# Patient Record
Sex: MALE | Race: BLACK OR AFRICAN AMERICAN | Employment: OTHER | URBAN - METROPOLITAN AREA
[De-identification: names, ages, dates, MRNs, and addresses within clinical notes are randomized per-mention and may not be internally consistent; named-entity substitution may affect disease eponyms.]

---

## 2020-03-31 ENCOUNTER — HOSPITAL ENCOUNTER (INPATIENT)
Age: 65
LOS: 8 days | Discharge: HOME | DRG: 177 | End: 2020-04-08
Attending: EMERGENCY MEDICINE | Admitting: INTERNAL MEDICINE
Payer: MEDICARE

## 2020-03-31 ENCOUNTER — APPOINTMENT (OUTPATIENT)
Dept: GENERAL RADIOLOGY | Age: 65
DRG: 177 | End: 2020-03-31
Payer: MEDICARE

## 2020-03-31 PROBLEM — N18.6 ESRD ON DIALYSIS (HCC): Status: ACTIVE | Noted: 2020-03-31

## 2020-03-31 PROBLEM — Z99.2 ESRD ON DIALYSIS (HCC): Status: ACTIVE | Noted: 2020-03-31

## 2020-03-31 PROBLEM — J44.1 COPD EXACERBATION (HCC): Status: ACTIVE | Noted: 2020-03-31

## 2020-03-31 PROBLEM — J18.9 HCAP (HEALTHCARE-ASSOCIATED PNEUMONIA): Status: ACTIVE | Noted: 2020-03-31

## 2020-03-31 LAB
ANION GAP SERPL CALCULATED.3IONS-SCNC: 16 MMOL/L (ref 3–16)
BASE EXCESS VENOUS: 12.6 MMOL/L
BASOPHILS ABSOLUTE: 0 K/UL (ref 0–0.2)
BASOPHILS RELATIVE PERCENT: 0.8 %
BUN BLDV-MCNC: 13 MG/DL (ref 7–20)
CALCIUM SERPL-MCNC: 8.9 MG/DL (ref 8.3–10.6)
CARBOXYHEMOGLOBIN: 2 %
CHLORIDE BLD-SCNC: 94 MMOL/L (ref 99–110)
CO2: 29 MMOL/L (ref 21–32)
CREAT SERPL-MCNC: 2.6 MG/DL (ref 0.8–1.3)
EOSINOPHILS ABSOLUTE: 0.1 K/UL (ref 0–0.6)
EOSINOPHILS RELATIVE PERCENT: 1.4 %
GFR AFRICAN AMERICAN: 30
GFR NON-AFRICAN AMERICAN: 25
GLUCOSE BLD-MCNC: 93 MG/DL (ref 70–99)
HCO3 VENOUS: 38 MMOL/L (ref 23–29)
HCT VFR BLD CALC: 30.5 % (ref 40.5–52.5)
HEMOGLOBIN: 10.2 G/DL (ref 13.5–17.5)
LACTIC ACID: 2.1 MMOL/L (ref 0.4–2)
LYMPHOCYTES ABSOLUTE: 0.5 K/UL (ref 1–5.1)
LYMPHOCYTES RELATIVE PERCENT: 10 %
MAGNESIUM: 2.2 MG/DL (ref 1.8–2.4)
MCH RBC QN AUTO: 31.8 PG (ref 26–34)
MCHC RBC AUTO-ENTMCNC: 33.3 G/DL (ref 31–36)
MCV RBC AUTO: 95.5 FL (ref 80–100)
METHEMOGLOBIN VENOUS: 0.7 %
MONOCYTES ABSOLUTE: 0.3 K/UL (ref 0–1.3)
MONOCYTES RELATIVE PERCENT: 6.1 %
NEUTROPHILS ABSOLUTE: 4.1 K/UL (ref 1.7–7.7)
NEUTROPHILS RELATIVE PERCENT: 81.7 %
O2 CONTENT, VEN: 9 ML/DL
O2 SAT, VEN: 69 %
O2 THERAPY: ABNORMAL
PCO2, VEN: 49.3 MMHG (ref 40–50)
PDW BLD-RTO: 16.2 % (ref 12.4–15.4)
PH VENOUS: 7.49 (ref 7.35–7.45)
PLATELET # BLD: 131 K/UL (ref 135–450)
PMV BLD AUTO: 8.7 FL (ref 5–10.5)
PO2, VEN: 36 MMHG
POTASSIUM REFLEX MAGNESIUM: 3.3 MMOL/L (ref 3.5–5.1)
PRO-BNP: ABNORMAL PG/ML (ref 0–124)
PROCALCITONIN: 0.81 NG/ML (ref 0–0.15)
RBC # BLD: 3.2 M/UL (ref 4.2–5.9)
SODIUM BLD-SCNC: 139 MMOL/L (ref 136–145)
TCO2 CALC VENOUS: 39 MMOL/L
TROPONIN: 0.1 NG/ML
WBC # BLD: 5.1 K/UL (ref 4–11)

## 2020-03-31 PROCEDURE — 96375 TX/PRO/DX INJ NEW DRUG ADDON: CPT

## 2020-03-31 PROCEDURE — 85025 COMPLETE CBC W/AUTO DIFF WBC: CPT

## 2020-03-31 PROCEDURE — 1200000000 HC SEMI PRIVATE

## 2020-03-31 PROCEDURE — 93005 ELECTROCARDIOGRAM TRACING: CPT | Performed by: INTERNAL MEDICINE

## 2020-03-31 PROCEDURE — 2580000003 HC RX 258: Performed by: NURSE PRACTITIONER

## 2020-03-31 PROCEDURE — 83735 ASSAY OF MAGNESIUM: CPT

## 2020-03-31 PROCEDURE — 6370000000 HC RX 637 (ALT 250 FOR IP): Performed by: EMERGENCY MEDICINE

## 2020-03-31 PROCEDURE — 87205 SMEAR GRAM STAIN: CPT

## 2020-03-31 PROCEDURE — 6360000002 HC RX W HCPCS: Performed by: NURSE PRACTITIONER

## 2020-03-31 PROCEDURE — 83880 ASSAY OF NATRIURETIC PEPTIDE: CPT

## 2020-03-31 PROCEDURE — 99285 EMERGENCY DEPT VISIT HI MDM: CPT

## 2020-03-31 PROCEDURE — 80048 BASIC METABOLIC PNL TOTAL CA: CPT

## 2020-03-31 PROCEDURE — 2580000003 HC RX 258: Performed by: EMERGENCY MEDICINE

## 2020-03-31 PROCEDURE — 94640 AIRWAY INHALATION TREATMENT: CPT

## 2020-03-31 PROCEDURE — 0100U HC RESPIRPTHGN MULT REV TRANS & AMP PRB TECH 21 TRGT: CPT

## 2020-03-31 PROCEDURE — 71045 X-RAY EXAM CHEST 1 VIEW: CPT

## 2020-03-31 PROCEDURE — 84145 PROCALCITONIN (PCT): CPT

## 2020-03-31 PROCEDURE — 94664 DEMO&/EVAL PT USE INHALER: CPT

## 2020-03-31 PROCEDURE — 96366 THER/PROPH/DIAG IV INF ADDON: CPT

## 2020-03-31 PROCEDURE — 83605 ASSAY OF LACTIC ACID: CPT

## 2020-03-31 PROCEDURE — 84484 ASSAY OF TROPONIN QUANT: CPT

## 2020-03-31 PROCEDURE — 6360000002 HC RX W HCPCS: Performed by: EMERGENCY MEDICINE

## 2020-03-31 PROCEDURE — 87070 CULTURE OTHR SPECIMN AEROBIC: CPT

## 2020-03-31 PROCEDURE — 96365 THER/PROPH/DIAG IV INF INIT: CPT

## 2020-03-31 PROCEDURE — 82803 BLOOD GASES ANY COMBINATION: CPT

## 2020-03-31 PROCEDURE — 93005 ELECTROCARDIOGRAM TRACING: CPT | Performed by: EMERGENCY MEDICINE

## 2020-03-31 PROCEDURE — U0002 COVID-19 LAB TEST NON-CDC: HCPCS

## 2020-03-31 PROCEDURE — 6370000000 HC RX 637 (ALT 250 FOR IP): Performed by: NURSE PRACTITIONER

## 2020-03-31 RX ORDER — SODIUM CHLORIDE 9 MG/ML
INJECTION, SOLUTION INTRAVENOUS CONTINUOUS
Status: DISCONTINUED | OUTPATIENT
Start: 2020-03-31 | End: 2020-04-01

## 2020-03-31 RX ORDER — OMEPRAZOLE 20 MG/1
20 CAPSULE, DELAYED RELEASE ORAL DAILY
Status: ON HOLD | COMMUNITY
End: 2020-04-08 | Stop reason: SDUPTHER

## 2020-03-31 RX ORDER — ACETAMINOPHEN 325 MG/1
650 TABLET ORAL EVERY 4 HOURS PRN
Status: DISCONTINUED | OUTPATIENT
Start: 2020-03-31 | End: 2020-04-08 | Stop reason: HOSPADM

## 2020-03-31 RX ORDER — TORSEMIDE 20 MG/1
40 TABLET ORAL DAILY
Status: DISCONTINUED | OUTPATIENT
Start: 2020-04-01 | End: 2020-04-08 | Stop reason: HOSPADM

## 2020-03-31 RX ORDER — BUDESONIDE AND FORMOTEROL FUMARATE DIHYDRATE 160; 4.5 UG/1; UG/1
2 AEROSOL RESPIRATORY (INHALATION) 2 TIMES DAILY
COMMUNITY
End: 2022-06-15 | Stop reason: ALTCHOICE

## 2020-03-31 RX ORDER — ACETAMINOPHEN 650 MG/1
650 SUPPOSITORY RECTAL EVERY 4 HOURS PRN
Status: DISCONTINUED | OUTPATIENT
Start: 2020-03-31 | End: 2020-04-08 | Stop reason: HOSPADM

## 2020-03-31 RX ORDER — BUDESONIDE AND FORMOTEROL FUMARATE DIHYDRATE 160; 4.5 UG/1; UG/1
2 AEROSOL RESPIRATORY (INHALATION) 2 TIMES DAILY
Status: DISCONTINUED | OUTPATIENT
Start: 2020-03-31 | End: 2020-04-08 | Stop reason: HOSPADM

## 2020-03-31 RX ORDER — CARVEDILOL 12.5 MG/1
12.5 TABLET ORAL 2 TIMES DAILY
COMMUNITY

## 2020-03-31 RX ORDER — METHYLPREDNISOLONE SODIUM SUCCINATE 125 MG/2ML
125 INJECTION, POWDER, LYOPHILIZED, FOR SOLUTION INTRAMUSCULAR; INTRAVENOUS ONCE
Status: COMPLETED | OUTPATIENT
Start: 2020-03-31 | End: 2020-03-31

## 2020-03-31 RX ORDER — ALLOPURINOL 100 MG/1
100 TABLET ORAL DAILY
Status: DISCONTINUED | OUTPATIENT
Start: 2020-04-01 | End: 2020-04-08 | Stop reason: HOSPADM

## 2020-03-31 RX ORDER — ATORVASTATIN CALCIUM 80 MG/1
80 TABLET, FILM COATED ORAL DAILY
Status: DISCONTINUED | OUTPATIENT
Start: 2020-04-01 | End: 2020-04-08 | Stop reason: HOSPADM

## 2020-03-31 RX ORDER — ALBUTEROL SULFATE 90 UG/1
2 AEROSOL, METERED RESPIRATORY (INHALATION) EVERY 6 HOURS PRN
COMMUNITY

## 2020-03-31 RX ORDER — ASPIRIN 81 MG/1
81 TABLET, CHEWABLE ORAL DAILY
Status: DISCONTINUED | OUTPATIENT
Start: 2020-04-01 | End: 2020-04-08 | Stop reason: HOSPADM

## 2020-03-31 RX ORDER — PROMETHAZINE HYDROCHLORIDE 25 MG/1
12.5 TABLET ORAL EVERY 6 HOURS PRN
Status: DISCONTINUED | OUTPATIENT
Start: 2020-03-31 | End: 2020-04-01

## 2020-03-31 RX ORDER — SODIUM CHLORIDE 0.9 % (FLUSH) 0.9 %
10 SYRINGE (ML) INJECTION PRN
Status: DISCONTINUED | OUTPATIENT
Start: 2020-03-31 | End: 2020-04-08 | Stop reason: HOSPADM

## 2020-03-31 RX ORDER — ACETAMINOPHEN 650 MG/1
650 SUPPOSITORY RECTAL EVERY 6 HOURS PRN
Status: DISCONTINUED | OUTPATIENT
Start: 2020-03-31 | End: 2020-03-31

## 2020-03-31 RX ORDER — GABAPENTIN 300 MG/1
300 CAPSULE ORAL NIGHTLY
Status: DISCONTINUED | OUTPATIENT
Start: 2020-03-31 | End: 2020-04-08 | Stop reason: HOSPADM

## 2020-03-31 RX ORDER — ATORVASTATIN CALCIUM 80 MG/1
80 TABLET, FILM COATED ORAL DAILY
Status: ON HOLD | COMMUNITY
End: 2020-04-08 | Stop reason: SDUPTHER

## 2020-03-31 RX ORDER — LEVOFLOXACIN 5 MG/ML
250 INJECTION, SOLUTION INTRAVENOUS EVERY 24 HOURS
Status: DISCONTINUED | OUTPATIENT
Start: 2020-03-31 | End: 2020-04-01

## 2020-03-31 RX ORDER — ACETAMINOPHEN 325 MG/1
650 TABLET ORAL EVERY 6 HOURS PRN
Status: DISCONTINUED | OUTPATIENT
Start: 2020-03-31 | End: 2020-03-31

## 2020-03-31 RX ORDER — TRAZODONE HYDROCHLORIDE 150 MG/1
150 TABLET ORAL NIGHTLY
Status: ON HOLD | COMMUNITY
End: 2020-04-08 | Stop reason: SDUPTHER

## 2020-03-31 RX ORDER — HYDRALAZINE HYDROCHLORIDE 25 MG/1
25 TABLET, FILM COATED ORAL 2 TIMES DAILY
Status: DISCONTINUED | OUTPATIENT
Start: 2020-03-31 | End: 2020-04-08

## 2020-03-31 RX ORDER — SODIUM CHLORIDE 0.9 % (FLUSH) 0.9 %
10 SYRINGE (ML) INJECTION EVERY 12 HOURS SCHEDULED
Status: DISCONTINUED | OUTPATIENT
Start: 2020-03-31 | End: 2020-04-08 | Stop reason: HOSPADM

## 2020-03-31 RX ORDER — ALLOPURINOL 100 MG/1
100 TABLET ORAL
COMMUNITY

## 2020-03-31 RX ORDER — PREDNISONE 20 MG/1
60 TABLET ORAL ONCE
Status: DISCONTINUED | OUTPATIENT
Start: 2020-03-31 | End: 2020-03-31

## 2020-03-31 RX ORDER — ALBUTEROL SULFATE 90 UG/1
2 AEROSOL, METERED RESPIRATORY (INHALATION) ONCE
Status: COMPLETED | OUTPATIENT
Start: 2020-03-31 | End: 2020-03-31

## 2020-03-31 RX ORDER — METHYLPREDNISOLONE SODIUM SUCCINATE 40 MG/ML
40 INJECTION, POWDER, LYOPHILIZED, FOR SOLUTION INTRAMUSCULAR; INTRAVENOUS DAILY
Status: DISCONTINUED | OUTPATIENT
Start: 2020-04-01 | End: 2020-04-01

## 2020-03-31 RX ORDER — ALBUTEROL SULFATE 90 UG/1
2 AEROSOL, METERED RESPIRATORY (INHALATION) EVERY 6 HOURS PRN
Status: DISCONTINUED | OUTPATIENT
Start: 2020-03-31 | End: 2020-04-01

## 2020-03-31 RX ORDER — ONDANSETRON 2 MG/ML
4 INJECTION INTRAMUSCULAR; INTRAVENOUS EVERY 6 HOURS PRN
Status: DISCONTINUED | OUTPATIENT
Start: 2020-03-31 | End: 2020-04-01

## 2020-03-31 RX ORDER — CARVEDILOL 25 MG/1
25 TABLET ORAL 2 TIMES DAILY WITH MEALS
Status: DISCONTINUED | OUTPATIENT
Start: 2020-04-01 | End: 2020-04-06

## 2020-03-31 RX ADMIN — SODIUM CHLORIDE, PRESERVATIVE FREE 10 ML: 5 INJECTION INTRAVENOUS at 21:47

## 2020-03-31 RX ADMIN — METHYLPREDNISOLONE SODIUM SUCCINATE 125 MG: 125 INJECTION, POWDER, FOR SOLUTION INTRAMUSCULAR; INTRAVENOUS at 21:49

## 2020-03-31 RX ADMIN — TRAZODONE HYDROCHLORIDE 150 MG: 100 TABLET ORAL at 21:49

## 2020-03-31 RX ADMIN — SODIUM CHLORIDE: 9 INJECTION, SOLUTION INTRAVENOUS at 21:47

## 2020-03-31 RX ADMIN — HYDRALAZINE HYDROCHLORIDE 25 MG: 25 TABLET, FILM COATED ORAL at 21:50

## 2020-03-31 RX ADMIN — GABAPENTIN 300 MG: 300 CAPSULE ORAL at 21:49

## 2020-03-31 RX ADMIN — LEVOFLOXACIN 250 MG: 5 INJECTION, SOLUTION INTRAVENOUS at 21:49

## 2020-03-31 RX ADMIN — BUDESONIDE AND FORMOTEROL FUMARATE DIHYDRATE 2 PUFF: 160; 4.5 AEROSOL RESPIRATORY (INHALATION) at 21:51

## 2020-03-31 RX ADMIN — CEFEPIME 1 G: 1 INJECTION, POWDER, FOR SOLUTION INTRAMUSCULAR; INTRAVENOUS at 17:57

## 2020-03-31 RX ADMIN — VANCOMYCIN HYDROCHLORIDE 1000 MG: 1 INJECTION, POWDER, LYOPHILIZED, FOR SOLUTION INTRAVENOUS at 17:59

## 2020-03-31 RX ADMIN — Medication 2 PUFF: at 16:55

## 2020-03-31 SDOH — HEALTH STABILITY: MENTAL HEALTH: HOW OFTEN DO YOU HAVE A DRINK CONTAINING ALCOHOL?: NEVER

## 2020-03-31 ASSESSMENT — ENCOUNTER SYMPTOMS
DIARRHEA: 0
ANAL BLEEDING: 0
WHEEZING: 0
COUGH: 1
BLOOD IN STOOL: 0
BACK PAIN: 0
CONSTIPATION: 0
ABDOMINAL DISTENTION: 0
EYE PAIN: 0
PHOTOPHOBIA: 0
CHOKING: 0
STRIDOR: 0
EYE ITCHING: 0
EYE DISCHARGE: 0
RECTAL PAIN: 0
ABDOMINAL PAIN: 0
APNEA: 0
VOMITING: 0
EYE REDNESS: 0
SHORTNESS OF BREATH: 1
COLOR CHANGE: 0
CHEST TIGHTNESS: 0
NAUSEA: 0

## 2020-03-31 ASSESSMENT — PAIN DESCRIPTION - PAIN TYPE: TYPE: ACUTE PAIN

## 2020-03-31 ASSESSMENT — PAIN DESCRIPTION - LOCATION: LOCATION: RIB CAGE

## 2020-03-31 ASSESSMENT — PAIN DESCRIPTION - ONSET: ONSET: PROGRESSIVE

## 2020-03-31 ASSESSMENT — PAIN SCALES - GENERAL
PAINLEVEL_OUTOF10: 6
PAINLEVEL_OUTOF10: 0

## 2020-03-31 ASSESSMENT — PAIN DESCRIPTION - FREQUENCY: FREQUENCY: CONTINUOUS

## 2020-03-31 ASSESSMENT — PAIN DESCRIPTION - DESCRIPTORS: DESCRIPTORS: ACHING

## 2020-03-31 NOTE — H&P
Provider, MD   aspirin 81 MG tablet Take 81 mg by mouth daily   Yes Historical Provider, MD   VITAMIN D PO Take by mouth Vitamin D2. 50,000 units once a week   Yes Historical Provider, MD   TORSEMIDE PO Take 40 mg by mouth daily   Yes Historical Provider, MD   tiotropium (SPIRIVA) 18 MCG inhalation capsule Inhale 18 mcg into the lungs daily   Yes Historical Provider, MD   budesonide-formoterol (SYMBICORT) 160-4.5 MCG/ACT AERO Inhale 2 puffs into the lungs 2 times daily   Yes Historical Provider, MD   albuterol sulfate HFA (VENTOLIN HFA) 108 (90 Base) MCG/ACT inhaler Inhale 2 puffs into the lungs every 6 hours as needed for Wheezing 90mcg/actuation   Yes Historical Provider, MD   allopurinol (ZYLOPRIM) 100 MG tablet Take 100 mg by mouth daily   Yes Historical Provider, MD   atorvastatin (LIPITOR) 80 MG tablet Take 80 mg by mouth daily   Yes Historical Provider, MD   OMEPRAZOLE PO Take by mouth 0.4mg cap daily   Yes Historical Provider, MD   traZODone (DESYREL) 150 MG tablet Take 150 mg by mouth nightly   Yes Historical Provider, MD       Allergies:  Patient has no known allergies. Social History:      The patient currently lives home    TOBACCO:   reports that he has quit smoking. He has never used smokeless tobacco.  ETOH:   reports current alcohol use. Family History:      Reviewed in detail positive as follows:    History reviewed. No pertinent family history. REVIEW OF SYSTEMS:   Pertinent positives as noted in the HPI. All other systems reviewed and negative. PHYSICAL EXAM PERFORMED:    BP (!) 168/98   Pulse 101   Temp 98.5 °F (36.9 °C) (Oral)   Resp 30   Ht 5' 8\" (1.727 m)   Wt 129 lb 13.6 oz (58.9 kg)   SpO2 100%   BMI 19.74 kg/m²     General appearance: Well-developed, well-nourished male in no apparent distress, appears stated age and cooperative. Positive cough but no fever. HEENT:  Normal cephalic, atraumatic without obvious deformity. Pupils equal, round, and reactive to light. Extra ocular muscles intact. Conjunctivae/corneas clear. Neck: Supple, with full range of motion. No jugular venous distention. Trachea midline. Respiratory:  Normal respiratory effort. Bibasilar Rales with occasional wheeze. No accessory muscle use  Cardiovascular:  Regular rate and rhythm without murmurs, rubs or gallops. No lower extremity edema   Abdomen: Soft, non-tender, non-distended, without rebound or guarding. Normal bowel sounds. Musculoskeletal:  No clubbing, cyanosis or edema bilaterally. Full range of motion without deformity. Skin: Skin color, texture, turgor normal.  No rashes or lesions. Neurologic:  Neurovascularly intact without any focal sensory/motor deficits. Cranial nerves: II-XII intact, grossly non-focal.  Psychiatric:  Alert and oriented, thought content appropriate, normal insight  Capillary Refill: Brisk,< 3 seconds   Peripheral Pulses: +2 palpable, equal bilaterally       Labs:     Recent Labs     03/31/20  1645   WBC 5.1   HGB 10.2*   HCT 30.5*   *     Recent Labs     03/31/20  1645      K 3.3*   CL 94*   CO2 29   BUN 13   CREATININE 2.6*   CALCIUM 8.9     No results for input(s): AST, ALT, BILIDIR, BILITOT, ALKPHOS in the last 72 hours. No results for input(s): INR in the last 72 hours. Recent Labs     03/31/20  1645   TROPONINI 0.10*       Urinalysis:    No results found for: Sidhu Giuliana, BACTERIA, RBCUA, BLOODU, SPECGRAV, GLUCOSEU    Radiology:       XR CHEST PORTABLE   Final Result   Mild interstitial/reticular opacities to the lungs bilaterally along with   mild hazy opacities. Differential considerations include mild pulmonary and   interstitial edema as well as nonspecific infectious/inflammatory process to   include atypical/viral pneumonia. Underlying chronic interstitial/fibrotic   change not excluded.              ASSESSMENT:    Active Hospital Problems    Diagnosis Date Noted    HCAP (healthcare-associated pneumonia) [J18.9] 03/31/2020    COPD

## 2020-03-31 NOTE — ED TRIAGE NOTES
Pt presents to ED via EMS from dialysis center with c/o of SOB and cough. Completed dialysis. Pt reports \"is always short of breath\" but it has worsened. +nonproductive cough. Pt reports has been tested for COVID-19 but tested negative. +COPD. ST on monitor. Pt reports wears PRN O2 at night but is requiring 2L NC to maintain an O2 sat >90%. EKG performed on arrival. Call light within reach. Bed in lowest position. Will continue to monitor.

## 2020-03-31 NOTE — ED NOTES
Handoff report to Kayley Dyer RN to assume care. Denies further questions.       Robert Forrest RN  03/31/20 1935

## 2020-04-01 LAB
ANION GAP SERPL CALCULATED.3IONS-SCNC: 17 MMOL/L (ref 3–16)
ANISOCYTOSIS: ABNORMAL
BASOPHILS ABSOLUTE: 0 K/UL (ref 0–0.2)
BASOPHILS RELATIVE PERCENT: 0.3 %
BUN BLDV-MCNC: 26 MG/DL (ref 7–20)
C-REACTIVE PROTEIN: 22.6 MG/L (ref 0–5.1)
CALCIUM SERPL-MCNC: 9.1 MG/DL (ref 8.3–10.6)
CHLORIDE BLD-SCNC: 94 MMOL/L (ref 99–110)
CO2: 28 MMOL/L (ref 21–32)
CREAT SERPL-MCNC: 4.3 MG/DL (ref 0.8–1.3)
EKG ATRIAL RATE: 106 BPM
EKG ATRIAL RATE: 169 BPM
EKG DIAGNOSIS: NORMAL
EKG DIAGNOSIS: NORMAL
EKG P AXIS: 22 DEGREES
EKG P AXIS: 66 DEGREES
EKG P-R INTERVAL: 142 MS
EKG P-R INTERVAL: 192 MS
EKG Q-T INTERVAL: 294 MS
EKG Q-T INTERVAL: 344 MS
EKG QRS DURATION: 88 MS
EKG QRS DURATION: 92 MS
EKG QTC CALCULATION (BAZETT): 456 MS
EKG QTC CALCULATION (BAZETT): 492 MS
EKG R AXIS: -60 DEGREES
EKG R AXIS: 102 DEGREES
EKG T AXIS: 106 DEGREES
EKG T AXIS: 73 DEGREES
EKG VENTRICULAR RATE: 106 BPM
EKG VENTRICULAR RATE: 169 BPM
EOSINOPHILS ABSOLUTE: 0 K/UL (ref 0–0.6)
EOSINOPHILS RELATIVE PERCENT: 0 %
FERRITIN: 1078 NG/ML (ref 30–400)
GFR AFRICAN AMERICAN: 17
GFR NON-AFRICAN AMERICAN: 14
GLUCOSE BLD-MCNC: 63 MG/DL (ref 70–99)
HCT VFR BLD CALC: 30.1 % (ref 40.5–52.5)
HEMOGLOBIN: 9.9 G/DL (ref 13.5–17.5)
INR BLD: 1.03 (ref 0.86–1.14)
L. PNEUMOPHILA SEROGP 1 UR AG: NORMAL
LACTATE DEHYDROGENASE: 259 U/L (ref 100–190)
LYMPHOCYTES ABSOLUTE: 0.3 K/UL (ref 1–5.1)
LYMPHOCYTES RELATIVE PERCENT: 6.2 %
MAGNESIUM: 2.2 MG/DL (ref 1.8–2.4)
MCH RBC QN AUTO: 31.6 PG (ref 26–34)
MCHC RBC AUTO-ENTMCNC: 32.8 G/DL (ref 31–36)
MCV RBC AUTO: 96.5 FL (ref 80–100)
MONOCYTES ABSOLUTE: 0.4 K/UL (ref 0–1.3)
MONOCYTES RELATIVE PERCENT: 9.2 %
MRSA SCREEN RT-PCR: NORMAL
NEUTROPHILS ABSOLUTE: 3.7 K/UL (ref 1.7–7.7)
NEUTROPHILS RELATIVE PERCENT: 84.3 %
OVALOCYTES: ABNORMAL
PDW BLD-RTO: 16.4 % (ref 12.4–15.4)
PLATELET # BLD: 99 K/UL (ref 135–450)
PLATELET SLIDE REVIEW: ABNORMAL
PMV BLD AUTO: 9.1 FL (ref 5–10.5)
POLYCHROMASIA: ABNORMAL
POTASSIUM SERPL-SCNC: 4.1 MMOL/L (ref 3.5–5.1)
PROTHROMBIN TIME: 12 SEC (ref 10–13.2)
RBC # BLD: 3.12 M/UL (ref 4.2–5.9)
REPORT: NORMAL
RESPIRATORY PANEL PCR: NORMAL
SLIDE REVIEW: ABNORMAL
SODIUM BLD-SCNC: 139 MMOL/L (ref 136–145)
STREP PNEUMONIAE ANTIGEN, URINE: NORMAL
VANCOMYCIN RANDOM: 17 UG/ML
WBC # BLD: 4.4 K/UL (ref 4–11)

## 2020-04-01 PROCEDURE — 87449 NOS EACH ORGANISM AG IA: CPT

## 2020-04-01 PROCEDURE — 6360000002 HC RX W HCPCS: Performed by: NURSE PRACTITIONER

## 2020-04-01 PROCEDURE — 94761 N-INVAS EAR/PLS OXIMETRY MLT: CPT

## 2020-04-01 PROCEDURE — 2580000003 HC RX 258: Performed by: NURSE PRACTITIONER

## 2020-04-01 PROCEDURE — 6360000002 HC RX W HCPCS: Performed by: FAMILY MEDICINE

## 2020-04-01 PROCEDURE — 2580000003 HC RX 258: Performed by: INTERNAL MEDICINE

## 2020-04-01 PROCEDURE — 85025 COMPLETE CBC W/AUTO DIFF WBC: CPT

## 2020-04-01 PROCEDURE — 82728 ASSAY OF FERRITIN: CPT

## 2020-04-01 PROCEDURE — 6370000000 HC RX 637 (ALT 250 FOR IP): Performed by: NURSE PRACTITIONER

## 2020-04-01 PROCEDURE — 94640 AIRWAY INHALATION TREATMENT: CPT

## 2020-04-01 PROCEDURE — 2700000000 HC OXYGEN THERAPY PER DAY

## 2020-04-01 PROCEDURE — 83615 LACTATE (LD) (LDH) ENZYME: CPT

## 2020-04-01 PROCEDURE — 1200000000 HC SEMI PRIVATE

## 2020-04-01 PROCEDURE — 93010 ELECTROCARDIOGRAM REPORT: CPT | Performed by: INTERNAL MEDICINE

## 2020-04-01 PROCEDURE — 80202 ASSAY OF VANCOMYCIN: CPT

## 2020-04-01 PROCEDURE — 2580000003 HC RX 258: Performed by: FAMILY MEDICINE

## 2020-04-01 PROCEDURE — 6370000000 HC RX 637 (ALT 250 FOR IP): Performed by: INTERNAL MEDICINE

## 2020-04-01 PROCEDURE — 87641 MR-STAPH DNA AMP PROBE: CPT

## 2020-04-01 PROCEDURE — 80048 BASIC METABOLIC PNL TOTAL CA: CPT

## 2020-04-01 PROCEDURE — 6360000002 HC RX W HCPCS: Performed by: INTERNAL MEDICINE

## 2020-04-01 PROCEDURE — 85610 PROTHROMBIN TIME: CPT

## 2020-04-01 PROCEDURE — 83735 ASSAY OF MAGNESIUM: CPT

## 2020-04-01 PROCEDURE — 6370000000 HC RX 637 (ALT 250 FOR IP): Performed by: FAMILY MEDICINE

## 2020-04-01 PROCEDURE — 86140 C-REACTIVE PROTEIN: CPT

## 2020-04-01 RX ORDER — BENZONATATE 100 MG/1
200 CAPSULE ORAL 3 TIMES DAILY PRN
Status: DISCONTINUED | OUTPATIENT
Start: 2020-04-01 | End: 2020-04-08 | Stop reason: HOSPADM

## 2020-04-01 RX ORDER — ALBUTEROL SULFATE 90 UG/1
2 AEROSOL, METERED RESPIRATORY (INHALATION) EVERY 4 HOURS PRN
Status: DISCONTINUED | OUTPATIENT
Start: 2020-04-01 | End: 2020-04-07

## 2020-04-01 RX ORDER — HEPARIN SODIUM 5000 [USP'U]/ML
5000 INJECTION, SOLUTION INTRAVENOUS; SUBCUTANEOUS EVERY 8 HOURS SCHEDULED
Status: DISCONTINUED | OUTPATIENT
Start: 2020-04-02 | End: 2020-04-04

## 2020-04-01 RX ORDER — ONDANSETRON 2 MG/ML
4 INJECTION INTRAMUSCULAR; INTRAVENOUS EVERY 6 HOURS PRN
Status: DISCONTINUED | OUTPATIENT
Start: 2020-04-01 | End: 2020-04-08 | Stop reason: HOSPADM

## 2020-04-01 RX ORDER — PROMETHAZINE HYDROCHLORIDE AND CODEINE PHOSPHATE 6.25; 1 MG/5ML; MG/5ML
5 SOLUTION ORAL NIGHTLY PRN
Status: DISCONTINUED | OUTPATIENT
Start: 2020-04-01 | End: 2020-04-03

## 2020-04-01 RX ORDER — POTASSIUM CHLORIDE 750 MG/1
10 TABLET, FILM COATED, EXTENDED RELEASE ORAL ONCE
Status: COMPLETED | OUTPATIENT
Start: 2020-04-01 | End: 2020-04-01

## 2020-04-01 RX ADMIN — TORSEMIDE 40 MG: 20 TABLET ORAL at 08:17

## 2020-04-01 RX ADMIN — ATORVASTATIN CALCIUM 80 MG: 80 TABLET, FILM COATED ORAL at 08:17

## 2020-04-01 RX ADMIN — CARVEDILOL 25 MG: 25 TABLET, FILM COATED ORAL at 17:52

## 2020-04-01 RX ADMIN — HYDRALAZINE HYDROCHLORIDE 25 MG: 25 TABLET, FILM COATED ORAL at 08:17

## 2020-04-01 RX ADMIN — VANCOMYCIN HYDROCHLORIDE 250 MG: 500 INJECTION, POWDER, LYOPHILIZED, FOR SOLUTION INTRAVENOUS at 15:35

## 2020-04-01 RX ADMIN — POTASSIUM CHLORIDE 10 MEQ: 750 TABLET, FILM COATED, EXTENDED RELEASE ORAL at 13:41

## 2020-04-01 RX ADMIN — ASPIRIN 81 MG 81 MG: 81 TABLET ORAL at 08:18

## 2020-04-01 RX ADMIN — AZITHROMYCIN DIHYDRATE 500 MG: 500 INJECTION, POWDER, LYOPHILIZED, FOR SOLUTION INTRAVENOUS at 20:48

## 2020-04-01 RX ADMIN — Medication 2 PUFF: at 08:13

## 2020-04-01 RX ADMIN — BUDESONIDE AND FORMOTEROL FUMARATE DIHYDRATE 2 PUFF: 160; 4.5 AEROSOL RESPIRATORY (INHALATION) at 08:16

## 2020-04-01 RX ADMIN — ENOXAPARIN SODIUM 30 MG: 30 INJECTION SUBCUTANEOUS at 08:17

## 2020-04-01 RX ADMIN — Medication 2 PUFF: at 14:01

## 2020-04-01 RX ADMIN — GABAPENTIN 300 MG: 300 CAPSULE ORAL at 20:49

## 2020-04-01 RX ADMIN — CARVEDILOL 25 MG: 25 TABLET, FILM COATED ORAL at 08:17

## 2020-04-01 RX ADMIN — BENZONATATE 200 MG: 100 CAPSULE ORAL at 17:52

## 2020-04-01 RX ADMIN — TRAZODONE HYDROCHLORIDE 150 MG: 100 TABLET ORAL at 20:48

## 2020-04-01 RX ADMIN — CEFEPIME HYDROCHLORIDE 1 G: 1 INJECTION, POWDER, FOR SOLUTION INTRAMUSCULAR; INTRAVENOUS at 05:07

## 2020-04-01 RX ADMIN — ALLOPURINOL 100 MG: 100 TABLET ORAL at 08:18

## 2020-04-01 RX ADMIN — HYDRALAZINE HYDROCHLORIDE 25 MG: 25 TABLET, FILM COATED ORAL at 20:49

## 2020-04-01 RX ADMIN — METHYLPREDNISOLONE SODIUM SUCCINATE 40 MG: 40 INJECTION, POWDER, FOR SOLUTION INTRAMUSCULAR; INTRAVENOUS at 08:17

## 2020-04-01 RX ADMIN — SODIUM CHLORIDE, PRESERVATIVE FREE 10 ML: 5 INJECTION INTRAVENOUS at 08:18

## 2020-04-01 RX ADMIN — CEFEPIME HYDROCHLORIDE 1 G: 1 INJECTION, POWDER, FOR SOLUTION INTRAMUSCULAR; INTRAVENOUS at 17:52

## 2020-04-01 RX ADMIN — SODIUM CHLORIDE, PRESERVATIVE FREE 10 ML: 5 INJECTION INTRAVENOUS at 20:50

## 2020-04-01 RX ADMIN — BUDESONIDE AND FORMOTEROL FUMARATE DIHYDRATE 2 PUFF: 160; 4.5 AEROSOL RESPIRATORY (INHALATION) at 20:49

## 2020-04-01 ASSESSMENT — PAIN SCALES - GENERAL: PAINLEVEL_OUTOF10: 0

## 2020-04-01 NOTE — PROGRESS NOTES
Pt had no complaints overnight. Pt did desat with exertion and when he took his O2 off he desats to 78%. Pt is still tachypneic.

## 2020-04-01 NOTE — PROGRESS NOTES
Clinical Pharmacy Note  Vancomycin Consult    Melvin Albrecht is a 60 y.o. male ordered Vancomycin for HCAP; consult received from   to manage therapy. Also receiving LQ. Patient Active Problem List   Diagnosis    HCAP (healthcare-associated pneumonia)    COPD exacerbation ([facility] [address]    ESRD on dialysis ([facility] [address]       Allergies:  Patient has no known allergies. Temp max:  Temp (24hrs), Av.2 °F (36.8 °C), Min:97.8 °F (36.6 °C), Max:98.5 °F (36.9 °C)      Recent Labs     20  1645 20  1350   WBC 5.1 4.4       Recent Labs     20  1645 20  1350   BUN 13 26*   CREATININE 2.6* 4.3*         Intake/Output Summary (Last 24 hours) at 2020 1445  Last data filed at 2020 1200  Gross per 24 hour   Intake 450 ml   Output --   Net 450 ml       Culture Results:      Ht Readings from Last 1 Encounters:   20 5' 8\" (1.727 m)        Wt Readings from Last 1 Encounters:   20 130 lb 11.7 oz (59.3 kg)         Estimated Creatinine Clearance: 15 mL/min (A) (based on SCr of 4.3 mg/dL (H)). Assessment/Plan:  Vanco trough on day #2 17mcg/ml after 1gm dose at 1800 last pm  Vancomycin 250 mg IVx1  ordered. Regimen projects a trough level of 15-20 mg/L. Level ordered for 20 0600. Patient on HD t-th-sat  . Thank you for the consult. Will continue to follow.

## 2020-04-01 NOTE — CONSULTS
not smoke or drink. FAMILY HISTORY:  Not significant for any kidney disease. REVIEW OF SYSTEMS:  Hard of hearing. No vision problem. Denies any  nausea, vomiting, or diarrhea. Denies any chest pain. Admits  improvement of shortness of breath and dyspnea on exertion. Denies any  fever, chills, or rigors. Feeling weak, tired, decreased level of  energy. PHYSICAL EXAMINATION:  GENERAL:  Sitting up in bed, alert, awake, and oriented, responding  properly. VITAL SIGNS:  Blood pressure 126/80, pulse 90, temperature 97.9. HEENT EXAMINATION:  No JVD. CHEST:  Few rhonchi and wheezing. CVS:  S1, S2 audible. Regular rate and rhythm. ABDOMEN:  Soft, nontender. Positive bowel sounds. EXTREMITIES:  Negative edema. LABORATORY DATA:  Sodium 133, potassium 3.3, chloride 94, bicarb 29, BUN  13, creatinine 2.6. Hemoglobin 10.2. IMPRESSION:  1. Shortness of breath most likely:  i.  Exacerbation of COPD. ii.  Rule out pneumonia. 2.  End-stage renal disease, on hemodialysis on Tuesday, Thursday, and  Saturday. 3.  Hypertension. 4.  Anemia of chronic disease. PLAN:  1. Medication reviewed and adjusted. 2.  Discontinue IV fluids. 3.  Repeat labs tomorrow. 4.  Continue with dialysis on Tuesday, Thursday, and Saturday. We will follow.         Drake Morris MD    D: 04/01/2020 13:10:41       T: 04/01/2020 17:00:11     [email or web address]  Job#: [id number]     Doc#: [id number]    CC:

## 2020-04-01 NOTE — PROGRESS NOTES
4 Eyes Skin Assessment     The patient is being assess for  Admission    I agree that 2 RN's have performed a thorough Head to Toe Skin Assessment on the patient. ALL assessment sites listed below have been assessed. Areas assessed by both nurses:   [x]   Head, Face, and Ears   [x]   Shoulders, Back, and Chest  [x]   Arms, Elbows, and Hands   [x]   Coccyx, Sacrum, and IschIum  [x]   Legs, Feet, and Heels        Does the Patient have Skin Breakdown?   No         Mauricio Prevention initiated:  No   Wound Care Orders initiated:  No      [facility] nurse consulted for Pressure Injury (Stage 3,4, Unstageable, DTI, NWPT, and Complex wounds), New and Established Ostomies:  No      Nurse 1 eSignature: Electronically signed by Malena Simms RN on 4/1/20 at 6:41 AM EDT    **SHARE this note so that the co-signing nurse is able to place an eSignature**    Nurse 2 eSignature: Electronically signed by Lexi Otto RN on 4/1/20 at 6:44 AM EDT

## 2020-04-01 NOTE — CARE COORDINATION
INITIAL CASE MANAGEMENT ASSESSMENT    Reviewed chart, met with patient to assess possible discharge needs. Explained Case Management role/services. Living Situation: confirmed address, lives w/ his daughter Carol Malcolm [phone], his son in law in a 2 story home w/ a flight to the [employer], 4 steps to enter from the porch and an upstairs bedroom. His son in law is still working and daughter is working from home    ADLs: he had been independent     DME: RW, cane, shr seat, grab bars in the shr, oxygen that he has been wearing just prn but does have portable tanks. Daughter did not know provider    PT/OT Recs: not currently ordered     Active Services: none     Transportation: family may be able to transport at [place] depending on their work schedules     Medications: confirmed Medicare as primary and Medicaid as ssecondary, rx are covered and daughter asked that they be filled at [facility]    PCP: confirmed Larry Ryder      HD/PD: [address] [facility] at [phone]  Fax [phone]    PLAN/COMMENTS: daughter plans for pt to return home  States that he was tested 3/21 at [facility] and was COVID neg      SW/CM provided contact information for patient or family to call with any questions. SW/CM will follow and assist as needed.   Electronically signed by Elena Aguilera RN on 4/1/2020 at 11:26 AM

## 2020-04-01 NOTE — PROGRESS NOTES
chloride 75 mL/hr at 03/31/20 2147       No intake or output data in the 24 hours ending 04/01/20 0921    Results:  CBC:   Recent Labs     03/31/20  1645   WBC 5.1   HGB 10.2*   HCT 30.5*   MCV 95.5   *     BMP:   Recent Labs     03/31/20  1645      K 3.3*   CL 94*   CO2 29   BUN 13   CREATININE 2.6*     Mag: No results for input(s): MAG in the last 72 hours. Phos: No results found for: PHOS  No components found for: GLU    LIVER PROFILE: No results for input(s): AST, ALT, LIPASE, BILIDIR, BILITOT, ALKPHOS in the last 72 hours. Invalid input(s): AMYLASE,  ALB  PT/INR: No results for input(s): PROTIME, INR in the last 72 hours. APTT: No results for input(s): APTT in the last 72 hours. UA:No results for input(s): NITRITE, COLORU, PHUR, LABCAST, WBCUA, RBCUA, MUCUS, TRICHOMONAS, YEAST, BACTERIA, CLARITYU, SPECGRAV, LEUKOCYTESUR, UROBILINOGEN, BILIRUBINUR, BLOODU, GLUCOSEU, AMORPHOUS in the last 72 hours. Invalid input(s): Dequan Phipps input(s): ABG  Lab Results   Component Value Date    CALCIUM 8.9 03/31/2020       Assessment:    Active Problems:    HCAP (healthcare-associated pneumonia)    COPD exacerbation ([address]    ESRD on dialysis [facility])  Resolved Problems:    * No resolved hospital problems. [facility] course: 60 y.o. male with PMHx of CVA, CHF, ESRD on dialysis, COPD, HLD and HTN  admitted with shortness of breath and cough. Patient reports having a cough for several weeks. Patient was tested for Covid 19, 2 weeks ago and was negative. He is afraid he may have been exposed since then. ED work-up significant for chest x-ray with mild interstitial reticular opacities bilaterally along with mild hazy opacities.     Plan:     Pneumonia, HCAP (hospitalized within 90 days) UC   - cont cefepime, azithro, and Vanc  - Blood cultures x 2 ordered  - Respiratory Panel is negative  - Legionella pneumophilia and Strep pneumoniae urine antigens are negative  - Rapid Influenza A&B negative  -

## 2020-04-02 LAB
ALBUMIN SERPL-MCNC: 3.6 G/DL (ref 3.4–5)
ALP BLD-CCNC: 146 U/L (ref 40–129)
ALT SERPL-CCNC: 37 U/L (ref 10–40)
ANION GAP SERPL CALCULATED.3IONS-SCNC: 16 MMOL/L (ref 3–16)
AST SERPL-CCNC: 76 U/L (ref 15–37)
BASOPHILS ABSOLUTE: 0 K/UL (ref 0–0.2)
BASOPHILS RELATIVE PERCENT: 0.5 %
BILIRUB SERPL-MCNC: 0.8 MG/DL (ref 0–1)
BILIRUBIN DIRECT: 0.3 MG/DL (ref 0–0.3)
BILIRUBIN, INDIRECT: 0.5 MG/DL (ref 0–1)
BUN BLDV-MCNC: 42 MG/DL (ref 7–20)
CALCIUM SERPL-MCNC: 8.9 MG/DL (ref 8.3–10.6)
CHLORIDE BLD-SCNC: 98 MMOL/L (ref 99–110)
CO2: 27 MMOL/L (ref 21–32)
CREAT SERPL-MCNC: 5.6 MG/DL (ref 0.8–1.3)
CULTURE, RESPIRATORY: NORMAL
EOSINOPHILS ABSOLUTE: 0 K/UL (ref 0–0.6)
EOSINOPHILS RELATIVE PERCENT: 0.8 %
GFR AFRICAN AMERICAN: 13
GFR NON-AFRICAN AMERICAN: 10
GLUCOSE BLD-MCNC: 115 MG/DL (ref 70–99)
GRAM STAIN RESULT: NORMAL
HCT VFR BLD CALC: 28.2 % (ref 40.5–52.5)
HEMOGLOBIN: 8.8 G/DL (ref 13.5–17.5)
LYMPHOCYTES ABSOLUTE: 0.5 K/UL (ref 1–5.1)
LYMPHOCYTES RELATIVE PERCENT: 12.4 %
MAGNESIUM: 2.2 MG/DL (ref 1.8–2.4)
MCH RBC QN AUTO: 31.4 PG (ref 26–34)
MCHC RBC AUTO-ENTMCNC: 31.1 G/DL (ref 31–36)
MCV RBC AUTO: 100.8 FL (ref 80–100)
MONOCYTES ABSOLUTE: 0.2 K/UL (ref 0–1.3)
MONOCYTES RELATIVE PERCENT: 6.2 %
NEUTROPHILS ABSOLUTE: 3.1 K/UL (ref 1.7–7.7)
NEUTROPHILS RELATIVE PERCENT: 80.1 %
PDW BLD-RTO: 16.8 % (ref 12.4–15.4)
PHOSPHORUS: 4 MG/DL (ref 2.5–4.9)
PLATELET # BLD: 82 K/UL (ref 135–450)
PMV BLD AUTO: 8.8 FL (ref 5–10.5)
POTASSIUM SERPL-SCNC: 4.7 MMOL/L (ref 3.5–5.1)
RBC # BLD: 2.8 M/UL (ref 4.2–5.9)
SODIUM BLD-SCNC: 141 MMOL/L (ref 136–145)
TOTAL PROTEIN: 5.8 G/DL (ref 6.4–8.2)
VANCOMYCIN RANDOM: 19.7 UG/ML
WBC # BLD: 3.9 K/UL (ref 4–11)

## 2020-04-02 PROCEDURE — 6360000002 HC RX W HCPCS: Performed by: FAMILY MEDICINE

## 2020-04-02 PROCEDURE — 6370000000 HC RX 637 (ALT 250 FOR IP): Performed by: FAMILY MEDICINE

## 2020-04-02 PROCEDURE — 1200000000 HC SEMI PRIVATE

## 2020-04-02 PROCEDURE — 6360000002 HC RX W HCPCS: Performed by: INTERNAL MEDICINE

## 2020-04-02 PROCEDURE — 5A1D70Z PERFORMANCE OF URINARY FILTRATION, INTERMITTENT, LESS THAN 6 HOURS PER DAY: ICD-10-PCS | Performed by: INTERNAL MEDICINE

## 2020-04-02 PROCEDURE — 94761 N-INVAS EAR/PLS OXIMETRY MLT: CPT

## 2020-04-02 PROCEDURE — 80069 RENAL FUNCTION PANEL: CPT

## 2020-04-02 PROCEDURE — 6370000000 HC RX 637 (ALT 250 FOR IP): Performed by: NURSE PRACTITIONER

## 2020-04-02 PROCEDURE — 80076 HEPATIC FUNCTION PANEL: CPT

## 2020-04-02 PROCEDURE — 2580000003 HC RX 258: Performed by: FAMILY MEDICINE

## 2020-04-02 PROCEDURE — 85025 COMPLETE CBC W/AUTO DIFF WBC: CPT

## 2020-04-02 PROCEDURE — 93005 ELECTROCARDIOGRAM TRACING: CPT | Performed by: FAMILY MEDICINE

## 2020-04-02 PROCEDURE — 2580000003 HC RX 258: Performed by: NURSE PRACTITIONER

## 2020-04-02 PROCEDURE — 90935 HEMODIALYSIS ONE EVALUATION: CPT

## 2020-04-02 PROCEDURE — 80202 ASSAY OF VANCOMYCIN: CPT

## 2020-04-02 PROCEDURE — 2580000003 HC RX 258: Performed by: INTERNAL MEDICINE

## 2020-04-02 PROCEDURE — 83735 ASSAY OF MAGNESIUM: CPT

## 2020-04-02 RX ORDER — HYDROXYCHLOROQUINE SULFATE 200 MG/1
400 TABLET, FILM COATED ORAL 2 TIMES DAILY
Status: COMPLETED | OUTPATIENT
Start: 2020-04-02 | End: 2020-04-02

## 2020-04-02 RX ORDER — LANOLIN ALCOHOL/MO/W.PET/CERES
3 CREAM (GRAM) TOPICAL NIGHTLY
Status: DISCONTINUED | OUTPATIENT
Start: 2020-04-02 | End: 2020-04-08 | Stop reason: HOSPADM

## 2020-04-02 RX ORDER — PANTOPRAZOLE SODIUM 40 MG/1
40 TABLET, DELAYED RELEASE ORAL
Status: DISCONTINUED | OUTPATIENT
Start: 2020-04-03 | End: 2020-04-08 | Stop reason: HOSPADM

## 2020-04-02 RX ADMIN — HYDROXYCHLOROQUINE SULFATE 400 MG: 200 TABLET ORAL at 21:51

## 2020-04-02 RX ADMIN — ALLOPURINOL 100 MG: 100 TABLET ORAL at 08:08

## 2020-04-02 RX ADMIN — BUDESONIDE AND FORMOTEROL FUMARATE DIHYDRATE 2 PUFF: 160; 4.5 AEROSOL RESPIRATORY (INHALATION) at 20:12

## 2020-04-02 RX ADMIN — HEPARIN SODIUM 5000 UNITS: 5000 INJECTION INTRAVENOUS; SUBCUTANEOUS at 06:18

## 2020-04-02 RX ADMIN — ATORVASTATIN CALCIUM 80 MG: 80 TABLET, FILM COATED ORAL at 08:07

## 2020-04-02 RX ADMIN — Medication 2 PUFF: at 08:07

## 2020-04-02 RX ADMIN — HEPARIN SODIUM 5000 UNITS: 5000 INJECTION INTRAVENOUS; SUBCUTANEOUS at 21:51

## 2020-04-02 RX ADMIN — BUDESONIDE AND FORMOTEROL FUMARATE DIHYDRATE 2 PUFF: 160; 4.5 AEROSOL RESPIRATORY (INHALATION) at 08:07

## 2020-04-02 RX ADMIN — SODIUM CHLORIDE, PRESERVATIVE FREE 10 ML: 5 INJECTION INTRAVENOUS at 08:08

## 2020-04-02 RX ADMIN — ASPIRIN 81 MG 81 MG: 81 TABLET ORAL at 08:07

## 2020-04-02 RX ADMIN — AZITHROMYCIN DIHYDRATE 500 MG: 500 INJECTION, POWDER, LYOPHILIZED, FOR SOLUTION INTRAVENOUS at 20:12

## 2020-04-02 RX ADMIN — TORSEMIDE 40 MG: 20 TABLET ORAL at 08:07

## 2020-04-02 RX ADMIN — CARVEDILOL 25 MG: 25 TABLET, FILM COATED ORAL at 18:24

## 2020-04-02 RX ADMIN — MELATONIN 3 MG: at 21:51

## 2020-04-02 RX ADMIN — GABAPENTIN 300 MG: 300 CAPSULE ORAL at 21:51

## 2020-04-02 RX ADMIN — HYDROXYCHLOROQUINE SULFATE 400 MG: 200 TABLET ORAL at 18:24

## 2020-04-02 RX ADMIN — HEPARIN SODIUM 5000 UNITS: 5000 INJECTION INTRAVENOUS; SUBCUTANEOUS at 13:32

## 2020-04-02 RX ADMIN — CEFEPIME HYDROCHLORIDE 1 G: 1 INJECTION, POWDER, FOR SOLUTION INTRAMUSCULAR; INTRAVENOUS at 18:24

## 2020-04-02 ASSESSMENT — PAIN SCALES - GENERAL
PAINLEVEL_OUTOF10: 0

## 2020-04-02 NOTE — PROGRESS NOTES
04/02/20  0630   WBC 5.1 4.4 3.9*   HGB 10.2* 9.9* 8.8*   HCT 30.5* 30.1* 28.2*   MCV 95.5 96.5 100.8*   * 99* 82*     BMP:   Recent Labs     03/31/20  1645 04/01/20  1350 04/02/20  0630    139 141   K 3.3* 4.1 4.7   CL 94* 94* 98*   CO2 29 28 27   PHOS  --   --  4.0   BUN 13 26* 42*   CREATININE 2.6* 4.3* 5.6*     Mag: No results for input(s): MAG in the last 72 hours. Phos:   Lab Results   Component Value Date    PHOS 4.0 04/02/2020     No components found for: GLU    LIVER PROFILE:   Recent Labs     04/02/20  0630   AST 76*   ALT 37   BILIDIR 0.3   BILITOT 0.8   ALKPHOS 146*     PT/INR:   Recent Labs     04/01/20  1813   PROTIME 12.0   INR 1.03     APTT: No results for input(s): APTT in the last 72 hours. UA:No results for input(s): NITRITE, COLORU, PHUR, LABCAST, WBCUA, RBCUA, MUCUS, TRICHOMONAS, YEAST, BACTERIA, CLARITYU, SPECGRAV, LEUKOCYTESUR, UROBILINOGEN, BILIRUBINUR, BLOODU, GLUCOSEU, AMORPHOUS in the last 72 hours. Invalid input(s): Ardeth Muse input(s): ABG  Lab Results   Component Value Date    CALCIUM 8.9 04/02/2020    PHOS 4.0 04/02/2020       Assessment:    Active Problems:    HCAP (healthcare-associated pneumonia)    COPD exacerbation ([facility] [address]    ESRD on dialysis [facility])  Resolved Problems:    * No resolved hospital problems. [facility]: 60 y.o. male with PMHx of CVA, CHF, ESRD on dialysis, COPD, HLD and HTN  admitted with shortness of breath and cough. Patient reports having a cough for several weeks. Patient was tested for Covid 19, 2 weeks ago and was negative. He is afraid he may have been exposed since then. ED work-up significant for chest x-ray with mild interstitial reticular opacities bilaterally along with mild hazy opacities.     Plan:     HCAP vs viral pna  - cxr with bl interstitial/reticular opacities   - cont cefepime, azithro x 5 days  - Blood cultures were not ordered on admission  - Respiratory Panel is negative  - Legionella pneumophilia and Strep

## 2020-04-02 NOTE — FLOWSHEET NOTE
04/02/20 1445 04/02/20 1812   Treatment   Time On 1445  --    Time Off  --  1812   Treatment Goal 2500  --    Observations & Evaluations   Level of Consciousness 0 0   Oriented X 3 3   Vital Signs   /85 (!) 168/100   Temp 98 °F (36.7 °C) 98.4 °F (36.9 °C)   Pulse 94 95   Resp 18 18   Weight 136 lb 14.5 oz (62.1 kg) 132 lb 0.9 oz (59.9 kg)   Weight Method Bed scale Bed scale   Percent Weight Change 10.3 -3.54   Dry Weight 126 lb 12.2 oz (57.5 kg)  --    Pain Assessment   Pain Assessment 0-10 0-10   Pain Level 0 0   Access   Needle Size Used 15  --    Technical Checks   RO Machine Log Sheet Completed Yes  --    Machine Alarm Self Test Completed  --    Machine Autotest Completed  --    Air Foam Detector Tested  --    Extracorporeal Circuit Tested for Integrity Yes  --    Treatment Initiation   Dialyze Hours 3.5  --    Treatment  Initiation Universal Precautions maintained;Lines secured to patient; Connections secured;Prime given;Venous Parameters set; Arterial Parameters set; Air foam detector engaged; Hemosafe Device; Saline line double clamped; Revaclear Dialyzer  --    During Hemodialysis Assessment   Blood Flow Rate (ml/min) 350 ml/min  --    Ultrafiltration Rate (ml/hr) 710 ml/hr  --    Arterial Pressure (mmHg) -90 mmHg  --    Venous Pressure (mmHg) 140  --    TMP 60  --      --    Access Visible Yes  --    Dialysis Bath   K+ (Potassium) 3  --    Ca+ (Calcium) 2.5  --    Na+ (Sodium) 140  --    HCO3 (Bicarb) 40  --    Post-Hemodialysis Assessment   Post-Treatment Procedures  --  Blood returned; Access bleeding time > 10 minutes   Machine Disinfection Process  --  Acid/Vinegar Clean;Bleach; Exterior Machine Disinfection   Rinseback Volume (ml)  --  400 ml   Total Liters Processed (l/min)  --  67.1 l/min   Dialyzer Clearance  --  Lightly streaked   Duration of Treatment (minutes)  --  210 minutes   Hemodialysis Intake (ml)  --  400 ml   Hemodialysis Output (ml)  --  2359 ml   NET Removed (ml)  --  1959 ml Tolerated Treatment  --  Good

## 2020-04-02 NOTE — PROGRESS NOTES
Pt is still complaining of double vision. Vitals are stable and pt is stable to sit on the stedy without assistance.

## 2020-04-03 LAB
EKG ATRIAL RATE: 83 BPM
EKG DIAGNOSIS: NORMAL
EKG P AXIS: 81 DEGREES
EKG P-R INTERVAL: 216 MS
EKG Q-T INTERVAL: 462 MS
EKG QRS DURATION: 90 MS
EKG QTC CALCULATION (BAZETT): 542 MS
EKG R AXIS: 38 DEGREES
EKG T AXIS: 53 DEGREES
EKG VENTRICULAR RATE: 83 BPM
HBV SURFACE AB TITR SER: 208.1 MIU/ML
HEPATITIS B SURFACE ANTIGEN INTERPRETATION: NORMAL

## 2020-04-03 PROCEDURE — 86706 HEP B SURFACE ANTIBODY: CPT

## 2020-04-03 PROCEDURE — 6370000000 HC RX 637 (ALT 250 FOR IP): Performed by: FAMILY MEDICINE

## 2020-04-03 PROCEDURE — 2580000003 HC RX 258: Performed by: FAMILY MEDICINE

## 2020-04-03 PROCEDURE — 6360000002 HC RX W HCPCS: Performed by: FAMILY MEDICINE

## 2020-04-03 PROCEDURE — 93010 ELECTROCARDIOGRAM REPORT: CPT | Performed by: INTERNAL MEDICINE

## 2020-04-03 PROCEDURE — 87340 HEPATITIS B SURFACE AG IA: CPT

## 2020-04-03 PROCEDURE — 2580000003 HC RX 258: Performed by: NURSE PRACTITIONER

## 2020-04-03 PROCEDURE — 2580000003 HC RX 258: Performed by: INTERNAL MEDICINE

## 2020-04-03 PROCEDURE — 86704 HEP B CORE ANTIBODY TOTAL: CPT

## 2020-04-03 PROCEDURE — 1200000000 HC SEMI PRIVATE

## 2020-04-03 PROCEDURE — 6370000000 HC RX 637 (ALT 250 FOR IP): Performed by: NURSE PRACTITIONER

## 2020-04-03 PROCEDURE — 94640 AIRWAY INHALATION TREATMENT: CPT

## 2020-04-03 PROCEDURE — 6360000002 HC RX W HCPCS: Performed by: INTERNAL MEDICINE

## 2020-04-03 RX ORDER — 0.9 % SODIUM CHLORIDE 0.9 %
500 INTRAVENOUS SOLUTION INTRAVENOUS ONCE
Status: COMPLETED | OUTPATIENT
Start: 2020-04-03 | End: 2020-04-03

## 2020-04-03 RX ORDER — GUAIFENESIN/DEXTROMETHORPHAN 100-10MG/5
5 SYRUP ORAL EVERY 4 HOURS PRN
Status: DISCONTINUED | OUTPATIENT
Start: 2020-04-03 | End: 2020-04-08 | Stop reason: HOSPADM

## 2020-04-03 RX ORDER — HYDROXYCHLOROQUINE SULFATE 200 MG/1
200 TABLET, FILM COATED ORAL 2 TIMES DAILY
Status: DISCONTINUED | OUTPATIENT
Start: 2020-04-03 | End: 2020-04-06

## 2020-04-03 RX ADMIN — HYDRALAZINE HYDROCHLORIDE 25 MG: 25 TABLET, FILM COATED ORAL at 08:20

## 2020-04-03 RX ADMIN — GABAPENTIN 300 MG: 300 CAPSULE ORAL at 21:36

## 2020-04-03 RX ADMIN — CARVEDILOL 25 MG: 25 TABLET, FILM COATED ORAL at 17:44

## 2020-04-03 RX ADMIN — AZITHROMYCIN DIHYDRATE 500 MG: 500 INJECTION, POWDER, LYOPHILIZED, FOR SOLUTION INTRAVENOUS at 20:11

## 2020-04-03 RX ADMIN — Medication 2 PUFF: at 08:21

## 2020-04-03 RX ADMIN — CEFEPIME HYDROCHLORIDE 1 G: 1 INJECTION, POWDER, FOR SOLUTION INTRAMUSCULAR; INTRAVENOUS at 17:44

## 2020-04-03 RX ADMIN — ALLOPURINOL 100 MG: 100 TABLET ORAL at 08:20

## 2020-04-03 RX ADMIN — HEPARIN SODIUM 5000 UNITS: 5000 INJECTION INTRAVENOUS; SUBCUTANEOUS at 06:36

## 2020-04-03 RX ADMIN — BUDESONIDE AND FORMOTEROL FUMARATE DIHYDRATE 2 PUFF: 160; 4.5 AEROSOL RESPIRATORY (INHALATION) at 20:11

## 2020-04-03 RX ADMIN — TORSEMIDE 40 MG: 20 TABLET ORAL at 08:20

## 2020-04-03 RX ADMIN — MELATONIN 3 MG: at 21:36

## 2020-04-03 RX ADMIN — BUDESONIDE AND FORMOTEROL FUMARATE DIHYDRATE 2 PUFF: 160; 4.5 AEROSOL RESPIRATORY (INHALATION) at 08:21

## 2020-04-03 RX ADMIN — ATORVASTATIN CALCIUM 80 MG: 80 TABLET, FILM COATED ORAL at 08:20

## 2020-04-03 RX ADMIN — HYDROXYCHLOROQUINE SULFATE 200 MG: 200 TABLET ORAL at 21:36

## 2020-04-03 RX ADMIN — CARVEDILOL 25 MG: 25 TABLET, FILM COATED ORAL at 08:20

## 2020-04-03 RX ADMIN — SODIUM CHLORIDE 500 ML: 9 INJECTION, SOLUTION INTRAVENOUS at 15:03

## 2020-04-03 RX ADMIN — SODIUM CHLORIDE, PRESERVATIVE FREE 10 ML: 5 INJECTION INTRAVENOUS at 08:21

## 2020-04-03 RX ADMIN — ASPIRIN 81 MG 81 MG: 81 TABLET ORAL at 08:20

## 2020-04-03 RX ADMIN — HEPARIN SODIUM 5000 UNITS: 5000 INJECTION INTRAVENOUS; SUBCUTANEOUS at 21:36

## 2020-04-03 RX ADMIN — PANTOPRAZOLE SODIUM 40 MG: 40 TABLET, DELAYED RELEASE ORAL at 06:36

## 2020-04-03 RX ADMIN — HEPARIN SODIUM 5000 UNITS: 5000 INJECTION INTRAVENOUS; SUBCUTANEOUS at 12:37

## 2020-04-03 ASSESSMENT — PAIN SCALES - GENERAL
PAINLEVEL_OUTOF10: 0
PAINLEVEL_OUTOF10: 0

## 2020-04-03 NOTE — PROGRESS NOTES
Patient is alert and oriented x4, up with SBA, call light within reach, bed/chair alarm on. AM meds complete, patient tolerated well. VSS and WDL. No s/s of distress, no further needs noted at this time.  Electronically signed by Layne iMllard RN on 4/3/2020 at 10:21 AM

## 2020-04-03 NOTE — PROGRESS NOTES
contact isolation  - testing pending  - on Plaquenil and Azithromycin    COPD - improving  - c/w inhalers   - steroids stopped  - antitussives    ESRD on HD  '- HD TTS  - mgmt per nephrology    HTN  - c/w home meds    DVT Prophylaxis: heparin sq  Diet: DIET GENERAL;  Code Status: DNR-CC      Dispo - inpatient    Mariah Banks MD

## 2020-04-03 NOTE — PROGRESS NOTES
Patient went to restroom by self. Had BM and was too weak to stand up by self, called out for help. Used Stedy to get patient back to bed. Vital signs stable, /72. Hydralazine held and NP notified.

## 2020-04-04 LAB
ANION GAP SERPL CALCULATED.3IONS-SCNC: 13 MMOL/L (ref 3–16)
BUN BLDV-MCNC: 36 MG/DL (ref 7–20)
CALCIUM SERPL-MCNC: 8.9 MG/DL (ref 8.3–10.6)
CHLORIDE BLD-SCNC: 96 MMOL/L (ref 99–110)
CO2: 28 MMOL/L (ref 21–32)
CREAT SERPL-MCNC: 5.9 MG/DL (ref 0.8–1.3)
GFR AFRICAN AMERICAN: 12
GFR NON-AFRICAN AMERICAN: 10
GLUCOSE BLD-MCNC: 95 MG/DL (ref 70–99)
HCT VFR BLD CALC: 24.9 % (ref 40.5–52.5)
HEMOGLOBIN: 8.2 G/DL (ref 13.5–17.5)
HEPATITIS B CORE TOTAL ANTIBODY: NEGATIVE
MCH RBC QN AUTO: 31.7 PG (ref 26–34)
MCHC RBC AUTO-ENTMCNC: 32.7 G/DL (ref 31–36)
MCV RBC AUTO: 96.9 FL (ref 80–100)
PDW BLD-RTO: 16.7 % (ref 12.4–15.4)
PLATELET # BLD: 81 K/UL (ref 135–450)
PMV BLD AUTO: 9.1 FL (ref 5–10.5)
POTASSIUM SERPL-SCNC: 5 MMOL/L (ref 3.5–5.1)
RBC # BLD: 2.58 M/UL (ref 4.2–5.9)
SODIUM BLD-SCNC: 137 MMOL/L (ref 136–145)
WBC # BLD: 4.2 K/UL (ref 4–11)

## 2020-04-04 PROCEDURE — 6360000002 HC RX W HCPCS: Performed by: INTERNAL MEDICINE

## 2020-04-04 PROCEDURE — 97530 THERAPEUTIC ACTIVITIES: CPT | Performed by: PHYSICAL THERAPIST

## 2020-04-04 PROCEDURE — 94640 AIRWAY INHALATION TREATMENT: CPT

## 2020-04-04 PROCEDURE — 97161 PT EVAL LOW COMPLEX 20 MIN: CPT | Performed by: PHYSICAL THERAPIST

## 2020-04-04 PROCEDURE — 6370000000 HC RX 637 (ALT 250 FOR IP): Performed by: FAMILY MEDICINE

## 2020-04-04 PROCEDURE — 2580000003 HC RX 258: Performed by: FAMILY MEDICINE

## 2020-04-04 PROCEDURE — 2580000003 HC RX 258: Performed by: INTERNAL MEDICINE

## 2020-04-04 PROCEDURE — 97116 GAIT TRAINING THERAPY: CPT | Performed by: PHYSICAL THERAPIST

## 2020-04-04 PROCEDURE — 80048 BASIC METABOLIC PNL TOTAL CA: CPT

## 2020-04-04 PROCEDURE — 85027 COMPLETE CBC AUTOMATED: CPT

## 2020-04-04 PROCEDURE — 97166 OT EVAL MOD COMPLEX 45 MIN: CPT

## 2020-04-04 PROCEDURE — 2580000003 HC RX 258: Performed by: NURSE PRACTITIONER

## 2020-04-04 PROCEDURE — 97530 THERAPEUTIC ACTIVITIES: CPT

## 2020-04-04 PROCEDURE — 6370000000 HC RX 637 (ALT 250 FOR IP): Performed by: NURSE PRACTITIONER

## 2020-04-04 PROCEDURE — 90935 HEMODIALYSIS ONE EVALUATION: CPT

## 2020-04-04 PROCEDURE — 6360000002 HC RX W HCPCS: Performed by: FAMILY MEDICINE

## 2020-04-04 PROCEDURE — 1200000000 HC SEMI PRIVATE

## 2020-04-04 RX ADMIN — HYDRALAZINE HYDROCHLORIDE 25 MG: 25 TABLET, FILM COATED ORAL at 09:56

## 2020-04-04 RX ADMIN — CARVEDILOL 25 MG: 25 TABLET, FILM COATED ORAL at 09:56

## 2020-04-04 RX ADMIN — SODIUM CHLORIDE, PRESERVATIVE FREE 10 ML: 5 INJECTION INTRAVENOUS at 20:03

## 2020-04-04 RX ADMIN — PANTOPRAZOLE SODIUM 40 MG: 40 TABLET, DELAYED RELEASE ORAL at 06:27

## 2020-04-04 RX ADMIN — GABAPENTIN 300 MG: 300 CAPSULE ORAL at 21:24

## 2020-04-04 RX ADMIN — HEPARIN SODIUM 5000 UNITS: 5000 INJECTION INTRAVENOUS; SUBCUTANEOUS at 06:27

## 2020-04-04 RX ADMIN — CEFEPIME HYDROCHLORIDE 1 G: 1 INJECTION, POWDER, FOR SOLUTION INTRAMUSCULAR; INTRAVENOUS at 18:17

## 2020-04-04 RX ADMIN — ALLOPURINOL 100 MG: 100 TABLET ORAL at 09:56

## 2020-04-04 RX ADMIN — MELATONIN 3 MG: at 21:24

## 2020-04-04 RX ADMIN — BUDESONIDE AND FORMOTEROL FUMARATE DIHYDRATE 2 PUFF: 160; 4.5 AEROSOL RESPIRATORY (INHALATION) at 07:55

## 2020-04-04 RX ADMIN — BUDESONIDE AND FORMOTEROL FUMARATE DIHYDRATE 2 PUFF: 160; 4.5 AEROSOL RESPIRATORY (INHALATION) at 20:04

## 2020-04-04 RX ADMIN — HYDROXYCHLOROQUINE SULFATE 200 MG: 200 TABLET ORAL at 09:56

## 2020-04-04 RX ADMIN — SODIUM CHLORIDE, PRESERVATIVE FREE 10 ML: 5 INJECTION INTRAVENOUS at 07:56

## 2020-04-04 RX ADMIN — AZITHROMYCIN DIHYDRATE 500 MG: 500 INJECTION, POWDER, LYOPHILIZED, FOR SOLUTION INTRAVENOUS at 20:03

## 2020-04-04 RX ADMIN — ATORVASTATIN CALCIUM 80 MG: 80 TABLET, FILM COATED ORAL at 09:56

## 2020-04-04 RX ADMIN — TORSEMIDE 40 MG: 20 TABLET ORAL at 09:56

## 2020-04-04 RX ADMIN — HYDROXYCHLOROQUINE SULFATE 200 MG: 200 TABLET ORAL at 21:24

## 2020-04-04 RX ADMIN — ASPIRIN 81 MG 81 MG: 81 TABLET ORAL at 09:55

## 2020-04-04 RX ADMIN — CARVEDILOL 25 MG: 25 TABLET, FILM COATED ORAL at 18:17

## 2020-04-04 ASSESSMENT — PAIN SCALES - GENERAL
PAINLEVEL_OUTOF10: 0

## 2020-04-04 NOTE — PROGRESS NOTES
Physical Therapy    Facility/Department: [address] PROGRESSIVE CARE  Initial Assessment    NAME: Nickolas Jones  : 1955  MRN: [id number]    Date of Service: 2020    Discharge Recommendations:  Home with assist PRN, S Level 1   PT Equipment Recommendations  Equipment Needed: No  Nickolas Jonse scored a 22/24 on the AM-PAC short mobility form. Current research shows that an AM-PAC score of 18 or greater is typically associated with a discharge to the patient's home setting. Based on the patients AM-PAC score and their current functional mobility deficits, it is recommended that the patient have 2-3 sessions per week of Physical Therapy at d/c to increase the patients independence. HOME HEALTH CARE: LEVEL 1 STANDARD     -Initial home health evaluation to occur within 24-48 hours, in patient home    -Home health agency to establish plan of care for patient over 60 day period    -Medication Reconciliation    -PCP Visit scheduled within seven days of discharge    -PT/OT to evaluate with goal of regaining prior level of functioning    -OT to evaluate if patient has [address] needs for personal care     If patient discharges prior to next session this note will serve as a discharge summary. Please see below for the latest assessment towards goals. Assessment   Body structures, Functions, Activity limitations: Decreased functional mobility   Assessment: pt is a 61 yo male who was adm to hosp with SOB; pt currently being ruled out for COVID; feel pt is close to his baseline except for endurance; recommend home with family assist and home PT  Prognosis: Good  Decision Making: Low Complexity  PT Education: PT Role;General Safety  Barriers to Learning: none  REQUIRES PT FOLLOW UP: Yes  Activity Tolerance  Activity Tolerance: Patient Tolerated treatment well;Patient limited by endurance       Patient Diagnosis(es): The primary encounter diagnosis was Dyspnea, unspecified type.  A diagnosis of Pneumonia due to organism was also pertinent to this visit. has a past medical history of Cerebral artery occlusion with cerebral infarction ([facility] [address] CHF (congestive heart failure) ([facility] [address] Chronic kidney disease, COPD (chronic obstructive pulmonary disease) ([facility] [address] Dialysis patient ([facility] [address] Gout, Hemodialysis patient ([facility] [address] Hx of blood clots, Hyperlipidemia, Hypertension, and Renal failure.   has a past surgical history that includes Dialysis fistula creation (Left) and Colonoscopy. Restrictions  Restrictions/Precautions  Restrictions/Precautions: Fall Risk  Position Activity Restriction  Other position/activity restrictions: HD T-H-Sat; wears AFO R foot when he is going outside  Vision/Hearing  Vision: Within Functional Limits  Hearing: Within functional limits     Subjective  General  Chart Reviewed: Yes  Patient assessed for rehabilitation services?: Yes  Additional Pertinent Hx: per MD note:  60 y.o. male with PMHx of CVA, CHF, ESRD on dialysis, COPD, HLD and HTN  admitted with shortness of breath and cough. Patient reports having a cough for several weeks. Patient was tested for Covid 19, 2 weeks ago and was negative. He is afraid he may have been exposed since then. ED work-up significant for chest x-ray with mild interstitial reticular opacities bilaterally along with mild hazy opacities. Response To Previous Treatment: Not applicable  Family / Caregiver Present: No  Follows Commands: Within Functional Limits  Subjective  Subjective: pt agreeable to therapy.  No c/o pain  Pain Screening  Patient Currently in Pain: Denies          Orientation  Orientation  Overall Orientation Status: Within Functional Limits  Social/Functional History  Social/Functional History  Lives With: Daughter(daughter and son in law)  Type of Home: House  Home Layout: Two level, Laundry in basement(1 flight to bed room)  Home Access: Stairs to enter with rails  Entrance Stairs - Number of Steps:  11+ 6 with rail  Bathroom Shower/Tub: Tub/Shower unit  Bathroom Equipment: Shower chair, Grab bars in shower(doesn't use shower chair)  Home Equipment: [email or web address]  ADL Assistance: [address]: (pt does his own laundry but daughter carries it up and down stairs)  Ambulation Assistance: Independent(intermittently uses cane)  Transfer Assistance: Independent  Active : No  Cognition   Cognition  Overall Cognitive Status: WFL    Objective          AROM RLE (degrees)  RLE AROM: WFL  AROM LLE (degrees)  LLE AROM : WFL  Strength RLE  Strength RLE: WFL  Comment: pt has some intermittent strength deficits from previous stroke  Strength LLE  Strength LLE: WFL     Sensation  Overall Sensation Status: Impaired(numbness/tingling in B hands and feet)  Bed mobility  Supine to Sit: Independent  Sit to Supine: Unable to assess(up in chair after session)  Scooting: Independent  Transfers  Sit to Stand: Stand by assistance  Stand to sit: Stand by assistance  Ambulation  Ambulation?: Yes  Ambulation 1  Surface: level tile  Device: No Device  Assistance: Stand by assistance  Quality of Gait: no LOB noted  Distance: 20'  Comments: pt becomes SOB with activity but O2 sats in 90s on RA     Balance  Comments: MI for sitting balance; SBA for standing balance        Plan   Plan  Times per week: 3-5  Current Treatment Recommendations: Functional Mobility Training  Safety Devices  Type of devices: Call light within reach, Left in chair, Nurse notified      AM-PAC Score  AM-PAC Inpatient Mobility Raw Score : 22 (04/04/20 1323)  AM-PAC Inpatient T-Scale Score : 53.28 (04/04/20 1323)  Mobility Inpatient CMS 0-100% Score: 20.91 (04/04/20 1323)  Mobility Inpatient CMS G-Code Modifier : Amado Taylor (04/04/20 1323)          Goals  Short term goals  Time Frame for Short term goals: by discharge  Short term goal 1: transfers MI  Short term goal 2: amb ' without an AD MI  Patient Goals   Patient goals : to get home       Therapy Time   Individual Concurrent Group

## 2020-04-04 NOTE — PROGRESS NOTES
This RN called the  and left a message per Dr. Domenica Jimenes request, in order to help facilitate a discharge plan for the pt to receive dialysis outpt as he is still in rule out droplet precautions for COVID-19

## 2020-04-04 NOTE — PROGRESS NOTES
Dialysis RN at bedside, pt has been in dialysis for a couple of hours and doesn't have any complaints at this time, this RN will ensure that pt has an order for dinner

## 2020-04-04 NOTE — PROGRESS NOTES
Laundry in basement(1 flight to bed room)  Home Access: Stairs to enter with rails  Entrance Stairs - Number of Steps:  11+ 6 with rail  Bathroom Shower/Tub: Tub/Shower unit  Bathroom Equipment: Shower chair, Grab bars in shower(doesn't use shower chair)  Home Equipment: Cane  ADL Assistance: [address]: (pt does his own laundry but daughter carries it up and down stairs)  Ambulation Assistance: Independent(intermittently uses cane)  Transfer Assistance: Independent  Active : No       Objective   Vision: Within Functional Limits  Hearing: Within functional limits    Orientation  Overall Orientation Status: Within Functional Limits  Orientation Level: Oriented X4     Balance  Sitting Balance: Supervision  Standing Balance: Stand by assistance  Functional Mobility  Functional - Mobility Device: No device  Activity: Other(short distances in room)  Assist Level: Stand by assistance  Functional Mobility Comments: no LOB; pt reports decreased overall endurance; no reports of light headedness or dizziness; pt reports minimal SOB although SpO2 >90% after ambulation  Wheelchair Bed Transfers  Wheelchair/Bed - Technique: Ambulating  Equipment Used: Bed;Other(bed to chair)  Level of Asssistance: Stand by assistance  ADL  Additional Comments: Anticipate pt needing up to SBA/supervision for ADLs at this time based on ROM, strength, balance, and endurance.    Tone RUE  RUE Tone: Normotonic  Tone LUE  LUE Tone: Normotonic  Coordination  Movements Are Fluid And Coordinated: Yes     Bed mobility  Supine to Sit: Independent  Sit to Supine: Unable to assess(up in chair after session)  Scooting: Independent  Transfers  Sit to stand: Stand by assistance  Stand to sit: Stand by assistance     Cognition  Overall Cognitive Status: WFL        Sensation  Overall Sensation Status: Impaired(numbness/tingling in B hands and feet)        LUE AROM (degrees)  LUE AROM : WFL  RUE AROM (degrees)  RUE AROM : WFL  LUE Strength  Gross LUE Strength: WFL  L Hand General: 4+/5  RUE Strength  Gross RUE Strength: WFL  R Hand General: 4+/5                   Plan   Plan  Times per week: 2-3x  Current Treatment Recommendations: Strengthening, Functional Mobility Training, Gait Training, Endurance Training, Balance Training, Safety Education & Training, Self-Care / ADL, Equipment Evaluation, Education, & procurement, Patient/Caregiver Education & Training    AM-PAC Score        AM-PAC Inpatient Daily Activity Raw Score: 21 (04/04/20 1321)  AM-PAC Inpatient ADL T-Scale Score : 44.27 (04/04/20 1321)  ADL Inpatient CMS 0-100% Score: 32.79 (04/04/20 1321)  ADL Inpatient CMS G-Code Modifier : Issac Valle (04/04/20 1321)    Goals  Short term goals  Time Frame for Short term goals: prior to D/C  Short term goal 1: complet functional mobility and transfers Ind  Short term goal 2: complete bathing and dressing Ind  Short term goal 3: complete toileting Ind  Short term goal 4: complete grooming in stance at sink Ind  Long term goals  Time Frame for Long term goals : STG=LTG  Patient Goals   Patient goals : return home       Therapy Time   Individual Concurrent Group Co-treatment   Time In 1230         Time Out 1315         Minutes 45         Timed Code Treatment Minutes: 30 Minutes(15 minute eval)       PATRICIA Vincent/ALMA

## 2020-04-05 ENCOUNTER — APPOINTMENT (OUTPATIENT)
Dept: CT IMAGING | Age: 65
DRG: 177 | End: 2020-04-05
Payer: MEDICARE

## 2020-04-05 ENCOUNTER — APPOINTMENT (OUTPATIENT)
Dept: GENERAL RADIOLOGY | Age: 65
DRG: 177 | End: 2020-04-05
Payer: MEDICARE

## 2020-04-05 LAB
HCT VFR BLD CALC: 24.7 % (ref 40.5–52.5)
HEMOGLOBIN: 8 G/DL (ref 13.5–17.5)
MCH RBC QN AUTO: 31.6 PG (ref 26–34)
MCHC RBC AUTO-ENTMCNC: 32.6 G/DL (ref 31–36)
MCV RBC AUTO: 97.2 FL (ref 80–100)
PDW BLD-RTO: 16.8 % (ref 12.4–15.4)
PLATELET # BLD: 88 K/UL (ref 135–450)
PMV BLD AUTO: 9.3 FL (ref 5–10.5)
RBC # BLD: 2.54 M/UL (ref 4.2–5.9)
TROPONIN: 0.08 NG/ML
WBC # BLD: 3.9 K/UL (ref 4–11)

## 2020-04-05 PROCEDURE — 6360000002 HC RX W HCPCS: Performed by: INTERNAL MEDICINE

## 2020-04-05 PROCEDURE — 36415 COLL VENOUS BLD VENIPUNCTURE: CPT

## 2020-04-05 PROCEDURE — 6370000000 HC RX 637 (ALT 250 FOR IP): Performed by: FAMILY MEDICINE

## 2020-04-05 PROCEDURE — 2580000003 HC RX 258: Performed by: INTERNAL MEDICINE

## 2020-04-05 PROCEDURE — 84484 ASSAY OF TROPONIN QUANT: CPT

## 2020-04-05 PROCEDURE — 70450 CT HEAD/BRAIN W/O DYE: CPT

## 2020-04-05 PROCEDURE — 6370000000 HC RX 637 (ALT 250 FOR IP): Performed by: NURSE PRACTITIONER

## 2020-04-05 PROCEDURE — 94640 AIRWAY INHALATION TREATMENT: CPT

## 2020-04-05 PROCEDURE — 85027 COMPLETE CBC AUTOMATED: CPT

## 2020-04-05 PROCEDURE — 71045 X-RAY EXAM CHEST 1 VIEW: CPT

## 2020-04-05 PROCEDURE — 93005 ELECTROCARDIOGRAM TRACING: CPT | Performed by: INTERNAL MEDICINE

## 2020-04-05 PROCEDURE — 2580000003 HC RX 258: Performed by: FAMILY MEDICINE

## 2020-04-05 PROCEDURE — 86022 PLATELET ANTIBODIES: CPT

## 2020-04-05 PROCEDURE — 6360000002 HC RX W HCPCS: Performed by: FAMILY MEDICINE

## 2020-04-05 PROCEDURE — 2580000003 HC RX 258: Performed by: NURSE PRACTITIONER

## 2020-04-05 PROCEDURE — 1200000000 HC SEMI PRIVATE

## 2020-04-05 RX ADMIN — GABAPENTIN 300 MG: 300 CAPSULE ORAL at 21:57

## 2020-04-05 RX ADMIN — ATORVASTATIN CALCIUM 80 MG: 80 TABLET, FILM COATED ORAL at 08:47

## 2020-04-05 RX ADMIN — Medication 2 PUFF: at 11:22

## 2020-04-05 RX ADMIN — CARVEDILOL 25 MG: 25 TABLET, FILM COATED ORAL at 18:05

## 2020-04-05 RX ADMIN — AZITHROMYCIN DIHYDRATE 500 MG: 500 INJECTION, POWDER, LYOPHILIZED, FOR SOLUTION INTRAVENOUS at 20:01

## 2020-04-05 RX ADMIN — BUDESONIDE AND FORMOTEROL FUMARATE DIHYDRATE 2 PUFF: 160; 4.5 AEROSOL RESPIRATORY (INHALATION) at 20:01

## 2020-04-05 RX ADMIN — BUDESONIDE AND FORMOTEROL FUMARATE DIHYDRATE 2 PUFF: 160; 4.5 AEROSOL RESPIRATORY (INHALATION) at 08:52

## 2020-04-05 RX ADMIN — PANTOPRAZOLE SODIUM 40 MG: 40 TABLET, DELAYED RELEASE ORAL at 05:38

## 2020-04-05 RX ADMIN — SODIUM CHLORIDE, PRESERVATIVE FREE 10 ML: 5 INJECTION INTRAVENOUS at 20:01

## 2020-04-05 RX ADMIN — ASPIRIN 81 MG 81 MG: 81 TABLET ORAL at 08:46

## 2020-04-05 RX ADMIN — SODIUM CHLORIDE, PRESERVATIVE FREE 10 ML: 5 INJECTION INTRAVENOUS at 08:47

## 2020-04-05 RX ADMIN — CARVEDILOL 25 MG: 25 TABLET, FILM COATED ORAL at 08:46

## 2020-04-05 RX ADMIN — TORSEMIDE 40 MG: 20 TABLET ORAL at 08:47

## 2020-04-05 RX ADMIN — ALLOPURINOL 100 MG: 100 TABLET ORAL at 08:47

## 2020-04-05 RX ADMIN — HYDROXYCHLOROQUINE SULFATE 200 MG: 200 TABLET ORAL at 08:46

## 2020-04-05 RX ADMIN — HYDROXYCHLOROQUINE SULFATE 200 MG: 200 TABLET ORAL at 21:57

## 2020-04-05 RX ADMIN — CEFEPIME HYDROCHLORIDE 1 G: 1 INJECTION, POWDER, FOR SOLUTION INTRAMUSCULAR; INTRAVENOUS at 18:05

## 2020-04-05 ASSESSMENT — PAIN SCALES - GENERAL
PAINLEVEL_OUTOF10: 0
PAINLEVEL_OUTOF10: 0
PAINLEVEL_OUTOF10: 7
PAINLEVEL_OUTOF10: 0

## 2020-04-05 NOTE — PROGRESS NOTES
D: pt has low BP this am, 4 L of fluid removed yesterday in HD, hydralazine held this am, pt denies chest pain at this time, but earlier this am, pt states that he had some chest pain, 3 out of 10, sharp pain in left chest A: this RN reported findings to Dr. Lluvia Asencio R: Dr. Lluvia Asencio ordered EKG

## 2020-04-05 NOTE — PROGRESS NOTES
°F (36.5 °C) (Oral)   Resp 18   Ht 5' 8\" (1.727 m)   Wt 134 lb 14.7 oz (61.2 kg)   SpO2 100%   BMI 20.51 kg/m²     General appearance: No apparent distress, appears stated age and cooperative. HEENT: Pupils equal, round, and reactive to light. Conjunctivae/corneas clear. Neck: Supple, with full range of motion. Trachea midline. Respiratory:  Normal respiratory effort. Clear to auscultation, bilaterally without Rales/Wheezes/Rhonchi. Coughing  Cardiovascular: Regular rate and rhythm with normal S1/S2 without murmurs, rubs or gallops. Abdomen: Soft, non-tender, non-distended with normal bowel sounds. Musculoskeletal: No clubbing, cyanosis or edema bilaterally. Full range of motion without deformity. Skin: Skin color, texture, turgor normal.  No rashes or lesions. Neurologic:  Neurovascularly intact without any focal sensory/motor deficits. Cranial nerves: II-XII intact, grossly non-focal.  Psychiatric: Alert and oriented, thought content appropriate, normal insight  Capillary Refill: Brisk,< 3 seconds   Peripheral Pulses: +2 palpable, equal bilaterally       Labs:   Recent Labs     04/04/20  0702 04/05/20  0718   WBC 4.2 3.9*   HGB 8.2* 8.0*   HCT 24.9* 24.7*   PLT 81* 88*     Recent Labs     04/04/20  0702      K 5.0   CL 96*   CO2 28   BUN 36*   CREATININE 5.9*   CALCIUM 8.9     No results for input(s): AST, ALT, BILIDIR, BILITOT, ALKPHOS in the last 72 hours. No results for input(s): INR in the last 72 hours. No results for input(s): Williemae Cannon in the last 72 hours. Urinalysis:    No results found for: Ian Clear, BACTERIA, RBCUA, BLOODU, [address], [address]    Radiology:  XR CHEST PORTABLE   Final Result   Mild diffuse interstitial prominence, improved since previous exam.         XR CHEST PORTABLE   Final Result   Mild interstitial/reticular opacities to the lungs bilaterally along with   mild hazy opacities.   Differential considerations include mild pulmonary and   interstitial edema as well as nonspecific infectious/inflammatory process to   include atypical/viral pneumonia. Underlying chronic interstitial/fibrotic   change not excluded. Assessment/Plan:    Active Hospital Problems    Diagnosis    HCAP (healthcare-associated pneumonia) [J18.9]    COPD exacerbation ([facility] [address] [J44.1]    ESRD on dialysis ([facility] [address] [N18.6, Z99.2]     HCAP versus viral pneumonia  Treating for gram negative PNA  - continuecefepime and azithromycin   - respiratory panel negative  - urine antigens negative  - flu negative  - MRSA negative  - repeat CXR in AM    Chest pain  3/10, has been coughing quite a bit. EKG with TWI on admission, then improved and now present again. Trop 0.10 on admission, not rechecked. Hard to interpret in setting of ESRD. Looking at associated chart had [address] with clean coronaries. Trop chronically elevated to 0.08--0.10.   - recheck trop  - defer cardiology consult unless chest pain recurs or trop significantly elevated     COVID-19 rule out  - c/w droplet plus contact isolation  - test pending  - on Plaquenil and Azithromycin     COPD - improving  - c/w inhalers   - steroids stopped  - antitussives     ESRD on HD  - HD TTS  - mgmt per nephrology     HTN  - c/w home meds     Thrombocytopenia  - platelet trending down  - d/c heparin  - HIT screening sent  - cbc in AM     DVT Prophylaxis: d/c heparin given low platelets, c/w SCDs  Diet: DIET GENERAL;  Code Status: DNR-CC    PT/OT Eval Status: ordered--C    Dispo - home likely AM-- will need to ensure he can return to HD with COVID pending, discussed with Sally Faye MD    This note was transcribed using [id number] [employer]. Please disregard any translational errors.

## 2020-04-06 LAB
ALBUMIN SERPL-MCNC: 3.4 G/DL (ref 3.4–5)
ANION GAP SERPL CALCULATED.3IONS-SCNC: 19 MMOL/L (ref 3–16)
ANION GAP SERPL CALCULATED.3IONS-SCNC: 22 MMOL/L (ref 3–16)
BASOPHILS ABSOLUTE: 0 K/UL (ref 0–0.2)
BASOPHILS RELATIVE PERCENT: 0.3 %
BUN BLDV-MCNC: 52 MG/DL (ref 7–20)
BUN BLDV-MCNC: 56 MG/DL (ref 7–20)
CALCIUM SERPL-MCNC: 8.4 MG/DL (ref 8.3–10.6)
CALCIUM SERPL-MCNC: 8.6 MG/DL (ref 8.3–10.6)
CHLORIDE BLD-SCNC: 92 MMOL/L (ref 99–110)
CHLORIDE BLD-SCNC: 93 MMOL/L (ref 99–110)
CO2: 21 MMOL/L (ref 21–32)
CO2: 24 MMOL/L (ref 21–32)
CREAT SERPL-MCNC: 8.7 MG/DL (ref 0.8–1.3)
CREAT SERPL-MCNC: 8.9 MG/DL (ref 0.8–1.3)
EKG ATRIAL RATE: 74 BPM
EKG DIAGNOSIS: NORMAL
EKG P AXIS: 72 DEGREES
EKG P-R INTERVAL: 222 MS
EKG Q-T INTERVAL: 428 MS
EKG QRS DURATION: 96 MS
EKG QTC CALCULATION (BAZETT): 475 MS
EKG R AXIS: 6 DEGREES
EKG T AXIS: 63 DEGREES
EKG VENTRICULAR RATE: 74 BPM
EOSINOPHILS ABSOLUTE: 0.1 K/UL (ref 0–0.6)
EOSINOPHILS RELATIVE PERCENT: 1.7 %
GFR AFRICAN AMERICAN: 7
GFR AFRICAN AMERICAN: 8
GFR NON-AFRICAN AMERICAN: 6
GFR NON-AFRICAN AMERICAN: 6
GLUCOSE BLD-MCNC: 103 MG/DL (ref 70–99)
GLUCOSE BLD-MCNC: 105 MG/DL (ref 70–99)
HCT VFR BLD CALC: 22.6 % (ref 40.5–52.5)
HEMOGLOBIN: 7.5 G/DL (ref 13.5–17.5)
HEPARIN INDUCED PLATELET ANTIBODY: NEGATIVE
LYMPHOCYTES ABSOLUTE: 0.5 K/UL (ref 1–5.1)
LYMPHOCYTES RELATIVE PERCENT: 11 %
MAGNESIUM: 2.2 MG/DL (ref 1.8–2.4)
MCH RBC QN AUTO: 31.6 PG (ref 26–34)
MCHC RBC AUTO-ENTMCNC: 33 G/DL (ref 31–36)
MCV RBC AUTO: 95.9 FL (ref 80–100)
MONOCYTES ABSOLUTE: 0.3 K/UL (ref 0–1.3)
MONOCYTES RELATIVE PERCENT: 7.7 %
NEUTROPHILS ABSOLUTE: 3.4 K/UL (ref 1.7–7.7)
NEUTROPHILS RELATIVE PERCENT: 79.3 %
PDW BLD-RTO: 16.5 % (ref 12.4–15.4)
PHOSPHORUS: 5.2 MG/DL (ref 2.5–4.9)
PLATELET # BLD: 85 K/UL (ref 135–450)
PMV BLD AUTO: 8.4 FL (ref 5–10.5)
POTASSIUM SERPL-SCNC: 4.7 MMOL/L (ref 3.5–5.1)
POTASSIUM SERPL-SCNC: 4.8 MMOL/L (ref 3.5–5.1)
PROCALCITONIN: 0.8 NG/ML (ref 0–0.15)
RBC # BLD: 2.36 M/UL (ref 4.2–5.9)
SARS-COV-2: NOT DETECTED
SODIUM BLD-SCNC: 135 MMOL/L (ref 136–145)
SODIUM BLD-SCNC: 136 MMOL/L (ref 136–145)
SOURCE: NORMAL
WBC # BLD: 4.3 K/UL (ref 4–11)

## 2020-04-06 PROCEDURE — 6370000000 HC RX 637 (ALT 250 FOR IP): Performed by: NURSE PRACTITIONER

## 2020-04-06 PROCEDURE — 6360000002 HC RX W HCPCS: Performed by: INTERNAL MEDICINE

## 2020-04-06 PROCEDURE — 83735 ASSAY OF MAGNESIUM: CPT

## 2020-04-06 PROCEDURE — 2580000003 HC RX 258: Performed by: INTERNAL MEDICINE

## 2020-04-06 PROCEDURE — 2580000003 HC RX 258: Performed by: NURSE PRACTITIONER

## 2020-04-06 PROCEDURE — 84145 PROCALCITONIN (PCT): CPT

## 2020-04-06 PROCEDURE — 6370000000 HC RX 637 (ALT 250 FOR IP): Performed by: FAMILY MEDICINE

## 2020-04-06 PROCEDURE — 1200000000 HC SEMI PRIVATE

## 2020-04-06 PROCEDURE — 94640 AIRWAY INHALATION TREATMENT: CPT

## 2020-04-06 PROCEDURE — 93010 ELECTROCARDIOGRAM REPORT: CPT | Performed by: INTERNAL MEDICINE

## 2020-04-06 PROCEDURE — 85025 COMPLETE CBC W/AUTO DIFF WBC: CPT

## 2020-04-06 PROCEDURE — 80069 RENAL FUNCTION PANEL: CPT

## 2020-04-06 RX ORDER — HEPARIN SODIUM 5000 [USP'U]/ML
5000 INJECTION, SOLUTION INTRAVENOUS; SUBCUTANEOUS EVERY 8 HOURS SCHEDULED
Status: DISCONTINUED | OUTPATIENT
Start: 2020-04-06 | End: 2020-04-08

## 2020-04-06 RX ORDER — CARVEDILOL 12.5 MG/1
12.5 TABLET ORAL 2 TIMES DAILY WITH MEALS
Status: DISCONTINUED | OUTPATIENT
Start: 2020-04-06 | End: 2020-04-08 | Stop reason: HOSPADM

## 2020-04-06 RX ADMIN — TORSEMIDE 40 MG: 20 TABLET ORAL at 08:43

## 2020-04-06 RX ADMIN — ATORVASTATIN CALCIUM 80 MG: 80 TABLET, FILM COATED ORAL at 08:43

## 2020-04-06 RX ADMIN — BUDESONIDE AND FORMOTEROL FUMARATE DIHYDRATE 2 PUFF: 160; 4.5 AEROSOL RESPIRATORY (INHALATION) at 19:39

## 2020-04-06 RX ADMIN — CARVEDILOL 25 MG: 25 TABLET, FILM COATED ORAL at 09:49

## 2020-04-06 RX ADMIN — HYDROXYCHLOROQUINE SULFATE 200 MG: 200 TABLET ORAL at 08:43

## 2020-04-06 RX ADMIN — HEPARIN SODIUM 5000 UNITS: 5000 INJECTION INTRAVENOUS; SUBCUTANEOUS at 20:24

## 2020-04-06 RX ADMIN — MELATONIN 3 MG: at 20:24

## 2020-04-06 RX ADMIN — Medication 2 PUFF: at 08:48

## 2020-04-06 RX ADMIN — PANTOPRAZOLE SODIUM 40 MG: 40 TABLET, DELAYED RELEASE ORAL at 06:26

## 2020-04-06 RX ADMIN — ASPIRIN 81 MG 81 MG: 81 TABLET ORAL at 08:43

## 2020-04-06 RX ADMIN — HEPARIN SODIUM 5000 UNITS: 5000 INJECTION INTRAVENOUS; SUBCUTANEOUS at 15:45

## 2020-04-06 RX ADMIN — BENZONATATE 200 MG: 100 CAPSULE ORAL at 12:20

## 2020-04-06 RX ADMIN — ALLOPURINOL 100 MG: 100 TABLET ORAL at 08:43

## 2020-04-06 RX ADMIN — SODIUM CHLORIDE, PRESERVATIVE FREE 10 ML: 5 INJECTION INTRAVENOUS at 20:24

## 2020-04-06 RX ADMIN — BUDESONIDE AND FORMOTEROL FUMARATE DIHYDRATE 2 PUFF: 160; 4.5 AEROSOL RESPIRATORY (INHALATION) at 08:48

## 2020-04-06 RX ADMIN — GUAIFENESIN AND DEXTROMETHORPHAN 5 ML: 100; 10 SYRUP ORAL at 12:20

## 2020-04-06 RX ADMIN — ACETAMINOPHEN 650 MG: 325 TABLET, FILM COATED ORAL at 10:47

## 2020-04-06 RX ADMIN — GABAPENTIN 300 MG: 300 CAPSULE ORAL at 20:24

## 2020-04-06 RX ADMIN — SODIUM CHLORIDE, PRESERVATIVE FREE 10 ML: 5 INJECTION INTRAVENOUS at 08:48

## 2020-04-06 RX ADMIN — CEFEPIME HYDROCHLORIDE 1 G: 1 INJECTION, POWDER, FOR SOLUTION INTRAMUSCULAR; INTRAVENOUS at 18:30

## 2020-04-06 ASSESSMENT — PAIN DESCRIPTION - FREQUENCY
FREQUENCY: CONTINUOUS
FREQUENCY: CONTINUOUS

## 2020-04-06 ASSESSMENT — PAIN DESCRIPTION - LOCATION
LOCATION: ABDOMEN
LOCATION: HEAD

## 2020-04-06 ASSESSMENT — PAIN SCALES - GENERAL
PAINLEVEL_OUTOF10: 7
PAINLEVEL_OUTOF10: 5

## 2020-04-06 ASSESSMENT — PAIN DESCRIPTION - PAIN TYPE
TYPE: ACUTE PAIN
TYPE: ACUTE PAIN

## 2020-04-06 ASSESSMENT — PAIN DESCRIPTION - DESCRIPTORS
DESCRIPTORS: ACHING;HEADACHE
DESCRIPTORS: TIGHTNESS

## 2020-04-06 ASSESSMENT — PAIN - FUNCTIONAL ASSESSMENT
PAIN_FUNCTIONAL_ASSESSMENT: ACTIVITIES ARE NOT PREVENTED
PAIN_FUNCTIONAL_ASSESSMENT: ACTIVITIES ARE NOT PREVENTED

## 2020-04-06 ASSESSMENT — PAIN DESCRIPTION - ORIENTATION
ORIENTATION: MID
ORIENTATION: MID

## 2020-04-06 ASSESSMENT — PAIN DESCRIPTION - ONSET
ONSET: ON-GOING
ONSET: ON-GOING

## 2020-04-06 ASSESSMENT — PAIN DESCRIPTION - PROGRESSION
CLINICAL_PROGRESSION: NOT CHANGED
CLINICAL_PROGRESSION: NOT CHANGED

## 2020-04-06 NOTE — PROGRESS NOTES
Educated patient on purpose of 4 eyes skin assessment and asked patient if allowed to perform, patient verbalized understanding and agreed to assessment. 4 Eyes Skin Assessment     The patient is being assess for  Transfer to New Unit    I agree that 2 RN's have performed a thorough Head to Toe Skin Assessment on the patient. ALL assessment sites listed below have been assessed.        Areas assessed by both nurses: abdulkadir and christophe  [x]   Head, Face, and Ears   [x]   Shoulders, Back, and Chest  [x]   Arms, Elbows, and Hands   [x]   Coccyx, Sacrum, and IschIum  [x]   Legs, Feet, and Heels        Does the Patient have Skin Breakdown?  bruising to abdomen         Mauricio Prevention initiated:  NA   Wound Care Orders initiated:  NA      WO nurse consulted for Pressure Injury (Stage 3,4, Unstageable, DTI, NWPT, and Complex wounds), New and Established Ostomies:  NA      Nurse 1 eSignature: Electronically signed by Karen Diaz RN on 4/6/20 at 6:11 PM EDT    **SHARE this note so that the co-signing nurse is able to place an eSignature**    Nurse 2 eSignature: Electronically signed by Tripp Antonio RN on 4/6/20 at 6:11 PM EDT

## 2020-04-06 NOTE — PROGRESS NOTES
Hospitalist Progress Note      PCP: Melvin Hashimoto, MD    Date of Admission: 3/31/2020    Chief Complaint:   Chief Complaint   Patient presents with    Shortness of Breath     dialysis pt. from dialysis. finished tx. [facility] Course: 60 y. o. male with PMHx of CVA, CHF, ESRD on dialysis, COPD, HLD and HTN  admitted with shortness of breath and cough.  Patient reports having a cough for several weeks. Patient was tested for Covid 19, 2 weeks ago and was negative. He is afraid he may have been exposed since then. ED work-up significant for chest x-ray with mild interstitial reticular opacities bilaterally along with mild hazy opacities. Subjective: Syncopal event last night when up in restroom. CTH negative. BP dropped to 70's/60's. Says he has been feeling dizzy and lightheaded when going to the bathroom for the last few days. Symptoms sound most consistent with orthostatic vs vasovagal etiology. Denies chest pain, nausea, vomiting, fevers, abdominal pain. Still coughing.       Medications:  Reviewed    Infusion Medications   Scheduled Medications    hydroxychloroquine  200 mg Oral BID    pantoprazole  40 mg Oral QAM AC    melatonin  3 mg Oral Nightly    cefepime  1 g Intravenous Q24H    allopurinol  100 mg Oral Daily    aspirin  81 mg Oral Daily    atorvastatin  80 mg Oral Daily    budesonide-formoterol  2 puff Inhalation BID    carvedilol  25 mg Oral BID WC    gabapentin  300 mg Oral Nightly    hydrALAZINE  25 mg Oral BID    tiotropium  2 puff Inhalation Daily    torsemide  40 mg Oral Daily    sodium chloride flush  10 mL Intravenous 2 times per day     PRN Meds: guaiFENesin-dextromethorphan, benzonatate, ondansetron, albuterol sulfate HFA, ipratropium, sodium chloride flush, acetaminophen **OR** acetaminophen      Intake/Output Summary (Last 24 hours) at 4/6/2020 0904  Last data filed at 4/5/2020 1859  Gross per 24 hour   Intake 720 ml   Output --   Net 720 ml       Physical

## 2020-04-06 NOTE — PROGRESS NOTES
Department of Internal Medicine  Nephrology Progress Note    HPI:  72-year-old -American male with a past medical history significant for hypertension, CVA, coronary artery disease, congestive heart failure, COPD, end-stage renal disease on hemodialysis on Tuesday, Thursday, and Saturday, [facility] with worsening shortness  of breath. Apparently, thepatient is having cough and shortness of breath off and on for the last three to four weeks and it worsened to a point the patient decided to come to the emergency room. He has been admitted for possible exacerbation of COPD and COVID rule out. He was tested a few weeks ago and was negative.  At the time of consultation, the patient is feeling and breathing much better. Denies any chest pain, shortness of breath, fever, chills or rigors. Denies any chest pain and admits breathing status at the baseline. The patient finished his dialysis treatment yesterday. SUBJECTIVE:  Gets dizzy at times; blurred vision; no SOB  ROS:  In bed. [address]:  medications reviewed.    heparin (porcine)  5,000 Units Subcutaneous 3 times per day    carvedilol  12.5 mg Oral BID WC    hydroxychloroquine  200 mg Oral BID    pantoprazole  40 mg Oral QAM AC    melatonin  3 mg Oral Nightly    cefepime  1 g Intravenous Q24H    allopurinol  100 mg Oral Daily    aspirin  81 mg Oral Daily    atorvastatin  80 mg Oral Daily    budesonide-formoterol  2 puff Inhalation BID    gabapentin  300 mg Oral Nightly    [Held by provider] hydrALAZINE  25 mg Oral BID    tiotropium  2 puff Inhalation Daily    torsemide  40 mg Oral Daily    sodium chloride flush  10 mL Intravenous 2 times per day         Physical Exam:    VITALS:  /74   Pulse 81   Temp 96.9 °F (36.1 °C) (Oral)   Resp 20   Ht 5' 8\" (1.727 m)   Wt 134 lb 14.7 oz (61.2 kg)   SpO2 96%   BMI 20.51 kg/m²   24HR INTAKE/OUTPUT:      Intake/Output Summary (Last 24 hours) at 4/6/2020 1420  Last

## 2020-04-06 NOTE — PROGRESS NOTES
Made MD aware the COVID results were negative and pt can be moved to a Non-COVID unit. Pt transferred to 3102. Bedside report given to [facility], all questions answered, no complications. Carotid dopplers were ordered for syncope. Order was canceled earlier due to COVID rule out and unable to obtain test. Made MD aware that if MD still wanted test done, it would have to be reordered. Awaiting response.      Electronically signed by Bo Norman RN on 4/6/2020 at 5:14 PM

## 2020-04-06 NOTE — PROGRESS NOTES
Pt arrived to unit at 1650 . Pt is alert and oriented X4. Pt oriented to unit and to room. Pt oriented to call light and to phone. White board updated. Pt denies any further needs at this time. Dyspnea with exertion noted, respirtory rate 24. Mews 3. Top of telemetry monitor changed out showed artifact 150s, with new top, monitor shows 80s SR 1st av block. Will continue to monitor and assess.    Electronically signed by Lilia Jama RN on 4/6/2020 at 6:11 PM

## 2020-04-07 LAB
ALBUMIN SERPL-MCNC: 3.5 G/DL (ref 3.4–5)
ANION GAP SERPL CALCULATED.3IONS-SCNC: 21 MMOL/L (ref 3–16)
BUN BLDV-MCNC: 70 MG/DL (ref 7–20)
CALCIUM SERPL-MCNC: 8.5 MG/DL (ref 8.3–10.6)
CHLORIDE BLD-SCNC: 89 MMOL/L (ref 99–110)
CO2: 22 MMOL/L (ref 21–32)
CREAT SERPL-MCNC: 10.2 MG/DL (ref 0.8–1.3)
EKG ATRIAL RATE: 83 BPM
EKG DIAGNOSIS: NORMAL
EKG P AXIS: 65 DEGREES
EKG P-R INTERVAL: 228 MS
EKG Q-T INTERVAL: 418 MS
EKG QRS DURATION: 90 MS
EKG QTC CALCULATION (BAZETT): 491 MS
EKG R AXIS: 12 DEGREES
EKG T AXIS: 72 DEGREES
EKG VENTRICULAR RATE: 83 BPM
GFR AFRICAN AMERICAN: 6
GFR NON-AFRICAN AMERICAN: 5
GLUCOSE BLD-MCNC: 93 MG/DL (ref 70–99)
GLUCOSE BLD-MCNC: 95 MG/DL (ref 70–99)
HCT VFR BLD CALC: 23.8 % (ref 40.5–52.5)
HEMOGLOBIN: 7.9 G/DL (ref 13.5–17.5)
MAGNESIUM: 2.2 MG/DL (ref 1.8–2.4)
MCH RBC QN AUTO: 31.7 PG (ref 26–34)
MCHC RBC AUTO-ENTMCNC: 33 G/DL (ref 31–36)
MCV RBC AUTO: 96.2 FL (ref 80–100)
PDW BLD-RTO: 16.3 % (ref 12.4–15.4)
PERFORMED ON: NORMAL
PHOSPHORUS: 5.9 MG/DL (ref 2.5–4.9)
PLATELET # BLD: 92 K/UL (ref 135–450)
PMV BLD AUTO: 9.4 FL (ref 5–10.5)
POTASSIUM SERPL-SCNC: 5.2 MMOL/L (ref 3.5–5.1)
RBC # BLD: 2.48 M/UL (ref 4.2–5.9)
SODIUM BLD-SCNC: 132 MMOL/L (ref 136–145)
TROPONIN: 0.11 NG/ML
WBC # BLD: 3.9 K/UL (ref 4–11)

## 2020-04-07 PROCEDURE — 6370000000 HC RX 637 (ALT 250 FOR IP): Performed by: NURSE PRACTITIONER

## 2020-04-07 PROCEDURE — 80069 RENAL FUNCTION PANEL: CPT

## 2020-04-07 PROCEDURE — 2580000003 HC RX 258: Performed by: INTERNAL MEDICINE

## 2020-04-07 PROCEDURE — 1200000000 HC SEMI PRIVATE

## 2020-04-07 PROCEDURE — 6370000000 HC RX 637 (ALT 250 FOR IP): Performed by: FAMILY MEDICINE

## 2020-04-07 PROCEDURE — 85027 COMPLETE CBC AUTOMATED: CPT

## 2020-04-07 PROCEDURE — 84484 ASSAY OF TROPONIN QUANT: CPT

## 2020-04-07 PROCEDURE — 6360000002 HC RX W HCPCS: Performed by: INTERNAL MEDICINE

## 2020-04-07 PROCEDURE — 90935 HEMODIALYSIS ONE EVALUATION: CPT

## 2020-04-07 PROCEDURE — 2580000003 HC RX 258: Performed by: NURSE PRACTITIONER

## 2020-04-07 PROCEDURE — 36415 COLL VENOUS BLD VENIPUNCTURE: CPT

## 2020-04-07 PROCEDURE — 94640 AIRWAY INHALATION TREATMENT: CPT

## 2020-04-07 PROCEDURE — 93010 ELECTROCARDIOGRAM REPORT: CPT | Performed by: INTERNAL MEDICINE

## 2020-04-07 PROCEDURE — 6370000000 HC RX 637 (ALT 250 FOR IP): Performed by: INTERNAL MEDICINE

## 2020-04-07 PROCEDURE — 93005 ELECTROCARDIOGRAM TRACING: CPT | Performed by: INTERNAL MEDICINE

## 2020-04-07 PROCEDURE — 93880 EXTRACRANIAL BILAT STUDY: CPT

## 2020-04-07 PROCEDURE — 83735 ASSAY OF MAGNESIUM: CPT

## 2020-04-07 RX ORDER — IPRATROPIUM BROMIDE AND ALBUTEROL SULFATE 2.5; .5 MG/3ML; MG/3ML
1 SOLUTION RESPIRATORY (INHALATION) EVERY 4 HOURS PRN
Status: DISCONTINUED | OUTPATIENT
Start: 2020-04-07 | End: 2020-04-08 | Stop reason: HOSPADM

## 2020-04-07 RX ORDER — MIDODRINE HYDROCHLORIDE 5 MG/1
5 TABLET ORAL PRN
Status: DISCONTINUED | OUTPATIENT
Start: 2020-04-07 | End: 2020-04-08 | Stop reason: HOSPADM

## 2020-04-07 RX ORDER — ALBUMIN (HUMAN) 12.5 G/50ML
12.5 SOLUTION INTRAVENOUS PRN
Status: DISCONTINUED | OUTPATIENT
Start: 2020-04-07 | End: 2020-04-08 | Stop reason: HOSPADM

## 2020-04-07 RX ADMIN — BUDESONIDE AND FORMOTEROL FUMARATE DIHYDRATE 2 PUFF: 160; 4.5 AEROSOL RESPIRATORY (INHALATION) at 20:11

## 2020-04-07 RX ADMIN — DARBEPOETIN ALFA 60 MCG: 60 SOLUTION INTRAVENOUS; SUBCUTANEOUS at 12:35

## 2020-04-07 RX ADMIN — TORSEMIDE 40 MG: 20 TABLET ORAL at 13:46

## 2020-04-07 RX ADMIN — SODIUM CHLORIDE, PRESERVATIVE FREE 10 ML: 5 INJECTION INTRAVENOUS at 20:42

## 2020-04-07 RX ADMIN — ASPIRIN 81 MG 81 MG: 81 TABLET ORAL at 13:46

## 2020-04-07 RX ADMIN — MELATONIN 3 MG: at 20:42

## 2020-04-07 RX ADMIN — GUAIFENESIN AND DEXTROMETHORPHAN 5 ML: 100; 10 SYRUP ORAL at 17:34

## 2020-04-07 RX ADMIN — HEPARIN SODIUM 5000 UNITS: 5000 INJECTION INTRAVENOUS; SUBCUTANEOUS at 06:42

## 2020-04-07 RX ADMIN — GUAIFENESIN AND DEXTROMETHORPHAN 5 ML: 100; 10 SYRUP ORAL at 02:45

## 2020-04-07 RX ADMIN — HEPARIN SODIUM 5000 UNITS: 5000 INJECTION INTRAVENOUS; SUBCUTANEOUS at 20:42

## 2020-04-07 RX ADMIN — ALLOPURINOL 100 MG: 100 TABLET ORAL at 13:46

## 2020-04-07 RX ADMIN — PANTOPRAZOLE SODIUM 40 MG: 40 TABLET, DELAYED RELEASE ORAL at 06:42

## 2020-04-07 RX ADMIN — CARVEDILOL 12.5 MG: 12.5 TABLET, FILM COATED ORAL at 17:29

## 2020-04-07 RX ADMIN — CEFEPIME HYDROCHLORIDE 1 G: 1 INJECTION, POWDER, FOR SOLUTION INTRAMUSCULAR; INTRAVENOUS at 17:28

## 2020-04-07 RX ADMIN — GUAIFENESIN AND DEXTROMETHORPHAN 5 ML: 100; 10 SYRUP ORAL at 13:47

## 2020-04-07 RX ADMIN — GABAPENTIN 300 MG: 300 CAPSULE ORAL at 20:43

## 2020-04-07 RX ADMIN — BENZONATATE 200 MG: 100 CAPSULE ORAL at 17:34

## 2020-04-07 RX ADMIN — ALBUTEROL SULFATE 2 PUFF: 90 AEROSOL, METERED RESPIRATORY (INHALATION) at 02:48

## 2020-04-07 RX ADMIN — ATORVASTATIN CALCIUM 80 MG: 80 TABLET, FILM COATED ORAL at 13:46

## 2020-04-07 RX ADMIN — HEPARIN SODIUM 5000 UNITS: 5000 INJECTION INTRAVENOUS; SUBCUTANEOUS at 13:47

## 2020-04-07 RX ADMIN — ACETAMINOPHEN 650 MG: 325 TABLET, FILM COATED ORAL at 17:34

## 2020-04-07 RX ADMIN — GUAIFENESIN AND DEXTROMETHORPHAN 5 ML: 100; 10 SYRUP ORAL at 21:38

## 2020-04-07 RX ADMIN — IPRATROPIUM BROMIDE 2 PUFF: 17 AEROSOL, METERED RESPIRATORY (INHALATION) at 02:48

## 2020-04-07 ASSESSMENT — PAIN DESCRIPTION - LOCATION: LOCATION: HEAD

## 2020-04-07 ASSESSMENT — PAIN - FUNCTIONAL ASSESSMENT: PAIN_FUNCTIONAL_ASSESSMENT: ACTIVITIES ARE NOT PREVENTED

## 2020-04-07 ASSESSMENT — PAIN SCALES - GENERAL
PAINLEVEL_OUTOF10: 0
PAINLEVEL_OUTOF10: 0
PAINLEVEL_OUTOF10: 3

## 2020-04-07 ASSESSMENT — PAIN DESCRIPTION - PROGRESSION: CLINICAL_PROGRESSION: GRADUALLY WORSENING

## 2020-04-07 ASSESSMENT — PAIN DESCRIPTION - PAIN TYPE: TYPE: ACUTE PAIN

## 2020-04-07 ASSESSMENT — PAIN DESCRIPTION - DESCRIPTORS: DESCRIPTORS: ACHING

## 2020-04-07 ASSESSMENT — PAIN DESCRIPTION - ONSET: ONSET: ON-GOING

## 2020-04-07 ASSESSMENT — PAIN DESCRIPTION - FREQUENCY: FREQUENCY: INTERMITTENT

## 2020-04-07 NOTE — PLAN OF CARE
Problem: Falls - Risk of:  Goal: Absence of physical injury  Description: Absence of physical injury  4/6/2020 0214 by Beth Mcdowell RN  Outcome: Ongoing       Problem: DAILY CARE  Goal: Daily care needs are met  4/6/2020 0214 by Beth Mcdowell RN  Outcome: Ongoing       Problem: PAIN  Goal: Patient's pain/discomfort is manageable  4/6/2020 0214 by Beth Mcdowell RN  Outcome: Ongoing     Problem: SKIN INTEGRITY  Goal: Skin integrity is maintained or improved  4/6/2020 0214 by Beth Mcdowell RN  Outcome: Ongoing       Problem: KNOWLEDGE DEFICIT  Goal: Patient/S.O. demonstrates understanding of disease process, treatment plan, medications, and discharge instructions. 4/6/2020 0214 by Beth Mcdowell RN  Outcome: Ongoing        Problem: Pain:  Description: Pain management should include both nonpharmacologic and pharmacologic interventions.   Goal: Pain level will decrease  Description: Pain level will decrease  Outcome: Ongoing  Goal: Control of acute pain  Description: Control of acute pain  Outcome: Ongoing
Problem: Falls - Risk of:  Goal: Will remain free from falls  Description: Will remain free from falls  4/1/2020 2338 by Ruth Ann Baxter RN  Outcome: Ongoing  4/1/2020 1037 by Maile Clements RN  Outcome: Ongoing  Goal: Absence of physical injury  Description: Absence of physical injury  4/1/2020 2338 by Ruth Ann Baxter RN  Outcome: Ongoing  4/1/2020 1037 by Maile Clements RN  Outcome: Ongoing
Problem: Falls - Risk of:  Goal: Will remain free from falls  Description: Will remain free from falls  4/2/2020 0916 by Hailey Cullen RN  Outcome: Ongoing  4/1/2020 2338 by Zoie Rodrigues RN  Outcome: Ongoing  Goal: Absence of physical injury  Description: Absence of physical injury  4/2/2020 0916 by Hailey Cullen RN  Outcome: Ongoing  4/1/2020 2338 by Zoie Rodrigues RN  Outcome: Ongoing     Problem: DAILY CARE  Goal: Daily care needs are met  4/2/2020 0916 by Hailey Cullen RN  Outcome: Ongoing  4/1/2020 2338 by Zoie Rodrigues RN  Outcome: Ongoing     Problem: PAIN  Goal: Patient's pain/discomfort is manageable  4/2/2020 0916 by Hailey Cullen RN  Outcome: Ongoing  4/1/2020 2338 by Zoie Rodrigues RN  Outcome: Ongoing     Problem: SKIN INTEGRITY  Goal: Skin integrity is maintained or improved  4/2/2020 0916 by Hailey Cullen RN  Outcome: Ongoing  4/1/2020 2338 by Zoie Rodrigues RN  Outcome: Ongoing     Problem: KNOWLEDGE DEFICIT  Goal: Patient/S.O. demonstrates understanding of disease process, treatment plan, medications, and discharge instructions.   4/2/2020 0916 by Hailey Cullen RN  Outcome: Ongoing  4/1/2020 2338 by Zoie Rodrigues RN  Outcome: Ongoing     Problem: DISCHARGE BARRIERS  Goal: Patient's continuum of care needs are met  4/2/2020 0916 by Hailey Cullen RN  Outcome: Ongoing  4/1/2020 2338 by Zoie Rodrigues RN  Outcome: Ongoing
Problem: Falls - Risk of:  Goal: Will remain free from falls  Description: Will remain free from falls  4/5/2020 0056 by Yosvany Najera RN  Outcome: Ongoing    Goal: Absence of physical injury  Description: Absence of physical injury  4/5/2020 0056 by Yosvany Najera RN  Outcome: Ongoing     Problem: DAILY CARE  Goal: Daily care needs are met  4/5/2020 0056 by Yosvany Najera RN  Outcome: Ongoing     Problem: PAIN  Goal: Patient's pain/discomfort is manageable  4/5/2020 0056 by Yosvany Najera RN  Outcome: Ongoing     Problem: SKIN INTEGRITY  Goal: Skin integrity is maintained or improved  4/5/2020 0056 by Yosvany Najera RN  Outcome: Ongoing       Problem: KNOWLEDGE DEFICIT  Goal: Patient/S.O. demonstrates understanding of disease process, treatment plan, medications, and discharge instructions.   4/5/2020 0056 by Yosvany Najera RN  Outcome: Ongoing
Problem: Falls - Risk of:  Goal: Will remain free from falls  Description: Will remain free from falls  4/6/2020 1031 by Ninoska Desai RN  Outcome: Ongoing     Fall risk assessment completed . Fall precautions in place, bed/ chair alarm on, side rails 2/4 up, call light in reach, educated pt on calling for assistance when needed, room clear of clutter. Pt verbalized understanding. Problem: DAILY CARE  Goal: Daily care needs are met  4/6/2020 1031 by Ninoska Desai RN  Outcome: Ongoing   Patient's ability assessed to perform self care and independent activity encouraged according to that ability. Assisted with ADL's as needed. Risk for skin breakdown assessed. Potential discharge needs assessed. Patient and family included in daily care decisions. Problem: SKIN INTEGRITY  Goal: Skin integrity is maintained or improved  4/6/2020 1031 by Ninoska Desai RN  Outcome: Ongoing   Skin assessment completed every shift. Pt assessed for incontinence, appropriate barrier cream applied prn. Pt encouraged to turn/rotate every 2 hours. Assistance provided if pt unable to do so themselves. Problem: Pain:  Goal: Pain level will decrease  Description: Pain level will decrease  4/6/2020 1031 by Ninoska Desai RN  Outcome: Ongoing   Pain/discomfort being managed with PRN analgesics per MD orders. Pt able to express presence and absence of pain and rate pain appropriately using numerical scale.
Problem: Falls - Risk of:  Goal: Will remain free from falls  Description: Will remain free from falls  4/7/2020 0721 by Shalini Raines RN  Outcome: Ongoing  4/6/2020 1859 by Michel Santana RN  Outcome: Ongoing  Note: Fall risk assessment completed. Fall precautions in place. Call light within reach. Pt educated on calling for assistance before getting up. Walkway free of clutter. Will continue to monitor. Goal: Absence of physical injury  Description: Absence of physical injury  4/7/2020 0721 by Shalini Raines RN  Outcome: Ongoing  4/6/2020 1859 by Michel Santana RN  Outcome: Ongoing  Note: Fall risk assessment completed. Fall precautions in place. Call light within reach. Pt educated on calling for assistance before getting up. Walkway free of clutter. Will continue to monitor. Problem: DAILY CARE  Goal: Daily care needs are met  4/7/2020 0721 by Shalini Raines RN  Outcome: Ongoing  4/6/2020 1859 by Michel Santana RN  Outcome: Ongoing     Problem: PAIN  Goal: Patient's pain/discomfort is manageable  4/7/2020 0721 by Shalini Raines RN  Outcome: Ongoing  4/6/2020 1859 by Michel Santana RN  Outcome: Ongoing  Note: Will monitor patients comfort and pain levels. Problem: SKIN INTEGRITY  Goal: Skin integrity is maintained or improved  4/7/2020 0721 by Shlaini Raines RN  Outcome: Ongoing  4/6/2020 1859 by Michel Santana RN  Outcome: Ongoing  Note: Will monitor skin and mucous members. Will turn patient every 2 hours, monitor for friction and sheering, and change dressings as needed. Will preform skin assessment every shift.  '     Problem: KNOWLEDGE DEFICIT  Goal: Patient/S.O. demonstrates understanding of disease process, treatment plan, medications, and discharge instructions.   4/7/2020 0721 by Shalini Raines RN  Outcome: Ongoing  4/6/2020 1859 by Michel Santana RN  Outcome: Ongoing     Problem: DISCHARGE BARRIERS  Goal: Patient's continuum of care needs are met  4/7/2020 0721 by Shalini Raines RN  Outcome:
Problem: Falls - Risk of:  Goal: Will remain free from falls  Description: Will remain free from falls  Outcome: Ongoing  Goal: Absence of physical injury  Description: Absence of physical injury  Outcome: Ongoing     Problem: DAILY CARE  Goal: Daily care needs are met  Outcome: Ongoing     Problem: PAIN  Goal: Patient's pain/discomfort is manageable  Outcome: Ongoing     Problem: SKIN INTEGRITY  Goal: Skin integrity is maintained or improved  Outcome: Ongoing     Problem: KNOWLEDGE DEFICIT  Goal: Patient/S.O. demonstrates understanding of disease process, treatment plan, medications, and discharge instructions.   Outcome: Ongoing
Ongoing  Note: Will monitor skin and mucous members. Will turn patient every 2 hours, monitor for friction and sheering, and change dressings as needed. Will preform skin assessment every shift. 4/7/2020 1900 by Robin Ruggiero RN  Outcome: Ongoing  4/7/2020 0721 by Robert Velazquez RN  Outcome: Ongoing     Problem: KNOWLEDGE DEFICIT  Goal: Patient/S.O. demonstrates understanding of disease process, treatment plan, medications, and discharge instructions. 4/7/2020 1936 by Robin Ruggiero RN  Outcome: Ongoing  4/7/2020 1900 by Robin Ruggiero RN  Outcome: Ongoing  4/7/2020 0721 by Robert Velazquez RN  Outcome: Ongoing     Problem: DISCHARGE BARRIERS  Goal: Patient's continuum of care needs are met  4/7/2020 1936 by Robin Ruggiero RN  Outcome: Ongoing  4/7/2020 1900 by Robin Ruggiero RN  Outcome: Ongoing  4/7/2020 0721 by Robert Velazquez RN  Outcome: Ongoing     Problem: Pain:  Goal: Pain level will decrease  Description: Pain level will decrease  4/7/2020 1936 by Robin Ruggiero RN  Outcome: Ongoing  Note: Pt assessed for pain. Pt in pain and assessed with 0-10 pain rating scale. Pt given prescribed analgesic for pain. (See eMar) Pt satisfied with pain relief thus far. Will reassess and continue to monitor.      4/7/2020 1900 by Robin Ruggiero RN  Outcome: Ongoing  4/7/2020 0721 by Robert Velazquez RN  Outcome: Ongoing  Goal: Control of acute pain  Description: Control of acute pain  4/7/2020 1936 by Robin Ruggiero RN  Outcome: Ongoing  4/7/2020 1900 by Robin Ruggiero RN  Outcome: Ongoing  4/7/2020 0721 by Robert Velazquez RN  Outcome: Ongoing  Goal: Control of chronic pain  Description: Control of chronic pain  4/7/2020 1936 by Robin Ruggiero RN  Outcome: Ongoing  4/7/2020 1900 by Robin Ruggiero RN  Outcome: Ongoing  4/7/2020 0721 by Robert Velazquez RN  Outcome: Ongoing

## 2020-04-07 NOTE — PROGRESS NOTES
Department of Internal Medicine  Nephrology Progress Note    HPI:  72-year-old -American male with a past medical history significant for hypertension, CVA, coronary artery disease, congestive heart failure, COPD, end-stage renal disease on hemodialysis on Tuesday, Thursday, and Saturday, [facility] admitted with worsening shortness  of breath. Apparently, thepatient is having cough and shortness of breath off and on for the last three to four weeks and it worsened to a point the patient decided to come to the emergency room. He has been admitted for possible exacerbation of COPD and COVID rule out. He was tested a few weeks ago and was negative.  At the time of consultation, the patient is feeling and breathing much better. Denies any chest pain, shortness of breath, fever, chills or rigors. Denies any chest pain and admits breathing status at the baseline. The patient finished his dialysis treatment yesterday. SUBJECTIVE:  Gets dizzy at times; blurred vision; no SOB  ROS:  In bed. [address] [employer]:  medications reviewed.    heparin (porcine)  5,000 Units Subcutaneous 3 times per day    carvedilol  12.5 mg Oral BID WC    pantoprazole  40 mg Oral QAM AC    melatonin  3 mg Oral Nightly    cefepime  1 g Intravenous Q24H    allopurinol  100 mg Oral Daily    aspirin  81 mg Oral Daily    atorvastatin  80 mg Oral Daily    budesonide-formoterol  2 puff Inhalation BID    gabapentin  300 mg Oral Nightly    [Held by provider] hydrALAZINE  25 mg Oral BID    tiotropium  2 puff Inhalation Daily    torsemide  40 mg Oral Daily    sodium chloride flush  10 mL Intravenous 2 times per day         Physical Exam:    VITALS:  /84   Pulse 84   Temp 98.8 °F (37.1 °C)   Resp 18   Ht 5' 8\" (1.727 m)   Wt 143 lb 11.8 oz (65.2 kg)   SpO2 98%   BMI 21.86 kg/m²   24HR INTAKE/OUTPUT:      Intake/Output Summary (Last 24 hours) at 4/7/2020 1120  Last data filed at 4/7/2020 0546  Gross per 24 hour

## 2020-04-07 NOTE — PROGRESS NOTES
Bed in lowest position, wheels locked, bed exit alarm in place, call light within reach, and non skid footwear on. Walkway free of clutter. Pt alert and oriented and able to make needs known. Pt educated to use call light when needing to get up, and pt utilizes call light to make needs known. Will continue to monitor.  Electronically signed by Shalini Raines RN on 4/7/2020 at 7:20 AM

## 2020-04-07 NOTE — PROGRESS NOTES
Patient is currently out of the room and unavailable for MDIs; will re-attempt as time allows later this a.m.

## 2020-04-08 VITALS
SYSTOLIC BLOOD PRESSURE: 127 MMHG | OXYGEN SATURATION: 87 % | TEMPERATURE: 98.2 F | RESPIRATION RATE: 18 BRPM | DIASTOLIC BLOOD PRESSURE: 69 MMHG | HEIGHT: 68 IN | BODY MASS INDEX: 20.48 KG/M2 | HEART RATE: 96 BPM | WEIGHT: 135.14 LBS

## 2020-04-08 LAB
ALBUMIN SERPL-MCNC: 3.3 G/DL (ref 3.4–5)
ANION GAP SERPL CALCULATED.3IONS-SCNC: 13 MMOL/L (ref 3–16)
BUN BLDV-MCNC: 35 MG/DL (ref 7–20)
CALCIUM SERPL-MCNC: 8.4 MG/DL (ref 8.3–10.6)
CHLORIDE BLD-SCNC: 92 MMOL/L (ref 99–110)
CO2: 30 MMOL/L (ref 21–32)
CREAT SERPL-MCNC: 6.1 MG/DL (ref 0.8–1.3)
GFR AFRICAN AMERICAN: 11
GFR NON-AFRICAN AMERICAN: 9
GLUCOSE BLD-MCNC: 144 MG/DL (ref 70–99)
HCT VFR BLD CALC: 22.1 % (ref 40.5–52.5)
HEMOGLOBIN: 7.2 G/DL (ref 13.5–17.5)
MAGNESIUM: 2 MG/DL (ref 1.8–2.4)
MCH RBC QN AUTO: 31.7 PG (ref 26–34)
MCHC RBC AUTO-ENTMCNC: 32.8 G/DL (ref 31–36)
MCV RBC AUTO: 96.6 FL (ref 80–100)
PDW BLD-RTO: 17 % (ref 12.4–15.4)
PHOSPHORUS: 3.6 MG/DL (ref 2.5–4.9)
PLATELET # BLD: 91 K/UL (ref 135–450)
PMV BLD AUTO: 8.7 FL (ref 5–10.5)
POTASSIUM SERPL-SCNC: 3.6 MMOL/L (ref 3.5–5.1)
PROCALCITONIN: 1.18 NG/ML (ref 0–0.15)
RBC # BLD: 2.28 M/UL (ref 4.2–5.9)
SODIUM BLD-SCNC: 135 MMOL/L (ref 136–145)
WBC # BLD: 3.4 K/UL (ref 4–11)

## 2020-04-08 PROCEDURE — 97530 THERAPEUTIC ACTIVITIES: CPT

## 2020-04-08 PROCEDURE — 97535 SELF CARE MNGMENT TRAINING: CPT

## 2020-04-08 PROCEDURE — 94680 O2 UPTK RST&XERS DIR SIMPLE: CPT

## 2020-04-08 PROCEDURE — 6370000000 HC RX 637 (ALT 250 FOR IP): Performed by: FAMILY MEDICINE

## 2020-04-08 PROCEDURE — 6370000000 HC RX 637 (ALT 250 FOR IP): Performed by: NURSE PRACTITIONER

## 2020-04-08 PROCEDURE — 94761 N-INVAS EAR/PLS OXIMETRY MLT: CPT

## 2020-04-08 PROCEDURE — 2580000003 HC RX 258: Performed by: NURSE PRACTITIONER

## 2020-04-08 PROCEDURE — 85027 COMPLETE CBC AUTOMATED: CPT

## 2020-04-08 PROCEDURE — 6360000002 HC RX W HCPCS: Performed by: INTERNAL MEDICINE

## 2020-04-08 PROCEDURE — 83735 ASSAY OF MAGNESIUM: CPT

## 2020-04-08 PROCEDURE — 84145 PROCALCITONIN (PCT): CPT

## 2020-04-08 PROCEDURE — 36415 COLL VENOUS BLD VENIPUNCTURE: CPT

## 2020-04-08 PROCEDURE — 6370000000 HC RX 637 (ALT 250 FOR IP): Performed by: INTERNAL MEDICINE

## 2020-04-08 PROCEDURE — 80069 RENAL FUNCTION PANEL: CPT

## 2020-04-08 PROCEDURE — 94640 AIRWAY INHALATION TREATMENT: CPT

## 2020-04-08 RX ORDER — ATORVASTATIN CALCIUM 80 MG/1
80 TABLET, FILM COATED ORAL DAILY
Qty: 30 TABLET | Refills: 0 | Status: SHIPPED | OUTPATIENT
Start: 2020-04-08 | End: 2022-06-15 | Stop reason: ALTCHOICE

## 2020-04-08 RX ORDER — BENZONATATE 200 MG/1
200 CAPSULE ORAL 3 TIMES DAILY PRN
Qty: 30 CAPSULE | Refills: 0 | Status: SHIPPED | OUTPATIENT
Start: 2020-04-08 | End: 2020-04-18

## 2020-04-08 RX ORDER — HYDRALAZINE HYDROCHLORIDE 25 MG/1
25 TABLET, FILM COATED ORAL 2 TIMES DAILY
Qty: 60 TABLET | Refills: 0 | Status: SHIPPED | OUTPATIENT
Start: 2020-04-08 | End: 2020-04-08 | Stop reason: HOSPADM

## 2020-04-08 RX ORDER — GABAPENTIN 300 MG/1
300 CAPSULE ORAL NIGHTLY
Qty: 30 CAPSULE | Refills: 0 | Status: SHIPPED | OUTPATIENT
Start: 2020-04-08 | End: 2022-03-01

## 2020-04-08 RX ORDER — OMEPRAZOLE 20 MG/1
20 CAPSULE, DELAYED RELEASE ORAL DAILY
Qty: 30 CAPSULE | Refills: 0 | Status: SHIPPED | OUTPATIENT
Start: 2020-04-08 | End: 2022-03-01

## 2020-04-08 RX ORDER — LOSARTAN POTASSIUM 25 MG/1
25 TABLET ORAL DAILY
Qty: 30 TABLET | Refills: 0 | Status: SHIPPED | OUTPATIENT
Start: 2020-04-08 | End: 2022-03-01

## 2020-04-08 RX ORDER — TRAZODONE HYDROCHLORIDE 150 MG/1
150 TABLET ORAL NIGHTLY
Qty: 30 TABLET | Refills: 0 | Status: SHIPPED | OUTPATIENT
Start: 2020-04-08 | End: 2022-03-01

## 2020-04-08 RX ORDER — TORSEMIDE 20 MG/1
40 TABLET ORAL DAILY
Qty: 60 TABLET | Refills: 0 | Status: SHIPPED | OUTPATIENT
Start: 2020-04-08 | End: 2022-03-01

## 2020-04-08 RX ADMIN — HEPARIN SODIUM 5000 UNITS: 5000 INJECTION INTRAVENOUS; SUBCUTANEOUS at 06:09

## 2020-04-08 RX ADMIN — ASPIRIN 81 MG 81 MG: 81 TABLET ORAL at 09:32

## 2020-04-08 RX ADMIN — BUDESONIDE AND FORMOTEROL FUMARATE DIHYDRATE 2 PUFF: 160; 4.5 AEROSOL RESPIRATORY (INHALATION) at 07:36

## 2020-04-08 RX ADMIN — PANTOPRAZOLE SODIUM 40 MG: 40 TABLET, DELAYED RELEASE ORAL at 06:09

## 2020-04-08 RX ADMIN — Medication 2 PUFF: at 07:36

## 2020-04-08 RX ADMIN — SODIUM CHLORIDE, PRESERVATIVE FREE 10 ML: 5 INJECTION INTRAVENOUS at 09:33

## 2020-04-08 RX ADMIN — CARVEDILOL 12.5 MG: 12.5 TABLET, FILM COATED ORAL at 09:32

## 2020-04-08 RX ADMIN — ALLOPURINOL 100 MG: 100 TABLET ORAL at 09:32

## 2020-04-08 RX ADMIN — ATORVASTATIN CALCIUM 80 MG: 80 TABLET, FILM COATED ORAL at 09:32

## 2020-04-08 RX ADMIN — TORSEMIDE 40 MG: 20 TABLET ORAL at 09:31

## 2020-04-08 ASSESSMENT — PAIN SCALES - GENERAL
PAINLEVEL_OUTOF10: 0
PAINLEVEL_OUTOF10: 0

## 2020-04-08 NOTE — PROGRESS NOTES
Physical Therapy    Kassie Covarrubias  4/8/2020  Patient is ambulating in room ad milena. OT indicates his mobility is safe. Nursing also indicates same. Will discharge PT.   Electronically signed by Saul Garcia PT on 4/8/2020 at 9:56 AM

## 2020-04-08 NOTE — CARE COORDINATION
4/8 dc order noted- no orders- family to return home- has home O2 -respiratory to deliver portable tank.   Electronically signed by Wong Duran on 4/8/2020 at 3:02 PM

## 2020-04-08 NOTE — DISCHARGE INSTR - DIET

## 2020-04-08 NOTE — PROGRESS NOTES
Data- discharge order received, pt verbalized agreement to discharge, disposition to previous residence, no needs for HHC/DME. Action- discharge instructions prepared and given to Mr Chandu Vivas, pt verbalized understanding. Medication information packet given r/t NEW and/or CHANGED prescriptions emphasizing name/purpose/side effects, pt verbalized understanding. Discharge instruction summary: Diet- renal cardiac, Activity- as tolerated , Primary Care Physician as follows: Leola Cheney -[phone] f/u appointment to be made by patient, immunizations reviewed, prescription medications filled by out patient pharmacy. CHF Education reviewed. Pt/ Family has had a total of 60 minutes CHF education this admission encounter. Response- Pt belongings gathered, IV removed. Disposition is home (no HHC/DME needs), transported by the [facility], taken to lobby via w/c , no complications.

## 2020-04-08 NOTE — PROGRESS NOTES
Department of Internal Medicine  Nephrology Progress Note    HPI:  62-year-old -American male with a past medical history significant for hypertension, CVA, coronary artery disease, congestive heart failure, COPD, end-stage renal disease on hemodialysis on Tuesday, Thursday, and Saturday, [facility] admitted with worsening shortness  of breath. Apparently, thepatient is having cough and shortness of breath off and on for the last three to four weeks and it worsened to a point the patient decided to come to the emergency room. He has been admitted for possible exacerbation of COPD and COVID rule out. He was tested a few weeks ago and was negative.  At the time of consultation, the patient is feeling and breathing much better. Denies any chest pain, shortness of breath, fever, chills or rigors. Denies any chest pain and admits breathing status at the baseline. The patient finished his dialysis treatment yesterday. SUBJECTIVE:  Feeling a lot better today; no SOB; eager to go home  ROS:  In bed. [address]:  medications reviewed.    darbepoetin zachary-polysorbate  60 mcg Intravenous Q7 Days    heparin (porcine)  5,000 Units Subcutaneous 3 times per day    carvedilol  12.5 mg Oral BID WC    pantoprazole  40 mg Oral QAM AC    melatonin  3 mg Oral Nightly    cefepime  1 g Intravenous Q24H    allopurinol  100 mg Oral Daily    aspirin  81 mg Oral Daily    atorvastatin  80 mg Oral Daily    budesonide-formoterol  2 puff Inhalation BID    gabapentin  300 mg Oral Nightly    tiotropium  2 puff Inhalation Daily    torsemide  40 mg Oral Daily    sodium chloride flush  10 mL Intravenous 2 times per day         Physical Exam:    VITALS:  BP (!) 146/83   Pulse 96   Temp 97.9 °F (36.6 °C) (Oral)   Resp 18   Ht 5' 8\" (1.727 m)   Wt 135 lb 2.3 oz (61.3 kg)   SpO2 94%   BMI 20.55 kg/m²   24HR INTAKE/OUTPUT:      Intake/Output Summary (Last 24 hours) at 4/8/2020 1040  Last data filed at

## 2020-04-08 NOTE — PROGRESS NOTES
Hospitalist Progress Note      PCP: Royer Hedrick MD    Date of Admission: 3/31/2020    Chief Complaint:   Chief Complaint   Patient presents with    Shortness of Breath     dialysis pt. from dialysis. finished tx. [facility] Course: 60 y. o. male with PMHx of CVA, CHF, ESRD on dialysis, COPD, HLD and HTN  admitted with shortness of breath and cough.  Patient reports having a cough for several weeks. Patient was tested for Covid 19, 2 weeks ago and was negative. He is afraid he may have been exposed since then. ED work-up significant for chest x-ray with mild interstitial reticular opacities bilaterally along with mild hazy opacities. Subjective:  Pt with dialysis today, tolerated well.   Denies, CP, SOB, presyncopal symptoms, other complaints    Medications:  Reviewed    Infusion Medications   Scheduled Medications    darbepoetin zachary-polysorbate  60 mcg Intravenous Q7 Days    heparin (porcine)  5,000 Units Subcutaneous 3 times per day    carvedilol  12.5 mg Oral BID WC    pantoprazole  40 mg Oral QAM AC    melatonin  3 mg Oral Nightly    cefepime  1 g Intravenous Q24H    allopurinol  100 mg Oral Daily    aspirin  81 mg Oral Daily    atorvastatin  80 mg Oral Daily    budesonide-formoterol  2 puff Inhalation BID    gabapentin  300 mg Oral Nightly    [Held by provider] hydrALAZINE  25 mg Oral BID    tiotropium  2 puff Inhalation Daily    torsemide  40 mg Oral Daily    sodium chloride flush  10 mL Intravenous 2 times per day     PRN Meds: ipratropium-albuterol, midodrine, albumin human, guaiFENesin-dextromethorphan, benzonatate, ondansetron, sodium chloride flush, acetaminophen **OR** acetaminophen      Intake/Output Summary (Last 24 hours) at 4/7/2020 2015  Last data filed at 4/7/2020 1220  Gross per 24 hour   Intake 720 ml   Output 4400 ml   Net -3680 ml       Physical Exam Performed:    BP (!) 142/79   Pulse 95   Temp 98.3 °F (36.8 °C)   Resp 24   Ht 5' 8\" (1.727 m)   Wt 134 improving  - c/w inhalers   - steroids stopped  - antitussives     ESRD on HD  - HD TTS  - mgmt per nephrology     HTN  - c/w home meds     Thrombocytopenia  - platelet trending down initially, now stable  - d/c heparin  - HIT screening sent-- negative  - cbc in AM     DVT Prophylaxis: heparin  Diet: DIET GENERAL;  Code Status: DNR-CCA    PT/OT Eval Status: ordered--C    Dispo - home prepared for 4/8    Katherne Closs, MD    This note was transcribed using [id number]. Please disregard any translational errors.

## 2020-04-08 NOTE — PROGRESS NOTES
MD  Subjective  Subjective: Patient supine in bed upon arrival to room. Patient without reports of pain. Agreeable to therapy  General Comment  Comments: okay for therapy per RN. Orientation  Orientation  Overall Orientation Status: Within Functional Limits  Objective    ADL  Grooming: Independent  Additional Comments: Patient reports completed ADL tasks this am prior to session without diff        Balance  Sitting Balance: Independent  Standing Balance: Independent  Standing Balance  Activity: Static standing without device   Functional Mobility  Functional - Mobility Device: No device  Activity: Other  Assist Level: Independent  Functional Mobility Comments: Functional mobility without device Independently without LOB noted. Bed mobility  Supine to Sit: Independent  Sit to Supine: Independent  Transfers  Sit to stand: Independent  Stand to sit:  Independent     Cognition  Overall Cognitive Status: [facility]           Plan   Plan  Times per week: D/C from acute OT  Current Treatment Recommendations: Strengthening, Functional Mobility Training, Gait Training, Endurance Training, Balance Training, Safety Education & Training, Self-Care / ADL, Equipment Evaluation, Education, & procurement, Patient/Caregiver Education & Training    AM-PAC Score        AM-PAC Inpatient Daily Activity Raw Score: 24 (04/08/20 0949)  AM-PAC Inpatient ADL T-Scale Score : 57.54 (04/08/20 0949)  ADL Inpatient CMS 0-100% Score: 0 (04/08/20 0949)  ADL Inpatient CMS G-Code Modifier : [address] (04/08/20 6281)    Goals  Short term goals  Time Frame for Short term goals: prior to D/C: All goals ongoing   Short term goal 1: complet functional mobility and transfers Ind  Short term goal 2: complete bathing and dressing Ind  Short term goal 3: complete toileting Ind  Short term goal 4: complete grooming in stance at sink Ind  Long term goals  Time Frame for Long term goals : STG=LTG  Patient Goals   Patient goals : return home       Therapy Time

## 2020-04-08 NOTE — DISCHARGE SUMMARY
CNP   4/8/2020      Thank you Chichi Molina MD for the opportunity to be involved in this patient's care. If you have any questions or concerns please feel free to contact me at [phone].

## 2020-04-08 NOTE — PROGRESS NOTES
[facility]    Respiratory Therapy   Home Oxygen Evaluation        Name: Steev Alexis  Medical Record Number: [id number]  Age: 60 y.o. Gender:  male   : 1955  Today's date: 2020  Room: X5K-1675/3102-01      Assessment        /69   Pulse 96   Temp 98.2 °F (36.8 °C) (Oral)   Resp 18   Ht 5' 8\" (1.727 m)   Wt 135 lb 2.3 oz (61.3 kg)   SpO2 (!) 87%   BMI 20.55 kg/m²     Patient Active Problem List   Diagnosis    HCAP (healthcare-associated pneumonia)    COPD exacerbation ([facility]    ESRD on dialysis ([facility]       Social History:  Social History     Tobacco Use    Smoking status: Former Smoker    Smokeless tobacco: Never Used   Substance Use Topics    Alcohol use: Yes     Frequency: Never     Comment: occasional    Drug use: Never       Patient Room Air saturation at rest 91  %  Patient Room Air saturation upon ambulation 83 %    Oxygen saturations of 88% or less on RA qualifies patient for Home Oxygen    Patient resting on 2  lmp  with an oxygen saturation of  93 %     Patient ambulated on 2 lpm with an oxygen saturation of 94%            Qualifying patient for home oxygen with ambulation and continuous flow  @ 2 lpm.      In your clinical assessment does the Patient Require Portable Oxygen Tanks?     Yes              [facility]  home care company contacted to follow care of patients home oxygen needs on 2020 at 1:04 PM    Patient/caregiver was educated on Home Oxygen process:  Yes      Level of patient/caregiver understanding able to:   [] Verbalize understanding   [] Demonstrate understanding       [] Teach back        [] Needs reinforcement        []  No available caregiver               []  Other:     Response to education:  Good      Time Spent with Home O2 Set Up:  5  minutes     Clare Guillermo RCP on 2020 at 1:04 PM

## 2020-04-09 ENCOUNTER — CARE COORDINATION (OUTPATIENT)
Dept: CASE MANAGEMENT | Age: 65
End: 2020-04-09

## 2020-04-09 NOTE — CARE COORDINATION
Sw received call from patient's [facility] [place] ([phone]; fax [phone]) requesting patient's dc summary and notes to support any increase in services he may need at home. Carlo sent dc summary and last PT/OT notes.      Electronically signed by Amy Guerrero on 4/9/2020 at 2:38 PM

## 2020-04-10 ENCOUNTER — CARE COORDINATION (OUTPATIENT)
Dept: CASE MANAGEMENT | Age: 65
End: 2020-04-10

## 2020-09-10 ENCOUNTER — APPOINTMENT (OUTPATIENT)
Dept: GENERAL RADIOLOGY | Age: 65
DRG: 190 | End: 2020-09-10
Payer: MEDICARE

## 2020-09-10 ENCOUNTER — HOSPITAL ENCOUNTER (INPATIENT)
Age: 65
LOS: 2 days | Discharge: HOME | DRG: 190 | End: 2020-09-12
Attending: INTERNAL MEDICINE | Admitting: INTERNAL MEDICINE
Payer: MEDICARE

## 2020-09-10 PROBLEM — J44.9 COPD (CHRONIC OBSTRUCTIVE PULMONARY DISEASE) (HCC): Status: ACTIVE | Noted: 2020-09-10

## 2020-09-10 LAB
ANION GAP SERPL CALCULATED.3IONS-SCNC: 14 MMOL/L (ref 3–16)
BASOPHILS ABSOLUTE: 0 K/UL (ref 0–0.2)
BASOPHILS RELATIVE PERCENT: 1 %
BUN BLDV-MCNC: 36 MG/DL (ref 7–20)
CALCIUM SERPL-MCNC: 9.5 MG/DL (ref 8.3–10.6)
CHLORIDE BLD-SCNC: 87 MMOL/L (ref 99–110)
CO2: 27 MMOL/L (ref 21–32)
CREAT SERPL-MCNC: 7 MG/DL (ref 0.8–1.3)
EOSINOPHILS ABSOLUTE: 0.1 K/UL (ref 0–0.6)
EOSINOPHILS RELATIVE PERCENT: 5.5 %
GFR AFRICAN AMERICAN: 10
GFR NON-AFRICAN AMERICAN: 8
GLUCOSE BLD-MCNC: 74 MG/DL (ref 70–99)
HCT VFR BLD CALC: 37.3 % (ref 40.5–52.5)
HEMOGLOBIN: 12 G/DL (ref 13.5–17.5)
LYMPHOCYTES ABSOLUTE: 0.5 K/UL (ref 1–5.1)
LYMPHOCYTES RELATIVE PERCENT: 18.7 %
MCH RBC QN AUTO: 30.3 PG (ref 26–34)
MCHC RBC AUTO-ENTMCNC: 32.2 G/DL (ref 31–36)
MCV RBC AUTO: 93.9 FL (ref 80–100)
MONOCYTES ABSOLUTE: 0.3 K/UL (ref 0–1.3)
MONOCYTES RELATIVE PERCENT: 11.4 %
NEUTROPHILS ABSOLUTE: 1.7 K/UL (ref 1.7–7.7)
NEUTROPHILS RELATIVE PERCENT: 63.4 %
PDW BLD-RTO: 15.7 % (ref 12.4–15.4)
PLATELET # BLD: 91 K/UL (ref 135–450)
PMV BLD AUTO: 8.2 FL (ref 5–10.5)
POTASSIUM REFLEX MAGNESIUM: 5.2 MMOL/L (ref 3.5–5.1)
PRO-BNP: ABNORMAL PG/ML (ref 0–124)
RBC # BLD: 3.98 M/UL (ref 4.2–5.9)
SODIUM BLD-SCNC: 128 MMOL/L (ref 136–145)
TROPONIN: 0.07 NG/ML
WBC # BLD: 2.7 K/UL (ref 4–11)

## 2020-09-10 PROCEDURE — 1200000000 HC SEMI PRIVATE

## 2020-09-10 PROCEDURE — 6370000000 HC RX 637 (ALT 250 FOR IP): Performed by: PHYSICIAN ASSISTANT

## 2020-09-10 PROCEDURE — 84484 ASSAY OF TROPONIN QUANT: CPT

## 2020-09-10 PROCEDURE — 6370000000 HC RX 637 (ALT 250 FOR IP): Performed by: INTERNAL MEDICINE

## 2020-09-10 PROCEDURE — 94640 AIRWAY INHALATION TREATMENT: CPT

## 2020-09-10 PROCEDURE — 83880 ASSAY OF NATRIURETIC PEPTIDE: CPT

## 2020-09-10 PROCEDURE — 2500000003 HC RX 250 WO HCPCS: Performed by: INTERNAL MEDICINE

## 2020-09-10 PROCEDURE — 93005 ELECTROCARDIOGRAM TRACING: CPT | Performed by: PHYSICIAN ASSISTANT

## 2020-09-10 PROCEDURE — 2700000000 HC OXYGEN THERAPY PER DAY

## 2020-09-10 PROCEDURE — 71046 X-RAY EXAM CHEST 2 VIEWS: CPT

## 2020-09-10 PROCEDURE — 80048 BASIC METABOLIC PNL TOTAL CA: CPT

## 2020-09-10 PROCEDURE — 2580000003 HC RX 258: Performed by: INTERNAL MEDICINE

## 2020-09-10 PROCEDURE — 6360000002 HC RX W HCPCS: Performed by: INTERNAL MEDICINE

## 2020-09-10 PROCEDURE — 94761 N-INVAS EAR/PLS OXIMETRY MLT: CPT

## 2020-09-10 PROCEDURE — 99285 EMERGENCY DEPT VISIT HI MDM: CPT

## 2020-09-10 PROCEDURE — 85025 COMPLETE CBC W/AUTO DIFF WBC: CPT

## 2020-09-10 RX ORDER — TORSEMIDE 20 MG/1
40 TABLET ORAL DAILY
Status: DISCONTINUED | OUTPATIENT
Start: 2020-09-11 | End: 2020-09-12 | Stop reason: HOSPADM

## 2020-09-10 RX ORDER — ONDANSETRON 2 MG/ML
4 INJECTION INTRAMUSCULAR; INTRAVENOUS EVERY 6 HOURS PRN
Status: DISCONTINUED | OUTPATIENT
Start: 2020-09-10 | End: 2020-09-12 | Stop reason: HOSPADM

## 2020-09-10 RX ORDER — TRAZODONE HYDROCHLORIDE 50 MG/1
50 TABLET ORAL NIGHTLY
Status: DISCONTINUED | OUTPATIENT
Start: 2020-09-10 | End: 2020-09-12 | Stop reason: HOSPADM

## 2020-09-10 RX ORDER — FUROSEMIDE 10 MG/ML
40 INJECTION INTRAMUSCULAR; INTRAVENOUS ONCE
Status: COMPLETED | OUTPATIENT
Start: 2020-09-10 | End: 2020-09-10

## 2020-09-10 RX ORDER — SODIUM CHLORIDE 0.9 % (FLUSH) 0.9 %
10 SYRINGE (ML) INJECTION EVERY 12 HOURS SCHEDULED
Status: DISCONTINUED | OUTPATIENT
Start: 2020-09-10 | End: 2020-09-12 | Stop reason: HOSPADM

## 2020-09-10 RX ORDER — ACETAMINOPHEN 325 MG/1
650 TABLET ORAL EVERY 6 HOURS PRN
Status: DISCONTINUED | OUTPATIENT
Start: 2020-09-10 | End: 2020-09-12 | Stop reason: HOSPADM

## 2020-09-10 RX ORDER — CALCIUM ACETATE 667 MG/1
1 TABLET ORAL
COMMUNITY
End: 2022-03-01

## 2020-09-10 RX ORDER — LOSARTAN POTASSIUM 25 MG/1
25 TABLET ORAL DAILY
Status: DISCONTINUED | OUTPATIENT
Start: 2020-09-10 | End: 2020-09-12 | Stop reason: HOSPADM

## 2020-09-10 RX ORDER — PREDNISONE 20 MG/1
40 TABLET ORAL DAILY
Status: DISCONTINUED | OUTPATIENT
Start: 2020-09-10 | End: 2020-09-12 | Stop reason: HOSPADM

## 2020-09-10 RX ORDER — GABAPENTIN 300 MG/1
300 CAPSULE ORAL NIGHTLY
Status: DISCONTINUED | OUTPATIENT
Start: 2020-09-10 | End: 2020-09-12 | Stop reason: HOSPADM

## 2020-09-10 RX ORDER — ATORVASTATIN CALCIUM 80 MG/1
80 TABLET, FILM COATED ORAL DAILY
Status: DISCONTINUED | OUTPATIENT
Start: 2020-09-10 | End: 2020-09-12 | Stop reason: HOSPADM

## 2020-09-10 RX ORDER — TORSEMIDE 20 MG/1
40 TABLET ORAL DAILY
Status: DISCONTINUED | OUTPATIENT
Start: 2020-09-10 | End: 2020-09-10

## 2020-09-10 RX ORDER — CALCIUM ACETATE 667 MG/1
667 CAPSULE ORAL
Status: DISCONTINUED | OUTPATIENT
Start: 2020-09-10 | End: 2020-09-12 | Stop reason: HOSPADM

## 2020-09-10 RX ORDER — PANTOPRAZOLE SODIUM 40 MG/1
40 TABLET, DELAYED RELEASE ORAL
Status: DISCONTINUED | OUTPATIENT
Start: 2020-09-11 | End: 2020-09-12 | Stop reason: HOSPADM

## 2020-09-10 RX ORDER — CARVEDILOL 25 MG/1
25 TABLET ORAL 2 TIMES DAILY WITH MEALS
Status: DISCONTINUED | OUTPATIENT
Start: 2020-09-10 | End: 2020-09-12 | Stop reason: HOSPADM

## 2020-09-10 RX ORDER — PROMETHAZINE HYDROCHLORIDE 25 MG/1
12.5 TABLET ORAL EVERY 6 HOURS PRN
Status: DISCONTINUED | OUTPATIENT
Start: 2020-09-10 | End: 2020-09-12 | Stop reason: HOSPADM

## 2020-09-10 RX ORDER — IPRATROPIUM BROMIDE AND ALBUTEROL SULFATE 2.5; .5 MG/3ML; MG/3ML
1 SOLUTION RESPIRATORY (INHALATION) ONCE
Status: COMPLETED | OUTPATIENT
Start: 2020-09-10 | End: 2020-09-10

## 2020-09-10 RX ORDER — SODIUM CHLORIDE 0.9 % (FLUSH) 0.9 %
10 SYRINGE (ML) INJECTION PRN
Status: DISCONTINUED | OUTPATIENT
Start: 2020-09-10 | End: 2020-09-12 | Stop reason: HOSPADM

## 2020-09-10 RX ORDER — ALLOPURINOL 100 MG/1
100 TABLET ORAL DAILY
Status: DISCONTINUED | OUTPATIENT
Start: 2020-09-10 | End: 2020-09-12 | Stop reason: HOSPADM

## 2020-09-10 RX ORDER — ACETAMINOPHEN 650 MG/1
650 SUPPOSITORY RECTAL EVERY 6 HOURS PRN
Status: DISCONTINUED | OUTPATIENT
Start: 2020-09-10 | End: 2020-09-12 | Stop reason: HOSPADM

## 2020-09-10 RX ORDER — AZITHROMYCIN 250 MG/1
250 TABLET, FILM COATED ORAL EVERY EVENING
Status: DISCONTINUED | OUTPATIENT
Start: 2020-09-10 | End: 2020-09-12 | Stop reason: HOSPADM

## 2020-09-10 RX ORDER — ASPIRIN 81 MG/1
81 TABLET, CHEWABLE ORAL DAILY
Status: DISCONTINUED | OUTPATIENT
Start: 2020-09-10 | End: 2020-09-12 | Stop reason: HOSPADM

## 2020-09-10 RX ORDER — IPRATROPIUM BROMIDE AND ALBUTEROL SULFATE 2.5; .5 MG/3ML; MG/3ML
1 SOLUTION RESPIRATORY (INHALATION) EVERY 4 HOURS PRN
Status: DISCONTINUED | OUTPATIENT
Start: 2020-09-10 | End: 2020-09-12 | Stop reason: HOSPADM

## 2020-09-10 RX ORDER — HEPARIN SODIUM 5000 [USP'U]/ML
5000 INJECTION, SOLUTION INTRAVENOUS; SUBCUTANEOUS EVERY 8 HOURS SCHEDULED
Status: DISCONTINUED | OUTPATIENT
Start: 2020-09-10 | End: 2020-09-12 | Stop reason: HOSPADM

## 2020-09-10 RX ORDER — BUDESONIDE AND FORMOTEROL FUMARATE DIHYDRATE 160; 4.5 UG/1; UG/1
2 AEROSOL RESPIRATORY (INHALATION) 2 TIMES DAILY
Status: DISCONTINUED | OUTPATIENT
Start: 2020-09-10 | End: 2020-09-12 | Stop reason: HOSPADM

## 2020-09-10 RX ADMIN — PREDNISONE 40 MG: 20 TABLET ORAL at 20:13

## 2020-09-10 RX ADMIN — IPRATROPIUM BROMIDE AND ALBUTEROL SULFATE 1 AMPULE: .5; 3 SOLUTION RESPIRATORY (INHALATION) at 13:34

## 2020-09-10 RX ADMIN — CARVEDILOL 25 MG: 25 TABLET, FILM COATED ORAL at 20:12

## 2020-09-10 RX ADMIN — ALLOPURINOL 100 MG: 100 TABLET ORAL at 20:13

## 2020-09-10 RX ADMIN — LOSARTAN POTASSIUM 25 MG: 25 TABLET, FILM COATED ORAL at 20:13

## 2020-09-10 RX ADMIN — ATORVASTATIN CALCIUM 80 MG: 80 TABLET, FILM COATED ORAL at 20:13

## 2020-09-10 RX ADMIN — HEPARIN SODIUM 5000 UNITS: 5000 INJECTION INTRAVENOUS; SUBCUTANEOUS at 20:13

## 2020-09-10 RX ADMIN — GABAPENTIN 300 MG: 300 CAPSULE ORAL at 20:13

## 2020-09-10 RX ADMIN — FUROSEMIDE 40 MG: 10 INJECTION, SOLUTION INTRAMUSCULAR; INTRAVENOUS at 20:13

## 2020-09-10 RX ADMIN — AZITHROMYCIN MONOHYDRATE 250 MG: 250 TABLET ORAL at 20:12

## 2020-09-10 RX ADMIN — BUDESONIDE AND FORMOTEROL FUMARATE DIHYDRATE 2 PUFF: 160; 4.5 AEROSOL RESPIRATORY (INHALATION) at 20:32

## 2020-09-10 RX ADMIN — SODIUM CHLORIDE, PRESERVATIVE FREE 10 ML: 5 INJECTION INTRAVENOUS at 20:19

## 2020-09-10 RX ADMIN — ASPIRIN 81 MG: 81 TABLET, CHEWABLE ORAL at 20:13

## 2020-09-10 RX ADMIN — CALCIUM ACETATE 667 MG: 667 CAPSULE ORAL at 20:13

## 2020-09-10 ASSESSMENT — ENCOUNTER SYMPTOMS
ABDOMINAL PAIN: 0
DIARRHEA: 0
NAUSEA: 0
VOMITING: 0
EYE PAIN: 0
SHORTNESS OF BREATH: 1
COUGH: 1
BACK PAIN: 0

## 2020-09-10 ASSESSMENT — PAIN SCALES - GENERAL
PAINLEVEL_OUTOF10: 0
PAINLEVEL_OUTOF10: 0

## 2020-09-10 NOTE — ED PROVIDER NOTES
[address] [facility]        Pt Name: Perico Spears  MRN: [id number]  [place] 1955  Date of evaluation: 9/10/2020  Provider: BERNARDO Hernandez  PCP: Jody Ahn MD    VU. I have evaluated this patient. My supervising physician was available for consultation. CHIEF COMPLAINT       Chief Complaint   Patient presents with    Shortness of Breath     started around 3am this morning pt went to dialysis but did not have it because he couldnt breathe        HISTORY OF PRESENT ILLNESS   (Location, Timing/Onset, Context/Setting, Quality, Duration, Modifying Factors, Severity, Associated Signs and Symptoms)  Note limiting factors. Perico Spears is a 60 y.o. male with past medical history of CVA, CHF, end-stage renal disease on dialysis ([facility]), who presents to the emergency department for evaluation of shortness of breath. Patient went to dialysis but could not have it performed due to his shortness of breath. Patient reports a 2-day history of cough and shortness of breath. It worsened this morning and woke him up from his sleep. Cough is productive of white phlegm. No wheezing. It hurts to take a deep breath. Patient states this feels similar to when he was admitted 3 months ago. He reports chills without fever. The patient denies abdominal pain, nausea, vomiting, diarrhea. No other acute concerns, associated symptoms or modifying factors. Nursing Notes were all reviewed and agreed with or any disagreements were addressed in the HPI. REVIEW OF SYSTEMS    (2-9 systems for level 4, 10 or more for level 5)     Review of Systems   Constitutional: Negative for chills, fatigue and fever. Eyes: Negative for pain. Respiratory: Positive for cough and shortness of breath. Cardiovascular: Negative for chest pain. Gastrointestinal: Negative for abdominal pain, diarrhea, nausea and vomiting.    Genitourinary: Negative for dysuria. Musculoskeletal: Negative for back pain, neck pain and neck stiffness. Skin: Negative for rash. Neurological: Negative for dizziness and headaches. Psychiatric/Behavioral: Negative for confusion. Positives and Pertinent negatives as per HPI. Except as noted above in the ROS, all other systems were reviewed and negative. PAST MEDICAL HISTORY     Past Medical History:   Diagnosis Date    [facility] occlusion with [facility] ([facility]     CHF (congestive heart failure) (HCC)     Chronic kidney disease     COPD (chronic obstructive pulmonary disease) ([facility]     Dialysis patient ([facility]     Gout     Hemodialysis patient ([facility]     Hx of blood clots     Hyperlipidemia     Hypertension     Renal failure          SURGICAL HISTORY     Past Surgical History:   Procedure Laterality Date    COLONOSCOPY      DIALYSIS FISTULA CREATION Left          CURRENTMEDICATIONS       Previous Medications    ALBUTEROL SULFATE HFA (VENTOLIN HFA) 108 (90 BASE) MCG/ACT INHALER    Inhale 2 puffs into the lungs every 6 hours as needed for Wheezing 90mcg/actuation    ALLOPURINOL (ZYLOPRIM) 100 MG TABLET    Take 100 mg by mouth daily    ASPIRIN 81 MG TABLET    Take 81 mg by mouth daily    ATORVASTATIN (LIPITOR) 80 MG TABLET    Take 1 tablet by mouth daily    BUDESONIDE-FORMOTEROL (SYMBICORT) 160-4.5 MCG/ACT AERO    Inhale 2 puffs into the lungs 2 times daily    CALCIUM ACETATE 667 MG TABS    Take 1 tablet by mouth 3 times daily (with meals)    CARVEDILOL (COREG) 25 MG TABLET    Take 25 mg by mouth 2 times daily Unsure of dose     GABAPENTIN (NEURONTIN) 300 MG CAPSULE    Take 1 capsule by mouth nightly for 30 days. LOSARTAN (COZAAR) 25 MG TABLET    Take 1 tablet by mouth daily Unsure of dose.     OMEPRAZOLE (PRILOSEC) 20 MG DELAYED RELEASE CAPSULE    Take 1 capsule by mouth daily    TIOTROPIUM (SPIRIVA) 18 MCG INHALATION CAPSULE    Inhale 1 capsule into the lungs daily    TORSEMIDE (DEMADEX) 20 MG TABLET    Take 2 tablets by mouth daily    TRAZODONE (DESYREL) 150 MG TABLET    Take 1 tablet by mouth nightly         ALLERGIES     Patient has no known allergies. FAMILYHISTORY     No family history on file. SOCIAL HISTORY       Social History     Tobacco Use    Smoking status: Former Smoker    Smokeless tobacco: Never Used   Substance Use Topics    Alcohol use: Yes     Frequency: Never     Comment: occasional    Drug use: Never       SCREENINGS             PHYSICAL EXAM    (up to 7 for level 4, 8 or more for level 5)     ED Triage Vitals   BP Temp Temp src Pulse Resp SpO2 Height Weight   -- -- -- -- -- -- -- --       Physical Exam  Vitals signs and nursing note reviewed. Constitutional:       General: He is not in acute distress. Appearance: He is well-developed. He is not diaphoretic. HENT:      Head: Normocephalic and atraumatic. Eyes:      General:         Right eye: No discharge. Left eye: No discharge. Neck:      Musculoskeletal: Normal range of motion and neck supple. Pulmonary:      Effort: Tachypnea (decreased breath sounds) present. No respiratory distress. Breath sounds: No stridor. Comments: Increased work of breathing  Musculoskeletal: Normal range of motion. Right lower leg: No edema. Left lower leg: No edema. Skin:     General: Skin is warm and dry. Coloration: Skin is not pale. Neurological:      Mental Status: He is alert and oriented to person, place, and time. Comments: No gross facial drooping. Moves all 4 extremities spontaneously.    Psychiatric:         Behavior: Behavior normal.         DIAGNOSTIC RESULTS   LABS:    Labs Reviewed   CBC WITH AUTO DIFFERENTIAL - Abnormal; Notable for the following components:       Result Value    WBC 2.7 (*)     RBC 3.98 (*)     Hemoglobin 12.0 (*)     Hematocrit 37.3 (*)     RDW 15.7 (*)     Platelets 91 (*)     Lymphocytes Absolute 0.5 (*)     All other components within early mild edema. I suspect the patient has a COPD exacerbation with his history of increasing productive cough of white sputum and shortness of breath that woke him up this morning. I ordered a DuoNeb. Patient needs hemodialysis, missed his session earlier today, will consult the hospitalist for admission. At reevaluation after duoneb -- pt states he does feel some improvement. Cough is more productive now. Results for orders placed or performed during the hospital encounter of 09/10/20   CBC Auto Differential   Result Value Ref Range    WBC 2.7 (L) 4.0 - 11.0 K/uL    RBC 3.98 (L) 4.20 - 5.90 M/uL    Hemoglobin 12.0 (L) 13.5 - 17.5 g/dL    Hematocrit 37.3 (L) 40.5 - 52.5 %    MCV 93.9 80.0 - 100.0 fL    MCH 30.3 26.0 - 34.0 pg    MCHC 32.2 31.0 - 36.0 g/dL    RDW 15.7 (H) 12.4 - 15.4 %    Platelets 91 (L) 913 - 450 K/uL    MPV 8.2 5.0 - 10.5 fL    Neutrophils % 63.4 %    Lymphocytes % 18.7 %    Monocytes % 11.4 %    Eosinophils % 5.5 %    Basophils % 1.0 %    Neutrophils Absolute 1.7 1.7 - 7.7 K/uL    Lymphocytes Absolute 0.5 (L) 1.0 - 5.1 K/uL    Monocytes Absolute 0.3 0.0 - 1.3 K/uL    Eosinophils Absolute 0.1 0.0 - 0.6 K/uL    Basophils Absolute 0.0 0.0 - 0.2 K/uL   Basic Metabolic Panel w/ Reflex to MG   Result Value Ref Range    Sodium 128 (L) 136 - 145 mmol/L    Potassium reflex Magnesium 5.2 (H) 3.5 - 5.1 mmol/L    Chloride 87 (L) 99 - 110 mmol/L    CO2 27 21 - 32 mmol/L    Anion Gap 14 3 - 16    Glucose 74 70 - 99 mg/dL    BUN 36 (H) 7 - 20 mg/dL    CREATININE 7.0 (HH) 0.8 - 1.3 mg/dL    GFR Non-African American 8 (A) >60    GFR  10 (A) >60    Calcium 9.5 8.3 - 10.6 mg/dL   Troponin   Result Value Ref Range    Troponin 0.07 (H) <0.01 ng/mL   Brain Natriuretic Peptide   Result Value Ref Range    Pro-BNP 54,044 (H) 0 - 124 pg/mL       I spoke with Dr. Amada Trammell. We thoroughly discussed the history, physical exam, laboratory and imaging studies, as well as, emergency department course. Based upon that discussion, we've decided to admit Anjel Fried for further observation and evaluation of Mike Rivera's dyspnea. As I have deemed necessary from their history, physical, and studies, I have considered and evaluated Anjel Fried for the following diagnoses:  ACUTE CORONARY SYNDROME, CHRONIC OBSTRUCTIVE PULMONARY DISEASE, CONGESTIVE HEART FAILURE, PERICARDIAL TAMPONADE, PNEUMONIA, PNEUMOTHORAX, PULMONARY EMBOLISM, SEPSIS, and THORACIC DISSECTION. FINAL IMPRESSION      1. COPD exacerbation ([facility]    2. ESRD on hemodialysis ([facility]    3. Dyspnea, unspecified type          DISPOSITION/PLAN   DISPOSITION        PATIENT REFERREDTO:  No follow-up provider specified.     DISCHARGE MEDICATIONS:  New Prescriptions    No medications on file       DISCONTINUED MEDICATIONS:  Discontinued Medications    No medications on file              (Please note that portions of this note were completed with a voice recognition program.  Efforts were made to edit the dictations but occasionally words are mis-transcribed.)    BERNARDO Canchola (electronically signed)           BERNARDO Canchola  09/10/20 3100

## 2020-09-10 NOTE — PROGRESS NOTES
4 Eyes Admission Assessment     I agree as the admission nurse that 2 RN's have performed a thorough Head to Toe Skin Assessment on the patient. ALL assessment sites listed below have been assessed on admission. Areas assessed by both nurses: Yes  [x]   Head, Face, and Ears   [x]   Shoulders, Back, and Chest  [x]   Arms, Elbows, and Hands   [x]   Coccyx, Sacrum, and Ischum  [x]   Legs, Feet, and Heels        Does the Patient have Skin Breakdown?   No         Mauricio Prevention initiated:  No   Wound Care Orders initiated:  NA      [facility] nurse consulted for Pressure Injury (Stage 3,4, Unstageable, DTI, NWPT, and Complex wounds):  No      Nurse 1 eSignature: Electronically signed by Goran Campbell RN on 9/10/20 at 5:16 PM EDT    **SHARE this note so that the co-signing nurse is able to place an eSignature**    Nurse 2 eSignature: Electronically signed by Rubina Segura RN on 9/10/20 at 5:17 PM EDT

## 2020-09-10 NOTE — PROGRESS NOTES
Pt arrived to room 3105 from ED. Vital signs taken and were WNL. Admission and assessment completed. Pt oriented to room, bed, phone, and call light. Fall prevention contract reviewed and signed. Fall precautions in place. Call light, bedside table and phone within easy reach. Pt instructed to use call light to call for assistance.

## 2020-09-10 NOTE — ED NOTES
Bed: B-06  Expected date: 9/10/20  Expected time: 11:25 AM  Means of arrival: [facility]  Comments:  64M SOB     Jing Braun RN  09/10/20 1120

## 2020-09-10 NOTE — ED PROVIDER NOTES
I was available for consultation on the patient if deemed necessary by advanced practice provider. I was not involved in the care of this patient nor had any participation in the medical decision making process. I was the attending on duty when the patient came to the ER was seen independently by the VU. The patient was discharged or admitted from the ER without my knowledge. I was available for consultation but was not requested to evaluate the patient, nor asked supervised the case. I did not see the patient and I am signing this chart administratively after the fact. EKG  The Ekg interpreted by me shows  normal sinus rhythm and And first-degree AV block with a rate of 87  Axis is   Normal  QTc is  Prolonged at 515 ms  Intervals and Durations are unremarkable. ST Segments: no acute change and normal  Delta waves, Brugada Syndrome, and Short LA are not present. Prior EKG to compare with was available.  No significant changes compared to prior EKG from April 7, 2020        Alma Delia Walton MD  09/10/20 [id number]

## 2020-09-10 NOTE — PROGRESS NOTES
Medication Reconciliation    List of medications patient is currently taking is complete. Source of information: 1. Conversation with patient at bedside                                      2. EPIC records                                       3. [employer] in [address]  Patient has no known allergies. Notes regarding home medications:   1. Patient did not receive any of his home medications prior to arrival to the emergency department.

## 2020-09-10 NOTE — PLAN OF CARE
Problem: Falls - Risk of:  Goal: Will remain free from falls  Description: Will remain free from falls  Outcome: Ongoing  Note: Fall risk assessment completed. Fall precautions in place. Call light within reach. Pt educated on calling for assistance before getting up. Walkway free of clutter. Will continue to monitor. Electronically signed by Lelo Elder RN on 9/10/2020 at 5:26 PM      Problem: OXYGENATION/RESPIRATORY FUNCTION  Goal: Patient will maintain patent airway  Outcome: Ongoing  Note: Patient remains free of significant airway secretions. Patient able to effectively cough and clear any secretions that need cleared. Will continue to monitor patient throughout shift. Electronically signed by Lelo Elder RN on 9/10/2020 at 5:27 PM      Problem: HEMODYNAMIC STATUS  Goal: Patient has stable vital signs and fluid balance  Outcome: Ongoing  Note: Vital signs stable, respiratory rate normal, heart rate is WNLs and regular on cardiac monitor, +2 pulses and less than 3 second cap refill noted in all extremities, body color appropriate for ethnicity and temperature warm, edema noted to none, patient repositions  self, and daily weights are ordered and no change from yesterday's weight. MD notified of weight gain. Will continue to monitor and reassess. Electronically signed by Lelo Elder RN on 9/10/2020 at 5:27 PM      Problem: FLUID AND ELECTROLYTE IMBALANCE  Goal: Fluid and electrolyte balance are achieved/maintained  Outcome: Ongoing  Note: Educated to drink fluids within fluid restriction guidelines and to adequately hydrate, medications administered as ordered, abnormal lab values assessed and reported to physician if critical or pertinent to patient status, skin turgor, mucus membranes, and respiratory status assess this shift and WNL. Patient and Family was included in plan of care. Will continue to monitor and reassess.  Electronically signed by Lelo Elder RN on 9/10/2020 at 5:27 PM Problem: ACTIVITY INTOLERANCE/IMPAIRED MOBILITY  Goal: Mobility/activity is maintained at optimum level for patient  Outcome: Ongoing

## 2020-09-10 NOTE — H&P
Hospital Medicine History & Physical      PCP: Fay Tomas MD    Date of Admission: 9/10/2020    Chief Complaint: Dyspnea      History Of Present Illness:  Patient is a 62-year-old male with past medical history of ESRD on HD TTS, CVA, hypertension who presents to the hospital for shortness of breath. According to the patient he woke up this morning with shortness of breath at around 4:30 AM and since then his shortness of breath was not relieving with inhalers. He also has dry cough, denies orthopnea, bilateral lower extremity swelling, he mentions he missed his dialysis for today. Denies associated chest pain nausea vomiting diarrhea constipation dysuria. Past Medical History:          Diagnosis Date    [facility]     [facility]     Chronic kidney disease     [facility] obstructive [facility] disease) [facility]     D[facility]     Gout     Hemodialysis [facility] ([facility]     Hx of blood clots     Hyperlipidemia     Hypertension     Renal failure        Past Surgical History:          Procedure Laterality Date    COLONOSCOPY      DIALYSIS FISTULA CREATION Left        Medications Prior to Admission:      Prior to Admission medications    Medication Sig Start Date End Date Taking? Authorizing Provider   calcium acetate 667 MG TABS Take 1 tablet by mouth 3 times daily (with meals)   Yes Historical Provider, MD   tiotropium (SPIRIVA) 18 MCG inhalation capsule Inhale 1 capsule into the lungs daily 4/8/20 9/10/20 Yes OFELIA Mills - CNP   gabapentin (NEURONTIN) 300 MG capsule Take 1 capsule by mouth nightly for 30 days.  4/8/20 9/10/20 Yes Dora Joyce APRN - CNP   traZODone (DESYREL) 150 MG tablet Take 1 tablet by mouth nightly  Patient taking differently: Take 150 mg by mouth nightly as needed  4/8/20 9/10/20 Yes Dora Joyce APRN - CNP   atorvastatin (LIPITOR) 80 MG tablet Take 1 tablet by mouth daily 4/8/20 9/10/20 Yes Joie Patterson, APRN - CNP   losartan (COZAAR) 25 MG tablet Take 1 tablet by mouth daily Unsure of dose. 4/8/20 9/10/20 Yes Adelaide Sever, APRN - CNP   torsemide (DEMADEX) 20 MG tablet Take 2 tablets by mouth daily 4/8/20 9/10/20 Yes Annette Sever, APRN - CNP   omeprazole (PRILOSEC) 20 MG delayed release capsule Take 1 capsule by mouth daily 4/8/20 9/10/20 Yes Annette Sever, APRN - CNP   carvedilol (COREG) 25 MG tablet Take 25 mg by mouth 2 times daily Unsure of dose    Yes Historical Provider, MD   aspirin 81 MG tablet Take 81 mg by mouth daily   Yes Historical Provider, MD   budesonide-formoterol (SYMBICORT) 160-4.5 MCG/ACT AERO Inhale 2 puffs into the lungs 2 times daily   Yes Historical Provider, MD   albuterol sulfate HFA (VENTOLIN HFA) 108 (90 Base) MCG/ACT inhaler Inhale 2 puffs into the lungs every 6 hours as needed for Wheezing 90mcg/actuation   Yes Historical Provider, MD   allopurinol (ZYLOPRIM) 100 MG tablet Take 100 mg by mouth daily   Yes Historical Provider, MD       Allergies:  Patient has no known allergies. Social History:      TOBACCO:   reports that he has quit smoking. He has never used smokeless tobacco.  ETOH:   reports current alcohol use. Family History:       Reviewed in detail and non contributory      No family history on file. REVIEW OF SYSTEMS:   Pertinent positives as noted in the HPI. All other systems reviewed and negative. PHYSICAL EXAM PERFORMED:    BP (!) 153/92   Pulse 95   Temp 98.1 °F (36.7 °C) (Oral)   Resp 16   Ht 5' 8\" (1.727 m)   Wt 143 lb 8 oz (65.1 kg)   SpO2 90%   BMI 21.82 kg/m²     General appearance:  No apparent distress, cooperative. HEENT:  Normal cephalic, atraumatic without obvious deformity. Conjunctivae/corneas clear. Neck: Supple, with full range of motion.  No cervical lymphadenopathy  Respiratory: Decreased breathing sounds bilaterally  Cardiovascular:  Regular rate and rhythm with normal S1/S2 without murmurs, rubs or gallops. Abdomen: Soft, non-tender, non-distended, normal bowel sounds. Musculoskeletal:  No edema noted bilaterally. No tenderness on palpation   Skin: no rash visible  Neurologic:  Neurologically intact without any focal sensory/motor deficits. grossly non-focal.  Psychiatric:  Alert and oriented, normal mood  Peripheral Pulses: +2 palpable, equal bilaterally       Labs:     Recent Labs     09/10/20  1227   WBC 2.7*   HGB 12.0*   HCT 37.3*   PLT 91*     Recent Labs     09/10/20  1227   *   K 5.2*   CL 87*   CO2 27   BUN 36*   CREATININE 7.0*   CALCIUM 9.5     No results for input(s): AST, ALT, BILIDIR, BILITOT, ALKPHOS in the last 72 hours. No results for input(s): INR in the last 72 hours. Recent Labs     09/10/20  1227   TROPONINI 0.07*       Urinalysis:    No results found for: Marvie Leeks, BACTERIA, RBCUA, BLOODU, SPECGRAV, GLUCOSEU    Radiology:       XR CHEST (2 VW)   Final Result   Trace pleural effusions/pleural thickening as well as mild subpleural opacity   which can reflect early/mild edema in the appropriate clinical setting. Active Hospital Problems    Diagnosis Date Noted    [facility] ([facility] [J44.9] 09/10/2020         Patient is a 56-year-old male with past medical history of ESRD on HD TTS, CVA, hypertension who presents to the hospital for shortness of breath. According to the patient he woke up this morning with shortness of breath at around 4:30 AM and since then his shortness of breath was not relieving with inhalers. He also has dry cough, denies orthopnea, bilateral lower extremity swelling, he mentions he missed his dialysis for today. Denies associated chest pain nausea vomiting diarrhea constipation dysuria.     Assessment  Dyspnea likely COPD exacerbation  Acute hypoxic respiratory failure satting less than 90% on room air likely secondary to above  Hyponatremia  Hyperkalemia  ESRD on HD TTS  Chronically elevated troponin secondary to ESRD  Hypertension  Chronic thrombocytopenia    Plan  DuoNeb every 4 hour, prednisone, azithromycin, check procalcitonin, chest x-ray does show some pulmonary vascular congestion, will give 40 of IV Lasix once  Consult nephrology, dialysis per nephrology  Monitor BMP  Resume home Coreg, Neurontin, statin, aspirin, Protonix  DVT prophylaxis with heparin  Diet: DIET RENAL; Daily Fluid Restriction: 1500 ml  Code Status: Full Code    PT/OT Eval Status: ordered    Dispo - pending clinical improvement       Abhijeet Cartwright MD    The note was completed using EMR and Dragon dictation system. Every effort was made to ensure accuracy; however, inadvertent computerized transcription errors may be present. Thank you Jesus Howell MD for the opportunity to be involved in this patient's care. If you have any questions or concerns please feel free to contact me at [phone].     Abhijeet Cartwright MD

## 2020-09-11 LAB
ANION GAP SERPL CALCULATED.3IONS-SCNC: 14 MMOL/L (ref 3–16)
BUN BLDV-MCNC: 48 MG/DL (ref 7–20)
CALCIUM SERPL-MCNC: 9.2 MG/DL (ref 8.3–10.6)
CHLORIDE BLD-SCNC: 91 MMOL/L (ref 99–110)
CO2: 24 MMOL/L (ref 21–32)
CREAT SERPL-MCNC: 7.7 MG/DL (ref 0.8–1.3)
EKG ATRIAL RATE: 87 BPM
EKG DIAGNOSIS: NORMAL
EKG P AXIS: 79 DEGREES
EKG P-R INTERVAL: 234 MS
EKG Q-T INTERVAL: 428 MS
EKG QRS DURATION: 98 MS
EKG QTC CALCULATION (BAZETT): 515 MS
EKG R AXIS: 69 DEGREES
EKG T AXIS: 78 DEGREES
EKG VENTRICULAR RATE: 87 BPM
GFR AFRICAN AMERICAN: 9
GFR NON-AFRICAN AMERICAN: 7
GLUCOSE BLD-MCNC: 166 MG/DL (ref 70–99)
HCT VFR BLD CALC: 32.7 % (ref 40.5–52.5)
HEMOGLOBIN: 10.9 G/DL (ref 13.5–17.5)
MCH RBC QN AUTO: 31.3 PG (ref 26–34)
MCHC RBC AUTO-ENTMCNC: 33.3 G/DL (ref 31–36)
MCV RBC AUTO: 94 FL (ref 80–100)
PDW BLD-RTO: 15.6 % (ref 12.4–15.4)
PLATELET # BLD: 83 K/UL (ref 135–450)
PMV BLD AUTO: 8.4 FL (ref 5–10.5)
POTASSIUM REFLEX MAGNESIUM: 5.3 MMOL/L (ref 3.5–5.1)
RBC # BLD: 3.48 M/UL (ref 4.2–5.9)
SODIUM BLD-SCNC: 129 MMOL/L (ref 136–145)
WBC # BLD: 2.5 K/UL (ref 4–11)

## 2020-09-11 PROCEDURE — 2500000003 HC RX 250 WO HCPCS: Performed by: INTERNAL MEDICINE

## 2020-09-11 PROCEDURE — 6360000002 HC RX W HCPCS: Performed by: INTERNAL MEDICINE

## 2020-09-11 PROCEDURE — 2700000000 HC OXYGEN THERAPY PER DAY

## 2020-09-11 PROCEDURE — 80048 BASIC METABOLIC PNL TOTAL CA: CPT

## 2020-09-11 PROCEDURE — 6370000000 HC RX 637 (ALT 250 FOR IP): Performed by: INTERNAL MEDICINE

## 2020-09-11 PROCEDURE — 90935 HEMODIALYSIS ONE EVALUATION: CPT

## 2020-09-11 PROCEDURE — 1200000000 HC SEMI PRIVATE

## 2020-09-11 PROCEDURE — 97530 THERAPEUTIC ACTIVITIES: CPT

## 2020-09-11 PROCEDURE — 97161 PT EVAL LOW COMPLEX 20 MIN: CPT

## 2020-09-11 PROCEDURE — 94760 N-INVAS EAR/PLS OXIMETRY 1: CPT

## 2020-09-11 PROCEDURE — 85027 COMPLETE CBC AUTOMATED: CPT

## 2020-09-11 PROCEDURE — 36415 COLL VENOUS BLD VENIPUNCTURE: CPT

## 2020-09-11 PROCEDURE — 5A1D70Z PERFORMANCE OF URINARY FILTRATION, INTERMITTENT, LESS THAN 6 HOURS PER DAY: ICD-10-PCS | Performed by: INTERNAL MEDICINE

## 2020-09-11 PROCEDURE — 2580000003 HC RX 258: Performed by: INTERNAL MEDICINE

## 2020-09-11 PROCEDURE — 93010 ELECTROCARDIOGRAM REPORT: CPT | Performed by: INTERNAL MEDICINE

## 2020-09-11 PROCEDURE — 94640 AIRWAY INHALATION TREATMENT: CPT

## 2020-09-11 PROCEDURE — 97165 OT EVAL LOW COMPLEX 30 MIN: CPT

## 2020-09-11 RX ADMIN — ATORVASTATIN CALCIUM 80 MG: 80 TABLET, FILM COATED ORAL at 08:09

## 2020-09-11 RX ADMIN — HEPARIN SODIUM 5000 UNITS: 5000 INJECTION INTRAVENOUS; SUBCUTANEOUS at 20:54

## 2020-09-11 RX ADMIN — ASPIRIN 81 MG: 81 TABLET, CHEWABLE ORAL at 08:09

## 2020-09-11 RX ADMIN — ACETAMINOPHEN 650 MG: 325 TABLET ORAL at 22:53

## 2020-09-11 RX ADMIN — SODIUM CHLORIDE, PRESERVATIVE FREE 10 ML: 5 INJECTION INTRAVENOUS at 08:10

## 2020-09-11 RX ADMIN — BUDESONIDE AND FORMOTEROL FUMARATE DIHYDRATE 2 PUFF: 160; 4.5 AEROSOL RESPIRATORY (INHALATION) at 19:50

## 2020-09-11 RX ADMIN — SODIUM CHLORIDE, PRESERVATIVE FREE 10 ML: 5 INJECTION INTRAVENOUS at 20:55

## 2020-09-11 RX ADMIN — GABAPENTIN 300 MG: 300 CAPSULE ORAL at 20:54

## 2020-09-11 RX ADMIN — CALCIUM ACETATE 667 MG: 667 CAPSULE ORAL at 08:09

## 2020-09-11 RX ADMIN — HEPARIN SODIUM 5000 UNITS: 5000 INJECTION INTRAVENOUS; SUBCUTANEOUS at 05:51

## 2020-09-11 RX ADMIN — TIOTROPIUM BROMIDE INHALATION SPRAY 2 PUFF: 3.12 SPRAY, METERED RESPIRATORY (INHALATION) at 07:38

## 2020-09-11 RX ADMIN — CARVEDILOL 25 MG: 25 TABLET, FILM COATED ORAL at 08:09

## 2020-09-11 RX ADMIN — LOSARTAN POTASSIUM 25 MG: 25 TABLET, FILM COATED ORAL at 08:09

## 2020-09-11 RX ADMIN — PANTOPRAZOLE SODIUM 40 MG: 40 TABLET, DELAYED RELEASE ORAL at 05:52

## 2020-09-11 RX ADMIN — BUDESONIDE AND FORMOTEROL FUMARATE DIHYDRATE 2 PUFF: 160; 4.5 AEROSOL RESPIRATORY (INHALATION) at 07:38

## 2020-09-11 RX ADMIN — CALCIUM ACETATE 667 MG: 667 CAPSULE ORAL at 11:46

## 2020-09-11 RX ADMIN — PREDNISONE 40 MG: 20 TABLET ORAL at 08:09

## 2020-09-11 RX ADMIN — TORSEMIDE 40 MG: 20 TABLET ORAL at 08:09

## 2020-09-11 RX ADMIN — ALLOPURINOL 100 MG: 100 TABLET ORAL at 08:09

## 2020-09-11 ASSESSMENT — PAIN DESCRIPTION - PAIN TYPE: TYPE: ACUTE PAIN

## 2020-09-11 ASSESSMENT — PAIN DESCRIPTION - FREQUENCY: FREQUENCY: INTERMITTENT

## 2020-09-11 ASSESSMENT — PAIN DESCRIPTION - ONSET: ONSET: GRADUAL

## 2020-09-11 ASSESSMENT — PAIN SCALES - WONG BAKER: WONGBAKER_NUMERICALRESPONSE: 0

## 2020-09-11 ASSESSMENT — PAIN DESCRIPTION - DESCRIPTORS: DESCRIPTORS: SORE

## 2020-09-11 ASSESSMENT — PAIN - FUNCTIONAL ASSESSMENT: PAIN_FUNCTIONAL_ASSESSMENT: ACTIVITIES ARE NOT PREVENTED

## 2020-09-11 ASSESSMENT — PAIN SCALES - GENERAL
PAINLEVEL_OUTOF10: 0
PAINLEVEL_OUTOF10: 2

## 2020-09-11 ASSESSMENT — PAIN DESCRIPTION - LOCATION: LOCATION: CHEST

## 2020-09-11 ASSESSMENT — PAIN DESCRIPTION - PROGRESSION: CLINICAL_PROGRESSION: NOT CHANGED

## 2020-09-11 ASSESSMENT — PAIN DESCRIPTION - ORIENTATION: ORIENTATION: UPPER;RIGHT;LEFT

## 2020-09-11 NOTE — PROGRESS NOTES
Patient is alert & oriented x4, up with a cane, 2/4 bed rails up, bed in lowest position, fall precautions in place, call light within reach. Morning assessment complete. Morning medications given. Pt complaining of a runny nose this morning. Informed pt to bring it up to the MD when she rounds pt verbally agreed with this. Will cont to monitor and reassess.   Electronically signed by Brennan Moura RN on 9/11/2020 at 9:25 AM

## 2020-09-11 NOTE — FLOWSHEET NOTE
Treatment time: 3 HOURS   Net Uf: 3000 ml     Pre weight: 63.8 kg   Post weight: 60.1 kg  EDW: 60 kg     Access used: JAIME AVF   Access function: GOOD       Medications or blood products given: NONE    Regular outpatient schedule: TTS*     Summary of response to treatment: TOLERATED TX WELL     Copy of dialysis treatment record placed in chart, to be scanned into EMR.     09/11/20 1410 09/11/20 1849   Treatment   Time On 1410  --    Time Off  --  1718   Observations & Evaluations   Level of Consciousness 0 0   Oriented X  --  4   Vital Signs   BP (!) 160/92 (!) 146/104   Temp 98.3 °F (36.8 °C) 98.4 °F (36.9 °C)   Pulse 89 81   Resp  --  20   Weight 140 lb 10.5 oz (63.8 kg) 132 lb 7.9 oz (60.1 kg)   Weight Method Actual;Standing scale Standing scale   Percent Weight Change -1.69 -5.8   Pain Assessment   Pain Assessment  --  0-10   Pain Level  --  0   Hemodialysis Fistula/Graft Arteriovenous vein graft Left Arm   No Placement Date or Time found. Pre-existing: Yes  Access Type: Arteriovenous vein graft  Orientation: Left  Access Location: Arm   Site Assessment  --  Clean;Dry; Intact   Thrill  --  Present   Bruit  --  Present   Dressing Intervention  --  New   Dressing Status  --  Clean;Dry; Intact   Post-Hemodialysis Assessment   Post-Treatment Procedures  --  Blood returned; Access bleeding time > 10 minutes   Machine Disinfection Process  --  Acid/Vinegar Clean;Heat Disinfect; Exterior Machine Disinfection   Rinseback Volume (ml)  --  400 ml   Total Liters Processed (l/min)  --  62.4 l/min   Dialyzer Clearance  --  Lightly streaked   Duration of Treatment (minutes)  --  180 minutes   NET Removed (ml)  --  3000 ml   Tolerated Treatment  --  Good   Physician Notified?  --  Yes

## 2020-09-11 NOTE — PROGRESS NOTES
Full range of motion without deformity. Skin: Skin color, texture, turgor normal.  No rashes or lesions. Neurologic:  Neurovascularly intact without any focal sensory/motor deficits. Cranial nerves: II-XII intact, grossly non-focal.  Psychiatric: Alert and oriented, thought content appropriate, normal insight  Capillary Refill: Brisk,< 3 seconds   Peripheral Pulses: +2 palpable, equal bilaterally       Labs:   Recent Labs     09/10/20  1227 09/11/20  0500   WBC 2.7* 2.5*   HGB 12.0* 10.9*   HCT 37.3* 32.7*   PLT 91* 83*     Recent Labs     09/10/20  1227 09/11/20  0500   * 129*   K 5.2* 5.3*   CL 87* 91*   CO2 27 24   BUN 36* 48*   CREATININE 7.0* 7.7*   CALCIUM 9.5 9.2     No results for input(s): AST, ALT, BILIDIR, BILITOT, ALKPHOS in the last 72 hours. No results for input(s): INR in the last 72 hours. Recent Labs     09/10/20  1227   TROPONINI 0.07*       Urinalysis:    No results found for: Nadiya Hoops, BACTERIA, RBCUA, BLOODU, SPECGRAV, GLUCOSEU    Radiology:  XR CHEST (2 VW)   Final Result   Trace pleural effusions/pleural thickening as well as mild subpleural opacity   which can reflect early/mild edema in the appropriate clinical setting.                  Assessment/Plan:    Active Hospital Problems    Diagnosis Date Noted    [facility]ch[facility] disease) ([facility] [J44.9] 09/10/2020       Dyspnea:  - due to COPD, fluid overload  - improving with nebulizing treatment, prednisone, azithromycin    ESRD:  - dialysed on 9/11 per nephro  - trend BMP    Hyperkalemia:  - due to ESRD  - correct per nephro    Anemia of Chronic disease:  - at baseline Hgb  - cont to trend    DVT Prophylaxis: heparin  Diet: DIET RENAL; Daily Fluid Restriction: 1500 ml  Code Status: Full Code    PT/OT Eval Status: N/A    Dispo - Raiza Livingston MD

## 2020-09-11 NOTE — PROGRESS NOTES
Physical Therapy    Facility/Department: [address] ORTHOPEDICS  Initial Assessment and Discharge Summary    NAME: Josafat Toscano  : 1955  MRN: [id number]    Date of Service: 2020    Discharge Recommendations:  Home independently     Josafat Toscano scored a 24/24 on the AM-PAC short mobility form. At this time, no further PT is recommended upon discharge due to baseline mobility. Recommend patient returns to prior setting with prior services. Assessment   Assessment: Pt is a 60 y.o. male who presented to the ED on 9/10/20 with SOB, COPD exacerbation. Patient currently functioning at his baseline. Recommend return home with prior level of support. Prognosis: Good  Decision Making: Low Complexity  History: see below  Exam: see below  Clinical Presentation: stable  PT Education: PT Role;Plan of Care  No Skilled PT: At baseline function  REQUIRES PT FOLLOW UP: No  Activity Tolerance  Activity Tolerance: Patient Tolerated treatment well       Patient Diagnosis(es): The primary encounter diagnosis was COPD exacerbation ([facility] Diagnoses of ESRD on hemodialysis ([address] and Dyspnea, unspecified type were also pertinent to this visit. has a past medical history of Cerebral artery occlusion with cerebral infarction ([address] CHF (congestive heart failure) ([address] Chronic kidney disease, COPD (chronic obstructive pulmonary disease) ([address] Dialysis patient ([address] Gout, Hemodialysis patient ([facility] Hx of blood clots, Hyperlipidemia, Hypertension, and Renal failure.   has a past surgical history that includes Dialysis fistula creation (Left) and Colonoscopy. Restrictions  Position Activity Restriction  Other position/activity restrictions: 2L of O2 prn (baseline)     Subjective  General  Chart Reviewed: Yes  Patient assessed for rehabilitation services?: Yes  Additional Pertinent Hx: Pt is a 60 y.o. male who presented to the ED on 9/10/20 with SOB, COPD exacerbation.   Response To Previous Treatment: Not notified      AM-PAC Score  AM-PAC Inpatient Mobility Raw Score : 24 (09/11/20 1036)  AM-PAC Inpatient T-Scale Score : 61.14 (09/11/20 1036)  Mobility Inpatient CMS 0-100% Score: 0 (09/11/20 1036)  Mobility Inpatient CMS G-Code Modifier : [address] (09/11/20 1036)          Therapy Time   Individual Concurrent Group Co-treatment   Time In 1000         Time Out 1030         Minutes 30         Timed Code Treatment Minutes: 15 Minutes       Joyce Lane, PT

## 2020-09-11 NOTE — PROGRESS NOTES
Occupational Therapy   Occupational Therapy Initial Assessment & Discharge Summary    Date: 2020   Patient Name: Anjel Fried  MRN: [id number]     : 1955    Date of Service: 2020    Assessment: Pt is 60 y.o. M who presents with SOB. Pt is being treated for COPD exacerbation. PTA pt lives with MILE carranza and malik [facility]. Pt reports independence in self-care tasks, homemaking responsibilities, and functional mobility with cane occasionally. Currently, pt presents with ROM/strength in [facility] for self-care and transfers. Pt completed sit <> stand transfers with SBA and functional mobility with no device with SBA. Anticipate pt is functioning at baseline for ADL needs. Anticipate pt to return home with assist from family as needed. Discharge Recommendations:  Home independently       Assessment   Performance deficits / Impairments: Decreased endurance  Assessment: Pt is 60 y.o. M who presents with SOB. Pt is being treated for COPD exacerbation. PTA pt lives with MILE carranza and malik [facility]. Pt reports independence in self-care tasks, homemaking responsibilities, and functional mobility with cane occasionally. Currently, pt presents with ROM/strength in [facility] for self-care and transfers. Pt completed sit <> stand transfers with SBA and functional mobility with no device with SBA. Anticipate pt is functioning at baseline for ADL needs. Anticipate pt to return home with assist from family as needed. Prognosis: Good  Decision Making: Low Complexity  History: PMH: COPD, CVA, CHF, HD  Exam: ADLs, transfers, func mob, bed mob  Assistance / Modification: SBA for mobility  OT Education: OT Role;ADL Adaptive Strategies; Plan of Care  REQUIRES OT FOLLOW UP: No  Activity Tolerance  Activity Tolerance: Patient Tolerated treatment well  Activity Tolerance: 2L of O2 prn, O2 sats maintained in low 90s  Safety Devices  Safety Devices in place: Yes  Type of devices: Nurse notified;Gait belt;Call light within reach; Left in chair           Patient Diagnosis(es): The primary encounter diagnosis was COPD exacerbation ([facility] Diagnoses of ESRD on hemodialysis ([facility] and Dyspnea, unspecified type were also pertinent to this visit. has a past medical history of Cerebral artery occlusion with cerebral infarction ([facility] CHF (congestive heart failure) ([facility] Chronic kidney disease, COPD (chronic obstructive pulmonary disease) ([facility] Gout, [facility] Hx of blood clots, Hyperlipidemia, Hypertension, and Renal failure.   has a past surgical history that includes Dialysis fistula creation (Left) and Colonoscopy. Restrictions  Position Activity Restriction  Other position/activity restrictions: 2L of O2 prn (baseline)    Subjective   General  Chart Reviewed: Yes  Patient assessed for rehabilitation services?: Yes  Additional Pertinent Hx: Per Harman Velazquez MD: Pt is \"60year-old male with past medical history of ESRD on HD TTS, CVA, hypertension who presents to the hospital for shortness of breath. According to the patient he woke up this morning with shortness of breath at around 4:30 AM and since then his shortness of breath was not relieving with inhalers. He also has dry cough, denies orthopnea, bilateral lower extremity swelling, he mentions he missed his dialysis for today. \"  Family / Caregiver Present: No  Referring Practitioner: Harman Velazquez MD  Diagnosis: COPD  Vital Signs  Level of Consciousness: Alert     Social/Functional History  Social/Functional History  Lives With: Family(brother, MILE and MIL)  Type of Home: House  Home Layout: Two level, Laundry in basement(lives in basement with full bed/bath)  Home Access: Stairs to enter with rails  Entrance Stairs - Number of Steps: 2 AYUSH + 9 to basement  Entrance Stairs - Rails: Left  Bathroom Shower/Tub: Walk-in shower, Shower chair with back  Bathroom Toilet: Standard  Bathroom Equipment: Grab bars in shower, Built-in shower seat, Shower chair  Home Equipment: [employer]  ADL Assistance: Independent  Homemaking Assistance: Independent  Ambulation Assistance: Independent(SPC in community)  Transfer Assistance: Independent  Active : No  Occupation: Retired  Type of occupation:        Objective   Vision: Within Functional Limits  Hearing: Within functional limits      Orientation  Overall Orientation Status: Within Functional Limits     Balance  Sitting Balance: Independent  Standing Balance: Stand by assistance  Standing Balance  Time: ~2-3 minutes  Activity: Func mob, transfers    Functional Mobility  Functional - Mobility Device: No device  Activity: Other(~150 feet)  Assist Level: Stand by assistance  Functional Mobility Comments: Pt completed functional mobility with no device with SBA, no overt LOB. Did mobility without AFO and no issues - reports he does wear all day. ADL  Additional Comments: PTA pt reports independence in self-care tasks. Anticipate pt is at baseline - may require increased rest break. Pt reports he completed shower earlier this date without assist. Declined needs for ADL tasks at this time. Tone RUE  RUE Tone: Normotonic  Tone LUE  LUE Tone: Normotonic  Coordination  Movements Are Fluid And Coordinated: Yes     Bed mobility  Comment: Up in chair at start and end of session     Transfers  Sit to stand: Stand by assistance  Stand to sit: Stand by assistance  Transfer Comments: SBA for sit <> stand to/from recliner chair     Cognition  Overall Cognitive Status: WFL        Sensation  Overall Sensation Status: [facility] for light touch)        LUE AROM (degrees)  LUE AROM : WFL  Left Hand AROM (degrees)  Left Hand AROM: WFL  RUE AROM (degrees)  RUE AROM : WFL  Right Hand AROM (degrees)  Right Hand AROM: WFL     LUE Strength  Gross LUE Strength: WFL  RUE Strength  Gross RUE Strength: WFL          Plan   Plan  Plan Comment: Pt discharged from OT services.       AM-PAC Score   No further OT needs

## 2020-09-11 NOTE — PROGRESS NOTES
[facility]  Nephrology   Dialysis Note        We are following the patient for ESRD    SUBJECTIVE:  Undergoing HD  Feels OK  Breathing better    OBJECTIVE:     PHYSICAL:    TEMPERATURE:  Current - Temp: 98.3 °F (36.8 °C); Max - Temp  Av °F (36.7 °C)  Min: 97.5 °F (36.4 °C)  Max: 98.3 °F (36.8 °C)  RESPIRATIONS RANGE: Resp  Av.2  Min: 14  Max: 20  PULSE RANGE: Pulse  Av.3  Min: 83  Max: 94  BLOOD PRESSURE RANGE:  Systolic (02WPK), KRY:055 , Min:126 , THF:012   ; Diastolic (31TQV), KATERINE:96, Min:51, Max:99    PULSE OXIMETRY RANGE: SpO2  Av.1 %  Min: 93 %  Max: 100 %  24HR INTAKE/OUTPUT:      Intake/Output Summary (Last 24 hours) at 2020 1623  Last data filed at 2020 1151  Gross per 24 hour   Intake 600 ml   Output 100 ml   Net 500 ml       CONSTITUTIONAL:  awake, alert, cooperative, no apparent distress, and appears stated age  HEENT:  sclera clear  NECK:  Supple, symmetrical, trachea midline, no adenopathy,  LUNGS:  No increased work of breathing, diminished BS bilaterally  CARDIOVASCULAR:  Normal apical impulse, regular rate and rhythm, normal S1 and S2  ABDOMEN:  No scars, normal bowel sounds, soft, non-distended  NEUROLOGIC:  alert, oriented, normal speech, no focal findings   SKIN: no bruising or bleeding  EXTREMITIES: no edema L AVG+T/B    Medications         Data      CBC:   Recent Labs     09/10/20  1227 20  0500   WBC 2.7* 2.5*   RBC 3.98* 3.48*   HGB 12.0* 10.9*   HCT 37.3* 32.7*   PLT 91* 83*     BMP:    Recent Labs     09/10/20  1227 20  0500   * 129*   K 5.2* 5.3*   CL 87* 91*   CO2 27 24   BUN 36* 48*   CREATININE 7.0* 7.7*   CALCIUM 9.5 9.2   GLUCOSE 74 166*     Phosphorus:  No results for input(s): PHOS in the last 72 hours. Magnesium:  No results for input(s): MG in the last 72 hours.       ASSESSMENT     Patient Active Problem List   Diagnosis    HCAP (healthcare-associated pneumonia)    [facility]    [facility]    COPD (chronic obstructive pulmonary disease) (HCC)       PLAN    ESRD HD in progress on 2 K bath Wt up 3.8 kg from TW   UF Goal 3 L today Running 3 hours today   Plan HD tomorrow again for his scheduled Rx  Holding TEODORA today       Rosibel Low MD, [place]

## 2020-09-11 NOTE — CONSULTS
[facility]  Consult Note           Reason for Consult:  ESRD  Requesting Physician:  Dr. Jeaneth Teresa    History Obtained From:  patient, electronic medical record    History of Present Ilness:    61 y/o AAM with ESRD on HD at [facility] TTS under [facility] Neph[facility]  Admitted with SOB that started around 3 AM  Has h/o COPD and uses home O2 prn  Missed HD yesterday due to coming to ER  Admitted with COPD exacerbation  Denies recent wt loss  Has had cough with white phlegm No hemoptysis fever Reports chills      Past Medical History:        Diagnosis Date    [facility]     CHF [facility]congestive heart failure) [facility]     Chronic kidney disease     COPD [facility]chronic obstructive pulmonary disease) ([facility]     Dialysis patient ([facility]     Gout     Hemod[facility] patient [facility]     Hx of blood clots     Hyperlipidemia     Hypertension     Renal failure        Past Surgical History:        Procedure Laterality Date    COLONOSCOPY      DIALYSIS FISTULA CREATION Left        Home Medications:    No current facility-administered medications on file prior to encounter. Current Outpatient Medications on File Prior to Encounter   Medication Sig Dispense Refill    calcium acetate 667 MG TABS Take 1 tablet by mouth 3 times daily (with meals)      tiotropium (SPIRIVA) 18 MCG inhalation capsule Inhale 1 capsule into the lungs daily 30 capsule 0    gabapentin (NEURONTIN) 300 MG capsule Take 1 capsule by mouth nightly for 30 days. 30 capsule 0    traZODone (DESYREL) 150 MG tablet Take 1 tablet by mouth nightly (Patient taking differently: Take 150 mg by mouth nightly as needed ) 30 tablet 0    atorvastatin (LIPITOR) 80 MG tablet Take 1 tablet by mouth daily 30 tablet 0    losartan (COZAAR) 25 MG tablet Take 1 tablet by mouth daily Unsure of dose.  30 tablet 0    torsemide (DEMADEX) 20 MG tablet Take 2 tablets by mouth daily 60 tablet 0    omeprazole (PRILOSEC) 20 MG delayed release capsule Take 1 capsule by mouth daily 30 capsule 0    carvedilol (COREG) 25 MG tablet Take 25 mg by mouth 2 times daily Unsure of dose       aspirin 81 MG tablet Take 81 mg by mouth daily      budesonide-formoterol (SYMBICORT) 160-4.5 MCG/ACT AERO Inhale 2 puffs into the lungs 2 times daily      albuterol sulfate HFA (VENTOLIN HFA) 108 (90 Base) MCG/ACT inhaler Inhale 2 puffs into the lungs every 6 hours as needed for Wheezing 90mcg/actuation      allopurinol (ZYLOPRIM) 100 MG tablet Take 100 mg by mouth daily         Allergies:  Patient has no known allergies.     Social History:    Social History     Socioeconomic History    Marital status: Single     Spouse name: Not on file    Number of children: Not on file    Years of education: Not on file    Highest education level: Not on file   Occupational History    Not on file   Social Needs    Financial resource strain: Not on file    Food insecurity     Worry: Not on file     Inability: Not on file    Transportation needs     Medical: Not on file     Non-medical: Not on file   Tobacco Use    Smoking status: Former Smoker    Smokeless tobacco: Never Used   Substance and Sexual Activity    Alcohol use: Yes     Frequency: Never     Comment: occasional    Drug use: Never    Sexual activity: Not on file   Lifestyle    Physical activity     Days per week: Not on file     Minutes per session: Not on file    Stress: Not on file   Relationships    Social connections     Talks on phone: Not on file     Gets together: Not on file     Attends [employer] service: Not on file     Active member of club or organization: Not on file     Attends meetings of clubs or organizations: Not on file     Relationship status: Not on file    Intimate partner violence     Fear of current or ex partner: Not on file     Emotionally abused: Not on file     Physically abused: Not on file     Forced sexual activity: Not on file   Other Topics Concern    Not on file Social History Narrative    Not on file       Family History:   History reviewed. No pertinent family history. Review of Systems:   Pertinent positives stated above in HPI. All other systems were reviewed and were negative. Voids small amounts of urine  No dysuria  No palpitations Notes chest discomfort on coughing    Physical exam:   Constitutional:  VITALS:  BP (!) 173/51   Pulse 86   Temp 97.7 °F (36.5 °C) (Oral)   Resp 14   Ht 5' 8\" (1.727 m)   Wt 143 lb 1.3 oz (64.9 kg)   SpO2 98%   BMI 21.76 kg/m²   Gen: alert, awake, nad  Skin: no rash, turgor wnl  Heent:  eomi, mmm  Neck: no bruits or jvd noted, thyroid normal  Cardiovascular:  S1, S2 without m/r/g  Respiratory: diminished BS at bases No wheezing or rales  Abdomen:  +bs, soft, nt, nd, no hepatosplenomegaly  Ext: no lower extremity edema  Psychiatric: mood and affect appropriate; judgement and insight intact  Musculoskeletal:  Rom, muscular strength intact; digits, nails normal  L AVG+T/B     Data/  CBC:   Lab Results   Component Value Date    WBC 2.5 09/11/2020    RBC 3.48 09/11/2020    HGB 10.9 09/11/2020    HCT 32.7 09/11/2020    MCV 94.0 09/11/2020    MCH 31.3 09/11/2020    MCHC 33.3 09/11/2020    RDW 15.6 09/11/2020    PLT 83 09/11/2020    MPV 8.4 09/11/2020     BMP:    Lab Results   Component Value Date     09/11/2020    K 5.3 09/11/2020    CL 91 09/11/2020    CO2 24 09/11/2020    BUN 48 09/11/2020    LABALBU 3.3 04/08/2020    CREATININE 7.7 09/11/2020    CALCIUM 9.2 09/11/2020    GFRAA 9 09/11/2020    LABGLOM 7 09/11/2020    GLUCOSE 166 09/11/2020         Assessment/  1-ESRD HD TTS at [facility] Missed HD yestrerday  2-Hyperkalemia mild  3-COPD exacerbation  4 CHF mild  5 HTN controlled  6 Pancytopenia    Plan/  1-HD today for fluid removal and correction of HD  2-HD tomorrow for his scheduled Rx  3-Continue BP meds  4 hgb 10.9 gm No TEODORA with HD   5 COPD per primary team    Thank you for the consultation.   Please do not hesitate to call with questions.     Samuel Marcial MD, [address]

## 2020-09-11 NOTE — PLAN OF CARE
Problem: Falls - Risk of:  Goal: Will remain free from falls  Description: Will remain free from falls  9/10/2020 2246 by Shi Lopez RN  Outcome: Ongoing  Note: Patient educated on fall prevention. Call light is within reach, bed locked in lowest position, personal items within reach, and bed alarm is on. Will round on patient per unit guidelines. 9/10/2020 1726 by Sully Almaguer RN  Outcome: Ongoing  Note: Fall risk assessment completed. Fall precautions in place. Call light within reach. Pt educated on calling for assistance before getting up. Walkway free of clutter. Will continue to monitor. Electronically signed by Sully Almaguer RN on 9/10/2020 at 5:26 PM   Goal: Absence of physical injury  Description: Absence of physical injury  Outcome: Ongoing  Note: Pt assessed for fall risk and fall precautions put into place. Bed in lowest position and wheels locked, call light within reach. Nonskid footwear in place. Patient educated on appropriate method of transfer and to call for assistance. Problem: OXYGENATION/RESPIRATORY FUNCTION  Goal: Patient will maintain patent airway  9/10/2020 2246 by Shi Lopez RN  Outcome: Ongoing  Note: Patient will maintain patent airway. 9/10/2020 1726 by Sully Almaguer RN  Outcome: Ongoing  Note: Patient remains free of significant airway secretions. Patient able to effectively cough and clear any secretions that need cleared. Will continue to monitor patient throughout shift. Electronically signed by Sully Almaguer RN on 9/10/2020 at 5:27 PM   Goal: Patient will achieve/maintain normal respiratory rate/effort  Description: Respiratory rate and effort will be within normal limits for the patient  Outcome: Ongoing  Note: Patient respiratory rate within normal limits. Will continue to monitor throughout the shift.         Problem: HEMODYNAMIC STATUS  Goal: Patient has stable vital signs and fluid balance  9/10/2020 2246 by Shi Lopez RN  Outcome: Ongoing  Note: Vitals signs are stable. Intake and output are being recorded, will continue to monitor    9/10/2020 1726 by Ferdinand Albarran RN  Outcome: Ongoing  Note: Vital signs stable, respiratory rate normal, heart rate is WNLs and regular on cardiac monitor, +2 pulses and less than 3 second cap refill noted in all extremities, body color appropriate for ethnicity and temperature warm, edema noted to none, patient repositions  self, and daily weights are ordered and no change from yesterday's weight. MD notified of weight gain. Will continue to monitor and reassess. Electronically signed by Ferdinand Albarran RN on 9/10/2020 at 5:27 PM      Problem: FLUID AND ELECTROLYTE IMBALANCE  Goal: Fluid and electrolyte balance are achieved/maintained  9/10/2020 2246 by Dmitriy Colon RN  Outcome: Ongoing  Note: Patient will maintain adequate fluid volume. 9/10/2020 1726 by Ferdinand Albarran RN  Outcome: Ongoing  Note: Educated to drink fluids within fluid restriction guidelines and to adequately hydrate, medications administered as ordered, abnormal lab values assessed and reported to physician if critical or pertinent to patient status, skin turgor, mucus membranes, and respiratory status assess this shift and WNL. Patient and Family was included in plan of care. Will continue to monitor and reassess. Electronically signed by Ferdinand Albarran RN on 9/10/2020 at 5:27 PM      Problem: ACTIVITY INTOLERANCE/IMPAIRED MOBILITY  Goal: Mobility/activity is maintained at optimum level for patient  9/10/2020 2246 by Dmitriy Colon RN  Outcome: Ongoing  Note: Early and frequent ambulqtion encouraged. Educated patient on importance of early ambulation. Patient assisted with ambulation. Will continue to monitor.     9/10/2020 1726 by Ferdinand Albarran RN  Outcome: Ongoing

## 2020-09-11 NOTE — PLAN OF CARE
Problem: Falls - Risk of:  Goal: Will remain free from falls  Description: Will remain free from falls  9/11/2020 0720 by Mayola Soulier, RN  Outcome: Ongoing  Note: Fall risk assessment completed. Fall precautions in place. Call light within reach. Pt educated on calling for assistance before getting up. Walkway free of clutter. Patient refusing alarms, encouraged patient to call for assistance especially if feeling dizzy, SOB, or weakness. Will continue to monitor. Problem: Falls - Risk of:  Goal: Absence of physical injury  Description: Absence of physical injury  9/11/2020 0720 by Mayola Soulier, RN  Outcome: Ongoing  Note: Pt absent from physical injury on this shift      Problem: OXYGENATION/RESPIRATORY FUNCTION  Goal: Patient will maintain patent airway  9/11/2020 0720 by Mayola Soulier, RN  Outcome: Ongoing  Note: Patient has maintained a patent airway, repositions self, lung sounds bilaterall, cough noted, RT is involved in patient care and is providing care, patient has had adequate fluid intake and is on a 1500 fluid restriction, no need to suction airway at this time, educated patient to turn and cough and deep breathe every two hours and as needed, encouraged incentive spirometer and patient has been performing. Will continue to monitor and reassess.        Problem: OXYGENATION/RESPIRATORY FUNCTION  Goal: Patient will achieve/maintain normal respiratory rate/effort  Description: Respiratory rate and effort will be within normal limits for the patient  9/11/2020 0720 by Mayola Soulier, RN  Outcome: Ongoing  Note: Pt will achieve/maintain normal respiratory rate/effort      Problem: HEMODYNAMIC STATUS  Goal: Patient has stable vital signs and fluid balance  9/11/2020 0720 by Mayola Soulier, RN  Outcome: Ongoing  Note: Pt has stable vital signs and fluid balance      Problem: FLUID AND ELECTROLYTE IMBALANCE  Goal: Fluid and electrolyte balance are achieved/maintained  9/11/2020 0720 by

## 2020-09-12 VITALS
HEART RATE: 81 BPM | HEIGHT: 68 IN | SYSTOLIC BLOOD PRESSURE: 145 MMHG | RESPIRATION RATE: 18 BRPM | OXYGEN SATURATION: 96 % | WEIGHT: 129.41 LBS | BODY MASS INDEX: 19.61 KG/M2 | DIASTOLIC BLOOD PRESSURE: 93 MMHG | TEMPERATURE: 97.7 F

## 2020-09-12 LAB
ANION GAP SERPL CALCULATED.3IONS-SCNC: 16 MMOL/L (ref 3–16)
BUN BLDV-MCNC: 34 MG/DL (ref 7–20)
CALCIUM SERPL-MCNC: 9.5 MG/DL (ref 8.3–10.6)
CHLORIDE BLD-SCNC: 88 MMOL/L (ref 99–110)
CO2: 24 MMOL/L (ref 21–32)
CREAT SERPL-MCNC: 5.6 MG/DL (ref 0.8–1.3)
GFR AFRICAN AMERICAN: 13
GFR NON-AFRICAN AMERICAN: 10
GLUCOSE BLD-MCNC: 158 MG/DL (ref 70–99)
HCT VFR BLD CALC: 32.7 % (ref 40.5–52.5)
HEMOGLOBIN: 10.7 G/DL (ref 13.5–17.5)
MCH RBC QN AUTO: 30.8 PG (ref 26–34)
MCHC RBC AUTO-ENTMCNC: 32.9 G/DL (ref 31–36)
MCV RBC AUTO: 93.7 FL (ref 80–100)
PDW BLD-RTO: 15.5 % (ref 12.4–15.4)
PLATELET # BLD: 101 K/UL (ref 135–450)
PMV BLD AUTO: 8.6 FL (ref 5–10.5)
POTASSIUM SERPL-SCNC: 3.4 MMOL/L (ref 3.5–5.1)
RBC # BLD: 3.48 M/UL (ref 4.2–5.9)
SODIUM BLD-SCNC: 128 MMOL/L (ref 136–145)
WBC # BLD: 4.3 K/UL (ref 4–11)

## 2020-09-12 PROCEDURE — 90935 HEMODIALYSIS ONE EVALUATION: CPT

## 2020-09-12 PROCEDURE — 94760 N-INVAS EAR/PLS OXIMETRY 1: CPT

## 2020-09-12 PROCEDURE — 6370000000 HC RX 637 (ALT 250 FOR IP): Performed by: INTERNAL MEDICINE

## 2020-09-12 PROCEDURE — 94640 AIRWAY INHALATION TREATMENT: CPT

## 2020-09-12 PROCEDURE — 2500000003 HC RX 250 WO HCPCS: Performed by: INTERNAL MEDICINE

## 2020-09-12 PROCEDURE — 6360000002 HC RX W HCPCS: Performed by: INTERNAL MEDICINE

## 2020-09-12 PROCEDURE — 80048 BASIC METABOLIC PNL TOTAL CA: CPT

## 2020-09-12 PROCEDURE — 85027 COMPLETE CBC AUTOMATED: CPT

## 2020-09-12 RX ORDER — PREDNISONE 20 MG/1
40 TABLET ORAL DAILY
Qty: 20 TABLET | Refills: 0 | Status: SHIPPED | OUTPATIENT
Start: 2020-09-13 | End: 2020-09-23

## 2020-09-12 RX ORDER — MORPHINE SULFATE 2 MG/ML
2 INJECTION, SOLUTION INTRAMUSCULAR; INTRAVENOUS ONCE
Status: DISCONTINUED | OUTPATIENT
Start: 2020-09-12 | End: 2020-09-12 | Stop reason: HOSPADM

## 2020-09-12 RX ORDER — METAXALONE 800 MG/1
800 TABLET ORAL DAILY PRN
Qty: 5 TABLET | Refills: 0 | Status: SHIPPED | OUTPATIENT
Start: 2020-09-12 | End: 2022-03-01

## 2020-09-12 RX ADMIN — LOSARTAN POTASSIUM 25 MG: 25 TABLET, FILM COATED ORAL at 14:26

## 2020-09-12 RX ADMIN — ATORVASTATIN CALCIUM 80 MG: 80 TABLET, FILM COATED ORAL at 14:26

## 2020-09-12 RX ADMIN — PREDNISONE 40 MG: 20 TABLET ORAL at 14:27

## 2020-09-12 RX ADMIN — TORSEMIDE 40 MG: 20 TABLET ORAL at 14:27

## 2020-09-12 RX ADMIN — CARVEDILOL 25 MG: 25 TABLET, FILM COATED ORAL at 14:27

## 2020-09-12 RX ADMIN — CALCIUM ACETATE 667 MG: 667 CAPSULE ORAL at 14:27

## 2020-09-12 RX ADMIN — HEPARIN SODIUM 5000 UNITS: 5000 INJECTION INTRAVENOUS; SUBCUTANEOUS at 05:16

## 2020-09-12 RX ADMIN — ALLOPURINOL 100 MG: 100 TABLET ORAL at 14:27

## 2020-09-12 RX ADMIN — PANTOPRAZOLE SODIUM 40 MG: 40 TABLET, DELAYED RELEASE ORAL at 05:16

## 2020-09-12 RX ADMIN — ASPIRIN 81 MG: 81 TABLET, CHEWABLE ORAL at 14:27

## 2020-09-12 RX ADMIN — TIOTROPIUM BROMIDE INHALATION SPRAY 2 PUFF: 3.12 SPRAY, METERED RESPIRATORY (INHALATION) at 07:37

## 2020-09-12 RX ADMIN — HEPARIN SODIUM 5000 UNITS: 5000 INJECTION INTRAVENOUS; SUBCUTANEOUS at 14:26

## 2020-09-12 RX ADMIN — BUDESONIDE AND FORMOTEROL FUMARATE DIHYDRATE 2 PUFF: 160; 4.5 AEROSOL RESPIRATORY (INHALATION) at 07:37

## 2020-09-12 ASSESSMENT — PAIN SCALES - GENERAL
PAINLEVEL_OUTOF10: 0
PAINLEVEL_OUTOF10: 0

## 2020-09-12 NOTE — PROGRESS NOTES
Data- discharge order received, pt verbalized agreement to discharge, disposition to previous residence, no needs for HHC/DME. Action- discharge instructions prepared and given to pt, pt verbalized understanding. Medication information packet given r/t NEW and/or CHANGED prescriptions emphasizing name/purpose/side effects, pt verbalized understanding. Discharge instruction summary: Diet- renal with 1.5 fluid restriction, Activity- as tolerated, Primary Care Physician as follows: Mauricio Huitron -[phone] f/u appointment in two weeks with cardiology and pulmonology, immunizations reviewed and pt educated to ask PCP about any immunization needs, prescription medications filled at [facility], pt made aware. CHF Education reviewed. Pt/ Family has had a total of 60 minutes CHF education this admission encounter. Response- Pt belongings gathered, IV removed. Disposition is home (no HHC/DME needs), transported with wheelchair, taken to lobby via w/c w/ aide, no complications.    Electronically signed by Delfino Kinsey RN on 9/12/2020 at 5:00 PM

## 2020-09-12 NOTE — PROGRESS NOTES
NEPHROLOGY PROGRESS NOTE    Patient: Adele Farr MRN: [id number]     YOB: 1955  Age: 60 y.o. Sex: male    Unit: [address] ORTHOPEDICS Room/Bed: C7W-6692/3105-01 Location: [address] [place] [address]     Admitting Physician: Adam Covington    Primary Care Physician: Heriberto Brownlee MD          LOS: 2 days       Reason for evaluation:   ESRD      SUBJECTIVE:      The patient was seen and examined on HD. Notes and labs reviewed. There were no complications over night. Patient's review of systems: still has ROUSE      OBJECTIVE:     Vitals:    09/11/20 2323 09/12/20 0516 09/12/20 0737 09/12/20 0821   BP: (!) 142/75 138/72  (!) 150/89   Pulse: 72 87  83   Resp: 16 16 16 16   Temp: 98.3 °F (36.8 °C) 97.9 °F (36.6 °C)  97.5 °F (36.4 °C)   TempSrc: Oral Oral  Oral   SpO2: 97% 95% 97% 96%   Weight:       Height:           Intake and Output:      Intake/Output Summary (Last 24 hours) at 9/12/2020 0853  Last data filed at 9/11/2020 1954  Gross per 24 hour   Intake 600 ml   Output 100 ml   Net 500 ml       Continuous Infusions:      Exam:   CONSTITUTIONAL/PSYCHIATRY: awake, alert and oriented x3. Not in acute distress   EYES: Conjunctivae: normal.   RESPIRATORY: Respiratory effort: normal. Auscultation: diminished BS  CARDIOVASCULAR: Auscultation: RRR. Carotids: no bruits. Neck veins: +JVD. Edema: trace. Graft: left U loop  GASTROINTESTINAL: Soft, nontender, nondistended. EXTREMITIES:  No cyanosis or clubbing. SKIN: Warm and dry. No significant rashes      LABS:   RFP:   Recent Labs     09/10/20  1227 09/11/20  0500   * 129*   K 5.2* 5.3*   CL 87* 91*   CO2 27 24   BUN 36* 48*   CREATININE 7.0* 7.7*   CALCIUM 9.5 9.2   GFRAA 10* 9*       Liver panel:  No results for input(s): ALB, TP, AST, ALT in the last 72 hours.     Invalid input(s): TBIL    Endocrine:   @[id number](VitD,PTH,TSH,aldosterone,renin activity,cortisol,metanephrine)@    CBC:   Recent Labs     09/10/20  1227 09/11/20  0500   WBC 2.7* 2.5*

## 2020-09-12 NOTE — PROGRESS NOTES
Pt returned from hemodialysis, pt eating lunch, AM meds given, no other needs at this time.  Electronically signed by Jim Talamantes RN on 9/12/2020 at 3:29 PM

## 2020-09-12 NOTE — PLAN OF CARE
Patient will achieve/maintain normal respiratory rate/effort  Description: Respiratory rate and effort will be within normal limits for the patient  9/11/2020 2004 by Tyson Serrano RN  Outcome: Ongoing  Note: Patient respiratory rate within normal limits. Will continue to monitor throughout the shift. 9/11/2020 0720 by Pietro Corley RN  Outcome: Ongoing  Note: Pt will achieve/maintain normal respiratory rate/effort      Problem: HEMODYNAMIC STATUS  Goal: Patient has stable vital signs and fluid balance  9/11/2020 2004 by Tyson Serrano RN  Outcome: Ongoing  Note: Vitals signs are stable. Intake and output are being recorded, will continue to monitor    9/11/2020 0720 by Pietro Corley RN  Outcome: Ongoing  Note: Pt has stable vital signs and fluid balance      Problem: FLUID AND ELECTROLYTE IMBALANCE  Goal: Fluid and electrolyte balance are achieved/maintained  9/11/2020 2004 by Tyson Serrano RN  Outcome: Ongoing  Note: Patient will maintain adequate fluid volume. 9/11/2020 0720 by Pietro Corley RN  Outcome: Ongoing  Note: Fluids and electrolyte balance are achieved/maintained      Problem: ACTIVITY INTOLERANCE/IMPAIRED MOBILITY  Goal: Mobility/activity is maintained at optimum level for patient  9/11/2020 2004 by Tyson Serrano RN  Outcome: Ongoing  Note: Early and frequent ambulqtion encouraged. Educated patient on importance of early ambulation. Patient assisted with ambulation. Will continue to monitor.     9/11/2020 0720 by Pietro Corley RN  Outcome: Ongoing  Note: Mobility being maintained at optimum level for patient

## 2020-09-12 NOTE — FLOWSHEET NOTE
09/12/20 0929 09/12/20 1303   Treatment   Time On 0929  --    Time Off  --  1303   Treatment Goal 1900  --    Observations & Evaluations   Level of Consciousness 0 0   Oriented X 3 3   Vital Signs   BP (!) 144/89 (!) 145/93   Temp 98.2 °F (36.8 °C) 97.7 °F (36.5 °C)   Pulse 83 81   Resp 18 18   Weight 134 lb 4.2 oz (60.9 kg) 129 lb 6.6 oz (58.7 kg)   Weight Method Standing scale Standing scale   Percent Weight Change 1.33 -3.61   Dry Weight 130 lb 1.1 oz (59 kg)  --    Pain Assessment   Pain Assessment 0-10 0-10   Pain Level 0 0   Technical Checks   RO Machine Log Sheet Completed Yes  --    Machine Alarm Self Test Completed  --    Machine Autotest Completed  --    Air Foam Detector Tested  --    Extracorporeal Circuit Tested for Integrity Yes  --    Treatment Initiation   Dialyze Hours 3.5  --    Treatment  Initiation Universal Precautions maintained;Lines secured to patient; Connections secured;Prime given;Venous Parameters set; Arterial Parameters set; Air foam detector engaged; Hemosafe Device; Saline line double clamped; Hemo-Safe Applied; Revaclear Dialyzer  --    During Hemodialysis Assessment   Blood Flow Rate (ml/min) 350 ml/min  --    Ultrafiltration Rate (ml/hr) 660 ml/hr  --    Arterial Pressure (mmHg) -100 mmHg  --    Venous Pressure (mmHg) 170  --    TMP 40  --      --    Access Visible Yes  --    Dialysis Bath   K+ (Potassium) 2  --    Ca+ (Calcium) 2.5  --    Na+ (Sodium) 136  --    HCO3 (Bicarb) 35  --    Hemodialysis Fistula/Graft Arteriovenous vein graft Left Arm   No Placement Date or Time found. Pre-existing: Yes  Access Type: Arteriovenous vein graft  Orientation: Left  Access Location: Arm   Site Assessment Clean;Dry; Intact Clean;Dry; Intact   Thrill Present Present   Bruit Present Present   Access Interventions Aseptic Technique Aseptic Technique   Dressing Intervention Dressing reinforced Dressing reinforced   Dressing Status Clean;Dry; Intact Clean;Dry; Intact   Post-Hemodialysis Assessment   Post-Treatment Procedures  --  Blood returned; Access bleeding time > 10 minutes   Machine Disinfection Process  --  Exterior Machine Disinfection   Rinseback Volume (ml)  --  400 ml   Total Liters Processed (l/min)  --  68 l/min   Dialyzer Clearance  --  Lightly streaked   Duration of Treatment (minutes)  --  210 minutes   Hemodialysis Intake (ml)  --  400 ml   Hemodialysis Output (ml)  --  2300 ml   NET Removed (ml)  --  1900 ml   Tolerated Treatment  --  Good

## 2020-09-12 NOTE — PLAN OF CARE
Problem: Falls - Risk of:  Goal: Will remain free from falls  Description: Will remain free from falls  Outcome: Ongoing  Note: Fall risk assessment completed. Fall precautions in place. Call light within reach. Pt educated on calling for assistance before getting up. Walkway free of clutter. Will continue to monitor.    Electronically signed by Addison Keyes RN on 9/12/2020 at 3:27 PM    Goal: Absence of physical injury  Description: Absence of physical injury  Outcome: Ongoing

## 2020-09-13 NOTE — DISCHARGE SUMMARY
Hospital Medicine Discharge Summary    Patient ID: Tiny Garcia      Patient's PCP: Angeline Galvez MD    Admit Date: 9/10/2020     Discharge Date: 9/12/2020      Admitting Physician: Lorne Nickerson MD     Discharge Physician: Chris Mitchell MD     Discharge Diagnoses: Active Hospital Problems    Diagnosis Date Noted    COPD (chronic obstructive pulmonary disease) ([facility] [address] [J44.9] 09/10/2020       The patient was seen and examined on day of discharge and this discharge summary is in conjunction with any daily progress note from day of discharge. Hospital Course:     Dyspnea:  - due to COPD, fluid overload  - improving with nebulizing treatment, prednisone, azithromycin     ESRD:  - dialysed on 9/11 per nephro  - trend BMP     Hyperkalemia:  - due to ESRD  - correct per nephro     Anemia of Chronic disease:  - at baseline Hgb  - cont to trend      Exam:     BP (!) 145/93   Pulse 81   Temp 97.7 °F (36.5 °C)   Resp 18   Ht 5' 8\" (1.727 m)   Wt 129 lb 6.6 oz (58.7 kg)   SpO2 96%   BMI 19.68 kg/m²     General appearance: No apparent distress, appears stated age and cooperative. HEENT: Pupils equal, round, and reactive to light. Conjunctivae/corneas clear. Neck: Supple, with full range of motion. No jugular venous distention. Trachea midline. Respiratory:  Normal respiratory effort. Clear to auscultation, bilaterally without Rales/Wheezes/Rhonchi. Cardiovascular: Regular rate and rhythm with normal S1/S2 without murmurs, rubs or gallops. Abdomen: Soft, non-tender, non-distended with normal bowel sounds. Musculoskelatal: No clubbing, cyanosis or edema bilaterally. Full range of motion without deformity. Skin: Skin color, texture, turgor normal.  No rashes or lesions. Neurologic:  Neurovascularly intact without any focal sensory/motor deficits.  Cranial nerves: II-XII intact, grossly non-focal.  Psychiatric: Alert and oriented, thought content appropriate, normal insight      Consults: IP CONSULT TO NEPHROLOGY    Significant Diagnostic Studies:       Radiology:  XR CHEST (2 VW)   Final Result   Trace pleural effusions/pleural thickening as well as mild subpleural opacity   which can reflect early/mild edema in the appropriate clinical setting. PCP/SNF to follow up: F/u chronic conditions    Disposition:  Home    Condition on D/C:  stable     Discharge Instructions/Follow-up:  F/u with PCP within 1 week    Code Status:  Prior     Activity: activity as tolerated    Diet: renal diet    Labs: For convenience and continuity at follow-up the following most recent labs are provided:      CBC:    Lab Results   Component Value Date    WBC 4.3 09/12/2020    HGB 10.7 09/12/2020    HCT 32.7 09/12/2020     09/12/2020       Renal:    Lab Results   Component Value Date     09/12/2020    K 3.4 09/12/2020    K 5.3 09/11/2020    CL 88 09/12/2020    CO2 24 09/12/2020    BUN 34 09/12/2020    CREATININE 5.6 09/12/2020    CALCIUM 9.5 09/12/2020    PHOS 3.6 04/08/2020       Discharge Medications:     Discharge Medication List as of 9/12/2020  3:35 PM           Details   predniSONE (DELTASONE) 20 MG tablet Take 2 tablets by mouth daily for 10 days, Disp-20 tablet,R-0Normal              Details   calcium acetate 667 MG TABS Take 1 tablet by mouth 3 times daily (with meals)Historical Med      tiotropium (SPIRIVA) 18 MCG inhalation capsule Inhale 1 capsule into the lungs daily, Disp-30 capsule,R-0Normal      gabapentin (NEURONTIN) 300 MG capsule Take 1 capsule by mouth nightly for 30 days. , Disp-30 capsule,R-0Normal      traZODone (DESYREL) 150 MG tablet Take 1 tablet by mouth nightly, Disp-30 tablet,R-0Normal      atorvastatin (LIPITOR) 80 MG tablet Take 1 tablet by mouth daily, Disp-30 tablet,R-0Normal      losartan (COZAAR) 25 MG tablet Take 1 tablet by mouth daily Unsure of dose., Disp-30 tablet,R-0Normal      torsemide (DEMADEX) 20 MG tablet Take 2 tablets by mouth daily, Disp-60 tablet,R-0Normal      omeprazole (PRILOSEC) 20 MG delayed release capsule Take 1 capsule by mouth daily, Disp-30 capsule,R-0Normal      carvedilol (COREG) 25 MG tablet Take 25 mg by mouth 2 times daily Unsure of dose Historical Med      aspirin 81 MG tablet Take 81 mg by mouth dailyHistorical Med      budesonide-formoterol (SYMBICORT) 160-4.5 MCG/ACT AERO Inhale 2 puffs into the lungs 2 times dailyHistorical Med      albuterol sulfate HFA (VENTOLIN HFA) 108 (90 Base) MCG/ACT inhaler Inhale 2 puffs into the lungs every 6 hours as needed for Wheezing 90mcg/actuationHistorical Med      allopurinol (ZYLOPRIM) 100 MG tablet Take 100 mg by mouth dailyHistorical Med             Time Spent on discharge is more than 15 minutes in the examination, evaluation, counseling and review of medications and discharge plan. Signed: Edgard Redmond MD   9/13/2020      Thank you Ana Rosa Benton MD for the opportunity to be involved in this patient's care. If you have any questions or concerns please feel free to contact me at [phone].

## 2020-09-14 ENCOUNTER — CARE COORDINATION (OUTPATIENT)
Dept: CASE MANAGEMENT | Age: 65
End: 2020-09-14

## 2020-09-14 NOTE — CARE COORDINATION
Patient contacted regarding Lorraine Zavala. COVID-19 testing not done. Patient has following risk factors of: acute respiratory failure and ESRD on HD. Care Transition Nurse contacted the patient by telephone to perform post discharge assessment. Verified name and  with patient as identifiers. Provided introduction to self, and explanation of the CTN role, and reason for call due to risk factors for infection and/or exposure to COVID-19. Symptoms reviewed with patient who verbalized the following symptoms: no new symptoms and no worsening symptoms. Due to no new or worsening symptoms encounter was not routed to provider for escalation. Discussed follow-up appointments. If no appointment was previously scheduled, appointment scheduling offered: Yes    No future appointments. Non-[facility] follow up appointment(s): Dr Louisa Weeks on Wednesday, 2020 at [address] [facility]:   Does patient have an Advance Directive:  not on file. CTN reviewed discharge instructions, medical action plan and red flags such as increased shortness of breath, increasing fever and signs of decompensation with patient who verbalized understanding. Reviewed and educated patient on any new and changed medications related to discharge diagnosis. Has new med, denies needs. Attends HD on Tues/Thur/Sat. Asks for assistance scheduling follow up appt with PCP, prefers [facility]. Outreach to PCP office of Dr Jigna Spear. Appointment scheduled as noted above. Patient notified. He states he can arrange transportation and repeated date/time. CTN provided contact number for future needs. Plan for follow-up call in 5-7 days based on severity of symptoms and risk factors.     Jordan Almaguer RN  Care Transition Nurse  [phone] mobile

## 2020-09-28 ENCOUNTER — CARE COORDINATION (OUTPATIENT)
Dept: CASE MANAGEMENT | Age: 65
End: 2020-09-28

## 2020-09-28 NOTE — CARE COORDINATION
Patient resolved from the Care Transitions episode on 09/28/2020  Discussed COVID-19 related testing which was not done at this time. Test results were not done. Patient informed of results, if available? Not done    Patient/family has been provided the following resources and education related to COVID-19:                         Signs, symptoms and red flags related to COVID-19            CDC exposure and quarantine guidelines            [employer] exposure contact - [phone]            Contact for their local Department of Health                 Patient currently reports that the following symptoms have improved:  no new or worsening symptoms. No further outreach scheduled with this CTN/ACM. Episode of Care resolved. Patient has this CTN/ACM contact information if future needs arise.

## 2021-04-06 ENCOUNTER — APPOINTMENT (OUTPATIENT)
Dept: GENERAL RADIOLOGY | Age: 66
DRG: 189 | End: 2021-04-06
Payer: MEDICARE

## 2021-04-06 ENCOUNTER — HOSPITAL ENCOUNTER (INPATIENT)
Age: 66
LOS: 3 days | Discharge: HOME | DRG: 189 | End: 2021-04-09
Attending: STUDENT IN AN ORGANIZED HEALTH CARE EDUCATION/TRAINING PROGRAM | Admitting: HOSPITALIST
Payer: MEDICARE

## 2021-04-06 DIAGNOSIS — J96.21 ACUTE ON CHRONIC RESPIRATORY FAILURE WITH HYPOXIA AND HYPERCAPNIA (HCC): Primary | ICD-10-CM

## 2021-04-06 DIAGNOSIS — J44.1 COPD EXACERBATION (HCC): ICD-10-CM

## 2021-04-06 DIAGNOSIS — J96.22 ACUTE ON CHRONIC RESPIRATORY FAILURE WITH HYPOXIA AND HYPERCAPNIA (HCC): Primary | ICD-10-CM

## 2021-04-06 LAB
A/G RATIO: 1.7 (ref 1.1–2.2)
ALBUMIN SERPL-MCNC: 4.7 G/DL (ref 3.4–5)
ALP BLD-CCNC: 157 U/L (ref 40–129)
ALT SERPL-CCNC: 9 U/L (ref 10–40)
ANION GAP SERPL CALCULATED.3IONS-SCNC: 17 MMOL/L (ref 3–16)
AST SERPL-CCNC: 21 U/L (ref 15–37)
BASE EXCESS VENOUS: 2.3 MMOL/L
BASOPHILS ABSOLUTE: 0 K/UL (ref 0–0.2)
BASOPHILS RELATIVE PERCENT: 0.6 %
BILIRUB SERPL-MCNC: 0.6 MG/DL (ref 0–1)
BUN BLDV-MCNC: 40 MG/DL (ref 7–20)
CALCIUM SERPL-MCNC: 9.2 MG/DL (ref 8.3–10.6)
CARBOXYHEMOGLOBIN: 1.2 %
CHLORIDE BLD-SCNC: 87 MMOL/L (ref 99–110)
CO2: 26 MMOL/L (ref 21–32)
CREAT SERPL-MCNC: 8.7 MG/DL (ref 0.8–1.3)
EKG ATRIAL RATE: 80 BPM
EKG DIAGNOSIS: NORMAL
EKG P AXIS: 79 DEGREES
EKG P-R INTERVAL: 256 MS
EKG Q-T INTERVAL: 396 MS
EKG QRS DURATION: 106 MS
EKG QTC CALCULATION (BAZETT): 456 MS
EKG R AXIS: 58 DEGREES
EKG T AXIS: 126 DEGREES
EKG VENTRICULAR RATE: 80 BPM
EOSINOPHILS ABSOLUTE: 0.1 K/UL (ref 0–0.6)
EOSINOPHILS RELATIVE PERCENT: 6 %
GFR AFRICAN AMERICAN: 8
GFR NON-AFRICAN AMERICAN: 6
GLOBULIN: 2.7 G/DL
GLUCOSE BLD-MCNC: 107 MG/DL (ref 70–99)
HCO3 VENOUS: 31 MMOL/L (ref 23–29)
HCT VFR BLD CALC: 33.5 % (ref 40.5–52.5)
HEMOGLOBIN: 10.8 G/DL (ref 13.5–17.5)
LYMPHOCYTES ABSOLUTE: 0.4 K/UL (ref 1–5.1)
LYMPHOCYTES RELATIVE PERCENT: 15.8 %
MCH RBC QN AUTO: 30.5 PG (ref 26–34)
MCHC RBC AUTO-ENTMCNC: 32.1 G/DL (ref 31–36)
MCV RBC AUTO: 94.8 FL (ref 80–100)
METHEMOGLOBIN VENOUS: 0.6 %
MONOCYTES ABSOLUTE: 0.2 K/UL (ref 0–1.3)
MONOCYTES RELATIVE PERCENT: 8.3 %
NEUTROPHILS ABSOLUTE: 1.7 K/UL (ref 1.7–7.7)
NEUTROPHILS RELATIVE PERCENT: 69.3 %
O2 CONTENT, VEN: 5 ML/DL
O2 SAT, VEN: 33 %
O2 THERAPY: ABNORMAL
PCO2, VEN: 70 MMHG (ref 40–50)
PDW BLD-RTO: 15.4 % (ref 12.4–15.4)
PH VENOUS: 7.25 (ref 7.35–7.45)
PLATELET # BLD: 78 K/UL (ref 135–450)
PMV BLD AUTO: 8.5 FL (ref 5–10.5)
PO2, VEN: 25 MMHG
POTASSIUM REFLEX MAGNESIUM: 4.4 MMOL/L (ref 3.5–5.1)
PRO-BNP: ABNORMAL PG/ML (ref 0–124)
RBC # BLD: 3.54 M/UL (ref 4.2–5.9)
SODIUM BLD-SCNC: 130 MMOL/L (ref 136–145)
TCO2 CALC VENOUS: 33 MMOL/L
TOTAL PROTEIN: 7.4 G/DL (ref 6.4–8.2)
TROPONIN: 0.1 NG/ML
WBC # BLD: 2.4 K/UL (ref 4–11)

## 2021-04-06 PROCEDURE — 6370000000 HC RX 637 (ALT 250 FOR IP): Performed by: HOSPITALIST

## 2021-04-06 PROCEDURE — 71045 X-RAY EXAM CHEST 1 VIEW: CPT

## 2021-04-06 PROCEDURE — 5A1D70Z PERFORMANCE OF URINARY FILTRATION, INTERMITTENT, LESS THAN 6 HOURS PER DAY: ICD-10-PCS | Performed by: STUDENT IN AN ORGANIZED HEALTH CARE EDUCATION/TRAINING PROGRAM

## 2021-04-06 PROCEDURE — 80053 COMPREHEN METABOLIC PANEL: CPT

## 2021-04-06 PROCEDURE — 93005 ELECTROCARDIOGRAM TRACING: CPT | Performed by: STUDENT IN AN ORGANIZED HEALTH CARE EDUCATION/TRAINING PROGRAM

## 2021-04-06 PROCEDURE — 2060000000 HC ICU INTERMEDIATE R&B

## 2021-04-06 PROCEDURE — 94640 AIRWAY INHALATION TREATMENT: CPT

## 2021-04-06 PROCEDURE — 94660 CPAP INITIATION&MGMT: CPT

## 2021-04-06 PROCEDURE — 93010 ELECTROCARDIOGRAM REPORT: CPT | Performed by: INTERNAL MEDICINE

## 2021-04-06 PROCEDURE — 82803 BLOOD GASES ANY COMBINATION: CPT

## 2021-04-06 PROCEDURE — 2500000003 HC RX 250 WO HCPCS: Performed by: HOSPITALIST

## 2021-04-06 PROCEDURE — 85025 COMPLETE CBC W/AUTO DIFF WBC: CPT

## 2021-04-06 PROCEDURE — 99285 EMERGENCY DEPT VISIT HI MDM: CPT

## 2021-04-06 PROCEDURE — 94761 N-INVAS EAR/PLS OXIMETRY MLT: CPT

## 2021-04-06 PROCEDURE — 84484 ASSAY OF TROPONIN QUANT: CPT

## 2021-04-06 PROCEDURE — 2580000003 HC RX 258: Performed by: HOSPITALIST

## 2021-04-06 PROCEDURE — 83880 ASSAY OF NATRIURETIC PEPTIDE: CPT

## 2021-04-06 PROCEDURE — 90935 HEMODIALYSIS ONE EVALUATION: CPT

## 2021-04-06 PROCEDURE — 2700000000 HC OXYGEN THERAPY PER DAY

## 2021-04-06 RX ORDER — LOSARTAN POTASSIUM 25 MG/1
25 TABLET ORAL DAILY
Status: DISCONTINUED | OUTPATIENT
Start: 2021-04-07 | End: 2021-04-09 | Stop reason: HOSPADM

## 2021-04-06 RX ORDER — SODIUM CHLORIDE 0.9 % (FLUSH) 0.9 %
10 SYRINGE (ML) INJECTION PRN
Status: DISCONTINUED | OUTPATIENT
Start: 2021-04-06 | End: 2021-04-09 | Stop reason: HOSPADM

## 2021-04-06 RX ORDER — ASPIRIN 81 MG/1
81 TABLET, CHEWABLE ORAL DAILY
Status: DISCONTINUED | OUTPATIENT
Start: 2021-04-07 | End: 2021-04-09 | Stop reason: HOSPADM

## 2021-04-06 RX ORDER — HEPARIN SODIUM 5000 [USP'U]/ML
5000 INJECTION, SOLUTION INTRAVENOUS; SUBCUTANEOUS EVERY 8 HOURS SCHEDULED
Status: DISCONTINUED | OUTPATIENT
Start: 2021-04-06 | End: 2021-04-06

## 2021-04-06 RX ORDER — ALBUTEROL SULFATE 2.5 MG/3ML
2.5 SOLUTION RESPIRATORY (INHALATION) EVERY 4 HOURS PRN
Status: DISCONTINUED | OUTPATIENT
Start: 2021-04-06 | End: 2021-04-09 | Stop reason: HOSPADM

## 2021-04-06 RX ORDER — ONDANSETRON 2 MG/ML
4 INJECTION INTRAMUSCULAR; INTRAVENOUS EVERY 6 HOURS PRN
Status: DISCONTINUED | OUTPATIENT
Start: 2021-04-06 | End: 2021-04-09 | Stop reason: HOSPADM

## 2021-04-06 RX ORDER — TORSEMIDE 20 MG/1
40 TABLET ORAL DAILY
Status: DISCONTINUED | OUTPATIENT
Start: 2021-04-07 | End: 2021-04-09 | Stop reason: HOSPADM

## 2021-04-06 RX ORDER — BUDESONIDE AND FORMOTEROL FUMARATE DIHYDRATE 160; 4.5 UG/1; UG/1
2 AEROSOL RESPIRATORY (INHALATION) 2 TIMES DAILY
Status: DISCONTINUED | OUTPATIENT
Start: 2021-04-06 | End: 2021-04-09 | Stop reason: HOSPADM

## 2021-04-06 RX ORDER — CARVEDILOL 25 MG/1
25 TABLET ORAL 2 TIMES DAILY
Status: DISCONTINUED | OUTPATIENT
Start: 2021-04-06 | End: 2021-04-09 | Stop reason: HOSPADM

## 2021-04-06 RX ORDER — ATORVASTATIN CALCIUM 80 MG/1
80 TABLET, FILM COATED ORAL DAILY
Status: DISCONTINUED | OUTPATIENT
Start: 2021-04-07 | End: 2021-04-09 | Stop reason: HOSPADM

## 2021-04-06 RX ORDER — CALCIUM ACETATE 667 MG/1
667 CAPSULE ORAL
Status: DISCONTINUED | OUTPATIENT
Start: 2021-04-06 | End: 2021-04-09 | Stop reason: HOSPADM

## 2021-04-06 RX ORDER — ACETAMINOPHEN 325 MG/1
650 TABLET ORAL EVERY 6 HOURS PRN
Status: DISCONTINUED | OUTPATIENT
Start: 2021-04-06 | End: 2021-04-09 | Stop reason: HOSPADM

## 2021-04-06 RX ORDER — SODIUM CHLORIDE 9 MG/ML
25 INJECTION, SOLUTION INTRAVENOUS PRN
Status: DISCONTINUED | OUTPATIENT
Start: 2021-04-06 | End: 2021-04-09 | Stop reason: HOSPADM

## 2021-04-06 RX ORDER — PROMETHAZINE HYDROCHLORIDE 25 MG/1
12.5 TABLET ORAL EVERY 6 HOURS PRN
Status: DISCONTINUED | OUTPATIENT
Start: 2021-04-06 | End: 2021-04-09 | Stop reason: HOSPADM

## 2021-04-06 RX ORDER — ALLOPURINOL 100 MG/1
100 TABLET ORAL DAILY
Status: DISCONTINUED | OUTPATIENT
Start: 2021-04-07 | End: 2021-04-09 | Stop reason: HOSPADM

## 2021-04-06 RX ORDER — SODIUM CHLORIDE 0.9 % (FLUSH) 0.9 %
10 SYRINGE (ML) INJECTION EVERY 12 HOURS SCHEDULED
Status: DISCONTINUED | OUTPATIENT
Start: 2021-04-06 | End: 2021-04-09 | Stop reason: HOSPADM

## 2021-04-06 RX ORDER — POLYETHYLENE GLYCOL 3350 17 G/17G
17 POWDER, FOR SOLUTION ORAL DAILY PRN
Status: DISCONTINUED | OUTPATIENT
Start: 2021-04-06 | End: 2021-04-09 | Stop reason: HOSPADM

## 2021-04-06 RX ORDER — ACETAMINOPHEN 650 MG/1
650 SUPPOSITORY RECTAL EVERY 6 HOURS PRN
Status: DISCONTINUED | OUTPATIENT
Start: 2021-04-06 | End: 2021-04-09 | Stop reason: HOSPADM

## 2021-04-06 RX ORDER — PANTOPRAZOLE SODIUM 40 MG/1
40 TABLET, DELAYED RELEASE ORAL
Status: DISCONTINUED | OUTPATIENT
Start: 2021-04-07 | End: 2021-04-09 | Stop reason: HOSPADM

## 2021-04-06 RX ORDER — GABAPENTIN 300 MG/1
300 CAPSULE ORAL NIGHTLY
Status: DISCONTINUED | OUTPATIENT
Start: 2021-04-06 | End: 2021-04-09 | Stop reason: HOSPADM

## 2021-04-06 RX ADMIN — Medication 10 ML: at 22:26

## 2021-04-06 RX ADMIN — CALCIUM ACETATE 667 MG: 667 CAPSULE ORAL at 23:12

## 2021-04-06 RX ADMIN — GABAPENTIN 300 MG: 300 CAPSULE ORAL at 22:25

## 2021-04-06 RX ADMIN — CARVEDILOL 25 MG: 25 TABLET, FILM COATED ORAL at 22:25

## 2021-04-06 RX ADMIN — BUDESONIDE AND FORMOTEROL FUMARATE DIHYDRATE 2 PUFF: 160; 4.5 AEROSOL RESPIRATORY (INHALATION) at 20:23

## 2021-04-06 ASSESSMENT — PAIN SCALES - GENERAL
PAINLEVEL_OUTOF10: 0

## 2021-04-06 NOTE — H&P
Hospital Medicine History & Physical      PCP: Cong Dobbins MD    Date of Admission: 4/6/2021    Date of Service: Pt seen/examined on 4/6/21 and Admitted to Inpatient with expected LOS greater than two midnights    Chief Complaint:  Shortness of breath missed HD today     History Of Present Illness:  63 y.o. male who presented to [facility]  ED via EMS c/o shortness of breath. Pt missed dialysis today. Normal schedule is Tues,Thurs, Sat. PMH CVA, congestive heart failure end-stage renal disease  hypertension, hyperlipidemia COPD on 2 L of oxygen at home presenting this morning brought by EMS complaining of shortness of breath  and  unable to ambulate from his table to his bathroom without being short of breath. Patient supposed to have dialysis today but did not get to dialysis because of his symptoms. Denies any chest pain, fevers chills nausea vomiting. No leg swelling.     /102, Na 130, BUN 40 cr 8.7, BNP >70,000, trop 0.10, WBC 2.4 Hgb 10.8, plt 78, pH 7.25 PcO2 70, cxr stable cardiomegaly with bibasilar opacities. Requiring bipap from ER  Admit inpatient to SDU given pH and Co2  level  With nephrology consult     Past Medical History:          Diagnosis Date    Cerebral artery occlusion with cerebral infarction ([facility] [address]     CHF (congestive heart failure) (HCC)     Chronic kidney disease     COPD (chronic obstructive pulmonary disease) ([facility] [address]     Dialysis patient ([facility] [address]     Gout     Hemodialysis patient ([facility] [address]     Hx of blood clots     Hyperlipidemia     Hypertension     Renal failure        Past Surgical History:          Procedure Laterality Date    COLONOSCOPY      DIALYSIS FISTULA CREATION Left        Medications Prior to Admission:      Prior to Admission medications    Medication Sig Start Date End Date Taking?  Authorizing Provider   metaxalone (SKELAXIN) 800 MG tablet Take 1 tablet by mouth daily as needed for Pain 9/12/20  Yes Jeffy Hernandez MD   calcium acetate 667 MG TABS Take 1 tablet by mouth 3 times daily (with meals)   Yes Historical Provider, MD   tiotropium (SPIRIVA) 18 MCG inhalation capsule Inhale 1 capsule into the lungs daily 4/8/20 4/6/21 Yes OFELIA Mills CNP   gabapentin (NEURONTIN) 300 MG capsule Take 1 capsule by mouth nightly for 30 days. 4/8/20 4/6/21 Yes OFELIA Wolfe CNP   traZODone (DESYREL) 150 MG tablet Take 1 tablet by mouth nightly  Patient taking differently: Take 150 mg by mouth nightly as needed  4/8/20 4/6/21 Yes OFELIA Wolfe CNP   atorvastatin (LIPITOR) 80 MG tablet Take 1 tablet by mouth daily 4/8/20 4/6/21 Yes OFELIA Mills CNP   losartan (COZAAR) 25 MG tablet Take 1 tablet by mouth daily Unsure of dose. 4/8/20 4/6/21 Yes OFELIA Wolfe CNP   torsemide (DEMADEX) 20 MG tablet Take 2 tablets by mouth daily 4/8/20 4/6/21 Yes OFELIA Wolfe CNP   omeprazole (PRILOSEC) 20 MG delayed release capsule Take 1 capsule by mouth daily 4/8/20 4/6/21 Yes ElbertOFELIA Lang CNP   carvedilol (COREG) 25 MG tablet Take 25 mg by mouth 2 times daily Unsure of dose    Yes Historical Provider, MD   aspirin 81 MG tablet Take 81 mg by mouth daily   Yes Historical Provider, MD   budesonide-formoterol (SYMBICORT) 160-4.5 MCG/ACT AERO Inhale 2 puffs into the lungs 2 times daily   Yes Historical Provider, MD   albuterol sulfate HFA (VENTOLIN HFA) 108 (90 Base) MCG/ACT inhaler Inhale 2 puffs into the lungs every 6 hours as needed for Wheezing 90mcg/actuation   Yes Historical Provider, MD   allopurinol (ZYLOPRIM) 100 MG tablet Take 100 mg by mouth daily   Yes Historical Provider, MD       Allergies:  Patient has no known allergies. Social History:      The patient currently lives with nephew     TOBACCO:   reports that he has quit smoking. He has never used smokeless tobacco.  ETOH:   reports current alcohol use.   E-Cigarettes/Vaping Use     Questions Responses    E-Cigarette/Vaping Use Never User    Start Date     Passive Exposure Quit Date     Counseling Given     Comments         Quit tob 25 yr ago , drink occasional no drugs     Family History:          Problem Relation Age of Onset    Cancer Mother     Cancer Father     Other Sister     cancer and sickle cell parents both dc CA younger sister SS dz 8 9 siblings   2 older brothers dc [place] as well     REVIEW OF SYSTEMS:   Pertinent positives as noted in the HPI. All other systems reviewed and negative. PHYSICAL EXAM PERFORMED:    /69   Pulse 78   Temp 99 °F (37.2 °C) (Oral)   Resp 20   Ht 5' 8\" (1.727 m)   Wt 138 lb 14.2 oz (63 kg)   SpO2 100%   BMI 21.12 kg/m²     General appearance:  No  distress, bald HOB elevated   HEENT:  Normocephalic, atraumaticPupils equal, round, and reactive to light. Extra ocular muscles intact. Icteric appearing but LFT nl   Neck: Supple,  full range of motion. + jugular venous distention. Trachea midline. Respiratory:  Wasn't able to see before he went to HD had been in distress and needing bipap going to HD currently minimal rale LLL O2 3L as at home   Cardiovascular:  Regular rate and rhythm  normal S1/S2 without murmurs, rubs mild +S4. Abdomen: Soft, non-tender, non-distended with normal bowel sounds. Musculoskeletal:  No clubbing, cyanosis or edema bilaterally. Full range of motion without deformity. Skin: Skin color, texture, turgor normal.  No rashes or lesions. Neurologic:  Neurovascularly intact .  Cranial nerves: II-XII intact, grossly non-focal.  Psychiatric:  Alert and oriented, thought content appropriate, normal insight   Peripheral Pulses: +2 palpable, equal bilaterally       Labs:     Recent Labs     04/06/21  1130 04/07/21  0548   WBC 2.4* 2.6*   HGB 10.8* 8.7*   HCT 33.5* 26.3*   PLT 78* 73*     Recent Labs     04/06/21  1130 04/07/21  0548   * 134*   K 4.4 3.8   CL 87* 92*   CO2 26 29   BUN 40* 19   CREATININE 8.7* 5.7*   CALCIUM 9.2 8.7     Recent Labs     04/06/21  1130   AST 21   ALT 9*   BILITOT 0.6   ALKPHOS 157*     No results for input(s): INR in the last 72 hours. Recent Labs     04/06/21  1130   TROPONINI 0.10*       Urinalysis:    No results found for: Craige Seton, BACTERIA, RBCUA, BLOODU, SPECGRAV, GLUCOSEU    Radiology:       XR CHEST PORTABLE   Final Result   Stable cardiomegaly. Bibasilar opacities in this patient with low lung   volumes which may be related atelectasis or bibasilar airspace disease. ASSESSMENT:      Acute hypoxic and hypercapnic respiratory failure  acute exacerbation COPD  ESRD   Fluid overload  thrombocytopenia    Nephrology consult- to emergent HD   SDU as needing bipap  Will reassess O2 demand by am see if improved enough to go home       DVT Prophylaxis: scd  Diet: DIET RENAL; Daily Fluid Restriction: 1500 ml  Code Status: Full Code    PT/OT Eval Status: independent at home using 3L O2 NC at baseline     Dispo - home        Dominique Salinas MD    Thank you Lutricia Hamman, MD for the opportunity to be involved in this patient's care. If you have any questions or concerns please feel free to contact me at [phone].

## 2021-04-06 NOTE — ED NOTES
Bed: B-08  Expected date: 4/6/21  Expected time: 9:37 AM  Means of arrival: [facility]  Comments:  William Florian RN  04/06/21 7520

## 2021-04-06 NOTE — ED PROVIDER NOTES
Primary Care Physician: Dion Toscano MD   Attending Physician: Mya Mahoney MD     History   Chief Complaint   Patient presents with    Shortness of Breath     Pt to ED via EMS c/o shortness of breath. Pt missed dialysis today. Normal schedule is Tues,Thurs, Sat. HPI   Erika Alfaro is a 63 y.o. male history of CVA, congestive heart failure end-stage renal disease on hemodialysis Tuesday Thursdays Saturdays, hypertension, hyperlipidemia COPD on 2 L of oxygen at home presenting this morning brought by EMS complaining of shortness of breath that started this morning and he was unable to ambulate from his table to his bathroom without being short of breath. Patient supposed to have dialysis today but did not get to dialysis because of his symptoms. Denies any chest pain, fevers chills nausea vomiting. No leg swelling. Past Medical History:   Diagnosis Date    C[facility]bral [facility] occlusion with cerebral infarction ([facility] [address]     CHF (congestive heart failure) (HCC)     Chronic kidney disease     COPD [facility] obstructive pulmonary disease) ([facility] [address]     Dialysis patient ([facility] [address]     Gout     Hemodialysis patient ([facility] [address]     Hx of blood clots     Hyperlipidemia     Hypertension     Renal failure         Past Surgical History:   Procedure Laterality Date    COLONOSCOPY      DIALYSIS FISTULA CREATION Left         History reviewed. No pertinent family history.      Social History     Socioeconomic History    Marital status: Single     Spouse name: None    Number of children: None    Years of education: None    Highest education level: None   Occupational History    None   Social Needs    Financial resource strain: None    Food insecurity     Worry: None     Inability: None    Transportation needs     Medical: None     Non-medical: None   Tobacco Use    Smoking status: Former Smoker    Smokeless tobacco: Never Used   Substance and Sexual Activity    Alcohol use: Yes     Frequency: Never [facility]) 160-4.5 MCG/ACT inhaler 2 puff (2 puffs Inhalation Given 4/6/21 2023)   Calcium Acetate (Phos Binder) CAPS 667 mg (667 mg Oral Given 4/6/21 2312)   carvedilol (COREG) tablet 25 mg (25 mg Oral Given 4/6/21 2225)   gabapentin (NEURONTIN) capsule 300 mg (300 mg Oral Given 4/6/21 2225)   losartan (COZAAR) tablet 25 mg (has no administration in time range)   pantoprazole (PROTONIX) tablet 40 mg (has no administration in time range)   tiotropium (SPIRIVA RESPIMAT) 2.5 MCG/ACT inhaler 2 puff (has no administration in time range)   torsemide (DEMADEX) tablet 40 mg (has no administration in time range)   traZODone (DESYREL) tablet 150 mg (has no administration in time range)   sodium chloride flush 0.9 % injection 10 mL (10 mLs Intravenous Given 4/6/21 2226)   sodium chloride flush 0.9 % injection 10 mL (has no administration in time range)   0.9 % sodium chloride infusion (has no administration in time range)   promethazine (PHENERGAN) tablet 12.5 mg (has no administration in time range)     Or   ondansetron (ZOFRAN) injection 4 mg (has no administration in time range)   polyethylene glycol (GLYCOLAX) packet 17 g (has no administration in time range)   acetaminophen (TYLENOL) tablet 650 mg (has no administration in time range)     Or   acetaminophen (TYLENOL) suppository 650 mg (has no administration in time range)      Labs Reviewed   CBC WITH AUTO DIFFERENTIAL - Abnormal; Notable for the following components:       Result Value    WBC 2.4 (*)     RBC 3.54 (*)     Hemoglobin 10.8 (*)     Hematocrit 33.5 (*)     Platelets 78 (*)     Lymphocytes Absolute 0.4 (*)     All other components within normal limits    Narrative:     Performed at:  [address] [address]   Phone [phone]   COMPREHENSIVE METABOLIC PANEL W/ REFLEX TO MG FOR LOW K - Abnormal; Notable for the following components:    Sodium 130 (*)     Chloride 87 (*)     Anion Gap 17 (*) Glucose 107 (*)     BUN 40 (*)     CREATININE 8.7 (*)     GFR Non- 6 (*)     GFR African American 8 (*)     Alkaline Phosphatase 157 (*)     ALT 9 (*)     All other components within normal limits    Narrative:     Abhi Hawkins tel. [phone],  Chemistry results called to and read back by amanda williamson rn, 04/06/2021 12:09,  by CASTR  Performed at:  [facility]  [address], [place] [employer] [address]   Phone [phone]   TROPONIN - Abnormal; Notable for the following components:    Troponin 0.10 (*)     All other components within normal limits    Narrative:     Performed at:  [address] [facility] [employer] [address]   Phone [phone]   BRAIN NATRIURETIC PEPTIDE - Abnormal; Notable for the following components:    Pro-BNP >70,000 (*)     All other components within normal limits    Narrative:     Abhi Hawkins tel. [phone],  Chemistry results called to and read back by amanda williamson rn, 04/06/2021 12:09,  by CASTR  Performed at:  [facility]  [address] [facility] [employer] [address]   Phone [phone]   BLOOD GAS, VENOUS - Abnormal; Notable for the following components:    pH, [place] 7.254 (*)     pCO2, [place] 70.0 (*)     HCO3, Venous 31 (*)     All other components within normal limits    Narrative:     Performed at:  [address] [facility] [employer] [address]   Phone [phone]   BASIC METABOLIC PANEL W/ REFLEX TO MG FOR LOW K   CBC WITH AUTO DIFFERENTIAL      XR CHEST PORTABLE   Final Result   Stable cardiomegaly. Bibasilar opacities in this patient with low lung   volumes which may be related atelectasis or bibasilar airspace disease.              EKG INTERPRETATION:  EKG by my preliminary interpretation shows sinus rhythm with rate of 80, normal axis, normal intervals, with no ST changes indicative of ischemia at this time. PROCEDURES:   Procedures    ASSESSMENT AND PLAN:  Kareem Morocho is a 63 y.o. male history of CVA, congestive heart failure end-stage renal disease on hemodialysis Tuesday Thursdays Saturdays, hypertension, hyperlipidemia COPD on 2 L of oxygen at home presenting this morning brought by EMS complaining of shortness of breath that started this morning and he was unable to ambulate from his table to his bathroom without being short of breath. Patient supposed to have dialysis today but did not get to dialysis because of his symptoms. On exam patient is on 3 L of oxygen and ill looking chronically. Labs obtained and showed elevated troponin which is chronic from his kidney disease. His proBNP is also elevated but this time significant VBG was also obtained. And showed PCO2 of 70s which is new for him. pH of 7.2 as well. X-ray shows no acute findings. Most likely that symptoms are secondary to COPD exacerbation versus fluid overload from end-stage renal disease. Given the fact that he is hypoxic, dyspnea on exertion with elevated PCO2 and low pH I am recommending that patient be admitted for further evaluation and treatment. Been placed on a BiPAP for his low pH and PCO2 I believe is secondary to COPD exacerbation. Nephrology has been consulted for dialysis. Hospitalist was consulted patient was admitted to their service for further evaluation and treatment. ClINICAL IMPRESSION:  1. Acute on chronic respiratory failure with hypoxia and hypercapnia ([address]        DISPOSITION Admitted 04/06/2021 02:24:53 PM  -Findings and recommendations explained to patient. HE expressed understanding and agreed with the plan.   Tricia Harrison MD (electronically signed)  4/7/2021  _________________________________________________________________________________________  Amount and/or Complexity of Data Reviewed:  Clinical lab tests: ordered and reviewed   Tests in the radiology section of CPT®: ordered and reviewed   Tests in the medicine section of CPT®: ordered and reviewed   Decide to obtain previous medical records or to obtain history from someone other than the patient: no  Obtain history from someone other than the patient: no  Review and summarize past medical records:yes  I looked up the patient in our electronic medical record:yes  Discuss the patient with other providers:yes  Independent visualization of images, tracings, or specimens:yes  Risk of Complications, Morbidity, and/or Mortality:Moderate  Presenting problems: moderate Diagnostic procedures: moderate yes Management options: moderate   Critical Care Attestation   Total critical care time: 35 minutes minimum   Critical care time does not include separately billable procedures and treating other patients. Critical care was necessary to treat or prevent imminent or life-threatening deterioration of the following conditions: Respiratory failure due to hypoxia and hypercapnia. Patient provided with supplemental oxygen and treated urgently in the ED with BiPAp. Case discussed with consultants.       _________________________________________________________________________________________  This record is transcribed utilizing voice recognition technology. There are inherent limitations in this technology. In addition, there may be limitations in editing of this report. If there are any discrepancies, please contact me directly.         Boston Esquivel MD  04/07/21 8522

## 2021-04-06 NOTE — ED TRIAGE NOTES
Pt to ED via EMS c/o shortness of breath. Pt missed dialysis today.  Normal schedule is Tues,Thurs, Sat.

## 2021-04-06 NOTE — ED NOTES
Transportation placed by dialysis RN for pt to be brought to dialysis. Per ED MD Raeann, ok to remove bipap and place pt back on 3L o2/nc while at dialysis.        Manisha Davis RN  04/06/21 0689

## 2021-04-06 NOTE — CONSULTS
Nephrology ([facility]) Consult Note    Endy Neves is a 63 y.o. male whom we were asked to see for ESRD management. The patient presents after developed severe dyspnea this AM.  He denies CP and fever. CXR was read as fairly unremarkable. He was placed on bipap. Past Medical History:  ESRD on HD TTS  HTN  HLP  COPD on 2 liters home O2  h/o CVA    Review of System:  Otherwise unremarkable    Allergies:  Patient has no known allergies. Medications:  Current medications reviewed. Social History:  former tobacco  Family Medical History:  Negative for kidney disase    Physical Exam:  Blood pressure (!) 148/88, pulse 79, temperature 97.7 °F (36.5 °C), temperature source Oral, resp. rate 17, height 5' 8\" (1.727 m), weight 141 lb 15.6 oz (64.4 kg), SpO2 100 %.     General:  NAD, A+Ox3, ill-appearing, normal body habitus  HEENT:  PERRL, EOMI  Neck:  Supple, normal range of movement, thyroid unremarkable  Chest:  CTAB, good respiratory effort, good air movement  CV:  RRR, no murmurs or rubs, no carotid bruit, no abdominal bruits  Abdomen:  NTND, soft, +BS, no hepatosplenomegaly  Extremities:  No peripheral edema  Neurological:  Moving all four extremities, CN II-XII grossly intact  Lymphatics:  No palpable lymph nodes  Skin:  No rash, no jaundice  Psychiatric:  Normal insight and judgement, good recall    Laboratory Studies:  Lab Results   Component Value Date/Time     (L) 04/06/2021 11:30 AM    K 4.4 04/06/2021 11:30 AM    CL 87 (L) 04/06/2021 11:30 AM    CO2 26 04/06/2021 11:30 AM    BUN 40 (H) 04/06/2021 11:30 AM    CREATININE 8.7 (HH) 04/06/2021 11:30 AM    CALCIUM 9.2 04/06/2021 11:30 AM    PHOS 3.6 04/08/2020 04:32 AM    MG 2.00 04/08/2020 04:32 AM     Lab Results   Component Value Date/Time    WBC 2.4 (L) 04/06/2021 11:30 AM    HGB 10.8 (L) 04/06/2021 11:30 AM    HCT 33.5 (L) 04/06/2021 11:30 AM    PLT 78 (L) 04/06/2021 11:30 AM       Assessment/Plan:  Reviewed old records and labs.     1) ESRD  - HD TTS    2) dyspnea  - ddx: pulmonary edema vs.COPD exacerbation   - doubt pneumonia as no leukocytosis  - will assess response to volume removal  - defer to hospitalist regarding steroids and nebs    3) chronic thrombocytopenia

## 2021-04-06 NOTE — FLOWSHEET NOTE
04/06/21 1428 04/06/21 1759   Vital Signs   BP (!) 154/92 (!) 176/99   Temp 98.5 °F (36.9 °C) 98.6 °F (37 °C)   Pulse 82 86   Resp 20 20     Treatment time: 3.5 hours    Net UF: 1 L    Pre weight: 64.4 kg (standing scale)  Post weight: 63.5 kg (standing scale)  EDW: 63 kg    Access used: JAIME AVG  Access function: Prolonged bleeding post tx. Medications or blood products given: None    Regular outpatient schedule: [employer] of response to treatment: c/o leg cramps 45 minutes after start of tx. UFG decreased from 2.8 L to 1.4 L. Continued to c/o cramping at times throughout remaining tx. Copy of dialysis treatment record placed in chart, to be scanned into EMR.     Report called to Maurizio Tovar RN

## 2021-04-07 LAB
ANION GAP SERPL CALCULATED.3IONS-SCNC: 13 MMOL/L (ref 3–16)
BASOPHILS ABSOLUTE: 0 K/UL (ref 0–0.2)
BASOPHILS RELATIVE PERCENT: 0.6 %
BUN BLDV-MCNC: 19 MG/DL (ref 7–20)
CALCIUM SERPL-MCNC: 8.7 MG/DL (ref 8.3–10.6)
CHLORIDE BLD-SCNC: 92 MMOL/L (ref 99–110)
CO2: 29 MMOL/L (ref 21–32)
CREAT SERPL-MCNC: 5.7 MG/DL (ref 0.8–1.3)
EOSINOPHILS ABSOLUTE: 0.1 K/UL (ref 0–0.6)
EOSINOPHILS RELATIVE PERCENT: 4.7 %
GFR AFRICAN AMERICAN: 12
GFR NON-AFRICAN AMERICAN: 10
GLUCOSE BLD-MCNC: 117 MG/DL (ref 70–99)
HCT VFR BLD CALC: 26.3 % (ref 40.5–52.5)
HEMOGLOBIN: 8.7 G/DL (ref 13.5–17.5)
LYMPHOCYTES ABSOLUTE: 0.3 K/UL (ref 1–5.1)
LYMPHOCYTES RELATIVE PERCENT: 11.6 %
MCH RBC QN AUTO: 30.8 PG (ref 26–34)
MCHC RBC AUTO-ENTMCNC: 33 G/DL (ref 31–36)
MCV RBC AUTO: 93.4 FL (ref 80–100)
MONOCYTES ABSOLUTE: 0.3 K/UL (ref 0–1.3)
MONOCYTES RELATIVE PERCENT: 12.2 %
NEUTROPHILS ABSOLUTE: 1.8 K/UL (ref 1.7–7.7)
NEUTROPHILS RELATIVE PERCENT: 70.9 %
PDW BLD-RTO: 15.1 % (ref 12.4–15.4)
PLATELET # BLD: 73 K/UL (ref 135–450)
PMV BLD AUTO: 8.4 FL (ref 5–10.5)
POTASSIUM REFLEX MAGNESIUM: 3.8 MMOL/L (ref 3.5–5.1)
RBC # BLD: 2.82 M/UL (ref 4.2–5.9)
SODIUM BLD-SCNC: 134 MMOL/L (ref 136–145)
WBC # BLD: 2.6 K/UL (ref 4–11)

## 2021-04-07 PROCEDURE — 2580000003 HC RX 258: Performed by: HOSPITALIST

## 2021-04-07 PROCEDURE — 6370000000 HC RX 637 (ALT 250 FOR IP): Performed by: HOSPITALIST

## 2021-04-07 PROCEDURE — 94640 AIRWAY INHALATION TREATMENT: CPT

## 2021-04-07 PROCEDURE — 2060000000 HC ICU INTERMEDIATE R&B

## 2021-04-07 PROCEDURE — 36415 COLL VENOUS BLD VENIPUNCTURE: CPT

## 2021-04-07 PROCEDURE — 85025 COMPLETE CBC W/AUTO DIFF WBC: CPT

## 2021-04-07 PROCEDURE — 2500000003 HC RX 250 WO HCPCS: Performed by: HOSPITALIST

## 2021-04-07 PROCEDURE — 94761 N-INVAS EAR/PLS OXIMETRY MLT: CPT

## 2021-04-07 PROCEDURE — 2700000000 HC OXYGEN THERAPY PER DAY

## 2021-04-07 PROCEDURE — 80048 BASIC METABOLIC PNL TOTAL CA: CPT

## 2021-04-07 RX ORDER — AZITHROMYCIN 250 MG/1
250 TABLET, FILM COATED ORAL DAILY
Status: DISCONTINUED | OUTPATIENT
Start: 2021-04-08 | End: 2021-04-09 | Stop reason: HOSPADM

## 2021-04-07 RX ORDER — AZITHROMYCIN 500 MG/1
500 TABLET, FILM COATED ORAL ONCE
Status: COMPLETED | OUTPATIENT
Start: 2021-04-07 | End: 2021-04-07

## 2021-04-07 RX ORDER — IPRATROPIUM BROMIDE AND ALBUTEROL SULFATE 2.5; .5 MG/3ML; MG/3ML
1 SOLUTION RESPIRATORY (INHALATION) 4 TIMES DAILY
Status: DISCONTINUED | OUTPATIENT
Start: 2021-04-07 | End: 2021-04-09 | Stop reason: HOSPADM

## 2021-04-07 RX ADMIN — ALLOPURINOL 100 MG: 100 TABLET ORAL at 08:38

## 2021-04-07 RX ADMIN — ATORVASTATIN CALCIUM 80 MG: 80 TABLET, FILM COATED ORAL at 08:38

## 2021-04-07 RX ADMIN — CALCIUM ACETATE 667 MG: 667 CAPSULE ORAL at 08:38

## 2021-04-07 RX ADMIN — IPRATROPIUM BROMIDE AND ALBUTEROL SULFATE 1 AMPULE: .5; 3 SOLUTION RESPIRATORY (INHALATION) at 21:12

## 2021-04-07 RX ADMIN — GABAPENTIN 300 MG: 300 CAPSULE ORAL at 21:08

## 2021-04-07 RX ADMIN — TIOTROPIUM BROMIDE INHALATION SPRAY 2 PUFF: 3.12 SPRAY, METERED RESPIRATORY (INHALATION) at 08:25

## 2021-04-07 RX ADMIN — CARVEDILOL 25 MG: 25 TABLET, FILM COATED ORAL at 08:38

## 2021-04-07 RX ADMIN — PANTOPRAZOLE SODIUM 40 MG: 40 TABLET, DELAYED RELEASE ORAL at 06:27

## 2021-04-07 RX ADMIN — LOSARTAN POTASSIUM 25 MG: 25 TABLET, FILM COATED ORAL at 08:38

## 2021-04-07 RX ADMIN — AZITHROMYCIN MONOHYDRATE 500 MG: 500 TABLET ORAL at 15:42

## 2021-04-07 RX ADMIN — TORSEMIDE 40 MG: 20 TABLET ORAL at 08:38

## 2021-04-07 RX ADMIN — BUDESONIDE AND FORMOTEROL FUMARATE DIHYDRATE 2 PUFF: 160; 4.5 AEROSOL RESPIRATORY (INHALATION) at 08:25

## 2021-04-07 RX ADMIN — ASPIRIN 81 MG: 81 TABLET, CHEWABLE ORAL at 08:38

## 2021-04-07 RX ADMIN — Medication 10 ML: at 08:38

## 2021-04-07 RX ADMIN — Medication 10 ML: at 21:08

## 2021-04-07 RX ADMIN — CALCIUM ACETATE 667 MG: 667 CAPSULE ORAL at 17:26

## 2021-04-07 RX ADMIN — IPRATROPIUM BROMIDE AND ALBUTEROL SULFATE 1 AMPULE: .5; 3 SOLUTION RESPIRATORY (INHALATION) at 16:50

## 2021-04-07 RX ADMIN — BUDESONIDE AND FORMOTEROL FUMARATE DIHYDRATE 2 PUFF: 160; 4.5 AEROSOL RESPIRATORY (INHALATION) at 21:12

## 2021-04-07 RX ADMIN — CALCIUM ACETATE 667 MG: 667 CAPSULE ORAL at 12:53

## 2021-04-07 RX ADMIN — CARVEDILOL 25 MG: 25 TABLET, FILM COATED ORAL at 21:08

## 2021-04-07 ASSESSMENT — PAIN SCALES - GENERAL
PAINLEVEL_OUTOF10: 0
PAINLEVEL_OUTOF10: 0

## 2021-04-07 NOTE — PROGRESS NOTES
chloride flush  10 mL Intravenous 2 times per day     PRN Meds: albuterol, traZODone, sodium chloride flush, sodium chloride, promethazine **OR** ondansetron, polyethylene glycol, acetaminophen **OR** acetaminophen      Intake/Output Summary (Last 24 hours) at 4/7/2021 1428  Last data filed at 4/7/2021 1325  Gross per 24 hour   Intake 1840 ml   Output 1400 ml   Net 440 ml       Physical Exam Performed:    /69   Pulse 78   Temp 99 °F (37.2 °C) (Oral)   Resp 20   Ht 5' 8\" (1.727 m)   Wt 138 lb 14.2 oz (63 kg)   SpO2 100%   BMI 21.12 kg/m²     General appearance: No  distress, HOB elevated bald  HEENT: Pupils equal, round, and reactive to light. Conjunctivae/corneas clear. glasses   Neck: Supple,  full range of motion. jugular venous distention. Trachea midline. Respiratory:  Normal effort at rest . Clear to auscultation, but decreased O2 3L  Cardiovascular: Regular rate and rhythm with normal S1/S2 without murmurs, rubs or gallops. Abdomen: Soft, non-tender, non-distended with normal bowel sounds. Musculoskeletal: No clubbing, cyanosis or edema bilaterally. Full range of motion without deformity. Skin: Skin color, texture, turgor normal.  No rashes or lesions. Neurologic:  . Cranial nerves: II-XII intact, grossly non-focal.  Psychiatric: Alert and oriented, thought content appropriate, normal insight  Peripheral Pulses: +2 palpable, equal bilaterally       Labs:   Recent Labs     04/06/21  1130 04/07/21  0548   WBC 2.4* 2.6*   HGB 10.8* 8.7*   HCT 33.5* 26.3*   PLT 78* 73*     Recent Labs     04/06/21  1130 04/07/21  0548   * 134*   K 4.4 3.8   CL 87* 92*   CO2 26 29   BUN 40* 19   CREATININE 8.7* 5.7*   CALCIUM 9.2 8.7     Recent Labs     04/06/21  1130   AST 21   ALT 9*   BILITOT 0.6   ALKPHOS 157*     No results for input(s): INR in the last 72 hours.   Recent Labs     04/06/21  1130   TROPONINI 0.10*       Urinalysis:    No results found for: Jaimee Mishra, [address], BACTERIA, RBCUA, BLOODU, [address], GLUCOSEU    Radiology:  XR CHEST PORTABLE   Final Result   Stable cardiomegaly. Bibasilar opacities in this patient with low lung   volumes which may be related atelectasis or bibasilar airspace disease.              Assessment/Plan:    Active Hospital Problems    Diagnosis    Acute on chronic respiratory failure with hypoxia and hypercapnia (HCC) [J96.21, J96.22]   acute exacerbation COPD  ESRD  Fluid overload   Dyspnea, ROUSE  --Improved from renal standpoint now that dialyze  --O2 weaned back to 3 L NC as at home but still c/o feeling congested and having yellow sputum  --start azithromycin po course  --start duonebs QID  --no wheezing so will hold off on steroids for now  --Rx a  home nebulizer on dc    Anemia, acute on chronic from chronic dz/ESRD  --Hgb dropped from 10.8 to 8.7. no obvious blood loss will follow     Thrombocytopenia  -plt 78->73   Not on heparin for DVT prophylaxis     History CVA  history CHF  But no echo on computer   Legs not swollen, BNP>70,000 but can be elevated in renal failure  , order baseline echo     Will reassess if dyspnea improved/stable tomorrow for possible dc home       DVT Prophylaxis: scd  Diet: DIET RENAL; Daily Fluid Restriction: 1500 ml  Code Status: Full Code    PT/OT Eval Status: Marylou Tyler - home     Miguelina Pace MD

## 2021-04-07 NOTE — PROGRESS NOTES
Pt arrived to floor via stretcher from ED and ambulated to bed. Telemetry activated and confirmed with [facility]. Patient oriented to room and use of call light. Fall assessment completed and fall contract signed. Call light and personal items within reach. Admission and assessment initiated. POC and education initiated and reviewed with patient. Denied further needs or questions at this time. Will continue to monitor.

## 2021-04-07 NOTE — PLAN OF CARE
Problem: Infection:  Goal: Will remain free from infection  Description: Will remain free from infection  4/7/2021 1037 by Franci Holder RN  Outcome: Ongoing     Problem: Safety:  Goal: Free from accidental physical injury  Description: Free from accidental physical injury  4/7/2021 1037 by Franci Holder RN  Outcome: Ongoing     Problem: Safety:  Goal: Free from intentional harm  Description: Free from intentional harm  4/7/2021 1037 by Franci Holder RN  Outcome: Ongoing     Problem: Daily Care:  Goal: Daily care needs are met  Description: Daily care needs are met  4/7/2021 1037 by Franci Holder RN  Outcome: Ongoing     Problem: Pain:  Goal: Patient's pain/discomfort is manageable  Description: Patient's pain/discomfort is manageable  4/7/2021 1037 by Franci Holder RN  Outcome: Ongoing     Problem: Skin Integrity:  Goal: Skin integrity will stabilize  Description: Skin integrity will stabilize  4/7/2021 1037 by Franci Holder RN  Outcome: Ongoing     Problem: Discharge Planning:  Goal: Patients continuum of care needs are met  Description: Patients continuum of care needs are met  4/7/2021 1037 by Franci Holder RN  Outcome: Ongoing     Problem: Falls - Risk of:  Goal: Will remain free from falls  Description: Will remain free from falls  Outcome: Ongoing     Problem: Falls - Risk of:  Goal: Absence of physical injury  Description: Absence of physical injury  Outcome: Ongoing   Electronically signed by Virgen Gilbert RN on 4/7/2021 at 10:37 AM

## 2021-04-07 NOTE — PLAN OF CARE
Problem: Infection:  Goal: Will remain free from infection  Description: Will remain free from infection  Outcome: Ongoing     Problem: Safety:  Goal: Free from accidental physical injury  Description: Free from accidental physical injury  Outcome: Ongoing     Problem: Safety:  Goal: Free from intentional harm  Description: Free from intentional harm  Outcome: Ongoing     Problem: Daily Care:  Goal: Daily care needs are met  Description: Daily care needs are met  Outcome: Ongoing     Problem: Pain:  Goal: Patient's pain/discomfort is manageable  Description: Patient's pain/discomfort is manageable  Outcome: Ongoing     Problem: Skin Integrity:  Goal: Skin integrity will stabilize  Description: Skin integrity will stabilize  Outcome: Ongoing     Problem: Discharge Planning:  Goal: Patients continuum of care needs are met  Description: Patients continuum of care needs are met  Outcome: Ongoing

## 2021-04-07 NOTE — PLAN OF CARE
Problem: Infection:  Goal: Will remain free from infection  Description: Will remain free from infection  4/7/2021 0121 by Juan F Hernandes RN  Outcome: Ongoing     Problem: Safety:  Goal: Free from accidental physical injury  Description: Free from accidental physical injury  4/7/2021 0121 by Juan F Hernandes RN  Outcome: Ongoing     Problem: Safety:  Goal: Free from intentional harm  Description: Free from intentional harm  4/7/2021 0121 by Juan F Hernandes RN  Outcome: Ongoing     Problem: Daily Care:  Goal: Daily care needs are met  Description: Daily care needs are met  4/7/2021 0121 by Juan F Hernandes RN  Outcome: Ongoing     Problem: Pain:  Goal: Patient's pain/discomfort is manageable  Description: Patient's pain/discomfort is manageable  4/7/2021 0121 by Juan F Hernandes RN  Outcome: Ongoing     Problem: Skin Integrity:  Goal: Skin integrity will stabilize  Description: Skin integrity will stabilize  4/7/2021 0121 by Juan F Hernandes RN  Outcome: Ongoing     Problem: Discharge Planning:  Goal: Patients continuum of care needs are met  Description: Patients continuum of care needs are met  4/7/2021 0121 by Juan F Hernandes RN  Outcome: Ongoing

## 2021-04-07 NOTE — PROGRESS NOTES
4 Eyes Skin Assessment     NAME:  Jamshid Perkins  YOB: 1955  MEDICAL RECORD NUMBER:  [id number]    The patient is being assess for  Admission    I agree that 2 RN's have performed a thorough Head to Toe Skin Assessment on the patient. ALL assessment sites listed below have been assessed. Areas assessed by both nurses:    Head, Face, Ears, Shoulders, Back, Chest, Arms, Elbows, Hands, Sacrum. Buttock, Coccyx, Ischium and Legs. Feet and Heels        Does the Patient have a Wound?  No noted wound(s)       Mauricio Prevention initiated:  NA   Wound Care Orders initiated:  NA    Pressure Injury (Stage 3,4, Unstageable, DTI, NWPT, and Complex wounds) if present place consult order under [de-identified] No    New and Established Ostomies if present place consult order under : No      Nurse 1 eSignature: Electronically signed by Benedicto Tidwell RN on 4/7/21 at 1:19 AM EDT    **SHARE this note so that the co-signing nurse is able to place an eSignature**    Nurse 2 eSignature: Electronically signed by Sita Jalloh RN on 4/7/21 at 1:19 AM EDT

## 2021-04-08 LAB
ANION GAP SERPL CALCULATED.3IONS-SCNC: 12 MMOL/L (ref 3–16)
BUN BLDV-MCNC: 32 MG/DL (ref 7–20)
CALCIUM SERPL-MCNC: 8.7 MG/DL (ref 8.3–10.6)
CHLORIDE BLD-SCNC: 92 MMOL/L (ref 99–110)
CO2: 28 MMOL/L (ref 21–32)
CREAT SERPL-MCNC: 6.9 MG/DL (ref 0.8–1.3)
GFR AFRICAN AMERICAN: 10
GFR NON-AFRICAN AMERICAN: 8
GLUCOSE BLD-MCNC: 119 MG/DL (ref 70–99)
HCT VFR BLD CALC: 28.7 % (ref 40.5–52.5)
HEMOGLOBIN: 9.4 G/DL (ref 13.5–17.5)
LV EF: 48 %
LVEF MODALITY: NORMAL
MCH RBC QN AUTO: 30.8 PG (ref 26–34)
MCHC RBC AUTO-ENTMCNC: 32.7 G/DL (ref 31–36)
MCV RBC AUTO: 94 FL (ref 80–100)
PDW BLD-RTO: 15.1 % (ref 12.4–15.4)
PLATELET # BLD: 70 K/UL (ref 135–450)
PMV BLD AUTO: 8.8 FL (ref 5–10.5)
POTASSIUM SERPL-SCNC: 4.3 MMOL/L (ref 3.5–5.1)
RBC # BLD: 3.06 M/UL (ref 4.2–5.9)
SODIUM BLD-SCNC: 132 MMOL/L (ref 136–145)
WBC # BLD: 2.1 K/UL (ref 4–11)

## 2021-04-08 PROCEDURE — 6370000000 HC RX 637 (ALT 250 FOR IP): Performed by: HOSPITALIST

## 2021-04-08 PROCEDURE — 94640 AIRWAY INHALATION TREATMENT: CPT

## 2021-04-08 PROCEDURE — 93306 TTE W/DOPPLER COMPLETE: CPT

## 2021-04-08 PROCEDURE — 94761 N-INVAS EAR/PLS OXIMETRY MLT: CPT

## 2021-04-08 PROCEDURE — 2700000000 HC OXYGEN THERAPY PER DAY

## 2021-04-08 PROCEDURE — 36415 COLL VENOUS BLD VENIPUNCTURE: CPT

## 2021-04-08 PROCEDURE — 80048 BASIC METABOLIC PNL TOTAL CA: CPT

## 2021-04-08 PROCEDURE — 90935 HEMODIALYSIS ONE EVALUATION: CPT

## 2021-04-08 PROCEDURE — 2500000003 HC RX 250 WO HCPCS: Performed by: HOSPITALIST

## 2021-04-08 PROCEDURE — 2580000003 HC RX 258: Performed by: HOSPITALIST

## 2021-04-08 PROCEDURE — 85027 COMPLETE CBC AUTOMATED: CPT

## 2021-04-08 PROCEDURE — 2060000000 HC ICU INTERMEDIATE R&B

## 2021-04-08 RX ADMIN — ATORVASTATIN CALCIUM 80 MG: 80 TABLET, FILM COATED ORAL at 12:56

## 2021-04-08 RX ADMIN — CARVEDILOL 25 MG: 25 TABLET, FILM COATED ORAL at 12:55

## 2021-04-08 RX ADMIN — LOSARTAN POTASSIUM 25 MG: 25 TABLET, FILM COATED ORAL at 12:55

## 2021-04-08 RX ADMIN — TIOTROPIUM BROMIDE INHALATION SPRAY 2 PUFF: 3.12 SPRAY, METERED RESPIRATORY (INHALATION) at 08:02

## 2021-04-08 RX ADMIN — TORSEMIDE 40 MG: 20 TABLET ORAL at 12:55

## 2021-04-08 RX ADMIN — BUDESONIDE AND FORMOTEROL FUMARATE DIHYDRATE 2 PUFF: 160; 4.5 AEROSOL RESPIRATORY (INHALATION) at 08:02

## 2021-04-08 RX ADMIN — ALLOPURINOL 100 MG: 100 TABLET ORAL at 12:55

## 2021-04-08 RX ADMIN — IPRATROPIUM BROMIDE AND ALBUTEROL SULFATE 1 AMPULE: .5; 3 SOLUTION RESPIRATORY (INHALATION) at 08:02

## 2021-04-08 RX ADMIN — BUDESONIDE AND FORMOTEROL FUMARATE DIHYDRATE 2 PUFF: 160; 4.5 AEROSOL RESPIRATORY (INHALATION) at 21:19

## 2021-04-08 RX ADMIN — GABAPENTIN 300 MG: 300 CAPSULE ORAL at 20:18

## 2021-04-08 RX ADMIN — AZITHROMYCIN MONOHYDRATE 250 MG: 250 TABLET ORAL at 12:55

## 2021-04-08 RX ADMIN — IPRATROPIUM BROMIDE AND ALBUTEROL SULFATE 1 AMPULE: .5; 3 SOLUTION RESPIRATORY (INHALATION) at 21:19

## 2021-04-08 RX ADMIN — IPRATROPIUM BROMIDE AND ALBUTEROL SULFATE 1 AMPULE: .5; 3 SOLUTION RESPIRATORY (INHALATION) at 16:04

## 2021-04-08 RX ADMIN — PANTOPRAZOLE SODIUM 40 MG: 40 TABLET, DELAYED RELEASE ORAL at 06:47

## 2021-04-08 RX ADMIN — ASPIRIN 81 MG: 81 TABLET, CHEWABLE ORAL at 12:56

## 2021-04-08 RX ADMIN — Medication 10 ML: at 20:18

## 2021-04-08 RX ADMIN — CALCIUM ACETATE 667 MG: 667 CAPSULE ORAL at 16:41

## 2021-04-08 RX ADMIN — CARVEDILOL 25 MG: 25 TABLET, FILM COATED ORAL at 20:18

## 2021-04-08 RX ADMIN — CALCIUM ACETATE 667 MG: 667 CAPSULE ORAL at 12:55

## 2021-04-08 NOTE — PROGRESS NOTES
Hospitalist Progress Note      PCP: Lorie Alatorre MD    Date of Admission: 4/6/2021    Chief Complaint: Shortness of breath missed HD today     Hospital Course: 63 y.o. male who presented to [facility]  ED via EMS c/o shortness of breath. Pt missed dialysis today. Normal schedule is Tues,Thurs, Sat. PMH CVA, congestive heart failure end-stage renal disease  hypertension, hyperlipidemia COPD on 2 L of oxygen at home presenting this morning brought by EMS complaining of shortness of breath  and  unable to ambulate from his table to his bathroom without being short of breath.  Patient supposed to have dialysis today but did not get to dialysis because of his symptoms.  Denies any chest pain, fevers chills nausea vomiting.  No leg swelling.     /102, Na 130, BUN 40 cr 8.7, BNP >70,000, trop 0.10, WBC 2.4 Hgb 10.8, plt 78, pH 7.25 PcO2 70, cxr stable cardiomegaly with bibasilar opacities. Requiring bipap from ER  Admit inpatient to SDU given pH and Co2  level  With nephrology consult .  Underwent HD and weaned back to 3L O2 as at home         Subjective: sleeping awoke easily will Rx neb machine on dc. resp easy and even much improved feels better with the abx plan dc tomorrow tired today after HD today   All other systems neg       Medications:  Reviewed    Infusion Medications    sodium chloride       Scheduled Medications    azithromycin  250 mg Oral Daily    ipratropium-albuterol  1 ampule Inhalation 4x daily    allopurinol  100 mg Oral Daily    aspirin  81 mg Oral Daily    atorvastatin  80 mg Oral Daily    budesonide-formoterol  2 puff Inhalation BID    Calcium Acetate (Phos Binder)  667 mg Oral TID WC    carvedilol  25 mg Oral BID    gabapentin  300 mg Oral Nightly    losartan  25 mg Oral Daily    pantoprazole  40 mg Oral QAM AC    tiotropium  2 puff Inhalation Daily    torsemide  40 mg Oral Daily    sodium chloride flush  10 mL Intravenous 2 times per day     PRN Meds: perflutren lipid microspheres, albuterol, traZODone, sodium chloride flush, sodium chloride, promethazine **OR** ondansetron, polyethylene glycol, acetaminophen **OR** acetaminophen      Intake/Output Summary (Last 24 hours) at 4/8/2021 1820  Last data filed at 4/8/2021 1344  Gross per 24 hour   Intake 600 ml   Output --   Net 600 ml       Physical Exam Performed:    /79   Pulse 82   Temp 97.7 °F (36.5 °C) (Oral)   Resp 18   Ht 5' 8\" (1.727 m)   Wt 136 lb 14.5 oz (62.1 kg)   SpO2 100%   BMI 20.82 kg/m²     General appearance: No  distress, HOB elevated bald sleeping awoke easily   HEENT: Pupils equal, round, and reactive to light. Conjunctivae/corneas clear. glasses   Neck: Supple,  full range of motion. jugular venous distention. Trachea midline. Respiratory:  Normal effort at rest . Clear to auscultation,  O2 3L  Cardiovascular: Regular rate and rhythm with normal S1/S2 without murmurs, rubs or gallops. Abdomen: Soft, non-tender, non-distended with normal bowel sounds. Musculoskeletal: No clubbing, cyanosis or edema bilaterally. Full range of motion without deformity. Skin: Skin color, texture, turgor normal.  No rashes or lesions. Neurologic:  . Cranial nerves: II-XII intact, grossly non-focal.  Psychiatric: Alert and oriented, thought content appropriate, normal insight  Peripheral Pulses: +2 palpable, equal bilaterally       Labs:   Recent Labs     04/06/21  1130 04/07/21  0548 04/08/21  0411   WBC 2.4* 2.6* 2.1*   HGB 10.8* 8.7* 9.4*   HCT 33.5* 26.3* 28.7*   PLT 78* 73* 70*     Recent Labs     04/06/21  1130 04/07/21  0548 04/08/21  0411   * 134* 132*   K 4.4 3.8 4.3   CL 87* 92* 92*   CO2 26 29 28   BUN 40* 19 32*   CREATININE 8.7* 5.7* 6.9*   CALCIUM 9.2 8.7 8.7     Recent Labs     04/06/21  1130   AST 21   ALT 9*   BILITOT 0.6   ALKPHOS 157*     No results for input(s): INR in the last 72 hours.   Recent Labs     04/06/21  1130   TROPONINI 0.10*       Urinalysis:    No results found for: NITRU, swollen, BNP>70,000 but can be elevated in renal failure  , order baseline echo     Chronic diastolic CHF  Per echo does have grade II diastolic dysfx    DVT Prophylaxis: scd  Diet: DIET RENAL; Daily Fluid Restriction: 1500 ml  Code Status: Full Code    PT/OT Eval Status: indpendent     Dispo - home in am    Ron Salazar MD

## 2021-04-08 NOTE — PROGRESS NOTES
Nephrology ([facility]) Progress Note    CC: ESRD management    Subjective:    HPI:  Breathing better. No CP. Dialysis earlier today. ROS:  In bed. No fever. [address]:  medications reviewed. Objective:  Blood pressure 129/79, pulse 82, temperature 97.7 °F (36.5 °C), temperature source Oral, resp. rate 18, height 5' 8\" (1.727 m), weight 136 lb 14.5 oz (62.1 kg), SpO2 100 %. Intake/Output Summary (Last 24 hours) at 4/8/2021 1637  Last data filed at 4/8/2021 1344  Gross per 24 hour   Intake 600 ml   Output --   Net 600 ml     General:  NAD, A+Ox3  Chest:  CTAB  CVS:  RRR  Abdominal:  NTND, soft, +BS  Extremities:  no edema  Skin:  no rash    Labs:  Renal panel:  Lab Results   Component Value Date/Time     (L) 04/08/2021 04:11 AM    K 4.3 04/08/2021 04:11 AM    K 3.8 04/07/2021 05:48 AM    CO2 28 04/08/2021 04:11 AM    BUN 32 (H) 04/08/2021 04:11 AM    CREATININE 6.9 (HH) 04/08/2021 04:11 AM    CALCIUM 8.7 04/08/2021 04:11 AM    PHOS 3.6 04/08/2020 04:32 AM    MG 2.00 04/08/2020 04:32 AM     CBC:  Lab Results   Component Value Date/Time    WBC 2.1 (L) 04/08/2021 04:11 AM    HGB 9.4 (L) 04/08/2021 04:11 AM    HCT 28.7 (L) 04/08/2021 04:11 AM    PLT 70 (L) 04/08/2021 04:11 AM       Assessment/Plan:  Reviewed old records and labs. 1) ESRD  - HD TTS     2) dyspnea  - suspect. COPD exacerbation              - doubt pneumonia as no leukocytosis  - he feels better     3) pancytopenia  - unclear if this has ever been worked up  - it could be a contributory factor to his dyspnea    Will defer issue of discharge to Dr. Cheo Cadet.

## 2021-04-08 NOTE — FLOWSHEET NOTE
3. 5 HOURS   2600 MLS REMOVED    PRE WT 64.6 STANDING  POST WT 62.1  DW 62    JAIME AVG  GOOD FUNCTION 350 BFR    NO MEDICATION GIVEN   TTS SCHEDULE    TOLERATED TX WELL      04/08/21 4133 04/08/21 1203   Treatment   Time On 0830  --    Time Off  --  1201   Observations & Evaluations   Level of Consciousness  --  Alert (0)   Vital Signs   /73 (!) 143/84   Temp 97.4 °F (36.3 °C) 97.2 °F (36.2 °C)   Pulse 76 74   Resp 18 20   Weight 142 lb 6.7 oz (64.6 kg) 136 lb 14.5 oz (62.1 kg)   Weight Method Standing scale Standing scale   Percent Weight Change 2.22 -3.87   Pain Assessment   Pain Assessment  --  0-10   Pain Level  --  0   Treatment Initiation   Dialyze Hours  --  3.5   Hemodialysis Fistula/Graft Arteriovenous vein graft Left Arm   No Placement Date or Time found. Pre-existing: Yes  Access Type: Arteriovenous vein graft  Orientation: Left  Access Location: Arm   Site Assessment  --  Clean;Dry; Intact   Thrill  --  Present   Bruit  --  Present   Dressing Intervention  --  New   Dressing Status  --  Clean;Dry; Intact   Post-Hemodialysis Assessment   Post-Treatment Procedures  --  Blood returned; Access bleeding time < 10 minutes   Machine Disinfection Process  --  Acid/Vinegar Clean;Heat Disinfect; Exterior Machine Disinfection   Rinseback Volume (ml)  --  400 ml   Total Liters Processed (l/min)  --  70.1 l/min   Dialyzer Clearance  --  Lightly streaked   Duration of Treatment (minutes)  --  210 minutes   NET Removed (ml)  --  2600 ml   Tolerated Treatment  --  Good   Physician Notified?  --  Yes

## 2021-04-09 VITALS
DIASTOLIC BLOOD PRESSURE: 74 MMHG | HEIGHT: 68 IN | SYSTOLIC BLOOD PRESSURE: 132 MMHG | BODY MASS INDEX: 21.05 KG/M2 | RESPIRATION RATE: 16 BRPM | OXYGEN SATURATION: 99 % | WEIGHT: 138.89 LBS | TEMPERATURE: 97.7 F | HEART RATE: 72 BPM

## 2021-04-09 LAB
ANION GAP SERPL CALCULATED.3IONS-SCNC: 12 MMOL/L (ref 3–16)
BUN BLDV-MCNC: 20 MG/DL (ref 7–20)
CALCIUM SERPL-MCNC: 9 MG/DL (ref 8.3–10.6)
CHLORIDE BLD-SCNC: 92 MMOL/L (ref 99–110)
CO2: 27 MMOL/L (ref 21–32)
CREAT SERPL-MCNC: 4.9 MG/DL (ref 0.8–1.3)
GFR AFRICAN AMERICAN: 15
GFR NON-AFRICAN AMERICAN: 12
GLUCOSE BLD-MCNC: 107 MG/DL (ref 70–99)
HCT VFR BLD CALC: 27.7 % (ref 40.5–52.5)
HEMOGLOBIN: 9.3 G/DL (ref 13.5–17.5)
MCH RBC QN AUTO: 31.3 PG (ref 26–34)
MCHC RBC AUTO-ENTMCNC: 33.6 G/DL (ref 31–36)
MCV RBC AUTO: 93.1 FL (ref 80–100)
PDW BLD-RTO: 15.5 % (ref 12.4–15.4)
PLATELET # BLD: 79 K/UL (ref 135–450)
PMV BLD AUTO: 8.8 FL (ref 5–10.5)
POTASSIUM SERPL-SCNC: 3.8 MMOL/L (ref 3.5–5.1)
RBC # BLD: 2.97 M/UL (ref 4.2–5.9)
SODIUM BLD-SCNC: 131 MMOL/L (ref 136–145)
WBC # BLD: 2.2 K/UL (ref 4–11)

## 2021-04-09 PROCEDURE — 2700000000 HC OXYGEN THERAPY PER DAY

## 2021-04-09 PROCEDURE — 85027 COMPLETE CBC AUTOMATED: CPT

## 2021-04-09 PROCEDURE — 94761 N-INVAS EAR/PLS OXIMETRY MLT: CPT

## 2021-04-09 PROCEDURE — 2500000003 HC RX 250 WO HCPCS: Performed by: HOSPITALIST

## 2021-04-09 PROCEDURE — 6370000000 HC RX 637 (ALT 250 FOR IP): Performed by: HOSPITALIST

## 2021-04-09 PROCEDURE — 80048 BASIC METABOLIC PNL TOTAL CA: CPT

## 2021-04-09 PROCEDURE — 94640 AIRWAY INHALATION TREATMENT: CPT

## 2021-04-09 PROCEDURE — 36415 COLL VENOUS BLD VENIPUNCTURE: CPT

## 2021-04-09 RX ORDER — IPRATROPIUM BROMIDE AND ALBUTEROL SULFATE 2.5; .5 MG/3ML; MG/3ML
3 SOLUTION RESPIRATORY (INHALATION) 4 TIMES DAILY
Qty: 360 ML | Refills: 0 | Status: SHIPPED | OUTPATIENT
Start: 2021-04-09 | End: 2022-03-01

## 2021-04-09 RX ORDER — AZITHROMYCIN 250 MG/1
250 TABLET, FILM COATED ORAL DAILY
Qty: 3 TABLET | Refills: 0 | Status: SHIPPED | OUTPATIENT
Start: 2021-04-10 | End: 2021-04-13

## 2021-04-09 RX ADMIN — TORSEMIDE 40 MG: 20 TABLET ORAL at 08:23

## 2021-04-09 RX ADMIN — ASPIRIN 81 MG: 81 TABLET, CHEWABLE ORAL at 08:23

## 2021-04-09 RX ADMIN — ATORVASTATIN CALCIUM 80 MG: 80 TABLET, FILM COATED ORAL at 08:23

## 2021-04-09 RX ADMIN — TIOTROPIUM BROMIDE INHALATION SPRAY 2 PUFF: 3.12 SPRAY, METERED RESPIRATORY (INHALATION) at 08:41

## 2021-04-09 RX ADMIN — IPRATROPIUM BROMIDE AND ALBUTEROL SULFATE 1 AMPULE: .5; 3 SOLUTION RESPIRATORY (INHALATION) at 08:41

## 2021-04-09 RX ADMIN — PANTOPRAZOLE SODIUM 40 MG: 40 TABLET, DELAYED RELEASE ORAL at 06:35

## 2021-04-09 RX ADMIN — LOSARTAN POTASSIUM 25 MG: 25 TABLET, FILM COATED ORAL at 08:23

## 2021-04-09 RX ADMIN — BUDESONIDE AND FORMOTEROL FUMARATE DIHYDRATE 2 PUFF: 160; 4.5 AEROSOL RESPIRATORY (INHALATION) at 08:41

## 2021-04-09 RX ADMIN — CARVEDILOL 25 MG: 25 TABLET, FILM COATED ORAL at 08:23

## 2021-04-09 RX ADMIN — AZITHROMYCIN MONOHYDRATE 250 MG: 250 TABLET ORAL at 08:23

## 2021-04-09 RX ADMIN — ALLOPURINOL 100 MG: 100 TABLET ORAL at 08:23

## 2021-04-09 RX ADMIN — CALCIUM ACETATE 667 MG: 667 CAPSULE ORAL at 08:23

## 2021-04-09 RX ADMIN — IPRATROPIUM BROMIDE AND ALBUTEROL SULFATE 1 AMPULE: .5; 3 SOLUTION RESPIRATORY (INHALATION) at 12:27

## 2021-04-09 NOTE — PLAN OF CARE
Problem: Infection:  Goal: Will remain free from infection  Description: Will remain free from infection  Outcome: Ongoing     Problem: Safety:  Goal: Free from accidental physical injury  Description: Free from accidental physical injury  Outcome: Ongoing  Goal: Free from intentional harm  Description: Free from intentional harm  Outcome: Ongoing     Problem: Daily Care:  Goal: Daily care needs are met  Description: Daily care needs are met  Outcome: Ongoing     Problem: Pain:  Goal: Patient's pain/discomfort is manageable  Description: Patient's pain/discomfort is manageable  Outcome: Ongoing     Problem: Skin Integrity:  Goal: Skin integrity will stabilize  Description: Skin integrity will stabilize  Outcome: Ongoing     Problem: Discharge Planning:  Goal: Patients continuum of care needs are met  Description: Patients continuum of care needs are met  Outcome: Ongoing     Problem: Falls - Risk of:  Goal: Will remain free from falls  Description: Will remain free from falls  Outcome: Ongoing  Goal: Absence of physical injury  Description: Absence of physical injury  Outcome: Ongoing     Problem: OXYGENATION/RESPIRATORY FUNCTION  Goal: Patient will maintain patent airway  Outcome: Ongoing  Goal: Patient will achieve/maintain normal respiratory rate/effort  Description: Respiratory rate and effort will be within normal limits for the patient  Outcome: Ongoing     Problem: HEMODYNAMIC STATUS  Goal: Patient has stable vital signs and fluid balance  Outcome: Ongoing     Problem: FLUID AND ELECTROLYTE IMBALANCE  Goal: Fluid and electrolyte balance are achieved/maintained  Outcome: Ongoing     Problem: ACTIVITY INTOLERANCE/IMPAIRED MOBILITY  Goal: Mobility/activity is maintained at optimum level for patient  Outcome: Ongoing

## 2021-04-09 NOTE — DISCHARGE INSTR - COC
Continuity of Care Form    Patient Name: Enedina Kelelr   :  1955  MRN:  [id number]    Admit date:  2021  Discharge date:  ***    Code Status Order: Full Code   Advance Directives:   Advance Care Flowsheet Documentation     Date/Time Healthcare Directive Type of Healthcare Directive Copy in [address] Agent's Name Healthcare Agent's Phone Number    [phone]  Yes, patient has an advance directive for healthcare treatment  Living will;Durable power of  for health care  No, copy requested from family  Healthcare power of   Alem Melgar  --          Admitting Physician:  Jatin Alicia MD  PCP: Jade Pichardo MD    Discharging Nurse: [facility] Unit/Room#: B9L-8660/8028-82  Discharging Unit Phone Number: ***    Emergency Contact:   Extended Emergency Contact Information  Primary Emergency Contact: [address] [facility] [place] [address] Phone: [phone]  Relation: Child   needed? No    Past Surgical History:  Past Surgical History:   Procedure Laterality Date    COLONOSCOPY      DIALYSIS FISTULA CREATION Left        Immunization History: There is no immunization history on file for this patient.     Active Problems:  Patient Active Problem List   Diagnosis Code    HCAP (healthcare-associated pneumonia) J18.9    COPD exacerbation ([facility] J44.1    ESRD on dialysis ([facility] N18.6, Z99.2    COPD (chronic obstructive pulmonary disease) ([facility] [facility] J44.9    Acute on chronic respiratory failure with hypoxia and hypercapnia (HCC) J96.21, J96.22       Isolation/Infection:   Isolation          No Isolation        Patient Infection Status     Infection Onset Added Last Indicated Last Indicated By Review Planned Expiration Resolved Resolved By    None active    Resolved    COVID-19 Rule Out  20 Arely Esqueda RN   20 Arely Esqueda RN    COVID-19 Rule Out 20 COVID-19 (Ordered)   20 Rule-Out Test Resulted          Nurse Assessment:  Last Vital Signs: /74   Pulse 72   Temp 97.7 °F (36.5 °C) (Oral)   Resp 16   Ht 5' 8\" (1.727 m)   Wt 138 lb 14.2 oz (63 kg)   SpO2 99%   BMI 21.12 kg/m²     Last documented pain score (0-10 scale): Pain Level: 0  Last Weight:   Wt Readings from Last 1 Encounters:   04/09/21 138 lb 14.2 oz (63 kg)     Mental Status:  {IP PT MENTAL STATUS:20030}    IV Access:  { ALO IV ACCESS:048384154}    Nursing Mobility/ADLs:  Walking   {CHP DME RPEP:251461257}  Transfer  {CHP DME ACRE:032529803}  Bathing  {CHP DME RUFK:217641496}  Dressing  {CHP DME IBDF:630668110}  Toileting  {CHP DME PQKW:064104172}  Feeding  {CHP DME RFZL:969282018}  Med Admin  {P DME JFER:697063202}  Med Delivery   { ALO MED Delivery:541915565}    Wound Care Documentation and Therapy:        Elimination:  Continence:   · Bowel: {YES / VC:37866}  · Bladder: {YES / CP:14337}  Urinary Catheter: {Urinary Catheter:300277615}   Colostomy/Ileostomy/Ileal Conduit: {YES / LK:93102}       Date of Last BM: ***    Intake/Output Summary (Last 24 hours) at 4/9/2021 1516  Last data filed at 4/9/2021 1127  Gross per 24 hour   Intake 1320 ml   Output --   Net 1320 ml     I/O last 3 completed shifts:   In: 36 [P.O.:1320]  Out: -     Safety Concerns:     [address] [employer] Safety Concerns:954156620}    Impairments/Disabilities:      [employer] Impairments/Disabilities:027048958}    Nutrition Therapy:  Current Nutrition Therapy:   [employer] Diet List:791478226}    Routes of Feeding: {CHP DME Other Feedings:161302710}  Liquids: {Slp liquid thickness:53850}  Daily Fluid Restriction: {CHP DME Yes amt example:257401611}  Last Modified Barium Swallow with Video (Video Swallowing Test): {Done Not Done KHTE:877234613}    Treatments at the Time of Hospital Discharge:   Respiratory Treatments: ***  Oxygen Therapy:  {Therapy; copd oxygen:24783}  Ventilator:    {MH CC Vent [facility]:436789172}    Rehab Therapies: {THERAPEUTIC INTERVENTION:4377269552}  Weight Bearing Status/Restrictions: [address] CC Weight Bearin}  Other Medical Equipment (for information only, NOT a DME order):  {EQUIPMENT:007810594}  Other Treatments: ***    Patient's personal belongings (please select all that are sent with patient):  {CHP DME Belongings:524012201}    RN SIGNATURE:  {Esignature:043340372}    CASE MANAGEMENT/SOCIAL WORK SECTION    Inpatient Status Date: 2021    Readmission Risk Assessment Score:  Readmission Risk              Risk of Unplanned Readmission:        15           Discharging to Facility/ Agency   · Name:   · Address:  · Phone:  · Fax:    Dialysis Facility (if applicable)   · Name:  [facility]  · Address:  · Dialysis Schedule:   · Phone: [phone]  · Fax: [address]    / signature: Electronically signed by DINESH Burch, [place] on 21 at 3:19 PM EDT    PHYSICIAN SECTION    Prognosis: {Prognosis:4491266811}    Condition at Discharge: [address] Patient Condition:286133016}    Rehab Potential (if transferring to Rehab): {Prognosis:6378198994}    Recommended Labs or Other Treatments After Discharge: ***    Physician Certification: I certify the above information and transfer of Joon Lugo  is necessary for the continuing treatment of the diagnosis listed and that he requires {Admit to Appropriate Level of Care:63411} for {GREATER/LESS:042304268} 30 days.      Update Admission H&P: {CHP DME Changes in GDGUO:975217493}    PHYSICIAN SIGNATURE:  {Esignature:777826643}

## 2021-04-09 NOTE — PROGRESS NOTES
NEURONTIN  Take 1 capsule by mouth nightly for 30 days. losartan 25 MG tablet  Commonly known as: COZAAR  Take 1 tablet by mouth daily Unsure of dose.      metaxalone 800 MG tablet  Commonly known as: Skelaxin  Take 1 tablet by mouth daily as needed for Pain     omeprazole 20 MG delayed release capsule  Commonly known as: PRILOSEC  Take 1 capsule by mouth daily     Symbicort 160-4.5 MCG/ACT [employer]ero  Generic drug: budesonide-formoterol     tiotropium 18 MCG inhalation capsule  Commonly known as: SPIRIVA  Inhale 1 capsule into the lungs daily     torsemide 20 MG tablet  Commonly known as: DEMADEX  Take 2 tablets by mouth daily     Ventolin  (90 Base) MCG/ACT inhaler  Generic drug: albuterol sulfate HFA           Where to Get Your Medications      These medications were sent to [address] [place] [address] Rd, [address] [facility] [phone]  [address] [place] [address], [address] [facility]    Phone: [phone]   · azithromycin 250 MG tablet  · ipratropium-albuterol 0.5-2.5 (3) MG/3ML Soln nebulizer solution         Carl Marvin, [facility]  Heart Failure Discharge Medication Reconciliation Program  [phone]

## 2021-04-09 NOTE — PROGRESS NOTES
Nephrology ([facility]) Progress Note    CC: ESRD management    Subjective:    HPI:  Breathing better. No CP.   ROS:  In bed. No fever. [address] [place] [place]:  medications reviewed. Objective:  Blood pressure 132/74, pulse 72, temperature 97.7 °F (36.5 °C), temperature source Oral, resp. rate 16, height 5' 8\" (1.727 m), weight 138 lb 14.2 oz (63 kg), SpO2 99 %. Intake/Output Summary (Last 24 hours) at 4/9/2021 1544  Last data filed at 4/9/2021 1127  Gross per 24 hour   Intake 1320 ml   Output 0 ml   Net 1320 ml     General:  NAD, A+Ox3  Chest:  CTAB  CVS:  RRR  Abdominal:  NTND, soft, +BS  Extremities:  no edema  Skin:  no rash    Labs:  Renal panel:  Lab Results   Component Value Date/Time     (L) 04/09/2021 04:28 AM    K 3.8 04/09/2021 04:28 AM    K 3.8 04/07/2021 05:48 AM    CO2 27 04/09/2021 04:28 AM    BUN 20 04/09/2021 04:28 AM    CREATININE 4.9 (H) 04/09/2021 04:28 AM    CALCIUM 9.0 04/09/2021 04:28 AM    PHOS 3.6 04/08/2020 04:32 AM    MG 2.00 04/08/2020 04:32 AM     CBC:  Lab Results   Component Value Date/Time    WBC 2.2 (L) 04/09/2021 04:28 AM    HGB 9.3 (L) 04/09/2021 04:28 AM    HCT 27.7 (L) 04/09/2021 04:28 AM    PLT 79 (L) 04/09/2021 04:28 AM       Assessment/Plan:  Reviewed old records and labs. 1) ESRD  - HD TTS     2) dyspnea  - suspect. COPD exacerbation              - doubt pneumonia as no leukocytosis  - he feels better     3) pancytopenia  - unclear if this has ever been worked up  - it could be a contributory factor to his dyspnea    Will defer issue of discharge to Dr. Saad Pagan.

## 2021-04-09 NOTE — CARE COORDINATION
[employer] rep delivered the ordered Nebulizer to the patient and reviewed insurance coverage, equipment setup, care and maintenance, and supply reorder process. Notified RN.     Thank you for the referral.  Electronically signed by Elena Lin on 4/9/2021 at 1:13 PM Cell ph# [phone]
Voicemail received from Elli Small ([phone]) with [facility]. Patient provided verbal consent for SW to speak with Elli Small and confirmed he was active with [facility] in the past.   Elli Small requested to speak with patient regarding level of care. Patient spoke with Elli Small via telephone. SW discussed discharge planning with patient. Patient confirmed his home address with his nephew. \[facility] [place] [facility]\"" T,TH,S - transportation services. Patient reported he only while he is being active (cleaning, or riding the stationary bike). [facility]. Baseline of 1 liter per [employer]. Would need: Generic prescriptions with updated liter flow and physician notes if increase in O2 demands.      [employer]  P: [phone]  F:  [address], MSW, [place], Social Work/Case Management   [phone]  Electronically signed by DINESH Verdugo, MARICRUZ on 4/7/2021 at 2:42 PM
 Maribeth received referral from  for NEBULIZER for home. Orders received.      Will follow up with patient to deliver the Ordered NEBULIZER to patient's room prior to discharge.     Thank you for the referral.  Electronically signed by Serina Puckett on 4/9/2021 at 12:16 PM  Cell ph# [phone]
this pt is being admitted to the hospital.     [address] Not ordered    Discharge planning team will remain available for needs. Please consult for any specifics not addressed in this note.     Eileen Jansen  [phone]  Electronically signed by Claudia Don on 4/6/2021 at 1:15 PM

## 2021-04-09 NOTE — DISCHARGE SUMMARY
Hospital Medicine Discharge Summary    Patient ID: Lexx Dwyer      Patient's PCP: Tamanna Felder MD    Admit Date: 4/6/2021     Discharge Date:   4/9/21    Admitting Physician: Ismael Rendon MD     Discharge Physician: Ismael Rendon MD     Discharge Diagnoses: Active Hospital Problems    Diagnosis    Acute on chronic respiratory failure with hypoxia and hypercapnia (HCC) [J96.21, J96.22]    COPD exacerbation (HCC) [J44.1]    ESRD on dialysis ([address] [N18.6, Z99.2]   fluid overload due to noncompliance with dialysis   Dyspnea, dyspnea on exertion  Anemia acute on chronic from ESRD   Thrombocytopenia acute on chronic   Pancytopenia  History CVA  Chronic diastolic CHF  Essential hypertension      The patient was seen and examined on day of discharge and this discharge summary is in conjunction with any daily progress note from day of discharge. Hospital Course:   63 y. o. male who presented to [facility]  ED via EMS c/o shortness of breath. Pt missed dialysis today. Normal schedule is Tues,Thurs, Sat. PMH CVA, congestive heart failure end-stage renal disease  hypertension, hyperlipidemia COPD on 2 L of oxygen at home presenting this morning brought by EMS complaining of shortness of breath  and  unable to ambulate from his table to his bathroom without being short of breath.  Patient supposed to have dialysis today but did not get to dialysis because of his symptoms.  Denies any chest pain, fevers chills nausea vomiting.  No leg swelling.     /102, Na 130, BUN 40 cr 8.7, BNP >70,000, trop 0.10, WBC 2.4 Hgb 10.8, plt 78, pH 7.25 PcO2 70, cxr stable cardiomegaly with bibasilar opacities. Requiring bipap from ER  Admit inpatient to SDU given pH and [address] nephrology consult . Underwent HD and weaned back to 3L O2 as at home but complained of yellow sputum and still feeling dyspneic. asked for a home nebulizer machine feeling his inhaler isn't always enough.   Added scheduled duonebs QID and zithromax po course for COPD exacerbation with improvemnt in symptoms. Rx neb machine and solution for home use on dc            Physical Exam Performed:     /74   Pulse 72   Temp 97.7 °F (36.5 °C) (Oral)   Resp 16   Ht 5' 8\" (1.727 m)   Wt 138 lb 14.2 oz (63 kg)   SpO2 99%   BMI 21.12 kg/m²       General appearance:  No distress,seated side of bed   HEENT:  Normal cephalic, atraumaticPupils equal, round, and reactive to light. Extra ocular muscles intact. Conjunctivae/corneas clear. glasses  Neck: Supple,  full range of motion. No jugular venous distention. Trachea midline. Respiratory:  Normal  effort. Clear to auscultation,o2 3L . Cardiovascular:  Regular rate and rhythm with normal S1/S2 without murmurs, rubs or gallops. Abdomen: Soft, non-tender, non-distended with normal bowel sounds. Musculoskeletal:  No clubbing, cyanosis or edema bilaterally. Full range of motion without deformity. Skin: Skin color, texture, turgor normal.  No rashes or lesions. Neurologic:  Neurovascularly intact  Cranial nerves: II-XII intact, grossly non-focal.  Psychiatric:  Alert and oriented, thought content appropriate, normal insight  Peripheral Pulses: +2 palpable, equal bilaterally       Labs: For convenience and continuity at follow-up the following most recent labs are provided:      CBC:    Lab Results   Component Value Date    WBC 2.2 04/09/2021    HGB 9.3 04/09/2021    HCT 27.7 04/09/2021    PLT 79 04/09/2021       Renal:    Lab Results   Component Value Date     04/09/2021    K 3.8 04/09/2021    K 3.8 04/07/2021    CL 92 04/09/2021    CO2 27 04/09/2021    BUN 20 04/09/2021    CREATININE 4.9 04/09/2021    CALCIUM 9.0 04/09/2021    PHOS 3.6 04/08/2020         Significant Diagnostic Studies    Radiology:   XR CHEST PORTABLE   Final Result   Stable cardiomegaly. Bibasilar opacities in this patient with low lung   volumes which may be related atelectasis or bibasilar airspace disease. Echo  Summary   Left ventricular cavity size is normal. Borderline mild left ventricular   hypertrophy. Ejection fraction is visually estimated to be 45-50%. Mild   global dysfunction. Probably grade 2 diastolic dysfunction.   The left atrium is dilated.   The right atrium appears dilated.   The aortic root is mildly dilated at 3.73 cm in diameter.   Mild or mild-moderate mitral regurgitation.   Moderate tricuspid regurgitation.   Mild pulmonic regurgitation present.  IVC size is dilated (>2.1 cm) and collapses < 50% with respiration   consistent with elevated RA pressure (15 mmHg).    Estimated pulmonary artery systolic pressure is severely elevated at 65 mmHg   assuming a right atrial pressure of 15 mmHg.   A bubble study was performed and fails to show evidence of shunting    Consults:     IP CONSULT TO NEPHROLOGY  IP CONSULT TO HOSPITALIST  IP CONSULT TO CASE MANAGEMENT  IP CONSULT TO DIETITIAN    Disposition:  home    Condition at Discharge: Stable    Discharge Instructions/Follow-up:  Keep hemodialysis appointment   Use the nebulizer machine 4x/day initially until improved then can decrease frequency as needed may use prn If exacerbation      Code Status:  Full Code     Activity: activity as tolerated    Diet: renal dietfluid restrict to 1.5L/day       Discharge Medications:     Current Discharge Medication List           Details   ipratropium-albuterol (DUONEB) 0.5-2.5 (3) MG/3ML SOLN nebulizer solution Inhale 3 mLs into the lungs 4 times daily  Qty: 360 mL, Refills: 0      azithromycin (ZITHROMAX) 250 MG tablet Take 1 tablet by mouth daily for 3 days  Qty: 3 tablet, Refills: 0    Associated Diagnoses: COPD exacerbation ([facility] [address]              Details   metaxalone (SKELAXIN) 800 MG tablet Take 1 tablet by mouth daily as needed for Pain  Qty: 5 tablet, Refills: 0      calcium acetate 667 MG TABS Take 1 tablet by mouth 3 times daily (with meals)      tiotropium (SPIRIVA) 18 MCG inhalation capsule Inhale 1 capsule

## 2021-04-12 NOTE — PROGRESS NOTES
CLINICAL PHARMACY NOTE: MEDS TO [address] Select Patient?: No  Total # of Prescriptions Filled: 1   The following medications were delivered to the patient:  · Azithromycin 250mg  Total # of Interventions Completed: 0  Time Spent (min): 30    Additional Documentation:

## 2021-05-12 ENCOUNTER — HOSPITAL ENCOUNTER (EMERGENCY)
Age: 66
Discharge: HOME | End: 2021-05-12
Attending: EMERGENCY MEDICINE
Payer: MEDICARE

## 2021-05-12 VITALS
BODY MASS INDEX: 22.25 KG/M2 | TEMPERATURE: 97.9 F | HEIGHT: 68 IN | DIASTOLIC BLOOD PRESSURE: 87 MMHG | OXYGEN SATURATION: 97 % | HEART RATE: 70 BPM | WEIGHT: 146.83 LBS | SYSTOLIC BLOOD PRESSURE: 155 MMHG | RESPIRATION RATE: 16 BRPM

## 2021-05-12 DIAGNOSIS — N18.6 ESRD (END STAGE RENAL DISEASE) (HCC): Primary | ICD-10-CM

## 2021-05-12 DIAGNOSIS — T82.838A HEMORRHAGE OF ARTERIOVENOUS FISTULA, INITIAL ENCOUNTER (HCC): ICD-10-CM

## 2021-05-12 LAB
ANION GAP SERPL CALCULATED.3IONS-SCNC: 13 MMOL/L (ref 3–16)
BASOPHILS ABSOLUTE: 0 K/UL (ref 0–0.2)
BASOPHILS RELATIVE PERCENT: 0.7 %
BUN BLDV-MCNC: 50 MG/DL (ref 7–20)
CALCIUM SERPL-MCNC: 8.5 MG/DL (ref 8.3–10.6)
CHLORIDE BLD-SCNC: 86 MMOL/L (ref 99–110)
CO2: 27 MMOL/L (ref 21–32)
CREAT SERPL-MCNC: 10.5 MG/DL (ref 0.8–1.3)
EOSINOPHILS ABSOLUTE: 0.2 K/UL (ref 0–0.6)
EOSINOPHILS RELATIVE PERCENT: 6.9 %
GFR AFRICAN AMERICAN: 6
GFR NON-AFRICAN AMERICAN: 5
GLUCOSE BLD-MCNC: 101 MG/DL (ref 70–99)
HCT VFR BLD CALC: 26.5 % (ref 40.5–52.5)
HEMOGLOBIN: 8.8 G/DL (ref 13.5–17.5)
LYMPHOCYTES ABSOLUTE: 0.3 K/UL (ref 1–5.1)
LYMPHOCYTES RELATIVE PERCENT: 14.9 %
MCH RBC QN AUTO: 31.2 PG (ref 26–34)
MCHC RBC AUTO-ENTMCNC: 33.3 G/DL (ref 31–36)
MCV RBC AUTO: 93.8 FL (ref 80–100)
MONOCYTES ABSOLUTE: 0.2 K/UL (ref 0–1.3)
MONOCYTES RELATIVE PERCENT: 8.8 %
NEUTROPHILS ABSOLUTE: 1.6 K/UL (ref 1.7–7.7)
NEUTROPHILS RELATIVE PERCENT: 68.7 %
PDW BLD-RTO: 15.3 % (ref 12.4–15.4)
PLATELET # BLD: 99 K/UL (ref 135–450)
PMV BLD AUTO: 8.5 FL (ref 5–10.5)
POTASSIUM REFLEX MAGNESIUM: 4.6 MMOL/L (ref 3.5–5.1)
RBC # BLD: 2.82 M/UL (ref 4.2–5.9)
SODIUM BLD-SCNC: 126 MMOL/L (ref 136–145)
WBC # BLD: 2.3 K/UL (ref 4–11)

## 2021-05-12 PROCEDURE — 80048 BASIC METABOLIC PNL TOTAL CA: CPT

## 2021-05-12 PROCEDURE — 99285 EMERGENCY DEPT VISIT HI MDM: CPT

## 2021-05-12 PROCEDURE — 85025 COMPLETE CBC W/AUTO DIFF WBC: CPT

## 2021-05-12 ASSESSMENT — PAIN SCALES - GENERAL: PAINLEVEL_OUTOF10: 0

## 2021-05-12 NOTE — ED NOTES
Bed: E-40  Expected date: 5/12/21  Expected time: 2:24 PM  Means of arrival: [facility]  Comments:  55M fistula      Ty Bill RN  05/12/21 7437

## 2021-05-12 NOTE — ED NOTES
Pt presents to ED via EMS for bleeding from fistula. Pt arrived with tourniquet on via EMS. Tourniquet removed, blood remains oozing at this time. Pressure dressing applied to left arm. Radial pulse 3+ noted to left arm. Pt is alert and oriented x4. Pt does state that he feels lightheaded. Vital signs with defined limits. Pt on 3 L NC, pt reports that he wears 3 L NC at home. Safety precautions in place.       Hazel Barbosa RN  05/12/21 0234

## 2021-05-12 NOTE — ED PROVIDER NOTES
[address] [place] [address] EMERGENCY DEPARTMENT      CHIEF COMPLAINT  Bleeding/Bruising (bleeding from fistula )       HISTORY OF PRESENT ILLNESS  Estephanie Burgos is a 63 y.o. male  who presents to the ED for evaluation of bleeding from his fistula. Patient reports having a left AV fistula that is accessed for dialysis. He gets dialysis Tuesday Thursday and Saturdays. Reports he did miss his dialysis yesterday but has not missed any other dialysis. Reports he misses dialysis due to a doctor's appointment. Denies having any complaints. Reports having a scab on the facial and sometimes it bleeds after getting access. While he was washing the dishes today, he must have accidentally scraped the scab off because it started bleeding. Reports the bleeding stopped in route to the hospital.  Denies taking anticoagulants. Denies any recent illnesses. Denies having numbness or tingling sensation to his left upper extremity. No other complaints, modifying factors or associated symptoms. I have reviewed the following from the nursing documentation.     Past Medical History:   Diagnosis Date    [facility]     CHF (congestive heart failure) (HCC)     Chronic kidney disease     COPD (chronic obstructive pulmonary disease) ([address]     Dialysis patient [facility]     Gout     Hemodialysis patient ([facility]     Hx of blood clots     Hyperlipidemia     Hypertension     Renal failure      Past Surgical History:   Procedure Laterality Date    COLONOSCOPY      DIALYSIS FISTULA CREATION Left      Family History   Problem Relation Age of Onset    Cancer Mother     Cancer Father     Other Sister      Social History     Socioeconomic History    Marital status: Single     Spouse name: Not on file    Number of children: Not on file    Years of education: Not on file    Highest education level: Not on file   Occupational History    Not on file   Social Needs    Financial resource strain: Not on file    Food insecurity     Worry: Not on file     Inability: Not on file    Transportation needs     Medical: Not on file     Non-medical: Not on file   Tobacco Use    Smoking status: Former Smoker    Smokeless tobacco: Never Used   Substance and Sexual Activity    Alcohol use: Yes     Frequency: Never     Comment: occasional    Drug use: Never    Sexual activity: Not Currently   Lifestyle    Physical activity     Days per week: Not on file     Minutes per session: Not on file    Stress: Not on file   Relationships    Social connections     Talks on phone: Not on file     Gets together: Not on file     Attends Sabianist service: Not on file     Active member of club or organization: Not on file     Attends meetings of clubs or organizations: Not on file     Relationship status: Not on file    Intimate partner violence     Fear of current or ex partner: Not on file     Emotionally abused: Not on file     Physically abused: Not on file     Forced sexual activity: Not on file   Other Topics Concern    Not on file   Social History Narrative    Not on file     No current facility-administered medications for this encounter. Current Outpatient Medications   Medication Sig Dispense Refill    ipratropium-albuterol (DUONEB) 0.5-2.5 (3) MG/3ML SOLN nebulizer solution Inhale 3 mLs into the lungs 4 times daily 360 mL 0    metaxalone (SKELAXIN) 800 MG tablet Take 1 tablet by mouth daily as needed for Pain 5 tablet 0    calcium acetate 667 MG TABS Take 1 tablet by mouth 3 times daily (with meals)      tiotropium (SPIRIVA) 18 MCG inhalation capsule Inhale 1 capsule into the lungs daily 30 capsule 0    gabapentin (NEURONTIN) 300 MG capsule Take 1 capsule by mouth nightly for 30 days.  30 capsule 0    traZODone (DESYREL) 150 MG tablet Take 1 tablet by mouth nightly (Patient taking differently: Take 150 mg by mouth nightly as needed ) 30 tablet 0    atorvastatin (LIPITOR) 80 MG tablet Take 1 tablet by mouth daily 30 tablet 0    losartan (COZAAR) 25 MG tablet Take 1 tablet by mouth daily Unsure of dose. 30 tablet 0    torsemide (DEMADEX) 20 MG tablet Take 2 tablets by mouth daily 60 tablet 0    omeprazole (PRILOSEC) 20 MG delayed release capsule Take 1 capsule by mouth daily 30 capsule 0    carvedilol (COREG) 25 MG tablet Take 25 mg by mouth 2 times daily Unsure of dose       aspirin 81 MG tablet Take 81 mg by mouth daily      budesonide-formoterol (SYMBICORT) 160-4.5 MCG/ACT AERO Inhale 2 puffs into the lungs 2 times daily      albuterol sulfate HFA (VENTOLIN HFA) 108 (90 Base) MCG/ACT inhaler Inhale 2 puffs into the lungs every 6 hours as needed for Wheezing 90mcg/actuation      allopurinol (ZYLOPRIM) 100 MG tablet Take 100 mg by mouth daily       No Known Allergies    REVIEW OF SYSTEMS  10 systems reviewed, pertinent positives per HPI otherwise noted to be negative. PHYSICAL EXAM  BP (!) 155/87   Pulse 70   Temp 97.9 °F (36.6 °C) (Oral)   Resp 16   Ht 5' 8\" (1.727 m)   Wt 146 lb 13.2 oz (66.6 kg)   SpO2 97%   BMI 22.32 kg/m²    GENERAL APPEARANCE: Awake and alert. No acute distress. HENT: Normocephalic. Atraumatic. PERRL. EOMI. No facial droop. HEART/CHEST: RRR. LUNGS: Respirations unlabored. Speaking comfortably in full sentences. ABDOMEN: Soft, non-distended abdomen. Non tender to palpation. No guarding. No rebound. EXTREMITIES: AV fistula on the left arm. No active bleed. Sensation intact to radial/median/ulnar nerve distribution. 2+ radial pulses. Good cap refill. SKIN: Warm and dry. No acute rashes. NEUROLOGICAL: Alert and oriented. No gross facial drooping. Answering questions appropriately. Moving all extremities. PSYCHIATRIC: Pleasant. Normal mood and affect.     LABS  Results for orders placed or performed during the hospital encounter of 09/58/27   Basic Metabolic Panel w/ Reflex to MG   Result Value Ref Range    Sodium 126 (L) 136 - 145 mmol/L    Potassium reflex Magnesium 4.6 3.5 - 5.1 mmol/L    Chloride 86 (L) 99 - 110 mmol/L    CO2 27 21 - 32 mmol/L    Anion Gap 13 3 - 16    Glucose 101 (H) 70 - 99 mg/dL    BUN 50 (H) 7 - 20 mg/dL    CREATININE 10.5 (HH) 0.8 - 1.3 mg/dL    GFR Non- 5 (A) >60    GFR  6 (A) >60    Calcium 8.5 8.3 - 10.6 mg/dL   CBC Auto Differential   Result Value Ref Range    WBC 2.3 (L) 4.0 - 11.0 K/uL    RBC 2.82 (L) 4.20 - 5.90 M/uL    Hemoglobin 8.8 (L) 13.5 - 17.5 g/dL    Hematocrit 26.5 (L) 40.5 - 52.5 %    MCV 93.8 80.0 - 100.0 fL    MCH 31.2 26.0 - 34.0 pg    MCHC 33.3 31.0 - 36.0 g/dL    RDW 15.3 12.4 - 15.4 %    Platelets 99 (L) 071 - 450 K/uL    MPV 8.5 5.0 - 10.5 fL    Neutrophils % 68.7 %    Lymphocytes % 14.9 %    Monocytes % 8.8 %    Eosinophils % 6.9 %    Basophils % 0.7 %    Neutrophils Absolute 1.6 (L) 1.7 - 7.7 K/uL    Lymphocytes Absolute 0.3 (L) 1.0 - 5.1 K/uL    Monocytes Absolute 0.2 0.0 - 1.3 K/uL    Eosinophils Absolute 0.2 0.0 - 0.6 K/uL    Basophils Absolute 0.0 0.0 - 0.2 K/uL       I have reviewed all labs for this visit. RADIOLOGY  No results found. ED COURSE/MDM  Patient seen and evaluated. At presentation, patient was awake, afebrile, normotensive, satting greater than 90% on his home 3 L nasal cannula O2, and the Ace bandage was removed to reveal no active bleed from the AV fistula site. There was palpable thrill. He had good cap refill and intact sensation to all of his fingers. The fistula site was rebandaged. As patient reports missing his dialysis yesterday, basic labs obtained. Hemoglobin was 8.8, no significant change compared to baseline. BUN is 50. Potassium normal.  During observation in the emergency department, there was no rebleeding. Patient instructed to follow-up with his dialysis clinic tomorrow as scheduled. Patient instructed to return to the emergency department immediately if there is a rebleed.   Patient verbalizes understanding is agreeable with plan. He was discharged home with strict return precautions. Pt was seen during the [place] 19 pandemic. Appropriate PPE worn by ME during patient encounters. Pt seen during a time with constrained hospital bed capacity and other potential inpatient and outpatient resources were constrained due to the viral pandemic. During the patient's ED course, the patient was given:  Medications - No data to display     CLINICAL IMPRESSION  1. ESRD (end stage renal disease) ([facility]    2. Hemorrhage of arteriovenous fistula, initial encounter ([facility])        Blood pressure (!) 155/87, pulse 70, temperature 97.9 °F (36.6 °C), temperature source Oral, resp. rate 16, height 5' 8\" (1.727 m), weight 146 lb 13.2 oz (66.6 kg), SpO2 97 %. DISPOSITION  Jin Marr was discharged home in stable condition. Patient was given scripts for the following medications. I counseled patient how to take these medications. Discharge Medication List as of 5/12/2021  4:30 PM          Follow-up with:  Khai Rolle MD  [address] [place] [address]  [phone]    In 2 days        DISCLAIMER: This chart was created using Dragon dictation software. Efforts were made by me to ensure accuracy, however some errors may be present due to limitations of this technology and occasionally words are not transcribed correctly.        Wilfrid Miramontes MD  05/13/21 1338

## 2021-09-21 ENCOUNTER — APPOINTMENT (OUTPATIENT)
Dept: GENERAL RADIOLOGY | Age: 66
DRG: 190 | End: 2021-09-21
Payer: MEDICARE

## 2021-09-21 ENCOUNTER — HOSPITAL ENCOUNTER (INPATIENT)
Age: 66
LOS: 4 days | Discharge: HOME | DRG: 190 | End: 2021-09-25
Attending: EMERGENCY MEDICINE | Admitting: INTERNAL MEDICINE
Payer: MEDICARE

## 2021-09-21 DIAGNOSIS — J44.1 COPD EXACERBATION (HCC): ICD-10-CM

## 2021-09-21 DIAGNOSIS — E87.20 ACIDOSIS: ICD-10-CM

## 2021-09-21 DIAGNOSIS — E87.1 HYPONATREMIA: Primary | ICD-10-CM

## 2021-09-21 LAB
A/G RATIO: 1.5 (ref 1.1–2.2)
ALBUMIN SERPL-MCNC: 4.3 G/DL (ref 3.4–5)
ALP BLD-CCNC: 195 U/L (ref 40–129)
ALT SERPL-CCNC: 8 U/L (ref 10–40)
ANION GAP SERPL CALCULATED.3IONS-SCNC: 17 MMOL/L (ref 3–16)
AST SERPL-CCNC: 20 U/L (ref 15–37)
BASE EXCESS VENOUS: 3.3 MMOL/L
BASOPHILS ABSOLUTE: 0 K/UL (ref 0–0.2)
BASOPHILS RELATIVE PERCENT: 0.8 %
BILIRUB SERPL-MCNC: 1 MG/DL (ref 0–1)
BUN BLDV-MCNC: 21 MG/DL (ref 7–20)
CALCIUM SERPL-MCNC: 8.9 MG/DL (ref 8.3–10.6)
CARBOXYHEMOGLOBIN: 1.3 %
CHLORIDE BLD-SCNC: 82 MMOL/L (ref 99–110)
CO2: 27 MMOL/L (ref 21–32)
CREAT SERPL-MCNC: 6.9 MG/DL (ref 0.8–1.3)
EOSINOPHILS ABSOLUTE: 0.1 K/UL (ref 0–0.6)
EOSINOPHILS RELATIVE PERCENT: 3.4 %
GFR AFRICAN AMERICAN: 10
GFR NON-AFRICAN AMERICAN: 8
GLOBULIN: 2.8 G/DL
GLUCOSE BLD-MCNC: 100 MG/DL (ref 70–99)
HCO3 VENOUS: 32 MMOL/L (ref 23–29)
HCT VFR BLD CALC: 35.2 % (ref 40.5–52.5)
HEMOGLOBIN: 11.3 G/DL (ref 13.5–17.5)
LACTIC ACID: 0.9 MMOL/L (ref 0.4–2)
LYMPHOCYTES ABSOLUTE: 0.2 K/UL (ref 1–5.1)
LYMPHOCYTES RELATIVE PERCENT: 11.2 %
MCH RBC QN AUTO: 30.1 PG (ref 26–34)
MCHC RBC AUTO-ENTMCNC: 32 G/DL (ref 31–36)
MCV RBC AUTO: 94 FL (ref 80–100)
METHEMOGLOBIN VENOUS: 0.7 %
MONOCYTES ABSOLUTE: 0.2 K/UL (ref 0–1.3)
MONOCYTES RELATIVE PERCENT: 8.6 %
NEUTROPHILS ABSOLUTE: 1.6 K/UL (ref 1.7–7.7)
NEUTROPHILS RELATIVE PERCENT: 76 %
O2 SAT, VEN: 20 %
O2 THERAPY: ABNORMAL
PCO2, VEN: 72.2 MMHG (ref 40–50)
PDW BLD-RTO: 18.5 % (ref 12.4–15.4)
PH VENOUS: 7.26 (ref 7.35–7.45)
PLATELET # BLD: 85 K/UL (ref 135–450)
PMV BLD AUTO: 7.5 FL (ref 5–10.5)
PO2, VEN: <30 MMHG
POTASSIUM REFLEX MAGNESIUM: 4.2 MMOL/L (ref 3.5–5.1)
RBC # BLD: 3.75 M/UL (ref 4.2–5.9)
SARS-COV-2, NAAT: NOT DETECTED
SODIUM BLD-SCNC: 126 MMOL/L (ref 136–145)
TCO2 CALC VENOUS: 34 MMOL/L
TOTAL PROTEIN: 7.1 G/DL (ref 6.4–8.2)
WBC # BLD: 2.1 K/UL (ref 4–11)

## 2021-09-21 PROCEDURE — 1200000000 HC SEMI PRIVATE

## 2021-09-21 PROCEDURE — 6370000000 HC RX 637 (ALT 250 FOR IP): Performed by: INTERNAL MEDICINE

## 2021-09-21 PROCEDURE — 93005 ELECTROCARDIOGRAM TRACING: CPT | Performed by: INTERNAL MEDICINE

## 2021-09-21 PROCEDURE — 6370000000 HC RX 637 (ALT 250 FOR IP): Performed by: NURSE PRACTITIONER

## 2021-09-21 PROCEDURE — 85025 COMPLETE CBC W/AUTO DIFF WBC: CPT

## 2021-09-21 PROCEDURE — 83605 ASSAY OF LACTIC ACID: CPT

## 2021-09-21 PROCEDURE — 80053 COMPREHEN METABOLIC PANEL: CPT

## 2021-09-21 PROCEDURE — 99285 EMERGENCY DEPT VISIT HI MDM: CPT

## 2021-09-21 PROCEDURE — 93005 ELECTROCARDIOGRAM TRACING: CPT | Performed by: EMERGENCY MEDICINE

## 2021-09-21 PROCEDURE — 94761 N-INVAS EAR/PLS OXIMETRY MLT: CPT

## 2021-09-21 PROCEDURE — 82803 BLOOD GASES ANY COMBINATION: CPT

## 2021-09-21 PROCEDURE — 6360000002 HC RX W HCPCS: Performed by: INTERNAL MEDICINE

## 2021-09-21 PROCEDURE — 87635 SARS-COV-2 COVID-19 AMP PRB: CPT

## 2021-09-21 PROCEDURE — 71045 X-RAY EXAM CHEST 1 VIEW: CPT

## 2021-09-21 PROCEDURE — 2700000000 HC OXYGEN THERAPY PER DAY

## 2021-09-21 PROCEDURE — 94640 AIRWAY INHALATION TREATMENT: CPT

## 2021-09-21 RX ORDER — PREDNISONE 20 MG/1
40 TABLET ORAL DAILY
Status: DISCONTINUED | OUTPATIENT
Start: 2021-09-22 | End: 2021-09-25 | Stop reason: HOSPADM

## 2021-09-21 RX ORDER — SODIUM CHLORIDE 0.9 % (FLUSH) 0.9 %
5-40 SYRINGE (ML) INJECTION EVERY 12 HOURS SCHEDULED
Status: DISCONTINUED | OUTPATIENT
Start: 2021-09-21 | End: 2021-09-25 | Stop reason: HOSPADM

## 2021-09-21 RX ORDER — DOXYCYCLINE HYCLATE 100 MG
100 TABLET ORAL EVERY 12 HOURS
Status: DISCONTINUED | OUTPATIENT
Start: 2021-09-21 | End: 2021-09-25 | Stop reason: HOSPADM

## 2021-09-21 RX ORDER — IPRATROPIUM BROMIDE AND ALBUTEROL SULFATE 2.5; .5 MG/3ML; MG/3ML
3 SOLUTION RESPIRATORY (INHALATION) 4 TIMES DAILY
Status: DISCONTINUED | OUTPATIENT
Start: 2021-09-21 | End: 2021-09-21

## 2021-09-21 RX ORDER — ONDANSETRON 2 MG/ML
4 INJECTION INTRAMUSCULAR; INTRAVENOUS EVERY 6 HOURS PRN
Status: DISCONTINUED | OUTPATIENT
Start: 2021-09-21 | End: 2021-09-25 | Stop reason: HOSPADM

## 2021-09-21 RX ORDER — POLYETHYLENE GLYCOL 3350 17 G/17G
17 POWDER, FOR SOLUTION ORAL DAILY PRN
Status: DISCONTINUED | OUTPATIENT
Start: 2021-09-21 | End: 2021-09-25 | Stop reason: HOSPADM

## 2021-09-21 RX ORDER — ACETAMINOPHEN 650 MG/1
650 SUPPOSITORY RECTAL EVERY 6 HOURS PRN
Status: DISCONTINUED | OUTPATIENT
Start: 2021-09-21 | End: 2021-09-25 | Stop reason: HOSPADM

## 2021-09-21 RX ORDER — IPRATROPIUM BROMIDE AND ALBUTEROL SULFATE 2.5; .5 MG/3ML; MG/3ML
1 SOLUTION RESPIRATORY (INHALATION)
Status: DISCONTINUED | OUTPATIENT
Start: 2021-09-22 | End: 2021-09-25 | Stop reason: HOSPADM

## 2021-09-21 RX ORDER — ALBUTEROL SULFATE 90 UG/1
2 AEROSOL, METERED RESPIRATORY (INHALATION) 4 TIMES DAILY
Status: DISCONTINUED | OUTPATIENT
Start: 2021-09-21 | End: 2021-09-21

## 2021-09-21 RX ORDER — ALBUTEROL SULFATE 90 UG/1
2 AEROSOL, METERED RESPIRATORY (INHALATION) EVERY 6 HOURS PRN
Status: DISCONTINUED | OUTPATIENT
Start: 2021-09-21 | End: 2021-09-21

## 2021-09-21 RX ORDER — ALLOPURINOL 100 MG/1
100 TABLET ORAL DAILY
Status: DISCONTINUED | OUTPATIENT
Start: 2021-09-21 | End: 2021-09-25 | Stop reason: HOSPADM

## 2021-09-21 RX ORDER — HEPARIN SODIUM 5000 [USP'U]/ML
5000 INJECTION, SOLUTION INTRAVENOUS; SUBCUTANEOUS EVERY 8 HOURS SCHEDULED
Status: DISCONTINUED | OUTPATIENT
Start: 2021-09-21 | End: 2021-09-22

## 2021-09-21 RX ORDER — ONDANSETRON 4 MG/1
4 TABLET, ORALLY DISINTEGRATING ORAL EVERY 8 HOURS PRN
Status: DISCONTINUED | OUTPATIENT
Start: 2021-09-21 | End: 2021-09-25 | Stop reason: HOSPADM

## 2021-09-21 RX ORDER — SODIUM CHLORIDE 9 MG/ML
25 INJECTION, SOLUTION INTRAVENOUS PRN
Status: DISCONTINUED | OUTPATIENT
Start: 2021-09-21 | End: 2021-09-25 | Stop reason: HOSPADM

## 2021-09-21 RX ORDER — SODIUM CHLORIDE 0.9 % (FLUSH) 0.9 %
5-40 SYRINGE (ML) INJECTION PRN
Status: DISCONTINUED | OUTPATIENT
Start: 2021-09-21 | End: 2021-09-25 | Stop reason: HOSPADM

## 2021-09-21 RX ORDER — IPRATROPIUM BROMIDE AND ALBUTEROL SULFATE 2.5; .5 MG/3ML; MG/3ML
1 SOLUTION RESPIRATORY (INHALATION) ONCE
Status: COMPLETED | OUTPATIENT
Start: 2021-09-21 | End: 2021-09-21

## 2021-09-21 RX ORDER — ACETAMINOPHEN 325 MG/1
650 TABLET ORAL EVERY 6 HOURS PRN
Status: DISCONTINUED | OUTPATIENT
Start: 2021-09-21 | End: 2021-09-25 | Stop reason: HOSPADM

## 2021-09-21 RX ORDER — CARVEDILOL 12.5 MG/1
12.5 TABLET ORAL 2 TIMES DAILY
Status: DISCONTINUED | OUTPATIENT
Start: 2021-09-21 | End: 2021-09-25 | Stop reason: HOSPADM

## 2021-09-21 RX ORDER — ASPIRIN 81 MG/1
81 TABLET, CHEWABLE ORAL DAILY
Status: DISCONTINUED | OUTPATIENT
Start: 2021-09-21 | End: 2021-09-25 | Stop reason: HOSPADM

## 2021-09-21 RX ORDER — PREDNISONE 20 MG/1
60 TABLET ORAL ONCE
Status: COMPLETED | OUTPATIENT
Start: 2021-09-21 | End: 2021-09-21

## 2021-09-21 RX ORDER — BUDESONIDE AND FORMOTEROL FUMARATE DIHYDRATE 160; 4.5 UG/1; UG/1
2 AEROSOL RESPIRATORY (INHALATION) 2 TIMES DAILY
Status: DISCONTINUED | OUTPATIENT
Start: 2021-09-21 | End: 2021-09-25 | Stop reason: HOSPADM

## 2021-09-21 RX ADMIN — HEPARIN SODIUM 5000 UNITS: 5000 INJECTION INTRAVENOUS; SUBCUTANEOUS at 20:56

## 2021-09-21 RX ADMIN — IPRATROPIUM BROMIDE AND ALBUTEROL SULFATE 1 AMPULE: 2.5; .5 SOLUTION RESPIRATORY (INHALATION) at 14:44

## 2021-09-21 RX ADMIN — ALLOPURINOL 100 MG: 100 TABLET ORAL at 18:25

## 2021-09-21 RX ADMIN — ASPIRIN 81 MG CHEWABLE TABLET 81 MG: 81 TABLET CHEWABLE at 18:25

## 2021-09-21 RX ADMIN — CARVEDILOL 12.5 MG: 12.5 TABLET, FILM COATED ORAL at 20:55

## 2021-09-21 RX ADMIN — DOXYCYCLINE HYCLATE 100 MG: 100 TABLET, COATED ORAL at 18:25

## 2021-09-21 RX ADMIN — PREDNISONE 60 MG: 20 TABLET ORAL at 16:03

## 2021-09-21 ASSESSMENT — PAIN DESCRIPTION - ONSET: ONSET: GRADUAL

## 2021-09-21 ASSESSMENT — PAIN DESCRIPTION - DESCRIPTORS: DESCRIPTORS: SHARP

## 2021-09-21 ASSESSMENT — PAIN DESCRIPTION - FREQUENCY: FREQUENCY: INTERMITTENT

## 2021-09-21 ASSESSMENT — PAIN DESCRIPTION - PAIN TYPE: TYPE: ACUTE PAIN

## 2021-09-21 ASSESSMENT — PAIN SCALES - GENERAL: PAINLEVEL_OUTOF10: 3

## 2021-09-21 ASSESSMENT — PAIN DESCRIPTION - LOCATION: LOCATION: CHEST

## 2021-09-21 ASSESSMENT — PAIN DESCRIPTION - ORIENTATION: ORIENTATION: MID

## 2021-09-21 ASSESSMENT — PAIN DESCRIPTION - PROGRESSION: CLINICAL_PROGRESSION: NOT CHANGED

## 2021-09-21 NOTE — PROGRESS NOTES
Admitted patient to room 4272 from the Emergency Room with SOB and high blood pressure. VS stable (see flowsheet). Patient's breathing regular and unlabored, denies pain. Oriented to room, call light, TV, phone, patient rights and responsibilities. Bed in lowest position and locked, exit alarm in place. Non-slip socks on. ID bracelet on and correct per pt verbally reporting name and date of birth. Call light within reach. Needed items within reach.

## 2021-09-21 NOTE — PROGRESS NOTES
Assessed patient. Patient is awake and alert on home oxygen setting saturating 100%. No respiratory distress noted. Perfect serve sent about clarification on ABG order. Nurse aware. Report given to night shift RRT.

## 2021-09-21 NOTE — ED NOTES
Pt was given a urinal and 2 warm blankets. Will continue to monitor.       Renetta Patel RN  09/21/21 5187

## 2021-09-21 NOTE — PROGRESS NOTES
Pt was c/o intermitten chest pain 3/10, non radiating. Pt states \" feels sharp\"  comes on fast and leaves fast. Stat 12 lead EKG ordered.

## 2021-09-21 NOTE — PROGRESS NOTES
4 Eyes Skin Assessment     NAME:  Jasmin Townsend  YOB: 1955  MEDICAL RECORD NUMBER:  [id number]    The patient is being assess for  Admission    I agree that 2 RN's have performed a thorough Head to Toe Skin Assessment on the patient. ALL assessment sites listed below have been assessed. Areas assessed by both nurses:    Head, Face, Ears, Shoulders, Back, Chest, Arms, Elbows, Hands, Sacrum. Buttock, Coccyx, Ischium and Legs. Feet and Heels        Does the Patient have a Wound?  No noted wound(s)       Mauricio Prevention initiated:  No   Wound Care Orders initiated:  No    Pressure Injury (Stage 3,4, Unstageable, DTI, NWPT, and Complex wounds) if present place consult order under [de-identified] No    New and Established Ostomies if present place consult order under : No      Nurse 1 eSignature: Electronically signed by Jonathan Fletcher RN on 9/21/21 at 6:38 PM EDT    **SHARE this note so that the co-signing nurse is able to place an eSignature**    Nurse 2 eSignature: Electronically signed by Leah Abdi RN on 9/21/21 at [address] PM EDT

## 2021-09-21 NOTE — PROGRESS NOTES
Pt sent up from ED with dialysis cath in L arm. Currently unable to remove due to no dialysis nurse currently in the hospital. Dougie Patel, nursing supervisor contacted by charge nurse.  Electronically signed by Liana Jones RN on 9/21/2021 at 7:45 PM

## 2021-09-21 NOTE — ED PROVIDER NOTES
[address]  [address] [address]  Dept: [phone]  Loc: [facility]      CHIEF COMPLAINT    Chief Complaint   Patient presents with    COPD     PT WAS GETTING DIALYSIS AND \"COPD FLARED UP \"        HPI    Tigre Marrufo is a 63 y.o. male who presents to the emergency department with a feeling that his COPD was flaring up in the middle of his dialysis treatment today. He feels like he got more than a little more than senior care through his treatment. He felt fine this morning when he woke up and before his dialysis treatment. He states he got about senior care through when he felt short of breath. He denies body aches, fever, chills, cough, chest pain, lightheadedness or dizziness. Normally wears O2 at 3 L per nasal cannula around-the-clock at home. REVIEW OF SYSTEMS    Cardiac: no chest pain, no palpitations, no syncope  Respiratory: see HPI, no cough  Neurologic: no syncope or LOC  GI: no vomiting, no diarrhea  General: no fever  All other systems reviewed and are negative.     PAST MEDICAL & SURGICAL HISTORY    Past Medical History:   Diagnosis Date    [facility]     [facility]     Chronic kidney disease     COPD (chronic obstructive pulmonary disease) ([facility]     Dialysis patient ([facility]     Gout     Hemodialysis patient ([facility]     Hx of blood clots     Hyperlipidemia     Hypertension     Renal failure      Past Surgical History:   Procedure Laterality Date    COLONOSCOPY      DIALYSIS FISTULA CREATION Left        CURRENT MEDICATIONS  (may include discharge medications prescribed in the ED)  Current Outpatient Rx   Medication Sig Dispense Refill    ipratropium-albuterol (DUONEB) 0.5-2.5 (3) MG/3ML SOLN nebulizer solution Inhale 3 mLs into the lungs 4 times daily 360 mL 0    metaxalone (SKELAXIN) 800 MG tablet Take 1 tablet by mouth daily as needed for Pain 5 tablet 0    calcium acetate 667 MG TABS Take 1 tablet by mouth 3 times daily (with meals)      tiotropium (SPIRIVA) 18 MCG inhalation capsule Inhale 1 capsule into the lungs daily 30 capsule 0    gabapentin (NEURONTIN) 300 MG capsule Take 1 capsule by mouth nightly for 30 days. 30 capsule 0    traZODone (DESYREL) 150 MG tablet Take 1 tablet by mouth nightly (Patient taking differently: Take 150 mg by mouth nightly as needed ) 30 tablet 0    atorvastatin (LIPITOR) 80 MG tablet Take 1 tablet by mouth daily 30 tablet 0    losartan (COZAAR) 25 MG tablet Take 1 tablet by mouth daily Unsure of dose.  30 tablet 0    torsemide (DEMADEX) 20 MG tablet Take 2 tablets by mouth daily 60 tablet 0    omeprazole (PRILOSEC) 20 MG delayed release capsule Take 1 capsule by mouth daily 30 capsule 0    carvedilol (COREG) 25 MG tablet Take 25 mg by mouth 2 times daily Unsure of dose       aspirin 81 MG tablet Take 81 mg by mouth daily      budesonide-formoterol (SYMBICORT) 160-4.5 MCG/ACT AERO Inhale 2 puffs into the lungs 2 times daily      albuterol sulfate HFA (VENTOLIN HFA) 108 (90 Base) MCG/ACT inhaler Inhale 2 puffs into the lungs every 6 hours as needed for Wheezing 90mcg/actuation      allopurinol (ZYLOPRIM) 100 MG tablet Take 100 mg by mouth daily         ALLERGIES    No Known Allergies    SOCIAL & FAMILY HISTORY    Social History     Socioeconomic History    Marital status: Single     Spouse name: None    Number of children: None    Years of education: None    Highest education level: None   Occupational History    None   Tobacco Use    Smoking status: Former Smoker    Smokeless tobacco: Never Used   Vaping Use    Vaping Use: Never used   Substance and Sexual Activity    Alcohol use: Yes     Comment: occasional    Drug use: Never    Sexual activity: Not Currently   Other Topics Concern    None   Social History Narrative    None     Social Determinants of Health     Financial Resource Strain:     Difficulty of Paying Living Expenses:    Food Insecurity:     Worried About Running Out of Food in the Last Year:     [address] in the Last Year:    Transportation Needs:     Lack of Transportation (Medical):  Lack of Transportation (Non-Medical):    Physical Activity:     Days of Exercise per Week:     Minutes of Exercise per Session:    Stress:     Feeling of Stress :    Social Connections:     Frequency of Communication with Friends and Family:     Frequency of Social Gatherings with Friends and Family:     Attends Jain Services:     Active Member of Clubs or Organizations:     Attends Club or Organization Meetings:     Marital Status:    Intimate Partner Violence:     Fear of Current or Ex-Partner:     Emotionally Abused:     Physically Abused:     Sexually Abused:      Family History   Problem Relation Age of Onset    Cancer Mother     Cancer Father     Other Sister        PHYSICAL EXAM    VITAL SIGNS: BP (!) 159/99   Pulse 92   Temp 98.8 °F (37.1 °C) (Oral)   Resp 20   Ht 5' 8\" (1.727 m)   Wt 151 lb 3.8 oz (68.6 kg)   SpO2 100%   BMI 23.00 kg/m²    Constitutional:  Well developed, well nourished, no acute distress   HENT:  Atraumatic, dry mucus membranes  Neck: supple, no JVD   Respiratory: Respirations are even and unlabored. On room air. Clear throughout auscultation and diminished in the bases. No cough. Speaking in full uninterrupted sentences.   Cardiovascular: Regular rhythm and rate, S1-S2  Vascular: Radial and DP pulses 2+ and equal bilaterally  GI:  Soft, nontender, normal bowel sounds  Musculoskeletal:  no lower extremity edema, no lower extremity asymmetry, no calf tenderness, no thigh tenderness, no acute deformities  Integument:  Skin is warm and dry, no petechiae   Neurologic:  Alert & oriented, no slurred speech  Psych: Pleasant affect, no hallucinations    EKG    Twelve-lead EKG reviewed by myself and interpreted by my attending physician Dr. Tiffany Puente. Ventricular rate 94 bpm, MO interval 260 ms, QRS duration 100 ms,  ms  No ST elevation or depression. Interpretation is a sinus rhythm with first-degree block, PVCs. Today's tracing was compared to the one on April 6/2021 without significant changes. RADIOLOGY/PROCEDURES    XR CHEST PORTABLE   Final Result   No acute process.            Labs Reviewed   CBC WITH AUTO DIFFERENTIAL - Abnormal; Notable for the following components:       Result Value    WBC 2.1 (*)     RBC 3.75 (*)     Hemoglobin 11.3 (*)     Hematocrit 35.2 (*)     RDW 18.5 (*)     Platelets 85 (*)     Neutrophils Absolute 1.6 (*)     Lymphocytes Absolute 0.2 (*)     All other components within normal limits    Narrative:     Performed at:  [address] [facility] [facility]   Phone [phone]   COMPREHENSIVE METABOLIC PANEL W/ REFLEX TO MG FOR LOW K - Abnormal; Notable for the following components:    Sodium 126 (*)     Chloride 82 (*)     Anion Gap 17 (*)     Glucose 100 (*)     BUN 21 (*)     CREATININE 6.9 (*)     GFR Non- 8 (*)     GFR African American 10 (*)     Alkaline Phosphatase 195 (*)     ALT 8 (*)     All other components within normal limits    Narrative:     Melba Bradley tel. [phone],  Chemistry results called to and read back by Eusebia Iniguez RN, 09/21/2021  14:42, by Tanvi Ta  Performed at:  [facility]  [email or web address]   Phone [phone]   BLOOD GAS, VENOUS - Abnormal; Notable for the following components:    pH, [place] 7.258 (*)     pCO2, [place] 72.2 (*)     HCO3, Venous 32 (*)     All other components within normal limits    Narrative:     Performed at:  [address] [facility] [address]   Phone [phone]   LACTIC ACID, PLASMA    Narrative:     Performed at:  [id number] [facility]  [address]  [place]   Phone [phone]       ED COURSE & MEDICAL DECISION MAKING    Pertinent Labs & Imaging studies reviewed and interpreted. (See chart for details)    See chart for details of medications given during the ED stay. Vitals:    09/21/21 1314 09/21/21 1446   BP: (!) 159/99    Pulse: 92    Resp: 28 20   Temp: 98.8 °F (37.1 °C)    TempSrc: Oral    SpO2: 100% 100%   Weight: 151 lb 3.8 oz (68.6 kg)    Height: 5' 8\" (1.727 m)      Medications   predniSONE (DELTASONE) tablet 60 mg (has no administration in time range)   ipratropium-albuterol (DUONEB) nebulizer solution 1 ampule (1 ampule Inhalation Given 9/21/21 1444)     This patient was seen evaluated by myself my attending physician Dr. Tiffany Puente. Differential diagnosis includes but is not limited to Covid, pneumonia, heart failure, pulmonary edema, COPD, other. Is nontoxic in appearance and hemodynamically stable. He has shortness of breath midway through his dialysis treatment. He told me he felt fine today prior to the treatment. He told my attending physician that he had been feeling short of breath all day. Labs reveal a white count of 2.1. On chart review he has been leukopenic. Stat hemoglobin of 11.3 with hematocrit of 35.2. Sodium is 126 with a BUN of 21 and a creatinine of 6.9, anion gap is 17, GFR is 10, lactic acid 0.9 and glucose is 100. Venous pH is 7.258 with a PCO2 of 72.2. He was given a nebulizer treatment and prednisone. He is maintaining his O2 saturations on his home O2 dose of 3 L per nasal cannula however because he is tachypneic and acidotic and hyponatremia we are recommending admission. The patient was updated on plan of care and he agrees. Perfect serve to the hospitalist service. FINAL IMPRESSION    1. Hyponatremia    2. Acidosis    3.  COPD exacerbation ([address]        PLAN  Admission to the hospital    (Please note that this note was completed with a voice recognition program.  Every attempt was made to edit the dictations, but inevitably there remain words that are mis-transcribed.)          Dayanna Thrasher, OFELIA - CNP  09/21/21 9105

## 2021-09-21 NOTE — PROGRESS NOTES
Dialysis nurse Kenneth Barber contacted by nursing supervisor Chel Butler and should be here in about 1 hour to remove the dialysis catheter.  Electronically signed by Marni Childs RN on 9/21/2021 at 7:59 PM

## 2021-09-21 NOTE — ED PROVIDER NOTES
[facility] [facility]      Pt Name: Roger Pena  MRN: [id number]  [place] 1955  Date of evaluation: 9/21/2021  Provider: Isaiah Arrington, [address] [place] [address]  Chief Complaint   Patient presents with    COPD     PT WAS GETTING DIALYSIS AND \"COPD FLARED UP \"        I have fully participated in the care of Roger Pena and have had a face-to-face evaluation. I have reviewed and agree with all pertinent clinical information, and midlevel provider's history, and physical exam. I have also reviewed the labs, EKG, and imaging studies and treatment plan. I have also reviewed and agree with the medications, allergies and past medical history section for this Roger Pena. I agree with the diagnosis, and I concur. I wore personal protective equipment when I was in the room the entire time. This includes gloves, N95 mask, face shield, and a glove over my stethoscope for protection. Past Medical History:   Diagnosis Date    [facility]erebral [facility] occlusion with [facility] infarction ([facility]     CHF (congestive heart failure) (HCC)     Chronic kidney disease     COPD (chronic obstructive pulmonary disease) ([facility]     Dialysis patient ([facility]     Gout     Hemodialysis patient ([facility]     Hx of blood clots     Hyperlipidemia     Hypertension     Renal failure        MDM:  Roger Pena is a 63 y.o. male who presents with shortness of breath from dialysis. He has a history of end-stage renal disease. They had to stop dialysis and sent him to the emergency department. He denies any fevers or chills. Denies any nausea vomiting. Denies any chest pains. He denies any increase in his fluid intake. He denies any increase in his weight. Physical exam shows to be mildly dyspneic with Kussmaul respirations. Breath sounds are clear and equal bilaterally. Lower extremities show trace edema. Remainder of his exam is negative.   Laboratory work shows a mild acidosis with a pH of 7.52. Remainder of his work-up was noncontributory. Therefore, patient was admitted to the hospital for acidosis and dyspnea. Nephrology was consulted. Hospitalist admits.     Vitals:    09/21/21 1446   BP:    Pulse:    Resp: 20   Temp:    SpO2: 100%       Lab results  Labs Reviewed   CBC WITH AUTO DIFFERENTIAL - Abnormal; Notable for the following components:       Result Value    WBC 2.1 (*)     RBC 3.75 (*)     Hemoglobin 11.3 (*)     Hematocrit 35.2 (*)     RDW 18.5 (*)     Platelets 85 (*)     Neutrophils Absolute 1.6 (*)     Lymphocytes Absolute 0.2 (*)     All other components within normal limits    Narrative:     Performed at:  [address] [place] [address]   Phone [phone]   COMPREHENSIVE METABOLIC PANEL W/ REFLEX TO MG FOR LOW K - Abnormal; Notable for the following components:    Sodium 126 (*)     Chloride 82 (*)     Anion Gap 17 (*)     Glucose 100 (*)     BUN 21 (*)     CREATININE 6.9 (*)     GFR Non- 8 (*)     GFR African American 10 (*)     Alkaline Phosphatase 195 (*)     ALT 8 (*)     All other components within normal limits    Narrative:     Lino Moran tel. [phone],  Chemistry results called to and read back by Kerwin Guillory RN, 09/21/2021  14:42, by Jazmine Galeana  Performed at:  [address] [place] [address]   Phone [phone]   BLOOD GAS, VENOUS - Abnormal; Notable for the following components:    pH, [place]ug[place] 7.258 (*)     pCO2, [place] 72.2 (*)     HCO3, Venous 32 (*)     All other components within normal limits    Narrative:     Performed at:  [address] [facility] [place] [address]   Phone [phone]   LACTIC ACID, PLASMA    Narrative:     Performed at:  [address] [place] [address]   Phone [phone]       EKG Results  EKG Interpretation    Interpreted by emergency department physician  Time performed: 1336  Time read: 1337    Rhythm: Sinus rhythm  Ventricular Rate: 94  QRS Axis: -78  Ectopy: Occasional PVC  Conduction: Sinus rhythm first-degree AV block and LVH and early repolarization abnormality with MEJIA, there is left axis deviation. ST Segments: Consistent with LVH and early repolarization abnormality  T Waves: Consistent with LVH and early repolarization abnormality  Q Waves: None noted    Other findings: Motion artifact but EKG is readable    Compared to EKG on: 4/6/2021 and appears unchanged    Clinical Impression: Sinus rhythm with first-degree AV block and LVH with left atrial hypertrophy. There is left axis deviation and ST-T wave changes consistent with LVH and early repolarization abnormalities. Court Dials, DO      Radiology results  XR CHEST PORTABLE   Final Result   No acute process. Medications   ipratropium-albuterol (DUONEB) nebulizer solution 1 ampule (1 ampule Inhalation Given 9/21/21 1444)   predniSONE (DELTASONE) tablet 60 mg (60 mg Oral Given 9/21/21 1603)       New Prescriptions    No medications on file       The patient's blood pressure was found to be elevated according to CMS/Medicare and the Affordable Care Act/ObamaCare criteria. Elevated blood pressure could occur because of pain or anxiety or other reasons and does not mean that they need to have their blood pressure treated or medications otherwise adjusted. However, this could also be a sign that they will need to have their blood pressure treated or medications changed. The patient was instructed to follow up closely with their personal physician to have their blood pressure rechecked. The patient was instructed to take a list of recent blood pressure readings to their next visit with their personal physician. IMPRESSIONS:  1. Hyponatremia    2. Acidosis    3.  COPD exacerbation ([facility]               Jerry Stone Yusef Norton DO  09/21/21 2546

## 2021-09-21 NOTE — ED NOTES
Bed: B-34  Expected date: 9/21/21  Expected time:   Means of arrival: [facility]  Comments:  Dialysis   SOB     Christine Tamez RN  09/21/21 2455

## 2021-09-22 LAB
ACANTHOCYTES: ABNORMAL
ALBUMIN SERPL-MCNC: 4 G/DL (ref 3.4–5)
ALP BLD-CCNC: 165 U/L (ref 40–129)
ALT SERPL-CCNC: 7 U/L (ref 10–40)
ANION GAP SERPL CALCULATED.3IONS-SCNC: 18 MMOL/L (ref 3–16)
AST SERPL-CCNC: 15 U/L (ref 15–37)
BASOPHILS ABSOLUTE: 0 K/UL (ref 0–0.2)
BASOPHILS RELATIVE PERCENT: 0.3 %
BILIRUB SERPL-MCNC: 0.6 MG/DL (ref 0–1)
BILIRUBIN DIRECT: 0.3 MG/DL (ref 0–0.3)
BILIRUBIN, INDIRECT: 0.3 MG/DL (ref 0–1)
BUN BLDV-MCNC: 33 MG/DL (ref 7–20)
CALCIUM SERPL-MCNC: 8.9 MG/DL (ref 8.3–10.6)
CHLORIDE BLD-SCNC: 81 MMOL/L (ref 99–110)
CO2: 22 MMOL/L (ref 21–32)
CREAT SERPL-MCNC: 8.2 MG/DL (ref 0.8–1.3)
EOSINOPHILS ABSOLUTE: 0 K/UL (ref 0–0.6)
EOSINOPHILS RELATIVE PERCENT: 0.1 %
FERRITIN: 1664 NG/ML (ref 30–400)
FOLATE: 6.58 NG/ML (ref 4.78–24.2)
GFR AFRICAN AMERICAN: 8
GFR NON-AFRICAN AMERICAN: 7
GLUCOSE BLD-MCNC: 157 MG/DL (ref 70–99)
GLUCOSE BLD-MCNC: 219 MG/DL (ref 70–99)
HAPTOGLOBIN: 81 MG/DL (ref 30–200)
HCT VFR BLD CALC: 33.1 % (ref 40.5–52.5)
HEMATOLOGY PATH CONSULT: YES
HEMOGLOBIN: 10.5 G/DL (ref 13.5–17.5)
IRON SATURATION: 71 % (ref 20–50)
IRON: 152 UG/DL (ref 59–158)
LACTATE DEHYDROGENASE: 212 U/L (ref 100–190)
LYMPHOCYTES ABSOLUTE: 0.2 K/UL (ref 1–5.1)
LYMPHOCYTES RELATIVE PERCENT: 12.5 %
MCH RBC QN AUTO: 29.9 PG (ref 26–34)
MCHC RBC AUTO-ENTMCNC: 31.8 G/DL (ref 31–36)
MCV RBC AUTO: 93.8 FL (ref 80–100)
MONOCYTES ABSOLUTE: 0.1 K/UL (ref 0–1.3)
MONOCYTES RELATIVE PERCENT: 6 %
NEUTROPHILS ABSOLUTE: 1.3 K/UL (ref 1.7–7.7)
NEUTROPHILS RELATIVE PERCENT: 81.1 %
OVALOCYTES: ABNORMAL
PDW BLD-RTO: 17.7 % (ref 12.4–15.4)
PERFORMED ON: ABNORMAL
PLATELET # BLD: 82 K/UL (ref 135–450)
PLATELET SLIDE REVIEW: ABNORMAL
PMV BLD AUTO: 8 FL (ref 5–10.5)
POIKILOCYTES: ABNORMAL
POTASSIUM REFLEX MAGNESIUM: 3.8 MMOL/L (ref 3.5–5.1)
RBC # BLD: 3.52 M/UL (ref 4.2–5.9)
SLIDE REVIEW: ABNORMAL
SODIUM BLD-SCNC: 121 MMOL/L (ref 136–145)
TEAR DROP CELLS: ABNORMAL
TOTAL IRON BINDING CAPACITY: 214 UG/DL (ref 260–445)
VITAMIN B-12: 1597 PG/ML (ref 211–911)
WBC # BLD: 1.7 K/UL (ref 4–11)

## 2021-09-22 PROCEDURE — 97161 PT EVAL LOW COMPLEX 20 MIN: CPT

## 2021-09-22 PROCEDURE — 84155 ASSAY OF PROTEIN SERUM: CPT

## 2021-09-22 PROCEDURE — 97530 THERAPEUTIC ACTIVITIES: CPT

## 2021-09-22 PROCEDURE — 2580000003 HC RX 258: Performed by: INTERNAL MEDICINE

## 2021-09-22 PROCEDURE — 82746 ASSAY OF FOLIC ACID SERUM: CPT

## 2021-09-22 PROCEDURE — 82728 ASSAY OF FERRITIN: CPT

## 2021-09-22 PROCEDURE — 6370000000 HC RX 637 (ALT 250 FOR IP): Performed by: INTERNAL MEDICINE

## 2021-09-22 PROCEDURE — 94640 AIRWAY INHALATION TREATMENT: CPT

## 2021-09-22 PROCEDURE — 83010 ASSAY OF HAPTOGLOBIN QUANT: CPT

## 2021-09-22 PROCEDURE — 83550 IRON BINDING TEST: CPT

## 2021-09-22 PROCEDURE — 83540 ASSAY OF IRON: CPT

## 2021-09-22 PROCEDURE — 84165 PROTEIN E-PHORESIS SERUM: CPT

## 2021-09-22 PROCEDURE — 80076 HEPATIC FUNCTION PANEL: CPT

## 2021-09-22 PROCEDURE — 85025 COMPLETE CBC W/AUTO DIFF WBC: CPT

## 2021-09-22 PROCEDURE — 1200000000 HC SEMI PRIVATE

## 2021-09-22 PROCEDURE — 80048 BASIC METABOLIC PNL TOTAL CA: CPT

## 2021-09-22 PROCEDURE — 83615 LACTATE (LD) (LDH) ENZYME: CPT

## 2021-09-22 PROCEDURE — 36415 COLL VENOUS BLD VENIPUNCTURE: CPT

## 2021-09-22 PROCEDURE — 6360000002 HC RX W HCPCS: Performed by: INTERNAL MEDICINE

## 2021-09-22 PROCEDURE — 90935 HEMODIALYSIS ONE EVALUATION: CPT

## 2021-09-22 PROCEDURE — 82607 VITAMIN B-12: CPT

## 2021-09-22 RX ORDER — PANTOPRAZOLE SODIUM 40 MG/1
40 TABLET, DELAYED RELEASE ORAL
Status: DISCONTINUED | OUTPATIENT
Start: 2021-09-22 | End: 2021-09-25 | Stop reason: HOSPADM

## 2021-09-22 RX ADMIN — DOXYCYCLINE HYCLATE 100 MG: 100 TABLET, COATED ORAL at 18:18

## 2021-09-22 RX ADMIN — PREDNISONE 40 MG: 20 TABLET ORAL at 14:11

## 2021-09-22 RX ADMIN — CARVEDILOL 12.5 MG: 12.5 TABLET, FILM COATED ORAL at 20:59

## 2021-09-22 RX ADMIN — SODIUM CHLORIDE, PRESERVATIVE FREE 10 ML: 5 INJECTION INTRAVENOUS at 21:00

## 2021-09-22 RX ADMIN — HEPARIN SODIUM 5000 UNITS: 5000 INJECTION INTRAVENOUS; SUBCUTANEOUS at 14:17

## 2021-09-22 RX ADMIN — SODIUM CHLORIDE, PRESERVATIVE FREE 10 ML: 5 INJECTION INTRAVENOUS at 14:14

## 2021-09-22 RX ADMIN — IPRATROPIUM BROMIDE AND ALBUTEROL SULFATE 1 AMPULE: 2.5; .5 SOLUTION RESPIRATORY (INHALATION) at 16:08

## 2021-09-22 RX ADMIN — DOXYCYCLINE HYCLATE 100 MG: 100 TABLET, COATED ORAL at 06:09

## 2021-09-22 RX ADMIN — ASPIRIN 81 MG CHEWABLE TABLET 81 MG: 81 TABLET CHEWABLE at 14:10

## 2021-09-22 RX ADMIN — BUDESONIDE AND FORMOTEROL FUMARATE DIHYDRATE 2 PUFF: 160; 4.5 AEROSOL RESPIRATORY (INHALATION) at 21:01

## 2021-09-22 RX ADMIN — IPRATROPIUM BROMIDE AND ALBUTEROL SULFATE 1 AMPULE: 2.5; .5 SOLUTION RESPIRATORY (INHALATION) at 21:01

## 2021-09-22 RX ADMIN — HEPARIN SODIUM 5000 UNITS: 5000 INJECTION INTRAVENOUS; SUBCUTANEOUS at 06:11

## 2021-09-22 RX ADMIN — CARVEDILOL 12.5 MG: 12.5 TABLET, FILM COATED ORAL at 14:10

## 2021-09-22 RX ADMIN — ALLOPURINOL 100 MG: 100 TABLET ORAL at 14:11

## 2021-09-22 RX ADMIN — PANTOPRAZOLE SODIUM 40 MG: 40 TABLET, DELAYED RELEASE ORAL at 15:02

## 2021-09-22 ASSESSMENT — PAIN SCALES - GENERAL
PAINLEVEL_OUTOF10: 0
PAINLEVEL_OUTOF10: 0

## 2021-09-22 NOTE — DISCHARGE INSTR - COC
Continuity of Care Form    Patient Name: Almaz Joyner   :  1955  MRN:  [id number]    Admit date:  2021  Discharge date:  2021    Code Status Order: Full Code   Advance Directives:      Admitting Physician:  Case Mercedes MD  PCP: Bob Lund MD    Discharging Nurse: Erin Mitchell RN   [address] Unit/Room#: F4B-8159/2776-39  Discharging Unit Phone Number: [phone]    Emergency Contact:   Extended Emergency Contact Information  Primary Emergency Contact: [address] [facility], [address] Phone: [phone]  Relation: Child   needed?  No    Past Surgical History:  Past Surgical History:   Procedure Laterality Date    COLONOSCOPY      DIALYSIS FISTULA CREATION Left        Immunization History:   Immunization History   Administered Date(s) Administered    COVID-19, Moderna, PF, 100mcg/0.5mL 2021, 2021       Active Problems:  Patient Active Problem List   Diagnosis Code    HCAP (healthcare-associated pneumonia) J18.9    COPD exacerbation ([facility] J44.1    ESRD on dialysis ([facility] N18.6, Z99.2    COPD (chronic obstructive pulmonary disease) ([facility] J44.9    Acute on chronic respiratory failure with hypoxia and hypercapnia (HCC) J96.21, J96.22       Isolation/Infection:   Isolation            No Isolation          Patient Infection Status       Infection Onset Added Last Indicated Last Indicated By Review Planned Expiration Resolved Resolved By    None active    Resolved    COVID-19 Rule Out 21 COVID-19, Rapid (Ordered)   21 Rule-Out Test Resulted    COVID-19 Rule Out  20 Brayan Serna RN   20 Brayan Serna RN    COVID-19 Rule Out 20 COVID-19 (Ordered)   20 Rule-Out Test Resulted            Nurse Assessment:  Last Vital Signs: /81   Pulse 78   Temp 98.3 °F (36.8 °C) (Oral)   Resp 16   Ht 5' 8\" (1.727 m)   Wt 147 lb 7.8 oz (66.9 kg)   SpO2 100%   BMI 22.43 kg/m²     Last documented pain score (0-10 scale): Pain Level: 3  Last Weight:   Wt Readings from Last 1 Encounters:   09/22/21 147 lb 7.8 oz (66.9 kg)     Mental Status:  oriented, alert and able to concentrate and follow conversation    IV Access:  Dialysis catheter -Left arm fistula/graft     Nursing Mobility/ADLs:  Walking   Independent  Transfer  Independent  Bathing  Independent  Dressing  Independent  [address] Delivery   none    Wound Care Documentation and Therapy:        Elimination:  Continence: Bowel: Yes  Bladder: Yes  Urinary Catheter: None   Colostomy/Ileostomy/Ileal Conduit: No       Date of Last BM: 09/24/2021  No intake or output data in the 24 hours ending 09/22/21 1003  No intake/output data recorded. Safety Concerns: At Risk for Falls    Impairments/Disabilities:      None    Nutrition Therapy:  Current Nutrition Therapy:   - Oral Diet:  General    Routes of Feeding: Oral  Liquids: No Restrictions  Daily Fluid Restriction: yes - amount 1000  Last Modified Barium Swallow with Video (Video Swallowing Test): not done    Treatments at the Time of Hospital Discharge:   Respiratory Treatments: inhalers  Oxygen Therapy:  is on oxygen at 2 L/min per nasal cannula.   Ventilator:    - No ventilator support    Rehab Therapies: dialysis   Weight Bearing Status/Restrictions: No weight bearing restirctions  Other Medical Equipment (for information only, NOT a DME order):  {EQUIPMENT:[id number]}  Other Treatments: ***    Patient's personal belongings (please select all that are sent with patient):  Glasses    RN SIGNATURE:  Electronically signed by Guillermo Oliveira RN on 9/25/21 at 5:04 PM EDT    CASE MANAGEMENT/SOCIAL WORK SECTION    Inpatient Status Date: ***    Readmission Risk Assessment Score:  Readmission Risk              Risk of Unplanned Readmission:  20           Discharging to Facility/ Agency   Name:   Address:  Phone:  Fax:    Dialysis Facility  Name: [facility]  Dialysis Schedule: TTS  Phone: [phone]   Fax: [phone]    / signature: {Esignature:[id number]}    PHYSICIAN SECTION    Prognosis: Fair    Condition at Discharge: Stable    Rehab Potential (if transferring to Rehab): Fair    Recommended Labs or Other Treatments After Discharge: otpt    Physician Certification: I certify the above information and transfer of Almaz Joyner  is necessary for the continuing treatment of the diagnosis listed and that he requires [address] for less 30 days.      Update Admission H&P: No change in H&P    PHYSICIAN SIGNATURE:  Electronically signed by Reggie Yoon MD on 9/25/21 at 5:30 PM EDT

## 2021-09-22 NOTE — PROCEDURES
Abdominal ultrasound to be done in AM since patient is not NPO today. Nurse advised a separate study for the vascular department needs to be ordered in order to assess for portal hypertension.

## 2021-09-22 NOTE — PROGRESS NOTES
Called in to de-access pt's dialysis access. JAIME AVG noted. [place] in place upon arrival. AVG de-accessed without difficulty. Hemostasis achieved in < 10 min each site. +b/t noted after de-access.

## 2021-09-22 NOTE — CONSULTS
BID  predniSONE (DELTASONE) tablet 40 mg, 40 mg, Oral, Daily  doxycycline hyclate (VIBRA-TABS) tablet 100 mg, 100 mg, Oral, Q12H  ipratropium-albuterol (DUONEB) nebulizer solution 1 ampule, 1 ampule, Inhalation, Q4H WA  Allergies:  Patient has no known allergies. Social History:      Social History     Socioeconomic History    Marital status: Single     Spouse name: Not on file    Number of children: Not on file    Years of education: Not on file    Highest education level: Not on file   Occupational History    Not on file   Tobacco Use    Smoking status: Former Smoker    Smokeless tobacco: Never Used   Vaping Use    Vaping Use: Never used   Substance and Sexual Activity    Alcohol use: Yes     Comment: occasional    Drug use: Never    Sexual activity: Not Currently   Other Topics Concern    Not on file   Social History Narrative    Not on file     Social Determinants of Health     Financial Resource Strain:     Difficulty of Paying Living Expenses:    Food Insecurity:     Worried About Running Out of Food in the Last Year:     [address] in the Last Year:    Transportation Needs:     Lack of Transportation (Medical):      Lack of Transportation (Non-Medical):    Physical Activity:     Days of Exercise per Week:     Minutes of Exercise per Session:    Stress:     Feeling of Stress :    Social Connections:     Frequency of Communication with Friends and Family:     Frequency of Social Gatherings with Friends and Family:     Attends Druze Services:     Active Member of Clubs or Organizations:     Attends Club or Organization Meetings:     Marital Status:    Intimate Partner Violence:     Fear of Current or Ex-Partner:     Emotionally Abused:     Physically Abused:     Sexually Abused:           Family History:         Problem Relation Age of Onset    Cancer Mother     Cancer Father     Other Sister      REVIEW OF SYSTEMS:    ROS per the HPI   Otherwise  10 point ROS negative     PHYSICAL EXAM:      Vitals:  BP (!) 141/83   Pulse 73   Temp 97.8 °F (36.6 °C) (Oral)   Resp 18   Ht 5' 8\" (1.727 m)   Wt 147 lb 7.8 oz (66.9 kg)   SpO2 96%   BMI 22.43 kg/m²     CONSTITUTIONAL:  awake, alert, cooperative, no apparent distress, and appears stated age NAD  EYES:  pupils equal, round and reactive to light, extra ocular muscles intact, sclera clear, conjunctiva normal  NECK:  Supple, symmetrical, trachea midline, no adenopathy, thyroid symmetric, not enlarged and no tenderness, skin normal  HEMATOLOGIC/LYMPHATICS:  no cervical lymphadenopathy, no supraclavicular lymphadenopathy, no axillary lymphadenopathy and no inguinal lymphadenopathy  LUNGS:  No increased work of breathing, good air exchange, clear to auscultation bilaterally, no crackles or wheezing  CARDIOVASCULAR:  , regular rate and rhythm, normal S1 and S2, no S3 or S4, and no murmur noted  ABDOMEN:  No scars, normal bowel sounds, soft, non-distended, non-tender, no masses palpated, no hepatosplenomegally      MUSCULOSKELETAL:  There is no redness, warmth, or swelling of the joints. Full range of motion noted. Motor strength is 5 out of 5 all extremities bilaterally. NEUROLOGIC:  Awake, alert, oriented to name, place and time. Cranial nerves II-XII are grossly intact. Motor is 5 out of 5 bilaterally. SKIN:  no bruising or bleeding      DATA:    PT/INR:    Recent Labs     09/21/21  1354 09/22/21  0830   PROT 7.1 6.3*     PTT:  No results for input(s): APTT in the last 72 hours.   CMP:    Lab Results   Component Value Date     09/22/2021    K 3.8 09/22/2021    CL 81 09/22/2021    CO2 22 09/22/2021    BUN 33 09/22/2021    PROT 6.3 09/22/2021     Magnesium:    Lab Results   Component Value Date    MG 2.00 04/08/2020     Phosphorus:  No components found for: PO4  Calcium:  No components found for: CA  CBC:    Lab Results   Component Value Date    WBC 1.7 09/22/2021    RBC 3.52 09/22/2021    HGB 10.5 09/22/2021    HCT 33.1 09/22/2021    MCV 93.8 09/22/2021    RDW 17.7 09/22/2021    PLT 82 09/22/2021     DIFF:    Lab Results   Component Value Date    MCV 93.8 09/22/2021    RDW 17.7 09/22/2021      LDH:  @labcrnt(LDH)@  Uric Acid:  @labcrnt(URIC)@    Radiology Review: US ABDOMEN COMPLETE    Result Date: 9/23/2021  EXAMINATION: COMPLETE ABDOMINAL ULTRASOUND 9/23/2021 7:23 am COMPARISON: None. HISTORY: ORDERING SYSTEM PROVIDED HISTORY: eval for splenomegly/portal htn TECHNOLOGIST PROVIDED HISTORY: Reason for exam:->eval for splenomegly/portal htn FINDINGS: LIVER: Hepatomegaly. Otherwise the liver demonstrates normal echogenicity without evidence of intrahepatic biliary ductal dilatation. BILIARY SYSTEM: Gallbladder is unremarkable without evidence of pericholecystic fluid, wall thickening or stones. Negative sonographic Christianson's sign. Common bile duct is within normal limits measuring 0.5 cm. KIDNEYS: The kidneys are unremarkable in appearance without evidence of hydronephrosis. The right kidney measures 8.5 cm in length and the left kidney measures 9.3 cm in length. PANCREAS: Visualized portions of the pancreas are unremarkable. SPLEEN: The spleen is unremarkable in appearance. Spleen is within normal limits in size. IVC: The IVC is patent. AORTA: The aorta is not well visualized. OTHER: No evidence of ascites. Hepatomegaly. The spleen is unremarkable. XR CHEST PORTABLE    Result Date: 9/21/2021  EXAMINATION: ONE XRAY VIEW OF THE CHEST 9/21/2021 1:41 pm COMPARISON: 04/06/2021 HISTORY: ORDERING SYSTEM PROVIDED HISTORY: SOB TECHNOLOGIST PROVIDED HISTORY: Reason for exam:->SOB Reason for Exam: SOB; COPD Acuity: Acute Type of Exam: Initial FINDINGS: The lungs are without acute focal process. There is no effusion or pneumothorax. The cardiomediastinal silhouette is stable. The osseous structures are stable. No acute process.          Problem List  Patient Active Problem List   Diagnosis    HCAP (healthcare-associated pneumonia)    COPD exacerbation ([facility]    ESRD on dialysis ([facility]    COPD (chronic obstructive pulmonary disease) ([facility]    Acute on chronic respiratory failure with hypoxia and hypercapnia (HCC)       IMPRESSION/RECOMMENDATIONS:    panctyopenia   Will fu on labs orderd   Will order us spleen/liver to eval   willl start there and if normal dive deeper  I also sent flow on peripheral blood   He does not have blasts or left shift   Anemia likley esrd related but low wbc/plt would have different reason  No new meds per pt     Copd-per primary team    esrd -dialysis

## 2021-09-22 NOTE — PROGRESS NOTES
Physical Therapy    Facility/Department: [address] MED SURG  Initial Assessment    NAME: Yessi Rowe  : 1955  MRN: [id number]    Date of Service: 2021    Discharge Recommendations:  Home with assist PRN        Assessment   Body structures, Functions, Activity limitations: Decreased endurance;Decreased balance  Assessment: The patient is a 63 y.o. male who presents to [facility] on 21 with SOB. Pt diagnosed with COPD exacerbation. Prior to admission, pt living with nephew in level entry one level home - nephew works day shift - pt reporting he is independent with all ADLs and ambulation, using a SPC in community. Pt currently functioning very near baseline. Anticipate return home with PRN assist from nephew. Will continue to follow while in hopsital, but will not need any therapy beyond discharge. Treatment Diagnosis: impaired dynamic balance  Prognosis: Good  Decision Making: Low Complexity  History: see below  Exam: see below  Clinical Presentation: stable  PT Education: PT Role;Plan of Care;General Safety;Gait Training;Functional Mobility Training  REQUIRES PT FOLLOW UP: Yes  Activity Tolerance  Activity Tolerance: Patient Tolerated treatment well       Patient Diagnosis(es): The primary encounter diagnosis was Hyponatremia. Diagnoses of Acidosis and COPD exacerbation ([address] were also pertinent to this visit. has a past medical history of Cerebral artery occlusion with cerebral infarction ([address] CHF (congestive heart failure) ([address] Chronic kidney disease, COPD (chronic obstructive pulmonary disease) ([address] Dialysis patient ([address] Gout, Hemodialysis patient ([address] Hx of blood clots, Hyperlipidemia, Hypertension, and Renal failure.   has a past surgical history that includes Dialysis fistula creation (Left) and Colonoscopy.     Restrictions  Restrictions/Precautions  Restrictions/Precautions: Up as Tolerated  Position Activity Restriction  Other position/activity restrictions: chronic 3L O2     Vision/Hearing  Hearing: Within functional limits       Subjective  General  Chart Reviewed: Yes  Patient assessed for rehabilitation services?: Yes  Additional Pertinent Hx: The patient is a 63 y.o. male who presents to [facility] on 9/21/21 with SOB. Response To Previous Treatment: Not applicable  Family / Caregiver Present: No  Referring Practitioner: Tati Vega MD  Referral Date : 09/21/21  Diagnosis: COPD exacerbation  Follows Commands: Within Functional Limits  Subjective  Subjective: Pt is agreeable to PT. Orientation  Orientation  Overall Orientation Status: Within Functional Limits     Social/Functional History  Social/Functional History  Lives With: Other (comment) (nephew - works full time during day)  Type of Home: [address]: One level  Home Access: Level entry  Bathroom Shower/Tub: Tub/Shower unit, Shower chair with back  [employer]: Handicap height  Home Equipment: Rolling walker, Oxygen, Cane (3L O2; 4L with exertion)  ADL Assistance: Independent  Homemaking Assistance: Needs assistance (nephew does shopping)  Ambulation Assistance: Independent (uses a SPC in community)  Transfer Assistance: Independent  Active : No  Occupation: On disability  Type of occupation:   Additional Comments: Denies recent falls     Cognition   Cognition  Overall Cognitive Status: WFL    Objective  Strength RLE  Strength RLE: WFL  Strength LLE  Strength LLE: WFL  Motor Control  Gross Motor?: WFL     Bed mobility  Supine to Sit: Unable to assess  Sit to Supine: Unable to assess  Comment: Pt sitting at EOB at beginning and end of session.   Transfers  Sit to Stand: Supervision  Stand to sit: Supervision  Ambulation  Ambulation?: Yes  Ambulation 1  Surface: level tile  Device: No Device  Other Apparatus: O2 (3L)  Assistance: Stand by assistance;Contact guard assistance  Quality of Gait: several minor LOB that were self managed  Gait Deviations: Slow Mariely;Decreased step length;Decreased step height  Distance: [de-identified]'  Comments: O2 sats drop from 100% to 90%.   Stairs/Curb  Stairs?: No     Balance  Posture: Good  Sitting - Static: Good  Sitting - Dynamic: Good  Standing - Static: Fair;+  Standing - Dynamic: Fair;+        Plan   Plan  Times per week: 2-3x/week  Current Treatment Recommendations: Functional Mobility Training, Balance Training, Gait Training, Patient/Caregiver Education & Training, Equipment Evaluation, Education, & procurement, Neuromuscular Re-education  Safety Devices  Type of devices: Call light within reach, Left in bed      AM-PAC Score  AM-PAC Inpatient Mobility Raw Score : 23 (09/22/21 0819)  AM-PAC Inpatient T-Scale Score : 56.93 (09/22/21 0819)  Mobility Inpatient CMS 0-100% Score: 11.2 (09/22/21 0819)  Mobility Inpatient CMS G-Code Modifier : CI (09/22/21 0819)          Goals  Short term goals  Time Frame for Short term goals: by acute discharge  Short term goal 1: sit<>stand independently  Short term goal 2: ambulate > 50' independently  Patient Goals   Patient goals : none stated       Therapy Time   Individual Concurrent Group Co-treatment   Time In 0735         Time Out 0810         Minutes 35         Timed Code Treatment Minutes: 25 Minutes       Agnelique Sung PT

## 2021-09-22 NOTE — PROGRESS NOTES
Hospitalist Progress Note      PCP: Oswaldo Adair MD    Date of Admission: 9/21/2021    Chief Complaint:   SOB    Hospital Course: The patient is a 63 y.o. male who presents to [facility] with SOB. Pt has h/o ESRD on HD, COPD, states he was getting dialysis today when he developed SOB with suspected COPD exacerbation and hence presented to the ER. Found to be in COPD exacerbation. Subjective:   Breathing better but still very dyspneic with minimal exertion  Denies chest pain    Medications:  Reviewed    Infusion Medications    sodium chloride       Scheduled Medications    sodium chloride flush  5-40 mL IntraVENous 2 times per day    heparin (porcine)  5,000 Units SubCUTAneous 3 times per day    allopurinol  100 mg Oral Daily    aspirin  81 mg Oral Daily    budesonide-formoterol  2 puff Inhalation BID    carvedilol  12.5 mg Oral BID    predniSONE  40 mg Oral Daily    doxycycline hyclate  100 mg Oral Q12H    ipratropium-albuterol  1 ampule Inhalation Q4H WA     PRN Meds: sodium chloride flush, sodium chloride, ondansetron **OR** ondansetron, polyethylene glycol, acetaminophen **OR** acetaminophen    No intake or output data in the 24 hours ending 09/22/21 1402    Physical Exam Performed:    /83   Pulse 79   Temp 98.1 °F (36.7 °C)   Resp 18   Ht 5' 8\" (1.727 m)   Wt 145 lb 1 oz (65.8 kg)   SpO2 100%   BMI 22.06 kg/m²     General appearance: No apparent distress, appears stated age and cooperative. HEENT: Pupils equal, round, and reactive to light. Conjunctivae/corneas clear. Neck: Supple, with full range of motion. No jugular venous distention. Trachea midline. Respiratory:  Normal respiratory effort. wheeze bilaterally. Cardiovascular: Regular rate and rhythm with normal S1/S2 without murmurs, rubs or gallops. Abdomen: Soft, non-tender, non-distended with normal bowel sounds. Musculoskeletal: No clubbing, cyanosis or edema bilaterally.   Full range of motion without deformity. Skin: Skin color, texture, turgor normal.  No rashes or lesions. Neurologic:  Neurovascularly intact without any focal sensory/motor deficits. Cranial nerves: II-XII intact, grossly non-focal.  Psychiatric: Alert and oriented, thought content appropriate, normal insight  Capillary Refill: Brisk,3 seconds, normal   Peripheral Pulses: +2 palpable, equal bilaterally       Labs:   Recent Labs     09/21/21  1354 09/22/21  0830   WBC 2.1* 1.7*   HGB 11.3* 10.5*   HCT 35.2* 33.1*   PLT 85* 82*     Recent Labs     09/21/21  1354 09/22/21  0912   * 121*   K 4.2 3.8   CL 82* 81*   CO2 27 22   BUN 21* 33*   CREATININE 6.9* 8.2*   CALCIUM 8.9 8.9     Recent Labs     09/21/21  1354 09/22/21  0912   AST 20 15   ALT 8* 7*   BILIDIR  --  0.3   BILITOT 1.0 0.6   ALKPHOS 195* 165*     No results for input(s): INR in the last 72 hours. No results for input(s): Juan Antonio Snowball in the last 72 hours. Urinalysis:    No results found for: [address], BACTERIA, RBCUA, BLOODU, SPECGRAV, [address]    Radiology:  XR CHEST PORTABLE   Final Result   No acute process. US ABDOMEN COMPLETE    (Results Pending)           Assessment/Plan:  Acute COPD exacerbation with bronchitis - started on pred, cont neb/inhaler. Will treat with doxy     Chr resp failure - on 3L home O2     HTN - fairly controlled, cont home meds     Hyponatremia - Na 126, nephrology consulted. Check urine Na     ESRD on HD - on dialysis T/T/S, nephrology consulted. His dialysis was stopped senior living. Await nephro recs     Pancytopenia - long standing  -Awaiting hematology/oncology advice. DVT Prophylaxis: SCDs. Continue heparin  Diet: ADULT DIET;  Regular  Code Status: Full Code    PT/OT Eval Status: Ordered    Dispo -pending clinical improvement    Eber Yi MD

## 2021-09-22 NOTE — CARE COORDINATION
Advance Care Planning     Advance Care Planning Activator (Inpatient)  Conversation Note      Date of ACP Conversation: 9/22/2021     Conversation Conducted with: Patient with Decision Making Capacity    ACP Activator: David Agudelo RN      Health Care Decision Maker:     Current Designated Health Care Decision Maker:     Primary Decision Maker: Jamal Torres - [phone]    Today we documented Decision Maker(s) consistent with Legal Next of Kin hierarchy. Care Preferences    Ventilation: \"If you were in your present state of health and suddenly became very ill and were unable to breathe on your own, what would your preference be about the use of a ventilator (breathing machine) if it were available to you? \"      Would the patient desire the use of ventilator (breathing machine)?: yes    \"If your health worsens and it becomes clear that your chance of recovery is unlikely, what would your preference be about the use of a ventilator (breathing machine) if it were available to you? \"     Would the patient desire the use of ventilator (breathing machine)?: Yes      Resuscitation  \"CPR works best to restart the heart when there is a sudden event, like a heart attack, in someone who is otherwise healthy. Unfortunately, CPR does not typically restart the heart for people who have serious health conditions or who are very sick. \"    \"In the event your heart stopped as a result of an underlying serious health condition, would you want attempts to be made to restart your heart (answer \"yes\" for attempt to resuscitate) or would you prefer a natural death (answer \"no\" for do not attempt to resuscitate)? \" yes       [] Yes   [] No   Educated Patient / Aranza Ferrer regarding differences between Advance Directives and portable DNR orders.     Length of ACP Conversation in minutes:  4 min  Conversation Outcomes:  [x] ACP discussion completed  [] Existing advance directive reviewed with patient; no changes to patient's previously recorded wishes  [] New Advance Directive completed  [] Portable Do Not Rescitate prepared for Provider review and signature  [] POLST/POST/MOLST/MOST prepared for Provider review and signature      Follow-up plan:    [] Schedule follow-up conversation to continue planning  [] Referred individual to Provider for additional questions/concerns   [] Advised patient/agent/surrogate to review completed ACP document and update if needed with changes in condition, patient preferences or care setting    [] This note routed to one or more involved healthcare providers        Christine Hardin RN, BSN,   [phone]  Electronically signed by Christine Hardin RN on 9/22/2021 at 2:15 PM

## 2021-09-22 NOTE — FLOWSHEET NOTE
Treatment time:3.5 hours  Net UF: 791 ml     Pre weight: 66.3 kg   Post weight: 65 kg  EDW:65 kg     Access used: JAIME AVG  Access function: Good with  ml/min     Medications or blood products given: None     Regular outpatient schedule: [facility]     Summary of response to treatment: Tolerated tx well.  No HD related complaints or complications.      Copy of dialysis treatment record placed in chart, to be scanned into EMR.       09/22/21 0920 09/22/21 1300   Treatment   Time On 0915  --    Time Off  --  1247   Vital Signs   /83 (!) 153/89   Temp 98.1 °F (36.7 °C) 97.7 °F (36.5 °C)   Pulse 79 78   Resp 18 18   Dialysis Bath   K+ (Potassium) 3  --    Ca+ (Calcium) 2.5  --    Na+ (Sodium) 130  --    HCO3 (Bicarb) 35  --

## 2021-09-22 NOTE — H&P
Hospital Medicine History & Physical      PCP: April Cruz MD    Date of Admission: 9/21/2021    Date of Service: Pt seen/examined on 9/21/21 and Admitted to Inpatient     Chief Complaint: SOB    History Of Present Illness: The patient is a 63 y.o. male who presents to [facility] with SOB. Pt has h/o ESRD on HD, COPD, states he was getting dialysis today when he developed SOB with suspected COPD exacerbation and hence presented to the ER. Found to be in COPD exacerbation. Past Medical History:        Diagnosis Date    [facility]     [facility]     Chronic kidney disease     COPD [facility] obstructive [facility] disease[facility]     Dialysis patient ([facility]     Gout     Hemodialysis patient ([facility]     Hx of blood clots     Hyperlipidemia     Hypertension     Renal failure        Past Surgical History:        Procedure Laterality Date    COLONOSCOPY      DIALYSIS FISTULA CREATION Left        Medications Prior to Admission:    Prior to Admission medications    Medication Sig Start Date End Date Taking? Authorizing Provider   ipratropium-albuterol (DUONEB) 0.5-2.5 (3) MG/3ML SOLN nebulizer solution Inhale 3 mLs into the lungs 4 times daily 4/9/21   Rhiannon Rice MD   metaxalone [facility]) 800 MG tablet Take 1 tablet by mouth daily as needed for Pain 9/12/20   Ashlee Gilliam MD   calcium acetate 667 MG TABS Take 1 tablet by mouth 3 times daily (with meals)    Historical Provider, MD   tiotropium (SPIRIVA) 18 MCG inhalation capsule Inhale 1 capsule into the lungs daily 4/8/20 4/6/21  Peola Antu, APRN - CNP   gabapentin (NEURONTIN) 300 MG capsule Take 1 capsule by mouth nightly for 30 days.  4/8/20 4/6/21  Peola Antu, APRN - CNP   traZODone (DESYREL) 150 MG tablet Take 1 tablet by mouth nightly  Patient taking differently: Take 150 mg by mouth nightly as needed  4/8/20 4/6/21  OFELIA Salazar CNP   atorvastatin (LIPITOR) 80 MG tablet Take 1 tablet by mouth daily 4/8/20 4/6/21  OFELIA Salazar CNP   losartan (COZAAR) 25 MG tablet Take 1 tablet by mouth daily Unsure of dose. 4/8/20 4/6/21  OFELIA Salazar CNP   torsemide (DEMADEX) 20 MG tablet Take 2 tablets by mouth daily 4/8/20 4/6/21  OFELIA Salazar CNP   omeprazole (PRILOSEC) 20 MG delayed release capsule Take 1 capsule by mouth daily 4/8/20 4/6/21  OFELIA Salazar CNP   carvedilol (COREG) 12.5 MG tablet Take 12.5 mg by mouth 2 times daily Unsure of dose     Historical Provider, MD   aspirin 81 MG tablet Take 81 mg by mouth daily    Historical Provider, MD   budesonide-formoterol (SYMBICORT) 160-4.5 MCG/ACT AERO Inhale 2 puffs into the lungs 2 times daily    Historical Provider, MD   albuterol sulfate HFA (VENTOLIN HFA) 108 (90 Base) MCG/ACT inhaler Inhale 2 puffs into the lungs every 6 hours as needed for Wheezing 90mcg/actuation    Historical Provider, MD   allopurinol (ZYLOPRIM) 100 MG tablet Take 100 mg by mouth daily    Historical Provider, MD       Allergies:  Patient has no known allergies. Social History:  The patient currently lives at home    TOBACCO:   reports that he has quit smoking. He has never used smokeless tobacco.  ETOH:   reports current alcohol use. Family History:  Reviewed in detail and negative for DM, Early CAD, Cancer, CVA. Positive as follows:        Problem Relation Age of Onset    Cancer Mother     Cancer Father     Other Sister        REVIEW OF SYSTEMS:   Positive for SOB and as noted in the HPI. All other systems reviewed and negative.     PHYSICAL EXAM:    BP (!) 157/91   Pulse 93   Temp 98 °F (36.7 °C) (Oral)   Resp 20   Ht 5' 8\" (1.727 m)   Wt 151 lb 3.8 oz (68.6 kg)   SpO2 98%   BMI 23.00 kg/m²     General appearance: No apparent distress appears stated age and cooperative. HEENT Normal cephalic, atraumatic without obvious deformity. Pupils equal, round, and reactive to light. Extra ocular muscles intact. Conjunctivae/corneas clear. Neck: Supple, No jugular venous distention/bruits. Trachea midline without thyromegaly or adenopathy with full range of motion. Lungs: diminished b/l, occasional exp wheeze  Heart: Regular rate and rhythm with Normal S1/S2   Abdomen: Soft, non-tender or non-distended without rigidity or guarding and positive bowel sounds   Extremities: No clubbing, cyanosis, or edema bilaterally. Skin: Skin color, texture, turgor normal.  No rashes or lesions. Neurologic: Alert and oriented X 3, neurovascularly intact with sensory/motor intact upper extremities/lower extremities, bilaterally. Cranial nerves: II-XII intact, grossly non-focal.  Mental status: Alert, oriented, thought content appropriate. Capillary Refill: Acceptable  < 3 seconds  Peripheral Pulses: +3 Easily felt, not easily obliterated with pressure      CXR:  I have reviewed the CXR with the following interpretation: clear    CBC   Recent Labs     09/21/21  1354   WBC 2.1*   HGB 11.3*   HCT 35.2*   PLT 85*      RENAL  Recent Labs     09/21/21  1354   *   K 4.2   CL 82*   CO2 27   BUN 21*   CREATININE 6.9*     LFT'S  Recent Labs     09/21/21  1354   AST 20   ALT 8*   BILITOT 1.0   ALKPHOS 195*     COAG  No results for input(s): INR in the last 72 hours. CARDIAC ENZYMES  No results for input(s): CKTOTAL, CKMB, CKMBINDEX, TROPONINI in the last 72 hours.     U/A:  No results found for: NITRITE, COLORU, WBCUA, RBCUA, MUCUS, BACTERIA, CLARITYU, SPECGRAV, LEUKOCYTESUR, BLOODU, GLUCOSEU, AMORPHOUS    ABG  No results found for: QBX6ERK, BEART, L7MUQFGP, PHART, THGBART, PVP9PIZ, PO2ART, CPF1XNL        Active Hospital Problems    Diagnosis Date Noted    COPD exacerbation ([facility] [J44.1] 03/31/2020         PHYSICIANS CERTIFICATION:    I certify that Terry Herrera is expected to be hospitalized for > than 2 midnights based on the following assessment and plan:      ASSESSMENT/PLAN:    Acute COPD exacerbation with bronchitis - started on pred, cont neb/inhaler. Will treat with doxy    Chr resp failure - on 3L home O2    HTN - fairly controlled, cont home meds    Hyponatremia - Na 126, nephrology consulted. Check urine Na    ESRD on HD - on dialysis T/T/S, nephrology consulted. His dialysis was stopped intermediate. Await nephro recs    Pancytopenia - long standing, will consult hem/onc for further recs        DVT Prophylaxis: heparin  Diet: ADULT DIET; Regular  Code Status: Full Code  PT/OT Eval Status: ordered    Troy Perez MD    Thank you Thomas Loredo MD for the opportunity to be involved in this patient's care. If you have any questions or concerns please feel free to contact me at [phone].

## 2021-09-22 NOTE — CARE COORDINATION
Called [facility] on [address] to set up home delivery of medications. Pt is aware there will be a $5.95 charge with each delivery. Jennifer Gonzalez RN, BSN,   [phone]  Electronically signed by Jennifer Gonzalez RN on 9/22/2021 at 2:35 PM     They required credit card information. Went pack to pt & explained set-up process & he will take care of it tomorrow.     Jennifer Gonzalez RN, BSN,   [phone]  Electronically signed by Jennifer Gonzalez RN on 9/22/2021 at 2:45 PM

## 2021-09-22 NOTE — CARE COORDINATION
INITIAL CASE MANAGEMENT ASSESSMENT    Reviewed chart, met with patient to assess possible discharge needs. Explained Case Management role/services. Living Situation: verified address, lives in a 1 story house with 0 [address] with his nephew    ADLs: independent     DME: oxygen through [employer] (2-3L), cane, nebulizer    PT/OT Recs:   PT   \"Date of Service: 9/22/2021     Discharge Recommendations:  Home with assist PRN\"    OT-pending     Active Services: Active with [facility] through [facility] (lifeline & dialysis transportation)-verified at [phone], Terra Hallman is his currently his CM. Transportation: not an active , medical & non-medical transport through [facility], he may need a ride [facility]     Medications: no barriers, uses [facility] on [address], would like medications delivered to his home; wants to use the [employer] with this admission    PCP: Florencio Lozada MD      HD/PD: Dionne                T/TH/S @ [phone] (f) [phone] (at [place] [phone] fax last run sheet, last nephro note & last progress note/dc summary)                Transportation through [facility]    PLAN/COMMENTS: plan is to return home at [place], denies any new needs, would like to see if meds can be delivered to his house-will update him on a $5.95 deliver fee, he may need a ride home at [place]    CM provided contact information for patient or family to call with any questions. CM will follow and assist as needed.     Abiola Perales RN, BSN,   [phone]    Electronically signed by Abiola Perales RN on 9/22/2021 at 9:37 AM

## 2021-09-22 NOTE — CONSULTS
Nephrology ([facility]) Consult Note    Kia Wilkerson is a 63 y.o. male whom we were asked to see for ESRD management. The patient developed dyspnea at dialysis yesterday. It started suddenly ~ 1 hour into treatment, so it was ended early. In the ER, he was suspect of COPD exacerbation. Denies fever, cough, and chest discomfort      Past Medical History:  ESRD on HD TTS ( nephrology)  COPD on home O2 3 liters  HTN  HLP  CHF  h/o CVA  gout    Review of System:  Otherwise unremarkable    Allergies:  Patient has no known allergies. Medications:  Current medications reviewed. Social History:  former tobacco  Family Medical History:  Negative for kidney disase    Physical Exam:  Blood pressure 139/83, pulse 79, temperature 98.1 °F (36.7 °C), resp. rate 18, height 5' 8\" (1.727 m), weight 145 lb 1 oz (65.8 kg), SpO2 100 %.     General:  NAD, A+Ox3, ill-appearing, normal body habitus  HEENT:  PERRL, EOMI  Neck:  Supple, normal range of movement  Chest:  CTAB, good respiratory effort, good air movement  CV:  RRR, no murmurs or rubs, no carotid bruit, no abdominal bruits  Abdomen:  NTND, soft, +BS, no hepatosplenomegaly  Extremities:  No peripheral edema  Neurological:  Moving all four extremities, CN II-XII grossly intact  Lymphatics:  No palpable lymph nodes  Skin:  No rash, no jaundice  Psychiatric:  Normal insight and judgement, good recall    Laboratory Studies:  Lab Results   Component Value Date/Time     (L) 09/22/2021 09:12 AM    K 3.8 09/22/2021 09:12 AM    CL 81 (L) 09/22/2021 09:12 AM    CO2 22 09/22/2021 09:12 AM    BUN 33 (H) 09/22/2021 09:12 AM    CREATININE 8.2 (HH) 09/22/2021 09:12 AM    CALCIUM 8.9 09/22/2021 09:12 AM    PHOS 3.6 04/08/2020 04:32 AM    MG 2.00 04/08/2020 04:32 AM     Lab Results   Component Value Date/Time    WBC 1.7 (LL) 09/22/2021 08:30 AM    HGB 10.5 (L) 09/22/2021 08:30 AM    HCT 33.1 (L) 09/22/2021 08:30 AM    PLT 82 (L) 09/22/2021 08:30 AM Assessment/Plan:  Reviewed old records and labs.     1) ESRD   - HD MWF    2) COPD exacerbation   - on steroids and nebs    3) pancytopenia   - Dr. Rodrigo Sweeney consulted

## 2021-09-23 ENCOUNTER — APPOINTMENT (OUTPATIENT)
Dept: ULTRASOUND IMAGING | Age: 66
DRG: 190 | End: 2021-09-23
Payer: MEDICARE

## 2021-09-23 LAB
A/G RATIO: 1.5 (ref 1.1–2.2)
ALBUMIN SERPL-MCNC: 4 G/DL (ref 3.4–5)
ALP BLD-CCNC: 155 U/L (ref 40–129)
ALT SERPL-CCNC: 7 U/L (ref 10–40)
ANION GAP SERPL CALCULATED.3IONS-SCNC: 18 MMOL/L (ref 3–16)
AST SERPL-CCNC: 21 U/L (ref 15–37)
BASOPHILS ABSOLUTE: 0 K/UL (ref 0–0.2)
BASOPHILS RELATIVE PERCENT: 0.2 %
BILIRUB SERPL-MCNC: 0.6 MG/DL (ref 0–1)
BUN BLDV-MCNC: 18 MG/DL (ref 7–20)
CALCIUM SERPL-MCNC: 9.5 MG/DL (ref 8.3–10.6)
CHLORIDE BLD-SCNC: 83 MMOL/L (ref 99–110)
CO2: 23 MMOL/L (ref 21–32)
CREAT SERPL-MCNC: 5.4 MG/DL (ref 0.8–1.3)
EKG ATRIAL RATE: 88 BPM
EKG ATRIAL RATE: 94 BPM
EKG DIAGNOSIS: NORMAL
EKG DIAGNOSIS: NORMAL
EKG P AXIS: 81 DEGREES
EKG P AXIS: 98 DEGREES
EKG P-R INTERVAL: 260 MS
EKG P-R INTERVAL: 274 MS
EKG Q-T INTERVAL: 362 MS
EKG Q-T INTERVAL: 390 MS
EKG QRS DURATION: 100 MS
EKG QRS DURATION: 96 MS
EKG QTC CALCULATION (BAZETT): 452 MS
EKG QTC CALCULATION (BAZETT): 471 MS
EKG R AXIS: -78 DEGREES
EKG R AXIS: 264 DEGREES
EKG T AXIS: 103 DEGREES
EKG T AXIS: 62 DEGREES
EKG VENTRICULAR RATE: 88 BPM
EKG VENTRICULAR RATE: 94 BPM
EOSINOPHILS ABSOLUTE: 0 K/UL (ref 0–0.6)
EOSINOPHILS RELATIVE PERCENT: 0.1 %
GFR AFRICAN AMERICAN: 13
GFR NON-AFRICAN AMERICAN: 11
GLOBULIN: 2.7 G/DL
GLUCOSE BLD-MCNC: 100 MG/DL (ref 70–99)
GLUCOSE BLD-MCNC: 103 MG/DL (ref 70–99)
GLUCOSE BLD-MCNC: 108 MG/DL (ref 70–99)
GLUCOSE BLD-MCNC: 141 MG/DL (ref 70–99)
GLUCOSE BLD-MCNC: 153 MG/DL (ref 70–99)
HCT VFR BLD CALC: 34 % (ref 40.5–52.5)
HEMATOLOGY PATH CONSULT: NORMAL
HEMOGLOBIN: 10.8 G/DL (ref 13.5–17.5)
LYMPHOCYTES ABSOLUTE: 0.3 K/UL (ref 1–5.1)
LYMPHOCYTES RELATIVE PERCENT: 12.6 %
MCH RBC QN AUTO: 30.2 PG (ref 26–34)
MCHC RBC AUTO-ENTMCNC: 31.9 G/DL (ref 31–36)
MCV RBC AUTO: 94.5 FL (ref 80–100)
MONOCYTES ABSOLUTE: 0.2 K/UL (ref 0–1.3)
MONOCYTES RELATIVE PERCENT: 7.3 %
NEUTROPHILS ABSOLUTE: 2.1 K/UL (ref 1.7–7.7)
NEUTROPHILS RELATIVE PERCENT: 79.8 %
PDW BLD-RTO: 18.5 % (ref 12.4–15.4)
PERFORMED ON: ABNORMAL
PLATELET # BLD: 85 K/UL (ref 135–450)
PMV BLD AUTO: 8 FL (ref 5–10.5)
POTASSIUM SERPL-SCNC: 4.2 MMOL/L (ref 3.5–5.1)
RBC # BLD: 3.6 M/UL (ref 4.2–5.9)
SODIUM BLD-SCNC: 124 MMOL/L (ref 136–145)
TOTAL PROTEIN: 6.7 G/DL (ref 6.4–8.2)
URIC ACID, SERUM: 3 MG/DL (ref 3.5–7.2)
WBC # BLD: 2.7 K/UL (ref 4–11)

## 2021-09-23 PROCEDURE — 97165 OT EVAL LOW COMPLEX 30 MIN: CPT

## 2021-09-23 PROCEDURE — 9990000010 HC NO CHARGE VISIT

## 2021-09-23 PROCEDURE — 94761 N-INVAS EAR/PLS OXIMETRY MLT: CPT

## 2021-09-23 PROCEDURE — 88184 FLOWCYTOMETRY/ TC 1 MARKER: CPT

## 2021-09-23 PROCEDURE — 93010 ELECTROCARDIOGRAM REPORT: CPT | Performed by: INTERNAL MEDICINE

## 2021-09-23 PROCEDURE — 2580000003 HC RX 258: Performed by: INTERNAL MEDICINE

## 2021-09-23 PROCEDURE — 80053 COMPREHEN METABOLIC PANEL: CPT

## 2021-09-23 PROCEDURE — 90935 HEMODIALYSIS ONE EVALUATION: CPT

## 2021-09-23 PROCEDURE — 6370000000 HC RX 637 (ALT 250 FOR IP): Performed by: INTERNAL MEDICINE

## 2021-09-23 PROCEDURE — 97530 THERAPEUTIC ACTIVITIES: CPT

## 2021-09-23 PROCEDURE — 85025 COMPLETE CBC W/AUTO DIFF WBC: CPT

## 2021-09-23 PROCEDURE — 2700000000 HC OXYGEN THERAPY PER DAY

## 2021-09-23 PROCEDURE — 76700 US EXAM ABDOM COMPLETE: CPT

## 2021-09-23 PROCEDURE — 84550 ASSAY OF BLOOD/URIC ACID: CPT

## 2021-09-23 PROCEDURE — 94640 AIRWAY INHALATION TREATMENT: CPT

## 2021-09-23 PROCEDURE — 88185 FLOWCYTOMETRY/TC ADD-ON: CPT

## 2021-09-23 PROCEDURE — 36415 COLL VENOUS BLD VENIPUNCTURE: CPT

## 2021-09-23 PROCEDURE — 1200000000 HC SEMI PRIVATE

## 2021-09-23 RX ADMIN — PREDNISONE 40 MG: 20 TABLET ORAL at 14:54

## 2021-09-23 RX ADMIN — IPRATROPIUM BROMIDE AND ALBUTEROL SULFATE 1 AMPULE: 2.5; .5 SOLUTION RESPIRATORY (INHALATION) at 21:13

## 2021-09-23 RX ADMIN — PANTOPRAZOLE SODIUM 40 MG: 40 TABLET, DELAYED RELEASE ORAL at 05:28

## 2021-09-23 RX ADMIN — ASPIRIN 81 MG CHEWABLE TABLET 81 MG: 81 TABLET CHEWABLE at 14:54

## 2021-09-23 RX ADMIN — ALLOPURINOL 100 MG: 100 TABLET ORAL at 14:54

## 2021-09-23 RX ADMIN — CARVEDILOL 12.5 MG: 12.5 TABLET, FILM COATED ORAL at 20:53

## 2021-09-23 RX ADMIN — BUDESONIDE AND FORMOTEROL FUMARATE DIHYDRATE 2 PUFF: 160; 4.5 AEROSOL RESPIRATORY (INHALATION) at 08:22

## 2021-09-23 RX ADMIN — DOXYCYCLINE HYCLATE 100 MG: 100 TABLET, COATED ORAL at 17:46

## 2021-09-23 RX ADMIN — SODIUM CHLORIDE, PRESERVATIVE FREE 10 ML: 5 INJECTION INTRAVENOUS at 20:55

## 2021-09-23 RX ADMIN — DOXYCYCLINE HYCLATE 100 MG: 100 TABLET, COATED ORAL at 05:28

## 2021-09-23 RX ADMIN — CARVEDILOL 12.5 MG: 12.5 TABLET, FILM COATED ORAL at 14:54

## 2021-09-23 RX ADMIN — IPRATROPIUM BROMIDE AND ALBUTEROL SULFATE 1 AMPULE: 2.5; .5 SOLUTION RESPIRATORY (INHALATION) at 16:31

## 2021-09-23 RX ADMIN — IPRATROPIUM BROMIDE AND ALBUTEROL SULFATE 1 AMPULE: 2.5; .5 SOLUTION RESPIRATORY (INHALATION) at 08:21

## 2021-09-23 RX ADMIN — BUDESONIDE AND FORMOTEROL FUMARATE DIHYDRATE 2 PUFF: 160; 4.5 AEROSOL RESPIRATORY (INHALATION) at 21:13

## 2021-09-23 ASSESSMENT — PAIN SCALES - GENERAL
PAINLEVEL_OUTOF10: 0

## 2021-09-23 NOTE — PROGRESS NOTES
Occupational Therapy   Occupational Therapy Initial Assessment and Discharge  Date: 2021   Patient Name: Altagracia Acosta  MRN: [id number]     : 1955    Date of Service: 2021    Discharge Recommendations:  Home with assist PRN  OT Equipment Recommendations  Equipment Needed: No     Altagracia Acosta scored a 24/24 on the AM-PAC ADL Inpatient form. At this time, no further OT is recommended upon discharge due to pt functioning at/near baseline. Recommend patient returns to prior setting with prior services. Assessment   Performance deficits / Impairments: Decreased endurance  Assessment: Pt presents to be functioning at/near baseline, and does not require any additional acute OT. Pt does have decreased endurance, but wears home O2 at baseline and does not limit pt's occuptional performance as pt is UAL in room managing ADLs at modified independent level. Pt in agreement he is functioning at baseline and denies need for OT services. Anticipate pt will be safe to return home with nephew's assist prn. No acute OT goals identified, will discharge. Prognosis: Good  Decision Making: Low Complexity  OT Education: OT Role  No Skilled OT: Independent with functional mobility; Independent with ADL's;At baseline function; Safe to return home; No OT goals identified  REQUIRES OT FOLLOW UP: No  Activity Tolerance  Activity Tolerance: Patient Tolerated treatment well;Patient limited by fatigue  Activity Tolerance: SpO2 on 3 L O2 mid 80's with activity  Safety Devices  Safety Devices in place: Yes  Type of devices: Call light within reach; Left in chair;Nurse notified           Patient Diagnosis(es): The primary encounter diagnosis was Hyponatremia. Diagnoses of Acidosis and COPD exacerbation ([facility] were also pertinent to this visit.      has a past medical history of Cerebral artery occlusion with cerebral infarction [facility]), CHF (congestive heart failure) ([facility] [address] Chronic kidney disease, COPD (chronic obstructive pulmonary disease) ([facility] [address] Dialysis patient ([facility] [address] Gout, Hemodialysis patient ([facility] [address] Hx of blood clots, Hyperlipidemia, Hypertension, and Renal failure.   has a past surgical history that includes Dialysis fistula creation (Left) and Colonoscopy. Restrictions  Restrictions/Precautions  Restrictions/Precautions: Up Ad Mariela  Position Activity Restriction  Other position/activity restrictions: chronic 3L O2    Subjective   General  Chart Reviewed: Yes  Patient assessed for rehabilitation services?: Yes  Additional Pertinent Hx: The patient is a 63 y.o. male who presents to [facility] with SOB. Pt has h/o ESRD on HD, COPD, states he was getting dialysis today when he developed SOB with suspected COPD exacerbation and hence presented to the ER. Found to be in COPD exacerbation. Family / Caregiver Present: No  Referring Practitioner: Yamila Rocha MD  Diagnosis: Acute COPD exacerbation with bronchitis  Subjective  Subjective: Pt met b/s for OT eval/tx. Pt seated in recliner on arrival, agreeable to participate in therapy. Pt denies pain. Social/Functional History  Social/Functional History  Lives With: Other (comment) (nephew - works full time during day)  Type of Home: House  Home Layout: One level  Home Access: Level entry  Bathroom Shower/Tub: Tub/Shower unit, Shower chair with back, Curtain  Bathroom Toilet: Handicap height  Bathroom Equipment: Shower chair, Hand-held shower (in process of having grab bars installed)  Bathroom Accessibility: Walker accessible  Home Equipment: Oxygen, Cane (3L O2; 4L with exertion)  ADL Assistance: Independent  Homemaking Assistance: Needs assistance (nephew does shopping.  Pt does own laundry, cooking, and light cleaning.)  Ambulation Assistance: Independent (uses a SPC in community)  Transfer Assistance: Independent  Active : No  Patient's  Info: [employer] transportation  Occupation: On disability  Type of occupation: ,   Additional Comments: Denies recent falls       Objective   Vision: Impaired  Vision Exceptions: Wears glasses for distance  Hearing: Within functional limits      Orientation  Overall Orientation Status: Within Functional Limits     Balance  Sitting Balance: Independent  Standing Balance: Independent  Functional Mobility  Functional - Mobility Device: No device  Activity: Other  Assist Level: Independent  Functional Mobility Comments: Pt completed fxl mobility ~1 lap around room with no AD independently. ADL  LE Dressing: Independent (to don socks)  Toileting:  (pt reports just toileted independently prior to OT arrival)  Additional Comments: anticipate pt is independent dressing, grooming, and feeding, modified independent bathing based on ROM/strength, balance, endurance. Bed mobility  Comment: NT-pt seated in recliner at start/end of session     Transfers  Sit to stand: Independent  Stand to sit: Independent     Cognition  Overall Cognitive Status: WFL     Sensation  Overall Sensation Status: Impaired (pt reports neuropathy in B UE/LE's)     LUE AROM (degrees)  LUE AROM : WFL  RUE AROM (degrees)  RUE AROM : WFL     LUE Strength  Gross LUE Strength: WFL  RUE Strength  Gross RUE Strength: WFL  RUE Strength Comment: pt reports h/o CVA with R-sided weakness, WFL strength for ADLs                   Plan   Plan  Times per week: d/c acute OT             AM-PAC Score        AM-PAC Inpatient Daily Activity Raw Score: 24 (09/23/21 1515)  AM-PAC Inpatient ADL T-Scale Score : 57.54 (09/23/21 1515)  ADL Inpatient CMS 0-100% Score: 0 (09/23/21 1515)  ADL Inpatient CMS G-Code Modifier : [address] (09/23/21 1515)    Goals  Short term goals  Time Frame for Short term goals: no acute OT goals identified.        Therapy Time   Individual Concurrent Group Co-treatment   Time In 6162         Time Out 1508         Minutes 26         Timed Code Treatment Minutes: ROSAS GutierrezR/L 0268

## 2021-09-23 NOTE — PLAN OF CARE
Problem: Pain:  Goal: Pain level will decrease  Description: Pain level will decrease  9/23/2021 1057 by Nasim Ruvalcaba RN  Outcome: Ongoing  9/23/2021 0058 by Wesley Flores RN  Outcome: Ongoing  Goal: Control of acute pain  Description: Control of acute pain  9/23/2021 1057 by Nasim Ruvalcaba RN  Outcome: Ongoing  9/23/2021 0058 by Wesley Flores RN  Outcome: Ongoing  Goal: Control of chronic pain  Description: Control of chronic pain  9/23/2021 1057 by Nasim Ruvalcaba RN  Outcome: Ongoing  9/23/2021 0058 by Wesley Flores RN  Outcome: Ongoing     Problem: Falls - Risk of:  Goal: Will remain free from falls  Description: Will remain free from falls  9/23/2021 1057 by Nasim Ruvalcaba RN  Outcome: Ongoing  9/23/2021 0058 by Wesley Flores RN  Outcome: Ongoing  Goal: Absence of physical injury  Description: Absence of physical injury  9/23/2021 1057 by Nasim Ruvalcaba RN  Outcome: Ongoing  9/23/2021 0058 by Wesley Flores RN  Outcome: Ongoing

## 2021-09-23 NOTE — PROGRESS NOTES
Hospitalist Progress Note      PCP: Uziel Zamarripa MD    Date of Admission: 9/21/2021    Chief Complaint:   SOB    Hospital Course: The patient is a 63 y.o. male who presents to [facility] with SOB. Pt has h/o ESRD on HD, COPD, states he was getting dialysis today when he developed SOB with suspected COPD exacerbation and hence presented to the ER. Found to be in COPD exacerbation. Subjective:   Breathing better but still very dyspneic with minimal exertion  Denies chest pain  Having hemodialysis now    Medications:  Reviewed    Infusion Medications    sodium chloride       Scheduled Medications    pantoprazole  40 mg Oral QAM AC    sodium chloride flush  5-40 mL IntraVENous 2 times per day    allopurinol  100 mg Oral Daily    aspirin  81 mg Oral Daily    budesonide-formoterol  2 puff Inhalation BID    carvedilol  12.5 mg Oral BID    predniSONE  40 mg Oral Daily    doxycycline hyclate  100 mg Oral Q12H    ipratropium-albuterol  1 ampule Inhalation Q4H WA     PRN Meds: sodium chloride flush, sodium chloride, ondansetron **OR** ondansetron, polyethylene glycol, acetaminophen **OR** acetaminophen      Intake/Output Summary (Last 24 hours) at 9/23/2021 1013  Last data filed at 9/22/2021 1300  Gross per 24 hour   Intake 400 ml   Output 1191 ml   Net -791 ml       Physical Exam Performed:    BP (!) 141/83   Pulse 73   Temp 97.8 °F (36.6 °C) (Oral)   Resp 18   Ht 5' 8\" (1.727 m)   Wt 147 lb 7.8 oz (66.9 kg)   SpO2 96%   BMI 22.43 kg/m²     General appearance: No apparent distress, appears stated age and cooperative. HEENT: Pupils equal, round, and reactive to light. Conjunctivae/corneas clear. Neck: Supple, with full range of motion. No jugular venous distention. Trachea midline. Respiratory:  Normal respiratory effort. wheeze bilaterally. Cardiovascular: Regular rate and rhythm with normal S1/S2 without murmurs, rubs or gallops.   Abdomen: Soft, non-tender, non-distended with normal bowel sounds. Musculoskeletal: No clubbing, cyanosis or edema bilaterally. Full range of motion without deformity. Skin: Skin color, texture, turgor normal.  No rashes or lesions. Neurologic:  Neurovascularly intact without any focal sensory/motor deficits. Cranial nerves: II-XII intact, grossly non-focal.  Psychiatric: Alert and oriented, thought content appropriate, normal insight  Capillary Refill: Brisk,3 seconds, normal   Peripheral Pulses: +2 palpable, equal bilaterally       Labs:   Recent Labs     09/21/21  1354 09/22/21  0830   WBC 2.1* 1.7*   HGB 11.3* 10.5*   HCT 35.2* 33.1*   PLT 85* 82*     Recent Labs     09/21/21  1354 09/22/21  0912   * 121*   K 4.2 3.8   CL 82* 81*   CO2 27 22   BUN 21* 33*   CREATININE 6.9* 8.2*   CALCIUM 8.9 8.9     Recent Labs     09/21/21  1354 09/22/21  0912   AST 20 15   ALT 8* 7*   BILIDIR  --  0.3   BILITOT 1.0 0.6   ALKPHOS 195* 165*     No results for input(s): INR in the last 72 hours. No results for input(s): Flora Iggy in the last 72 hours. Urinalysis:    No results found for: Elgie Reels, BACTERIA, RBCUA, BLOODU, SPECGRAV, [address]    Radiology:  US ABDOMEN COMPLETE   Final Result   Hepatomegaly. The spleen is unremarkable. XR CHEST PORTABLE   Final Result   No acute process. Assessment/Plan:  Acute COPD exacerbation with bronchitis - started on pred, cont neb/inhaler. Will treat with doxy     Chr resp failure - on 3L home O2     HTN - fairly controlled, cont home meds     Hyponatremia - Na 126, nephrology consulted. Check urine Na     ESRD on HD - on dialysis T/T/S, nephrology consulted. His dialysis was stopped long term. Await nephro recs     Pancytopenia - long standing  -Hemonc seen- attemting to get BM biopsy results from - done in last 3 months  -Recheck CBC, CMP today          DVT Prophylaxis: SCDs. Continue heparin  Diet: ADULT DIET;  Regular  Code Status: Full Code    PT/OT Eval Status: Ordered    Dispo -pending clinical improvement    Brook Pardo MD

## 2021-09-23 NOTE — FLOWSHEET NOTE
Treatment time: 3:10 completed    Net UF: 1.5 liters     Pre weight: 64 kg   Post weight: 62.5 kg   EDW: 63 kg     Access used: L AVG  Access function: good     Medications or blood products given: none     Regular outpatient schedule: TTS     Summary of response to treatment: 1.5 liters removed. Pt tolerated tx well.  Post VSS    Copy of dialysis treatment record placed in chart, to be scanned into EMR.     09/23/21 1004 09/23/21 1312   Vital Signs   /88 (!) 162/69   Temp 98.1 °F (36.7 °C) 97.4 °F (36.3 °C)   Pulse 82 66   Weight 141 lb 1.5 oz (64 kg) 137 lb 12.6 oz (62.5 kg)   Weight Method Standing scale Bed scale   Post-Hemodialysis Assessment   Hemodialysis Intake (ml)  --  500 ml   Hemodialysis Output (ml)  --  2000 ml   NET Removed (ml)  --  1500 ml   Tolerated Treatment  --  Good

## 2021-09-23 NOTE — PROGRESS NOTES
[facility]  Progress Note      SUBJECTIVE:   Pt going to dialysis today  I reviewed his us-his spleen is not big,   His non invasive tests are negative  He then told me he had a bone marrow in the past three months at    However nothing is in care everywhere     ROS: No fever chills sweats, no nausea, vomiting, diarrhea  shortness of breath chest pain or other pain  OBJECTIVE      Medications    Current Facility-Administered Medications: pantoprazole (PROTONIX) tablet 40 mg, 40 mg, Oral, QAM AC  sodium chloride flush 0.9 % injection 5-40 mL, 5-40 mL, IntraVENous, 2 times per day  sodium chloride flush 0.9 % injection 5-40 mL, 5-40 mL, IntraVENous, PRN  0.9 % sodium chloride infusion, 25 mL, IntraVENous, PRN  ondansetron (ZOFRAN-ODT) disintegrating tablet 4 mg, 4 mg, Oral, Q8H PRN **OR** ondansetron (ZOFRAN) injection 4 mg, 4 mg, IntraVENous, Q6H PRN  polyethylene glycol (GLYCOLAX) packet 17 g, 17 g, Oral, Daily PRN  acetaminophen (TYLENOL) tablet 650 mg, 650 mg, Oral, Q6H PRN **OR** acetaminophen (TYLENOL) suppository 650 mg, 650 mg, Rectal, Q6H PRN  allopurinol (ZYLOPRIM) tablet 100 mg, 100 mg, Oral, Daily  aspirin chewable tablet 81 mg, 81 mg, Oral, Daily  budesonide-formoterol (SYMBICORT) 160-4.5 MCG/ACT inhaler 2 puff, 2 puff, Inhalation, BID  carvedilol (COREG) tablet 12.5 mg, 12.5 mg, Oral, BID  predniSONE (DELTASONE) tablet 40 mg, 40 mg, Oral, Daily  doxycycline hyclate (VIBRA-TABS) tablet 100 mg, 100 mg, Oral, Q12H  ipratropium-albuterol (DUONEB) nebulizer solution 1 ampule, 1 ampule, Inhalation, Q4H WA  Physical    VITALS:  BP (!) 141/83   Pulse 73   Temp 97.8 °F (36.6 °C) (Oral)   Resp 18   Ht 5' 8\" (1.727 m)   Wt 147 lb 7.8 oz (66.9 kg)   SpO2 96%   BMI 22.43 kg/m²   TEMPERATURE:  Current - Temp: 97.8 °F (36.6 °C);  Max - Temp  Av.9 °F (36.6 °C)  Min: 97.5 °F (36.4 °C)  Max: 98.8 °F (37.1 °C)  PULSE OXIMETRY RANGE: SpO2  Av.4 %  Min: 95 %  Max: 100 %  24HR INTAKE/OUTPUT:    Intake/Output Summary (Last 24 hours) at 9/23/2021 0959  Last data filed at 9/22/2021 1300  Gross per 24 hour   Intake 400 ml   Output 1191 ml   Net -791 ml       CONSTITUTIONAL:  awake, alert, cooperative, no apparent distress, HEENT oral pharynx , no scleral icterus  HEMATOLOGIC/LYMPHATICS:  no cervical lymphadenopathy, no supraclavicular lymphadenopathy, LUNGS:  No increased work of breathing, good air exchange, clear to auscultation bilaterally, no crackles or wheezing  CARDIOVASCULAR:  , regular rate and rhythm, normal S1 and S2, no S3 or S4, and no murmur noted  ABDOMEN:  No scars, normal bowel sounds, soft, non-distended, non-tender, no masses palpated, no hepatosplenomegally  MUSCULOSKELETAL:  There is no redness, warmth, or swelling of the joints. EXTREMETIES: No clubbing cynosis or edema  NEUROLOGIC:  Awake, alert, oriented to name, place and time. Cranial nerves II-XII are grossly intact. Motor is 5 out of 5 bilaterally. SKIN:  no bruising or bleeding      Data      Recent Labs     09/21/21  1354 09/22/21  0830   WBC 2.1* 1.7*   HGB 11.3* 10.5*   HCT 35.2* 33.1*   PLT 85* 82*   MCV 94.0 93.8        Recent Labs     09/21/21  1354 09/22/21  0912   * 121*   K 4.2 3.8   CL 82* 81*   CO2 27 22   BUN 21* 33*   CREATININE 6.9* 8.2*     Recent Labs     09/21/21  1354 09/22/21  0912   AST 20 15   ALT 8* 7*   BILIDIR  --  0.3   BILITOT 1.0 0.6   ALKPHOS 195* 165*       Magnesium:    Lab Results   Component Value Date    MG 2.00 04/08/2020    MG 2.20 04/07/2020    MG 2.20 04/06/2020       Imaging US ABDOMEN COMPLETE    Result Date: 9/23/2021  EXAMINATION: COMPLETE ABDOMINAL ULTRASOUND 9/23/2021 7:23 am COMPARISON: None. HISTORY: ORDERING SYSTEM PROVIDED HISTORY: eval for splenomegly/portal htn TECHNOLOGIST PROVIDED HISTORY: Reason for exam:->eval for splenomegly/portal htn FINDINGS: LIVER: Hepatomegaly.   Otherwise the liver demonstrates normal echogenicity without evidence of intrahepatic biliary ductal dilatation. BILIARY SYSTEM: Gallbladder is unremarkable without evidence of pericholecystic fluid, wall thickening or stones. Negative sonographic Christianson's sign. Common bile duct is within normal limits measuring 0.5 cm. KIDNEYS: The kidneys are unremarkable in appearance without evidence of hydronephrosis. The right kidney measures 8.5 cm in length and the left kidney measures 9.3 cm in length. PANCREAS: Visualized portions of the pancreas are unremarkable. SPLEEN: The spleen is unremarkable in appearance. Spleen is within normal limits in size. IVC: The IVC is patent. AORTA: The aorta is not well visualized. OTHER: No evidence of ascites. Hepatomegaly. The spleen is unremarkable. XR CHEST PORTABLE    Result Date: 9/21/2021  EXAMINATION: ONE XRAY VIEW OF THE CHEST 9/21/2021 1:41 pm COMPARISON: 04/06/2021 HISTORY: ORDERING SYSTEM PROVIDED HISTORY: SOB TECHNOLOGIST PROVIDED HISTORY: Reason for exam:->SOB Reason for Exam: SOB; COPD Acuity: Acute Type of Exam: Initial FINDINGS: The lungs are without acute focal process. There is no effusion or pneumothorax. The cardiomediastinal silhouette is stable. The osseous structures are stable. No acute process.        Problem List  Patient Active Problem List   Diagnosis    HCAP (healthcare-associated pneumonia)    COPD exacerbation ([address]    ESRD on dialysis ([address]    COPD (chronic obstructive pulmonary disease) ([address]    Acute on chronic respiratory failure with hypoxia and hypercapnia ([address]       ASSESSMENT AND PLAN    Pancytopenia  All workup so far negative  Pt tells me he had a marrow at  within the past three months  I will see if medical records can be found on this pt  As I was yuliya gto rec a marrow but if he just had one for the same reason it wont be needed      Apolonia Malik MD, MD

## 2021-09-23 NOTE — PLAN OF CARE
Problem: Pain:  Goal: Pain level will decrease  Description: Pain level will decrease  9/23/2021 0058 by Florian Dykes RN  Outcome: Ongoing  9/22/2021 1527 by Karin Scott RN  Outcome: Ongoing  Goal: Control of acute pain  Description: Control of acute pain  9/23/2021 0058 by Florian Dykes RN  Outcome: Ongoing  9/22/2021 1527 by Karin Scott RN  Outcome: Ongoing  Goal: Control of chronic pain  Description: Control of chronic pain  9/23/2021 0058 by Florian Dykes RN  Outcome: Ongoing  9/22/2021 1527 by Karin Scott RN  Outcome: Ongoing     Problem: Falls - Risk of:  Goal: Will remain free from falls  Description: Will remain free from falls  9/23/2021 0058 by Florian Dykes RN  Outcome: Ongoing  9/22/2021 1527 by Karin Scott RN  Outcome: Ongoing  Goal: Absence of physical injury  Description: Absence of physical injury  9/23/2021 0058 by Florian Dykes RN  Outcome: Ongoing  9/22/2021 1527 by Karin Scott RN  Outcome: Ongoing

## 2021-09-23 NOTE — PROGRESS NOTES
Physical Therapy  Follow-up Attempt    21    Name: Lindsey Armas   : 1955    MRN: [id number]    PT order noted. Patient unable to be seen by PT due to leaving room for HD. Will continue to follow.     Electronically signed by Jermain Dumont PT on 2021 at 9:47 AM

## 2021-09-24 LAB
ALBUMIN SERPL-MCNC: 3.8 G/DL (ref 3.1–4.9)
ALPHA-1-GLOBULIN: 0.3 G/DL (ref 0.2–0.4)
ALPHA-2-GLOBULIN: 0.6 G/DL (ref 0.4–1.1)
ANION GAP SERPL CALCULATED.3IONS-SCNC: 14 MMOL/L (ref 3–16)
BETA GLOBULIN: 0.8 G/DL (ref 0.9–1.6)
BUN BLDV-MCNC: 18 MG/DL (ref 7–20)
CALCIUM SERPL-MCNC: 9.2 MG/DL (ref 8.3–10.6)
CHLORIDE BLD-SCNC: 88 MMOL/L (ref 99–110)
CO2: 25 MMOL/L (ref 21–32)
CREAT SERPL-MCNC: 5.6 MG/DL (ref 0.8–1.3)
GAMMA GLOBULIN: 0.9 G/DL (ref 0.6–1.8)
GFR AFRICAN AMERICAN: 12
GFR NON-AFRICAN AMERICAN: 10
GLUCOSE BLD-MCNC: 115 MG/DL (ref 70–99)
GLUCOSE BLD-MCNC: 131 MG/DL (ref 70–99)
GLUCOSE BLD-MCNC: 89 MG/DL (ref 70–99)
PERFORMED ON: ABNORMAL
PERFORMED ON: NORMAL
POTASSIUM SERPL-SCNC: 4.4 MMOL/L (ref 3.5–5.1)
SODIUM BLD-SCNC: 127 MMOL/L (ref 136–145)
SPE/IFE INTERPRETATION: NORMAL
TOTAL PROTEIN: 6.3 G/DL (ref 6.4–8.2)

## 2021-09-24 PROCEDURE — 97530 THERAPEUTIC ACTIVITIES: CPT

## 2021-09-24 PROCEDURE — 94761 N-INVAS EAR/PLS OXIMETRY MLT: CPT

## 2021-09-24 PROCEDURE — 2700000000 HC OXYGEN THERAPY PER DAY

## 2021-09-24 PROCEDURE — 94640 AIRWAY INHALATION TREATMENT: CPT

## 2021-09-24 PROCEDURE — 2580000003 HC RX 258: Performed by: INTERNAL MEDICINE

## 2021-09-24 PROCEDURE — 5A1D70Z PERFORMANCE OF URINARY FILTRATION, INTERMITTENT, LESS THAN 6 HOURS PER DAY: ICD-10-PCS | Performed by: INTERNAL MEDICINE

## 2021-09-24 PROCEDURE — 6370000000 HC RX 637 (ALT 250 FOR IP): Performed by: INTERNAL MEDICINE

## 2021-09-24 PROCEDURE — 80048 BASIC METABOLIC PNL TOTAL CA: CPT

## 2021-09-24 PROCEDURE — 1200000000 HC SEMI PRIVATE

## 2021-09-24 PROCEDURE — 36415 COLL VENOUS BLD VENIPUNCTURE: CPT

## 2021-09-24 RX ADMIN — IPRATROPIUM BROMIDE AND ALBUTEROL SULFATE 1 AMPULE: 2.5; .5 SOLUTION RESPIRATORY (INHALATION) at 20:36

## 2021-09-24 RX ADMIN — BUDESONIDE AND FORMOTEROL FUMARATE DIHYDRATE 2 PUFF: 160; 4.5 AEROSOL RESPIRATORY (INHALATION) at 20:36

## 2021-09-24 RX ADMIN — DOXYCYCLINE HYCLATE 100 MG: 100 TABLET, COATED ORAL at 06:06

## 2021-09-24 RX ADMIN — ASPIRIN 81 MG CHEWABLE TABLET 81 MG: 81 TABLET CHEWABLE at 08:47

## 2021-09-24 RX ADMIN — IPRATROPIUM BROMIDE AND ALBUTEROL SULFATE 1 AMPULE: 2.5; .5 SOLUTION RESPIRATORY (INHALATION) at 08:17

## 2021-09-24 RX ADMIN — PREDNISONE 40 MG: 20 TABLET ORAL at 08:47

## 2021-09-24 RX ADMIN — ALLOPURINOL 100 MG: 100 TABLET ORAL at 08:47

## 2021-09-24 RX ADMIN — IPRATROPIUM BROMIDE AND ALBUTEROL SULFATE 1 AMPULE: 2.5; .5 SOLUTION RESPIRATORY (INHALATION) at 12:36

## 2021-09-24 RX ADMIN — PANTOPRAZOLE SODIUM 40 MG: 40 TABLET, DELAYED RELEASE ORAL at 06:06

## 2021-09-24 RX ADMIN — CARVEDILOL 12.5 MG: 12.5 TABLET, FILM COATED ORAL at 21:45

## 2021-09-24 RX ADMIN — IPRATROPIUM BROMIDE AND ALBUTEROL SULFATE 1 AMPULE: 2.5; .5 SOLUTION RESPIRATORY (INHALATION) at 17:02

## 2021-09-24 RX ADMIN — BUDESONIDE AND FORMOTEROL FUMARATE DIHYDRATE 2 PUFF: 160; 4.5 AEROSOL RESPIRATORY (INHALATION) at 08:17

## 2021-09-24 RX ADMIN — SODIUM CHLORIDE, PRESERVATIVE FREE 10 ML: 5 INJECTION INTRAVENOUS at 21:45

## 2021-09-24 RX ADMIN — CARVEDILOL 12.5 MG: 12.5 TABLET, FILM COATED ORAL at 08:47

## 2021-09-24 RX ADMIN — DOXYCYCLINE HYCLATE 100 MG: 100 TABLET, COATED ORAL at 19:15

## 2021-09-24 RX ADMIN — SODIUM CHLORIDE, PRESERVATIVE FREE 10 ML: 5 INJECTION INTRAVENOUS at 10:00

## 2021-09-24 ASSESSMENT — PAIN SCALES - GENERAL
PAINLEVEL_OUTOF10: 0

## 2021-09-24 NOTE — PROGRESS NOTES
[facility]  Progress Note      SUBJECTIVE:   I was able to locate his bone marrow from 2021   It was normocellular marrow -mildly hypercellular with no dysplasia and had normal cytogenetics  Thus its not his marrow causing it     ROS: No fever chills sweats, no nausea, vomiting, diarrhea  shortness of breath chest pain or other pain  OBJECTIVE      Medications    Current Facility-Administered Medications: pantoprazole (PROTONIX) tablet 40 mg, 40 mg, Oral, QAM AC  sodium chloride flush 0.9 % injection 5-40 mL, 5-40 mL, IntraVENous, 2 times per day  sodium chloride flush 0.9 % injection 5-40 mL, 5-40 mL, IntraVENous, PRN  0.9 % sodium chloride infusion, 25 mL, IntraVENous, PRN  ondansetron (ZOFRAN-ODT) disintegrating tablet 4 mg, 4 mg, Oral, Q8H PRN **OR** ondansetron (ZOFRAN) injection 4 mg, 4 mg, IntraVENous, Q6H PRN  polyethylene glycol (GLYCOLAX) packet 17 g, 17 g, Oral, Daily PRN  acetaminophen (TYLENOL) tablet 650 mg, 650 mg, Oral, Q6H PRN **OR** acetaminophen (TYLENOL) suppository 650 mg, 650 mg, Rectal, Q6H PRN  allopurinol (ZYLOPRIM) tablet 100 mg, 100 mg, Oral, Daily  aspirin chewable tablet 81 mg, 81 mg, Oral, Daily  budesonide-formoterol (SYMBICORT) 160-4.5 MCG/ACT inhaler 2 puff, 2 puff, Inhalation, BID  carvedilol (COREG) tablet 12.5 mg, 12.5 mg, Oral, BID  predniSONE (DELTASONE) tablet 40 mg, 40 mg, Oral, Daily  doxycycline hyclate (VIBRA-TABS) tablet 100 mg, 100 mg, Oral, Q12H  ipratropium-albuterol (DUONEB) nebulizer solution 1 ampule, 1 ampule, Inhalation, Q4H WA  Physical    VITALS:  BP (!) 152/87   Pulse 89   Temp 97.9 °F (36.6 °C) (Oral)   Resp 16   Ht 5' 8\" (1.727 m)   Wt 137 lb 5.6 oz (62.3 kg)   SpO2 95%   BMI 20.88 kg/m²   TEMPERATURE:  Current - Temp: 97.9 °F (36.6 °C);  Max - Temp  Av.8 °F (36.6 °C)  Min: 97.4 °F (36.3 °C)  Max: 98.1 °F (36.7 °C)  PULSE OXIMETRY RANGE: SpO2  Av.2 %  Min: 95 %  Max: 100 %  24HR INTAKE/OUTPUT:      Intake/Output Summary (Last 24 hours) at 9/24/2021 3401  Last data filed at 9/23/2021 2128  Gross per 24 hour   Intake 620 ml   Output 2000 ml   Net -1380 ml       CONSTITUTIONAL:  awake, alert, cooperative, no apparent distress, HEENT oral pharynx , no scleral icterus  HEMATOLOGIC/LYMPHATICS:  no cervical lymphadenopathy, no supraclavicular lymphadenopathy, LUNGS:  No increased work of breathing, good air exchange, clear to auscultation bilaterally, no crackles or wheezing  CARDIOVASCULAR:  , regular rate and rhythm, normal S1 and S2, no S3 or S4, and no murmur noted  ABDOMEN:  No scars, normal bowel sounds, soft, non-distended, non-tender, no masses palpated, no hepatosplenomegally  MUSCULOSKELETAL:  There is no redness, warmth, or swelling of the joints. EXTREMETIES: No clubbing cynosis or edema  NEUROLOGIC:  Awake, alert, oriented to name, place and time. Cranial nerves II-XII are grossly intact. Motor is 5 out of 5 bilaterally. SKIN:  no bruising or bleeding      Data      Recent Labs     09/21/21  1354 09/22/21  0830 09/23/21  0757   WBC 2.1* 1.7* 2.7*   HGB 11.3* 10.5* 10.8*   HCT 35.2* 33.1* 34.0*   PLT 85* 82* 85*   MCV 94.0 93.8 94.5        Recent Labs     09/21/21  1354 09/22/21  0912 09/23/21  0751   * 121* 124*   K 4.2 3.8 4.2   CL 82* 81* 83*   CO2 27 22 23   BUN 21* 33* 18   CREATININE 6.9* 8.2* 5.4*     Recent Labs     09/21/21  1354 09/22/21  0912 09/23/21  0751   AST 20 15 21   ALT 8* 7* 7*   BILIDIR  --  0.3  --    BILITOT 1.0 0.6 0.6   ALKPHOS 195* 165* 155*       Magnesium:    Lab Results   Component Value Date    MG 2.00 04/08/2020    MG 2.20 04/07/2020    MG 2.20 04/06/2020       Imaging US ABDOMEN COMPLETE    Result Date: 9/23/2021  EXAMINATION: COMPLETE ABDOMINAL ULTRASOUND 9/23/2021 7:23 am COMPARISON: None. HISTORY: ORDERING SYSTEM PROVIDED HISTORY: eval for splenomegly/portal htn TECHNOLOGIST PROVIDED HISTORY: Reason for exam:->eval for splenomegly/portal htn FINDINGS: LIVER: Hepatomegaly. Otherwise the liver demonstrates normal echogenicity without evidence of intrahepatic biliary ductal dilatation. BILIARY SYSTEM: Gallbladder is unremarkable without evidence of pericholecystic fluid, wall thickening or stones. Negative sonographic Christianson's sign. Common bile duct is within normal limits measuring 0.5 cm. KIDNEYS: The kidneys are unremarkable in appearance without evidence of hydronephrosis. The right kidney measures 8.5 cm in length and the left kidney measures 9.3 cm in length. PANCREAS: Visualized portions of the pancreas are unremarkable. SPLEEN: The spleen is unremarkable in appearance. Spleen is within normal limits in size. IVC: The IVC is patent. AORTA: The aorta is not well visualized. OTHER: No evidence of ascites. Hepatomegaly. The spleen is unremarkable. XR CHEST PORTABLE    Result Date: 9/21/2021  EXAMINATION: ONE XRAY VIEW OF THE CHEST 9/21/2021 1:41 pm COMPARISON: 04/06/2021 HISTORY: ORDERING SYSTEM PROVIDED HISTORY: SOB TECHNOLOGIST PROVIDED HISTORY: Reason for exam:->SOB Reason for Exam: SOB; COPD Acuity: Acute Type of Exam: Initial FINDINGS: The lungs are without acute focal process. There is no effusion or pneumothorax. The cardiomediastinal silhouette is stable. The osseous structures are stable. No acute process.        Problem List  Patient Active Problem List   Diagnosis    HCAP (healthcare-associated pneumonia)    COPD exacerbation ([address]    ESRD on dialysis ([address]    COPD (chronic obstructive pulmonary disease) ([address]    Acute on chronic respiratory failure with hypoxia and hypercapnia ([address]       ASSESSMENT AND PLAN    Pancytopenia  All workup so far negative  I was able to find this from June 2021 and it was infact normal   THerefore no need to repeat  Cause may be a medication effect but at this point its not causing a clinical problem so I recommend observatinon, no further action and I will sign off       Judy Stacy MD, MD

## 2021-09-24 NOTE — PROGRESS NOTES
Physical Therapy  Facility/Department: [address] MED SURG  Daily Treatment Note and discharge summary  NAME: Roger Pena  : 1955  MRN: [id number]    Date of Service: 2021    Discharge Recommendations:  Home with assist PRN        Assessment   Body structures, Functions, Activity limitations: Decreased endurance;Decreased balance  Assessment: The patient is a 63 y.o. male who presents to [facility] on 21 with SOB. Pt diagnosed with COPD exacerbation. Prior to admission, pt living with nephew in level entry one level home - nephew works day shift - pt reporting he is independent with all ADLs and ambulation, using a SPC in community. Pt currently functioning  at baseline - even on 2L O2 vs chronic 3L O2 with sats staying above 97% with 200' of ambulation. Anticipate return home with PRN assist from nephew. PT signing off. Treatment Diagnosis: impaired dynamic balance  Prognosis: Good  Decision Making: Low Complexity  History: see below  Exam: see below  Clinical Presentation: stable  PT Education: PT Role;Plan of Care;General Safety;Gait Training;Functional Mobility Training  No Skilled PT: At baseline function  REQUIRES PT FOLLOW UP: No  Activity Tolerance  Activity Tolerance: Patient Tolerated treatment well     Patient Diagnosis(es): The primary encounter diagnosis was Hyponatremia. Diagnoses of Acidosis and COPD exacerbation ([address] were also pertinent to this visit. has a past medical history of Cerebral artery occlusion with cerebral infarction ([address] CHF (congestive heart failure) ([address] Chronic kidney disease, COPD (chronic obstructive pulmonary disease) ([address] Dialysis patient ([address] Gout, Hemodialysis patient ([address] Hx of blood clots, Hyperlipidemia, Hypertension, and Renal failure.   has a past surgical history that includes Dialysis fistula creation (Left) and Colonoscopy.     Restrictions  Restrictions/Precautions  Restrictions/Precautions: Up Ad Mariela  Position Activity Restriction  Other position/activity restrictions: chronic 3L O2, however on 2L this date. Subjective   General  Chart Reviewed: Yes  Additional Pertinent Hx: The patient is a 63 y.o. male who presents to [facility] on 9/21/21 with SOB. Response To Previous Treatment: Patient with no complaints from previous session. Family / Caregiver Present: No  Referring Practitioner: Renee Mcgraw MD  Subjective  Subjective: Pt is agreeable to PT. Orientation  Orientation  Overall Orientation Status: Within Functional Limits     Cognition   Cognition  Overall Cognitive Status: WFL     Objective   Bed mobility  Supine to Sit: Independent  Sit to Supine: Independent  Transfers  Sit to Stand: Independent  Stand to sit: Independent  Ambulation  Ambulation?: Yes  Ambulation 1  Surface: level tile  Device: No Device  Other Apparatus: O2 (2L)  Assistance: Independent  Gait Deviations: None  Distance: 200'  Comments: O2 sats 97% or higher while on 2L O2 during his 200' of ambulation.   Stairs/Curb  Stairs?: No     Balance  Posture: Good  Sitting - Static: Good  Sitting - Dynamic: Good  Standing - Static: Good  Standing - Dynamic: Good              AM-PAC Score  AM-PAC Inpatient Mobility Raw Score : 24 (09/24/21 0944)  AM-PAC Inpatient T-Scale Score : 61.14 (09/24/21 0944)  Mobility Inpatient CMS 0-100% Score: 0 (09/24/21 0944)  Mobility Inpatient CMS G-Code Modifier : [address] (09/24/21 0944)          Goals  Short term goals  Time Frame for Short term goals: by acute discharge - all goals met 9/24/21  Short term goal 1: sit<>stand independently  Short term goal 2: ambulate > 50' independently  Patient Goals   Patient goals : none stated    Plan    Plan  Times per week: 2-3x/week  Current Treatment Recommendations: Functional Mobility Training, Balance Training, Gait Training, Patient/Caregiver Education & Training, Equipment Evaluation, Education, & procurement, Neuromuscular Re-education  Safety Devices  Type of devices: Call light within reach, Left in bed     Therapy Time   Individual Concurrent Group Co-treatment   Time In 0915         Time Out 0940         Minutes 25         Timed Code Treatment Minutes: 25 Minutes       Myron Wright, PT

## 2021-09-24 NOTE — PROGRESS NOTES
Nephrology ([facility]) Progress Note    CC: ESRD management    Subjective:    HPI:  Breathing stable. No CP.   ROS:  In bed. [address]:  medications reviewed. Objective:  Blood pressure (!) 142/75, pulse 85, temperature 98.3 °F (36.8 °C), temperature source Oral, resp. rate 18, height 5' 8\" (1.727 m), weight 137 lb 5.6 oz (62.3 kg), SpO2 100 %. Intake/Output Summary (Last 24 hours) at 9/24/2021 1440  Last data filed at 9/23/2021 2128  Gross per 24 hour   Intake 120 ml   Output --   Net 120 ml     General:  NAD, A+Ox3  Chest:  occ wheezing  CVS:  RRR  Abdominal:  NTND, soft, +BS  Extremities:  no edema  Skin:  no rash    Labs:  Renal panel:  Lab Results   Component Value Date/Time     (L) 09/23/2021 07:51 AM    K 4.2 09/23/2021 07:51 AM    K 3.8 09/22/2021 09:12 AM    CO2 23 09/23/2021 07:51 AM    BUN 18 09/23/2021 07:51 AM    CREATININE 5.4 (HH) 09/23/2021 07:51 AM    CALCIUM 9.5 09/23/2021 07:51 AM    PHOS 3.6 04/08/2020 04:32 AM    MG 2.00 04/08/2020 04:32 AM     CBC:  Lab Results   Component Value Date/Time    WBC 2.7 (L) 09/23/2021 07:57 AM    HGB 10.8 (L) 09/23/2021 07:57 AM    HCT 34.0 (L) 09/23/2021 07:57 AM    PLT 85 (L) 09/23/2021 07:57 AM       Assessment/Plan:  Reviewed old records and labs. 1) ESRD              - HD MWF     2) COPD exacerbation              - on steroids and nebs     3) pancytopenia   - 06/21 bone marrow unremarkable              - Dr. Festus Bolivar consulted    4) hyponatremia   - fluid restriction 1 liter/day    Patient can be discharged from renal standpoint.

## 2021-09-24 NOTE — FLOWSHEET NOTE
Patient back in bed early after his early morning ABD ultrasound. And dialysis as well. He said he's not happy coz he's been hungry all morning (was NPO at midnight). No complaints this evening. Refused to have his SCD's back on. Niecy Toney said, I don't have to wear it, so I won't\" he stated, referring to his doctors. Patient up ad milena. No BLE edema noted.

## 2021-09-24 NOTE — PLAN OF CARE
Problem: Pain:  Goal: Pain level will decrease  Description: Pain level will decrease  9/23/2021 2301 by Hay Jensen RN  Outcome: Ongoing  9/23/2021 1057 by Aparna Monique RN  Outcome: Ongoing  Goal: Control of acute pain  Description: Control of acute pain  9/23/2021 2301 by Hay Jensen RN  Outcome: Ongoing  9/23/2021 1057 by Aparna Monique RN  Outcome: Ongoing  Goal: Control of chronic pain  Description: Control of chronic pain  9/23/2021 2301 by Hay Jensen RN  Outcome: Ongoing  9/23/2021 1057 by Aparna Monique RN  Outcome: Ongoing     Problem: Falls - Risk of:  Goal: Will remain free from falls  Description: Will remain free from falls  9/23/2021 2301 by Hay Jenesn RN  Outcome: Ongoing  9/23/2021 1057 by Aparna Monique RN  Outcome: Ongoing  Goal: Absence of physical injury  Description: Absence of physical injury  9/23/2021 2301 by Hay Jensen RN  Outcome: Ongoing  9/23/2021 1057 by Aparna Monique RN  Outcome: Ongoing

## 2021-09-24 NOTE — PROGRESS NOTES
Hospitalist Progress Note      PCP: Jessica Cai MD    Date of Admission: 9/21/2021    Chief Complaint:   SOB    Hospital Course: The patient is a 63 y.o. male who presents to [facility] with SOB. Pt has h/o ESRD on HD, COPD, states he was getting dialysis today when he developed SOB with suspected COPD exacerbation and hence presented to the ER. Found to be in COPD exacerbation. Subjective:   Breathing better but still very dyspneic with minimal exertion  Denies chest pain  Having hemodialysis now  We will continue to follow  Okay for discharge when diuresis is optimized    Medications:  Reviewed    Infusion Medications    sodium chloride       Scheduled Medications    pantoprazole  40 mg Oral QAM AC    sodium chloride flush  5-40 mL IntraVENous 2 times per day    allopurinol  100 mg Oral Daily    aspirin  81 mg Oral Daily    budesonide-formoterol  2 puff Inhalation BID    carvedilol  12.5 mg Oral BID    predniSONE  40 mg Oral Daily    doxycycline hyclate  100 mg Oral Q12H    ipratropium-albuterol  1 ampule Inhalation Q4H WA     PRN Meds: sodium chloride, sodium chloride flush, sodium chloride, ondansetron **OR** ondansetron, polyethylene glycol, acetaminophen **OR** acetaminophen      Intake/Output Summary (Last 24 hours) at 9/24/2021 1649  Last data filed at 9/23/2021 2128  Gross per 24 hour   Intake 120 ml   Output --   Net 120 ml       Physical Exam Performed:    BP (!) 142/75   Pulse 85   Temp 98.3 °F (36.8 °C) (Oral)   Resp 18   Ht 5' 8\" (1.727 m)   Wt 137 lb 5.6 oz (62.3 kg)   SpO2 100%   BMI 20.88 kg/m²     General appearance: Obese ,no apparent distress, appears stated age and cooperative. HEENT: Pupils equal, round, and reactive to light. Conjunctivae/corneas clear. Neck: Supple, with full range of motion. No jugular venous distention. Trachea midline. Respiratory:  Normal respiratory effort. wheeze bilaterally.   Cardiovascular: Regular rate and rhythm with normal S1/S2 without murmurs, rubs or gallops. Abdomen: Soft, non-tender, non-distended with normal bowel sounds. Musculoskeletal: No clubbing, cyanosis or edema bilaterally. Full range of motion without deformity. Skin: Skin color, texture, turgor normal.  No rashes or lesions. Neurologic:  Neurovascularly intact without any focal sensory/motor deficits. Cranial nerves: II-XII intact, grossly non-focal.  Psychiatric: Alert and oriented, thought content appropriate, normal insight  Capillary Refill: Brisk,3 seconds, normal   Peripheral Pulses: +2 palpable, equal bilaterally       Labs:   Recent Labs     09/22/21  0830 09/23/21  0757   WBC 1.7* 2.7*   HGB 10.5* 10.8*   HCT 33.1* 34.0*   PLT 82* 85*     Recent Labs     09/22/21  0912 09/23/21  0751   * 124*   K 3.8 4.2   CL 81* 83*   CO2 22 23   BUN 33* 18   CREATININE 8.2* 5.4*   CALCIUM 8.9 9.5     Recent Labs     09/22/21  0912 09/23/21  0751   AST 15 21   ALT 7* 7*   BILIDIR 0.3  --    BILITOT 0.6 0.6   ALKPHOS 165* 155*     No results for input(s): INR in the last 72 hours. No results for input(s): Zettie Binet in the last 72 hours. Urinalysis:    No results found for: Deward Goon, BACTERIA, RBCUA, BLOODU, SPECGRAV, [address]    Radiology:  US ABDOMEN COMPLETE   Final Result   Hepatomegaly. The spleen is unremarkable. XR CHEST PORTABLE   Final Result   No acute process. Assessment/Plan:  Acute COPD exacerbation with bronchitis   - started on pred  -cont neb/inhaler.   -Will treat with doxy     Chronic hypoxic respiratory failure- on 3L home O2     HTN - fairly controlled, cont home meds     Hyponatremia - Na 126,   -nephrology consulted. -Check urine Na  -Daily BMP     ESRD on HD - on dialysis T/T/S, nephrology consulted. His dialysis was stopped skilled nursing.  Await nephro recs     Pancytopenia - long standing  -Hemonc seen- attemting to get BM biopsy results from - done in last 3 months  -Recheck CBC, CMP today          DVT Prophylaxis: SCDs. Continue heparin  Diet: ADULT DIET;  Regular; 1000 ml  Code Status: Full Code    PT/OT Eval Status: Ordered    Dispo -pending clinical improvement    Elenita Elias MD

## 2021-09-25 VITALS
DIASTOLIC BLOOD PRESSURE: 96 MMHG | HEART RATE: 81 BPM | HEIGHT: 68 IN | OXYGEN SATURATION: 99 % | WEIGHT: 136.69 LBS | BODY MASS INDEX: 20.72 KG/M2 | RESPIRATION RATE: 20 BRPM | SYSTOLIC BLOOD PRESSURE: 134 MMHG | TEMPERATURE: 97.8 F

## 2021-09-25 LAB
ANION GAP SERPL CALCULATED.3IONS-SCNC: 15 MMOL/L (ref 3–16)
BASOPHILS ABSOLUTE: 0 K/UL (ref 0–0.2)
BASOPHILS RELATIVE PERCENT: 0.1 %
BUN BLDV-MCNC: 25 MG/DL (ref 7–20)
CALCIUM SERPL-MCNC: 9.9 MG/DL (ref 8.3–10.6)
CHLORIDE BLD-SCNC: 87 MMOL/L (ref 99–110)
CO2: 26 MMOL/L (ref 21–32)
CREAT SERPL-MCNC: 6.5 MG/DL (ref 0.8–1.3)
EOSINOPHILS ABSOLUTE: 0 K/UL (ref 0–0.6)
EOSINOPHILS RELATIVE PERCENT: 0.3 %
GFR AFRICAN AMERICAN: 10
GFR NON-AFRICAN AMERICAN: 9
GLUCOSE BLD-MCNC: 96 MG/DL (ref 70–99)
HCT VFR BLD CALC: 34.1 % (ref 40.5–52.5)
HEMOGLOBIN: 11 G/DL (ref 13.5–17.5)
LYMPHOCYTES ABSOLUTE: 0.4 K/UL (ref 1–5.1)
LYMPHOCYTES RELATIVE PERCENT: 12.3 %
MCH RBC QN AUTO: 30.3 PG (ref 26–34)
MCHC RBC AUTO-ENTMCNC: 32.3 G/DL (ref 31–36)
MCV RBC AUTO: 93.9 FL (ref 80–100)
MONOCYTES ABSOLUTE: 0.2 K/UL (ref 0–1.3)
MONOCYTES RELATIVE PERCENT: 7.3 %
NEUTROPHILS ABSOLUTE: 2.5 K/UL (ref 1.7–7.7)
NEUTROPHILS RELATIVE PERCENT: 80 %
PDW BLD-RTO: 18.4 % (ref 12.4–15.4)
PLATELET # BLD: 76 K/UL (ref 135–450)
PMV BLD AUTO: 7.3 FL (ref 5–10.5)
POTASSIUM REFLEX MAGNESIUM: 3.6 MMOL/L (ref 3.5–5.1)
RBC # BLD: 3.63 M/UL (ref 4.2–5.9)
SODIUM BLD-SCNC: 128 MMOL/L (ref 136–145)
WBC # BLD: 3.1 K/UL (ref 4–11)

## 2021-09-25 PROCEDURE — 6370000000 HC RX 637 (ALT 250 FOR IP): Performed by: INTERNAL MEDICINE

## 2021-09-25 PROCEDURE — 85025 COMPLETE CBC W/AUTO DIFF WBC: CPT

## 2021-09-25 PROCEDURE — 94640 AIRWAY INHALATION TREATMENT: CPT

## 2021-09-25 PROCEDURE — 36415 COLL VENOUS BLD VENIPUNCTURE: CPT

## 2021-09-25 PROCEDURE — 90935 HEMODIALYSIS ONE EVALUATION: CPT

## 2021-09-25 PROCEDURE — 80048 BASIC METABOLIC PNL TOTAL CA: CPT

## 2021-09-25 PROCEDURE — 2580000003 HC RX 258: Performed by: INTERNAL MEDICINE

## 2021-09-25 RX ORDER — PREDNISONE 20 MG/1
40 TABLET ORAL DAILY
Qty: 10 TABLET | Refills: 0 | Status: SHIPPED | OUTPATIENT
Start: 2021-09-26 | End: 2021-10-01

## 2021-09-25 RX ADMIN — SODIUM CHLORIDE, PRESERVATIVE FREE 10 ML: 5 INJECTION INTRAVENOUS at 08:46

## 2021-09-25 RX ADMIN — IPRATROPIUM BROMIDE AND ALBUTEROL SULFATE 1 AMPULE: 2.5; .5 SOLUTION RESPIRATORY (INHALATION) at 11:45

## 2021-09-25 RX ADMIN — ASPIRIN 81 MG CHEWABLE TABLET 81 MG: 81 TABLET CHEWABLE at 08:46

## 2021-09-25 RX ADMIN — PREDNISONE 40 MG: 20 TABLET ORAL at 08:46

## 2021-09-25 RX ADMIN — BUDESONIDE AND FORMOTEROL FUMARATE DIHYDRATE 2 PUFF: 160; 4.5 AEROSOL RESPIRATORY (INHALATION) at 08:05

## 2021-09-25 RX ADMIN — ALLOPURINOL 100 MG: 100 TABLET ORAL at 08:46

## 2021-09-25 RX ADMIN — CARVEDILOL 12.5 MG: 12.5 TABLET, FILM COATED ORAL at 08:46

## 2021-09-25 RX ADMIN — IPRATROPIUM BROMIDE AND ALBUTEROL SULFATE 1 AMPULE: 2.5; .5 SOLUTION RESPIRATORY (INHALATION) at 16:39

## 2021-09-25 RX ADMIN — PANTOPRAZOLE SODIUM 40 MG: 40 TABLET, DELAYED RELEASE ORAL at 06:26

## 2021-09-25 RX ADMIN — DOXYCYCLINE HYCLATE 100 MG: 100 TABLET, COATED ORAL at 06:26

## 2021-09-25 ASSESSMENT — PAIN SCALES - GENERAL
PAINLEVEL_OUTOF10: 0

## 2021-09-25 NOTE — PROGRESS NOTES
Patient refusing HD today according to floor nurse. HD nurse went to patient's bedside to speak with him about getting tx today. Explained to him that this is his regular day and he needs to get his treatment today to stay on schedule. Patient belligerent and continued to refuses. Dr. Zac Gusman notified.

## 2021-09-25 NOTE — PLAN OF CARE
Problem: Pain:  Goal: Pain level will decrease  Description: Pain level will decrease  Outcome: Ongoing  Goal: Control of acute pain  Description: Control of acute pain  Outcome: Ongoing  Goal: Control of chronic pain  Description: Control of chronic pain  Outcome: Ongoing     Problem: Falls - Risk of:  Goal: Will remain free from falls  Description: Will remain free from falls  Outcome: Ongoing  Goal: Absence of physical injury  Description: Absence of physical injury  Outcome: Ongoing     Problem: Infection:  Goal: Will remain free from infection  Description: Will remain free from infection  Outcome: Ongoing     Problem: Daily Care:  Goal: Daily care needs are met  Description: Daily care needs are met  Outcome: Ongoing

## 2021-09-25 NOTE — PLAN OF CARE
Problem: Pain:  Goal: Pain level will decrease  Description: Pain level will decrease  9/25/2021 1052 by Ilana Mathis RN  Outcome: Ongoing     Problem: Pain:  Goal: Control of chronic pain  Description: Control of chronic pain  9/25/2021 1052 by Ilana Mathis RN  Outcome: Ongoing  Note: Pt able to express presence and absence of pain using numerical pain scale. Pt pain is managed by PRN analgesics as ordered by MD. Pain reassess after each interventions. Will continue to monitor as needed. Problem: Falls - Risk of:  Goal: Will remain free from falls  Description: Will remain free from falls  9/25/2021 1052 by Ilana Mathis RN  Outcome: Ongoing  Note: Pt free from falls this shift. Non skid socks provided. Pt educated on use of call light as needed for assistance. Call light always within reach. Pt able and agreeable to contact for safety appropriately.     9/24/2021 2353 by Jeannette Kay RN  Outcome: Ongoing     Problem: Falls - Risk of:  Goal: Absence of physical injury  Description: Absence of physical injury  9/25/2021 1052 by Ilana Mathis RN  Outcome: Ongoing     Problem: Infection:  Goal: Will remain free from infection  Description: Will remain free from infection  9/25/2021 1052 by Ilana Mathis RN  Outcome: Ongoing  9/24/2021 2353 by Jeannette Kay RN  Outcome: Ongoing     Problem: Daily Care:  Goal: Daily care needs are met  Description: Daily care needs are met  9/25/2021 1052 by Ilana Mathis RN  Outcome: Ongoing  9/24/2021 2353 by Jeannette Kay RN  Outcome: Ongoing

## 2021-09-25 NOTE — FLOWSHEET NOTE
Treatment time: 2 hours  Net UF: 1000 ml     Pre weight: 63.1 kg   Post weight: 62 kg  EDW: 65 kg     Access used: JAIME AVG  Access function: Good with  ml/min     Medications or blood products given: None     Regular outpatient schedule: DILLON Truong     Summary of response to treatment: Tolerated tx well. No HD related complaints or complications.  Tx originally ordered for 2.5hrs, however patient refused and states he is only running 2hr tx today.      Copy of dialysis treatment record placed in chart, to be scanned into EMR.       09/25/21 1330 09/25/21 1535   Treatment   Time On  --  1330   Time Off  --  1530   Vital Signs   BP (!) 142/87 (!) 134/96   Temp 97.9 °F (36.6 °C) 97.8 °F (36.6 °C)   Pulse 90 81   Resp 16 20   Weight Method  --  Estimated;Standing scale   Dialysis Bath   K+ (Potassium) 4  --    Ca+ (Calcium) 2.25  --    Na+ (Sodium) 130  --    HCO3 (Bicarb) 35  --

## 2021-09-25 NOTE — CARE COORDINATION
CASE MANAGEMENT DISCHARGE SUMMARY:    DISCHARGE DATE: 09/25/21     DISCHARGED TO HOME     TRANSPORTATION: Ly 5:40 pm              DINESH Erazo, [facility] Case Management  [phone]  Electronically signed by DINESH Erazo, HEMALW on 9/25/2021 at 5:12 PM

## 2021-09-25 NOTE — PROGRESS NOTES
Pt refusing to get dialysis today. Pt states he is tired of it and just wants to go home today. Pt educated on importance of getting dialyzed. Pt still refusing. Dialysis nurse notified. Call placed to nephrology and waiting for response.  Electronically signed by Evelin Xavier RN on 9/25/2021 at 10:52 AM

## 2021-09-25 NOTE — PROGRESS NOTES
All verbal and written discharge instructions given to the pt at discharge regarding new meds and follow up appointment. Pt verbalized understandings. Electronically signed by Caren Myles RN on 9/25/2021 at 5:27 PM  \ .

## 2021-09-25 NOTE — PROGRESS NOTES
[facility]  Phone: [phone]  Fax: [phone]  [email or web address] [facility]      Nephrology Progress Note    CC: ESRD management    Subjective: Interval History:  -No acute events overnight  -Refusing HD this AM      ROS: Complaining over hospitalization. Wants to \"bust out of here\". All other ROS negative. SHx: No visitors present at bedside      Objective:  Blood pressure (!) 154/87, pulse 93, temperature 98.2 °F (36.8 °C), temperature source Oral, resp. rate 17, height 5' 8\" (1.727 m), weight 137 lb 5.6 oz (62.3 kg), SpO2 99 %. Intake/Output Summary (Last 24 hours) at 9/25/2021 1243  Last data filed at 9/25/2021 0846  Gross per 24 hour   Intake 20 ml   Output --   Net 20 ml     Gen: NAD  HEENT: Sclera anicteric, MMM  Neck: Supple. No JVD  CV: RRR, S1/S2  Pulm: Mild exp wheezing  Abd: Soft, NT, ND  Ext: 1-2+ pitting edema in B/L LE  Neuro: Awake/Alert, Answers questions appropriately  Skin: Warm. No significant visible rashes appreciated  Psych: Normal affect/mood  Access: L upper arm AVG w/ (+) thrill/bruit      Labs:  Renal panel:  Lab Results   Component Value Date/Time     (L) 09/25/2021 09:01 AM    K 3.6 09/25/2021 09:01 AM    CO2 26 09/25/2021 09:01 AM    BUN 25 (H) 09/25/2021 09:01 AM    CREATININE 6.5 (HH) 09/25/2021 09:01 AM    CALCIUM 9.9 09/25/2021 09:01 AM    PHOS 3.6 04/08/2020 04:32 AM    MG 2.00 04/08/2020 04:32 AM     CBC:  Lab Results   Component Value Date/Time    WBC 3.1 (L) 09/25/2021 09:01 AM    HGB 11.0 (L) 09/25/2021 09:01 AM    HCT 34.1 (L) 09/25/2021 09:01 AM    PLT 76 (L) 09/25/2021 09:01 AM       Assessment/Plan:    1) ESRD on HD  -Dialyzes on TTS schedule (not MWF as previously documented)  -Refusing HD today despite being normal dialysis day  -Advised patient of risks of non-adherence with HD, including pulmonary edema, electrolyte abnormalities, cardiac arrhythmias, and sudden cardiac death.  Patient voiced understanding and continues to refuse HD at this time     2) COPD exacerbation              - on steroids and nebs   ---Management per Primary Team     3) pancytopenia   - 06/21 bone marrow unremarkable              - Dr. Jerrell Grossman consulted   ---Hold TEODORA given Hgb>11g/dL   ---Management per Oncology    4) hyponatremia   - fluid restriction 1 liter/day   ---Can modulate with HD (if patient allows)      5) HTN  -Near goal  -Continue current regimen  -Needs TW challenged given degree of LE edema        Belkys Zamudio MD, ERIK  Nephrologist  [facility]

## 2021-09-27 NOTE — DISCHARGE SUMMARY
Hospital Medicine Discharge Summary      Patient ID: Kaylah Michael , [id number]     Patient's PCP: John Regan MD    Admit Date: 9/21/2021     Discharge Date: 9/25/2021      Admitting Physician: Zaki Aguilar MD    Discharge Physician: Micah Davis MD     Discharge Diagnoses: Active Hospital Problems    Diagnosis Date Noted    COPD exacerbation ([facility] [address] [J44.1] 03/31/2020         The patient was seen and examined on the day of discharge and this discharge summary is in conjunction with any daily progress note from day of discharge. HOSPITAL COURSE    Patient demographics:  The patient  Kaylah Michael is a 63 y.o. male      Significant past medical history:       Patient Active Problem List   Diagnosis    HCAP (healthcare-associated pneumonia)    COPD exacerbation ([facility] [address]    ESRD on dialysis ([facility] [address]    COPD (chronic obstructive pulmonary disease) ([facility] [address]    Acute on chronic respiratory failure with hypoxia and hypercapnia (HCC)            Presenting symptoms:  SOB     Diagnostic workup:         US ABDOMEN COMPLETE         CONSULTS DURING ADMISSION :   IP CONSULT TO HOSPITALIST  IP CONSULT TO HEM/ONC  IP CONSULT TO NEPHROLOGY        Patient was diagnosed with:  Acute COPD exacerbation with bronchitis   Chronic hypoxic respiratory failure-  HTN  ESRD on HD     Treatment while inpatient:  Pt presented to [facility] with SOB. Patient was diagnosed with acute COPD exacerbation. Patient was started on steroids beta-blockers and antibiotics. Patient has history of chronic respiratory failure with home oxygen of 3 L. Patient is also end-stage renal disease and underwent hemodialysis on 9/25. At the time of discharge patient is breathing comfortably on his base line oxygen of 3 L nasal cannula.                         Discharge Condition:  stable      Discharged to:  Home      Activity:   as tolerated:     Follow Up: Follow-up with PCP in 1-2 weeks                  Labs:  For convenience and continuity at follow-up the following most recent labs are provided:      CBC:   Lab Results   Component Value Date    WBC 3.1 09/25/2021    HGB 11.0 09/25/2021    HCT 34.1 09/25/2021    PLT 76 09/25/2021       RENAL:   Lab Results   Component Value Date     09/25/2021    K 3.6 09/25/2021    CL 87 09/25/2021    CO2 26 09/25/2021    BUN 25 09/25/2021    CREATININE 6.5 09/25/2021           Discharge Medications:    Guille Zamora   Home Medication Instructions [facility]:[id number]    Printed on:09/27/21 1041   Medication Information                      albuterol sulfate HFA (VENTOLIN HFA) 108 (90 Base) MCG/ACT inhaler  Inhale 2 puffs into the lungs every 6 hours as needed for Wheezing 90mcg/actuation             allopurinol (ZYLOPRIM) 100 MG tablet  Take 100 mg by mouth daily             aspirin 81 MG tablet  Take 81 mg by mouth daily             atorvastatin (LIPITOR) 80 MG tablet  Take 1 tablet by mouth daily             budesonide-formoterol (SYMBICORT) 160-4.5 MCG/ACT AERO  Inhale 2 puffs into the lungs 2 times daily             calcium acetate 667 MG TABS  Take 1 tablet by mouth 3 times daily (with meals)             carvedilol (COREG) 12.5 MG tablet  Take 12.5 mg by mouth 2 times daily Unsure of dose              gabapentin (NEURONTIN) 300 MG capsule  Take 1 capsule by mouth nightly for 30 days. ipratropium-albuterol (DUONEB) 0.5-2.5 (3) MG/3ML SOLN nebulizer solution  Inhale 3 mLs into the lungs 4 times daily             losartan (COZAAR) 25 MG tablet  Take 1 tablet by mouth daily Unsure of dose.              metaxalone (SKELAXIN) 800 MG tablet  Take 1 tablet by mouth daily as needed for Pain             omeprazole (PRILOSEC) 20 MG delayed release capsule  Take 1 capsule by mouth daily             predniSONE (DELTASONE) 20 MG tablet  Take 2 tablets by mouth daily for 5 days             tiotropium (SPIRIVA) 18 MCG inhalation capsule  Inhale 1 capsule into the lungs daily torsemide (DEMADEX) 20 MG tablet  Take 2 tablets by mouth daily             traZODone (DESYREL) 150 MG tablet  Take 1 tablet by mouth nightly                    Time Spent on discharge is more than 30 min in the examination, evaluation, counseling and review of medications and discharge plan. Signed:  Janae Rice MD   9/27/2021      Thank you Jessica Cai MD for the opportunity to be involved in this patient's care. If you have any questions or concerns please feel free to contact me at [phone]. This note was transcribed using [id number]. Please disregard any translational errors.

## 2022-03-01 ENCOUNTER — APPOINTMENT (OUTPATIENT)
Dept: CT IMAGING | Age: 67
DRG: 291 | End: 2022-03-01
Payer: MEDICARE

## 2022-03-01 ENCOUNTER — APPOINTMENT (OUTPATIENT)
Dept: GENERAL RADIOLOGY | Age: 67
DRG: 291 | End: 2022-03-01
Payer: MEDICARE

## 2022-03-01 ENCOUNTER — HOSPITAL ENCOUNTER (INPATIENT)
Age: 67
LOS: 4 days | Discharge: HOME | DRG: 291 | End: 2022-03-05
Attending: EMERGENCY MEDICINE | Admitting: INTERNAL MEDICINE
Payer: MEDICARE

## 2022-03-01 DIAGNOSIS — R07.9 CHEST PAIN, UNSPECIFIED TYPE: ICD-10-CM

## 2022-03-01 DIAGNOSIS — N18.6 ESRD (END STAGE RENAL DISEASE) (HCC): ICD-10-CM

## 2022-03-01 DIAGNOSIS — I47.20 V-TACH: Primary | ICD-10-CM

## 2022-03-01 DIAGNOSIS — E87.6 HYPOKALEMIA: ICD-10-CM

## 2022-03-01 LAB
ANION GAP SERPL CALCULATED.3IONS-SCNC: 14 MMOL/L (ref 3–16)
APTT: 37.7 SEC (ref 26.2–38.6)
BASE EXCESS VENOUS: 7.8 MMOL/L
BASOPHILS ABSOLUTE: 0 K/UL (ref 0–0.2)
BASOPHILS RELATIVE PERCENT: 0.5 %
BUN BLDV-MCNC: 31 MG/DL (ref 7–20)
CALCIUM SERPL-MCNC: 8.1 MG/DL (ref 8.3–10.6)
CARBOXYHEMOGLOBIN: 1.5 %
CHLORIDE BLD-SCNC: 83 MMOL/L (ref 99–110)
CO2: 29 MMOL/L (ref 21–32)
CREAT SERPL-MCNC: 7.1 MG/DL (ref 0.8–1.3)
EOSINOPHILS ABSOLUTE: 0.1 K/UL (ref 0–0.6)
EOSINOPHILS RELATIVE PERCENT: 2.9 %
GFR AFRICAN AMERICAN: 9
GFR NON-AFRICAN AMERICAN: 8
GLUCOSE BLD-MCNC: 101 MG/DL (ref 70–99)
HCO3 VENOUS: 35 MMOL/L (ref 23–29)
HCT VFR BLD CALC: 29.7 % (ref 40.5–52.5)
HCT VFR BLD CALC: 32.1 % (ref 40.5–52.5)
HEMOGLOBIN: 10.1 G/DL (ref 13.5–17.5)
HEMOGLOBIN: 9.4 G/DL (ref 13.5–17.5)
LYMPHOCYTES ABSOLUTE: 0.3 K/UL (ref 1–5.1)
LYMPHOCYTES RELATIVE PERCENT: 10.1 %
MAGNESIUM: 2.1 MG/DL (ref 1.8–2.4)
MCH RBC QN AUTO: 28.7 PG (ref 26–34)
MCH RBC QN AUTO: 29.1 PG (ref 26–34)
MCHC RBC AUTO-ENTMCNC: 31.4 G/DL (ref 31–36)
MCHC RBC AUTO-ENTMCNC: 31.7 G/DL (ref 31–36)
MCV RBC AUTO: 91.5 FL (ref 80–100)
MCV RBC AUTO: 91.6 FL (ref 80–100)
METHEMOGLOBIN VENOUS: 0.6 %
MONOCYTES ABSOLUTE: 0.3 K/UL (ref 0–1.3)
MONOCYTES RELATIVE PERCENT: 12 %
NEUTROPHILS ABSOLUTE: 2 K/UL (ref 1.7–7.7)
NEUTROPHILS RELATIVE PERCENT: 74.5 %
O2 SAT, VEN: 55 %
O2 THERAPY: ABNORMAL
PCO2, VEN: 63.6 MMHG (ref 40–50)
PDW BLD-RTO: 18.5 % (ref 12.4–15.4)
PDW BLD-RTO: 18.5 % (ref 12.4–15.4)
PH VENOUS: 7.35 (ref 7.35–7.45)
PLATELET # BLD: 77 K/UL (ref 135–450)
PLATELET # BLD: 88 K/UL (ref 135–450)
PMV BLD AUTO: 7.7 FL (ref 5–10.5)
PMV BLD AUTO: 7.8 FL (ref 5–10.5)
PO2, VEN: 31 MMHG
POTASSIUM REFLEX MAGNESIUM: 3.1 MMOL/L (ref 3.5–5.1)
PRO-BNP: ABNORMAL PG/ML (ref 0–124)
RBC # BLD: 3.24 M/UL (ref 4.2–5.9)
RBC # BLD: 3.5 M/UL (ref 4.2–5.9)
SODIUM BLD-SCNC: 126 MMOL/L (ref 136–145)
TCO2 CALC VENOUS: 37 MMOL/L
TROPONIN: 0.11 NG/ML
TROPONIN: 0.11 NG/ML
WBC # BLD: 2.6 K/UL (ref 4–11)
WBC # BLD: 2.7 K/UL (ref 4–11)

## 2022-03-01 PROCEDURE — 85025 COMPLETE CBC W/AUTO DIFF WBC: CPT

## 2022-03-01 PROCEDURE — 2060000000 HC ICU INTERMEDIATE R&B

## 2022-03-01 PROCEDURE — 6360000002 HC RX W HCPCS: Performed by: EMERGENCY MEDICINE

## 2022-03-01 PROCEDURE — 99285 EMERGENCY DEPT VISIT HI MDM: CPT

## 2022-03-01 PROCEDURE — 2580000003 HC RX 258: Performed by: EMERGENCY MEDICINE

## 2022-03-01 PROCEDURE — 71046 X-RAY EXAM CHEST 2 VIEWS: CPT

## 2022-03-01 PROCEDURE — 2580000003 HC RX 258: Performed by: INTERNAL MEDICINE

## 2022-03-01 PROCEDURE — 85027 COMPLETE CBC AUTOMATED: CPT

## 2022-03-01 PROCEDURE — 83735 ASSAY OF MAGNESIUM: CPT

## 2022-03-01 PROCEDURE — 85730 THROMBOPLASTIN TIME PARTIAL: CPT

## 2022-03-01 PROCEDURE — 71260 CT THORAX DX C+: CPT

## 2022-03-01 PROCEDURE — 96375 TX/PRO/DX INJ NEW DRUG ADDON: CPT

## 2022-03-01 PROCEDURE — 83880 ASSAY OF NATRIURETIC PEPTIDE: CPT

## 2022-03-01 PROCEDURE — 80048 BASIC METABOLIC PNL TOTAL CA: CPT

## 2022-03-01 PROCEDURE — 84484 ASSAY OF TROPONIN QUANT: CPT

## 2022-03-01 PROCEDURE — 93005 ELECTROCARDIOGRAM TRACING: CPT | Performed by: EMERGENCY MEDICINE

## 2022-03-01 PROCEDURE — 6370000000 HC RX 637 (ALT 250 FOR IP): Performed by: EMERGENCY MEDICINE

## 2022-03-01 PROCEDURE — 6360000004 HC RX CONTRAST MEDICATION: Performed by: EMERGENCY MEDICINE

## 2022-03-01 PROCEDURE — 36415 COLL VENOUS BLD VENIPUNCTURE: CPT

## 2022-03-01 PROCEDURE — 82803 BLOOD GASES ANY COMBINATION: CPT

## 2022-03-01 RX ORDER — CARVEDILOL 12.5 MG/1
12.5 TABLET ORAL 2 TIMES DAILY WITH MEALS
Status: DISCONTINUED | OUTPATIENT
Start: 2022-03-02 | End: 2022-03-05 | Stop reason: HOSPADM

## 2022-03-01 RX ORDER — SODIUM CHLORIDE 0.9 % (FLUSH) 0.9 %
10 SYRINGE (ML) INJECTION EVERY 12 HOURS SCHEDULED
Status: DISCONTINUED | OUTPATIENT
Start: 2022-03-02 | End: 2022-03-05 | Stop reason: HOSPADM

## 2022-03-01 RX ORDER — ACETAMINOPHEN 650 MG/1
650 SUPPOSITORY RECTAL EVERY 4 HOURS PRN
Status: DISCONTINUED | OUTPATIENT
Start: 2022-03-01 | End: 2022-03-05 | Stop reason: HOSPADM

## 2022-03-01 RX ORDER — SODIUM CHLORIDE 0.9 % (FLUSH) 0.9 %
10 SYRINGE (ML) INJECTION PRN
Status: DISCONTINUED | OUTPATIENT
Start: 2022-03-01 | End: 2022-03-05 | Stop reason: HOSPADM

## 2022-03-01 RX ORDER — ONDANSETRON 2 MG/ML
4 INJECTION INTRAMUSCULAR; INTRAVENOUS EVERY 4 HOURS PRN
Status: DISCONTINUED | OUTPATIENT
Start: 2022-03-01 | End: 2022-03-05 | Stop reason: HOSPADM

## 2022-03-01 RX ORDER — LOSARTAN POTASSIUM 25 MG/1
25 TABLET ORAL DAILY
Status: ON HOLD | COMMUNITY
End: 2022-03-05 | Stop reason: HOSPADM

## 2022-03-01 RX ORDER — POLYETHYLENE GLYCOL 3350 17 G/17G
17 POWDER, FOR SOLUTION ORAL DAILY PRN
Status: DISCONTINUED | OUTPATIENT
Start: 2022-03-01 | End: 2022-03-05 | Stop reason: HOSPADM

## 2022-03-01 RX ORDER — GABAPENTIN 300 MG/1
300 CAPSULE ORAL NIGHTLY
Status: ON HOLD | COMMUNITY
End: 2022-09-15 | Stop reason: SDUPTHER

## 2022-03-01 RX ORDER — HEPARIN SODIUM 1000 [USP'U]/ML
4000 INJECTION, SOLUTION INTRAVENOUS; SUBCUTANEOUS ONCE
Status: COMPLETED | OUTPATIENT
Start: 2022-03-01 | End: 2022-03-01

## 2022-03-01 RX ORDER — HEPARIN SODIUM 1000 [USP'U]/ML
4000 INJECTION, SOLUTION INTRAVENOUS; SUBCUTANEOUS PRN
Status: DISCONTINUED | OUTPATIENT
Start: 2022-03-01 | End: 2022-03-01

## 2022-03-01 RX ORDER — ACETAMINOPHEN 325 MG/1
650 TABLET ORAL EVERY 4 HOURS PRN
Status: DISCONTINUED | OUTPATIENT
Start: 2022-03-01 | End: 2022-03-05 | Stop reason: HOSPADM

## 2022-03-01 RX ORDER — POTASSIUM CHLORIDE 20 MEQ/1
40 TABLET, EXTENDED RELEASE ORAL ONCE
Status: COMPLETED | OUTPATIENT
Start: 2022-03-01 | End: 2022-03-01

## 2022-03-01 RX ORDER — HEPARIN SODIUM 1000 [USP'U]/ML
2000 INJECTION, SOLUTION INTRAVENOUS; SUBCUTANEOUS PRN
Status: DISCONTINUED | OUTPATIENT
Start: 2022-03-01 | End: 2022-03-01

## 2022-03-01 RX ORDER — LOSARTAN POTASSIUM 25 MG/1
25 TABLET ORAL DAILY
Status: DISCONTINUED | OUTPATIENT
Start: 2022-03-02 | End: 2022-03-05

## 2022-03-01 RX ORDER — PANTOPRAZOLE SODIUM 40 MG/1
40 TABLET, DELAYED RELEASE ORAL
Status: DISCONTINUED | OUTPATIENT
Start: 2022-03-02 | End: 2022-03-05 | Stop reason: HOSPADM

## 2022-03-01 RX ORDER — OMEPRAZOLE 20 MG/1
20 CAPSULE, DELAYED RELEASE ORAL DAILY
COMMUNITY

## 2022-03-01 RX ORDER — HEPARIN SODIUM 5000 [USP'U]/ML
5000 INJECTION, SOLUTION INTRAVENOUS; SUBCUTANEOUS EVERY 8 HOURS SCHEDULED
Status: DISCONTINUED | OUTPATIENT
Start: 2022-03-01 | End: 2022-03-01 | Stop reason: SDUPTHER

## 2022-03-01 RX ORDER — SODIUM CHLORIDE 9 MG/ML
25 INJECTION, SOLUTION INTRAVENOUS PRN
Status: DISCONTINUED | OUTPATIENT
Start: 2022-03-01 | End: 2022-03-05 | Stop reason: HOSPADM

## 2022-03-01 RX ORDER — BUDESONIDE AND FORMOTEROL FUMARATE DIHYDRATE 160; 4.5 UG/1; UG/1
2 AEROSOL RESPIRATORY (INHALATION) 2 TIMES DAILY
Status: DISCONTINUED | OUTPATIENT
Start: 2022-03-02 | End: 2022-03-05 | Stop reason: HOSPADM

## 2022-03-01 RX ORDER — TRAZODONE HYDROCHLORIDE 150 MG/1
150 TABLET ORAL NIGHTLY PRN
COMMUNITY
End: 2022-06-15 | Stop reason: ALTCHOICE

## 2022-03-01 RX ORDER — DOXYCYCLINE HYCLATE 100 MG
100 TABLET ORAL ONCE
Status: COMPLETED | OUTPATIENT
Start: 2022-03-01 | End: 2022-03-01

## 2022-03-01 RX ORDER — ALLOPURINOL 100 MG/1
100 TABLET ORAL DAILY
Status: DISCONTINUED | OUTPATIENT
Start: 2022-03-02 | End: 2022-03-05 | Stop reason: HOSPADM

## 2022-03-01 RX ORDER — HEPARIN SODIUM AND DEXTROSE 10000; 5 [USP'U]/100ML; G/100ML
0-3000 INJECTION INTRAVENOUS CONTINUOUS
Status: DISCONTINUED | OUTPATIENT
Start: 2022-03-01 | End: 2022-03-04

## 2022-03-01 RX ORDER — TORSEMIDE 20 MG/1
20 TABLET ORAL DAILY
COMMUNITY
End: 2022-06-15 | Stop reason: ALTCHOICE

## 2022-03-01 RX ORDER — ALBUTEROL SULFATE 90 UG/1
2 AEROSOL, METERED RESPIRATORY (INHALATION) EVERY 6 HOURS PRN
Status: DISCONTINUED | OUTPATIENT
Start: 2022-03-01 | End: 2022-03-05 | Stop reason: HOSPADM

## 2022-03-01 RX ORDER — SODIUM CHLORIDE 9 MG/ML
INJECTION, SOLUTION INTRAVENOUS CONTINUOUS
Status: DISCONTINUED | OUTPATIENT
Start: 2022-03-01 | End: 2022-03-02

## 2022-03-01 RX ADMIN — HEPARIN SODIUM 930 UNITS/HR: 10000 INJECTION INTRAVENOUS; SUBCUTANEOUS at 20:44

## 2022-03-01 RX ADMIN — SODIUM CHLORIDE: 9 INJECTION, SOLUTION INTRAVENOUS at 23:43

## 2022-03-01 RX ADMIN — DOXYCYCLINE HYCLATE 100 MG: 100 TABLET, FILM COATED ORAL at 19:12

## 2022-03-01 RX ADMIN — HEPARIN SODIUM 4000 UNITS: 1000 INJECTION INTRAVENOUS; SUBCUTANEOUS at 20:15

## 2022-03-01 RX ADMIN — POTASSIUM CHLORIDE 40 MEQ: 20 TABLET, EXTENDED RELEASE ORAL at 19:11

## 2022-03-01 RX ADMIN — IOPAMIDOL 75 ML: 755 INJECTION, SOLUTION INTRAVENOUS at 17:17

## 2022-03-01 ASSESSMENT — PAIN DESCRIPTION - ONSET: ONSET: PROGRESSIVE

## 2022-03-01 ASSESSMENT — PAIN SCALES - GENERAL
PAINLEVEL_OUTOF10: 1
PAINLEVEL_OUTOF10: 0

## 2022-03-01 ASSESSMENT — PAIN DESCRIPTION - FREQUENCY: FREQUENCY: CONTINUOUS

## 2022-03-01 ASSESSMENT — PAIN - FUNCTIONAL ASSESSMENT: PAIN_FUNCTIONAL_ASSESSMENT: 0-10

## 2022-03-01 ASSESSMENT — PAIN DESCRIPTION - PAIN TYPE: TYPE: ACUTE PAIN

## 2022-03-01 ASSESSMENT — PAIN DESCRIPTION - LOCATION: LOCATION: CHEST

## 2022-03-01 ASSESSMENT — PAIN DESCRIPTION - DESCRIPTORS: DESCRIPTORS: STABBING

## 2022-03-01 ASSESSMENT — PAIN SCALES - WONG BAKER: WONGBAKER_NUMERICALRESPONSE: 2

## 2022-03-01 ASSESSMENT — PAIN DESCRIPTION - PROGRESSION: CLINICAL_PROGRESSION: GRADUALLY IMPROVING

## 2022-03-01 NOTE — ED PROVIDER NOTES
EKG Interpretation    Interpreted by emergency department physician  Time performed: 1897  Time read: 1617    Rhythm: Sinus  Ventricular Rate: 93  QRS Axis: -47  Ectopy: None  Conduction: Sinus rhythm with first-degree AV block with LVH with early repolarization abnormalities and left anterior fascicular block  ST Segments: Consistent with early repolarization abnormalities  T Waves: Consistent with early repolarization abnormalities  Q Waves: Lead III and aVF    Other findings: Motion artifact but EKG is readable    Compared to EKG on: 9/21/2021 and appears unchanged    Clinical Impression: Sinus rhythm with first-degree AV block with LVH and early repolarization abnormalities with left anterior fascicular block. There are Q waves noted in lead III and aVF but this is unchanged from the previous EKG on 9/21/2021. There is motion artifact but EKG is readable. This is unchanged from the previous EKG on 9/21/2021.     DO Charlene Terry DO  03/01/22 5164

## 2022-03-01 NOTE — ED NOTES
Pt has a run of vtach on the monitor per [facility], strip given to EDMD. Pt denies any s/s     Mary Gomez RN  03/01/22 7483

## 2022-03-01 NOTE — ED PROVIDER NOTES
[facility] EMERGENCY DEPARTMENT    CHIEF COMPLAINT  Chest Pain (x 1 day with productive cough, pt has 324 ASA and nitro x 1 en route)       HISTORY OF PRESENT ILLNESS  Harini Navarro is a 61 y.o. male with past medical history including ESRD on dialysis, CHF, COPD, CVA, hypertension, hyperlipidemia, and as below who presents to the ED with complaint of chest pain and productive cough. Patient reports the onset of chest pain this morning around 1030, when he woke up. He describes a midsternal, sharp chest pain. Worse with inspiration, no ameliorating factors. Worsened over the course of the day but is improved at this time. He denies fever, shortness of breath, nausea, vomiting, diarrhea, abdominal pain. He received full dose aspirin and 1 nitroglycerin in route. His last dialysis session was today, full session    No other complaints, modifying factors or associated symptoms.      I have reviewed the following from the nursing documentation:    Past Medical History:   Diagnosis Date    Cerebral artery occlusion with cerebral infarction ([facility] [address]     CHF (congestive heart failure) (HCC)     Chronic kidney disease     COPD (chronic obstructive pulmonary disease) ([facility] [address]     Dialysis patient ([facility] [address]     Gout     Hemodialysis patient ([facility] [address]     Hx of blood clots     Hyperlipidemia     Hypertension     Renal failure      Past Surgical History:   Procedure Laterality Date    COLONOSCOPY      DIALYSIS FISTULA CREATION Left      Family History   Problem Relation Age of Onset    Cancer Mother     Cancer Father     Other Sister      Social History     Socioeconomic History    Marital status: Single     Spouse name: Not on file    Number of children: Not on file    Years of education: Not on file    Highest education level: Not on file   Occupational History    Not on file   Tobacco Use    Smoking status: Former Smoker    Smokeless tobacco: Never Used   Vaping Use    Vaping Use: Never used Substance and Sexual Activity    Alcohol use: Yes     Comment: occasional    Drug use: Never    Sexual activity: Not Currently   Other Topics Concern    Not on file   Social History Narrative    Not on file     Social Determinants of Health     Financial Resource Strain:     Difficulty of Paying Living Expenses: Not on file   Food Insecurity:     Worried About Running Out of Food in the Last Year: Not on file    Ty of Food in the Last Year: Not on file   Transportation Needs:     Lack of Transportation (Medical): Not on file    Lack of Transportation (Non-Medical):  Not on file   Physical Activity:     Days of Exercise per Week: Not on file    Minutes of Exercise per Session: Not on file   Stress:     Feeling of Stress : Not on file   Social Connections:     Frequency of Communication with Friends and Family: Not on file    Frequency of Social Gatherings with Friends and Family: Not on file    Attends Hinduism Services: Not on file    Active Member of [address] [place] [address] or Organizations: Not on file    Attends Club or Organization Meetings: Not on file    Marital Status: Not on file   Intimate Partner Violence:     Fear of Current or Ex-Partner: Not on file    Emotionally Abused: Not on file    Physically Abused: Not on file    Sexually Abused: Not on file   Housing Stability:     Unable to Pay for Housing in the Last Year: Not on file    Number of [place] in the Last Year: Not on file    Unstable Housing in the Last Year: Not on file     Current Facility-Administered Medications   Medication Dose Route Frequency Provider Last Rate Last Admin    doxycycline hyclate (VIBRA-TABS) tablet 100 mg  100 mg Oral Once Chacorta Handley MD        potassium chloride (KLOR-CON M) extended release tablet 40 mEq  40 mEq Oral Once Chacorta Handley MD         Current Outpatient Medications   Medication Sig Dispense Refill    ipratropium-albuterol (DUONEB) 0.5-2.5 (3) MG/3ML SOLN nebulizer solution Inhale 3 mLs into the lungs 4 times daily 360 mL 0    metaxalone (SKELAXIN) 800 MG tablet Take 1 tablet by mouth daily as needed for Pain 5 tablet 0    calcium acetate 667 MG TABS Take 1 tablet by mouth 3 times daily (with meals)      tiotropium (SPIRIVA) 18 MCG inhalation capsule Inhale 1 capsule into the lungs daily 30 capsule 0    gabapentin (NEURONTIN) 300 MG capsule Take 1 capsule by mouth nightly for 30 days. 30 capsule 0    traZODone (DESYREL) 150 MG tablet Take 1 tablet by mouth nightly (Patient taking differently: Take 150 mg by mouth nightly as needed ) 30 tablet 0    atorvastatin (LIPITOR) 80 MG tablet Take 1 tablet by mouth daily 30 tablet 0    losartan (COZAAR) 25 MG tablet Take 1 tablet by mouth daily Unsure of dose. 30 tablet 0    torsemide (DEMADEX) 20 MG tablet Take 2 tablets by mouth daily 60 tablet 0    omeprazole (PRILOSEC) 20 MG delayed release capsule Take 1 capsule by mouth daily 30 capsule 0    carvedilol (COREG) 12.5 MG tablet Take 12.5 mg by mouth 2 times daily Unsure of dose       aspirin 81 MG tablet Take 81 mg by mouth daily      budesonide-formoterol (SYMBICORT) 160-4.5 MCG/ACT AERO Inhale 2 puffs into the lungs 2 times daily      albuterol sulfate HFA (VENTOLIN HFA) 108 (90 Base) MCG/ACT inhaler Inhale 2 puffs into the lungs every 6 hours as needed for Wheezing 90mcg/actuation      allopurinol (ZYLOPRIM) 100 MG tablet Take 100 mg by mouth daily       No Known Allergies    REVIEW OF SYSTEMS  10 systems reviewed, pertinent positives and negatives per HPI, otherwise noted to be negative. PHYSICAL EXAM  ED Triage Vitals [03/01/22 1614]   BP Temp Temp Source Pulse Resp SpO2 Height Weight   (!) 157/88 98.4 °F (36.9 °C) Oral 88 18 94 % -- 170 lb 9.6 oz (77.4 kg)     General appearance: Awake and alert. Cooperative. No acute distress. HENT: Normocephalic. Atraumatic. Mucous membranes are moist.  Neck: Supple. Eyes: PERRL. EOMI. Heart/Chest: RRR. No murmurs.     Lungs: Respirations unlabored. CTAB. Good air exchange. Speaking comfortably in full sentences. Abdomen: Soft. Non-tender. Non-distended. No rebound or guarding. Musculoskeletal: No extremity edema. No deformity. No tenderness in the extremities. Skin: Warm and dry. No acute rashes. Neurological: Alert and oriented. CN II-XII intact. Strength 5/5 bilateral upper and lower extremities. Sensation intact to light touch. Psychiatric: Mood/affect: normal      LABS  I have reviewed all labs for this visit.    Results for orders placed or performed during the hospital encounter of 03/01/22   CBC with Auto Differential   Result Value Ref Range    WBC 2.7 (L) 4.0 - 11.0 K/uL    RBC 3.50 (L) 4.20 - 5.90 M/uL    Hemoglobin 10.1 (L) 13.5 - 17.5 g/dL    Hematocrit 32.1 (L) 40.5 - 52.5 %    MCV 91.5 80.0 - 100.0 fL    MCH 28.7 26.0 - 34.0 pg    MCHC 31.4 31.0 - 36.0 g/dL    RDW 18.5 (H) 12.4 - 15.4 %    Platelets 88 (L) 874 - 450 K/uL    MPV 7.8 5.0 - 10.5 fL    Neutrophils % 74.5 %    Lymphocytes % 10.1 %    Monocytes % 12.0 %    Eosinophils % 2.9 %    Basophils % 0.5 %    Neutrophils Absolute 2.0 1.7 - 7.7 K/uL    Lymphocytes Absolute 0.3 (L) 1.0 - 5.1 K/uL    Monocytes Absolute 0.3 0.0 - 1.3 K/uL    Eosinophils Absolute 0.1 0.0 - 0.6 K/uL    Basophils Absolute 0.0 0.0 - 0.2 K/uL   Basic Metabolic Panel w/ Reflex to MG   Result Value Ref Range    Sodium 126 (L) 136 - 145 mmol/L    Potassium reflex Magnesium 3.1 (L) 3.5 - 5.1 mmol/L    Chloride 83 (L) 99 - 110 mmol/L    CO2 29 21 - 32 mmol/L    Anion Gap 14 3 - 16    Glucose 101 (H) 70 - 99 mg/dL    BUN 31 (H) 7 - 20 mg/dL    CREATININE 7.1 (HH) 0.8 - 1.3 mg/dL    GFR Non-African American 8 (A) >60    GFR  9 (A) >60    Calcium 8.1 (L) 8.3 - 10.6 mg/dL   Troponin   Result Value Ref Range    Troponin 0.11 (H) <0.01 ng/mL   Brain Natriuretic Peptide   Result Value Ref Range    Pro-BNP >70,000 (H) 0 - 124 pg/mL   Blood Gas, Venous   Result Value Ref Range    pH, [place] 7.348 (L) 7.350 - 7.450    pCO2, [place] 63.6 (H) 40.0 - 50.0 mmHg    pO2, [place] 31 Not Established mmHg    HCO3, Venous 35 (H) 23 - 29 mmol/L    Base Excess, [place] 7.8 Not Established mmol/L    O2 Sat, [place] 55 Not Established %    Carboxyhemoglobin 1.5 %    MetHgb, [place] 0.6 <1.5 %    TC02 (Calc), [place] 37 Not Established mmol/L    O2 Therapy Unknown    Magnesium   Result Value Ref Range    Magnesium 2.10 1.80 - 2.40 mg/dL       RADIOLOGY  I have reviewed all radiographic studies for this visit. CT CHEST PULMONARY EMBOLISM W CONTRAST   Final Result   1. Evidence of acute pulmonary embolism or acute aortic disease. Main   pulmonary artery is prominent possibly related to pulmonary arterial   hypertension. 2. Cardiomegaly but no evidence of pericardial disease. 3. Moderate right effusion and right lower lobe atelectatic changes. Trace   left effusion and mild left lower lobe atelectatic changes. 4. Mild ascites and mesenteric congestion. XR CHEST (2 VW)   Final Result   Small right pleural effusion with associated airspace disease. The airspace   disease could represent atelectasis or in the appropriate clinical setting   pneumonia. ECG  EKG interpreted by myself. Rate: normal  Rhythm: NSR  Axis: normal  Intervals:   QTc 445  ST Segments: no acute abnormality  T waves: T wave inversion in V6, seen previously  Comparison: Compared to 9/21/21, there is resolution of T wave inversions in V3, V4, and V5  Impression: Sinus rhythm with first-degree AV block, left anterior fascicular block, pulmonary disease pattern       ED COURSE/MDM  Patient seen and evaluated. Old records reviewed. Labs and imaging reviewed and results discussed with patient/family to extent possible. This is a 72-year-old male who presents with complaint of sharp chest pain and cough. On arrival patient is slightly hypertensive with otherwise reassuring vital signs. Afebrile and nontoxic. Cardiac exam no murmur.   Lungs are clear. EKG shows sinus rhythm with no acute ischemic abnormality. Stable T wave inversion in V6. Renal panel shows hyponatremia and hypochloremia, chronic for the patient. Hypokalemia potassium 3.1. Will replete orally. Magnesium is within normal limits. BNP and troponin are both elevated, chronic for the patient and at around the patient's baseline for these studies. Difficult to interpret in the setting of end-stage renal disease. CBC with pancytopenia, at around the patient's baseline. VBG shows mild CO2 retention, chronic. Overall, the patient's presentation is not fully consistent with a COPD exacerbation as he has no wheezing, prolongation of the expiratory phase, or any significantly diminished breath sounds. He is on his baseline oxygen requirement. The patient did have an episode of V. tach that resolved spontaneously. 15 beats over about 5 seconds. At the patient does complain of chest pain, does have troponin elevation (albeit possibly representing type II NSTEMI), and as the patient did have a run of V. tach, will start patient on heparin. Will consult cardiology. Will admit to hospital medicine for further evaluation and treatment. During the patient's ED course, the patient was given:  Medications   heparin 25,000 units in dextrose 5 % 250 mL infusion (rate based) (930 Units/hr IntraVENous New Bag 3/1/22 2044)   iopamidol (ISOVUE-370) 76 % injection 75 mL (75 mLs IntraVENous Given 3/1/22 1717)   doxycycline hyclate (VIBRA-TABS) tablet 100 mg (100 mg Oral Given 3/1/22 1912)   potassium chloride (KLOR-CON M) extended release tablet 40 mEq (40 mEq Oral Given 3/1/22 1911)   heparin (porcine) injection 4,000 Units (4,000 Units IntraVENous Given 3/1/22 2015)        All questions were answered and the patient/family expressed understanding and agreement with the plan.      PROCEDURES  Rhythm strip interpretation:   NSR,15 beats of monomorphic Vtach, NSR    CRITICAL CARE  Total critical care time provided today thus far was 33 minutes. This excludes seperately billable procedures. Critical care time was provided for chest pain and ventricular tachycardia that required close evaluation and/or intervention with concern for potential patient decompensation. CLINICAL IMPRESSION  1. V-tach (HCC)    2. Chest pain, unspecified type    3. ESRD (end stage renal disease) ([facility] [address]    4. Hypokalemia        DISPOSITION   admit    Richard Amaro MD    Note: This chart was created using voice recognition dictation software. Efforts were made by me to ensure accuracy, however some errors may be present due to limitations of this technology and occasionally words are not transcribed correctly.            Richard Amaro MD  03/02/22 9889

## 2022-03-02 PROBLEM — I50.42 CHRONIC COMBINED SYSTOLIC AND DIASTOLIC CHF (CONGESTIVE HEART FAILURE) (HCC): Status: ACTIVE | Noted: 2022-03-02

## 2022-03-02 PROBLEM — I47.20 VENTRICULAR TACHYCARDIA: Status: ACTIVE | Noted: 2022-03-02

## 2022-03-02 PROBLEM — E87.6 HYPOKALEMIA: Status: ACTIVE | Noted: 2022-03-02

## 2022-03-02 PROBLEM — I10 ESSENTIAL HYPERTENSION: Status: ACTIVE | Noted: 2022-03-02

## 2022-03-02 PROBLEM — R77.8 ELEVATED TROPONIN: Status: ACTIVE | Noted: 2022-03-02

## 2022-03-02 PROBLEM — E78.5 HYPERLIPIDEMIA: Status: ACTIVE | Noted: 2022-03-02

## 2022-03-02 LAB
ABO/RH: NORMAL
ANION GAP SERPL CALCULATED.3IONS-SCNC: 14 MMOL/L (ref 3–16)
ANION GAP SERPL CALCULATED.3IONS-SCNC: 17 MMOL/L (ref 3–16)
ANTI-XA UNFRAC HEPARIN: 0.7 IU/ML (ref 0.3–0.7)
ANTIBODY SCREEN: NORMAL
APTT: 137.3 SEC (ref 26.2–38.6)
APTT: 52.1 SEC (ref 26.2–38.6)
APTT: 71.9 SEC (ref 26.2–38.6)
BASOPHILS ABSOLUTE: 0 K/UL (ref 0–0.2)
BASOPHILS ABSOLUTE: 0 K/UL (ref 0–0.2)
BASOPHILS RELATIVE PERCENT: 0.8 %
BASOPHILS RELATIVE PERCENT: 0.8 %
BLOOD BANK DISPENSE STATUS: NORMAL
BLOOD BANK PRODUCT CODE: NORMAL
BPU ID: NORMAL
BUN BLDV-MCNC: 28 MG/DL (ref 7–20)
BUN BLDV-MCNC: 37 MG/DL (ref 7–20)
CALCIUM IONIZED: 1.02 MMOL/L (ref 1.12–1.32)
CALCIUM SERPL-MCNC: 5.4 MG/DL (ref 8.3–10.6)
CALCIUM SERPL-MCNC: 7.7 MG/DL (ref 8.3–10.6)
CHLORIDE BLD-SCNC: 83 MMOL/L (ref 99–110)
CHLORIDE BLD-SCNC: 96 MMOL/L (ref 99–110)
CO2: 19 MMOL/L (ref 21–32)
CO2: 23 MMOL/L (ref 21–32)
CREAT SERPL-MCNC: 6.3 MG/DL (ref 0.8–1.3)
CREAT SERPL-MCNC: 8.3 MG/DL (ref 0.8–1.3)
DESCRIPTION BLOOD BANK: NORMAL
EKG ATRIAL RATE: 93 BPM
EKG DIAGNOSIS: NORMAL
EKG P AXIS: 80 DEGREES
EKG P-R INTERVAL: 272 MS
EKG Q-T INTERVAL: 358 MS
EKG QRS DURATION: 102 MS
EKG QTC CALCULATION (BAZETT): 445 MS
EKG R AXIS: -47 DEGREES
EKG T AXIS: 133 DEGREES
EKG VENTRICULAR RATE: 93 BPM
EOSINOPHILS ABSOLUTE: 0.1 K/UL (ref 0–0.6)
EOSINOPHILS ABSOLUTE: 0.1 K/UL (ref 0–0.6)
EOSINOPHILS RELATIVE PERCENT: 4.2 %
EOSINOPHILS RELATIVE PERCENT: 4.4 %
GFR AFRICAN AMERICAN: 11
GFR AFRICAN AMERICAN: 8
GFR NON-AFRICAN AMERICAN: 7
GFR NON-AFRICAN AMERICAN: 9
GLUCOSE BLD-MCNC: 216 MG/DL (ref 70–99)
GLUCOSE BLD-MCNC: 69 MG/DL (ref 70–99)
HCT VFR BLD CALC: 20.9 % (ref 40.5–52.5)
HCT VFR BLD CALC: 30.2 % (ref 40.5–52.5)
HEMOGLOBIN: 6.6 G/DL (ref 13.5–17.5)
HEMOGLOBIN: 9.5 G/DL (ref 13.5–17.5)
LYMPHOCYTES ABSOLUTE: 0.3 K/UL (ref 1–5.1)
LYMPHOCYTES ABSOLUTE: 0.4 K/UL (ref 1–5.1)
LYMPHOCYTES RELATIVE PERCENT: 15.5 %
LYMPHOCYTES RELATIVE PERCENT: 17.1 %
MAGNESIUM: 1.6 MG/DL (ref 1.8–2.4)
MCH RBC QN AUTO: 29 PG (ref 26–34)
MCH RBC QN AUTO: 29.1 PG (ref 26–34)
MCHC RBC AUTO-ENTMCNC: 31.5 G/DL (ref 31–36)
MCHC RBC AUTO-ENTMCNC: 31.5 G/DL (ref 31–36)
MCV RBC AUTO: 92 FL (ref 80–100)
MCV RBC AUTO: 92.4 FL (ref 80–100)
MONOCYTES ABSOLUTE: 0.3 K/UL (ref 0–1.3)
MONOCYTES ABSOLUTE: 0.3 K/UL (ref 0–1.3)
MONOCYTES RELATIVE PERCENT: 13.8 %
MONOCYTES RELATIVE PERCENT: 14.4 %
NEUTROPHILS ABSOLUTE: 1.3 K/UL (ref 1.7–7.7)
NEUTROPHILS ABSOLUTE: 1.6 K/UL (ref 1.7–7.7)
NEUTROPHILS RELATIVE PERCENT: 63.3 %
NEUTROPHILS RELATIVE PERCENT: 65.7 %
PDW BLD-RTO: 18.3 % (ref 12.4–15.4)
PDW BLD-RTO: 18.8 % (ref 12.4–15.4)
PH VENOUS: 7.28 (ref 7.35–7.45)
PLATELET # BLD: 62 K/UL (ref 135–450)
PLATELET # BLD: 78 K/UL (ref 135–450)
PMV BLD AUTO: 7.6 FL (ref 5–10.5)
PMV BLD AUTO: 7.9 FL (ref 5–10.5)
POTASSIUM REFLEX MAGNESIUM: 2.6 MMOL/L (ref 3.5–5.1)
POTASSIUM SERPL-SCNC: 4.7 MMOL/L (ref 3.5–5.1)
RBC # BLD: 2.26 M/UL (ref 4.2–5.9)
RBC # BLD: 3.28 M/UL (ref 4.2–5.9)
SODIUM BLD-SCNC: 123 MMOL/L (ref 136–145)
SODIUM BLD-SCNC: 129 MMOL/L (ref 136–145)
TROPONIN: 0.09 NG/ML
WBC # BLD: 2 K/UL (ref 4–11)
WBC # BLD: 2.4 K/UL (ref 4–11)

## 2022-03-02 PROCEDURE — 2580000003 HC RX 258: Performed by: INTERNAL MEDICINE

## 2022-03-02 PROCEDURE — 90935 HEMODIALYSIS ONE EVALUATION: CPT

## 2022-03-02 PROCEDURE — 6360000002 HC RX W HCPCS: Performed by: INTERNAL MEDICINE

## 2022-03-02 PROCEDURE — 93010 ELECTROCARDIOGRAM REPORT: CPT | Performed by: INTERNAL MEDICINE

## 2022-03-02 PROCEDURE — 6370000000 HC RX 637 (ALT 250 FOR IP): Performed by: INTERNAL MEDICINE

## 2022-03-02 PROCEDURE — 94761 N-INVAS EAR/PLS OXIMETRY MLT: CPT

## 2022-03-02 PROCEDURE — 94640 AIRWAY INHALATION TREATMENT: CPT

## 2022-03-02 PROCEDURE — 36415 COLL VENOUS BLD VENIPUNCTURE: CPT

## 2022-03-02 PROCEDURE — 82330 ASSAY OF CALCIUM: CPT

## 2022-03-02 PROCEDURE — 80048 BASIC METABOLIC PNL TOTAL CA: CPT

## 2022-03-02 PROCEDURE — 85025 COMPLETE CBC W/AUTO DIFF WBC: CPT

## 2022-03-02 PROCEDURE — 83735 ASSAY OF MAGNESIUM: CPT

## 2022-03-02 PROCEDURE — 86900 BLOOD TYPING SEROLOGIC ABO: CPT

## 2022-03-02 PROCEDURE — 99223 1ST HOSP IP/OBS HIGH 75: CPT | Performed by: INTERNAL MEDICINE

## 2022-03-02 PROCEDURE — 86850 RBC ANTIBODY SCREEN: CPT

## 2022-03-02 PROCEDURE — P9016 RBC LEUKOCYTES REDUCED: HCPCS

## 2022-03-02 PROCEDURE — 96376 TX/PRO/DX INJ SAME DRUG ADON: CPT

## 2022-03-02 PROCEDURE — 85520 HEPARIN ASSAY: CPT

## 2022-03-02 PROCEDURE — 85730 THROMBOPLASTIN TIME PARTIAL: CPT

## 2022-03-02 PROCEDURE — 86923 COMPATIBILITY TEST ELECTRIC: CPT

## 2022-03-02 PROCEDURE — 2700000000 HC OXYGEN THERAPY PER DAY

## 2022-03-02 PROCEDURE — 86901 BLOOD TYPING SEROLOGIC RH(D): CPT

## 2022-03-02 PROCEDURE — 2060000000 HC ICU INTERMEDIATE R&B

## 2022-03-02 PROCEDURE — 36430 TRANSFUSION BLD/BLD COMPNT: CPT

## 2022-03-02 PROCEDURE — 84484 ASSAY OF TROPONIN QUANT: CPT

## 2022-03-02 RX ORDER — SODIUM CHLORIDE 9 MG/ML
INJECTION, SOLUTION INTRAVENOUS PRN
Status: DISCONTINUED | OUTPATIENT
Start: 2022-03-02 | End: 2022-03-05 | Stop reason: HOSPADM

## 2022-03-02 RX ORDER — HEPARIN SODIUM 1000 [USP'U]/ML
2000 INJECTION, SOLUTION INTRAVENOUS; SUBCUTANEOUS ONCE
Status: COMPLETED | OUTPATIENT
Start: 2022-03-02 | End: 2022-03-02

## 2022-03-02 RX ADMIN — HEPARIN SODIUM 850 UNITS/HR: 10000 INJECTION INTRAVENOUS; SUBCUTANEOUS at 14:31

## 2022-03-02 RX ADMIN — PANTOPRAZOLE SODIUM 40 MG: 40 TABLET, DELAYED RELEASE ORAL at 05:04

## 2022-03-02 RX ADMIN — Medication 2 PUFF: at 08:08

## 2022-03-02 RX ADMIN — HEPARIN SODIUM 2000 UNITS: 1000 INJECTION INTRAVENOUS; SUBCUTANEOUS at 14:29

## 2022-03-02 RX ADMIN — ALLOPURINOL 100 MG: 100 TABLET ORAL at 12:12

## 2022-03-02 RX ADMIN — HEPARIN SODIUM 700 UNITS/HR: 10000 INJECTION INTRAVENOUS; SUBCUTANEOUS at 07:44

## 2022-03-02 RX ADMIN — SODIUM CHLORIDE, PRESERVATIVE FREE 10 ML: 5 INJECTION INTRAVENOUS at 12:14

## 2022-03-02 RX ADMIN — POTASSIUM BICARBONATE 60 MEQ: 782 TABLET, EFFERVESCENT ORAL at 07:03

## 2022-03-02 ASSESSMENT — PAIN SCALES - GENERAL
PAINLEVEL_OUTOF10: 0
PAINLEVEL_OUTOF10: 0

## 2022-03-02 NOTE — PROGRESS NOTES
Clinical Pharmacy Note  Heparin Dosing Consult    Jose Martinez is a 61 y.o. male ordered heparin per low dose nomogram by Dr. Leanne Pizarro. Lab Results   Component Value Date    APTT 37.7 03/01/2022     Lab Results   Component Value Date    HGB 9.4 03/01/2022    HCT 29.7 03/01/2022    PLT 77 03/01/2022    INR 1.03 04/01/2020       Ht Readings from Last 1 Encounters:   09/21/21 5' 8\" (1.727 m)        Wt Readings from Last 1 Encounters:   03/01/22 170 lb 9.6 oz (77.4 kg)        Assessment/Plan:  Initial bolus: 4000 units  Initial infusion rate: 930 units/hr  Next aPTT: 3/2/22 0300    Pharmacy will continue to monitor adjust heparin based on aPTT results using nomogram below:     LOW DOSE HEPARIN PROTOCOL (ACS/STEMI/A FIB)     Initial Bolus: 60 units/kg Max Bolus: 4,000 units       Initial Rate: 12 units/kg/hr Max Initial Rate: 1,000 units/hr     aPTT < 45   Heparin 60 units/kg bolus Increase infusion by 4 units/kg/hr       (maximum 4,000 units)   aPTT 45-59.9   Heparin 30 units/kg bolus Increase infusion by 2 units/kg/hr       (maximum 2,000 units)   aPTT 60-90   No bolus   No change   aPTT 90.1-97.5 No bolus   Decrease infusion by 1 units/kg/hr   aPTT 97.6-105  No bolus     Decrease infusion by 2 units/kg/hr   aPTT > 105   Hold heparin for 1 hour Decrease infusion by 3 units/kg/hr     Obtain aPTT 6 hours after initial bolus and 6 hours after any dose change until two consecutive therapeutic aPTTs are achieved - then daily.   Pricilla Arauz, [facility],3/1/2022,9:39 PM

## 2022-03-02 NOTE — PROGRESS NOTES
Pharmacy Medication Reconciliation Note     List of medications Achilles Litten is currently taking is complete. Source of information:   1. Conversation with patient at bedside  2. EMR    Notes regarding home medications:   1. Patient reports not taking any home meds PTA in the ED  2. Patient reports he is not sure of his medications and there is no recent PCP encounter or dispense reports. With guidance, I tried to see if past meds are still currently being taken and it he was able to recognize some of the names. Unknown if meds are accurate or if all meds have been added. Patient reports he fills at [facility] in [place]--I assume he just means the [employer], will need to call tomorrow [phone].     Patient denies taking any OTC or herbal medications    Rachael Parker, Pharmacy Intern  3/1/2022  7:24 PM

## 2022-03-02 NOTE — ED NOTES
Report to [facility]. Pt to be transported to room by RN when room is clean.      Abdon Khalil RN  03/01/22 9642

## 2022-03-02 NOTE — PROGRESS NOTES
4 Eyes Skin Assessment     NAME:  Kareem Morocho  YOB: 1955  MEDICAL RECORD NUMBER:  [id number]    The patient is being assess for  Admission    I agree that 2 RN's have performed a thorough Head to Toe Skin Assessment on the patient. ALL assessment sites listed below have been assessed. Areas assessed by both nurses:    Head, Face, Ears, Shoulders, Back, Chest, Arms, Elbows, Hands, Sacrum. Buttock, Coccyx, Ischium and Legs. Feet and Heels        Does the Patient have a Wound?  No noted wound(s)       Mauricio Prevention initiated:  Yes   Wound Care Orders initiated:  NA    Pressure Injury (Stage 3,4, Unstageable, DTI, NWPT, and Complex wounds) if present place consult order under [de-identified] NA    New and Established Ostomies if present place consult order under : NA      Nurse 1 eSignature: Electronically signed by Daniele Garcia RN on 3/2/22 at 12:13 AM EST    **SHARE this note so that the co-signing nurse is able to place an eSignature**    Nurse 2 eSignature: Electronically signed by Silvia Borja RN on 3/2/22 at 1:46 AM EST

## 2022-03-02 NOTE — PROGRESS NOTES
Clinical Pharmacy Note  Heparin Dosing       Lab Results   Component Value Date    APTT 52.1 03/02/2022     Lab Results   Component Value Date    HGB 9.5 03/02/2022    HCT 30.2 03/02/2022    PLT 78 03/02/2022    INR 1.03 04/01/2020       Current Infusion Rate: 700 units/hr    Plan:  Bolus 2000 units  Rate:  850units/hr  Next aPTT: 2000 3/2/22    Pharmacy will continue to monitor and adjust based on aPTT results.   Juanito Harris, [facility], 3/2/2022 1:59 PM

## 2022-03-02 NOTE — PLAN OF CARE
Problem: Falls - Risk of:  Goal: Will remain free from falls  Description: Will remain free from falls  Outcome: Ongoing  Note: Bed alarm on, bed in lowest position, wheels locked. Fall risk assessment completed every shift. Nonskid socks on, fall sign posted. Pt educated on use of call light and called out appropriately. Problem: Falls - Risk of:  Goal: Absence of physical injury  Description: Absence of physical injury  Outcome: Ongoing  Note: No physical injury this shift. Problem: Pain:  Goal: Pain level will decrease  Description: Pain level will decrease  Outcome: Ongoing  Note: Assessing pain using appropriate pain rating scale q shift and PRN. Offering pt non-pharmacologic and pharmacological pain interventions when necessary. Reassessing pain and pts response to pain intervention. Patient states that chest pain has improved.         Problem: Pain:  Goal: Control of acute pain  Description: Control of acute pain  Outcome: Ongoing     Problem: Pain:  Goal: Control of chronic pain  Description: Control of chronic pain  Outcome: Ongoing

## 2022-03-02 NOTE — H&P
Hospital Medicine  History and Physical    PCP: Richy Hughes MD  Patient Name: Radha Hernandez    Date of Service: Pt seen/examined on 3/1/22 and admitted to Inpatient with expected LOS greater than two midnights due to medical therapy    CHIEF COMPLAINT:  Pt c/o chest pain  HISTORY OF PRESENT ILLNESS: Pt is an 61y.o. year-old male with a history of hemodialysis dependent end-stage renal disease, hypertension, hyperlipidemia, chronic combined systolic and diastolic congestive heart failure, and a prior CVA. He presents to the emergency room for evaluation following an acute onset of chest pain which woke him from sleep at approximately 10:30 AM.  His chest pain is described as sharp and pressure-like and is to the left of his midsternal area. He states that the pain is worse when he takes a deep breath or when he presses on the area. At the time I see him he has no pain at all. EMS reports a 15 beat run of ventricular tachycardia lasting approximately 5 seconds which resolved spontaneously. In the emergency room he was found to have an elevated troponin at 0.11. He is being admitted for further evaluation and treatment. Associated signs and symptoms do not include typical chest pain, shortness of breath, lightheaded, dizziness, diaphoresis, edema, orthopnea, paroxysmal nocturnal dyspnea, fever or chills. No recent medication changes. Past Medical History:        Diagnosis Date    [facility] [address]     CHF (congestive heart failure) (HCC)     Chronic kidney disease     COPD (chronic obstructive pulmonary disease) ([facility] [address]     Dialysis patient ([facility] [address]     Gout     Hemodialysis patient ([facility] [address]     Hx of blood clots     Hyperlipidemia     Hypertension     Renal failure        Past Surgical History:        Procedure Laterality Date    COLONOSCOPY      DIALYSIS FISTULA CREATION Left        Allergies:  Patient has no known allergies.     Medications Prior to Admission:    Prior to Admission medications    Medication Sig Start Date End Date Taking? Authorizing Provider   traZODone (DESYREL) 150 MG tablet Take 150 mg by mouth nightly as needed for Sleep   Yes Historical Provider, MD   gabapentin (NEURONTIN) 300 MG capsule Take 300 mg by mouth as needed. Yes Historical Provider, MD   omeprazole (PRILOSEC) 20 MG delayed release capsule Take 20 mg by mouth daily   Yes Historical Provider, MD   tiotropium (SPIRIVA) 18 MCG inhalation capsule Inhale 18 mcg into the lungs daily   Yes Historical Provider, MD   torsemide (DEMADEX) 20 MG tablet Take 20 mg by mouth daily   Yes Historical Provider, MD   carvedilol (COREG) 12.5 MG tablet Take 12.5 mg by mouth 2 times daily Unsure of dose    Yes Historical Provider, MD   budesonide-formoterol (SYMBICORT) 160-4.5 MCG/ACT AERO Inhale 2 puffs into the lungs 2 times daily   Yes Historical Provider, MD   allopurinol (ZYLOPRIM) 100 MG tablet Take 100 mg by mouth daily   Yes Historical Provider, MD   losartan (COZAAR) 25 MG tablet Take 25 mg by mouth daily    Historical Provider, MD   atorvastatin (LIPITOR) 80 MG tablet Take 1 tablet by mouth daily 4/8/20 4/6/21  Pierre Blank APRN - CNP   albuterol sulfate HFA (VENTOLIN HFA) 108 (90 Base) MCG/ACT inhaler Inhale 2 puffs into the lungs every 6 hours as needed for Wheezing 90mcg/actuation    Historical Provider, MD       Family History:       Problem Relation Age of Onset    Cancer Mother     Cancer Father     Other Sister      Social History:   TOBACCO:   reports that he has quit smoking. He has never used smokeless tobacco.  ETOH:   reports current alcohol use.   OCCUPATION:      REVIEW OF SYSTEMS:  A full review of systems was performed and is negative except for that which appears in the HPI    Physical Exam:    Vitals: /69   Pulse 86   Temp 98.3 °F (36.8 °C) (Oral)   Resp 19   Ht 5' 8\" (1.727 m)   Wt 155 lb 6.8 oz (70.5 kg)   SpO2 98%   BMI 23.63 kg/m²   General appearance: WD/WN 61y.o. year-old male who is alert, appears stated age and is cooperative  HEENT: Head: Normocephalic, no lesions, without obvious abnormality. Eye: Normal external eye, conjunctiva, lids cornea, PEERL. Ears: Normal external ears. Non-tender. Nose: Normal external nose, mucus membranes and septum. Pharynx: Dental Hygiene adequate. Normal buccal mucosa. Normal pharynx. Neck: no adenopathy, no carotid bruit, no JVD, supple, symmetrical, trachea midline and thyroid not enlarged, symmetric, no tenderness/mass/nodules  Lungs: clear to auscultation bilaterally and no use of accessory muscles  Heart: regular rate and rhythm, S1, S2 normal, no murmur, click, rub or gallop and normal apical impulse  Abdomen: soft, non-tender; bowel sounds normal; no masses, no organomegaly  Musculoskeletal: Pain in left precordial area with palpation  Extremities: extremities atraumatic, no cyanosis or edema and Homans sign is negative, no sign of DVT. Capillary Refill: Acceptable < 3 seconds   Peripheral Pulses: +3 easily felt, not easily obliterated with pressures   Skin: Skin color, texture, turgor normal. No rashes or lesions on exposed skin  Neurologic: Neurovascularly intact without any focal sensory/motor deficits. Cranial nerves: II-XII intact, grossly non-focal. Gait was not tested.   Mental Status: Alert and oriented, thought content appropriate, normal insight    CBC:   Recent Labs     03/01/22  1626 03/01/22  1929   WBC 2.7* 2.6*   HGB 10.1* 9.4*   PLT 88* 77*     BMP:    Recent Labs     03/01/22  1626   *   K 3.1*   CL 83*   CO2 29   BUN 31*   CREATININE 7.1*   GLUCOSE 101*     Troponin:   Recent Labs     03/01/22  1626 03/01/22  2226   TROPONINI 0.11* 0.11*     PT/INR:  No results found for: PTINR  U/A:  No results found for: LEUKOCYTESUR, NITRITE, RBCUA, SPECGRAV, UROBILINOGEN, BILIRUBINUR, BLOODU, GLUCOSEU, PROTEINU      RAD:   I have independently reviewed and interpreted the imaging studies below and based my recommendations to the patient on those findings. XR CHEST (2 VW)    Result Date: 3/1/2022  EXAMINATION: TWO XRAY VIEWS OF THE CHEST 3/1/2022 4:50 pm COMPARISON: Chest x-ray dated 21 September 2021 HISTORY: ORDERING SYSTEM PROVIDED HISTORY: Chest Pain TECHNOLOGIST PROVIDED HISTORY: Reason for exam:->Chest Pain Reason for Exam: pain FINDINGS: Small right pleural effusion with hazy right lower lobe airspace opacities. The left lung is clear. Stable cardiomediastinal silhouette. Small right pleural effusion with associated airspace disease. The airspace disease could represent atelectasis or in the appropriate clinical setting pneumonia. CT CHEST PULMONARY EMBOLISM W CONTRAST    Result Date: 3/1/2022  EXAMINATION: CTA OF THE CHEST 3/1/2022 4:14 pm TECHNIQUE: CTA of the chest was performed after the administration of intravenous contrast.  Multiplanar reformatted images are provided for review. MIP images are provided for review. Dose modulation, iterative reconstruction, and/or weight based adjustment of the mA/kV was utilized to reduce the radiation dose to as low as reasonably achievable. COMPARISON: None HISTORY: ORDERING SYSTEM PROVIDED HISTORY: pleuritic chest pain TECHNOLOGIST PROVIDED HISTORY: Reason for exam:->pleuritic chest pain Decision Support Exception - unselect if not a suspected or confirmed emergency medical condition->Emergency Medical Condition (MA) Reason for Exam: pleuritic chest pain FINDINGS: Pulmonary Arteries: Pulmonary arteries are adequately opacified for evaluation. No evidence of intraluminal filling defect to suggest pulmonary embolism. Main pulmonary artery is prominent measuring 3.2 cm. Mediastinum: No evidence of mediastinal lymphadenopathy. The heart is enlarged but no evidence of pericardial disease. There is no acute abnormality of the thoracic aorta.  Lungs/pleura: A moderate right pleural effusion is noted as well as right lower lobe atelectatic changes. Mild atelectasis right upper lobe as well. A trace left pleural effusion and mild left lower lobe atelectatic changes. No evidence of other infiltrates. No evidence of pulmonary edema. A scar-like lesion seen in the right upper lobe which is not likely to represent neoplasm. Upper Abdomen: Mild ascites and mesenteric infiltration. Soft Tissues/Bones: No acute bone or soft tissue abnormality. 1. Evidence of acute pulmonary embolism or acute aortic disease. Main pulmonary artery is prominent possibly related to pulmonary arterial hypertension. 2. Cardiomegaly but no evidence of pericardial disease. 3. Moderate right effusion and right lower lobe atelectatic changes. Trace left effusion and mild left lower lobe atelectatic changes. 4. Mild ascites and mesenteric congestion. EKG:   Read by ER in the absence of a Cardiologist shows  Rhythm: Sinus  Ventricular Rate: 93  QRS Axis: -47  Ectopy: None  Conduction: Sinus rhythm with first-degree AV block with LVH with early repolarization abnormalities and left anterior fascicular block  ST Segments: Consistent with early repolarization abnormalities  T Waves: Consistent with early repolarization abnormalities  Q Waves: Lead III and aVF     Other findings: Motion artifact but EKG is readable     Compared to EKG on: 9/21/2021 and appears unchanged      Assessment:   Principal Problem:    Chest pain  Active Problems:    ESRD on dialysis ([facility])    COPD (chronic obstructive pulmonary disease) ([facility])    Elevated troponin    Hypokalemia    Ventricular tachycardia ([facility])    Essential hypertension    Hyperlipidemia    Chronic combined systolic and diastolic CHF (congestive heart failure) ([facility])  Resolved Problems:    * No resolved hospital problems. *      Plan:       Chest pain with elevated troponin concerning for unstable angina. Patient will be admitted and monitored on telemetry. We will follow serial cardiac enzymes.   Patient was started on a heparin drip in the emergency room. Cardiology has been consulted. Patient currently denies any chest pain. Ventricular tachycardia ([facility] [address] -EMS reports a 15 run of ventricular tachycardia lasting approximately 5 seconds before resolving spontaneously. We will monitor closely on telemetry. Elevated troponin - likely associated with poor renal clearance in the setting of hemodialysis dependent end-stage renal disease. As stated above, we will follow serial cardiac enzymes and monitor on telemetry    CHF - Chronic combined diastolic and systolic dysfunction. A 2D Echocardiogram on 04/08/2021 shows an EF of 45-50%  and grade II diastolic dysfunction. Monitor strict I&Os and daily weights. A low sodium fluid restricted diet will be ordered when he is able to eat. Cardiology has been consulted. Hypokalemia -we will replace potassium and monitor levels closely    ESRD on dialysis [facility]) -patient is not sure who he sees for his renal issues. We will consult nephrology. COPD (chronic obstructive pulmonary disease) ([facility] [address] - currently without exacerbation. We will monitor and provide breathing treatments as indicated      Essential (primary) hypertension - continue home meds and monitor blood pressure    Hyperlipidemia - No current evidence of Rhabdomyolysis or other adverse effects. Continue statin therapy while in the hospital        DVT Prophylaxis: Heparin gtt  Diet: Diet NPO Exceptions are: Ice Chips, Sips of Water with Meds  Code Status: Full Code  (Advanced care planning has been discussed with patient and/or responsible family member and is reflected in the code status.  Further orders associated with this have been entered if appropriate)    Disposition: Anticipate that patient will remain in the hospital for 2 to 3 days depending on further evaluation and clinical course    Please note that over 50 minutes was spent in evaluating the patient, review of records and results, discussion with staff/family, etc.    Fred Valdez MD

## 2022-03-02 NOTE — CONSULTS
[facility]  Consult Note           Reason for Consult:  ESRD  Requesting Physician:  Dr. Jerilyn Herndon      History Obtained From:  patient, electronic medical record    History of Present Ilness:  62 y/o AAM with ESRD on HT TTS at [facility]. presented to ER with CP  He apparently developed CP in the dialysis unit described as sharp in nature and L sided Denies associated Palpitations or SOB  EMS reported 15 beat run of VTAch   He completed his HD and is using baseline O2     Past Medical History:        Diagnosis Date    Cerebral artery occlusion with cerebral infarction ([facility] [address]     CHF (congestive heart failure) ([facility] [address]     Chronic kidney disease     COPD (chronic obstructive pulmonary disease) ([facility] [address]     Dialysis patient ([facility] [address]     Gout     Hemodialysis patient ([facility] [address]     Hx of blood clots     Hyperlipidemia     Hypertension     Renal failure        Past Surgical History:        Procedure Laterality Date    COLONOSCOPY      DIALYSIS FISTULA CREATION Left        Home Medications:    No current facility-administered medications on file prior to encounter. Current Outpatient Medications on File Prior to Encounter   Medication Sig Dispense Refill    traZODone (DESYREL) 150 MG tablet Take 150 mg by mouth nightly as needed for Sleep      gabapentin (NEURONTIN) 300 MG capsule Take 300 mg by mouth as needed.       omeprazole (PRILOSEC) 20 MG delayed release capsule Take 20 mg by mouth daily      tiotropium (SPIRIVA) 18 MCG inhalation capsule Inhale 18 mcg into the lungs daily      torsemide (DEMADEX) 20 MG tablet Take 20 mg by mouth daily      carvedilol (COREG) 12.5 MG tablet Take 12.5 mg by mouth 2 times daily Unsure of dose       budesonide-formoterol (SYMBICORT) 160-4.5 MCG/ACT AERO Inhale 2 puffs into the lungs 2 times daily      allopurinol (ZYLOPRIM) 100 MG tablet Take 100 mg by mouth daily      losartan (COZAAR) 25 MG tablet Take 25 mg by mouth daily      atorvastatin (LIPITOR) 80 MG tablet Take 1 tablet by mouth daily 30 tablet 0    albuterol sulfate HFA (VENTOLIN HFA) 108 (90 Base) MCG/ACT inhaler Inhale 2 puffs into the lungs every 6 hours as needed for Wheezing 90mcg/actuation         Allergies:  Patient has no known allergies. Social History:    Social History     Socioeconomic History    Marital status: Single     Spouse name: Not on file    Number of children: Not on file    Years of education: Not on file    Highest education level: Not on file   Occupational History    Not on file   Tobacco Use    Smoking status: Former Smoker    Smokeless tobacco: Never Used   Vaping Use    Vaping Use: Never used   Substance and Sexual Activity    Alcohol use: Yes     Comment: occasional    Drug use: Never    Sexual activity: Not Currently   Other Topics Concern    Not on file   Social History Narrative    Not on file     Social Determinants of Health     Financial Resource Strain:     Difficulty of Paying Living Expenses: Not on file   Food Insecurity:     Worried About Running Out of Food in the Last Year: Not on file    Ty of Food in the Last Year: Not on file   Transportation Needs:     Lack of Transportation (Medical): Not on file    Lack of Transportation (Non-Medical):  Not on file   Physical Activity:     Days of Exercise per Week: Not on file    Minutes of Exercise per Session: Not on file   Stress:     Feeling of Stress : Not on file   Social Connections:     Frequency of Communication with Friends and Family: Not on file    Frequency of Social Gatherings with Friends and Family: Not on file    Attends Rastafari Services: Not on file    Active Member of Clubs or Organizations: Not on file    Attends Club or Organization Meetings: Not on file    Marital Status: Not on file   Intimate Partner Violence:     Fear of Current or Ex-Partner: Not on file    Emotionally Abused: Not on file    Physically Abused: Not on file    Sexually Abused: Not on file Housing Stability:     Unable to Pay for Housing in the Last Year: Not on file    Number of Places Lived in the Last Year: Not on file    Unstable Housing in the Last Year: Not on file       Family History:   Family History   Problem Relation Age of Onset    Cancer Mother     Cancer Father     Other Sister        Review of Systems:   Pertinent positives stated above in HPI. All other systems were reviewed and were negative. Physical exam:   Constitutional:  VITALS:  /81   Pulse 83   Temp 97.8 °F (36.6 °C) (Oral)   Resp 18   Ht 5' 8\" (1.727 m)   Wt 155 lb 6.8 oz (70.5 kg)   SpO2 100%   BMI 23.63 kg/m²   Gen: alert, awake, nad  Skin: no rash, turgor wnl  Heent:  eomi, mmm  Neck: no bruits or jvd noted, thyroid normal  Cardiovascular:  S1, S2 + systolic murmur   Respiratory: few crackles at bases bilaterally   Abdomen:  +bs, soft, nt, nd, no hepatosplenomegaly  Ext: +++  lower extremity edema  Psychiatric: mood and affect appropriate; judgement and insight intact  Musculoskeletal:  Rom, muscular strength intact; digits, nails normal  L AVF+T/B    Data/  CBC:   Lab Results   Component Value Date    WBC 2.4 03/02/2022    RBC 3.28 03/02/2022    HGB 9.5 03/02/2022    HCT 30.2 03/02/2022    MCV 92.0 03/02/2022    MCH 29.0 03/02/2022    MCHC 31.5 03/02/2022    RDW 18.8 03/02/2022    PLT 78 03/02/2022    MPV 7.6 03/02/2022     BMP:    Lab Results   Component Value Date     03/02/2022    K 4.7 03/02/2022    K 2.6 03/02/2022    CL 83 03/02/2022    CO2 23 03/02/2022    BUN 37 03/02/2022    LABALBU 4.0 09/23/2021    CREATININE 8.3 03/02/2022    CALCIUM 7.7 03/02/2022    GFRAA 8 03/02/2022    LABGLOM 7 03/02/2022    GLUCOSE 69 03/02/2022         Assessment/  1-ESRD HD TTS at [facility]. Completed HD yesterday  2-Volume overload appears grossly volume overloaded, SOB CTA no PE   3- CP   4 Pancytopenia  5 Hypokalemia replaced Labs from today look erroneous !       Plan/  1-He is markedly fluid overloaded Plan UF today for fluid removal HD again tomorrow  2-Recheck HGB and electrolytes  3-Stop IVF's  4 Place on FR 1200 ml  5 Cardiology consulted    Thank you for the consultation. Please do not hesitate to call with questions.     Augustina Allison MD, Lin Roe

## 2022-03-02 NOTE — PLAN OF CARE
Problem: Falls - Risk of:  Goal: Will remain free from falls  Description: Will remain free from falls  3/2/2022 1348 by Ileana Botello RN  Outcome: Ongoing     Problem: Pain:  Goal: Pain level will decrease  Description: Pain level will decrease  3/2/2022 1348 by Ileana Botello RN  Outcome: Ongoing

## 2022-03-02 NOTE — PROGRESS NOTES
Hospitalist Progress Note      PCP: Silver Garduno MD    Date of Admission: 3/1/2022    Chief Complaint: Chest pain    Hospital Course: Patient is a 73-year-old male admitted to the hospital with chest pain. Patient is a hemodialysis patient. Found to have anemia as well. Found to have elevated troponins. Admitted to hospitalist service    Subjective: Patient feels better not currently having chest pain. He is in the middle of receiving a unit of blood        Medications:  Reviewed    Infusion Medications    sodium chloride      heparin (PORCINE) Infusion 850 Units/hr (03/02/22 1431)    sodium chloride       Scheduled Medications    tiotropium  2 puff Inhalation Daily    pantoprazole  40 mg Oral QAM AC    losartan  25 mg Oral Daily    carvedilol  12.5 mg Oral BID WC    budesonide-formoterol  2 puff Inhalation BID    allopurinol  100 mg Oral Daily    sodium chloride flush  10 mL IntraVENous 2 times per day     PRN Meds: sodium chloride, traZODone, albuterol sulfate HFA, sodium chloride flush, sodium chloride, ondansetron, polyethylene glycol, acetaminophen **OR** acetaminophen      Intake/Output Summary (Last 24 hours) at 3/2/2022 1529  Last data filed at 3/2/2022 1405  Gross per 24 hour   Intake 980 ml   Output --   Net 980 ml       Physical Exam Performed:    /81   Pulse 83   Temp 97.8 °F (36.6 °C) (Oral)   Resp 18   Ht 5' 8\" (1.727 m)   Wt 155 lb 6.8 oz (70.5 kg)   SpO2 100%   BMI 23.63 kg/m²     General appearance: No apparent distress, appears stated age and cooperative. HEENT: Pupils equal, round, and reactive to light. Conjunctivae/corneas clear. Neck: Supple, with full range of motion. No jugular venous distention. Trachea midline. Respiratory:  Normal respiratory effort. Clear to auscultation, bilaterally without Rales/Wheezes/Rhonchi. Cardiovascular: Regular rate and rhythm with normal S1/S2 without murmurs, rubs or gallops.   Abdomen: Soft, non-tender, non-distended with normal bowel sounds. Musculoskeletal: No clubbing, cyanosis or edema bilaterally. Full range of motion without deformity. Skin: Skin color, texture, turgor normal.  No rashes or lesions. Neurologic:  Neurovascularly intact without any focal sensory/motor deficits. Cranial nerves: II-XII intact, grossly non-focal.  Psychiatric: Alert and oriented, thought content appropriate, normal insight  Capillary Refill: Brisk,3 seconds, normal   Peripheral Pulses: +2 palpable, equal bilaterally       Labs:   Recent Labs     03/01/22  1929 03/02/22  0503 03/02/22  1201   WBC 2.6* 2.0* 2.4*   HGB 9.4* 6.6* 9.5*   HCT 29.7* 20.9* 30.2*   PLT 77* 62* 78*     Recent Labs     03/01/22  1626 03/02/22  0503 03/02/22  1200   * 129* 123*   K 3.1* 2.6* 4.7   CL 83* 96* 83*   CO2 29 19* 23   BUN 31* 28* 37*   CREATININE 7.1* 6.3* 8.3*   CALCIUM 8.1* 5.4* 7.7*     No results for input(s): AST, ALT, BILIDIR, BILITOT, ALKPHOS in the last 72 hours. No results for input(s): INR in the last 72 hours. Recent Labs     03/01/22  1626 03/01/22  2226 03/02/22  0255   TROPONINI 0.11* 0.11* 0.09*       Urinalysis:    No results found for: Amaro Bridegroom, BACTERIA, RBCUA, BLOODU, SPECGRAV, GLUCOSEU    Radiology:  CT CHEST PULMONARY EMBOLISM W CONTRAST   Final Result   1. Evidence of acute pulmonary embolism or acute aortic disease. Main   pulmonary artery is prominent possibly related to pulmonary arterial   hypertension. 2. Cardiomegaly but no evidence of pericardial disease. 3. Moderate right effusion and right lower lobe atelectatic changes. Trace   left effusion and mild left lower lobe atelectatic changes. 4. Mild ascites and mesenteric congestion. XR CHEST (2 VW)   Final Result   Small right pleural effusion with associated airspace disease. The airspace   disease could represent atelectasis or in the appropriate clinical setting   pneumonia.                  Assessment/Plan:    Active Hospital Problems    Diagnosis  Elevated troponin [R77.8]     Hypokalemia [E87.6]     Ventricular tachycardia (HCC) [I47.2]     Essential hypertension [I10]     Hyperlipidemia [E78.5]     Chronic combined systolic and diastolic CHF (congestive heart failure) (HCC) [I50.42]     Chest pain [R07.9]     COPD (chronic obstructive pulmonary disease) (HCC) [J44.9]     ESRD on dialysis ([address] [N18.6, Z99.2]      Chest pain-slight troponin elevation  Could be demand ischemia related to anemia  Agree with packed RBC transfusion  H&H CBC being repeated concern initial labs might have been spurious  Patient is on doxycycline which can cause terrible \"heartburn\"  Patient does have some degree of LV dysfunction based on review of previous echo done here April/2021  EKG does show some T wave inversion  Dr Cornelio Holder consulted cardiology-on heparin drip presently    End-stage renal disease with fluid overload-plan further hemodialysis today by nephrology    Pancytopenia-this appears chronic based on review of his previous lab studies. I will check electronic health record to see what work-ups been done for this in the past    Diarrhea- will send stool for c diff . If negative start immodium    DVT Prophylaxis: heparin drip  Diet: ADULT DIET; Regular; Low Sodium (2 gm); Low Potassium (Less than 3000 mg/day);  Less than 60 gm; 1200 ml  Code Status: Full Code        Dispo -  2-3 d  Aneudy Alford MD

## 2022-03-02 NOTE — PROGRESS NOTES
Pt. arrived to floor via stretcher from ED and transfered to bed. Telemetry activated and confirmed with [facility]. Patient oriented to room and use of call light. Call light and personal items within reach. Admission and assessment initiated. Denied further needs or questions at this time. Will continue to monitor.

## 2022-03-03 LAB
ANION GAP SERPL CALCULATED.3IONS-SCNC: 15 MMOL/L (ref 3–16)
APTT: 36.3 SEC (ref 26.2–38.6)
APTT: 48.3 SEC (ref 26.2–38.6)
APTT: 99 SEC (ref 26.2–38.6)
BASOPHILS ABSOLUTE: 0 K/UL (ref 0–0.2)
BASOPHILS RELATIVE PERCENT: 0.9 %
BUN BLDV-MCNC: 41 MG/DL (ref 7–20)
C DIFF TOXIN/ANTIGEN: NORMAL
CALCIUM SERPL-MCNC: 8.4 MG/DL (ref 8.3–10.6)
CHLORIDE BLD-SCNC: 84 MMOL/L (ref 99–110)
CO2: 25 MMOL/L (ref 21–32)
CREAT SERPL-MCNC: 9.1 MG/DL (ref 0.8–1.3)
EOSINOPHILS ABSOLUTE: 0.1 K/UL (ref 0–0.6)
EOSINOPHILS RELATIVE PERCENT: 3.3 %
GFR AFRICAN AMERICAN: 7
GFR NON-AFRICAN AMERICAN: 6
GLUCOSE BLD-MCNC: 85 MG/DL (ref 70–99)
HCT VFR BLD CALC: 33.9 % (ref 40.5–52.5)
HEMOGLOBIN: 10.7 G/DL (ref 13.5–17.5)
LV EF: 34 %
LV EF: 38 %
LVEF MODALITY: NORMAL
LVEF MODALITY: NORMAL
LYMPHOCYTES ABSOLUTE: 0.6 K/UL (ref 1–5.1)
LYMPHOCYTES RELATIVE PERCENT: 14.5 %
MCH RBC QN AUTO: 29.1 PG (ref 26–34)
MCHC RBC AUTO-ENTMCNC: 31.5 G/DL (ref 31–36)
MCV RBC AUTO: 92.3 FL (ref 80–100)
MONOCYTES ABSOLUTE: 0.6 K/UL (ref 0–1.3)
MONOCYTES RELATIVE PERCENT: 15.1 %
NEUTROPHILS ABSOLUTE: 2.6 K/UL (ref 1.7–7.7)
NEUTROPHILS RELATIVE PERCENT: 66.2 %
PDW BLD-RTO: 18 % (ref 12.4–15.4)
PLATELET # BLD: 82 K/UL (ref 135–450)
PMV BLD AUTO: 7.5 FL (ref 5–10.5)
POTASSIUM REFLEX MAGNESIUM: 3.9 MMOL/L (ref 3.5–5.1)
RBC # BLD: 3.67 M/UL (ref 4.2–5.9)
SODIUM BLD-SCNC: 124 MMOL/L (ref 136–145)
WBC # BLD: 3.9 K/UL (ref 4–11)

## 2022-03-03 PROCEDURE — 94640 AIRWAY INHALATION TREATMENT: CPT

## 2022-03-03 PROCEDURE — 3430000000 HC RX DIAGNOSTIC RADIOPHARMACEUTICAL: Performed by: INTERNAL MEDICINE

## 2022-03-03 PROCEDURE — A9502 TC99M TETROFOSMIN: HCPCS | Performed by: INTERNAL MEDICINE

## 2022-03-03 PROCEDURE — 96365 THER/PROPH/DIAG IV INF INIT: CPT

## 2022-03-03 PROCEDURE — 5A1D70Z PERFORMANCE OF URINARY FILTRATION, INTERMITTENT, LESS THAN 6 HOURS PER DAY: ICD-10-PCS | Performed by: INTERNAL MEDICINE

## 2022-03-03 PROCEDURE — 96376 TX/PRO/DX INJ SAME DRUG ADON: CPT

## 2022-03-03 PROCEDURE — 87324 CLOSTRIDIUM AG IA: CPT

## 2022-03-03 PROCEDURE — 6370000000 HC RX 637 (ALT 250 FOR IP): Performed by: INTERNAL MEDICINE

## 2022-03-03 PROCEDURE — 36415 COLL VENOUS BLD VENIPUNCTURE: CPT

## 2022-03-03 PROCEDURE — 85730 THROMBOPLASTIN TIME PARTIAL: CPT

## 2022-03-03 PROCEDURE — 93017 CV STRESS TEST TRACING ONLY: CPT

## 2022-03-03 PROCEDURE — 85025 COMPLETE CBC W/AUTO DIFF WBC: CPT

## 2022-03-03 PROCEDURE — 6360000002 HC RX W HCPCS: Performed by: INTERNAL MEDICINE

## 2022-03-03 PROCEDURE — 93306 TTE W/DOPPLER COMPLETE: CPT

## 2022-03-03 PROCEDURE — 2580000003 HC RX 258: Performed by: INTERNAL MEDICINE

## 2022-03-03 PROCEDURE — 2060000000 HC ICU INTERMEDIATE R&B

## 2022-03-03 PROCEDURE — 96366 THER/PROPH/DIAG IV INF ADDON: CPT

## 2022-03-03 PROCEDURE — 99233 SBSQ HOSP IP/OBS HIGH 50: CPT | Performed by: INTERNAL MEDICINE

## 2022-03-03 PROCEDURE — 80048 BASIC METABOLIC PNL TOTAL CA: CPT

## 2022-03-03 PROCEDURE — 87449 NOS EACH ORGANISM AG IA: CPT

## 2022-03-03 PROCEDURE — 94761 N-INVAS EAR/PLS OXIMETRY MLT: CPT

## 2022-03-03 PROCEDURE — 2700000000 HC OXYGEN THERAPY PER DAY

## 2022-03-03 PROCEDURE — 78452 HT MUSCLE IMAGE SPECT MULT: CPT

## 2022-03-03 PROCEDURE — 90935 HEMODIALYSIS ONE EVALUATION: CPT

## 2022-03-03 RX ORDER — HEPARIN SODIUM 1000 [USP'U]/ML
4000 INJECTION, SOLUTION INTRAVENOUS; SUBCUTANEOUS ONCE
Status: COMPLETED | OUTPATIENT
Start: 2022-03-03 | End: 2022-03-03

## 2022-03-03 RX ORDER — HEPARIN SODIUM 1000 [USP'U]/ML
2000 INJECTION, SOLUTION INTRAVENOUS; SUBCUTANEOUS ONCE
Status: COMPLETED | OUTPATIENT
Start: 2022-03-04 | End: 2022-03-04

## 2022-03-03 RX ORDER — MAGNESIUM SULFATE 1 G/100ML
1000 INJECTION INTRAVENOUS PRN
Status: DISCONTINUED | OUTPATIENT
Start: 2022-03-03 | End: 2022-03-05 | Stop reason: HOSPADM

## 2022-03-03 RX ORDER — LOPERAMIDE HYDROCHLORIDE 2 MG/1
2 CAPSULE ORAL 4 TIMES DAILY PRN
Status: DISCONTINUED | OUTPATIENT
Start: 2022-03-03 | End: 2022-03-05 | Stop reason: HOSPADM

## 2022-03-03 RX ORDER — MAGNESIUM SULFATE IN WATER 40 MG/ML
2000 INJECTION, SOLUTION INTRAVENOUS ONCE
Status: COMPLETED | OUTPATIENT
Start: 2022-03-03 | End: 2022-03-03

## 2022-03-03 RX ADMIN — LOPERAMIDE HYDROCHLORIDE 2 MG: 2 CAPSULE ORAL at 21:09

## 2022-03-03 RX ADMIN — REGADENOSON 0.4 MG: 0.08 INJECTION, SOLUTION INTRAVENOUS at 11:04

## 2022-03-03 RX ADMIN — TETROFOSMIN 10 MILLICURIE: 1.38 INJECTION, POWDER, LYOPHILIZED, FOR SOLUTION INTRAVENOUS at 07:36

## 2022-03-03 RX ADMIN — Medication 2 PUFF: at 08:46

## 2022-03-03 RX ADMIN — SODIUM CHLORIDE, PRESERVATIVE FREE 10 ML: 5 INJECTION INTRAVENOUS at 09:10

## 2022-03-03 RX ADMIN — PANTOPRAZOLE SODIUM 40 MG: 40 TABLET, DELAYED RELEASE ORAL at 06:31

## 2022-03-03 RX ADMIN — HEPARIN SODIUM 4000 UNITS: 1000 INJECTION INTRAVENOUS; SUBCUTANEOUS at 06:31

## 2022-03-03 RX ADMIN — ALLOPURINOL 100 MG: 100 TABLET ORAL at 09:09

## 2022-03-03 RX ADMIN — LOSARTAN POTASSIUM 25 MG: 25 TABLET, FILM COATED ORAL at 09:09

## 2022-03-03 RX ADMIN — CARVEDILOL 12.5 MG: 12.5 TABLET, FILM COATED ORAL at 09:00

## 2022-03-03 RX ADMIN — MAGNESIUM SULFATE HEPTAHYDRATE 2000 MG: 40 INJECTION, SOLUTION INTRAVENOUS at 19:03

## 2022-03-03 RX ADMIN — TETROFOSMIN 30 MILLICURIE: 1.38 INJECTION, POWDER, LYOPHILIZED, FOR SOLUTION INTRAVENOUS at 11:14

## 2022-03-03 RX ADMIN — CARVEDILOL 12.5 MG: 12.5 TABLET, FILM COATED ORAL at 18:01

## 2022-03-03 ASSESSMENT — PAIN SCALES - WONG BAKER
WONGBAKER_NUMERICALRESPONSE: 0
WONGBAKER_NUMERICALRESPONSE: 0

## 2022-03-03 ASSESSMENT — PAIN SCALES - GENERAL
PAINLEVEL_OUTOF10: 0

## 2022-03-03 NOTE — PROGRESS NOTES
Hospitalist Progress Note      PCP: Fernanda Moreira MD    Date of Admission: 3/1/2022    Chief Complaint: Chest pain    Hospital Course: Patient is a 62-year-old male admitted to the hospital with chest pain. Patient is a hemodialysis patient. Found to have anemia as well. Found to have elevated troponins. Admitted to hospitalist service    Subjective: voluminous diarrhea. c diff neg. Awaits stress test and echo. No abd pain  He says diarrhea is common for him and runs it course normally      Medications:  Reviewed    Infusion Medications    sodium chloride      heparin (PORCINE) Infusion 850 Units/hr (03/02/22 1431)    sodium chloride       Scheduled Medications    tiotropium  2 puff Inhalation Daily    pantoprazole  40 mg Oral QAM AC    losartan  25 mg Oral Daily    carvedilol  12.5 mg Oral BID WC    budesonide-formoterol  2 puff Inhalation BID    allopurinol  100 mg Oral Daily    sodium chloride flush  10 mL IntraVENous 2 times per day     PRN Meds: loperamide, sodium chloride, regadenoson, traZODone, albuterol sulfate HFA, sodium chloride flush, sodium chloride, ondansetron, polyethylene glycol, acetaminophen **OR** acetaminophen      Intake/Output Summary (Last 24 hours) at 3/3/2022 0943  Last data filed at 3/3/2022 0910  Gross per 24 hour   Intake 990 ml   Output 3200 ml   Net -2210 ml       Physical Exam Performed:    BP (!) 144/90   Pulse 90   Temp 97.6 °F (36.4 °C) (Oral)   Resp 16   Ht 5' 8\" (1.727 m)   Wt 155 lb 10.3 oz (70.6 kg)   SpO2 99%   BMI 23.67 kg/m²     General appearance: No apparent distress, appears stated age and cooperative. HEENT: Pupils equal, round, and reactive to light. Conjunctivae/corneas clear. Neck: Supple, with full range of motion. No jugular venous distention. Trachea midline. Respiratory:  Normal respiratory effort. Clear to auscultation, bilaterally without Rales/Wheezes/Rhonchi.   Cardiovascular: Regular rate and rhythm with normal S1/S2 without NM Cardiac Stress Test Nuclear Imaging    (Results Pending)           Assessment/Plan:    Active Hospital Problems    Diagnosis     Elevated troponin [R77.8]     Hypokalemia [E87.6]     V-tach ([facility]) [I47.2]     Essential hypertension [I10]     Hyperlipidemia [E78.5]     Chronic combined systolic and diastolic CHF (congestive heart failure) ([facility]) [I50.42]     SOB (shortness of breath) [R06.02]     Chest pain [R07.9]     COPD (chronic obstructive pulmonary disease) ([facility]) [J44.9]     ESRD on dialysis ([facility] [N18.6, Z99.2]      Chest pain-slight troponin elevation  Could be demand ischemia related to anemia  S/p transfusion  Seen by cardiology  Stress test and echo pending     End-stage renal disease with fluid overload-HD sometime today    Diarrhea-c diff neg. Unclear etiology. Possible viral GE  Start imodium give dose now    Pancytopenia-this appears chronic based on review of his previous lab studies. I will check electronic health record to see what work-ups been done for this in the past    COPD without exacerbation-continue MDIs    HTN-stable on meds    Chronic combined systolic diastolic hf-based on 2/4563 echo  Compensated.   Repeat echo today to assess LV function  On b-blocker and ARB  Diuretic resistant due to ESRD     DVT Prophylaxis: heparin drip  Diet: Diet NPO Exceptions are: Ice Chips, Sips of Water with Meds  Code Status: Full Code        Dispo -  2-3 d  Duglas Strickland MD

## 2022-03-03 NOTE — PROGRESS NOTES
Clinical Pharmacy Note  Heparin Dosing       Lab Results   Component Value Date    APTT 71.9 03/02/2022     Lab Results   Component Value Date    HGB 9.5 03/02/2022    HCT 30.2 03/02/2022    PLT 78 03/02/2022    INR 1.03 04/01/2020       Current Infusion Rate: 850 units/hr    Plan:  Bolus: none  Rate:  850 units/hr  Next aPTT: 0400 03/03/22    Pharmacy will continue to monitor and adjust based on aPTT results.   Homer Rasmussen, [address] [place] [address], 3/2/2022 10:12 PM

## 2022-03-03 NOTE — PROGRESS NOTES
Clinical Pharmacy Note  Heparin Dosing       Lab Results   Component Value Date    APTT 99.0 03/03/2022     Lab Results   Component Value Date    HGB 10.7 03/03/2022    HCT 33.9 03/03/2022    PLT 82 03/03/2022    INR 1.03 04/01/2020       Current Infusion Rate: 850 units/hr    Plan:    Rate:  Reduce to 695 units/hr  Next aPTT: 2200 03/03/22    Pharmacy will continue to monitor and adjust based on aPTT results.   Xena Basurto, [address] [facility], 3/3/2022 3:43 PM

## 2022-03-03 NOTE — CONSULTS
[address] [facility]                                  CONSULTATION    PATIENT NAME: Jhonatan Sands                     :        1955  MED REC NO:   [id number]                          ROOM:       9313  ACCOUNT NO:   [de-identified]                           ADMIT DATE: 2022  PROVIDER:     Andreas Erazo MD    CARDIOLOGY CONSULTATION    CONSULT DATE:  2022    HISTORY OF PRESENT ILLNESS:  This is a 60-year-old -American male  with a history of hypertension, end-stage renal disease, on dialysis,  hyperlipidemia, previous history of CHF, CVA presented to the hospital  with a sudden onset of shortness of breath along with the pain. He has  been having this for three days. He is not clear whether his pain gets  worse with the exertion or not. He says the pain does not radiate to  his jaws or to his arms. He has no diaphoresis with this. He has no  palpitation. He presented to the emergency room and his blood test  showed elevated troponin leading to his cardiac admission. His EKG also  shows some ST and T-wave abnormalities. He has no fever, chills, or  rigors. No cough or sputum production. REVIEW OF SYSTEMS:  Please see HPI. All other systems are reviewed and  they are negative. PAST MEDICAL HISTORY:  1. History of congestive heart failure. Last echocardiogram showed EF  of 45 to 50%. 2.  Hypertension. 3.  History of chronic kidney disease, on dialysis. 4.  History of gout. 5.  History of hyperlipidemia. 6.  History of lung disease per patient. PAST SURGICAL HISTORY:  The patient had a colonoscopy and dialysis  fistula creation. SOCIAL HISTORY:  Remote history of smoking, has not smoked for 30 years. No history of alcohol abuse. FAMILY HISTORY:  Both the parents had a history of cancer but no history  of any early or premature atherosclerosis.     MEDICINES AND ALLERGIES: ischemic heart disease. 2.  We will obtain an echocardiogram to accurately assess his LV  function. 3.  We will make further recommendations after reviewing the  above-mentioned test.    I appreciate the opportunity to participate in the care of this pleasant  male. Complexity, very high.         Ean Bowman MD    D: 03/02/2022 16:57:01       T: 03/02/2022 18:15:33     [email or web address]  Job#: [id number]     Doc#: [id number]    CC:

## 2022-03-03 NOTE — CARE COORDINATION
CM attempted to see patient for discharge planning purposes. Patient on the bedside commode at this time. Will attempt on 3/4/22.      Marissa VALERA RN  Case Management  [phone]    Electronically signed by Marissa Moya RN on 3/3/2022 at 6:04 PM

## 2022-03-03 NOTE — PROGRESS NOTES
Clinical Pharmacy Note  Heparin Dosing       Lab Results   Component Value Date    APTT 36.3 03/03/2022     Lab Results   Component Value Date    HGB 10.7 03/03/2022    HCT 33.9 03/03/2022    PLT 82 03/03/2022    INR 1.03 04/01/2020       Current Infusion Rate: 850 units/hr    Plan:  Patient pulled IV out and heparin was off for a period of time  Will bolus 4,000 units IVP x 1 and resume at previously established rate    Bolus: 4,000 units  Rate:  Resume at 850 units/hr  Next aPTT: 1230 03/03/22    Pharmacy will continue to monitor and adjust based on aPTT results.   Mickey Saxena [facility], 3/3/2022 6:27 AM

## 2022-03-03 NOTE — PROGRESS NOTES
[facility]   Daily Progress Note      Admit Date:  3/1/2022    CC: \"  This is a 72-year-old -American male  with a history of hypertension, end-stage renal disease, on dialysis,  hyperlipidemia, previous history of CHF, CVA presented to the hospital  with a sudden onset of shortness of breath along with the pain. He has  been having this for three days. He is not clear whether his pain gets  worse with the exertion or not. He says the pain does not radiate to  his jaws or to his arms. He has no diaphoresis with this. He has no  palpitation. He presented to the emergency room and his blood test  showed elevated troponin leading to his cardiac admission. His EKG also  shows some ST and T-wave abnormalities. He has no fever, chills, or  rigors. No cough or sputum production.     Subjective:  Patient denies having any chest pain tightness pressure or shortness of breath but does complain of diarrhea. Vital signs remained stable except slightly elevated blood pressure. No arrhythmias noted on the monitor    Objective:   BP (!) 144/90   Pulse 90   Temp 97.6 °F (36.4 °C) (Oral)   Resp 16   Ht 5' 8\" (1.727 m)   Wt 155 lb 10.3 oz (70.6 kg)   SpO2 99%   BMI 23.67 kg/m²     Intake/Output Summary (Last 24 hours) at 3/3/2022 0910  Last data filed at 3/2/2022 1940  Gross per 24 hour   Intake 980 ml   Output 3200 ml   Net -2220 ml     Wt Readings from Last 3 Encounters:   03/03/22 155 lb 10.3 oz (70.6 kg)   09/25/21 136 lb 11 oz (62 kg)   05/12/21 146 lb 13.2 oz (66.6 kg)     Telemetry: NSR    Physical Exam:  General:  NAD, Awake, alert and oriented X4  Skin:  Warm and dry  Neck:  Supple, no JVP appreciated, no bruit  Chest:  Clear to auscultation, no wheezes/rhonchi/rales  Cardiovascular:  Regular rate.  S1S2  Abdomen:  Soft, nontender, +bowel sounds  Extremities:  No LE edema    Cardiac Diagnosis:  hypertension, hyperlipidemia and CHF    Medications:    tiotropium  2 puff Inhalation Daily    pantoprazole  40 mg Oral QAM AC    losartan  25 mg Oral Daily    carvedilol  12.5 mg Oral BID WC    budesonide-formoterol  2 puff Inhalation BID    allopurinol  100 mg Oral Daily    sodium chloride flush  10 mL IntraVENous 2 times per day      sodium chloride      heparin (PORCINE) Infusion 850 Units/hr (03/02/22 1431)    sodium chloride       sodium chloride, regadenoson, traZODone, albuterol sulfate HFA, sodium chloride flush, sodium chloride, ondansetron, polyethylene glycol, acetaminophen **OR** acetaminophen    Lab Data:  CBC:   Recent Labs     03/02/22  0503 03/02/22  1201 03/03/22  0505   WBC 2.0* 2.4* 3.9*   HGB 6.6* 9.5* 10.7*   PLT 62* 78* 82*     BMP:    Recent Labs     03/02/22  0503 03/02/22  1200 03/03/22  0505   * 123* 124*   K 2.6* 4.7 3.9   CO2 19* 23 25   BUN 28* 37* 41*   CREATININE 6.3* 8.3* 9.1*     LIVR: No results for input(s): AST, ALT in the last 72 hours. INR:  No results for input(s): INR in the last 72 hours. APTT:   Recent Labs     03/02/22  1256 03/02/22  2126 03/03/22  0505   APTT 52.1* 71.9* 36.3     BNP:  No results for input(s): BNP in the last 72 hours. Imaging: Echocardiogram 4/8/2021 left ventricular cavity size is normal. Borderline mild left ventricular   hypertrophy. Ejection fraction is visually estimated to be 45-50%. Mild   global dysfunction. Probably grade 2 diastolic dysfunction. The left atrium is dilated. The right atrium appears dilated. The aortic root is mildly dilated at 3.73 cm in diameter. Mild or mild-moderate mitral regurgitation. Moderate tricuspid regurgitation. Mild pulmonic regurgitation present. IVC size is dilated (>2.1 cm) and collapses < 50% with respiration   consistent with elevated RA pressure (15 mmHg). Estimated pulmonary artery systolic pressure is severely elevated at 65 mmHg   assuming a right atrial pressure of 15 mmHg. A bubble study was performed and fails to show evidence of shunting. Signature    Assessment/Plan:  1) elevated troponin  -Patient has end-stage renal disease which certainly will make interpretation of troponin elevation difficult but with his symptoms of chest pain and shortness of breath on admission and EKG changes, underlying ischemic heart disease is a strong possibility  -Awaiting nuclear stress test.  -Will need cardiac cath if nuclear stress test is abnormal for reversible ischemia  -We will discontinue heparin once current bag is completed    2) hypertension  -Blood pressure is mildly elevated today  -We will continue to follow  -BP goal is less than 140/90      Shortness of breath  -Probably related to hypertension but will need to exclude underlying LV dysfunction  -Echo currently is pending    End-stage renal disease  -On dialysis        Electronically signed by Madalyn Alcaraz MD on 3/3/2022 at 9:10 AM

## 2022-03-03 NOTE — FLOWSHEET NOTE
Treatment time: 2 hours  Net UF: 2700 ml     Pre weight: 70.5 kg   Post weight: 67.8 kg  EDW: TBD     Access used: JAIME AVG  Access function: Good with  ml/min     Medications or blood products given: None     Regular outpatient schedule: [facility]     Summary of response to treatment: Tolerated tx fair.  Tx ended 6min early because pt was c/o crampin in arms and hands.      Copy of dialysis treatment record placed in chart, to be scanned into EMR.       03/02/22 1741 03/02/22 1940   Treatment   Time On  --  1741   Time Off  --  1935   Vital Signs   BP (!) 141/90 (!) 149/97   Temp 98.6 °F (37 °C) 97.4 °F (36.3 °C)   Pulse 88 86   Resp 18 18

## 2022-03-03 NOTE — FLOWSHEET NOTE
Treatment time: 2 hours  Net UF: 2016 ml     Pre weight: 70.6 kg   Post weight: 68.5 kg  EDW: 61.5 kg     Access used: JAIME AVG  Access function: Good with  ml/min     Medications or blood products given: None     Regular outpatient schedule: 2025 [facility]     Summary of response to treatment: Tolerated tx well. No HD related complaints or complications. Patient refused to complete full 3hrs or tx, and after 2hrs of tx states he is ready to come off of the machine.  He states its been a long day and he's ready to go back to his room.      Copy of dialysis treatment record placed in chart, to be scanned into EMR.       03/03/22 1250 03/03/22 1510   Treatment   Time On  --  1253   Time Off  --  1505   Vital Signs   BP (!) 144/90 (!) 144/91   Temp 97.5 °F (36.4 °C) 97.7 °F (36.5 °C)   Pulse 79 85   Resp 18 18   Dialysis Bath   K+ (Potassium) 4  --    Ca+ (Calcium) 2.25  --    Na+ (Sodium) 135  --    HCO3 (Bicarb) 35  --

## 2022-03-03 NOTE — PROGRESS NOTES
[facility]  Phone: [phone]  Fax: [phone]  [email or web address]          We are following the patient for ESRD  60 y/o AAM with ESRD on HT TTS at [place] [address]. presented to ER with CP  He apparently developed CP in the dialysis unit described as sharp in nature and L sided Denies associated Palpitations or SOB  EMS reported 15 beat run of VTAch   He completed his HD and is using baseline O2     SUBJECTIVE:  Undergoing HD at this time Feels OK No CP at this time  UF yesterday 2.7 L removed     OBJECTIVE:     PHYSICAL:    TEMPERATURE:  Current - Temp: 97.6 °F (36.4 °C);  Max - Temp  Av.9 °F (36.6 °C)  Min: 97.4 °F (36.3 °C)  Max: 98.6 °F (37 °C)  RESPIRATIONS RANGE: Resp  Av.2  Min: 16  Max: 21  PULSE RANGE: Pulse  Av.6  Min: 85  Max: 94  BLOOD PRESSURE RANGE:  Systolic (47XPI), BA , Min:137 , VAW:022   ; Diastolic (26ZZH), DLZ:30, Min:87, Max:98    PULSE OXIMETRY RANGE: SpO2  Av.5 %  Min: 94 %  Max: 99 %  24HR INTAKE/OUTPUT:    Intake/Output Summary (Last 24 hours) at 3/3/2022 1228  Last data filed at 3/3/2022 8309  Gross per 24 hour   Intake 990 ml   Output 3200 ml   Net -2210 ml       CONSTITUTIONAL:  awake, alert, cooperative, no apparent distress, and appears stated age  HEENT:  Lids and lashes normal, pupils equal, round and reactive to light  NECK:  Supple, symmetrical, trachea midline, no adenopathy  LUNGS:  No increased work of breathing, good air exchange, clear to auscultation bilaterally, no crackles or wheezing  CARDIOVASCULAR:  Normal apical impulse, regular rate and rhythm, normal S1 and S2 + systolic murmur   ABDOMEN:  No scars, normal bowel sounds, soft, non-distended  NEUROLOGIC:  alert, oriented, normal speech, no focal findings  SKIN: no bruising or bleeding  EXTREMITIES: 2+ bilateral pedal edema    Medications     sodium chloride      heparin (PORCINE) Infusion 850 Units/hr (22 1431)    sodium chloride          Data      CBC: Recent Labs     03/02/22  0503 03/02/22  1201 03/03/22  0505   WBC 2.0* 2.4* 3.9*   RBC 2.26* 3.28* 3.67*   HGB 6.6* 9.5* 10.7*   HCT 20.9* 30.2* 33.9*   PLT 62* 78* 82*     BMP:    Recent Labs     03/02/22  0503 03/02/22  1200 03/03/22  0505   * 123* 124*   K 2.6* 4.7 3.9   CL 96* 83* 84*   CO2 19* 23 25   BUN 28* 37* 41*   CREATININE 6.3* 8.3* 9.1*   CALCIUM 5.4* 7.7* 8.4   GLUCOSE 216* 69* 85     Phosphorus:  No results for input(s): PHOS in the last 72 hours.   Magnesium:    Recent Labs     03/01/22  1626 03/02/22  0503   MG 2.10 1.60*         ASSESSMENT     Patient Active Problem List   Diagnosis    HCAP (healthcare-associated pneumonia)    COPD exacerbation ([address]    ESRD on dialysis ([address]    COPD (chronic obstructive pulmonary disease) ([address]    Acute on chronic respiratory failure with hypoxia and hypercapnia (HCC)    Chest pain    Elevated troponin    Hypokalemia    V-tach ([address]    Essential hypertension    Hyperlipidemia    Chronic combined systolic and diastolic CHF (congestive heart failure) (HCC)    SOB (shortness of breath)       PLAN  1-ESRD HD TTS at [facility]. UF yesterday 2.7 L removed HD in progress Challenge TW UF Goal 3.5 L   2-Volume overload appears grossly volume overloaded, SOB CTA no PE FR with HD   3- CP cardiology following Stress test ECHO done  4 Pancytopenia HGB 6.6 gm yesterday was likely erroneous  5 Hypokalemia corrected

## 2022-03-04 LAB
ANION GAP SERPL CALCULATED.3IONS-SCNC: 19 MMOL/L (ref 3–16)
APTT: 70.6 SEC (ref 26.2–38.6)
BASOPHILS ABSOLUTE: 0 K/UL (ref 0–0.2)
BASOPHILS RELATIVE PERCENT: 0.9 %
BUN BLDV-MCNC: 30 MG/DL (ref 7–20)
CALCIUM SERPL-MCNC: 8.7 MG/DL (ref 8.3–10.6)
CHLORIDE BLD-SCNC: 89 MMOL/L (ref 99–110)
CO2: 23 MMOL/L (ref 21–32)
CREAT SERPL-MCNC: 7.3 MG/DL (ref 0.8–1.3)
EOSINOPHILS ABSOLUTE: 0.1 K/UL (ref 0–0.6)
EOSINOPHILS RELATIVE PERCENT: 3.6 %
GFR AFRICAN AMERICAN: 9
GFR NON-AFRICAN AMERICAN: 8
GLUCOSE BLD-MCNC: 90 MG/DL (ref 70–99)
HCT VFR BLD CALC: 31 % (ref 40.5–52.5)
HCT VFR BLD CALC: 33.3 % (ref 40.5–52.5)
HEMOGLOBIN: 10.6 G/DL (ref 13.5–17.5)
HEMOGLOBIN: 9.9 G/DL (ref 13.5–17.5)
LYMPHOCYTES ABSOLUTE: 0.6 K/UL (ref 1–5.1)
LYMPHOCYTES RELATIVE PERCENT: 17.1 %
MCH RBC QN AUTO: 29.3 PG (ref 26–34)
MCH RBC QN AUTO: 29.5 PG (ref 26–34)
MCHC RBC AUTO-ENTMCNC: 31.8 G/DL (ref 31–36)
MCHC RBC AUTO-ENTMCNC: 32 G/DL (ref 31–36)
MCV RBC AUTO: 92.1 FL (ref 80–100)
MCV RBC AUTO: 92.2 FL (ref 80–100)
MONOCYTES ABSOLUTE: 0.4 K/UL (ref 0–1.3)
MONOCYTES RELATIVE PERCENT: 11.1 %
NEUTROPHILS ABSOLUTE: 2.2 K/UL (ref 1.7–7.7)
NEUTROPHILS RELATIVE PERCENT: 67.3 %
PDW BLD-RTO: 18.3 % (ref 12.4–15.4)
PDW BLD-RTO: 18.4 % (ref 12.4–15.4)
PLATELET # BLD: 80 K/UL (ref 135–450)
PLATELET # BLD: 81 K/UL (ref 135–450)
PMV BLD AUTO: 7.1 FL (ref 5–10.5)
PMV BLD AUTO: 7.5 FL (ref 5–10.5)
POTASSIUM REFLEX MAGNESIUM: 4 MMOL/L (ref 3.5–5.1)
RBC # BLD: 3.36 M/UL (ref 4.2–5.9)
RBC # BLD: 3.61 M/UL (ref 4.2–5.9)
SODIUM BLD-SCNC: 131 MMOL/L (ref 136–145)
WBC # BLD: 3.3 K/UL (ref 4–11)
WBC # BLD: 3.5 K/UL (ref 4–11)

## 2022-03-04 PROCEDURE — 96376 TX/PRO/DX INJ SAME DRUG ADON: CPT

## 2022-03-04 PROCEDURE — 6370000000 HC RX 637 (ALT 250 FOR IP): Performed by: INTERNAL MEDICINE

## 2022-03-04 PROCEDURE — 80048 BASIC METABOLIC PNL TOTAL CA: CPT

## 2022-03-04 PROCEDURE — 85730 THROMBOPLASTIN TIME PARTIAL: CPT

## 2022-03-04 PROCEDURE — 85025 COMPLETE CBC W/AUTO DIFF WBC: CPT

## 2022-03-04 PROCEDURE — 36415 COLL VENOUS BLD VENIPUNCTURE: CPT

## 2022-03-04 PROCEDURE — 94761 N-INVAS EAR/PLS OXIMETRY MLT: CPT

## 2022-03-04 PROCEDURE — 85027 COMPLETE CBC AUTOMATED: CPT

## 2022-03-04 PROCEDURE — 2580000003 HC RX 258: Performed by: INTERNAL MEDICINE

## 2022-03-04 PROCEDURE — 94640 AIRWAY INHALATION TREATMENT: CPT

## 2022-03-04 PROCEDURE — 2700000000 HC OXYGEN THERAPY PER DAY

## 2022-03-04 PROCEDURE — 2060000000 HC ICU INTERMEDIATE R&B

## 2022-03-04 PROCEDURE — 6360000002 HC RX W HCPCS: Performed by: EMERGENCY MEDICINE

## 2022-03-04 PROCEDURE — 99233 SBSQ HOSP IP/OBS HIGH 50: CPT | Performed by: INTERNAL MEDICINE

## 2022-03-04 RX ADMIN — HEPARIN SODIUM 2000 UNITS: 1000 INJECTION INTRAVENOUS; SUBCUTANEOUS at 00:40

## 2022-03-04 RX ADMIN — Medication 2 PUFF: at 19:53

## 2022-03-04 RX ADMIN — LOPERAMIDE HYDROCHLORIDE 2 MG: 2 CAPSULE ORAL at 08:13

## 2022-03-04 RX ADMIN — Medication 2 PUFF: at 09:11

## 2022-03-04 RX ADMIN — CARVEDILOL 12.5 MG: 12.5 TABLET, FILM COATED ORAL at 08:13

## 2022-03-04 RX ADMIN — SODIUM CHLORIDE, PRESERVATIVE FREE 10 ML: 5 INJECTION INTRAVENOUS at 08:12

## 2022-03-04 RX ADMIN — ALLOPURINOL 100 MG: 100 TABLET ORAL at 08:12

## 2022-03-04 RX ADMIN — LOSARTAN POTASSIUM 25 MG: 25 TABLET, FILM COATED ORAL at 08:13

## 2022-03-04 RX ADMIN — CARVEDILOL 12.5 MG: 12.5 TABLET, FILM COATED ORAL at 18:07

## 2022-03-04 RX ADMIN — PANTOPRAZOLE SODIUM 40 MG: 40 TABLET, DELAYED RELEASE ORAL at 08:13

## 2022-03-04 RX ADMIN — LOPERAMIDE HYDROCHLORIDE 2 MG: 2 CAPSULE ORAL at 18:50

## 2022-03-04 RX ADMIN — SODIUM CHLORIDE, PRESERVATIVE FREE 10 ML: 5 INJECTION INTRAVENOUS at 21:11

## 2022-03-04 RX ADMIN — Medication 2 PUFF: at 09:09

## 2022-03-04 ASSESSMENT — PAIN SCALES - GENERAL
PAINLEVEL_OUTOF10: 0
PAINLEVEL_OUTOF10: 0

## 2022-03-04 NOTE — PROGRESS NOTES
[facility]  Phone: [phone]  Fax: [phone]  [email or web address]          We are following the patient for ESRD  60 y/o AAM with ESRD on HT TTS at [place] [address]. presented to ER with CP  He apparently developed CP in the dialysis unit described as sharp in nature and L sided Denies associated Palpitations or SOB  EMS reported 15 beat run of VTAch   He completed his HD and is using baseline O2     SUBJECTIVE:  No CP or SOB  On 2 L 02  HD yesterday  2 L removed   ECHO noted    OBJECTIVE:     PHYSICAL:    TEMPERATURE:  Current - Temp: 97.7 °F (36.5 °C);  Max - Temp  Av.1 °F (36.7 °C)  Min: 97.7 °F (36.5 °C)  Max: 98.7 °F (37.1 °C)  RESPIRATIONS RANGE: Resp  Av.6  Min: 16  Max: 24  PULSE RANGE: Pulse  Av.7  Min: 85  Max: 90  BLOOD PRESSURE RANGE:  Systolic (58ANG), SQN:053 , Min:131 , GJM:861   ; Diastolic (11KNU), FCR:89, Min:79, Max:106    PULSE OXIMETRY RANGE: SpO2  Av.3 %  Min: 95 %  Max: 100 %  24HR INTAKE/OUTPUT:      Intake/Output Summary (Last 24 hours) at 3/4/2022 1443  Last data filed at 3/3/2022 1510  Gross per 24 hour   Intake 500 ml   Output 2516 ml   Net -2016 ml       CONSTITUTIONAL:  awake, alert, cooperative, no apparent distress  HEENT:  Lids and lashes normal, pupils equal, round and reactive to light  NECK:  Supple, symmetrical, trachea midline, no adenopathy  LUNGS:  No increased work of breathing, good air exchange, clear to auscultation bilaterally, no crackles or wheezing  CARDIOVASCULAR:  Normal apical impulse, regular rate and rhythm, normal S1 and S2 + systolic murmur   ABDOMEN:  No scars, normal bowel sounds, soft, non-distended  NEUROLOGIC:  alert, oriented, normal speech, no focal findings  SKIN: no bruising or bleeding  EXTREMITIES: 2+ bilateral pedal edema    Medications     sodium chloride      sodium chloride          Data      CBC:   Recent Labs     22  1201 22  0505 22  0515   WBC 2.4* 3.9* 3.3*   RBC 3.28* 3.67* 3.61*   HGB 9.5* 10.7* 10.6*   HCT 30.2* 33.9* 33.3*   PLT 78* 82* 81*     BMP:    Recent Labs     03/02/22  1200 03/03/22  0505 03/04/22  0515   * 124* 131*   K 4.7 3.9 4.0   CL 83* 84* 89*   CO2 23 25 23   BUN 37* 41* 30*   CREATININE 8.3* 9.1* 7.3*   CALCIUM 7.7* 8.4 8.7   GLUCOSE 69* 85 90     Phosphorus:  No results for input(s): PHOS in the last 72 hours.   Magnesium:    Recent Labs     03/01/22  1626 03/02/22  0503   MG 2.10 1.60*         ASSESSMENT     Patient Active Problem List   Diagnosis    HCAP (healthcare-associated pneumonia)    [facility]    [facility]    COPD [facility]chronic obstructive pulmonary disease) [facility]    Acute on chronic respiratory failure with hypoxia and hypercapnia (HCC)    Chest pain    Elevated troponin    Hypokalemia    V-tach ([facility]    Essential hypertension    Hyperlipidemia    Chronic combined systolic and diastolic CHF (congestive heart failure) (HCC)    SOB (shortness of breath)       PLAN  1-ESRD HD TTS at [address] [place] as remains fluid overloaded   2-Volume overload appears grossly volume overloaded, SOB CTA no PE FR with HD   3- CP cardiology following Stress test ECHO noted  4 Pancytopenia HGB 6.6 gm yesterday was likely erroneous  5 Hypokalemia corrected  6 HTN BP elevated monitor response to FR

## 2022-03-04 NOTE — PROGRESS NOTES
Clinical Pharmacy Note  Heparin Dosing       Lab Results   Component Value Date    APTT 70.6 03/04/2022     Lab Results   Component Value Date    HGB 10.6 03/04/2022    HCT 33.3 03/04/2022    PLT 81 03/04/2022    INR 1.03 04/01/2020       Current Infusion Rate: 770 units/hr    Plan:  Rate:  continue 770 units/hr  Next aPTT: 1100 03/04/22    Pharmacy will continue to monitor and adjust based on aPTT results.   Ayan Lane [facility], 3/4/2022 6:41 AM

## 2022-03-04 NOTE — PROGRESS NOTES
Hospitalist Progress Note      PCP: Jess Manley MD    Date of Admission: 3/1/2022    Chief Complaint: Chest pain    Hospital Course: Patient is a 62-year-old male admitted to the hospital with chest pain. Patient is a hemodialysis patient. Found to have anemia as well. Found to have elevated troponins. Admitted to hospitalist service    Subjective: Less diarrhea today  He is complaining me about food choices  Frustrated but lightens up when I mentioned that he might be discharged tomorrow  No chest discomfort today    Medications:  Reviewed    Infusion Medications    sodium chloride      sodium chloride       Scheduled Medications    tiotropium  2 puff Inhalation Daily    pantoprazole  40 mg Oral QAM AC    losartan  25 mg Oral Daily    carvedilol  12.5 mg Oral BID WC    budesonide-formoterol  2 puff Inhalation BID    allopurinol  100 mg Oral Daily    sodium chloride flush  10 mL IntraVENous 2 times per day     PRN Meds: loperamide, magnesium sulfate, sodium chloride, traZODone, albuterol sulfate HFA, sodium chloride flush, sodium chloride, ondansetron, polyethylene glycol, acetaminophen **OR** acetaminophen      Intake/Output Summary (Last 24 hours) at 3/4/2022 1331  Last data filed at 3/3/2022 1510  Gross per 24 hour   Intake 500 ml   Output 2516 ml   Net -2016 ml       Physical Exam Performed:    /79   Pulse 90   Temp 97.7 °F (36.5 °C) (Oral)   Resp 22   Ht 5' 8\" (1.727 m)   Wt 151 lb 10.8 oz (68.8 kg)   SpO2 98%   BMI 23.06 kg/m²     General appearance: No apparent distress, appears stated age and cooperative. HEENT: Pupils equal, round, and reactive to light. Conjunctivae/corneas clear. Neck: Supple, with full range of motion. No jugular venous distention. Trachea midline. Respiratory:  Normal respiratory effort. Clear to auscultation, bilaterally without Rales/Wheezes/Rhonchi.   Cardiovascular: Regular rate and rhythm with normal S1/S2 without murmurs, rubs or gallops. Abdomen: Soft, non-tender, non-distended with normal bowel sounds. Musculoskeletal: No clubbing, cyanosis or edema bilaterally. Full range of motion without deformity. Skin: Skin color, texture, turgor normal.  No rashes or lesions. Neurologic:  Neurovascularly intact without any focal sensory/motor deficits. Cranial nerves: II-XII intact, grossly non-focal.  Psychiatric: Alert and oriented, thought content appropriate, normal insight  Capillary Refill: Brisk,3 seconds, normal   Peripheral Pulses: +2 palpable, equal bilaterally       Labs:   Recent Labs     03/02/22  1201 03/03/22  0505 03/04/22  0515   WBC 2.4* 3.9* 3.3*   HGB 9.5* 10.7* 10.6*   HCT 30.2* 33.9* 33.3*   PLT 78* 82* 81*     Recent Labs     03/02/22  1200 03/03/22  0505 03/04/22  0515   * 124* 131*   K 4.7 3.9 4.0   CL 83* 84* 89*   CO2 23 25 23   BUN 37* 41* 30*   CREATININE 8.3* 9.1* 7.3*   CALCIUM 7.7* 8.4 8.7     No results for input(s): AST, ALT, BILIDIR, BILITOT, ALKPHOS in the last 72 hours. No results for input(s): INR in the last 72 hours. Recent Labs     03/01/22  1626 03/01/22  2226 03/02/22  0255   TROPONINI 0.11* 0.11* 0.09*       Urinalysis:    No results found for: Lelan Craw, BACTERIA, RBCUA, BLOODU, SPECGRAV, GLUCOSEU    Radiology:  NM Cardiac Stress Test Nuclear Imaging   Final Result      CT CHEST PULMONARY EMBOLISM W CONTRAST   Final Result   1. Evidence of acute pulmonary embolism or acute aortic disease. Main   pulmonary artery is prominent possibly related to pulmonary arterial   hypertension. 2. Cardiomegaly but no evidence of pericardial disease. 3. Moderate right effusion and right lower lobe atelectatic changes. Trace   left effusion and mild left lower lobe atelectatic changes. 4. Mild ascites and mesenteric congestion. XR CHEST (2 VW)   Final Result   Small right pleural effusion with associated airspace disease.   The airspace   disease could represent atelectasis or in the appropriate clinical setting   pneumonia. Assessment/Plan:    Active Hospital Problems    Diagnosis     Elevated troponin [R77.8]     Hypokalemia [E87.6]     V[facility]tach ([facility]) [I47.2]     Essential hypertension [I10]     Hyperlipidemia [E78.5]     Chronic combined systolic and diastolic CHF (congestive heart failure) ([facility]) [I50.42]     SOB (shortness of breath) [R06.02]     Chest pain [R07.9]     COPD (chronic obstructive pulmonary disease) ([facility]) [J44.9]     ESRD on dialysis ([facility] [N18.6, Z99.2]      Chest pain-slight troponin elevation  Could be demand ischemia related to anemia  Stress test yesterday showed no reversible ischemia  Medical management for now  Heparin drip stopped    End-stage renal disease with fluid overload-probable hemodialysis tomorrow per his regular schedule day    Diarrhea-slightly improved. Continue Imodium    Pancytopenia-this appears chronic based on review of his previous lab studies. I will check electronic health record to see what work-ups been done for this in the past    COPD without exacerbation-continue MDIs    HTN-stable on meds    Chronic combined systolic diastolic hf-based on 9/5937 echo  Compensated. Repeat echo today to assess LV function  On b-blocker and ARB  Diuretic resistant due to ESRD     DVT Prophylaxis: heparin drip  Diet: ADULT DIET; Regular; Low Sodium (2 gm); Low Potassium (Less than 3000 mg/day);  Less than 60 gm; 1200 ml  Code Status: Full Code        Dispo -Home tomorrow after hemodialysis  Hipolito Beckwith MD

## 2022-03-04 NOTE — PLAN OF CARE
Problem: Falls - Risk of:  Goal: Will remain free from falls  Description: Will remain free from falls  Outcome: Ongoing     Problem: Pain:  Goal: Pain level will decrease  Description: Pain level will decrease  Outcome: Ongoing     Problem: OXYGENATION/RESPIRATORY FUNCTION  Goal: Patient will maintain patent airway  Outcome: Ongoing     Problem: HEMODYNAMIC STATUS  Goal: Patient has stable vital signs and fluid balance  Outcome: Ongoing     Problem: FLUID AND ELECTROLYTE IMBALANCE  Goal: Fluid and electrolyte balance are achieved/maintained  Outcome: Ongoing     Problem: ACTIVITY INTOLERANCE/IMPAIRED MOBILITY  Goal: Mobility/activity is maintained at optimum level for patient  Outcome: Ongoing

## 2022-03-04 NOTE — PROGRESS NOTES
Clinical Pharmacy Note  Heparin Dosing       Lab Results   Component Value Date    APTT 48.3 03/03/2022     Lab Results   Component Value Date    HGB 10.7 03/03/2022    HCT 33.9 03/03/2022    PLT 82 03/03/2022    INR 1.03 04/01/2020       Current Infusion Rate: 695 units/hr    Plan:  Bolus: 2000 units  Rate:  Increase to 770 units/hr  Next aPTT: 0600 03/04/22    Pharmacy will continue to monitor and adjust based on aPTT results.   Pio Flores [facility], 3/3/2022 11:54 PM

## 2022-03-04 NOTE — PROGRESS NOTES
[facility]  Inpatient Progress Note    CC:       Chest pain and troponin elevation         HPI:   This is a 61 y.o. male on hemodialysis presenting with chest pain. He has troponin elevation with the reason for the consult. The patient was seen in consultation by Dr. Hafsa Jacobs who ordered a nuclear scan the results of which are pending. Previous echo shows EF of 35-40% with pulmonary hypertension. Review of Systems -   Constitutional: Negative for weight gain/loss; malaise, fever  Respiratory: Negative for Asthma;  cough and hemoptysis  Cardiovascular: Negative for palpitations,dizziness   Gastrointestinal: Negative for abd.pain; constipation/diarrhea;    Genitourinary: Negative for stones; hematuria; frequency hesitancy  Integumentt: Negative for rash or pruritis  Hematologic/lymphatic: Negative for blood dyscrasia; leukemia/lymphoma  Musculoskeletal: Negative for Connective tissue disease  Neurological:  Negative for Seizure   Behavioral/Psych:Negative for Bipolar disorder, Schizophrenia; Dementia  Endocrine: negative for thyroid, parathyroid disease      Intake/Output Summary (Last 24 hours) at 3/4/2022 0935  Last data filed at 3/3/2022 1510  Gross per 24 hour   Intake 500 ml   Output 2516 ml   Net -2016 ml         Physical Examination:    BP (!) 155/106   Pulse 90   Temp 98.3 °F (36.8 °C) (Oral)   Resp 22   Ht 5' 8\" (1.727 m)   Wt 151 lb 10.8 oz (68.8 kg)   SpO2 95%   BMI 23.06 kg/m²   HEENT:  Face: Atraumatic, Conjunctiva: Pink; non icteric,  Mucous Memb:  Moist, No thyromegaly or Lymphadenopathy  Respiratory:  Resp Assessment: normal, Resp Auscultation: clear   Cardiovascular: Auscultation: nl S1 & S2, Palpation:  Nl PMI;  No heaves or thrills, JVP:  normal  Abdomen: Soft, non-tender, Normal bowel sounds,  No organomegaly  Extremities: No Cyanosis or Clubbing; Edema none  Neurological: Oriented to time, place, and person, Non-anxious  Psychiatric: Normal mood and affect  Skin: Warm and dry,  No rash seen    Current Facility-Administered Medications: loperamide (IMODIUM) capsule 2 mg, 2 mg, Oral, 4x Daily PRN  magnesium sulfate 1000 mg in dextrose 5% 100 mL IVPB, 1,000 mg, IntraVENous, PRN  0.9 % sodium chloride infusion, , IntraVENous, PRN  heparin 25,000 units in dextrose 5 % 250 mL infusion (rate based), 0-3,000 Units/hr, IntraVENous, Continuous  traZODone (DESYREL) tablet 150 mg, 150 mg, Oral, Nightly PRN  tiotropium (SPIRIVA RESPIMAT) 2.5 MCG/ACT inhaler 2 puff, 2 puff, Inhalation, Daily  pantoprazole (PROTONIX) tablet 40 mg, 40 mg, Oral, QAM AC  losartan (COZAAR) tablet 25 mg, 25 mg, Oral, Daily  carvedilol (COREG) tablet 12.5 mg, 12.5 mg, Oral, BID WC  budesonide-formoterol (SYMBICORT) 160-4.5 MCG/ACT inhaler 2 puff, 2 puff, Inhalation, BID  allopurinol (ZYLOPRIM) tablet 100 mg, 100 mg, Oral, Daily  albuterol sulfate  (90 Base) MCG/ACT inhaler 2 puff, 2 puff, Inhalation, Q6H PRN  sodium chloride flush 0.9 % injection 10 mL, 10 mL, IntraVENous, 2 times per day  sodium chloride flush 0.9 % injection 10 mL, 10 mL, IntraVENous, PRN  0.9 % sodium chloride infusion, 25 mL, IntraVENous, PRN  ondansetron (ZOFRAN) injection 4 mg, 4 mg, IntraVENous, Q4H PRN  polyethylene glycol (GLYCOLAX) packet 17 g, 17 g, Oral, Daily PRN  acetaminophen (TYLENOL) tablet 650 mg, 650 mg, Oral, Q4H PRN **OR** acetaminophen (TYLENOL) suppository 650 mg, 650 mg, Rectal, Q4H PRN         Labs:   Recent Labs     03/03/22  0505 03/04/22  0515   WBC 3.9* 3.3*   HGB 10.7* 10.6*   HCT 33.9* 33.3*   PLT 82* 81*     Recent Labs     03/03/22  0505 03/04/22  0515   * 131*   K 3.9 4.0   CO2 25 23   BUN 41* 30*   CREATININE 9.1* 7.3*   LABGLOM 6* 8*     No results for input(s): BNP in the last 72 hours. No results for input(s): PROTIME, INR in the last 72 hours.   Recent Labs     03/01/22  1626 03/01/22  2226 03/02/22  0255   TROPONINI 0.11* 0.11* 0.09*         Telemetry Monitor:       EKG:   Sinus rhythm with nonspecific ST T wave changes      ECHO: 3/3/2022  Normal LV size and wall thickness. EF 35-40%. global hypokinesis Grade III diastolic dysfunction with elevated LV filling pressures. Thickening of leaflets of mitral valve. Moderate mitral regurgitation. The left atrium is severely dilated. Aortic valve leaflets appear mildly calcified but opens normally; Mild aortic regurgitation. dilated right ventricle. Right ventricular systolic function is mildly reduced. Moderate tricuspid regurgitation. The right atrium is severely dilated. Stress Nuclear: 3/3/2022   1. Technically a satisfactory study.    2. No evidence of Ischemia by Myocardial Perfusion Imaging.    3. Gated Study shows dilated LV with global systolic dysfunction. EF of 34%.          ASSESSMENT AND PLAN:      Chest pain and troponin elevation is the reason for the complaint  Troponins are chronically elevated due to chronic renal disease  Stress nuclear scan performed yesterday shows normal perfusion and no ischemia; EF is 35%    Hypertension  To be managed by nephrology team    Nonischemic cardiomyopathy  Patient is on losartan and carvedilol  Not on spironolactone because of being on dialysis    Will sign off    Catie Escudero M.D  3/4/2022

## 2022-03-04 NOTE — ACP (ADVANCE CARE PLANNING)
Advance Care Planning     Advance Care Planning Activator (Inpatient)  Conversation Note      Date of ACP Conversation: 3/4/2022     Conversation Conducted with: Patient with Decision Making Capacity    ACP Activator: Jhonny Meraz RN    Health Care Decision Maker:     Current Designated Health Care Decision Maker:     Primary Decision Maker: Louie Hamvhu - [phone]    Care Preferences    Ventilation: \"If you were in your present state of health and suddenly became very ill and were unable to breathe on your own, what would your preference be about the use of a ventilator (breathing machine) if it were available to you? \"      Would the patient desire the use of ventilator (breathing machine)?: yes    \"If your health worsens and it becomes clear that your chance of recovery is unlikely, what would your preference be about the use of a ventilator (breathing machine) if it were available to you? \"     Would the patient desire the use of ventilator (breathing machine)?: Unsure      Resuscitation  \"CPR works best to restart the heart when there is a sudden event, like a heart attack, in someone who is otherwise healthy. Unfortunately, CPR does not typically restart the heart for people who have serious health conditions or who are very sick. \"    \"In the event your heart stopped as a result of an underlying serious health condition, would you want attempts to be made to restart your heart (answer \"yes\" for attempt to resuscitate) or would you prefer a natural death (answer \"no\" for do not attempt to resuscitate)? \" yes       [] Yes   [x] No   Educated Patient / Zygmunt [place] regarding differences between Advance Directives and portable DNR orders.     Length of ACP Conversation in minutes:  5    Conversation Outcomes:  [x] ACP discussion completed  [] Existing advance directive reviewed with patient; no changes to patient's previously recorded wishes  [] New Advance Directive completed  [] Portable Do Not Rescitate prepared for Provider review and signature  [] POLST/POST/MOLST/MOST prepared for Provider review and signature      Follow-up plan:    [] Schedule follow-up conversation to continue planning  [] Referred individual to Provider for additional questions/concerns   [] Advised patient/agent/surrogate to review completed ACP document and update if needed with changes in condition, patient preferences or care setting    [x] This note routed to one or more involved healthcare providers    Pernell VALERA RN  Case Management  [phone]    Electronically signed by Pernell Holley RN on 3/4/2022 at 5:12 PM

## 2022-03-04 NOTE — CARE COORDINATION
INITIAL CASE MANAGEMENT ASSESSMENT    Met with patient to assess possible discharge needs. Explained Case Management role/services. Living Situation: Lives with nephew in a one story house with no YAUSH. ADLs: Independent     DME: Oxygen. Patient has [employer] portable oxygen tank he states he bought. States he does not have an O2 provider or concentrator at home. Portable tank is in the room. States he has tubing at home and is given them at [facility]. PT/OT Recs: No recommendations at this time. Active Services: N/A     Transportation: Not an active . Transportation to HD is provided by Mamta VALENCIA and is already setup. Medications: [address] on [facility]    PCP: Gabriel Sutton MD      HD/PD: [facility] 11:00-11:30am     PLAN/COMMENTS: CM met with the patient to discuss discharge planning. Initial discharge plan is to return home. States he does not have anyone who can pick him up. Will require a lyft ride at discharge. Possible discharge on 3/5/22. Provided contact information for patient or family to call with any questions. Will follow and assist as needed.     Janny VALERA RN  Case Management  [phone]    Electronically signed by Janny Felix RN on 3/4/2022 at 5:06 PM

## 2022-03-05 VITALS
TEMPERATURE: 97.8 F | RESPIRATION RATE: 18 BRPM | HEART RATE: 82 BPM | DIASTOLIC BLOOD PRESSURE: 90 MMHG | BODY MASS INDEX: 22.35 KG/M2 | WEIGHT: 147.49 LBS | HEIGHT: 68 IN | OXYGEN SATURATION: 99 % | SYSTOLIC BLOOD PRESSURE: 159 MMHG

## 2022-03-05 LAB
ANION GAP SERPL CALCULATED.3IONS-SCNC: 14 MMOL/L (ref 3–16)
BASOPHILS ABSOLUTE: 0 K/UL (ref 0–0.2)
BASOPHILS RELATIVE PERCENT: 1 %
BUN BLDV-MCNC: 37 MG/DL (ref 7–20)
CALCIUM SERPL-MCNC: 8.5 MG/DL (ref 8.3–10.6)
CHLORIDE BLD-SCNC: 90 MMOL/L (ref 99–110)
CO2: 24 MMOL/L (ref 21–32)
CREAT SERPL-MCNC: 8.2 MG/DL (ref 0.8–1.3)
EOSINOPHILS ABSOLUTE: 0.1 K/UL (ref 0–0.6)
EOSINOPHILS RELATIVE PERCENT: 2.9 %
GFR AFRICAN AMERICAN: 8
GFR NON-AFRICAN AMERICAN: 7
GLUCOSE BLD-MCNC: 88 MG/DL (ref 70–99)
HCT VFR BLD CALC: 29.7 % (ref 40.5–52.5)
HEMOGLOBIN: 9.5 G/DL (ref 13.5–17.5)
LYMPHOCYTES ABSOLUTE: 0.6 K/UL (ref 1–5.1)
LYMPHOCYTES RELATIVE PERCENT: 19.1 %
MCH RBC QN AUTO: 29.4 PG (ref 26–34)
MCHC RBC AUTO-ENTMCNC: 32 G/DL (ref 31–36)
MCV RBC AUTO: 91.7 FL (ref 80–100)
MONOCYTES ABSOLUTE: 0.4 K/UL (ref 0–1.3)
MONOCYTES RELATIVE PERCENT: 12.5 %
NEUTROPHILS ABSOLUTE: 2 K/UL (ref 1.7–7.7)
NEUTROPHILS RELATIVE PERCENT: 64.5 %
PDW BLD-RTO: 18.2 % (ref 12.4–15.4)
PLATELET # BLD: 68 K/UL (ref 135–450)
PMV BLD AUTO: 7.9 FL (ref 5–10.5)
POTASSIUM REFLEX MAGNESIUM: 4.4 MMOL/L (ref 3.5–5.1)
RBC # BLD: 3.24 M/UL (ref 4.2–5.9)
SODIUM BLD-SCNC: 128 MMOL/L (ref 136–145)
WBC # BLD: 3.1 K/UL (ref 4–11)

## 2022-03-05 PROCEDURE — 94640 AIRWAY INHALATION TREATMENT: CPT

## 2022-03-05 PROCEDURE — 85025 COMPLETE CBC W/AUTO DIFF WBC: CPT

## 2022-03-05 PROCEDURE — 36415 COLL VENOUS BLD VENIPUNCTURE: CPT

## 2022-03-05 PROCEDURE — 6370000000 HC RX 637 (ALT 250 FOR IP): Performed by: INTERNAL MEDICINE

## 2022-03-05 PROCEDURE — 94761 N-INVAS EAR/PLS OXIMETRY MLT: CPT

## 2022-03-05 PROCEDURE — 2700000000 HC OXYGEN THERAPY PER DAY

## 2022-03-05 PROCEDURE — 90935 HEMODIALYSIS ONE EVALUATION: CPT

## 2022-03-05 PROCEDURE — 80048 BASIC METABOLIC PNL TOTAL CA: CPT

## 2022-03-05 RX ORDER — LOSARTAN POTASSIUM 50 MG/1
50 TABLET ORAL DAILY
Status: DISCONTINUED | OUTPATIENT
Start: 2022-03-06 | End: 2022-03-05

## 2022-03-05 RX ORDER — LOPERAMIDE HYDROCHLORIDE 2 MG/1
2 CAPSULE ORAL 4 TIMES DAILY PRN
Qty: 30 CAPSULE | Refills: 0 | Status: SHIPPED | OUTPATIENT
Start: 2022-03-05 | End: 2022-03-15

## 2022-03-05 RX ORDER — LOSARTAN POTASSIUM 50 MG/1
50 TABLET ORAL DAILY
Status: DISCONTINUED | OUTPATIENT
Start: 2022-03-05 | End: 2022-03-05 | Stop reason: HOSPADM

## 2022-03-05 RX ORDER — LOSARTAN POTASSIUM 50 MG/1
50 TABLET ORAL DAILY
Qty: 30 TABLET | Refills: 3 | Status: SHIPPED | OUTPATIENT
Start: 2022-03-06 | End: 2022-06-15 | Stop reason: ALTCHOICE

## 2022-03-05 RX ADMIN — Medication 2 PUFF: at 08:00

## 2022-03-05 RX ADMIN — ALLOPURINOL 100 MG: 100 TABLET ORAL at 13:51

## 2022-03-05 RX ADMIN — LOSARTAN POTASSIUM 50 MG: 50 TABLET, FILM COATED ORAL at 13:51

## 2022-03-05 RX ADMIN — Medication 2 PUFF: at 07:58

## 2022-03-05 ASSESSMENT — PAIN SCALES - GENERAL
PAINLEVEL_OUTOF10: 0

## 2022-03-05 NOTE — PLAN OF CARE
Problem: Falls - Risk of:  Goal: Will remain free from falls  Description: Will remain free from falls  3/5/2022 0107 by Samuel Aguilar RN  Outcome: Ongoing    Goal: Absence of physical injury  Description: Absence of physical injury  3/5/2022 0107 by Samuel Aguilar RN  Outcome: Ongoing  Fall risk assessment completed every shift. All precautions in place. Pt has call light within reach at all times. Room clear of clutter. Pt aware to call for assistance when getting up. Problem: Pain:  Goal: Pain level will decrease  Description: Pain level will decrease  3/5/2022 0107 by Samuel Aguilar RN  Outcome: Ongoing    Goal: Control of acute pain  Description: Control of acute pain  3/5/2022 0107 by Samuel Aguilar RN  Outcome: Ongoing    Goal: Control of chronic pain  Description: Control of chronic pain  3/5/2022 0107 by Samuel Aguilar RN  Outcome: Ongoing  Pain/discomfort being managed with PRN analgesics per MD orders. Pt able to express presence and absence of pain and rate pain appropriately using numerical scale.        Problem: OXYGENATION/RESPIRATORY FUNCTION  Goal: Patient will maintain patent airway  3/5/2022 0107 by Samuel Aguilar RN  Outcome: Ongoing    Goal: Patient will achieve/maintain normal respiratory rate/effort  Description: Respiratory rate and effort will be within normal limits for the patient  3/5/2022 0107 by Samuel Aguilar RN  Outcome: Ongoing       Problem: HEMODYNAMIC STATUS  Goal: Patient has stable vital signs and fluid balance  3/5/2022 0107 by Samuel Aguilar RN  Outcome: Ongoing       Problem: FLUID AND ELECTROLYTE IMBALANCE  Goal: Fluid and electrolyte balance are achieved/maintained  3/5/2022 0107 by Samuel Aguilar RN  Outcome: Ongoing       Problem: ACTIVITY INTOLERANCE/IMPAIRED MOBILITY  Goal: Mobility/activity is maintained at optimum level for patient  3/5/2022 0107 by Samuel Aguilar RN  Outcome: Ongoing  Assess breath sounds, oxygen requirements and saturations, and activity tolerance. Monitor intake and output and daily weights and lab values. Encourage fluid and dietary restrictions.

## 2022-03-05 NOTE — PROGRESS NOTES
[facility]  Phone: [phone]  Fax: [phone] [address] [facility]. [facility]          We are following the patient for ESRD  60 y/o AAM with ESRD on HT TTS at [place] [address]. presented to ER with CP  He apparently developed CP in the dialysis unit described as sharp in nature and L sided Denies associated Palpitations or SOB  EMS reported 15 beat run of VTAch       SUBJECTIVE:  Denies CP or dyspnea  Hemodialysis in progress, seen during treatment     Lowering TW with HD    No dyspnea, CP. No cough or wheezing. No N/V, abd pain, or diarrhea. No F/C. No visitors    OBJECTIVE:     PHYSICAL:    TEMPERATURE:  Current - Temp: 97.8 °F (36.6 °C);  Max - Temp  Av.9 °F (36.6 °C)  Min: 97.4 °F (36.3 °C)  Max: 98.6 °F (37 °C)  RESPIRATIONS RANGE: Resp  Avg: 15.5  Min: 13  Max: 18  PULSE RANGE: Pulse  Av.4  Min: 74  Max: 90  BLOOD PRESSURE RANGE:  Systolic (51PPE), TX , Min:130 , UUQ:319   ; Diastolic (05FAB), JACKSON:72, Min:79, Max:116    PULSE OXIMETRY RANGE: SpO2  Av.3 %  Min: 98 %  Max: 100 %  24HR INTAKE/OUTPUT:      Intake/Output Summary (Last 24 hours) at 3/5/2022 1210  Last data filed at 3/5/2022 0526  Gross per 24 hour   Intake 480 ml   Output --   Net 480 ml       CONSTITUTIONAL:  awake, alert, cooperative, no apparent distresslight  NECK:  Supple, symmetrical, trachea midline, no JVD  LUNGS:  Clear, no rales  CARDIOVASCULAR:  RRR, no murmur  ABDOMEN:  No scars, normal bowel sounds, soft, non-distended  NEUROLOGIC:  alert, oriented, normal speech, no focal findings  SKIN: no bruising or bleeding  EXTREMITIES: no significant LE edema    Data      CBC:   Recent Labs     22  0515 22  1838 22  0745   WBC 3.3* 3.5* 3.1*   RBC 3.61* 3.36* 3.24*   HGB 10.6* 9.9* 9.5*   HCT 33.3* 31.0* 29.7*   PLT 81* 80* 68*     BMP:    Recent Labs     22  0505 22  0515 22  0745   * 131* 128*   K 3.9 4.0 4.4   CL 84* 89* 90*   CO2 25 23 24   BUN 41* 30* 37* CREATININE 9.1* 7.3* 8.2*   CALCIUM 8.4 8.7 8.5   GLUCOSE 85 90 88     Phosphorus:  No results for input(s): PHOS in the last 72 hours. Magnesium:    No results for input(s): MG in the last 72 hours.       ASSESSMENT     Patient Active Problem List   Diagnosis    HCAP (healthcare-associated pneumonia)    [facility] exacerbation [facility]    ESRD [facility] dialysis ([facility]    [facility]    Acute on chronic respiratory failure with hypoxia and hypercapnia (HCC)    Chest pain    Elevated troponin    Hypokalemia    [facility]tach [facility]    Essential hypertension    Hyperlipidemia    Chronic combined systolic and diastolic CHF (congestive heart failure) ([facility])    SOB (shortness of breath)       PLAN  1-ESRD HD TTS at [facility].    Seen at end of HD   No edema now - TW lowered  2-Volume overload - CTA no PE FR with HD    Echo with reduced EF   TW lowered on HD  3- CP cardiology following    EF 35-40%   Stress test no acute ischemia  4 Pancytopenia    hgb stable, TEODORA with HD   Mild decrease in WBC  5 Hypokalemia corrected  6 HTN BP too high despite UF   Increase losartan dose

## 2022-03-05 NOTE — PLAN OF CARE
Problem: Falls - Risk of:  Goal: Will remain free from falls  Description: Will remain free from falls  3/5/2022 1024 by Sly Landeros RN  Outcome: Ongoing   Fall risk assessment completed per unit protocol. Patient's bed is in the lowest position, call light is within reach and the patient's room is free of clutter. The patient has been instructed to call for assistance before getting out of bed or the chair. Problem: Pain:  Goal: Pain level will decrease  Description: Pain level will decrease  3/5/2022 1024 by Sly Landeros RN  Outcome: Ongoing   Pain/discomfort being managed with PRN analgesics per MD orders. Pt able to express presence and absence of pain and rate pain appropriately using numerical scale.      Problem: OXYGENATION/RESPIRATORY FUNCTION  Goal: Patient will maintain patent airway  3/5/2022 1024 by Sly Landeros RN  Outcome: Ongoing   Patient is able to breathe comfortably on 2L of oxygen per nasal cannula

## 2022-03-05 NOTE — PROGRESS NOTES
IV and telemetry monitor removed. Discharge paperwork completed and discussed with the patient. All questions answered. Patient has been made aware of follow up appointments that have been made/need to be made and patient verbalized understanding. Patient has been given all paper prescriptions that need to be filled or the medications have been filled with [facility] and patient participated [place] [address] [place] [facility] All belongings have been packed up and sent with the patient. Patient will be driven home by [facility] .

## 2022-03-05 NOTE — PROGRESS NOTES
Patient states he bought an Inogen himself \"because I have COPD\". He doesn't have a concentrator at home so I wonder if he was even prescribed oxygen.

## 2022-03-05 NOTE — CARE COORDINATION
Discharge order noted. Chart reviewed. Spoke with patient. He is requesting a Lyft @ 3:45pm. Jillian Harris has been scheduled.     Electronically signed by Juan Pablo Razo RN MSN on 3/5/2022 at 2:43 PM

## 2022-03-05 NOTE — FLOWSHEET NOTE
03/05/22 0850 03/05/22 1200   Vital Signs   BP (!) 152/96 (!) 159/90   Temp 97.8 °F (36.6 °C) 97.8 °F (36.6 °C)   Pulse 82  --    Resp 18  --    Weight 152 lb 12.5 oz (69.3 kg) 147 lb 7.8 oz (66.9 kg)   Weight Method Standing scale Standing scale       Treatment time: 3hr  Net UF: 2L    Pre weight: 69.3 kg  Post weight: 66.9 kg  EDW: TBD    Access used: LUAG  Access function:Good     Medications or blood products given: none    Regular outpatient schedule: [facility] TTS    Summary of response to treatment: Good    Copy of dialysis treatment record placed in chart, to be scanned into EMR.

## 2022-03-07 ENCOUNTER — TELEPHONE (OUTPATIENT)
Dept: PHARMACY | Age: 67
End: 2022-03-07

## 2022-03-07 NOTE — TELEPHONE ENCOUNTER
Outbound call from the outpatient wellness center. Patient scheduled for 12:00 on [facility] follow-up. Has a history of CHF and end-stage renal disease on dialysis. Patient refused medication reconciliation but states that he \"has everything that he supposed to have and is taking everything that he is supposed to take\". Tells me that he is \"feeling just fine\". Has dialysis set up and has no complaints today. Followed this up by stating that I interrupted him and he does not want me to call back and hung up the phone.

## 2022-03-07 NOTE — DISCHARGE SUMMARY
Hospital Medicine Discharge Summary    Patient ID: Mariana Steinberg      Patient's PCP: Ruthann Zapien MD    Admit Date: 3/1/2022     Discharge Date: 3/5/2022      Admitting Provider: Aniyah Lai MD     Discharge Provider: Troy Alas MD     Discharge Diagnoses:  Chest pain  Anemia  End-stage renal disease with volume overload  Chronic combined systolic and diastolic congestive heart failure  Diarrhea  COPD  Chronic respiratory failure       The patient was seen and examined on day of discharge and this discharge summary is in conjunction with any daily progress note from day of discharge. Hospital Course: Patient is a 72-year-old male admitted to the hospital with chest pain. Patient also had a 15 beat run of V. tach. Patient was found to have elevated troponins. He is a dialysis patient. Patient was anemic. He received packed RBC transfusion. Was felt he might have some demand ischemia from anemia. Cardiology was consulted. Echo showed somewhat worsening LV function with EF of about 35 to 85% with diastolic and systolic dysfunction. He also underwent stress test which showed no  reversible ischemia. Patient had no further chest pain after receiving blood transfusion. He underwent hemodialysis for volume overload. Patient's course was also complicated by diarrhea. C. difficile was negative. This diarrhea appears to be intermittent in nature could have been viral in etiology. Imodium seems to have helped  Patient was discharged home will continue on hemodialysis. He was sent home on his chronic oxygen. He will follow-up with his PCP. He will continue hemodialysis per routine            Physical Exam Performed:     BP (!) 159/90   Pulse 82   Temp 97.8 °F (36.6 °C)   Resp 18   Ht 5' 8\" (1.727 m)   Wt 147 lb 7.8 oz (66.9 kg)   SpO2 99%   BMI 22.43 kg/m²       General appearance:  No apparent distress, appears stated age and cooperative.   HEENT:  Normal cephalic, atraumatic without obvious deformity. Pupils equal, round, and reactive to light. Extra ocular muscles intact. Conjunctivae/corneas clear. Neck: Supple, with full range of motion. No jugular venous distention. Trachea midline. Respiratory:  Normal respiratory effort. Clear to auscultation, bilaterally without Rales/Wheezes/Rhonchi. Cardiovascular:  Regular rate and rhythm with normal S1/S2 without murmurs, rubs or gallops. Abdomen: Soft, non-tender, non-distended with normal bowel sounds. Musculoskeletal:  No clubbing, cyanosis or edema bilaterally. Full range of motion without deformity. Skin: Skin color, texture, turgor normal.  No rashes or lesions. Neurologic:  Neurovascularly intact without any focal sensory/motor deficits. Cranial nerves: II-XII intact, grossly non-focal.  Psychiatric:  Alert and oriented, thought content appropriate, normal insight  Capillary Refill: Brisk,< 3 seconds   Peripheral Pulses: +2 palpable, equal bilaterally       Labs: For convenience and continuity at follow-up the following most recent labs are provided:      CBC:    Lab Results   Component Value Date    WBC 3.1 03/05/2022    HGB 9.5 03/05/2022    HCT 29.7 03/05/2022    PLT 68 03/05/2022       Renal:    Lab Results   Component Value Date     03/05/2022    K 4.4 03/05/2022    CL 90 03/05/2022    CO2 24 03/05/2022    BUN 37 03/05/2022    CREATININE 8.2 03/05/2022    CALCIUM 8.5 03/05/2022    PHOS 3.6 04/08/2020         Significant Diagnostic Studies    Radiology:   NM Cardiac Stress Test Nuclear Imaging   Final Result      CT CHEST PULMONARY EMBOLISM W CONTRAST   Final Result   1. Evidence of acute pulmonary embolism or acute aortic disease. Main   pulmonary artery is prominent possibly related to pulmonary arterial   hypertension. 2. Cardiomegaly but no evidence of pericardial disease. 3. Moderate right effusion and right lower lobe atelectatic changes. Trace   left effusion and mild left lower lobe atelectatic changes. 4. Mild ascites and mesenteric congestion. XR CHEST (2 VW)   Final Result   Small right pleural effusion with associated airspace disease. The airspace   disease could represent atelectasis or in the appropriate clinical setting   pneumonia. Consults:     IP CONSULT TO CARDIOLOGY  IP CONSULT TO HOSPITALIST  IP CONSULT TO CARDIOLOGY  IP CONSULT TO NEPHROLOGY    Disposition:  home    Condition at Discharge: Stable    Discharge Instructions/Follow-up: Follow-up at [facility] [address] TIERNEY SMITH as per your usual schedule. Continue on your home oxygen. Code Status:  Prior full    Activity: activity as tolerated    Diet: cardiac diet, diabetic diet, low fat, low cholesterol diet and renal diet      Discharge Medications:     Discharge Medication List as of 3/5/2022  3:00 PM           Details   loperamide (IMODIUM) 2 MG capsule Take 1 capsule by mouth 4 times daily as needed for Diarrhea, Disp-30 capsule, R-0Normal              Details   losartan (COZAAR) 50 MG tablet Take 1 tablet by mouth daily, Disp-30 tablet, R-3Normal              Details   traZODone (DESYREL) 150 MG tablet Take 150 mg by mouth nightly as needed for SleepHistorical Med      gabapentin (NEURONTIN) 300 MG capsule Take 300 mg by mouth as needed. Historical Med      omeprazole (PRILOSEC) 20 MG delayed release capsule Take 20 mg by mouth dailyHistorical Med      tiotropium (SPIRIVA) 18 MCG inhalation capsule Inhale 18 mcg into the lungs dailyHistorical Med      torsemide (DEMADEX) 20 MG tablet Take 20 mg by mouth dailyHistorical Med      atorvastatin (LIPITOR) 80 MG tablet Take 1 tablet by mouth daily, Disp-30 tablet, R-0Normal      carvedilol (COREG) 12.5 MG tablet Take 12.5 mg by mouth 2 times daily Unsure of dose Historical Med      budesonide-formoterol (SYMBICORT) 160-4.5 MCG/ACT AERO Inhale 2 puffs into the lungs 2 times dailyHistorical Med      albuterol sulfate HFA (VENTOLIN HFA) 108 (90 Base) MCG/ACT inhaler Inhale 2 puffs into the lungs every 6 hours as needed for Wheezing 90mcg/actuationHistorical Med      allopurinol (ZYLOPRIM) 100 MG tablet Take 100 mg by mouth dailyHistorical Med             Time Spent on discharge is more than 45 minutes in the examination, evaluation, counseling and review of medications and discharge plan. Signed:    Mona Hall MD   3/7/2022      Thank you Jose Marie MD for the opportunity to be involved in this patient's care. If you have any questions or concerns please feel free to contact me at [phone].

## 2022-03-12 NOTE — PROGRESS NOTES
Physician Progress Note      PATIENT:               Cristy Daniels  CSN #:                  [id number]  :                       1955  ADMIT DATE:       3/1/2022 3:58 PM  [address] DATE:        3/5/2022 3:53 PM  RESPONDING  PROVIDER #:        EZRA SARGENT MD          QUERY TEXT:    Pt admitted with  chest pain. Pt noted to have ESRD. If possible, please   document in progress notes and discharge summary if you are evaluating and/or   treating any of the following: The medical record reflects the following:  Risk Factors: HX- ESRD on HD  Clinical Indicators:  bun 31, crt 7.1, gfr 9,      trop 0.11,  0.11.,   Stress   test- 1. Technically a satisfactory study. 2. No evidence of Ischemia by   Myocardial Perfusion Imaging. 3. Gated Study shows dilated LV with global   systolic dysfunction. EF of 34%. ... [facility] Records [facility] Records Per Nephrology consult note on 3/2/22- He   apparently developed CP in the dialysis unit described as sharp in nature and   L sided Denies associated Palpitations or SOB   Volume overload appears   grossly volume overloaded, SOB CTA no PE   He is markedly fluid overloaded   Plan UF today for fluid removal HD again tomorr  Treatment: Nephrology and Cardiology consults, Stress test, Hemodialysis, ECHO    Thank Niya Anguiano RN BSN CDS CRCR  [email or web address]  Options provided:  -- Chest pain possibly due to ESRD  -- Other - I will add my own diagnosis  -- Disagree - Not applicable / Not valid  -- Disagree - Clinically unable to determine / Unknown  -- Refer to Clinical Documentation Reviewer    PROVIDER RESPONSE TEXT:    Chest pain due to demand ischemia from anemia.     Query created by: Zaina Shoemaker on 3/11/2022 8:41 AM      Electronically signed by:  EZRA SARGENT MD 3/12/2022 9:30 AM

## 2022-04-01 PROBLEM — R77.8 ELEVATED TROPONIN: Status: RESOLVED | Noted: 2022-03-02 | Resolved: 2022-04-01

## 2022-05-31 ENCOUNTER — APPOINTMENT (OUTPATIENT)
Dept: CT IMAGING | Age: 67
End: 2022-05-31
Payer: MEDICARE

## 2022-05-31 ENCOUNTER — HOSPITAL ENCOUNTER (EMERGENCY)
Age: 67
Discharge: HOME | End: 2022-06-01
Attending: EMERGENCY MEDICINE
Payer: MEDICARE

## 2022-05-31 VITALS
RESPIRATION RATE: 18 BRPM | OXYGEN SATURATION: 100 % | SYSTOLIC BLOOD PRESSURE: 133 MMHG | HEART RATE: 85 BPM | DIASTOLIC BLOOD PRESSURE: 81 MMHG | TEMPERATURE: 98 F

## 2022-05-31 DIAGNOSIS — S09.90XA INJURY OF HEAD, INITIAL ENCOUNTER: Primary | ICD-10-CM

## 2022-05-31 PROCEDURE — 99284 EMERGENCY DEPT VISIT MOD MDM: CPT

## 2022-05-31 PROCEDURE — 6370000000 HC RX 637 (ALT 250 FOR IP): Performed by: PHYSICIAN ASSISTANT

## 2022-05-31 PROCEDURE — 70450 CT HEAD/BRAIN W/O DYE: CPT

## 2022-05-31 RX ORDER — ACETAMINOPHEN 325 MG/1
650 TABLET ORAL ONCE
Status: COMPLETED | OUTPATIENT
Start: 2022-05-31 | End: 2022-05-31

## 2022-05-31 RX ORDER — PSEUDOEPHEDRINE HYDROCHLORIDE 30 MG/1
30 TABLET ORAL EVERY 6 HOURS PRN
Qty: 28 TABLET | Refills: 0 | Status: SHIPPED | OUTPATIENT
Start: 2022-05-31 | End: 2022-06-07

## 2022-05-31 RX ADMIN — ACETAMINOPHEN 650 MG: 325 TABLET ORAL at 22:15

## 2022-05-31 ASSESSMENT — ENCOUNTER SYMPTOMS
BACK PAIN: 0
VOMITING: 0
EYE REDNESS: 0
NAUSEA: 0
COLOR CHANGE: 1
SHORTNESS OF BREATH: 0
SORE THROAT: 0
ABDOMINAL PAIN: 0
DIARRHEA: 0

## 2022-05-31 ASSESSMENT — PAIN SCALES - GENERAL
PAINLEVEL_OUTOF10: 6
PAINLEVEL_OUTOF10: 6

## 2022-05-31 ASSESSMENT — VISUAL ACUITY: OU: 1

## 2022-05-31 ASSESSMENT — PAIN - FUNCTIONAL ASSESSMENT: PAIN_FUNCTIONAL_ASSESSMENT: 0-10

## 2022-06-01 NOTE — ED NOTES
Report given to [facility]. Transport team updated on POC and pt history. Pt left in stable condition.       Ana Bowles RN  06/01/22 0011       Marielena Miller RN  06/01/22 5708

## 2022-06-01 NOTE — ED PROVIDER NOTES
1 [facility]  EMERGENCY DEPARTMENT ENCOUNTER          PHYSICIAN ASSISTANT NOTE       Date of evaluation: 5/31/2022    Chief Complaint     Fall      History of Present Illness     Frank Evans is a 61 y.o. male who presents with complaint of a head injury. Patient states that he got up to go to the bathroom and he fell forward and hit his head on the floor. He states he did not lose consciousness. He admits to throbbing in the left forehead where he has a large bruise. Denies preceding chest pain, shortness of breath or lightheadedness. He used his life alert to call EMS. He denies focal weakness or numbness, speech or vision changes. Previously he is on anticoagulation. Denies neck or back pain. Review of Systems     Review of Systems   Constitutional: Negative for chills and fever. HENT: Negative for sore throat. Eyes: Negative for redness and visual disturbance. Respiratory: Negative for shortness of breath. Cardiovascular: Negative for chest pain. Gastrointestinal: Negative for abdominal pain, diarrhea, nausea and vomiting. Musculoskeletal: Negative for back pain, myalgias and neck pain. Skin: Positive for color change. Negative for wound. Neurological: Positive for headaches. Negative for syncope, weakness, light-headedness and numbness. Hematological: Bruises/bleeds easily. Psychiatric/Behavioral: Negative for confusion. All other systems reviewed and are negative. Past Medical, Surgical, Family, and Social History     He has a past medical history of Cerebral artery occlusion with cerebral infarction ([address] CHF (congestive heart failure) ([address] Chronic kidney disease, COPD (chronic obstructive pulmonary disease) ([address] [facility] patient ([facility] Gout, Hemodialysis patient ([facility] Hx of blood clots, Hyperlipidemia, Hypertension, and Renal failure. He has a past surgical history that includes Dialysis fistula creation (Left) and Colonoscopy.   His family history includes Cancer in his father and mother; Other in his sister. He reports that he has quit smoking. He has never used smokeless tobacco. He reports current alcohol use. He reports that he does not use drugs. Medications     Discharge Medication List as of 5/31/2022 11:10 PM      CONTINUE these medications which have NOT CHANGED    Details   losartan (COZAAR) 50 MG tablet Take 1 tablet by mouth daily, Disp-30 tablet, R-3Normal      traZODone (DESYREL) 150 MG tablet Take 150 mg by mouth nightly as needed for SleepHistorical Med      gabapentin (NEURONTIN) 300 MG capsule Take 300 mg by mouth as needed. Historical Med      omeprazole (PRILOSEC) 20 MG delayed release capsule Take 20 mg by mouth dailyHistorical Med      tiotropium (SPIRIVA) 18 MCG inhalation capsule Inhale 18 mcg into the lungs dailyHistorical Med      torsemide (DEMADEX) 20 MG tablet Take 20 mg by mouth dailyHistorical Med      atorvastatin (LIPITOR) 80 MG tablet Take 1 tablet by mouth daily, Disp-30 tablet, R-0Normal      carvedilol (COREG) 12.5 MG tablet Take 12.5 mg by mouth 2 times daily Unsure of dose Historical Med      budesonide-formoterol (SYMBICORT) 160-4.5 MCG/ACT AERO Inhale 2 puffs into the lungs 2 times dailyHistorical Med      albuterol sulfate HFA (VENTOLIN HFA) 108 (90 Base) MCG/ACT inhaler Inhale 2 puffs into the lungs every 6 hours as needed for Wheezing 90mcg/actuationHistorical Med      allopurinol (ZYLOPRIM) 100 MG tablet Take 100 mg by mouth dailyHistorical Med             Allergies     He has No Known Allergies. Physical Exam     INITIAL VITALS: BP: 133/81, Temp: 98 °F (36.7 °C), Heart Rate: 85, Resp: 18, SpO2: 100 %  Physical Exam  Vitals and nursing note reviewed. Constitutional:       General: He is not in acute distress. Appearance: Normal appearance. He is well-developed. He is not diaphoretic. HENT:      Head: Normocephalic. Contusion (left forehead with large contusion) present.    Eyes:      General: Vision grossly intact. Extraocular Movements: Extraocular movements intact. Conjunctiva/sclera: Conjunctivae normal.   Cardiovascular:      Rate and Rhythm: Normal rate and regular rhythm. Heart sounds: Normal heart sounds. Pulmonary:      Effort: Pulmonary effort is normal. No respiratory distress. Breath sounds: Normal breath sounds. Abdominal:      Palpations: Abdomen is soft. Tenderness: There is no abdominal tenderness. Musculoskeletal:         General: No tenderness. Normal range of motion. Cervical back: Normal range of motion. Skin:     General: Skin is warm and dry. Neurological:      General: No focal deficit present. Mental Status: He is alert and oriented to person, place, and time. Cranial Nerves: No cranial nerve deficit. Motor: No weakness. Psychiatric:         Mood and Affect: Mood normal.         Behavior: Behavior normal.         Thought Content: Thought content normal.         Judgment: Judgment normal.         Diagnostic Results         RADIOLOGY:  CT HEAD WO CONTRAST   Final Result      No acute intracranial pathology        Left frontal cephalohematoma      Acute on chronic maxillary sinusitis             LABS:   No results found for this visit on 05/31/22. ED BEDSIDE ULTRASOUND:      RECENT VITALS:  BP: 133/81, Temp: 98 °F (36.7 °C), Heart Rate: 85, Resp: 18, SpO2: 100 %     Procedures         ED Course     Nursing Notes, Past Medical Hx,Past Surgical Hx, Social Hx, Allergies, and Family Hx were reviewed.     The patient was given the following medications:  Orders Placed This Encounter   Medications    acetaminophen (TYLENOL) tablet 650 mg    pseudoephedrine (DECONGESTANT) 30 MG tablet     Sig: Take 1 tablet by mouth every 6 hours as needed for Congestion     Dispense:  28 tablet     Refill:  0       CONSULTS:  None    MEDICAL DECISION MAKING / ASSESSMENT / Margret Marco A is a 61 y.o. male resents emergency department after mechanical fall. He does have a large hematoma on the left forehead. He has a normal neurological exam.  He is on a blood thinner and imaging was obtained that showed no acute intercranial pathology. Left frontal cephalohematoma. Acute on chronic maxillary sinusitis. He does admit to feeling congested but does not have any maxillary sinus tenderness. Feel the patient can be treated with decongestants this is likely viral.    Patient will be advised to rest ice and elevate his head and take Tylenol as needed for pain. If he has any new or worsening symptoms he should return the emergency room. On reassessment patient has no new complaints and no other areas that require imaging. I feel he is stable for discharge. .      This patient was also evaluated by the attending physician. All care plans were discussed and agreed upon. Clinical Impression     1.  Injury of head, initial encounter        Disposition     PATIENT REFERRED TO:  Tu Lopez MD  [address] [place] [address]  [phone]            DISCHARGE MEDICATIONS:  Discharge Medication List as of 5/31/2022 11:10 PM      START taking these medications    Details   pseudoephedrine (DECONGESTANT) 30 MG tablet Take 1 tablet by mouth every 6 hours as needed for Congestion, Disp-28 tablet, R-0Print             DISPOSITION Decision To Discharge 05/31/2022 10:56:18 PM        [place], [place]  06/01/22 0221

## 2022-06-01 NOTE — ED PROVIDER NOTES
ED Attending Attestation Note     Date of evaluation: 5/31/2022    This patient was seen by the advance practice provider. I have seen and examined the patient, agree with the workup, evaluation, management and diagnosis. The care plan has been discussed. My assessment reveals an older gentleman who fell while getting out of bed this evening. He has a large hematoma to his left frontal scalp without any breaks in the skin. He is alert and oriented.      Jose Alfredo Fragoso MD  05/31/22 8950

## 2022-06-07 ENCOUNTER — HOSPITAL ENCOUNTER (EMERGENCY)
Age: 67
Discharge: NURSING, REHAB OR LONG-TERM CARE | End: 2022-06-07
Attending: EMERGENCY MEDICINE
Payer: MEDICARE

## 2022-06-07 VITALS
OXYGEN SATURATION: 100 % | TEMPERATURE: 98.4 F | RESPIRATION RATE: 18 BRPM | HEART RATE: 80 BPM | SYSTOLIC BLOOD PRESSURE: 149 MMHG | DIASTOLIC BLOOD PRESSURE: 98 MMHG

## 2022-06-07 DIAGNOSIS — T82.838A HEMORRHAGE DUE TO VASCULAR CATHETER, INITIAL ENCOUNTER (HCC): Primary | ICD-10-CM

## 2022-06-07 PROCEDURE — 99283 EMERGENCY DEPT VISIT LOW MDM: CPT

## 2022-06-07 ASSESSMENT — PAIN DESCRIPTION - LOCATION: LOCATION: HEAD

## 2022-06-07 ASSESSMENT — PAIN SCALES - GENERAL: PAINLEVEL_OUTOF10: 5

## 2022-06-07 ASSESSMENT — PAIN DESCRIPTION - DESCRIPTORS: DESCRIPTORS: ACHING

## 2022-06-07 ASSESSMENT — PAIN - FUNCTIONAL ASSESSMENT: PAIN_FUNCTIONAL_ASSESSMENT: 0-10

## 2022-06-07 NOTE — ED PROVIDER NOTES
[address] [facility] ENCOUNTER          EM RESIDENT NOTE       Date of evaluation: 6/7/2022    Chief Complaint     Vascular Access Problem      of Present Illness     Kristin Chapman is a 61 y.o. male with history of ESRD on dialysis (T, Th, S) who presents to the ED for persistent bleeding from fistula after dialysis. Patient completed dialysis today and noted to have persistent bleeding from dialysis site. Given ongoing bleeding, patient was brought to the ED for further evaluation. Patient otherwise denies additional complaints at this time. Review of Systems     Denies fever, nausea/vomiting/diarrhea, abdominal pain, lower extremity edema and rash. All other systems were reviewed and were negative. Past Medical, Surgical, Family, and Social History     He has a past medical history of Cerebral artery occlusion with cerebral infarction ([facility] [address] CHF (congestive heart failure) ([facility] [address] Chronic kidney disease, COPD (chronic obstructive pulmonary disease) ([facility] [address] Dialysis patient ([facility] [address] Gout, Hemodialysis patient ([facility] [address] Hx of blood clots, Hyperlipidemia, Hypertension, and Renal failure. He has a past surgical history that includes Dialysis fistula creation (Left) and Colonoscopy. His family history includes Cancer in his father and mother; Other in his sister. He reports that he has quit smoking. He has never used smokeless tobacco. He reports current alcohol use. He reports that he does not use drugs.     Medications     Current Discharge Medication List      CONTINUE these medications which have NOT CHANGED    Details   pseudoephedrine (DECONGESTANT) 30 MG tablet Take 1 tablet by mouth every 6 hours as needed for Congestion  Qty: 28 tablet, Refills: 0      losartan (COZAAR) 50 MG tablet Take 1 tablet by mouth daily  Qty: 30 tablet, Refills: 3      traZODone (DESYREL) 150 MG tablet Take 150 mg by mouth nightly as needed for Sleep      gabapentin (NEURONTIN) 300 MG capsule Take 300 mg by mouth as needed. omeprazole (PRILOSEC) 20 MG delayed release capsule Take 20 mg by mouth daily      tiotropium (SPIRIVA) 18 MCG inhalation capsule Inhale 18 mcg into the lungs daily      torsemide (DEMADEX) 20 MG tablet Take 20 mg by mouth daily      atorvastatin (LIPITOR) 80 MG tablet Take 1 tablet by mouth daily  Qty: 30 tablet, Refills: 0      carvedilol (COREG) 12.5 MG tablet Take 12.5 mg by mouth 2 times daily Unsure of dose       budesonide-formoterol (SYMBICORT) 160-4.5 MCG/ACT AERO Inhale 2 puffs into the lungs 2 times daily      albuterol sulfate HFA (VENTOLIN HFA) 108 (90 Base) MCG/ACT inhaler Inhale 2 puffs into the lungs every 6 hours as needed for Wheezing 90mcg/actuation      allopurinol (ZYLOPRIM) 100 MG tablet Take 100 mg by mouth daily             Allergies     He has No Known Allergies. Physical Exam     INITIAL VITALS: BP: (!) 149/98, Temp: 98.4 °F (36.9 °C), Heart Rate: 80, Resp: 18, SpO2: 100 %     General:  Chronically ill appearing, in no acute distress, sitting comfortably in [facility]   Eyes:  PERRL. EOMI. No discharge from eyes   ENT:  No discharge from nose. OP clear  Neck:  Supple, trachea midline  Pulmonary:   Non-labored breathing. Breath sounds clear bilaterally  Cardiac:  Regular rate and rhythm. No murmurs/rubs/gallops  Abdomen:  Soft. Non-tender. Non-distended  Musculoskeletal:  No long bone deformity. Vascular:  Extremities warm and perfused. Normal pulses in all 4 extremities  Skin:  Dry, no rashes. L fistula with active stream of bleeding after dressing taken down. Good palpable thrill. Extremities:  No peripheral edema  Neuro:  Alert. Moves all four extremities to command. No focal deficit        DiagnosticResults     RADIOLOGY:  No orders to display       LABS:   No results found for this visit on 06/07/22.       RECENT VITALS:  BP: (!) 149/98, Temp: 98.4 °F (36.9 °C), Heart Rate: 80,Resp: 18, SpO2: 100 %       ED Course     Nursing Notes, Past Medical Hx, Past Surgical Hx, Social Hx, Allergies, and Family Hx were reviewed. The patient was given the followingmedications:  No orders of the defined types were placed in this encounter. CONSULTS:  C/ Juan Pablo Kaminski 19 / ASSESSMENT / Trinidad Vineet is a 61 y.o. male with history of ESRD on dialysis who presents to the ED for persistent bleeding from dialysis site. Patient without complaints on arrival, no active bleeding from bandage initially with completion of dialysis, therefore no labs sent. Dressing taken down, initial bleeding noted. Multiple attempts made at holding pressure with surgicell. After failed attempts, surgicell and 4x4 dressing applied with coban wrap. Surgery was called to assess fistula and on their arrival, patient noted to have resolved bleeding when dressing taken down. Good thrill remained. Patient had small ooze of blood, drastically reduced from initial bleed. Patient was observed for another 30 or 45 minutes had no ongoing bleeding whatsoever and at this time was felt to be stable for discharge home    This patient was also evaluated by the attending physician. All care plans werediscussed and agreed upon. Clinical Impression     1.  Hemorrhage due to vascular catheter, initial encounter [facility])        Disposition     PATIENT REFERRED TO:  Hermila Bro MD  [address] [facility] [address]  [place] [address]  [phone]    Call   As needed or for further mild issues    [facility] Emergency Department  [address]  [phone]  Go to   If symptoms worsen, such as significant increase in bleeding      DISCHARGE MEDICATIONS:  Current Discharge Medication List          DISPOSITION Discharge - Pending Orders Complete 06/07/2022 08:02:17 PM     Elbert García MD  06/07/22 7080       Madison Boone MD  06/07/22 5198

## 2022-06-07 NOTE — ED PROVIDER NOTES
ED Attending Attestation Note     Date of evaluation: 6/7/2022    This patient was seen by the resident. I have seen and examined the patient, agree with the workup, evaluation, management and diagnosis. The care plan has been discussed. I have reviewed the ECG and concur with the resident's interpretation. My assessment reveals a chronically ill-appearing elderly male lying in the hospital stretcher no distress, left upper arm with dialysis graft with palpable thrill audible bruit, there are 2 punctate areas of bleeding the superior most aspect has quite brisk pulsatile bleeding, lower aspect of the wound with a more slow ooze.      Natasha Kruse MD  06/07/22 1945

## 2022-06-07 NOTE — CONSULTS
Vascular Surgery  Resident Consult Note    Reason for consult: bleeding from left upper extremity AV graft    Chief Complaint: Bleeding from left upper extremity AV graft    History obtained from: patient/EMR    History of Present Illness :    Yessi Rowe is a 61 y.o. male with history of CVA, CHF, ESRD on HD via LUE AV graft, HLD, and HTN who presents to ED with concern of bleeding from his LUE AV graft after hemodialysis for more than 90 minutes, and for whom vascular surgery is consulted. Patient states that this is happened to him before and was resolved with pressure. Patient states he had the AV fistula created at  about 4-5 years ago but is unsure who his surgeon was. Patient states he last had his AVF examined more than a year ago and had to undergo balloon angioplasty due to stenosis. Patient denies any numbness or tingling in extremities. Patient states that initially, he was having spurting blood from AV graft and had pressure held in ED, with application of a pressure dressing. Currently, patient's bleeding has only a slow ooze from AV graft. Patient denies taking an blood thinners. Past Medical History:        Diagnosis Date    Cerebral artery occlusion with cerebral infarction ([facility] [address]     [facility]     Chronic kidney disease     COPD (chronic obstructive pulmonary disease) ([facility] [address] [facility]     Dialysis patient ([facility] [address] [facility]     Gout     Hemodialysis patient ([facility] [address]     Hx of blood clots     Hyperlipidemia     Hypertension     Renal failure        Past Surgical History:           Procedure Laterality Date    COLONOSCOPY      DIALYSIS FISTULA CREATION Left        Allergies:  Patient has no known allergies. Medications:   Home Meds  No current facility-administered medications on file prior to encounter.      Current Outpatient Medications on File Prior to Encounter   Medication Sig Dispense Refill    pseudoephedrine (DECONGESTANT) 30 MG tablet Take 1 tablet by mouth every 6 hours as needed for Congestion 28 tablet 0    losartan (COZAAR) 50 MG tablet Take 1 tablet by mouth daily 30 tablet 3    traZODone (DESYREL) 150 MG tablet Take 150 mg by mouth nightly as needed for Sleep      gabapentin (NEURONTIN) 300 MG capsule Take 300 mg by mouth as needed.  omeprazole (PRILOSEC) 20 MG delayed release capsule Take 20 mg by mouth daily      tiotropium (SPIRIVA) 18 MCG inhalation capsule Inhale 18 mcg into the lungs daily      torsemide (DEMADEX) 20 MG tablet Take 20 mg by mouth daily      atorvastatin (LIPITOR) 80 MG tablet Take 1 tablet by mouth daily 30 tablet 0    carvedilol (COREG) 12.5 MG tablet Take 12.5 mg by mouth 2 times daily Unsure of dose       budesonide-formoterol (SYMBICORT) 160-4.5 MCG/ACT AERO Inhale 2 puffs into the lungs 2 times daily      albuterol sulfate HFA (VENTOLIN HFA) 108 (90 Base) MCG/ACT inhaler Inhale 2 puffs into the lungs every 6 hours as needed for Wheezing 90mcg/actuation      allopurinol (ZYLOPRIM) 100 MG tablet Take 100 mg by mouth daily       Current Meds  No current facility-administered medications for this encounter. Family History:   Family History   Problem Relation Age of Onset    Cancer Mother     Cancer Father     Other Sister        Social History:   TOBACCO:   reports that he has quit smoking. He has never used smokeless tobacco.  ETOH:   reports current alcohol use. DRUGS:   reports no history of drug use.     Review of Systems:   A 14 point review of systems was conducted, significant findings as noted in HPI      Physical exam:    Vitals:    06/07/22 1608   BP: (!) 149/98   Pulse: 80   Resp: 18   Temp: 98.4 °F (36.9 °C)   TempSrc: Oral   SpO2: 100%       General appearance: alert, resting in bed  Neuro: A&Ox3, no focal deficits  HENT: trachea midline, no JVD, no LAD  Eyes: PERRLA, no scleral icterus  Chest/Lungs: normal respiratory effort, symmetric chest rise, on 4L NC  Cardiovascular: RRR  Abdomen: soft, non-tender, non-distended, no guarding/rigidity  Skin: warm and dry  Extremities: no edema , no cyanosis; LUE AV graft with pinpoint oozing, no ulcer; palpable thrill, palpable radial pulse    Labs:    CBC: No results for input(s): WBC, HGB, HCT, MCV, PLT in the last 72 hours. BMP: No results for input(s): NA, K, CL, CO2, PHOS, BUN, CREATININE, CA in the last 72 hours. PT/INR: No results for input(s): PROTIME, INR in the last 72 hours. APTT: No results for input(s): APTT in the last 72 hours. Liver Profile:  Lab Results   Component Value Date    AST 21 09/23/2021    ALT 7 09/23/2021    BILIDIR 0.3 09/22/2021    BILITOT 0.6 09/23/2021    ALKPHOS 155 09/23/2021   No results found for: CHOL, HDL, TRIG  UA: No results found for: NITRITE, COLORU, PHUR, LABCAST, [address], RBCUA, MUCUS, TRICHOMONAS, YEAST, BACTERIA, CLARITYU, SPECGRAV, LEUKOCYTESUR, UROBILINOGEN, BILIRUBINUR, BLOODU, GLUCOSEU, AMORPHOUS    Imaging:   No orders to display          Assessment/Plan:  Alonzo Torres is a 61 y.o. male with history of CVA, CHF, ESRD on HD via LUE AV graft, HLD, and HTN who presents to ED with concern of bleeding from his LUE AV graft after hemodialysis for more than 90 minutes, and for whom vascular surgery is consulted. Patient is afebrile and HDS. On inspection, patient's bleeding has slowed down significantly, and is now limited to a slow pinpoint ooze over the AV graft and no ulcer. There is a strong, palpable thrill and a palpable radial pulse. Additional pressure was held over LUE AV graft, with cessation of bleeding. New pressure dressing with Surgifoam and gauze was placed to LUE graft. It was recommended that patient follow up with UC for re-evaluation of his graft with possible fistulogram due to having a second episode of prolonged bleeding from his AV graft with likely venous outflow stenosis. Patient can remove pressure dressing tomorrow.  Otherwise, patient is OK to discharge from vascular surgery standpoint and per ED recommendations. Augustin Bonilla DO  PGY-1, General Surgery  06/07/22  6:38 PM  [phone]    VASCULAR staff     I did not see the patient but I coordinated care with the resident staff. Bleeding av fistula puncture site, no reports of ulcerations. Likely needs fistulagram. Okay to arrange with primary surgeon.

## 2022-06-07 NOTE — ED TRIAGE NOTES
Patient arrives c/o bleeding from his vascular access site. Patient gets dialysis through 2 15g IVs in his left arm and when they d/c the IVs it began to bleed. Dialysis states that the bleeding would not stop for over 90 minutes so they called 911. Patient denies any complaints at this time and VSS. Bleeding is controlled with a pressure bandage at this time.

## 2022-06-08 NOTE — ED NOTES
Patient discharged to [facility] via ambulance. All belongings sent with patient. Copy of AVS sent with patient.       Tomas Leal RN  06/07/22 6014

## 2022-06-09 ENCOUNTER — HOSPITAL ENCOUNTER (EMERGENCY)
Age: 67
Discharge: NURSING, REHAB OR LONG-TERM CARE | End: 2022-06-09
Attending: EMERGENCY MEDICINE
Payer: MEDICARE

## 2022-06-09 VITALS
BODY MASS INDEX: 23.42 KG/M2 | OXYGEN SATURATION: 100 % | SYSTOLIC BLOOD PRESSURE: 143 MMHG | HEART RATE: 84 BPM | DIASTOLIC BLOOD PRESSURE: 86 MMHG | RESPIRATION RATE: 17 BRPM | WEIGHT: 154 LBS | TEMPERATURE: 97.8 F

## 2022-06-09 DIAGNOSIS — R04.0 EPISTAXIS: Primary | ICD-10-CM

## 2022-06-09 PROCEDURE — 6370000000 HC RX 637 (ALT 250 FOR IP): Performed by: EMERGENCY MEDICINE

## 2022-06-09 PROCEDURE — 99284 EMERGENCY DEPT VISIT MOD MDM: CPT

## 2022-06-09 RX ORDER — OXYMETAZOLINE HYDROCHLORIDE 0.05 G/100ML
2 SPRAY NASAL ONCE
Status: COMPLETED | OUTPATIENT
Start: 2022-06-09 | End: 2022-06-09

## 2022-06-09 RX ADMIN — OXYMETAZOLINE HCL 2 SPRAY: 0.05 SPRAY NASAL at 08:19

## 2022-06-09 RX ADMIN — Medication 1 EACH: at 08:53

## 2022-06-09 ASSESSMENT — PAIN - FUNCTIONAL ASSESSMENT: PAIN_FUNCTIONAL_ASSESSMENT: NONE - DENIES PAIN

## 2022-06-09 NOTE — ED TRIAGE NOTES
Pt picked scab in nose last night, tucked tissue into nostril but did not stop bleeding. Pt on blood thinners and due to dialysis today.

## 2022-06-09 NOTE — ED NOTES
Afrin sprayed into both nostrils and nasal clip applied. Oxygen applied via pt mouth.  RT called to provide pt with humidified oxygen     Terri Greenwood RN  06/09/22 2039
Report called to [employer], instructed on epistaxis management and supplies being sent with pt      Gloria Sim RN  06/09/22 3741
breath sounds bilateral/chest excursion noted/positive end tidal CO2 noted

## 2022-06-09 NOTE — ED PROVIDER NOTES
[address] [facility]  EMERGENCY DEPARTMENT ENCOUNTER          ATTENDING PHYSICIAN NOTE       Date of evaluation: 6/9/2022    Chief Complaint     Epistaxis      History of Present Illness     Lauren Park is a 61 y.o. male who presents department epistaxis. Patient with history of end-stage renal disease on dialysis and is also on Eliquis. Says he picked his nose and picked off a scab inside his right nares. This happened last night and would not stop bleeding. Nursing was been placing toilet paper in that area to help the bleeding but the paper continued to have blood on it. Review of  Review of Systems     Review of Systems   He has an O2 requirement of 2 L. Denies any current chest pain or shortness of breath currently. Denies any nausea or vomiting. All his review of systems reported by patient    Past Medical, Surgical, Family, and Social History     He has a past medical history of Cerebral artery occlusion with cerebral infarction ([facility] [address] CHF (congestive heart failure) ([facility] [address] Chronic kidney disease, COPD (chronic obstructive pulmonary disease) ([facility] [address] Dialysis patient ([facility] [address] Gout, Hemodialysis patient ([facility] [address] Hx of blood clots, Hyperlipidemia, Hypertension, and Renal failure. He has a past surgical history that includes Dialysis fistula creation (Left) and Colonoscopy. His family history includes Cancer in his father and mother; Other in his sister. He reports that he has quit smoking. He has never used smokeless tobacco. He reports current alcohol use. He reports that he does not use drugs.     Medications     Previous Medications    ALBUTEROL SULFATE HFA (VENTOLIN HFA) 108 (90 BASE) MCG/ACT INHALER    Inhale 2 puffs into the lungs every 6 hours as needed for Wheezing 90mcg/actuation    ALLOPURINOL (ZYLOPRIM) 100 MG TABLET    Take 100 mg by mouth daily    ATORVASTATIN (LIPITOR) 80 MG TABLET    Take 1 tablet by mouth daily    BUDESONIDE-FORMOTEROL (SYMBICORT) 160-4.5 MCG/ACT AERO    Inhale 2 puffs into the lungs 2 times daily    CARVEDILOL (COREG) 12.5 MG TABLET    Take 12.5 mg by mouth 2 times daily Unsure of dose     GABAPENTIN (NEURONTIN) 300 MG CAPSULE    Take 300 mg by mouth as needed. LOSARTAN (COZAAR) 50 MG TABLET    Take 1 tablet by mouth daily    OMEPRAZOLE (PRILOSEC) 20 MG DELAYED RELEASE CAPSULE    Take 20 mg by mouth daily    TIOTROPIUM (SPIRIVA) 18 MCG INHALATION CAPSULE    Inhale 18 mcg into the lungs daily    TORSEMIDE (DEMADEX) 20 MG TABLET    Take 20 mg by mouth daily    TRAZODONE (DESYREL) 150 MG TABLET    Take 150 mg by mouth nightly as needed for Sleep       Allergies     He has No Known Allergies. Physical Exam     INITIAL VITALS: BP: 123/79, Temp: 97.8 °F (36.6 °C), Heart Rate: 80, Resp: 18, SpO2: 100 %   Physical Exam  Vitals and nursing note reviewed. Constitutional:       General: He is not in acute distress. Appearance: He is well-developed. He is not diaphoretic. HENT:      Nose:      Comments: He has right-sided epistaxis. There is an area on the septum which is macerated from the area where there was finger trauma. There is no blood pouring from the right nares but it is oozing. He is on Eliquis. Eyes:      Conjunctiva/sclera: Conjunctivae normal.      Pupils: Pupils are equal, round, and reactive to light. Cardiovascular:      Rate and Rhythm: Regular rhythm. Pulmonary:      Effort: Pulmonary effort is normal.   Musculoskeletal:      Cervical back: Neck supple. Skin:     General: Skin is warm and dry. Neurological:      Mental Status: He is alert and oriented to person, place, and time. Psychiatric:         Behavior: Behavior normal.           Diagnostic Results         RADIOLOGY:  No orders to display       LABS:   No results found for this visit on 06/09/22.         RECENT VITALS:  BP: 125/83,Temp: 97.8 °F (36.6 °C), Heart Rate: 80, Resp: 18, SpO2: 100 %     Procedures     Epistaxis Mgmt    Date/Time: 6/9/2022 8:20 AM  Performed by: Mega Valero MD  Authorized by: Keith Carcamo MD     Consent:     Consent obtained:  Verbal    Consent given by:  Patient    Risks discussed:  Bleeding  Anesthesia (see MAR for exact dosages): Anesthesia method:  None  Procedure details:     Treatment site:  R anterior    Treatment complexity:  Extensive  Post-procedure details:     Patient tolerance of procedure: Tolerated well, no immediate complications    Complexity due to Eliquis    ED Course     Nursing Notes, Past Medical Hx, Past Surgical Hx, Social Hx,Allergies, and Family Hx were reviewed. patient was given the following medications:  Orders Placed This Encounter   Medications    oxymetazoline (AFRIN) 0.05 % nasal spray 2 spray    silver nitrate applicators applicator 1 each       CONSULTS:  None    MEDICAL DECISIONMAKING / ASSESSMENT / PLAN     Kelsey Ferguson is a 61 y.o. male now with epistaxis. It was well controlled with Afrin and Merocel. He was discharged back to nursing facility after observation in the ED without any significant bleeding. There is no nausea or vomiting in the ED and no posterior blood in the pharynx at discharge. He will be referred to ENT and referral was given in the discharge instructions for the care facility. He will need oxygen via mouth until cleared by ENT. Discharged good condition. Clinical Impression     1. Epistaxis        Disposition     PATIENT REFERRED TO:  Mele Shine MD  [address] [place] [address]  [phone]      If symptoms worsen    [facility] Emergency Department  [address] [address]  [phone]    If symptoms worsen    DO Bernabe Mata [address]  [employer]  [address] [facility]  [phone]    Schedule an appointment as soon as possible for a visit today        DISCHARGE MEDICATIONS:  New Prescriptions    No medications on file       DISPOSITION Decision To Discharge 06/09/2022 10:07:42 AM       Alexis Jacobson MD  06/09/22 7058

## 2022-06-13 ENCOUNTER — HOSPITAL ENCOUNTER (EMERGENCY)
Age: 67
Discharge: HOME | DRG: 270 | End: 2022-06-14
Attending: EMERGENCY MEDICINE
Payer: MEDICARE

## 2022-06-13 DIAGNOSIS — T82.838A HEMORRHAGE OF ARTERIOVENOUS FISTULA, INITIAL ENCOUNTER (HCC): Primary | ICD-10-CM

## 2022-06-13 LAB
BASOPHILS ABSOLUTE: 0 K/UL (ref 0–0.2)
BASOPHILS RELATIVE PERCENT: 1 %
EOSINOPHILS ABSOLUTE: 0.1 K/UL (ref 0–0.6)
EOSINOPHILS RELATIVE PERCENT: 3.5 %
HCT VFR BLD CALC: 22.7 % (ref 40.5–52.5)
HEMOGLOBIN: 7.3 G/DL (ref 13.5–17.5)
LYMPHOCYTES ABSOLUTE: 0.5 K/UL (ref 1–5.1)
LYMPHOCYTES RELATIVE PERCENT: 14.7 %
MCH RBC QN AUTO: 28.5 PG (ref 26–34)
MCHC RBC AUTO-ENTMCNC: 32.3 G/DL (ref 31–36)
MCV RBC AUTO: 88.5 FL (ref 80–100)
MONOCYTES ABSOLUTE: 0.4 K/UL (ref 0–1.3)
MONOCYTES RELATIVE PERCENT: 12 %
NEUTROPHILS ABSOLUTE: 2.1 K/UL (ref 1.7–7.7)
NEUTROPHILS RELATIVE PERCENT: 68.8 %
PDW BLD-RTO: 17.8 % (ref 12.4–15.4)
PLATELET # BLD: 70 K/UL (ref 135–450)
PLATELET SLIDE REVIEW: ABNORMAL
PMV BLD AUTO: 8 FL (ref 5–10.5)
RBC # BLD: 2.57 M/UL (ref 4.2–5.9)
WBC # BLD: 3.1 K/UL (ref 4–11)

## 2022-06-13 PROCEDURE — 2500000003 HC RX 250 WO HCPCS: Performed by: EMERGENCY MEDICINE

## 2022-06-13 PROCEDURE — 36415 COLL VENOUS BLD VENIPUNCTURE: CPT

## 2022-06-13 PROCEDURE — 86901 BLOOD TYPING SEROLOGIC RH(D): CPT

## 2022-06-13 PROCEDURE — P9016 RBC LEUKOCYTES REDUCED: HCPCS

## 2022-06-13 PROCEDURE — 86900 BLOOD TYPING SEROLOGIC ABO: CPT

## 2022-06-13 PROCEDURE — 99284 EMERGENCY DEPT VISIT MOD MDM: CPT

## 2022-06-13 PROCEDURE — 86923 COMPATIBILITY TEST ELECTRIC: CPT

## 2022-06-13 PROCEDURE — 85025 COMPLETE CBC W/AUTO DIFF WBC: CPT

## 2022-06-13 PROCEDURE — 86850 RBC ANTIBODY SCREEN: CPT

## 2022-06-13 RX ORDER — LIDOCAINE HYDROCHLORIDE AND EPINEPHRINE 10; 10 MG/ML; UG/ML
20 INJECTION, SOLUTION INFILTRATION; PERINEURAL ONCE
Status: COMPLETED | OUTPATIENT
Start: 2022-06-13 | End: 2022-06-13

## 2022-06-13 RX ADMIN — LIDOCAINE HYDROCHLORIDE,EPINEPHRINE BITARTRATE 20 ML: 10; .01 INJECTION, SOLUTION INFILTRATION; PERINEURAL at 22:17

## 2022-06-13 ASSESSMENT — PAIN SCALES - GENERAL: PAINLEVEL_OUTOF10: 0

## 2022-06-14 VITALS
RESPIRATION RATE: 12 BRPM | HEART RATE: 72 BPM | SYSTOLIC BLOOD PRESSURE: 150 MMHG | DIASTOLIC BLOOD PRESSURE: 98 MMHG | OXYGEN SATURATION: 100 %

## 2022-06-14 LAB
ABO/RH: NORMAL
ANION GAP SERPL CALCULATED.3IONS-SCNC: 16 MMOL/L (ref 3–16)
ANTIBODY SCREEN: NORMAL
BUN BLDV-MCNC: 36 MG/DL (ref 7–20)
CALCIUM SERPL-MCNC: 8 MG/DL (ref 8.3–10.6)
CHLORIDE BLD-SCNC: 90 MMOL/L (ref 99–110)
CO2: 22 MMOL/L (ref 21–32)
CREAT SERPL-MCNC: 8.9 MG/DL (ref 0.8–1.3)
GFR AFRICAN AMERICAN: 7
GFR NON-AFRICAN AMERICAN: 6
GLUCOSE BLD-MCNC: 89 MG/DL (ref 70–99)
POTASSIUM REFLEX MAGNESIUM: 4.2 MMOL/L (ref 3.5–5.1)
SODIUM BLD-SCNC: 128 MMOL/L (ref 136–145)

## 2022-06-14 PROCEDURE — 80048 BASIC METABOLIC PNL TOTAL CA: CPT

## 2022-06-14 ASSESSMENT — PAIN - FUNCTIONAL ASSESSMENT: PAIN_FUNCTIONAL_ASSESSMENT: NONE - DENIES PAIN

## 2022-06-14 NOTE — ED PROVIDER NOTES
[address] [facility] ENCOUNTER          EM RESIDENT NOTE       Date of evaluation: 6/13/2022    Chief Complaint     Other  Dialysis site bleeding    History of Present Illness     Michelle Rajan is a 61 y.o. male with a history of COPD, ESRD on dialysis, heart failure, HLD, HTN, Eliquis use who presents today after removing the bandage from his dialysis which was done today at home and having sudden onset of pulsatile high-volume bleeding from the dialysis site. EMS was called by his nursing facility and attempted to place a pressure dressing with gauze and towels, but were unable to control the bleeding and placed a tourniquet for minutes before arrival.  Patient states mild pain at the site, but has no other current complaints. No shortness of breath no chest pain. States that he got dialysis today. Review of Systems     Review of systems is negative except for items mentioned above in HPI. Past Medical, Surgical, Family, and Social History     He has a past medical history of Cerebral artery occlusion with cerebral infarction ([facility] CHF (congestive heart failure) ([facility] Chronic kidney disease, COPD (chronic obstructive pulmonary disease) ([facility] Dialysis patient ([facility] Gout, Hemodialysis patient ([facility] Hx of blood clots, Hyperlipidemia, Hypertension, and Renal failure. He has a past surgical history that includes Dialysis fistula creation (Left) and Colonoscopy. His family history includes Cancer in his father and mother; Other in his sister. He reports that he has quit smoking. He has never used smokeless tobacco. He reports current alcohol use. He reports that he does not use drugs.     Medications     Previous Medications    ALBUTEROL SULFATE HFA (VENTOLIN HFA) 108 (90 BASE) MCG/ACT INHALER    Inhale 2 puffs into the lungs every 6 hours as needed for Wheezing 90mcg/actuation    ALLOPURINOL (ZYLOPRIM) 100 MG TABLET    Take 100 mg by mouth daily    ATORVASTATIN (LIPITOR) 80 MG TABLET    Take 1 tablet by mouth daily    BUDESONIDE-FORMOTEROL (SYMBICORT) 160-4.5 MCG/ACT AERO    Inhale 2 puffs into the lungs 2 times daily    CARVEDILOL (COREG) 12.5 MG TABLET    Take 12.5 mg by mouth 2 times daily Unsure of dose     GABAPENTIN (NEURONTIN) 300 MG CAPSULE    Take 300 mg by mouth as needed. LOSARTAN (COZAAR) 50 MG TABLET    Take 1 tablet by mouth daily    OMEPRAZOLE (PRILOSEC) 20 MG DELAYED RELEASE CAPSULE    Take 20 mg by mouth daily    TIOTROPIUM (SPIRIVA) 18 MCG INHALATION CAPSULE    Inhale 18 mcg into the lungs daily    TORSEMIDE (DEMADEX) 20 MG TABLET    Take 20 mg by mouth daily    TRAZODONE (DESYREL) 150 MG TABLET    Take 150 mg by mouth nightly as needed for Sleep       Allergies     He has No Known Allergies. Physical Exam     INITIAL VITALS: BP: (!) 137/106,  , Heart Rate: 77, Resp: 17, SpO2: 100 %   Physical Exam  Constitutional:       Appearance: He is well-developed. Comments: Chronically ill man lying in bed   HENT:      Head: Normocephalic and atraumatic. Cardiovascular:      Rate and Rhythm: Regular rhythm. Pulmonary:      Effort: Pulmonary effort is normal.   Abdominal:      General: There is no distension. Palpations: Abdomen is soft. There is no mass. Musculoskeletal:      Cervical back: Normal range of motion. Comments: Arm AV fistula with thrill present, tourniquet is up in the proximal humeral region, a submillimeter site of pulsatile bleeding is identified over the fistula, radial pulse remains 2+   Skin:     General: Skin is warm and dry. Neurological:      Mental Status: He is alert and oriented to person, place, and time.          DiagnosticResults     EKG   Interpreted in conjunction with emergencydepartment physician Rosie Ag*    None    RADIOLOGY:  No orders to display       LABS:   Results for orders placed or performed during the hospital encounter of 06/13/22   CBC with Auto Differential   Result Value Ref Range    WBC 3.1 (L) 4.0 - 11.0 K/uL    RBC 2.57 (L) 4.20 - 5.90 M/uL    Hemoglobin 7.3 (L) 13.5 - 17.5 g/dL    Hematocrit 22.7 (L) 40.5 - 52.5 %    MCV 88.5 80.0 - 100.0 fL    MCH 28.5 26.0 - 34.0 pg    MCHC 32.3 31.0 - 36.0 g/dL    RDW 17.8 (H) 12.4 - 15.4 %    Platelets 70 (L) 424 - 450 K/uL    MPV 8.0 5.0 - 10.5 fL    PLATELET SLIDE REVIEW Decreased     Neutrophils % 68.8 %    Lymphocytes % 14.7 %    Monocytes % 12.0 %    Eosinophils % 3.5 %    Basophils % 1.0 %    Neutrophils Absolute 2.1 1.7 - 7.7 K/uL    Lymphocytes Absolute 0.5 (L) 1.0 - 5.1 K/uL    Monocytes Absolute 0.4 0.0 - 1.3 K/uL    Eosinophils Absolute 0.1 0.0 - 0.6 K/uL    Basophils Absolute 0.0 0.0 - 0.2 K/uL   Basic Metabolic Panel w/ Reflex to MG   Result Value Ref Range    Sodium 128 (L) 136 - 145 mmol/L    Potassium reflex Magnesium 4.2 3.5 - 5.1 mmol/L    Chloride 90 (L) 99 - 110 mmol/L    CO2 22 21 - 32 mmol/L    Anion Gap 16 3 - 16    Glucose 89 70 - 99 mg/dL    BUN 36 (H) 7 - 20 mg/dL    CREATININE 8.9 (HH) 0.8 - 1.3 mg/dL    GFR Non- 6 (A) >60    GFR  7 (A) >60    Calcium 8.0 (L) 8.3 - 10.6 mg/dL   TYPE AND SCREEN   Result Value Ref Range    ABO/Rh B POS     Antibody Screen NEG        ED BEDSIDE ULTRASOUND:  None    RECENT VITALS:  BP: (!) 150/98,  , Heart Rate: 72,Resp: 12, SpO2: 100 %     Procedures     Procedures    ED Course     Nursing Notes, Past Medical Hx, Past Surgical Hx, Social Hx, Allergies, and Family Hx were reviewed. The patient was given the followingmedications:  Orders Placed This Encounter   Medications    lidocaine-EPINEPHrine 1 %-[id number] injection 20 mL       CONSULTS:  None    MEDICAL DECISION MAKING / ASSESSMENT / Janann Staves is a 61 y.o. male ESRD who got dialysis today, COPD, heart failure, on Eliquis he presents today with bleeding from his dialysis site in his left arm at his AV fistula.   Tourniquet was placed prior to arrival.  On my initial evaluation I noted the patient has a heavily saturated bandage of towels and gauze. .  I was able to remove this and control the bleeding with point pressure. I was then able to place pressure proximal and distal to the site of bleeding and achieve hemostasis. After several minutes bleeding had slowed to a very faint ooze. This was dried and Dermabond was applied. This remained hemostatic. A tourniquet was removed. A pressure dressing with Adaptic and ABD and Coban was placed. The patient was observed here in the emergency department for 1 hour without recurrence of bleeding and was discharged. ED Course as of 06/14/22 0152 Mon Jun 13, 2022 2322 Heart Rate: 77 [BB]   2322 Resp: 17 [BB]   2322 MAP (mmHg): 115 [BB]   2322 BP(!): 137/106 [BB]   2333 Hemoglobin Quant(!): 7.3  Hemoglobin 7.3 baseline 8-9's. Represent a small amount of blood loss anemia. Will recommend the patient get blood recheck with dialysis. No signs of hemorrhagic shock. [BB]   2333 WBC(!): 3.1 [BB]   2347 Rechecked wound, remains hemostatic, dressing adjusted for patient comfort [BB]   Tue Jun 14, 2022 0041 ABO Rh: B POS [BB]   0041 Antibody Screen: NEG [BB]   0151 Creatinine(!!): 8.9  Pt on dialysis [BB]   0151 Sodium(!): 128  C/w previous [BB]   0151 Potassium: 4.2 [BB]      ED Course User Index  [BB] Jared Wayne MD       This patient was also evaluated by the attending physician. All care plans werediscussed and agreed upon. Clinical Impression     1.  Hemorrhage of arteriovenous fistula, initial encounter [facility])        Disposition     PATIENT REFERRED TO:  [facility] Emergency Department  [address] [facility]  Go to   As needed, If symptoms worsen    Chu Ruiz MD  [address] [place] [address]  [phone]    Call         DISCHARGE MEDICATIONS:  New Prescriptions    No medications on file       DISPOSITION            Jared Wayne MD  Resident  06/14/22 7954

## 2022-06-14 NOTE — ED PROVIDER NOTES
ED Attending Attestation Note     Date of evaluation: 6/13/2022    This patient was seen by the resident. I have seen and examined the patient, agree with the workup, evaluation, management and diagnosis. The care plan has been discussed. My assessment reveals pulsatile puncture wound overlying fistula in the left arm. I was present for closure of puncture wound.      Galen Fraser MD  06/13/22 9550

## 2022-06-15 ENCOUNTER — APPOINTMENT (OUTPATIENT)
Dept: GENERAL RADIOLOGY | Age: 67
DRG: 270 | End: 2022-06-15
Payer: MEDICARE

## 2022-06-15 ENCOUNTER — HOSPITAL ENCOUNTER (INPATIENT)
Age: 67
LOS: 4 days | Discharge: NURSING, REHAB OR LONG-TERM CARE | DRG: 270 | End: 2022-06-19
Attending: EMERGENCY MEDICINE | Admitting: INTERNAL MEDICINE
Payer: MEDICARE

## 2022-06-15 DIAGNOSIS — D64.9 ANEMIA, UNSPECIFIED TYPE: Primary | ICD-10-CM

## 2022-06-15 DIAGNOSIS — T82.898A AV FISTULA OCCLUSION, INITIAL ENCOUNTER (HCC): ICD-10-CM

## 2022-06-15 DIAGNOSIS — R04.0 EPISTAXIS: ICD-10-CM

## 2022-06-15 DIAGNOSIS — E87.1 HYPONATREMIA: ICD-10-CM

## 2022-06-15 PROBLEM — T82.868A AV FISTULA THROMBOSIS, INITIAL ENCOUNTER (HCC): Status: ACTIVE | Noted: 2022-06-15

## 2022-06-15 LAB
ANION GAP SERPL CALCULATED.3IONS-SCNC: 16 MMOL/L (ref 3–16)
BASOPHILS ABSOLUTE: 0.1 K/UL (ref 0–0.2)
BASOPHILS RELATIVE PERCENT: 1.8 %
BLOOD BANK DISPENSE STATUS: NORMAL
BLOOD BANK PRODUCT CODE: NORMAL
BPU ID: NORMAL
BUN BLDV-MCNC: 42 MG/DL (ref 7–20)
CALCIUM SERPL-MCNC: 8.3 MG/DL (ref 8.3–10.6)
CHLORIDE BLD-SCNC: 89 MMOL/L (ref 99–110)
CO2: 22 MMOL/L (ref 21–32)
CREAT SERPL-MCNC: 10.3 MG/DL (ref 0.8–1.3)
DESCRIPTION BLOOD BANK: NORMAL
EKG ATRIAL RATE: 76 BPM
EKG DIAGNOSIS: NORMAL
EKG P AXIS: 76 DEGREES
EKG P-R INTERVAL: 288 MS
EKG Q-T INTERVAL: 400 MS
EKG QRS DURATION: 102 MS
EKG QTC CALCULATION (BAZETT): 450 MS
EKG R AXIS: -46 DEGREES
EKG T AXIS: 124 DEGREES
EKG VENTRICULAR RATE: 76 BPM
EOSINOPHILS ABSOLUTE: 0.1 K/UL (ref 0–0.6)
EOSINOPHILS RELATIVE PERCENT: 2.8 %
GFR AFRICAN AMERICAN: 6
GFR NON-AFRICAN AMERICAN: 5
GLUCOSE BLD-MCNC: 126 MG/DL (ref 70–99)
HCT VFR BLD CALC: 21.2 % (ref 40.5–52.5)
HCT VFR BLD CALC: 23.4 % (ref 40.5–52.5)
HEMOGLOBIN: 6.7 G/DL (ref 13.5–17.5)
HEMOGLOBIN: 7.5 G/DL (ref 13.5–17.5)
INR BLD: 1.48 (ref 0.87–1.14)
LYMPHOCYTES ABSOLUTE: 0.5 K/UL (ref 1–5.1)
LYMPHOCYTES RELATIVE PERCENT: 14 %
MCH RBC QN AUTO: 28.1 PG (ref 26–34)
MCHC RBC AUTO-ENTMCNC: 31.7 G/DL (ref 31–36)
MCV RBC AUTO: 88.5 FL (ref 80–100)
MONOCYTES ABSOLUTE: 0.3 K/UL (ref 0–1.3)
MONOCYTES RELATIVE PERCENT: 7.9 %
NEUTROPHILS ABSOLUTE: 2.4 K/UL (ref 1.7–7.7)
NEUTROPHILS RELATIVE PERCENT: 73.5 %
PDW BLD-RTO: 17.8 % (ref 12.4–15.4)
PLATELET # BLD: 86 K/UL (ref 135–450)
PMV BLD AUTO: 8 FL (ref 5–10.5)
POTASSIUM SERPL-SCNC: 4.4 MMOL/L (ref 3.5–5.1)
PROTHROMBIN TIME: 17.9 SEC (ref 11.7–14.5)
RBC # BLD: 2.4 M/UL (ref 4.2–5.9)
SODIUM BLD-SCNC: 127 MMOL/L (ref 136–145)
WBC # BLD: 3.3 K/UL (ref 4–11)

## 2022-06-15 PROCEDURE — 80048 BASIC METABOLIC PNL TOTAL CA: CPT

## 2022-06-15 PROCEDURE — 6360000002 HC RX W HCPCS

## 2022-06-15 PROCEDURE — 85025 COMPLETE CBC W/AUTO DIFF WBC: CPT

## 2022-06-15 PROCEDURE — 2580000003 HC RX 258

## 2022-06-15 PROCEDURE — 94640 AIRWAY INHALATION TREATMENT: CPT

## 2022-06-15 PROCEDURE — 85014 HEMATOCRIT: CPT

## 2022-06-15 PROCEDURE — 2060000000 HC ICU INTERMEDIATE R&B

## 2022-06-15 PROCEDURE — 6370000000 HC RX 637 (ALT 250 FOR IP): Performed by: EMERGENCY MEDICINE

## 2022-06-15 PROCEDURE — 99285 EMERGENCY DEPT VISIT HI MDM: CPT

## 2022-06-15 PROCEDURE — 94761 N-INVAS EAR/PLS OXIMETRY MLT: CPT

## 2022-06-15 PROCEDURE — 71046 X-RAY EXAM CHEST 2 VIEWS: CPT

## 2022-06-15 PROCEDURE — 6370000000 HC RX 637 (ALT 250 FOR IP)

## 2022-06-15 PROCEDURE — 94664 DEMO&/EVAL PT USE INHALER: CPT

## 2022-06-15 PROCEDURE — 36415 COLL VENOUS BLD VENIPUNCTURE: CPT

## 2022-06-15 PROCEDURE — 85610 PROTHROMBIN TIME: CPT

## 2022-06-15 PROCEDURE — 1200000000 HC SEMI PRIVATE

## 2022-06-15 PROCEDURE — 36430 TRANSFUSION BLD/BLD COMPNT: CPT

## 2022-06-15 PROCEDURE — 85018 HEMOGLOBIN: CPT

## 2022-06-15 PROCEDURE — 2700000000 HC OXYGEN THERAPY PER DAY

## 2022-06-15 PROCEDURE — 93005 ELECTROCARDIOGRAM TRACING: CPT | Performed by: EMERGENCY MEDICINE

## 2022-06-15 RX ORDER — GABAPENTIN 300 MG/1
300 CAPSULE ORAL NIGHTLY
Status: DISCONTINUED | OUTPATIENT
Start: 2022-06-15 | End: 2022-06-19 | Stop reason: HOSPADM

## 2022-06-15 RX ORDER — PSEUDOEPHEDRINE HYDROCHLORIDE 30 MG/1
30 TABLET ORAL EVERY 6 HOURS PRN
Status: DISCONTINUED | OUTPATIENT
Start: 2022-06-15 | End: 2022-06-19 | Stop reason: HOSPADM

## 2022-06-15 RX ORDER — SILDENAFIL CITRATE 20 MG/1
20 TABLET ORAL 3 TIMES DAILY
Status: DISCONTINUED | OUTPATIENT
Start: 2022-06-15 | End: 2022-06-19 | Stop reason: HOSPADM

## 2022-06-15 RX ORDER — FUROSEMIDE 20 MG/1
20 TABLET ORAL DAILY
COMMUNITY

## 2022-06-15 RX ORDER — ALLOPURINOL 100 MG/1
100 TABLET ORAL DAILY
Status: DISCONTINUED | OUTPATIENT
Start: 2022-06-15 | End: 2022-06-19 | Stop reason: HOSPADM

## 2022-06-15 RX ORDER — ALBUTEROL SULFATE 2.5 MG/3ML
2.5 SOLUTION RESPIRATORY (INHALATION) 4 TIMES DAILY
Status: DISCONTINUED | OUTPATIENT
Start: 2022-06-15 | End: 2022-06-15

## 2022-06-15 RX ORDER — ONDANSETRON 4 MG/1
4 TABLET, ORALLY DISINTEGRATING ORAL EVERY 8 HOURS PRN
Status: DISCONTINUED | OUTPATIENT
Start: 2022-06-15 | End: 2022-06-19 | Stop reason: HOSPADM

## 2022-06-15 RX ORDER — ONDANSETRON 2 MG/ML
4 INJECTION INTRAMUSCULAR; INTRAVENOUS EVERY 6 HOURS PRN
Status: DISCONTINUED | OUTPATIENT
Start: 2022-06-15 | End: 2022-06-19 | Stop reason: HOSPADM

## 2022-06-15 RX ORDER — ACETAMINOPHEN 325 MG/1
650 TABLET ORAL EVERY 6 HOURS PRN
Status: DISCONTINUED | OUTPATIENT
Start: 2022-06-15 | End: 2022-06-19 | Stop reason: HOSPADM

## 2022-06-15 RX ORDER — UMECLIDINIUM 62.5 UG/1
1 AEROSOL, POWDER ORAL DAILY
COMMUNITY

## 2022-06-15 RX ORDER — PYRIDOXINE HCL (VITAMIN B6) 100 MG
1 TABLET ORAL DAILY
COMMUNITY

## 2022-06-15 RX ORDER — SODIUM CHLORIDE 9 MG/ML
INJECTION, SOLUTION INTRAVENOUS PRN
Status: DISCONTINUED | OUTPATIENT
Start: 2022-06-15 | End: 2022-06-19 | Stop reason: HOSPADM

## 2022-06-15 RX ORDER — CARVEDILOL 12.5 MG/1
12.5 TABLET ORAL 2 TIMES DAILY
Status: DISCONTINUED | OUTPATIENT
Start: 2022-06-15 | End: 2022-06-19 | Stop reason: HOSPADM

## 2022-06-15 RX ORDER — SILDENAFIL CITRATE 20 MG/1
20 TABLET ORAL 3 TIMES DAILY
COMMUNITY
End: 2022-10-06

## 2022-06-15 RX ORDER — SODIUM CHLORIDE 0.9 % (FLUSH) 0.9 %
5-40 SYRINGE (ML) INJECTION PRN
Status: DISCONTINUED | OUTPATIENT
Start: 2022-06-15 | End: 2022-06-19 | Stop reason: HOSPADM

## 2022-06-15 RX ORDER — FUROSEMIDE 20 MG/1
20 TABLET ORAL DAILY
Status: DISCONTINUED | OUTPATIENT
Start: 2022-06-16 | End: 2022-06-16

## 2022-06-15 RX ORDER — PANTOPRAZOLE SODIUM 40 MG/1
40 TABLET, DELAYED RELEASE ORAL
Status: DISCONTINUED | OUTPATIENT
Start: 2022-06-16 | End: 2022-06-19 | Stop reason: HOSPADM

## 2022-06-15 RX ORDER — ATORVASTATIN CALCIUM 80 MG/1
80 TABLET, FILM COATED ORAL DAILY
COMMUNITY

## 2022-06-15 RX ORDER — ARFORMOTEROL TARTRATE 15 UG/2ML
15 SOLUTION RESPIRATORY (INHALATION) 2 TIMES DAILY
Status: DISCONTINUED | OUTPATIENT
Start: 2022-06-15 | End: 2022-06-19 | Stop reason: HOSPADM

## 2022-06-15 RX ORDER — ALBUTEROL SULFATE 2.5 MG/3ML
2.5 SOLUTION RESPIRATORY (INHALATION) EVERY 4 HOURS PRN
Status: DISCONTINUED | OUTPATIENT
Start: 2022-06-15 | End: 2022-06-19 | Stop reason: HOSPADM

## 2022-06-15 RX ORDER — POLYETHYLENE GLYCOL 3350 17 G/17G
17 POWDER, FOR SOLUTION ORAL DAILY PRN
Status: DISCONTINUED | OUTPATIENT
Start: 2022-06-15 | End: 2022-06-19 | Stop reason: HOSPADM

## 2022-06-15 RX ORDER — BUDESONIDE 0.5 MG/2ML
1 INHALANT ORAL 2 TIMES DAILY
Status: DISCONTINUED | OUTPATIENT
Start: 2022-06-15 | End: 2022-06-19 | Stop reason: HOSPADM

## 2022-06-15 RX ORDER — FLUTICASONE PROPIONATE 50 MCG
2 SPRAY, SUSPENSION (ML) NASAL DAILY
Status: DISCONTINUED | OUTPATIENT
Start: 2022-06-16 | End: 2022-06-19 | Stop reason: HOSPADM

## 2022-06-15 RX ORDER — FLUTICASONE PROPIONATE 50 MCG
2 SPRAY, SUSPENSION (ML) NASAL DAILY
Status: ON HOLD | COMMUNITY
End: 2022-06-18 | Stop reason: HOSPADM

## 2022-06-15 RX ORDER — SODIUM CHLORIDE 0.9 % (FLUSH) 0.9 %
5-40 SYRINGE (ML) INJECTION EVERY 12 HOURS SCHEDULED
Status: DISCONTINUED | OUTPATIENT
Start: 2022-06-15 | End: 2022-06-19 | Stop reason: HOSPADM

## 2022-06-15 RX ORDER — PSEUDOEPHEDRINE HYDROCHLORIDE 30 MG/1
30 TABLET ORAL EVERY 6 HOURS PRN
Status: ON HOLD | COMMUNITY
End: 2022-09-15 | Stop reason: HOSPADM

## 2022-06-15 RX ORDER — CALCIUM CARBONATE 500(1250)
500 TABLET ORAL DAILY
Status: ON HOLD | COMMUNITY
End: 2022-06-18 | Stop reason: HOSPADM

## 2022-06-15 RX ORDER — POTASSIUM CHLORIDE 750 MG/1
10 CAPSULE, EXTENDED RELEASE ORAL DAILY
Status: ON HOLD | COMMUNITY
End: 2022-06-18 | Stop reason: HOSPADM

## 2022-06-15 RX ORDER — OXYMETAZOLINE HYDROCHLORIDE 0.05 G/100ML
2 SPRAY NASAL ONCE
Status: COMPLETED | OUTPATIENT
Start: 2022-06-15 | End: 2022-06-15

## 2022-06-15 RX ORDER — ACETAMINOPHEN 650 MG/1
650 SUPPOSITORY RECTAL EVERY 6 HOURS PRN
Status: DISCONTINUED | OUTPATIENT
Start: 2022-06-15 | End: 2022-06-19 | Stop reason: HOSPADM

## 2022-06-15 RX ORDER — FLUTICASONE PROPIONATE AND SALMETEROL 500; 50 UG/1; UG/1
1 POWDER RESPIRATORY (INHALATION) 2 TIMES DAILY
COMMUNITY
End: 2022-10-06

## 2022-06-15 RX ORDER — ERGOCALCIFEROL 1.25 MG/1
50000 CAPSULE ORAL WEEKLY
COMMUNITY

## 2022-06-15 RX ORDER — ATORVASTATIN CALCIUM 80 MG/1
80 TABLET, FILM COATED ORAL DAILY
Status: DISCONTINUED | OUTPATIENT
Start: 2022-06-15 | End: 2022-06-19 | Stop reason: HOSPADM

## 2022-06-15 RX ADMIN — OXYMETAZOLINE HCL 2 SPRAY: 0.05 SPRAY NASAL at 11:34

## 2022-06-15 RX ADMIN — ALBUTEROL SULFATE 2.5 MG: 2.5 SOLUTION RESPIRATORY (INHALATION) at 17:30

## 2022-06-15 RX ADMIN — ATORVASTATIN CALCIUM 80 MG: 80 TABLET, FILM COATED ORAL at 16:43

## 2022-06-15 RX ADMIN — SODIUM CHLORIDE, PRESERVATIVE FREE 10 ML: 5 INJECTION INTRAVENOUS at 19:51

## 2022-06-15 RX ADMIN — SILDENAFIL 20 MG: 20 TABLET ORAL at 16:42

## 2022-06-15 RX ADMIN — GABAPENTIN 300 MG: 300 CAPSULE ORAL at 19:51

## 2022-06-15 RX ADMIN — BUDESONIDE 1000 MCG: 0.5 SUSPENSION RESPIRATORY (INHALATION) at 21:02

## 2022-06-15 RX ADMIN — SILDENAFIL 20 MG: 20 TABLET ORAL at 19:51

## 2022-06-15 RX ADMIN — ALLOPURINOL 100 MG: 100 TABLET ORAL at 16:43

## 2022-06-15 RX ADMIN — ARFORMOTEROL TARTRATE 15 MCG: 15 SOLUTION RESPIRATORY (INHALATION) at 21:03

## 2022-06-15 RX ADMIN — CARVEDILOL 12.5 MG: 12.5 TABLET, FILM COATED ORAL at 19:51

## 2022-06-15 ASSESSMENT — PAIN DESCRIPTION - FREQUENCY: FREQUENCY: INTERMITTENT

## 2022-06-15 ASSESSMENT — PAIN DESCRIPTION - ORIENTATION: ORIENTATION: RIGHT

## 2022-06-15 ASSESSMENT — PAIN DESCRIPTION - DESCRIPTORS: DESCRIPTORS: STABBING;SHARP

## 2022-06-15 ASSESSMENT — PAIN DESCRIPTION - LOCATION: LOCATION: CHEST

## 2022-06-15 ASSESSMENT — ENCOUNTER SYMPTOMS
VOMITING: 0
SHORTNESS OF BREATH: 0
NAUSEA: 0
COUGH: 0
DIARRHEA: 0
ABDOMINAL PAIN: 0

## 2022-06-15 ASSESSMENT — PAIN SCALES - GENERAL
PAINLEVEL_OUTOF10: 0
PAINLEVEL_OUTOF10: 7

## 2022-06-15 ASSESSMENT — PAIN - FUNCTIONAL ASSESSMENT: PAIN_FUNCTIONAL_ASSESSMENT: 0-10

## 2022-06-15 ASSESSMENT — PAIN DESCRIPTION - PAIN TYPE: TYPE: ACUTE PAIN

## 2022-06-15 NOTE — PROGRESS NOTES
RESPIRATORY THERAPY ASSESSMENT    Name:  Frank Evans  Medical Record Number:  [id number]  Age: 61 y.o. Gender: male  : 1955  Today's Date:  6/15/2022  Room:  [address] [place] [address]    Assessment     Is the patient being admitted for a COPD or Asthma exacerbation? No   (If yes the patient will be seen every 4 hours for the first 24 hours and then reassessed)    Patient Admission Diagnosis      Allergies  No Known Allergies          Pulmonary History:COPD  Home Oxygen Therapy:  NONE  Home Respiratory Therapy:Albuterol and Budesonide/Formoterol    Current Respiratory Therapy:  HHN QID Albuterol , Spirivia  Daily, Brovana. Pulmicort BID          Respiratory Severity Index(RSI)   Patients with orders for inhalation medications, oxygen, or any therapeutic treatment modality will be placed on Respiratory Protocol. They will be assessed with the first treatment and at least every 72 hours thereafter. The following severity scale will be used to determine frequency of treatment intervention.     Smoking History: Smoking History Less than 1ppd or less than 15 pack year = 1    Social History  Social History     Tobacco Use    Smoking status: Former Smoker    Smokeless tobacco: Never Used   Vaping Use    Vaping Use: Never used   Substance Use Topics    Alcohol use: Yes     Comment: occasional    Drug use: Never       Recent Surgical History: None = 0  Past Surgical History  Past Surgical History:   Procedure Laterality Date    COLONOSCOPY      DIALYSIS FISTULA CREATION Left        Level of Consciousness: Alert, Oriented, and Cooperative = 0    Level of Activity: Walking unassisted = 0    Respiratory Pattern: Regular Pattern; RR 8-20 = 0    Breath Sounds: Diminshed bilaterally , Clear= 2    Sputum   ,  ,    Cough: Strong, spontaneous, non-productive = 0    Vital Signs   BP (!) 146/90   Pulse 83   Temp 96.9 °F (36.1 °C) (Axillary) Comment: axillary  Resp 20   Ht 5' 8\" (1.727 m)   Wt 150 lb 4.8 oz (68.2 kg) SpO2 100%   BMI 22.85 kg/m²   SPO2 (COPD values may differ): 88-89% on room air or greater than 92% on FiO2 28- 35% = 2 ( HOME O2 @ 3L)    Peak Flow (asthma only): not applicable = 0    RSI: 5-6 = Q4hr PRN (every four hours as needed) for dyspnea        Plan       Goals: maintain home regimen for RX's -bronchodilatation  Patient/caregiver was educated on the proper method of use for Respiratory Care Devices:  Yes      Level of patient/caregiver understanding able to:   ? Verbalize understanding   ? Demonstrate understanding       ? Teach back        ? Needs reinforcement       ? No available caregiver               ? Other:     Response to education:  Very Good     Is patient being placed on Home Treatment Regimen? Yes     Does the patient have everything they need prior to discharge? Yes     Comments:     Plan of Care: Albuterol Q$PRN, Brovana and Pulmicort BID  Home O2 @ 3L    Electronically signed by Lennox Shelling, RCP on 6/15/2022 at 4:59 PM    Respiratory Protocol Guidelines     1. Assessment and treatment by Respiratory Therapy will be initiated for medication and therapeutic interventions upon initiation of aerosolized medication. 2. Physician will be contacted for respiratory rate (RR) greater than 35 breaths per minute. Therapy will be held for heart rate (HR) greater than 140 beats per minute, pending direction from physician. 3. Bronchodilators will be administered via Metered Dose Inhaler (MDI) with spacer when the following criteria are met:  a. Alert and cooperative     b. HR < 140 bpm  c. RR < 30 bpm                d. Can demonstrate a 2-3 second inspiratory hold  4. Bronchodilators will be administered via Hand Held Nebulizer RAMON [facility]) to patients when ANY of the following criteria are met  a. Incognizant or uncooperative          b. Patients treated with HHN at Home        c. Unable to demonstrate proper use of MDI with spacer     d. RR > 30 bpm   5.  Bronchodilators will be delivered via Metered

## 2022-06-15 NOTE — FLOWSHEET NOTE
Patient admitted to floor via EMS transport. Vitals taken and patient oxygen saturation at 85%. Patient put on 3L NC per patient request. Patient was not sent up with oxygen but stated he was wearing it before transport. Patient has new orders for blood products. Patient consent complete. Nurse at bedside for 15 mins. Vitals stable.  Rate increased to 100.      06/15/22 1615   Vitals   BP (!) 146/90   Temp 96.9 °F (36.1 °C)  (axillary)   Temp Source Axillary   Heart Rate 83   Resp 20   SpO2 100 %   Transfuse RBC   Patient Observed Initial 15 Min  Yes

## 2022-06-15 NOTE — H&P
Internal Medicine  PGY-1  History & Physical      CC: Nose bleed. History Obtained From: Patient, chart. HISTORY OF PRESENT ILLNESS:    Carola Tong is a 66M PMHx COPD, DVT, PE, CHF (last Echo 3/2022 EF 35-40% with G3DD), Pulm HTN, ESRD on dialysis, HTN, HLD who presented to ED with nosebleed from [address] He also endorsed some chest pain which he described as spasmodic, intermittent, rating it as a 6/10 with some non-specific radiation. He endorses worsening with deep breathing. He denies SOB, irregular rhythm. On further ROS, he denies fever/chills, n/v, abd pain, acute bowel concerns. He makes no urine. Per chart review patient has been to the ED multiple times over the past few months for nosebleeds that were treated in the ED. Of note patient is on Eliquis for hx of DVT and PE. Today per patient he was supposed to have AVF declotting done at [address] [place] [address] in [email or web address] however was again sent to the ED for nose bleed. Affrin spray was administered in ED and nose bleed had resolved by the time I saw the patient. Of note he was recently seen in the ED 6/13 for bleedings from his L AVF site. Bleeding was controlled in the ED and patient was discharged home. In the ED, VSS. PE revealed a comfortable appearing patient in no acute distress. AVF site on L arm with no palpable thrill, no bruit on auscultation. Also had mild bibasilar crackles on exam. Labs showed Cr 10.3 (ESRD state). He also has mild hyponatremia to 127 which appears chronic. On CBC Hb 6.7 today (was 10.7 in March 2022). CXR shows stable cardiomegaly.      Past Medical History:        Diagnosis Date    Cerebral artery occlusion with [facility] infarction ([facility]     CHF (congestive heart failure) (HCC)     Chronic kidney disease     COPD [facility]chronic obstructive pulmonary disease) ([facility]     Dialysis patient ([facility]     Gout     Hemodialysis patient ([facility]     Hx of blood clots     Hyperlipidemia     Hypertension     Renal failure    ·     Past Surgical History:        Procedure Laterality Date    COLONOSCOPY      DIALYSIS FISTULA CREATION Left    ·     Medications Priorto Admission:    · Not in a hospital admission. Allergies:  Patient has no known allergies. Social History:   · TOBACCO:   reports that he has quit smoking. He has never used smokeless tobacco.  · ETOH:   reports current alcohol use. · DRUGS: None. · Patient [facility] lives at [facility]. ·   Family History:       Problem Relation Age of Onset    Cancer Mother     Cancer Father     Other Sister    ·     Review of Systems  ROS: A 10 point review of systems was conducted, significant findings as noted in HPI. Physical Exam  Constitutional:       Appearance: Normal appearance. HENT:      Head:      Comments: Nodular bruise on L forehead. Mouth/Throat:      Mouth: Mucous membranes are moist.      Pharynx: Oropharynx is clear. Eyes:      Extraocular Movements: Extraocular movements intact. Conjunctiva/sclera: Conjunctivae normal.   Cardiovascular:      Rate and Rhythm: Normal rate and regular rhythm. Pulses: Normal pulses. Heart sounds: Normal heart sounds. Pulmonary:      Effort: Pulmonary effort is normal.      Comments: Mild crackles at b/l bases. Abdominal:      General: There is no distension. Palpations: Abdomen is soft. Tenderness: There is no abdominal tenderness. Musculoskeletal:         General: Normal range of motion. Right lower leg: Edema present. Left lower leg: Edema present. Comments: 1+ edema b/l LE. Skin:     General: Skin is warm and dry. Capillary Refill: Capillary refill takes less than 2 seconds. Comments: AVF site on L arm. No palpable thrill noted. No bruit appreciated. Neurological:      General: No focal deficit present. Mental Status: He is alert and oriented to person, place, and time. Mental status is at baseline.           Vitals:    06/15/22 crackles on exam, b/l LE swelling. Patient typically gets HD MTThSat at outside facility.   - dialysis once AVF site functioning  - Nephro c/s - apprec reccs    Acute on Chronic Anemia  Hb 6.7 this admission, steadily dropping since March 2022 from a baseline of 10.   - 1U pRBC transfusion  - f/u post-transfusion H&H  - follow H&H BID     Chronic Issues:  Hx CTEPH - holding Eliquis at this time   COPD - continue home inhalers   HFpEF, NICM - continue Coreg 12.5 BID, Lasix 20 mg daily   Gout - continue Allopurinol 100 daily MWF   HTN - continue Coreg, Lasix   HLD - continue Lipitor 80 mg dialy   GERD - continue Omepraole 20 mg PO daily   Chronic Pain - continue Gabapentin 200 mg TID     Will discuss with attending physician Dr. Jessica Cohen.      Code Status: Full Code  FEN: Adult Diet; Regular   PPX: SQH   DISPO: IP    Jayda Heck MD  6/15/2022,  1:47 PM

## 2022-06-15 NOTE — CONSULTS
Nephrology Consult Note  [phone] [address] [facility]        Reason for Consult:  ESRD     HISTORY OF PRESENT ILLNESS:                This is a patient with significant past medical history of ESRD on HD at [facility] to [facility] most recently was at [facility] prior, he had been to ED two days ago because of bleeding form AVG after dialysis it was compressed bleeding stopped but  he now  who presents with clotted access and unable to have dialysis. He will be admitted ,transfused for blood loss, will have thrombectomy per IR I am told ,we will arrange dialysis here in am , He has been on Eliquis for DVT. He did have epsitaxis but it is resolved otherwise feels ok . Past Medical History:        Diagnosis Date    [facility]erebral [facility] occlusion [facility] ([facility]     CHF (congestive heart failure) (HCC)     Chronic kidney disease     COPD (chronic obstructive pulmonary disease) ([facility]     Dialysis patient ([facility]     Gout     Hemodialysis patient ([facility]     Hx of blood clots     Hyperlipidemia     Hypertension     Renal failure        Past Surgical History:        Procedure Laterality Date    COLONOSCOPY      DIALYSIS FISTULA CREATION Left        Current Medications:    No current facility-administered medications on file prior to encounter.      Current Outpatient Medications on File Prior to Encounter   Medication Sig Dispense Refill    fluticasone-salmeterol (ADVAIR DISKUS) 500-50 MCG/ACT AEPB diskus inhaler Inhale 1 puff into the lungs 2 times daily      atorvastatin (LIPITOR) 80 MG tablet Take 80 mg by mouth daily      calcium carb-cholecalciferol 600-200 MG-UNIT TABS tablet Take 1 tablet by mouth daily      sodium chloride (OCEAN, BABY AYR) 0.65 % nasal spray 1 spray by Nasal route every 4 hours as needed for Congestion      apixaban (ELIQUIS) 5 MG TABS tablet Take 5 mg by mouth 2 times daily      fluticasone (FLONASE) 50 MCG/ACT nasal spray 2 sprays by Each Nostril route daily      furosemide (LASIX) 20 MG tablet Take 20 mg by mouth daily      Umeclidinium Bromide (INCRUSE ELLIPTA) 62.5 MCG/INH AEPB Inhale 1 puff into the lungs daily      calcium carbonate (OSCAL) 500 MG TABS tablet Take 500 mg by mouth daily      potassium chloride (MICRO-K) 10 MEQ extended release capsule Take 10 mEq by mouth daily      pseudoephedrine (SUDAFED) 30 MG tablet Take 30 mg by mouth every 6 hours as needed for Congestion      sildenafil (REVATIO) 20 MG tablet Take 20 mg by mouth in the morning, at noon, and at bedtime      vitamin D (ERGOCALCIFEROL) 1.25 MG (08465 UT) CAPS capsule Take 50,000 Units by mouth once a week      gabapentin (NEURONTIN) 300 MG capsule Take 300 mg by mouth at bedtime.  omeprazole (PRILOSEC) 20 MG delayed release capsule Take 20 mg by mouth daily      carvedilol (COREG) 12.5 MG tablet Take 12.5 mg by mouth 2 times daily Unsure of dose       albuterol sulfate HFA (VENTOLIN HFA) 108 (90 Base) MCG/ACT inhaler Inhale 2 puffs into the lungs every 6 hours as needed for Wheezing 90mcg/actuation      allopurinol (ZYLOPRIM) 100 MG tablet Take 100 mg by mouth Daily on MWF         Allergies:  Patient has no known allergies.     Social History:    Social History     Socioeconomic History    Marital status: Single     Spouse name: Not on file    Number of children: Not on file    Years of education: Not on file    Highest education level: Not on file   Occupational History    Not on file   Tobacco Use    Smoking status: Former Smoker    Smokeless tobacco: Never Used   Vaping Use    Vaping Use: Never used   Substance and Sexual Activity    Alcohol use: Yes     Comment: occasional    Drug use: Never    Sexual activity: Not Currently   Other Topics Concern    Not on file   Social History Narrative    Not on file     Social Determinants of Health     Financial Resource Strain:     Difficulty of Paying Living Expenses: Not on file   Food Insecurity:     Worried About Running Out of Food in the Last Year: Not on file    Ran Out of Food in the Last Year: Not on file   Transportation Needs:     Lack of Transportation (Medical): Not on file    Lack of Transportation (Non-Medical): Not on file   Physical Activity:     Days of Exercise per Week: Not on file    Minutes of Exercise per Session: Not on file   Stress:     Feeling of Stress : Not on file   Social Connections:     Frequency of Communication with Friends and Family: Not on file    Frequency of Social Gatherings with Friends and Family: Not on file    Attends Zoroastrian Services: Not on file    Active Member of [address] [place] [address] [employer] or Organizations: Not on file    Attends Club or Organization Meetings: Not on file    Marital Status: Not on file   Intimate Partner Violence:     Fear of Current or Ex-Partner: Not on file    Emotionally Abused: Not on file    Physically Abused: Not on file    Sexually Abused: Not on file   Housing Stability:     Unable to Pay for Housing in the Last Year: Not on file    Number of [place] in the Last Year: Not on file    Unstable Housing in the Last Year: Not on file       Family History:       Problem Relation Age of Onset    Cancer Mother     Cancer Father     Other Sister          Review of Systems:  a comprehensive Review of systems was negative except as noted in HPI     PHYSICAL EXAM:    Vitals:    BP (!) 146/90   Pulse 83   Temp 96.9 °F (36.1 °C) (Axillary) Comment: axillary  Resp 20   Ht 5' 8\" (1.727 m)   Wt 150 lb 4.8 oz (68.2 kg)   SpO2 100%   BMI 22.85 kg/m²   No intake/output data recorded. I/O this shift:  In: 300 [Blood:300]  Out: -     Physical Exam:  Gen: Resting in bed, NAD. HEENT: MMM, OP clear. CV: RRR no m/r/g. No S3.  Lungs: Good respiratory effort, clear air entry   Abd: S/NT +BS  Ext: No edema,left AVF no bruit   Skin: Warm. No rashes appreciated. : No TTP over bladder, nondistended.   Neuro: Alert and oriented x 3, nonfocal.  Joints: No erythema noted over joints. DATA:    CBC:   Lab Results   Component Value Date    WBC 3.3 06/15/2022    RBC 2.40 06/15/2022    HGB 6.7 06/15/2022    HCT 21.2 06/15/2022    MCV 88.5 06/15/2022    MCH 28.1 06/15/2022    MCHC 31.7 06/15/2022    RDW 17.8 06/15/2022    PLT 86 06/15/2022    MPV 8.0 06/15/2022     BMP:    Lab Results   Component Value Date     06/15/2022    K 4.4 06/15/2022    K 4.2 06/14/2022    CL 89 06/15/2022    CO2 22 06/15/2022    BUN 42 06/15/2022    LABALBU 4.0 09/23/2021    CREATININE 10.3 06/15/2022    CALCIUM 8.3 06/15/2022    GFRAA 6 06/15/2022    LABGLOM 5 06/15/2022    GLUCOSE 126 06/15/2022       IMPRESSION/RECOMMENDATIONS:      1. ESRD will arrange dialysis on schedule, orders reviewed with dialysis  RN   2. Thrombosed left AVE needs thrombectomy I am told it will be done in IR   3. Anemia will give TEODORA as needed   4. Transfused in ER   5. K is 4.4   6. Eliquis was held on admission     Thank you for allowing me to participate in the care of this patient. I will continue to follow along. Please call with questions.     Fidencio Richardson MD, MD

## 2022-06-15 NOTE — PROGRESS NOTES
4 Eyes Admission Assessment     I agree as the admission nurse that 2 RN's have performed a thorough Head to Toe Skin Assessment on the patient. ALL assessment sites listed below have been assessed on admission. Areas assessed by both nurses:   [x]   Head, Face, and Ears   [x]   Shoulders, Back, and Chest  [x]   Arms, Elbows, and Hands   [x]   Coccyx, Sacrum, and Ischium  [x]   Legs, Feet, and Heels        Does the Patient have Skin Breakdown?   No         Mauricio Prevention initiated:  No   Wound Care Orders initiated:  No      [facility] nurse consulted for Pressure Injury (Stage 3,4, Unstageable, DTI, NWPT, and Complex wounds) or Mauricio score 18 or lower:  No      Nurse 1 eSignature: Electronically signed by Huma Smith RN on 6/15/22 at 6:41 PM EDT    **SHARE this note so that the co-signing nurse is able to place an eSignature**    Nurse 2 eSignature: Electronically signed by Sheron Hinkle RN on 6/15/22 at 6:48 PM EDT

## 2022-06-15 NOTE — ED NOTES
Clinical Pharmacy Progress Note  Medication History     Admit Date: 6/15/2022    List of current medications the patient is taking is complete, and home medication list in Epic has been updated to reflect the changes noted below. Source(s) of information: Medication list from [address] made to medication list:  Medications removed (no longer taking): · Symbicort   · Losartan  · Spiriva   · Torsemide   · Trazodone     Medications added:  · Advair   · Calcium carb-Vit D  · Nasal saline   · Eliquis   · Flonase  · Furosemide   · Incruse   · Calcium   · Potassium chloride   · Pseudoephedrine  · Sildenafil    · Vit D3    Medication doses / instructions adjusted:  · Allopurinol changed from daily to MWF    Thanks !   Blair Cooks PharmD  Pharmacy Resident   Please call with questions [phone]  6/15/2022 1:50 PM

## 2022-06-15 NOTE — ED PROVIDER NOTES
[address] [facility]          ATTENDING PHYSICIAN NOTE       Date of evaluation: 6/15/2022    Chief Complaint     Other (Pt reports nosebleed that was uncontrolled up until arrival, + eliquis, lasting about 45 mins. Pt also sent for left arm clogged fistula. Pt reports last dialysis a few days ago. Pt also reports intermittent CP that started 2 days ago), Epistaxis, and Chest Pain      History of Present Illness     Kalpana Bauman is a 61 y.o. male with a history of systolic and diastolic congestive heart failure, COPD, and end-stage renal disease on hemodialysis who presents with complaints of a nosebleed lasting 45 minutes. The patient also takes Eliquis for remote history of DVT and PE. The patient states that he was recently put on Eliquis a couple of months ago but he does not know why. He has had intermittent nosebleeds since that time. Today's nosebleed lasted 45 minutes and therefore at his nursing home when they could not get it to stop, they sent him into the hospital for evaluation. The patient also reports that he has been having some sharp intermittent right-sided chest pain that comes on in waves almost like a spasm in the anterior part of his chest.  It is not there currently. This has been going on since last evening. When it comes on, it hurts for him to take a deep breath in until it relieves itself. Access center in [facility] was supposed to declot his AV fistula today. Review of Systems     Review of Systems   Constitutional: Negative for chills and fever. HENT: Positive for nosebleeds. Respiratory: Negative for cough and shortness of breath. Cardiovascular: Positive for chest pain. Gastrointestinal: Negative for abdominal pain, diarrhea, nausea and vomiting. All other systems reviewed and are negative.       Past Medical, Surgical, Family, and Social History     He has a past medical history of Cerebral artery occlusion with cerebral infarction [facility]), CHF (congestive heart failure) ([facility] Chronic kidney disease, [facility] D[facility] ([facility] Gout, Hemodialysis patient ([facility] Hx of blood clots, Hyperlipidemia, Hypertension, and Renal failure. He has a past surgical history that includes Dialysis fistula creation (Left) and Colonoscopy. His family history includes Cancer in his father and mother; Other in his sister. He reports that he has quit smoking. He has never used smokeless tobacco. He reports current alcohol use. He reports that he does not use drugs. Medications     Previous Medications    ALBUTEROL SULFATE HFA (VENTOLIN HFA) 108 (90 BASE) MCG/ACT INHALER    Inhale 2 puffs into the lungs every 6 hours as needed for Wheezing 90mcg/actuation    ALLOPURINOL (ZYLOPRIM) 100 MG TABLET    Take 100 mg by mouth daily    BUDESONIDE-FORMOTEROL (SYMBICORT) 160-4.5 MCG/ACT AERO    Inhale 2 puffs into the lungs 2 times daily    CARVEDILOL (COREG) 12.5 MG TABLET    Take 12.5 mg by mouth 2 times daily Unsure of dose     GABAPENTIN (NEURONTIN) 300 MG CAPSULE    Take 300 mg by mouth as needed. LOSARTAN (COZAAR) 50 MG TABLET    Take 1 tablet by mouth daily    OMEPRAZOLE (PRILOSEC) 20 MG DELAYED RELEASE CAPSULE    Take 20 mg by mouth daily    TIOTROPIUM (SPIRIVA) 18 MCG INHALATION CAPSULE    Inhale 18 mcg into the lungs daily    TORSEMIDE (DEMADEX) 20 MG TABLET    Take 20 mg by mouth daily    TRAZODONE (DESYREL) 150 MG TABLET    Take 150 mg by mouth nightly as needed for Sleep       Allergies     He has No Known Allergies. Physical Exam     INITIAL VITALS: BP: (!) 128/90, Temp: 98.2 °F (36.8 °C), Heart Rate: 82, Resp: 15, SpO2: 100 %   Physical Exam  Vitals and nursing note reviewed. Constitutional:       General: He is not in acute distress. Appearance: He is well-developed. He is not diaphoretic. HENT:      Nose:      Right Nostril: Epistaxis present.    Cardiovascular:      Rate and Rhythm: Normal rate and regular rhythm. Pulmonary:      Effort: Pulmonary effort is normal.      Breath sounds: Normal breath sounds. Abdominal:      General: Bowel sounds are normal. There is no distension. Palpations: Abdomen is soft. Tenderness: There is no abdominal tenderness. Skin:     General: Skin is warm and dry. Neurological:      Mental Status: He is alert and oriented to person, place, and time. Psychiatric:         Behavior: Behavior normal.         Diagnostic Results     EKG   Interpreted by Pietro Arita MD     Rhythm: normal sinus   Rate: normal  Axis: normal  Ectopy: none  Conduction: normal  ST Segments: no acute change  T Waves: no acute change  Q Waves: none    Clinical Impression: normal sinus rhythm with no acute changes/normal EKG      RADIOLOGY:  XR CHEST (2 VW)   Final Result   Impression: Pleural and parenchymal opacity of the right lung base. Stable cardiomegaly.           LABS:   Results for orders placed or performed during the hospital encounter of 06/15/22   CBC with Auto Differential   Result Value Ref Range    WBC 3.3 (L) 4.0 - 11.0 K/uL    RBC 2.40 (L) 4.20 - 5.90 M/uL    Hemoglobin 6.7 (LL) 13.5 - 17.5 g/dL    Hematocrit 21.2 (L) 40.5 - 52.5 %    MCV 88.5 80.0 - 100.0 fL    MCH 28.1 26.0 - 34.0 pg    MCHC 31.7 31.0 - 36.0 g/dL    RDW 17.8 (H) 12.4 - 15.4 %    Platelets 86 (L) 643 - 450 K/uL    MPV 8.0 5.0 - 10.5 fL    Neutrophils % 73.5 %    Lymphocytes % 14.0 %    Monocytes % 7.9 %    Eosinophils % 2.8 %    Basophils % 1.8 %    Neutrophils Absolute 2.4 1.7 - 7.7 K/uL    Lymphocytes Absolute 0.5 (L) 1.0 - 5.1 K/uL    Monocytes Absolute 0.3 0.0 - 1.3 K/uL    Eosinophils Absolute 0.1 0.0 - 0.6 K/uL    Basophils Absolute 0.1 0.0 - 0.2 K/uL   Protime-INR   Result Value Ref Range    Protime 17.9 (H) 11.7 - 14.5 sec    INR 1.48 (H) 0.87 - 1.33   Basic Metabolic Panel   Result Value Ref Range    Sodium 127 (L) 136 - 145 mmol/L    Potassium 4.4 3.5 - 5.1 mmol/L Chloride 89 (L) 99 - 110 mmol/L    CO2 22 21 - 32 mmol/L    Anion Gap 16 3 - 16    Glucose 126 (H) 70 - 99 mg/dL    BUN 42 (H) 7 - 20 mg/dL    CREATININE 10.3 (HH) 0.8 - 1.3 mg/dL    GFR Non- 5 (A) >60    GFR  6 (A) >60    Calcium 8.3 8.3 - 10.6 mg/dL   EKG 12 Lead   Result Value Ref Range    Ventricular Rate 76 BPM    Atrial Rate 76 BPM    P-R Interval 288 ms    QRS Duration 102 ms    Q-T Interval 400 ms    QTc Calculation (Bazett) 450 ms    P Axis 76 degrees    R Axis -46 degrees    T Axis 124 degrees    Diagnosis       EKG performed in ER and to be interpreted by ER physician. Confirmed by MD, ER ([id number]),  [address], [address] [place] [address] ([id number]) on 6/15/2022 11:41:43 AM       RECENT VITALS:  BP: (!) 128/90,Temp: 98.2 °F (36.8 °C), Heart Rate: 78, Resp: 17, SpO2: 100 %     Procedures     Afrin was instilled in the patient's nasal cavity and the epistaxis resolved. He did not require a procedure to stop the epistaxis here. ED Course     Nursing Notes, Past Medical Hx, Past Surgical Hx, Social Hx,Allergies, and Family Hx were reviewed. patient was given the following medications:  Orders Placed This Encounter   Medications    oxymetazoline (AFRIN) 0.05 % nasal spray 2 spray    silver nitrate applicators applicator       CONSULTS:  IP CONSULT TO NEPHROLOGY  IP CONSULT TO HOSPITALIST    MEDICAL DECISIONMAKING / ASSESSMENT / Larry Dai is a 61 y.o. male with a history of end-stage renal disease who was supposed to get his left AV fistula declotted today at the [facility] who was sent here instead because of epistaxis. The epistaxis has resolved with topical treatment and did not require any interventions.   The patient was found to be anemic with a hemoglobin of 6.7 and this has been a gradual downtrending number over the patient's last several visits for which he has been here for bleeding from his AV fistula following dialysis as well as episodes of epistaxis previously requiring packing. Given that the patient is now below 7, I feel he will need to be brought in for 1) transfusion, 2) declotting of his AV fistula, 3) monitoring for any further epistaxis given that he is on Eliquis. The patient is also mildly hyponatremic but appears to be somewhat hyponatremic at baseline. Nephrology was consulted, Dr. Fabrice Haskins, and IR declotting was advised rather than emergent dialysis since his potassium is normal.  Hospitalist was contacted for admission of the patient to achieve these goals. Clinical Impression     1. Anemia, unspecified type    2. Epistaxis    3. AV fistula occlusion, initial encounter ([facility]    4.  Hyponatremia        Disposition     DISPOSITION Decision To Admit 06/15/2022 01:23:47 PM       Karsten Tejada MD  06/15/22 [id number]

## 2022-06-16 ENCOUNTER — APPOINTMENT (OUTPATIENT)
Dept: INTERVENTIONAL RADIOLOGY/VASCULAR | Age: 67
DRG: 270 | End: 2022-06-16
Payer: MEDICARE

## 2022-06-16 LAB
ALBUMIN SERPL-MCNC: 3.5 G/DL (ref 3.4–5)
ALP BLD-CCNC: 238 U/L (ref 40–129)
ALT SERPL-CCNC: <5 U/L (ref 10–40)
ANION GAP SERPL CALCULATED.3IONS-SCNC: 13 MMOL/L (ref 3–16)
AST SERPL-CCNC: 19 U/L (ref 15–37)
BASOPHILS ABSOLUTE: 0 K/UL (ref 0–0.2)
BASOPHILS RELATIVE PERCENT: 0.4 %
BILIRUB SERPL-MCNC: 0.7 MG/DL (ref 0–1)
BILIRUBIN DIRECT: 0.3 MG/DL (ref 0–0.3)
BILIRUBIN, INDIRECT: 0.4 MG/DL (ref 0–1)
BUN BLDV-MCNC: 44 MG/DL (ref 7–20)
CALCIUM SERPL-MCNC: 8.2 MG/DL (ref 8.3–10.6)
CHLORIDE BLD-SCNC: 91 MMOL/L (ref 99–110)
CO2: 25 MMOL/L (ref 21–32)
CREAT SERPL-MCNC: 10.9 MG/DL (ref 0.8–1.3)
EOSINOPHILS ABSOLUTE: 0.1 K/UL (ref 0–0.6)
EOSINOPHILS RELATIVE PERCENT: 3.8 %
GFR AFRICAN AMERICAN: 6
GFR NON-AFRICAN AMERICAN: 5
GLUCOSE BLD-MCNC: 85 MG/DL (ref 70–99)
HCT VFR BLD CALC: 24.6 % (ref 40.5–52.5)
HEMOGLOBIN: 7.8 G/DL (ref 13.5–17.5)
LYMPHOCYTES ABSOLUTE: 0.5 K/UL (ref 1–5.1)
LYMPHOCYTES RELATIVE PERCENT: 14.3 %
MCH RBC QN AUTO: 28.1 PG (ref 26–34)
MCHC RBC AUTO-ENTMCNC: 31.7 G/DL (ref 31–36)
MCV RBC AUTO: 88.5 FL (ref 80–100)
MONOCYTES ABSOLUTE: 0.3 K/UL (ref 0–1.3)
MONOCYTES RELATIVE PERCENT: 7.5 %
NEUTROPHILS ABSOLUTE: 2.6 K/UL (ref 1.7–7.7)
NEUTROPHILS RELATIVE PERCENT: 74 %
PDW BLD-RTO: 17 % (ref 12.4–15.4)
PLATELET # BLD: 93 K/UL (ref 135–450)
PMV BLD AUTO: 8 FL (ref 5–10.5)
POTASSIUM REFLEX MAGNESIUM: 4.4 MMOL/L (ref 3.5–5.1)
RBC # BLD: 2.78 M/UL (ref 4.2–5.9)
SODIUM BLD-SCNC: 129 MMOL/L (ref 136–145)
TOTAL PROTEIN: 5.6 G/DL (ref 6.4–8.2)
WBC # BLD: 3.5 K/UL (ref 4–11)

## 2022-06-16 PROCEDURE — 1200000000 HC SEMI PRIVATE

## 2022-06-16 PROCEDURE — 6370000000 HC RX 637 (ALT 250 FOR IP)

## 2022-06-16 PROCEDURE — 3E03317 INTRODUCTION OF OTHER THROMBOLYTIC INTO PERIPHERAL VEIN, PERCUTANEOUS APPROACH: ICD-10-PCS | Performed by: INTERNAL MEDICINE

## 2022-06-16 PROCEDURE — 36907 BALO ANGIOP CTR DIALYSIS SEG: CPT

## 2022-06-16 PROCEDURE — 5A1D70Z PERFORMANCE OF URINARY FILTRATION, INTERMITTENT, LESS THAN 6 HOURS PER DAY: ICD-10-PCS | Performed by: INTERNAL MEDICINE

## 2022-06-16 PROCEDURE — X2CR3T7 EXTIRPATION OF MATTER FROM LEFT UPPER EXTREMITY VEIN USING COMPUTER-AIDED MECHANICAL ASPIRATION, PERCUTANEOUS APPROACH, NEW TECHNOLOGY GROUP 7: ICD-10-PCS | Performed by: INTERNAL MEDICINE

## 2022-06-16 PROCEDURE — 85025 COMPLETE CBC W/AUTO DIFF WBC: CPT

## 2022-06-16 PROCEDURE — 36215 PLACE CATHETER IN ARTERY: CPT

## 2022-06-16 PROCEDURE — C1725 CATH, TRANSLUMIN NON-LASER: HCPCS

## 2022-06-16 PROCEDURE — 80076 HEPATIC FUNCTION PANEL: CPT

## 2022-06-16 PROCEDURE — 99152 MOD SED SAME PHYS/QHP 5/>YRS: CPT

## 2022-06-16 PROCEDURE — 2580000003 HC RX 258

## 2022-06-16 PROCEDURE — 99153 MOD SED SAME PHYS/QHP EA: CPT

## 2022-06-16 PROCEDURE — C1769 GUIDE WIRE: HCPCS

## 2022-06-16 PROCEDURE — C1894 INTRO/SHEATH, NON-LASER: HCPCS

## 2022-06-16 PROCEDURE — C1887 CATHETER, GUIDING: HCPCS

## 2022-06-16 PROCEDURE — 2060000000 HC ICU INTERMEDIATE R&B

## 2022-06-16 PROCEDURE — 6360000004 HC RX CONTRAST MEDICATION: Performed by: STUDENT IN AN ORGANIZED HEALTH CARE EDUCATION/TRAINING PROGRAM

## 2022-06-16 PROCEDURE — 80048 BASIC METABOLIC PNL TOTAL CA: CPT

## 2022-06-16 PROCEDURE — 90935 HEMODIALYSIS ONE EVALUATION: CPT

## 2022-06-16 PROCEDURE — B51NYZZ FLUOROSCOPY OF LEFT UPPER EXTREMITY VEINS USING OTHER CONTRAST: ICD-10-PCS | Performed by: INTERNAL MEDICINE

## 2022-06-16 PROCEDURE — C1757 CATH, THROMBECTOMY/EMBOLECT: HCPCS

## 2022-06-16 PROCEDURE — 36415 COLL VENOUS BLD VENIPUNCTURE: CPT

## 2022-06-16 PROCEDURE — 6360000002 HC RX W HCPCS

## 2022-06-16 PROCEDURE — 36905 THRMBC/NFS DIALYSIS CIRCUIT: CPT

## 2022-06-16 PROCEDURE — 2709999900 HC NON-CHARGEABLE SUPPLY

## 2022-06-16 PROCEDURE — 75710 ARTERY X-RAYS ARM/LEG: CPT

## 2022-06-16 RX ORDER — IODIXANOL 320 MG/ML
100 INJECTION, SOLUTION INTRAVASCULAR
Status: COMPLETED | OUTPATIENT
Start: 2022-06-16 | End: 2022-06-16

## 2022-06-16 RX ORDER — MIDODRINE HYDROCHLORIDE 5 MG/1
5 TABLET ORAL
Status: DISCONTINUED | OUTPATIENT
Start: 2022-06-16 | End: 2022-06-19 | Stop reason: HOSPADM

## 2022-06-16 RX ADMIN — ATORVASTATIN CALCIUM 80 MG: 80 TABLET, FILM COATED ORAL at 09:53

## 2022-06-16 RX ADMIN — SODIUM CHLORIDE, PRESERVATIVE FREE 10 ML: 5 INJECTION INTRAVENOUS at 21:22

## 2022-06-16 RX ADMIN — SODIUM CHLORIDE, PRESERVATIVE FREE 10 ML: 5 INJECTION INTRAVENOUS at 08:43

## 2022-06-16 RX ADMIN — SILDENAFIL 20 MG: 20 TABLET ORAL at 09:54

## 2022-06-16 RX ADMIN — GABAPENTIN 300 MG: 300 CAPSULE ORAL at 21:22

## 2022-06-16 RX ADMIN — IODIXANOL 100 ML: 320 INJECTION, SOLUTION INTRAVASCULAR at 16:58

## 2022-06-16 RX ADMIN — CARVEDILOL 12.5 MG: 12.5 TABLET, FILM COATED ORAL at 21:22

## 2022-06-16 RX ADMIN — PANTOPRAZOLE SODIUM 40 MG: 40 TABLET, DELAYED RELEASE ORAL at 05:56

## 2022-06-16 RX ADMIN — CARVEDILOL 12.5 MG: 12.5 TABLET, FILM COATED ORAL at 09:54

## 2022-06-16 RX ADMIN — FUROSEMIDE 20 MG: 20 TABLET ORAL at 09:53

## 2022-06-16 RX ADMIN — SILDENAFIL 20 MG: 20 TABLET ORAL at 21:22

## 2022-06-16 RX ADMIN — ALLOPURINOL 100 MG: 100 TABLET ORAL at 09:53

## 2022-06-16 ASSESSMENT — PAIN SCALES - GENERAL
PAINLEVEL_OUTOF10: 0
PAINLEVEL_OUTOF10: 0

## 2022-06-16 NOTE — PROGRESS NOTES
Nephrology Progress Note  [phone]   [email or web address] com    Patient:  Jasmin Townsend   : 1955     -pt is off the floor for procedure. Will follow up later. Please call with questions.       Angelo Gosselin, MD, MD

## 2022-06-16 NOTE — BRIEF OP NOTE
Patient:  Janine Santamaria   :   1955    The procedure including risks and benefits was discussed at length with the patient (or designated family member) and all questions were answered. Informed consent to proceed with the procedure was given.       PROCEDURE : Left upper extremity declot with thrombus removal. See below    BLOOD LOSS : Minimal  SPECIMENS : None  COMPLICATIONS : None  CONDITION : Stable      Luisa Junior MD

## 2022-06-16 NOTE — CARE COORDINATION
Social Work: Discussed in Interdisciplinary Rounds:    MONSERRAT following for discharge disposition. Per chart review, pt admitted from [facility] where he is receiving in house dialysis. MONSERRAT spoke with [facility] in admissions ([phone]), she confirmed that pt is long term care, currently on a bed hold. Pt is able to return at discharge once medically stable. Violet reports that she will need the flowsheets from his dialysis treatments here. Note pt is long term care, does not need to be seen by therapy unless warranted. MONSERRAT to follow.     Chai Raymundo, MSW  [phone]

## 2022-06-16 NOTE — PROGRESS NOTES
Treatment time: 3    Net UF: 2500    Pre weight: 68.4  Post weight: 65.8  EDW: tbd    Access used: avg  Access function: well    Medications or blood products given: none    Regular outpatient schedule: tts    Summary of response to treatment: tolerated well. No issues noted/voiced t/o tx/ report called to primary RN. Pt stable upon leaving unit    Copy of dialysis treatment record placed in chart, to be scanned into EMR.

## 2022-06-16 NOTE — PROGRESS NOTES
Progress Note    Admit Date: 6/15/2022  Day: 2  Diet: ADULT DIET; Regular    CC: epistaxis    Interval history: NAEO, pt down in IR this morning, unable to see pt. HPI: Kristin Chapman is a 66M PMHx COPD, DVT, PE, CHF (last Echo 3/2022 EF 35-40% with G3DD), Pulm HTN, ESRD on dialysis, HTN, HLD who presented to ED with nosebleed from [address] He also endorsed some chest pain which he described as spasmodic, intermittent, rating it as a 6/10 with some non-specific radiation. He endorses worsening with deep breathing. He denies SOB, irregular rhythm. On further ROS, he denies fever/chills, n/v, abd pain, acute bowel concerns. He makes no urine.      Per chart review patient has been to the ED multiple times over the past few months for nosebleeds that were treated in the ED. Of note patient is on Eliquis for hx of DVT and PE. Today per patient he was supposed to have AVF declotting done at [facility] however was again sent to the ED for nose bleed. Affrin spray was administered in ED and nose bleed had resolved by the time I saw the patient. Of note he was recently seen in the ED 6/13 for bleedings from his L AVF site. Bleeding was controlled in the ED and patient was discharged home.      In the ED, VSS. PE revealed a comfortable appearing patient in no acute distress. AVF site on L arm with no palpable thrill, no bruit on auscultation. Also had mild bibasilar crackles on exam. Labs showed Cr 10.3 (ESRD state). He also has mild hyponatremia to 127 which appears chronic. On CBC Hb 6.7 today (was 10.7 in March 2022). CXR shows stable cardiomegaly.      Medications:     Scheduled Meds:   silver nitrate applicators   Topical Once    sodium chloride flush  5-40 mL IntraVENous 2 times per day    allopurinol  100 mg Oral Daily    atorvastatin  80 mg Oral Daily    carvedilol  12.5 mg Oral BID    fluticasone  2 spray Each Nostril Daily    furosemide  20 mg Oral Daily    gabapentin  300 mg Oral Nightly    pantoprazole  40 mg Oral QAM AC    sildenafil  20 mg Oral TID    Arformoterol Tartrate  15 mcg Nebulization BID    And    budesonide  1 mg Nebulization BID     Continuous Infusions:   sodium chloride      sodium chloride       PRN Meds:sodium chloride flush, sodium chloride, ondansetron **OR** ondansetron, polyethylene glycol, acetaminophen **OR** acetaminophen, pseudoephedrine, sodium chloride, albuterol, ipratropium    Objective:   Vitals:   T-max:  Patient Vitals for the past 8 hrs:   BP Temp Temp src Pulse Resp   06/16/22 0430 (!) 153/86 97.7 °F (36.5 °C) Oral 82 18       Intake/Output Summary (Last 24 hours) at 6/16/2022 0842  Last data filed at 6/15/2022 1900  Gross per 24 hour   Intake 800 ml   Output --   Net 800 ml       Review of Systems    Physical Exam    LABS:    CBC:   Recent Labs     06/13/22  2215 06/13/22  2215 06/15/22  1133 06/15/22  2031 06/16/22  0659   WBC 3.1*  --  3.3*  --  3.5*   HGB 7.3*   < > 6.7* 7.5* 7.8*   HCT 22.7*   < > 21.2* 23.4* 24.6*   PLT 70*  --  86*  --  93*   MCV 88.5  --  88.5  --  88.5    < > = values in this interval not displayed. Renal:    Recent Labs     06/14/22  0107 06/15/22  1133 06/16/22  0659   * 127* 129*   K 4.2 4.4 4.4   CL 90* 89* 91*   CO2 22 22 25   BUN 36* 42* 44*   CREATININE 8.9* 10.3* 10.9*   GLUCOSE 89 126* 85   CALCIUM 8.0* 8.3 8.2*   ANIONGAP 16 16 13     Hepatic: No results for input(s): AST, ALT, BILITOT, BILIDIR, PROT, LABALBU, ALKPHOS in the last 72 hours. Troponin: No results for input(s): TROPONINI in the last 72 hours. BNP: No results for input(s): BNP in the last 72 hours. Lipids: No results for input(s): CHOL, HDL in the last 72 hours. Invalid input(s): LDLCALCU, TRIGLYCERIDE  ABGs:  No results for input(s): PHART, KJB3ZIX, PO2ART, QEE3ACR, BEART, THGBART, U7HKBTHZ, UBP0PYA in the last 72 hours.     INR:   Recent Labs     06/15/22  1133   INR 1.48*     Lactate: No results for input(s): LACTATE in the last 72 hours. Cultures:  -----------------------------------------------------------------  RAD:   XR CHEST (2 VW)   Final Result   Impression: Pleural and parenchymal opacity of the right lung base. Stable cardiomegaly. Assessment/Plan:     AVF thrombosis  POA, Patient's AVF without palpable thrill, no bruit auscultated on exam.  Appears to be clotted. Patient recently seen in ED 6/13 with bleeding from AVF site. - IR c/s for thrombectomy    Acute on Chronic Anemia 2/2 Epistaxis  Hb 6.7 this admission, steadily dropping since March 2022 from a baseline of 10. Bleeding subsided after afrin nasal spray in the ED  - 1U pRBC transfusion  - follow H&H BID   - holding eliquis    Chest Pain  Hx of HFrEF, NICM   Last Echo 3/2022 w/ EF 35-40, G3DD. EKG this adm w/ no acute changes. Chronic TWI noted. Trop chronically elevated 2/2 ESRD. Patient follows w/ Dr Nathalia Rey for NICM, last stress test 04/22 was wnl.   - continue on Tele   - cont home coreg 12.5 bid, lasix 20mg daily     ESRD on HD   Patient on home O2 however concern for mild fluid overload - crackles on exam, b/l LE swelling. Patient typically gets HD MTThSat at outside facility.   - dialysis once AVF site functioning  - Nephro c/s - apprec reccs        Chronic Issues:  Hx CTEPH - holding Eliquis at this time   COPD - continue home inhalers   Gout - continue Allopurinol 100 daily MWF   HTN - continue Coreg, Lasix   HLD - continue Lipitor 80 mg dialy   GERD - continue Omeprazole 20 mg PO daily   Chronic Pain - continue Gabapentin 200 mg TID   Pulm HTN- cont sildenafil    Code Status: FULL  FEN: ADULT DIET;  Regular  PPX: SCD  DISPO: Deb Byers MD, PGY-1  06/16/22  8:42 AM    This patient has been staffed and discussed with Amando Pierson MD.

## 2022-06-16 NOTE — PROGRESS NOTES
Patient:  Frank Evans   :   1955      Relevant clinical history, particularly as it involves the pending procedure, was reviewed and discussed. The procedure including risks and benefits was discussed at length with the patient (or designated family member) and all questions were answered. Informed consent to proceed with the procedure was given. Vital signs were monitored and documented by the Radiology nurse. Targeted physical examination  Heart : regular rate and rhythm  Lungs : clear, breathing easily  Condition : stable    [facility] nurses notes reviewed and agreed. Past Medical History:        Diagnosis Date    [facility] ([facility]     [facility]est[facility]     Chronic kidney disease     COPD (chronic obstructive pulmonary disease) ([facility]     Dialysis patient ([facility]     Gout     [facility]     Hx of blood clots     Hyperlipidemia     Hypertension     Renal failure        Past Surgical History:           Procedure Laterality Date    COLONOSCOPY      DIALYSIS FISTULA CREATION Left        Allergies:  Patient has no known allergies. Medications:   Home Meds  No current facility-administered medications on file prior to encounter.      Current Outpatient Medications on File Prior to Encounter   Medication Sig Dispense Refill    fluticasone-salmeterol (ADVAIR DISKUS) 500-50 MCG/ACT AEPB diskus inhaler Inhale 1 puff into the lungs 2 times daily      atorvastatin (LIPITOR) 80 MG tablet Take 80 mg by mouth daily      calcium carb-cholecalciferol 600-200 MG-UNIT TABS tablet Take 1 tablet by mouth daily      sodium chloride (OCEAN, BABY AYR) 0.65 % nasal spray 1 spray by Nasal route every 4 hours as needed for Congestion      apixaban (ELIQUIS) 5 MG TABS tablet Take 5 mg by mouth 2 times daily      fluticasone (FLONASE) 50 MCG/ACT nasal spray 2 sprays by Each Nostril route daily      furosemide (LASIX) 20 MG tablet Take 20 mg by mouth daily      Umeclidinium Bromide (INCRUSE ELLIPTA) 62.5 MCG/INH AEPB Inhale 1 puff into the lungs daily      calcium carbonate (OSCAL) 500 MG TABS tablet Take 500 mg by mouth daily      potassium chloride (MICRO-K) 10 MEQ extended release capsule Take 10 mEq by mouth daily      pseudoephedrine (SUDAFED) 30 MG tablet Take 30 mg by mouth every 6 hours as needed for Congestion      sildenafil (REVATIO) 20 MG tablet Take 20 mg by mouth in the morning, at noon, and at bedtime      vitamin D (ERGOCALCIFEROL) 1.25 MG (52179 UT) CAPS capsule Take 50,000 Units by mouth once a week      gabapentin (NEURONTIN) 300 MG capsule Take 300 mg by mouth at bedtime.        omeprazole (PRILOSEC) 20 MG delayed release capsule Take 20 mg by mouth daily      carvedilol (COREG) 12.5 MG tablet Take 12.5 mg by mouth 2 times daily Unsure of dose       albuterol sulfate HFA (VENTOLIN HFA) 108 (90 Base) MCG/ACT inhaler Inhale 2 puffs into the lungs every 6 hours as needed for Wheezing 90mcg/actuation      allopurinol (ZYLOPRIM) 100 MG tablet Take 100 mg by mouth Daily on [facility]         Current Meds  [START ON 6/17/2022] epoetin zachary-epbx (RETACRIT) injection 10,000 Units, Once per day on Mon Wed Fri  [START ON 6/17/2022] iron sucrose (VENOFER) injection 100 mg, Once per day on Mon Wed Fri  midodrine (PROAMATINE) tablet 5 mg, Q2H PRN  silver nitrate applicators applicator, Once  sodium chloride flush 0.9 % injection 5-40 mL, 2 times per day  sodium chloride flush 0.9 % injection 5-40 mL, PRN  0.9 % sodium chloride infusion, PRN  ondansetron (ZOFRAN-ODT) disintegrating tablet 4 mg, Q8H PRN   Or  ondansetron (ZOFRAN) injection 4 mg, Q6H PRN  polyethylene glycol (GLYCOLAX) packet 17 g, Daily PRN  acetaminophen (TYLENOL) tablet 650 mg, Q6H PRN   Or  acetaminophen (TYLENOL) suppository 650 mg, Q6H PRN  allopurinol (ZYLOPRIM) tablet 100 mg, Daily  atorvastatin (LIPITOR) tablet 80 mg, Daily  carvedilol (COREG) tablet 12.5 mg, BID  fluticasone (FLONASE) 50 MCG/ACT nasal spray 2 spray, Daily  gabapentin (NEURONTIN) capsule 300 mg, Nightly  pantoprazole (PROTONIX) tablet 40 mg, QAM AC  sildenafil (REVATIO) tablet 20 mg, TID  pseudoephedrine (SUDAFED) tablet 30 mg, Q6H PRN  0.9 % sodium chloride infusion, PRN  Arformoterol Tartrate (BROVANA) nebulizer solution 15 mcg, BID   And  budesonide (PULMICORT) nebulizer suspension 1,000 mcg, BID  albuterol (PROVENTIL) nebulizer solution 2.5 mg, Q4H PRN  ipratropium (ATROVENT) 0.02 % nebulizer solution 0.5 mg, Q6H PRN          ASA 3 - Patient with moderate systemic disease with functional limitations    II (soft palate, uvula, fauces visible)    Activity:  2 - Able to move 4 extremities voluntarily on command  Respiration:  2 - Able to breathe deeply and cough freely  Circulation:  2 - BP+/- 20mmHg of normal  Consciousness:  2 - Fully awake  Oxygen Saturation (color):  2 - Able to maintain oxygen saturation >92% on room air    Sedation : Moderate sedation planned

## 2022-06-16 NOTE — PLAN OF CARE
Problem: Pain  Goal: Verbalizes/displays adequate comfort level or baseline comfort level  Outcome: Progressing  Flowsheets (Taken 6/16/2022 0430)  Verbalizes/displays adequate comfort level or baseline comfort level:   Encourage patient to monitor pain and request assistance   Assess pain using appropriate pain scale   Administer analgesics based on type and severity of pain and evaluate response   Implement non-pharmacological measures as appropriate and evaluate response   Consider cultural and social influences on pain and pain management   Notify Licensed Independent Practitioner if interventions unsuccessful or patient reports new pain     Problem: Safety - Adult  Goal: Free from fall injury  Outcome: Progressing  Flowsheets (Taken 6/16/2022 0423)  Free From Fall Injury: Instruct family/caregiver on patient safety     Problem: Chronic Conditions and Co-morbidities  Goal: Patient's chronic conditions and co-morbidity symptoms are monitored and maintained or improved  Outcome: Progressing

## 2022-06-17 PROBLEM — Z86.718 HISTORY OF VENOUS THROMBOEMBOLISM: Status: ACTIVE | Noted: 2022-06-17

## 2022-06-17 PROBLEM — D64.9 ANEMIA: Status: ACTIVE | Noted: 2022-06-17

## 2022-06-17 PROBLEM — R04.0 EPISTAXIS: Status: ACTIVE | Noted: 2022-06-17

## 2022-06-17 LAB
ANION GAP SERPL CALCULATED.3IONS-SCNC: 10 MMOL/L (ref 3–16)
BASOPHILS ABSOLUTE: 0 K/UL (ref 0–0.2)
BASOPHILS RELATIVE PERCENT: 0.4 %
BUN BLDV-MCNC: 28 MG/DL (ref 7–20)
CALCIUM SERPL-MCNC: 8.3 MG/DL (ref 8.3–10.6)
CHLORIDE BLD-SCNC: 93 MMOL/L (ref 99–110)
CO2: 26 MMOL/L (ref 21–32)
CREAT SERPL-MCNC: 7.9 MG/DL (ref 0.8–1.3)
EOSINOPHILS ABSOLUTE: 0.1 K/UL (ref 0–0.6)
EOSINOPHILS RELATIVE PERCENT: 2.8 %
GFR AFRICAN AMERICAN: 8
GFR NON-AFRICAN AMERICAN: 7
GLUCOSE BLD-MCNC: 80 MG/DL (ref 70–99)
HCT VFR BLD CALC: 24.1 % (ref 40.5–52.5)
HEMOGLOBIN: 7.4 G/DL (ref 13.5–17.5)
LYMPHOCYTES ABSOLUTE: 0.4 K/UL (ref 1–5.1)
LYMPHOCYTES RELATIVE PERCENT: 12 %
MCH RBC QN AUTO: 27.6 PG (ref 26–34)
MCHC RBC AUTO-ENTMCNC: 30.9 G/DL (ref 31–36)
MCV RBC AUTO: 89.3 FL (ref 80–100)
MONOCYTES ABSOLUTE: 0.3 K/UL (ref 0–1.3)
MONOCYTES RELATIVE PERCENT: 6.9 %
NEUTROPHILS ABSOLUTE: 2.9 K/UL (ref 1.7–7.7)
NEUTROPHILS RELATIVE PERCENT: 77.9 %
PDW BLD-RTO: 17 % (ref 12.4–15.4)
PHOSPHORUS: 4.1 MG/DL (ref 2.5–4.9)
PLATELET # BLD: 84 K/UL (ref 135–450)
PMV BLD AUTO: 7.8 FL (ref 5–10.5)
POTASSIUM REFLEX MAGNESIUM: 4.3 MMOL/L (ref 3.5–5.1)
RBC # BLD: 2.69 M/UL (ref 4.2–5.9)
SODIUM BLD-SCNC: 129 MMOL/L (ref 136–145)
WBC # BLD: 3.7 K/UL (ref 4–11)

## 2022-06-17 PROCEDURE — 6370000000 HC RX 637 (ALT 250 FOR IP)

## 2022-06-17 PROCEDURE — 2060000000 HC ICU INTERMEDIATE R&B

## 2022-06-17 PROCEDURE — 80048 BASIC METABOLIC PNL TOTAL CA: CPT

## 2022-06-17 PROCEDURE — 84100 ASSAY OF PHOSPHORUS: CPT

## 2022-06-17 PROCEDURE — 2580000003 HC RX 258

## 2022-06-17 PROCEDURE — 36415 COLL VENOUS BLD VENIPUNCTURE: CPT

## 2022-06-17 PROCEDURE — 6370000000 HC RX 637 (ALT 250 FOR IP): Performed by: STUDENT IN AN ORGANIZED HEALTH CARE EDUCATION/TRAINING PROGRAM

## 2022-06-17 PROCEDURE — 85025 COMPLETE CBC W/AUTO DIFF WBC: CPT

## 2022-06-17 RX ORDER — HEPARIN SODIUM 1000 [USP'U]/ML
1000 INJECTION, SOLUTION INTRAVENOUS; SUBCUTANEOUS
Status: DISCONTINUED | OUTPATIENT
Start: 2022-06-18 | End: 2022-06-17

## 2022-06-17 RX ADMIN — ALLOPURINOL 100 MG: 100 TABLET ORAL at 09:32

## 2022-06-17 RX ADMIN — ATORVASTATIN CALCIUM 80 MG: 80 TABLET, FILM COATED ORAL at 20:47

## 2022-06-17 RX ADMIN — CARVEDILOL 12.5 MG: 12.5 TABLET, FILM COATED ORAL at 09:32

## 2022-06-17 RX ADMIN — SILDENAFIL 20 MG: 20 TABLET ORAL at 15:10

## 2022-06-17 RX ADMIN — SALINE NASAL SPRAY 1 SPRAY: 1.5 SOLUTION NASAL at 15:11

## 2022-06-17 RX ADMIN — SALINE NASAL SPRAY 1 SPRAY: 1.5 SOLUTION NASAL at 20:40

## 2022-06-17 RX ADMIN — CARVEDILOL 12.5 MG: 12.5 TABLET, FILM COATED ORAL at 20:43

## 2022-06-17 RX ADMIN — SODIUM CHLORIDE, PRESERVATIVE FREE 10 ML: 5 INJECTION INTRAVENOUS at 09:32

## 2022-06-17 RX ADMIN — APIXABAN 2.5 MG: 2.5 TABLET, FILM COATED ORAL at 20:43

## 2022-06-17 RX ADMIN — PANTOPRAZOLE SODIUM 40 MG: 40 TABLET, DELAYED RELEASE ORAL at 05:51

## 2022-06-17 RX ADMIN — SILDENAFIL 20 MG: 20 TABLET ORAL at 20:43

## 2022-06-17 RX ADMIN — SODIUM CHLORIDE, PRESERVATIVE FREE 5 ML: 5 INJECTION INTRAVENOUS at 20:44

## 2022-06-17 RX ADMIN — SALINE NASAL SPRAY 1 SPRAY: 1.5 SOLUTION NASAL at 11:53

## 2022-06-17 RX ADMIN — APIXABAN 2.5 MG: 2.5 TABLET, FILM COATED ORAL at 15:10

## 2022-06-17 RX ADMIN — GABAPENTIN 300 MG: 300 CAPSULE ORAL at 20:43

## 2022-06-17 RX ADMIN — SILDENAFIL 20 MG: 20 TABLET ORAL at 09:32

## 2022-06-17 ASSESSMENT — PAIN SCALES - GENERAL
PAINLEVEL_OUTOF10: 0
PAINLEVEL_OUTOF10: 0

## 2022-06-17 NOTE — PROGRESS NOTES
Physician Progress Note      PATIENT:               Noemi Kidd  CSN #:                  [id number]  :                       1955  ADMIT DATE:       6/15/2022 10:32 AM  DISCH DATE:  RESPONDING  PROVIDER #:        Mehrdad Maguire          QUERY TEXT:    Pt admitted with anemia and epitaxis and has acute on chronic anemia   documented. If possible, please document in progress notes and discharge   summary further specificity regarding the acuity and type of anemia:    The medical record reflects the following:  Risk Factors: 61 y.o. male Tranferred to [facility] from nursing facility for   epistaxis and anemia with clotted AV Fistula  Clinical Indicators: per H&P: Hb 6.7 this admission, steadily dropping since   2022 from baseline of 10  Treatment: 1U PRBCs, per ED: Afrin was instilled in pt's nasal cavity and   epistaxis resolved. IR did thrombectomy of clotted AV fistula. Options provided:  -- Anemia due to acute on chronic blood loss  -- Anemia due to acute blood loss  -- Other - I will add my own diagnosis  -- Disagree - Not applicable / Not valid  -- Disagree - Clinically unable to determine / Unknown  -- Refer to Clinical Documentation Reviewer    PROVIDER RESPONSE TEXT:    This patient has acute blood loss anemia.     Query created by: Ml Gutiérrez on 2022 7:47 AM      Electronically signed by:  Mehrdad Maguire 2022 8:20 AM

## 2022-06-17 NOTE — CONSULTS
Clinical Pharmacy Progress Note  Medication History     Admit Date: 6/15/22    Asked to verify home medications by Dr. Ellis Montes, specifically Eliquis. List of of current medications patient is taking is complete. Please reference previously published note for additional information regarding medication reconciliation. Per the paperwork received by [facility], patient was receiving Eliquis. Have alerted Dr. Ellis Montes of these findings. Please call with any questions.   Sal Arzola PharmD, [facility]  Wireless: [id number]  Main pharmacy: [id number]  6/17/2022 9:34 AM

## 2022-06-17 NOTE — PROGRESS NOTES
Nephrology Progress Note  [phone]  [phone]   [facility]        Reason for Consult:  ESRD     HISTORY OF PRESENT ILLNESS:                This is a patient with significant past medical history of ESRD on HD at [facility] most recently was at [facility] prior, he had been to ED two days ago because of bleeding form AVG after dialysis it was compressed bleeding stopped but  he now  who presents with clotted access and unable to have dialysis. He will be admitted ,transfused for blood loss, will have thrombectomy per IR I am told ,we will arrange dialysis here in am , He has been on Eliquis for DVT. He did have epsitaxis but it is resolved otherwise feels ok . Subjective:  -pt seen and examined  -PMSHx and meds reviewed  -No family at bedside    S/p declot/HD  BP stable    ROS: neg chest pain/SOB      PHYSICAL EXAM:    Vitals:    BP (!) 123/59   Pulse 90   Temp 98 °F (36.7 °C) (Oral)   Resp 16   Ht 5' 8\" (1.727 m)   Wt 141 lb 0.4 oz (64 kg)   SpO2 98%   BMI 21.44 kg/m²   I/O last 3 completed shifts: In: 65 [P.O.:120]  Out: 3000   No intake/output data recorded. Physical Exam:  Gen: Resting in bed, NAD. HEENT: MMM, OP clear. CV: RRR no m/r/g. No S3.  Lungs: Good respiratory effort, clear air entry   Abd: S/NT +BS  Ext: No edema,left AVF no bruit   Skin: Warm. No rashes appreciated. : No TTP over bladder, nondistended. Neuro: Alert and oriented x 3, nonfocal.  Joints: No erythema noted over joints.     DATA:    CBC:   Lab Results   Component Value Date    WBC 3.7 06/17/2022    RBC 2.69 06/17/2022    HGB 7.4 06/17/2022    HCT 24.1 06/17/2022    MCV 89.3 06/17/2022    MCH 27.6 06/17/2022    MCHC 30.9 06/17/2022    RDW 17.0 06/17/2022    PLT 84 06/17/2022    MPV 7.8 06/17/2022     BMP:    Lab Results   Component Value Date     06/17/2022    K 4.3 06/17/2022    CL 93 06/17/2022    CO2 26 06/17/2022    BUN 28 06/17/2022    LABALBU 3.5 06/16/2022    CREATININE 7.9 06/17/2022 CALCIUM 8.3 06/17/2022    GFRAA 8 06/17/2022    LABGLOM 7 06/17/2022    GLUCOSE 80 06/17/2022       IMPRESSION/RECOMMENDATIONS:      ESRD : HD TTS-s/p HD yesterday  -plan for HD in am    Thrombosed left AVG: s/p successful thrombectomy-no issues with HD  -back on Eliquis    Anemia due to acute bleeding: epistaxis/access bleeding  -on epogen with HD-given PRBC-hgb is trending lower    Hyponatremia: challenge TW-FR 1L per day    HFrEF: challenge TW as tolerated    CKD-MBD: check phos    HTN: on carvedilol    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 I will continue to follow along. Please call with questions.     Toney Piedra MD, MD

## 2022-06-17 NOTE — PROGRESS NOTES
Progress Note    Admit Date: 6/15/2022  Day: 2  Diet: ADULT DIET; Regular    CC: epistaxis    Interval history: NAEO, s/p LUE declot with thrombus removal with IR yesterday, pt also had HD yesterday net UF 2500. Episode of epistaxis this morning. This morning was complaining about the \"shakes\" that he has had for the previous 2 weeks. He states they are worse in the morning but get better as the day goes on. Denies, SOB, CP, f/c, abdominal pain, nv. HPI: Toney Navarro is a 66M PMHx COPD, DVT, PE, CHF (last Echo 3/2022 EF 35-40% with G3DD), Pulm HTN, ESRD on dialysis, HTN, HLD who presented to ED with nosebleed from [address] He also endorsed some chest pain which he described as spasmodic, intermittent, rating it as a 6/10 with some non-specific radiation. He endorses worsening with deep breathing. He denies SOB, irregular rhythm. On further ROS, he denies fever/chills, n/v, abd pain, acute bowel concerns. He makes no urine.      Per chart review patient has been to the ED multiple times over the past few months for nosebleeds that were treated in the ED. Of note patient is on Eliquis for hx of DVT and PE. Today per patient he was supposed to have AVF declotting done at [address] [place] [address] in [facility] however was again sent to the ED for nose bleed. Affrin spray was administered in ED and nose bleed had resolved by the time I saw the patient. Of note he was recently seen in the ED 6/13 for bleedings from his L AVF site. Bleeding was controlled in the ED and patient was discharged home.      In the ED, VSS. PE revealed a comfortable appearing patient in no acute distress. AVF site on L arm with no palpable thrill, no bruit on auscultation. Also had mild bibasilar crackles on exam. Labs showed Cr 10.3 (ESRD state). He also has mild hyponatremia to 127 which appears chronic. On CBC Hb 6.7 today (was 10.7 in March 2022). CXR shows stable cardiomegaly.      Medications:     Scheduled Meds:   epoetin zachary-epbx  10,000 Units IntraVENous Once per day on Mon Wed Fri    iron sucrose  100 mg IntraVENous Once per day on Mon Wed Fri    silver nitrate applicators   Topical Once    sodium chloride flush  5-40 mL IntraVENous 2 times per day    allopurinol  100 mg Oral Daily    atorvastatin  80 mg Oral Daily    carvedilol  12.5 mg Oral BID    fluticasone  2 spray Each Nostril Daily    gabapentin  300 mg Oral Nightly    pantoprazole  40 mg Oral QAM AC    sildenafil  20 mg Oral TID    Arformoterol Tartrate  15 mcg Nebulization BID    And    budesonide  1 mg Nebulization BID     Continuous Infusions:   sodium chloride      sodium chloride       PRN Meds:sodium chloride, midodrine, sodium chloride flush, sodium chloride, ondansetron **OR** ondansetron, polyethylene glycol, acetaminophen **OR** acetaminophen, pseudoephedrine, sodium chloride, albuterol, ipratropium    Objective:   Vitals:   T-max:  Patient Vitals for the past 8 hrs:   BP Temp Temp src Pulse Resp SpO2 Weight   06/17/22 0930 (!) 123/59 98 °F (36.7 °C) Oral 90 16 98 % --   06/17/22 0600 -- -- -- -- -- -- 141 lb 0.4 oz (64 kg)   06/17/22 0411 109/60 97.9 °F (36.6 °C) Oral 82 16 -- --       Intake/Output Summary (Last 24 hours) at 6/17/2022 1026  Last data filed at 6/16/2022 2124  Gross per 24 hour   Intake 620 ml   Output 3000 ml   Net -2380 ml       Review of Systems    Physical Exam  Constitutional:       Appearance: Normal appearance. HENT:      Head: Normocephalic. Nose: Nose normal.   Eyes:      Extraocular Movements: Extraocular movements intact. Conjunctiva/sclera: Conjunctivae normal.   Cardiovascular:      Rate and Rhythm: Normal rate and regular rhythm. Pulses: Normal pulses. Heart sounds: Normal heart sounds. Pulmonary:      Effort: Pulmonary effort is normal. No respiratory distress. Breath sounds: Normal breath sounds. No wheezing or rales. Abdominal:      General: Abdomen is flat.       Palpations: Abdomen is soft. Musculoskeletal:         General: Normal range of motion. Cervical back: Normal range of motion. Skin:     General: Skin is warm and dry. Neurological:      General: No focal deficit present. Mental Status: He is alert. Psychiatric:         Mood and Affect: Mood normal.         LABS:    CBC:   Recent Labs     06/15/22  1133 06/15/22  1133 06/15/22  2031 06/16/22  0659 06/17/22 0623   WBC 3.3*  --   --  3.5* 3.7*   HGB 6.7*   < > 7.5* 7.8* 7.4*   HCT 21.2*   < > 23.4* 24.6* 24.1*   PLT 86*  --   --  93* 84*   MCV 88.5  --   --  88.5 89.3    < > = values in this interval not displayed. Renal:    Recent Labs     06/15/22  1133 06/16/22  0659 06/17/22 0623   * 129* 129*   K 4.4 4.4 4.3   CL 89* 91* 93*   CO2 22 25 26   BUN 42* 44* 28*   CREATININE 10.3* 10.9* 7.9*   GLUCOSE 126* 85 80   CALCIUM 8.3 8.2* 8.3   ANIONGAP 16 13 10     Hepatic:   Recent Labs     06/16/22  0659   AST 19   ALT <5*   BILITOT 0.7   BILIDIR 0.3   PROT 5.6*   LABALBU 3.5   ALKPHOS 238*     Troponin: No results for input(s): TROPONINI in the last 72 hours. BNP: No results for input(s): BNP in the last 72 hours. Lipids: No results for input(s): CHOL, HDL in the last 72 hours. Invalid input(s): LDLCALCU, TRIGLYCERIDE  ABGs:  No results for input(s): PHART, ZLG7UOF, PO2ART, OAA3DNU, BEART, THGBART, S4WXJNZP, PVK1GDG in the last 72 hours. INR:   Recent Labs     06/15/22  1133   INR 1.48*     Lactate: No results for input(s): LACTATE in the last 72 hours. Cultures:  -----------------------------------------------------------------  RAD:   IR FISTULAGRAM   Final Result   Successful declot of a left upper extremity graft. XR CHEST (2 VW)   Final Result   Impression: Pleural and parenchymal opacity of the right lung base. Stable cardiomegaly. Assessment/Plan:     AVF thrombosis  POA, Patient's AVF without palpable thrill, no bruit auscultated on exam.  Appears to be clotted.    Patient recently seen in ED 6/13 with bleeding from AVF site. - s/p declotting with IR (6/17)    Acute on Chronic Anemia 2/2 Epistaxis  Hb 6.7 this admission, steadily dropping since March 2022 from a baseline of 10. Bleeding subsided after afrin nasal spray in the ED  - s/p 1U pRBC transfusion  - follow H&H BID   - unclear as to when pt got started on eliquis CTPA 3/22 showed questionable PE, though d/c med rec does not show pt was d/c on eliquis, will try to contact [address] [place] [address] regarding when pt was started  - per pharmacy med rec medications are up to date. - holding flonase  - schedule saline nasal spray    Chest Pain  Hx of HFrEF, NICM   Last Echo 3/2022 w/ EF 35-40, G3DD. EKG this adm w/ no acute changes. Chronic TWI noted. Trop chronically elevated 2/2 ESRD. Patient follows w/ Dr Murtaza Cheng for NICM, last stress test 04/22 was wnl.   - continue on Tele   - cont home coreg 12.5 bid, lasix 20mg daily     ESRD on HD   Patient on home O2 however concern for mild fluid overload - crackles on exam, b/l LE swelling. Patient typically gets HD TThSat at outside facility.   - dialyzed (6/17) 2.5L UF  - Nephro c/s - apprec reccs        Chronic Issues:  Hx DVT/PE- holding Eliquis at this time   COPD - continue home inhalers   Gout - continue Allopurinol 100 daily MWF   HTN - continue Coreg, Lasix   HLD - continue Lipitor 80 mg dialy   GERD - continue Omeprazole 20 mg PO daily   Chronic Pain - continue Gabapentin 200 mg TID   Pulm HTN- cont sildenafil    Code Status: FULL  FEN: ADULT DIET;  Regular  PPX: SCD  DISPO: Jaylen Bella MD, PGY-1  06/17/22  10:26 AM    This patient has been staffed and discussed with Rhoda Severance, MD.

## 2022-06-18 VITALS
OXYGEN SATURATION: 99 % | RESPIRATION RATE: 16 BRPM | HEART RATE: 83 BPM | DIASTOLIC BLOOD PRESSURE: 62 MMHG | BODY MASS INDEX: 21.55 KG/M2 | SYSTOLIC BLOOD PRESSURE: 105 MMHG | WEIGHT: 142.2 LBS | TEMPERATURE: 98 F | HEIGHT: 68 IN

## 2022-06-18 LAB
ABO/RH: NORMAL
ANION GAP SERPL CALCULATED.3IONS-SCNC: 11 MMOL/L (ref 3–16)
ANTIBODY SCREEN: NORMAL
BASOPHILS ABSOLUTE: 0.1 K/UL (ref 0–0.2)
BASOPHILS RELATIVE PERCENT: 2.7 %
BLOOD BANK DISPENSE STATUS: NORMAL
BLOOD BANK PRODUCT CODE: NORMAL
BPU ID: NORMAL
BUN BLDV-MCNC: 25 MG/DL (ref 7–20)
CALCIUM SERPL-MCNC: 8.1 MG/DL (ref 8.3–10.6)
CHLORIDE BLD-SCNC: 91 MMOL/L (ref 99–110)
CO2: 25 MMOL/L (ref 21–32)
CREAT SERPL-MCNC: 6.5 MG/DL (ref 0.8–1.3)
DESCRIPTION BLOOD BANK: NORMAL
EOSINOPHILS ABSOLUTE: 0.1 K/UL (ref 0–0.6)
EOSINOPHILS RELATIVE PERCENT: 5 %
GFR AFRICAN AMERICAN: 10
GFR NON-AFRICAN AMERICAN: 9
GLUCOSE BLD-MCNC: 96 MG/DL (ref 70–99)
HBV SURFACE AB TITR SER: <3.5 MIU/ML
HCT VFR BLD CALC: 20 % (ref 40.5–52.5)
HCT VFR BLD CALC: 25.6 % (ref 40.5–52.5)
HEMOGLOBIN: 6.6 G/DL (ref 13.5–17.5)
HEMOGLOBIN: 8.3 G/DL (ref 13.5–17.5)
LYMPHOCYTES ABSOLUTE: 0.5 K/UL (ref 1–5.1)
LYMPHOCYTES RELATIVE PERCENT: 23.4 %
MCH RBC QN AUTO: 28.6 PG (ref 26–34)
MCHC RBC AUTO-ENTMCNC: 33 G/DL (ref 31–36)
MCV RBC AUTO: 86.7 FL (ref 80–100)
MONOCYTES ABSOLUTE: 0.1 K/UL (ref 0–1.3)
MONOCYTES RELATIVE PERCENT: 6.1 %
NEUTROPHILS ABSOLUTE: 1.2 K/UL (ref 1.7–7.7)
NEUTROPHILS RELATIVE PERCENT: 62.8 %
PDW BLD-RTO: 16.7 % (ref 12.4–15.4)
PLATELET # BLD: 76 K/UL (ref 135–450)
PMV BLD AUTO: 7.9 FL (ref 5–10.5)
POTASSIUM REFLEX MAGNESIUM: 3.9 MMOL/L (ref 3.5–5.1)
RBC # BLD: 2.31 M/UL (ref 4.2–5.9)
SODIUM BLD-SCNC: 127 MMOL/L (ref 136–145)
WBC # BLD: 1.9 K/UL (ref 4–11)

## 2022-06-18 PROCEDURE — 2580000003 HC RX 258

## 2022-06-18 PROCEDURE — 86923 COMPATIBILITY TEST ELECTRIC: CPT

## 2022-06-18 PROCEDURE — 6360000002 HC RX W HCPCS

## 2022-06-18 PROCEDURE — 86901 BLOOD TYPING SEROLOGIC RH(D): CPT

## 2022-06-18 PROCEDURE — 85018 HEMOGLOBIN: CPT

## 2022-06-18 PROCEDURE — 2700000000 HC OXYGEN THERAPY PER DAY

## 2022-06-18 PROCEDURE — 6370000000 HC RX 637 (ALT 250 FOR IP)

## 2022-06-18 PROCEDURE — 86900 BLOOD TYPING SEROLOGIC ABO: CPT

## 2022-06-18 PROCEDURE — 85025 COMPLETE CBC W/AUTO DIFF WBC: CPT

## 2022-06-18 PROCEDURE — 86850 RBC ANTIBODY SCREEN: CPT

## 2022-06-18 PROCEDURE — 36415 COLL VENOUS BLD VENIPUNCTURE: CPT

## 2022-06-18 PROCEDURE — 94640 AIRWAY INHALATION TREATMENT: CPT

## 2022-06-18 PROCEDURE — 94761 N-INVAS EAR/PLS OXIMETRY MLT: CPT

## 2022-06-18 PROCEDURE — 2060000000 HC ICU INTERMEDIATE R&B

## 2022-06-18 PROCEDURE — 90935 HEMODIALYSIS ONE EVALUATION: CPT

## 2022-06-18 PROCEDURE — P9016 RBC LEUKOCYTES REDUCED: HCPCS

## 2022-06-18 PROCEDURE — 36430 TRANSFUSION BLD/BLD COMPNT: CPT

## 2022-06-18 PROCEDURE — 86706 HEP B SURFACE ANTIBODY: CPT

## 2022-06-18 PROCEDURE — 85014 HEMATOCRIT: CPT

## 2022-06-18 PROCEDURE — 6370000000 HC RX 637 (ALT 250 FOR IP): Performed by: STUDENT IN AN ORGANIZED HEALTH CARE EDUCATION/TRAINING PROGRAM

## 2022-06-18 PROCEDURE — 80048 BASIC METABOLIC PNL TOTAL CA: CPT

## 2022-06-18 RX ORDER — SODIUM CHLORIDE 9 MG/ML
INJECTION, SOLUTION INTRAVENOUS PRN
Status: DISCONTINUED | OUTPATIENT
Start: 2022-06-18 | End: 2022-06-19 | Stop reason: HOSPADM

## 2022-06-18 RX ORDER — LANOLIN ALCOHOL/MO/W.PET/CERES
325 CREAM (GRAM) TOPICAL EVERY OTHER DAY
Qty: 15 TABLET | Refills: 1 | Status: SHIPPED | OUTPATIENT
Start: 2022-06-18 | End: 2022-08-17

## 2022-06-18 RX ADMIN — SILDENAFIL 20 MG: 20 TABLET ORAL at 12:26

## 2022-06-18 RX ADMIN — SILDENAFIL 20 MG: 20 TABLET ORAL at 17:18

## 2022-06-18 RX ADMIN — PANTOPRAZOLE SODIUM 40 MG: 40 TABLET, DELAYED RELEASE ORAL at 06:53

## 2022-06-18 RX ADMIN — ATORVASTATIN CALCIUM 80 MG: 80 TABLET, FILM COATED ORAL at 12:26

## 2022-06-18 RX ADMIN — CARVEDILOL 12.5 MG: 12.5 TABLET, FILM COATED ORAL at 12:26

## 2022-06-18 RX ADMIN — SODIUM CHLORIDE, PRESERVATIVE FREE 10 ML: 5 INJECTION INTRAVENOUS at 12:26

## 2022-06-18 RX ADMIN — CARVEDILOL 12.5 MG: 12.5 TABLET, FILM COATED ORAL at 20:50

## 2022-06-18 RX ADMIN — BUDESONIDE 500 MCG: 0.5 SUSPENSION RESPIRATORY (INHALATION) at 12:55

## 2022-06-18 RX ADMIN — SALINE NASAL SPRAY 1 SPRAY: 1.5 SOLUTION NASAL at 12:27

## 2022-06-18 RX ADMIN — SALINE NASAL SPRAY 1 SPRAY: 1.5 SOLUTION NASAL at 20:51

## 2022-06-18 RX ADMIN — SALINE NASAL SPRAY 1 SPRAY: 1.5 SOLUTION NASAL at 17:18

## 2022-06-18 RX ADMIN — ARFORMOTEROL TARTRATE 15 MCG: 15 SOLUTION RESPIRATORY (INHALATION) at 12:55

## 2022-06-18 RX ADMIN — GABAPENTIN 300 MG: 300 CAPSULE ORAL at 20:50

## 2022-06-18 RX ADMIN — APIXABAN 2.5 MG: 2.5 TABLET, FILM COATED ORAL at 20:50

## 2022-06-18 RX ADMIN — SILDENAFIL 20 MG: 20 TABLET ORAL at 20:50

## 2022-06-18 RX ADMIN — APIXABAN 2.5 MG: 2.5 TABLET, FILM COATED ORAL at 12:26

## 2022-06-18 RX ADMIN — ALLOPURINOL 100 MG: 100 TABLET ORAL at 12:26

## 2022-06-18 ASSESSMENT — PAIN SCALES - GENERAL
PAINLEVEL_OUTOF10: 0
PAINLEVEL_OUTOF10: 0

## 2022-06-18 NOTE — DISCHARGE INSTR - COC
Continuity of Care Form    Patient Name: Leonid Mckeon   :  1955  MRN:  [id number]    Admit date:  6/15/2022  Discharge date:  2022    Code Status Order: Full Code   Advance Directives:      Admitting Physician:  Matheus Nash MD  PCP: Retia Schwab, MD    Discharging Nurse: [facility] Unit/Room#: 5423/3281-46  Discharging Unit Phone Number: [phone]    Emergency Contact:   Extended Emergency Contact Information  Primary Emergency Contact: [address] [facility], CODY Faye Phone: [phone]  Relation: Child   needed? No    Past Surgical History:  Past Surgical History:   Procedure Laterality Date    COLONOSCOPY      DIALYSIS FISTULA CREATION Left        Immunization History: There is no immunization history for the selected administration types on file for this patient.     Active Problems:  Patient Active Problem List   Diagnosis Code    HCAP (healthcare-associated pneumonia) J18.9    COPD exacerbation ([facility] J44.1    ES[facility] N18.6, Z99.2    COPD (chronic obstructive pulmonary disease) ([facility] J44.9    Acute on chronic respiratory failure with hypoxia and hypercapnia ([facility]) J96.21, J96.22    Chest pain R07.9    Hypokalemia E87.6    V-tach ([facility]) I47.2    Essential hypertension I10    Hyperlipidemia E78.5    Chronic combined systolic and diastolic CHF (congestive heart failure) ([facility]) I50.42    SOB (shortness of breath) R06.02    AV fistula thrombosis, initial encounter ([facility] H05.948M    History of venous thromboembolism Z86.718    Epistaxis R04.0    Anemia D64.9       Isolation/Infection:   Isolation            No Isolation          Patient Infection Status       Infection Onset Added Last Indicated Last Indicated By Review Planned Expiration Resolved Resolved By    None active    Resolved    C-diff Rule Out 22 Clostridium Difficile Toxin/Antigen (Ordered)   22 Rule-Out Test Resulted    COVID-19 (Rule Out) 21 COVID-19, Rapid (Ordered)   09/21/21 Rule-Out Test Resulted    COVID-19 (Rule Out)  04/02/20 04/02/20 Radha Braden RN   04/06/20 DENISHA Dotson (Rule Out) 03/31/20 03/31/20 03/31/20 COVID-19 (Ordered)   03/31/20 Rule-Out Test Resulted            Nurse Assessment:  Last Vital Signs: /66   Pulse 82   Temp 97.5 °F (36.4 °C) (Oral)   Resp 18   Ht 5' 8\" (1.727 m)   Wt 142 lb 3.2 oz (64.5 kg)   SpO2 97%   BMI 21.62 kg/m²     Last documented pain score (0-10 scale): Pain Level: 0  Last Weight:   Wt Readings from Last 1 Encounters:   06/18/22 142 lb 3.2 oz (64.5 kg)     Mental Status:  oriented and alert    IV Access:  - None    Nursing Mobility/ADLs:  Walking   Assisted  Transfer  Assisted  Bathing  Assisted  Dressing  Assisted  Toileting  Assisted  Feeding  Independent  Med Admin  Assisted  Med Delivery   whole    Wound Care Documentation and Therapy:        Elimination:  Continence: Bowel: Yes  Bladder: anuric  Urinary Catheter: None   Colostomy/Ileostomy/Ileal Conduit: No       Date of Last BM: 6/17    Intake/Output Summary (Last 24 hours) at 6/18/2022 1210  Last data filed at 6/18/2022 0010  Gross per 24 hour   Intake 0 ml   Output 0 ml   Net 0 ml     I/O last 3 completed shifts: In: 120 [P.O.:120]  Out: 0     Safety Concerns:     History of Falls (last 30 days) and At Risk for Falls    Impairments/Disabilities:      None    Nutrition Therapy:  Current Nutrition Therapy:   - Oral Diet:  General    Routes of Feeding: Oral  Liquids: Thin Liquids  Daily Fluid Restriction: no  Last Modified Barium Swallow with Video (Video Swallowing Test): not done    Treatments at the Time of Hospital Discharge:   Respiratory Treatments: ***  Oxygen Therapy:  is on oxygen at 3 L/min per nasal cannula.   Ventilator:    - No ventilator support    Rehab Therapies: n/a  Weight Bearing Status/Restrictions: No weight bearing restrictions  Other Medical Equipment (for information only, NOT a DME order): walker  Other Treatments: ***    Patient's personal belongings (please select all that are sent with patient):  None    RN SIGNATURE:  Electronically signed by Lucius Durant RN on 6/18/22 at 3:06 PM EDT    CASE MANAGEMENT/SOCIAL WORK SECTION    Inpatient Status Date: ***    Readmission Risk Assessment Score:  Readmission Risk              Risk of Unplanned Readmission:  39           Discharging to Facility/ [address], [address] [place] [address]         Phone: [phone]       Fax: [phone]          HD at [facility]    / signature: Electronically signed by Ragini Martinez RN on 6/18/22 at 2:56 PM EDT    PHYSICIAN SECTION    Prognosis: Fair    Condition at Discharge: Stable    Rehab Potential (if transferring to Rehab): Fair    Recommended Labs or Other Treatments After Discharge: CBC in 3 days    Physician Certification: I certify the above information and transfer of Carey Monte  is necessary for the continuing treatment of the diagnosis listed and that he requires Intermediate Nursing Care for greater 30 days.      Update Admission H&P: No change in H&P    PHYSICIAN SIGNATURE:  Electronically signed by Re Tompkins MD on 6/18/22 at 12:12 PM EDT

## 2022-06-18 NOTE — PROGRESS NOTES
Progress Note    Admit Date: 6/15/2022  Day: 2  Diet: ADULT DIET; Regular    CC: epistaxis    Interval history: NAEO, s/p LUE declot with thrombus removal with IR (6/16), Hgb 6.6 this morning. Denies any episodes of epistaxis. Denies lightheadedness or dizziness. Denies, SOB, CP, f/c, abdominal pain, nv. HPI: Altagracia Acosta is a 66M PMHx COPD, DVT, PE, CHF (last Echo 3/2022 EF 35-40% with G3DD), Pulm HTN, ESRD on dialysis, HTN, HLD who presented to ED with nosebleed from [address] He also endorsed some chest pain which he described as spasmodic, intermittent, rating it as a 6/10 with some non-specific radiation. He endorses worsening with deep breathing. He denies SOB, irregular rhythm. On further ROS, he denies fever/chills, n/v, abd pain, acute bowel concerns. He makes no urine.      Per chart review patient has been to the ED multiple times over the past few months for nosebleeds that were treated in the ED. Of note patient is on Eliquis for hx of DVT and PE. Today per patient he was supposed to have AVF declotting done at [address] [place] [address] in [facility] however was again sent to the ED for nose bleed. Affrin spray was administered in ED and nose bleed had resolved by the time I saw the patient. Of note he was recently seen in the ED 6/13 for bleedings from his L AVF site. Bleeding was controlled in the ED and patient was discharged home.      In the ED, VSS. PE revealed a comfortable appearing patient in no acute distress. AVF site on L arm with no palpable thrill, no bruit on auscultation. Also had mild bibasilar crackles on exam. Labs showed Cr 10.3 (ESRD state). He also has mild hyponatremia to 127 which appears chronic. On CBC Hb 6.7 today (was 10.7 in March 2022). CXR shows stable cardiomegaly.      Medications:     Scheduled Meds:   sodium chloride  1 spray Each Nostril Q4H    apixaban  2.5 mg Oral BID    epoetin zachary-epbx  10,000 Units IntraVENous Once per day on Mon Wed Fri    iron sucrose 100 mg IntraVENous Once per day on Mon Wed Fri    silver nitrate applicators   Topical Once    sodium chloride flush  5-40 mL IntraVENous 2 times per day    allopurinol  100 mg Oral Daily    atorvastatin  80 mg Oral Daily    carvedilol  12.5 mg Oral BID    [Held by provider] fluticasone  2 spray Each Nostril Daily    gabapentin  300 mg Oral Nightly    pantoprazole  40 mg Oral QAM AC    sildenafil  20 mg Oral TID    Arformoterol Tartrate  15 mcg Nebulization BID    And    budesonide  1 mg Nebulization BID     Continuous Infusions:   sodium chloride      sodium chloride      sodium chloride       PRN Meds:sodium chloride, midodrine, sodium chloride flush, sodium chloride, ondansetron **OR** ondansetron, polyethylene glycol, acetaminophen **OR** acetaminophen, pseudoephedrine, sodium chloride, albuterol, ipratropium    Objective:   Vitals:   T-max:  Patient Vitals for the past 8 hrs:   BP Temp Temp src Pulse Resp SpO2 Weight   06/18/22 1312 115/69 97.6 °F (36.4 °C) Axillary 88 16 98 % --   06/18/22 1307 127/70 97.6 °F (36.4 °C) Axillary 89 16 100 % --   06/18/22 1302 114/74 97.6 °F (36.4 °C) Axillary 88 16 100 % --   06/18/22 1300 -- -- -- -- -- 100 % --   06/18/22 1248 114/66 97.9 °F (36.6 °C) Oral 86 16 100 % --   06/18/22 1204 121/66 97.5 °F (36.4 °C) Oral 82 18 97 % --   06/18/22 0732 106/70 98.6 °F (37 °C) -- -- -- -- 142 lb 3.2 oz (64.5 kg)       Intake/Output Summary (Last 24 hours) at 6/18/2022 1317  Last data filed at 6/18/2022 1232  Gross per 24 hour   Intake 180 ml   Output 0 ml   Net 180 ml       Review of Systems    Physical Exam  Constitutional:       Appearance: Normal appearance. HENT:      Head: Normocephalic. Nose: Nose normal.   Eyes:      Extraocular Movements: Extraocular movements intact. Conjunctiva/sclera: Conjunctivae normal.   Cardiovascular:      Rate and Rhythm: Normal rate and regular rhythm. Pulses: Normal pulses. Heart sounds: Normal heart sounds. Pulmonary:      Effort: Pulmonary effort is normal. No respiratory distress. Breath sounds: Normal breath sounds. No wheezing or rales. Abdominal:      General: Abdomen is flat. Palpations: Abdomen is soft. Musculoskeletal:         General: Normal range of motion. Cervical back: Normal range of motion. Skin:     General: Skin is warm and dry. Neurological:      General: No focal deficit present. Mental Status: He is alert. Psychiatric:         Mood and Affect: Mood normal.         LABS:    CBC:   Recent Labs     06/16/22  0659 06/17/22  0623 06/18/22  0745   WBC 3.5* 3.7* 1.9*   HGB 7.8* 7.4* 6.6*   HCT 24.6* 24.1* 20.0*   PLT 93* 84* 76*   MCV 88.5 89.3 86.7     Renal:    Recent Labs     06/16/22  0659 06/17/22  0623 06/17/22  0653 06/18/22  0745   * 129*  --  127*   K 4.4 4.3  --  3.9   CL 91* 93*  --  91*   CO2 25 26  --  25   BUN 44* 28*  --  25*   CREATININE 10.9* 7.9*  --  6.5*   GLUCOSE 85 80  --  96   CALCIUM 8.2* 8.3  --  8.1*   PHOS  --   --  4.1  --    ANIONGAP 13 10  --  11     Hepatic:   Recent Labs     06/16/22  0659   AST 19   ALT <5*   BILITOT 0.7   BILIDIR 0.3   PROT 5.6*   LABALBU 3.5   ALKPHOS 238*     Troponin: No results for input(s): TROPONINI in the last 72 hours. BNP: No results for input(s): BNP in the last 72 hours. Lipids: No results for input(s): CHOL, HDL in the last 72 hours. Invalid input(s): LDLCALCU, TRIGLYCERIDE  ABGs:  No results for input(s): PHART, ZNS4BWI, PO2ART, DQA8NHU, BEART, THGBART, C9XAZQPR, CZX1FYL in the last 72 hours. INR:   No results for input(s): INR in the last 72 hours. Lactate: No results for input(s): LACTATE in the last 72 hours. Cultures:  -----------------------------------------------------------------  RAD:   IR FISTULAGRAM   Final Result   Successful declot of a left upper extremity graft. XR CHEST (2 VW)   Final Result   Impression: Pleural and parenchymal opacity of the right lung base.       Stable cardiomegaly. Assessment/Plan:     AVF thrombosis, resolved  POA, Patient's AVF without palpable thrill, no bruit auscultated on exam.  Appears to be clotted. Patient recently seen in ED 6/13 with bleeding from AVF site. - s/p declotting with IR (6/17)    Acute on Chronic Anemia 2/2 Epistaxis  Hb 6.7 this admission, steadily dropping since March 2022 from a baseline of 10. Bleeding subsided after afrin nasal spray in the ED  - s/p 1U pRBC transfusion in ED, 1upRBC (6/18)  - r/s eliquis (6/17) at lower dose. Unclear as to when pt got started on eliquis CTPA 3/2022 negative for PE, though d/c med rec does not show pt was d/c on eliquis, will try to contact [address] [place] [address] regarding when pt was started  - per pharmacy med rec medications are up to date. - holding flonase  - schedule saline nasal spray    Chest Pain  Hx of HFrEF, NICM   Last Echo 3/2022 w/ EF 35-40, G3DD. EKG this adm w/ no acute changes. Chronic TWI noted. Trop chronically elevated 2/2 ESRD. Patient follows w/ Dr Rdz Form for NICM, last stress test 04/22 was wnl.   - continue on Tele   - cont home coreg 12.5 bid, lasix 20mg daily     ESRD on HD   Patient on home O2 however concern for mild fluid overload - crackles on exam, b/l LE swelling. Patient typically gets HD TThSat at outside facility.   - dialyzed (6/17) 2.5L UF  - Nephro c/s - apprec reccs        Chronic Issues:  Hx DVT/PE- holding Eliquis at this time   COPD - continue home inhalers   Gout - continue Allopurinol 100 daily MWF   HTN - continue Coreg, Lasix   HLD - continue Lipitor 80 mg dialy   GERD - continue Omeprazole 20 mg PO daily   Chronic Pain - continue Gabapentin 200 mg TID   Pulm HTN- cont sildenafil    Code Status: FULL  FEN: ADULT DIET;  Regular  PPX: SCD  DISPO: Tressa Javed MD, PGY-1  06/18/22  1:17 PM    This patient has been staffed and discussed with Amalia Gibbs MD.

## 2022-06-18 NOTE — CARE COORDINATION
Returning to LTC at [facility] today by [facility], 3L O2. Called [facility] ([address] and called [address] to let them know. Nurse report: [phone], ask for Loraine Moctezuma  Fax: [phone]  Electronically signed by Rasta Barron RN on 6/18/2022 at 2:41 PM     Faxed AVS along with HD flow tx sheet to [facility].  Electronically signed by Rasta Barron RN on 6/18/2022 at 3:02 PM

## 2022-06-18 NOTE — PROGRESS NOTES
Treatment time: 210min    Net UF: 2600ml    Pre weight: 64.5k  Post weight: 61.5 k  EDW: TBD    Access used: JAIME AVF  Access function: Good    Medications or blood products given: None    Regular outpatient schedule: TTS    Summary of response to treatment: Tolerated well. Copy of dialysis treatment record placed in chart, to be scanned into EMR.

## 2022-06-18 NOTE — PLAN OF CARE
Problem: Pain  Goal: Verbalizes/displays adequate comfort level or baseline comfort level  Outcome: Progressing  Pt denies any pain at this time    Problem: Metabolic/Fluid and Electrolytes - Adult  Goal: Electrolytes maintained within normal limits  Outcome: Progressing     Problem: Safety - Adult  Goal: Free from fall injury  Outcome: Progressing   Remains free from falls.

## 2022-06-18 NOTE — PROGRESS NOTES
Nephrology Progress Note  [phone]  [phone]   [facility]        Reason for Consult:  ESRD     HISTORY OF PRESENT ILLNESS:                This is a patient with significant past medical history of ESRD on HD at [facility] most recently was at [facility] prior, he had been to ED two days ago because of bleeding from AVG after dialysis it was compressed bleeding stopped but  he now  who presents with clotted access and unable to have dialysis. He will be admitted ,transfused for blood loss, will have thrombectomy       Subjective:  -pt seen and examined  -PMSHx and meds reviewed      Had dialysis today using JAIME AV loop graft  No problems with dialysis  Hgb low - receiving transfusion    ROS: neg chest pain/SOB      PHYSICAL EXAM:    Vitals:    /69   Pulse 88   Temp 97.6 °F (36.4 °C) (Axillary)   Resp 16   Ht 5' 8\" (1.727 m)   Wt 142 lb 3.2 oz (64.5 kg)   SpO2 98%   BMI 21.62 kg/m²   I/O last 3 completed shifts: In: 120 [P.O.:120]  Out: 0   I/O this shift:  In: 180 [P.O.:180]  Out: -     Physical Exam:  Gen: Resting in bed, NAD. HEENT: MMM, OP clear. CV: RRR no m/r/g. No S3.  Lungs: Good respiratory effort, clear air entry   Abd: S/NT +BS  Ext: No edema,left   Skin: Warm. No rashes appreciated. : No TTP over bladder, nondistended. Neuro: Alert and oriented x 3, nonfocal.  ACCESS: JAIME AV loop graft +T/B. DATA:    CBC: Recent Labs     06/16/22  0659 06/17/22  0623 06/18/22  0745   WBC 3.5* 3.7* 1.9*   RBC 2.78* 2.69* 2.31*   HGB 7.8* 7.4* 6.6*   HCT 24.6* 24.1* 20.0*   PLT 93* 84* 76*     BMP:    Recent Labs     06/16/22  0659 06/17/22  0623 06/18/22  0745   * 129* 127*   K 4.4 4.3 3.9   CL 91* 93* 91*   CO2 25 26 25   BUN 44* 28* 25*   CREATININE 10.9* 7.9* 6.5*   CALCIUM 8.2* 8.3 8.1*   GLUCOSE 85 80 96     Phosphorus:    Recent Labs     06/17/22  0653   PHOS 4.1     Magnesium:  No results for input(s): MG in the last 72 hours.       IMPRESSION/RECOMMENDATIONS:      ESRD : HD TTS-  Stable dialysis today    Thrombosed left AVG: s/p successful thrombectomy per IR  -back on Eliquis    Anemia due to acute bleeding: epistaxis/access bleeding  -on epogen with HD  - transfusion 6/18    Hyponatremia: challenge TW-FR 1L per day    HFrEF: challenge TW as tolerated    CKD-MBD: check phos    HTN: on carvedilol    H/o DVT/PE: back on Eliquis    pulm HTN: on sildenafil          Eladia Rowe MD

## 2022-06-19 NOTE — PROGRESS NOTES
Pt was discharged to [address] [place] [address] with his personal belongings, accompanied by 2 ambulance staff. Tele removed. IV removed with no complications. Left the floor at 0120 hrs.

## 2022-06-21 NOTE — DISCHARGE SUMMARY
INTERNAL MEDICINE DEPARTMENT AT [address] [place] [address]  DISCHARGE SUMMARY    Patient ID: Hanna Rios                                             Discharge Date: 6/21/2022   Patient's PCP: Christi Proctor MD                                          Discharge Physician: Bogdan Pearson MD MD  Admit Date: 6/15/2022   Admitting Physician: Maggi Cifuentes MD    PROBLEMS DURING HOSPITALIZATION:  Present on Admission:   [facility] fistula thrombosis, initial encounter ([facility]   History of venous thromboembolism   Epistaxis   Anemia   ESRD on dialysis ([facility]   Chronic combined systolic and diastolic CHF (congestive heart failure) ([facility]      DISCHARGE DIAGNOSES:    HPI:    Hanna Rios is a 66M PMHx COPD, DVT, PE, CHF (last Echo 3/2022 EF 35-40% with G3DD), Pulm HTN, ESRD on dialysis, HTN, HLD who presented to ED with nosebleed from [address] [place] [address]. He also endorsed some chest pain which he described as spasmodic, intermittent, rating it as a 6/10 with some non-specific radiation. He endorses worsening with deep breathing. He denies SOB, irregular rhythm. On further ROS, he denies fever/chills, n/v, abd pain, acute bowel concerns. He makes no urine.      Per chart review patient has been to the ED multiple times over the past few months for nosebleeds that were treated in the ED. Of note patient is on Eliquis for hx of DVT and PE. Today per patient he was supposed to have AVF declotting done at [address] [place] [address] in [facility] however was again sent to the ED for nose bleed. Affrin spray was administered in ED and nose bleed had resolved by the time I saw the patient. Of note he was recently seen in the ED 6/13 for bleedings from his L AVF site. Bleeding was controlled in the ED and patient was discharged home.      In the ED, VSS. PE revealed a comfortable appearing patient in no acute distress. AVF site on L arm with no palpable thrill, no bruit on auscultation.  Also had mild bibasilar crackles on exam. Labs showed Cr 10.3 (ESRD state). He also has mild hyponatremia to 127 which appears chronic. On CBC Hb 6.7 today (was 10.7 in March 2022). CXR shows stable cardiomegaly. The following issues were addressed during hospitalization:  AVF thrombosis  IR consulted for thrombectomy with successful declotting     Acute on Chronic Anemia 2/2 Epistaxis  Hb 6.7 this admission, steadily dropping since March 2022 from a baseline of 10. Bleeding subsided after afrin nasal spray in the ED  Pt was transfuse 1U pRBC transfusion in ED, 1upRBC (6/18)  Eliquis was held at admission and restarted at a lower eliquis dose on (6/17) for AV thrombosis. Unclear as to when pt got started on eliquis CTPA 3/2022 negative for PE, D/c med rec does not show pt was d/c on eliquis. Pharmacy med rec, medications are up to date. Pt was put on scheduled ocean spray and did not have any more episodes of epistaxis. He should stop flonase at discharge. ESRD on HD   Nephro consulted pt dialyzed on regular schedule on TTS      Physical Exam       Constitutional:       Appearance: Normal appearance. HENT:      Head: Normocephalic. Nose: Nose normal.   Eyes:      Extraocular Movements: Extraocular movements intact. Conjunctiva/sclera: Conjunctivae normal.   Cardiovascular:      Rate and Rhythm: Normal rate and regular rhythm. Pulses: Normal pulses. Heart sounds: Normal heart sounds. Pulmonary:      Effort: Pulmonary effort is normal. No respiratory distress. Breath sounds: Normal breath sounds. No wheezing or rales. Abdominal:      General: Abdomen is flat. Palpations: Abdomen is soft. Musculoskeletal:         General: Normal range of motion. Cervical back: Normal range of motion. Skin:     General: Skin is warm and dry. Neurological:      General: No focal deficit present. Mental Status: He is alert.    Psychiatric:         Mood and Affect: Mood normal.            Consults: IR, nephrology  Significant Diagnostic Studies:  fistulagram  Treatments: thrombectomy  Disposition: long term care facility  Discharged Condition: Stable  Follow Up: Primary Care Physician in one week    DISCHARGE MEDICATION:       Medication List      START taking these medications    ferrous sulfate 325 (65 Fe) MG EC tablet  Commonly known as: Fe Tabs  Take 1 tablet by mouth every other day        CHANGE how you take these medications    apixaban 2.5 MG Tabs tablet  Commonly known as: ELIQUIS  Take 1 tablet by mouth 2 times daily  What changed:   · medication strength  · how much to take     atorvastatin 80 MG tablet  Commonly known as: LIPITOR  What changed: Another medication with the same name was removed. Continue taking this medication, and follow the directions you see here.      sodium chloride 0.65 % nasal spray  Commonly known as: OCEAN, BABY AYR  1 spray by Nasal route every 4 hours  What changed:   · when to take this  · reasons to take this        CONTINUE taking these medications    Advair Diskus 500-50 MCG/ACT Aepb diskus inhaler  Generic drug: fluticasone-salmeterol     allopurinol 100 MG tablet  Commonly known as: ZYLOPRIM     calcium carb-cholecalciferol 600-200 MG-UNIT Tabs tablet     carvedilol 12.5 MG tablet  Commonly known as: COREG     furosemide 20 MG tablet  Commonly known as: LASIX     gabapentin 300 MG capsule  Commonly known as: NEURONTIN     Incruse Ellipta 62.5 MCG/INH Aepb  Generic drug: Umeclidinium Bromide     omeprazole 20 MG delayed release capsule  Commonly known as: PRILOSEC     pseudoephedrine 30 MG tablet  Commonly known as: SUDAFED     sildenafil 20 MG tablet  Commonly known as: REVATIO     Ventolin  (90 Base) MCG/ACT inhaler  Generic drug: albuterol sulfate HFA     vitamin D 1.25 MG (99301 UT) Caps capsule  Commonly known as: ERGOCALCIFEROL        STOP taking these medications    calcium carbonate 500 MG Tabs tablet  Commonly known as: OSCAL     fluticasone 50 MCG/ACT nasal spray  Commonly known as: FLONASE     losartan 50 MG tablet  Commonly known as: COZAAR     potassium chloride 10 MEQ extended release capsule  Commonly known as: MICRO-K     Symbicort 160-4.5 MCG/ACT Aero  Generic drug: budesonide-formoterol     tiotropium 18 MCG inhalation capsule  Commonly known as: SPIRIVA     torsemide 20 MG tablet  Commonly known as: DEMADEX     traZODone 150 MG tablet  Commonly known as: DESYREL           Where to Get Your Medications      You can get these medications from any pharmacy    Bring a paper prescription for each of these medications  · apixaban 2.5 MG Tabs tablet  · ferrous sulfate 325 (65 Fe) MG EC tablet  · sodium chloride 0.65 % nasal spray          Activity: activity as tolerated  Diet: renal diet  Wound Care: keep wound clean and dry    Time Spent on discharge is more than 15 minutes    Signed:   Cori Rainey MD,  MD, PGY-1  6/21/2022

## 2022-07-07 ENCOUNTER — HOSPITAL ENCOUNTER (EMERGENCY)
Age: 67
Discharge: HOME | End: 2022-07-08
Attending: STUDENT IN AN ORGANIZED HEALTH CARE EDUCATION/TRAINING PROGRAM | Admitting: SURGERY
Payer: MEDICARE

## 2022-07-07 VITALS
WEIGHT: 143 LBS | RESPIRATION RATE: 15 BRPM | HEART RATE: 86 BPM | DIASTOLIC BLOOD PRESSURE: 96 MMHG | OXYGEN SATURATION: 100 % | SYSTOLIC BLOOD PRESSURE: 135 MMHG | HEIGHT: 68 IN | BODY MASS INDEX: 21.67 KG/M2 | TEMPERATURE: 98.9 F

## 2022-07-07 DIAGNOSIS — I77.0 A-V FISTULA (HCC): Primary | ICD-10-CM

## 2022-07-07 PROBLEM — T82.898A PROBLEM WITH DIALYSIS ACCESS (HCC): Status: ACTIVE | Noted: 2022-07-07

## 2022-07-07 LAB
ACANTHOCYTES: ABNORMAL
ANION GAP SERPL CALCULATED.3IONS-SCNC: 12 MMOL/L (ref 3–16)
ANISOCYTOSIS: ABNORMAL
BASOPHILS ABSOLUTE: 0 K/UL (ref 0–0.2)
BASOPHILS RELATIVE PERCENT: 0.5 %
BUN BLDV-MCNC: 23 MG/DL (ref 7–20)
CALCIUM SERPL-MCNC: 8.5 MG/DL (ref 8.3–10.6)
CHLORIDE BLD-SCNC: 88 MMOL/L (ref 99–110)
CO2: 30 MMOL/L (ref 21–32)
CREAT SERPL-MCNC: 6.6 MG/DL (ref 0.8–1.3)
EOSINOPHILS ABSOLUTE: 0.1 K/UL (ref 0–0.6)
EOSINOPHILS RELATIVE PERCENT: 3.5 %
GFR AFRICAN AMERICAN: 10
GFR NON-AFRICAN AMERICAN: 9
GLUCOSE BLD-MCNC: 81 MG/DL (ref 70–99)
HCT VFR BLD CALC: 24.3 % (ref 40.5–52.5)
HEMOGLOBIN: 7.8 G/DL (ref 13.5–17.5)
HYPOCHROMIA: ABNORMAL
LYMPHOCYTES ABSOLUTE: 0.4 K/UL (ref 1–5.1)
LYMPHOCYTES RELATIVE PERCENT: 11.7 %
MCH RBC QN AUTO: 28.3 PG (ref 26–34)
MCHC RBC AUTO-ENTMCNC: 32.3 G/DL (ref 31–36)
MCV RBC AUTO: 87.5 FL (ref 80–100)
MONOCYTES ABSOLUTE: 0.2 K/UL (ref 0–1.3)
MONOCYTES RELATIVE PERCENT: 6.6 %
NEUTROPHILS ABSOLUTE: 2.8 K/UL (ref 1.7–7.7)
NEUTROPHILS RELATIVE PERCENT: 77.7 %
PDW BLD-RTO: 17.2 % (ref 12.4–15.4)
PLATELET # BLD: 68 K/UL (ref 135–450)
PLATELET SLIDE REVIEW: ABNORMAL
PMV BLD AUTO: 7.9 FL (ref 5–10.5)
POTASSIUM REFLEX MAGNESIUM: 4 MMOL/L (ref 3.5–5.1)
RBC # BLD: 2.77 M/UL (ref 4.2–5.9)
SLIDE REVIEW: ABNORMAL
SODIUM BLD-SCNC: 130 MMOL/L (ref 136–145)
TEAR DROP CELLS: ABNORMAL
WBC # BLD: 3.5 K/UL (ref 4–11)

## 2022-07-07 PROCEDURE — 36415 COLL VENOUS BLD VENIPUNCTURE: CPT

## 2022-07-07 PROCEDURE — G0378 HOSPITAL OBSERVATION PER HR: HCPCS

## 2022-07-07 PROCEDURE — 99285 EMERGENCY DEPT VISIT HI MDM: CPT

## 2022-07-07 PROCEDURE — 80048 BASIC METABOLIC PNL TOTAL CA: CPT

## 2022-07-07 PROCEDURE — 12001 RPR S/N/AX/GEN/TRNK 2.5CM/<: CPT

## 2022-07-07 PROCEDURE — 85025 COMPLETE CBC W/AUTO DIFF WBC: CPT

## 2022-07-07 PROCEDURE — 99284 EMERGENCY DEPT VISIT MOD MDM: CPT

## 2022-07-07 RX ORDER — CARVEDILOL 12.5 MG/1
12.5 TABLET ORAL 2 TIMES DAILY
Status: CANCELLED | OUTPATIENT
Start: 2022-07-07

## 2022-07-07 RX ORDER — GABAPENTIN 300 MG/1
300 CAPSULE ORAL NIGHTLY
Status: CANCELLED | OUTPATIENT
Start: 2022-07-07

## 2022-07-07 RX ORDER — ATORVASTATIN CALCIUM 40 MG/1
80 TABLET, FILM COATED ORAL DAILY
Status: CANCELLED | OUTPATIENT
Start: 2022-07-07

## 2022-07-07 RX ORDER — SILDENAFIL CITRATE 20 MG/1
20 TABLET ORAL 3 TIMES DAILY
Status: CANCELLED | OUTPATIENT
Start: 2022-07-07

## 2022-07-07 RX ORDER — FUROSEMIDE 40 MG/1
20 TABLET ORAL DAILY
Status: CANCELLED | OUTPATIENT
Start: 2022-07-07

## 2022-07-07 RX ORDER — PANTOPRAZOLE SODIUM 40 MG/1
40 TABLET, DELAYED RELEASE ORAL
Status: CANCELLED | OUTPATIENT
Start: 2022-07-08

## 2022-07-07 RX ORDER — ALBUTEROL SULFATE 2.5 MG/3ML
2.5 SOLUTION RESPIRATORY (INHALATION) EVERY 6 HOURS PRN
Status: CANCELLED | OUTPATIENT
Start: 2022-07-07

## 2022-07-07 RX ORDER — ONDANSETRON 4 MG/1
4 TABLET, ORALLY DISINTEGRATING ORAL EVERY 8 HOURS PRN
Status: CANCELLED | OUTPATIENT
Start: 2022-07-07

## 2022-07-07 RX ORDER — LIDOCAINE HYDROCHLORIDE AND EPINEPHRINE 10; 10 MG/ML; UG/ML
20 INJECTION, SOLUTION INFILTRATION; PERINEURAL ONCE
Status: DISCONTINUED | OUTPATIENT
Start: 2022-07-07 | End: 2022-07-08 | Stop reason: HOSPADM

## 2022-07-07 RX ORDER — PYRIDOXINE HCL (VITAMIN B6) 100 MG
1 TABLET ORAL DAILY
Status: CANCELLED | OUTPATIENT
Start: 2022-07-07

## 2022-07-07 RX ORDER — ALLOPURINOL 100 MG/1
100 TABLET ORAL
Status: CANCELLED | OUTPATIENT
Start: 2022-07-08

## 2022-07-07 RX ORDER — ERGOCALCIFEROL 1.25 MG/1
50000 CAPSULE ORAL WEEKLY
Status: CANCELLED | OUTPATIENT
Start: 2022-07-07

## 2022-07-07 RX ORDER — SODIUM CHLORIDE 0.9 % (FLUSH) 0.9 %
5-40 SYRINGE (ML) INJECTION PRN
Status: CANCELLED | OUTPATIENT
Start: 2022-07-07

## 2022-07-07 RX ORDER — SODIUM CHLORIDE 9 MG/ML
INJECTION, SOLUTION INTRAVENOUS PRN
Status: CANCELLED | OUTPATIENT
Start: 2022-07-07

## 2022-07-07 RX ORDER — SODIUM CHLORIDE 0.9 % (FLUSH) 0.9 %
5-40 SYRINGE (ML) INJECTION EVERY 12 HOURS SCHEDULED
Status: CANCELLED | OUTPATIENT
Start: 2022-07-07

## 2022-07-07 RX ORDER — ONDANSETRON 2 MG/ML
4 INJECTION INTRAMUSCULAR; INTRAVENOUS EVERY 6 HOURS PRN
Status: CANCELLED | OUTPATIENT
Start: 2022-07-07

## 2022-07-07 ASSESSMENT — ENCOUNTER SYMPTOMS
BLOOD IN STOOL: 0
PHOTOPHOBIA: 0
ABDOMINAL PAIN: 0
APNEA: 0
VOMITING: 0
NAUSEA: 0
RECTAL PAIN: 0
BACK PAIN: 0

## 2022-07-07 NOTE — DISCHARGE SUMMARY
Vascular Surgery  Discharge Summary    Patient:  Jocelyn Ovalle    Admit Date: 7/7/2022 12:41 PM  Discharge Date: 7/7/2022    Admitting Physician: Maria Dolores lAcantara MD     Discharge Physician: Same    Admitting Diagnosis: Problem with dialysis access [facility])    Discharge Diagnosis: Same    Consults: None    HPI:   Catrina Come a 61 y. o. male with Hx of CVA, CHF, ESRD on HD via LUE AV graft, HLD, and HTN who presents to ED with concern of bleeding from his LUE AV graft after HD for more than 90 minutes, and for whom vascular surgery is consulted. Ani Gibosn states that this happened to him before and was resolved with pressure. Patient states he had the AV fistula created at  about 4-5 years ago but is unsure who his surgeon was. Patient states he last had his AVF examined more than a year ago and had to undergo balloon angioplasty due to stenosis. Patient denies any numbness or tingling in extremities. Patient states that initially, he was having spurting blood from AV graft and had pressure held in ED, with application of a pressure dressing. Currently, patient's bleeding has no active bleeding from AV graft. Hospital Course: The patient was initially admitted for observation with the plan to undergo fistulogram in the AM. Unfortunately due to his most recent Eliquis dose being this AM, this procedure would involve a high bleeding risk in the setting of a functioning and now hemostatic AV graft. The first available opportunity to perform the procedure would be early next week. Accordingly, the patient was provided with the option of returning to his nursing facility and scheduling his fistulogram in the outpatient setting.  The patient agreed to the outpatient course of action and was, therefore, discharged back to his facility with the plan to return for outpatient fistulogram.      Disposition:   Back to nursing facility     Discharge Physical Exam:   General appearance: alert, no acute distress.    Neuro: A&Ox3, no focal deficits, sensation intact  Chest/Lungs: normal effort with no accessory muscle use on 3L NC  Cardiovascular: RRR  Skin: warm and dry, no rashes  Extremities: no edema, no cyanosis.  Palpable thrill in LUE, two scabbed areas were previous sutures were removed which remain hemostatic, puncture site that had been bleeding earlier today with prolene suture in place and hemostatic     Condition at discharge: Stable    Discharge Instructions: See separate form    Seema Pappas MD, MPH  PGY-4 General Surgery  07/07/22  4:10 PM

## 2022-07-07 NOTE — ED NOTES
PT'S FISTULA SITE STARTED BLEEDING AGAIN WHEN EMS SHOWED UP TO TAKE PT HOME. VASCULAR SUGERY PAGED AND PRESSURE DRESSING APPLIED PER ED MD.      Nallely York RN  07/07/22 5380

## 2022-07-07 NOTE — CARE COORDINATION
Case Management Assessment            Discharge Note                    Date / Time of Note: 7/7/2022 4:12 PM                  Discharge Note Completed by: DINESH Blanco    Patient Name: Prince Thorpe   YOB: 1955  Diagnosis: Problem with dialysis access, initial encounter [facility]) [I25.175A]   Date / Time: 7/7/2022 12:41 PM    Current PCP: Dajuan Marcum MD  Clinic patient: No    Hospitalization in the last 30 days: No    Advance Directives:  Code Status: Prior  [address] [place] [address]  DNR form completed and on chart: No    Financial:  Payor: Benedicto Mosley / Plan: Darline Adame ESSENTIAL/PLUS / Product Type: *No Product type* /      Pharmacy:    [address] [place], [place] [id number] [facility], [address] [place] [address] [place] [phone] - F [phone]  [address] [place] [address]  Phone: [phone] Fax: [phone]    [facility] [id number] [address] [phone] - F [phone]  [place] [address]  [address] [place] [address]  Phone: [phone] Fax: [phone]      Assistance purchasing medications?:    Assistance provided by Case Management: None at this time    Does patient want to participate in local refill/ meds to beds program?:      Meds To Beds General Rules:  1. Can ONLY be done Monday- Friday between 8:30am-5pm  2. Prescription(s) must be in pharmacy by 3pm to be filled same day  3. Copy of patient's insurance/ prescription drug card and patient face sheet must be sent along with the prescription(s)  4. Cost of Rx cannot be added to hospital bill. If financial assistance is needed, please contact unit  or ;  or  CANNOT provide pharmacy voucher for patients co-pays  5.  Patients can then  the prescription on their way out of the hospital at discharge, or pharmacy can deliver to the bedside if staff is available. (payment due at time of pick-up or delivery - cash, check, or card accepted) Able to afford home medications/ co-pay costs: Yes    ADLS:  Current PT AM-PAC Score:   /24  Current OT AM-PAC Score:   /24      DISCHARGE Disposition: Lauren (LTC): [facility]  Phone: [phone]  Fax: [phone]    LOC at discharge: Thierno Mckenna Completed: Yes    Notification completed in HENS/PAS?:  Not Applicable    IMM Completed:   Not Indicated    Transportation:  Transportation PLAN for discharge: EMS transportation   Mode of Transport: Ambulance stretcher - BLS  Reason for medical transport: Other: chronic heart failure, COPD, high fall risk  Name of Transport Company: [facility]   Phone: [phone]  Time of Transport: 7 - 7:30 pm    Transport form completed: Yes    Home Care:  Home Care ordered at discharge: Not [address]: Not Applicable  Orders faxed: No    Durable Medical Equipment:  DME Provider:none  Equipment obtained during hospitalization: none    Home Oxygen and Respiratory Equipment:  Oxygen needed at discharge?: Not [address]: Not Applicable  Portable tank available for discharge?: Not Indicated    Dialysis:  Dialysis patient: Yes    Dialysis Center:  [facility]  Address:    Phone:      Referrals made at [facility] for outpatient continued care:  Not Applicable    Additional CM Notes: Patient is from LTC with [facility]. Patient is returning to [facility] at discharge. CM spoke to [place] and they are aware patient will be returning between 7 and 7:30 pm. Nurse can call report to [phone]. Updated clinicals faxed to [phone].  Transportation arranged with [employer] ([facility] [phone] for 7 - 7:30 pm.    The Plan for Transition of Care is related to the following treatment goals of Problem with dialysis access, initial encounter [facility]) [Z71.383F]    The Patient and/or patient representative Candelario Favre and his family were provided with a choice of provider and agrees with the discharge plan Yes    Freedom of choice list was provided with basic dialogue that supports the patient's individualized plan of care/goals and shares the quality data associated with the providers.  Yes    Care Transitions patient: No    DINESH Sarmiento  [facility]  Case Management Department  Ph: [phone]  Fax: [phone]

## 2022-07-07 NOTE — H&P
Vascular Surgery   History & Physical      Reason for Consult: AV fistula bleed     History of Present Illness:   Albina Mitchell is a 61 y.o. male with Hx of CVA, CHF, ESRD on HD via LUE AV graft, HLD, and HTN who presents to ED with concern of bleeding from his LUE AV graft after HD for more than 90 minutes, and for whom vascular surgery is consulted. Master Pinzon states that this happened to him before and was resolved with pressure. Patient states he had the AV fistula created at  about 4-5 years ago but is unsure who his surgeon was. Patient states he last had his AVF examined more than a year ago and had to undergo balloon angioplasty due to stenosis. Patient denies any numbness or tingling in extremities. Patient states that initially, he was having spurting blood from AV graft and had pressure held in ED, with application of a pressure dressing. Currently, patient's bleeding has no active bleeding from AV graft.  Patient reports taking Eliquis last this AM.     Past Medical History:    Past Medical History             Diagnosis Date    Cerebral artery occlusion with [facility] infarction ([facility]      [facility]estive [facility] failure) [facility]      Chronic kidney disease      COPD (chronic obstructive pulmonary disease) ([facility]      Dialysis patient ([facility]      Gout      Hemodialysis patient ([facility]      Hx of blood clots      Hyperlipidemia      Hypertension      Renal failure              Past Surgical History:    Past Surgical History             Procedure Laterality Date    COLONOSCOPY        DIALYSIS FISTULA CREATION Left              Allergies:  Patient has no known allergies.     Medications:   Home Meds  No current facility-administered medications on file prior to encounter.             Current Outpatient Medications on File Prior to Encounter   Medication Sig Dispense Refill    apixaban (ELIQUIS) 2.5 MG TABS tablet Take 1 tablet by mouth 2 times daily 60 tablet 1    sodium chloride (OCEAN, BABY AYR) 0.65 % nasal spray 1 spray by Nasal route every 4 hours 1 each 1    ferrous sulfate (FE TABS) 325 (65 Fe) MG EC tablet Take 1 tablet by mouth every other day 15 tablet 1    fluticasone-salmeterol (ADVAIR DISKUS) 500-50 MCG/ACT AEPB diskus inhaler Inhale 1 puff into the lungs 2 times daily        atorvastatin (LIPITOR) 80 MG tablet Take 80 mg by mouth daily        calcium carb-cholecalciferol 600-200 MG-UNIT TABS tablet Take 1 tablet by mouth daily        furosemide (LASIX) 20 MG tablet Take 20 mg by mouth daily        Umeclidinium Bromide (INCRUSE ELLIPTA) 62.5 MCG/INH AEPB Inhale 1 puff into the lungs daily        pseudoephedrine (SUDAFED) 30 MG tablet Take 30 mg by mouth every 6 hours as needed for Congestion        sildenafil (REVATIO) 20 MG tablet Take 20 mg by mouth in the morning, at noon, and at bedtime        vitamin D (ERGOCALCIFEROL) 1.25 MG (67522 UT) CAPS capsule Take 50,000 Units by mouth once a week        gabapentin (NEURONTIN) 300 MG capsule Take 300 mg by mouth at bedtime.         omeprazole (PRILOSEC) 20 MG delayed release capsule Take 20 mg by mouth daily        carvedilol (COREG) 12.5 MG tablet Take 12.5 mg by mouth 2 times daily Unsure of dose         albuterol sulfate HFA (VENTOLIN HFA) 108 (90 Base) MCG/ACT inhaler Inhale 2 puffs into the lungs every 6 hours as needed for Wheezing 90mcg/actuation        allopurinol (ZYLOPRIM) 100 MG tablet Take 100 mg by mouth Daily on MWF             Current Meds    Current Meds Link used for Sign Out Report   lidocaine-EPINEPHrine 1 %-1:[id number] injection 20 mL, Once            Family History:   Family History         Family History   Problem Relation Age of Onset    Cancer Mother      Cancer Father      Other Sister              Social History:   TOBACCO:   reports that he has quit smoking. He has never used smokeless tobacco.  ETOH:   reports current alcohol use.   DRUGS:   reports no history of drug use.     Review of Systems:   A 14 point review of systems was conducted, significant findings as noted in HPI. All other systems negative. Physical exam:    Vitals   Vitals:     07/07/22 1244 07/07/22 1344   BP: (!) 145/94 (!) 151/94   Pulse: 84 81   Resp: 18 16   Temp: 98.9 °F (37.2 °C)     TempSrc: Oral     SpO2: 100% 98%   Weight: 143 lb (64.9 kg)     Height: 5' 8\" (1.727 m)              General appearance: alert, no acute distress. Few spots of blood on his shirt and bedding. Neuro: A&Ox3, no focal deficits, sensation intact  Eyes: No scleral icterus, EOM grossly intact  Neck: trachea midline, no JVD, no lymphadenopathy, neck supple  Chest/Lungs: normal effort with no accessory muscle use on 3L NC  Cardiovascular: RRR,   Skin: warm and dry, no rashes  Extremities: no edema, no cyanosis. Palpable thrill in LUE, along w/ 3 sutures at each access point.      Labs:    CBC: No results for input(s): WBC, HGB, HCT, MCV, PLT in the last 72 hours. BMP: No results for input(s): NA, K, CL, CO2, PHOS, BUN, CREATININE, CA in the last 72 hours. PT/INR: No results for input(s): PROTIME, INR in the last 72 hours. APTT: No results for input(s): APTT in the last 72 hours. Liver Profile:         Lab Results   Component Value Date/Time     AST 19 06/16/2022 06:59 AM     ALT <5 06/16/2022 06:59 AM     BILIDIR 0.3 06/16/2022 06:59 AM     BILITOT 0.7 06/16/2022 06:59 AM     ALKPHOS 238 06/16/2022 06:59 AM   No results found for: CHOL, HDL, TRIG  UA: No results found for: NITRITE, COLORU, PHUR, LABCAST, WBCUA, RBCUA, MUCUS, TRICHOMONAS, YEAST, BACTERIA, CLARITYU, SPECGRAV, LEUKOCYTESUR, UROBILINOGEN, BILIRUBINUR, BLOODU, GLUCOSEU, AMORPHOUS     Imaging:   No orders to display      Assessment/Plan:  Claudy Shawn a 61 y. o. male with history of CVA, CHF, ESRD on HD via LUE AV graft, HLD, and HTN who presents to ED with concern of bleeding from his LUE AV graft after hemodialysis for more than 90 minutes, and for whom vascular surgery is consulted.  The patient's AV graft was hemostatic at the time of evaluation by the Vascular Surgery team. He has a palpable thrill, good distal pulse, and no new sensory or motor deficits to the hand. There are two sites of previous sutured access points and the area of concern from today with a prolene suture in place, but no ulceration of the skin.      - Patient on Eliquis for chronic pulmonary embolism pulmonary hypertension -- last taken this AM  - Will plan to have patient follow-up in the outpatient setting for fistulagram   - Will discharge back to nursing facility  - The patient was provided with contact information for Dr. Jose Guadalupe Redmond office and instructed to call the office to schedule outpatient fistulography in the upcoming week. He was told that he would need to hold his Eliquis for three days prior to his procedure. The patient voiced his understanding and agreement with the plan.    -Patient was discussed with Dr. Ton Jolly MD, MPH  PGY-4 General Surgery  07/07/22  3:58 PM

## 2022-07-07 NOTE — PROGRESS NOTES
Interval Progress    Patient was assessed at the bedside. Pressure dressing removed to assess AV graft access site. The graft access site is hemostatic. Woven gauze was applied and loosely wrapped with Coban. The patient may be discharged back to his facility tonight.      Kathleen Kowalski MD, MPH  PGY-4 General Surgery  07/07/22  7:28 PM

## 2022-07-07 NOTE — DISCHARGE INSTR - COC
Continuity of Care Form    Patient Name: Rodger Wade   :  1955  MRN:  [id number]    Admit date:  2022  Discharge date:  ***    Code Status Order: Prior   Advance Directives:      Admitting Physician:  Americo Zaman MD  PCP: Owen Holland MD    Discharging Nurse: [facility] Unit/Room#: A10/A10-10  Discharging Unit Phone Number: ***    Emergency Contact:   Extended Emergency Contact Information  Primary Emergency Contact: [address], [place] [address] Phone: [phone]  Relation: Child   needed? No    Past Surgical History:  Past Surgical History:   Procedure Laterality Date    COLONOSCOPY      DIALYSIS FISTULA CREATION Left        Immunization History: There is no immunization history for the selected administration types on file for this patient.     Active Problems:  Patient Active Problem List   Diagnosis Code    HCAP (healthcare-associated pneumonia) J18.9    COPD exacerbation ([facility] J44.1    ESRD on dialysis ([facility] [address] N18.6, Z99.2    COPD (chronic obstructive pulmonary disease) ([facility]) J44.9    Acute on chronic respiratory failure with hypoxia and hypercapnia ([facility]) J96.21, J96.22    Chest pain R07.9    Hypokalemia E87.6    V-tach ([facility]) I47.2    Essential hypertension I10    Hyperlipidemia E78.5    Chronic combined systolic and diastolic CHF (congestive heart failure) ([facility]) I50.42    SOB (shortness of breath) R06.02    AV fistula thrombosis, initial encounter ([facility] J07.846D    History of venous thromboembolism Z86.718    Epistaxis R04.0    Anemia D64.9    Problem with dialysis access [facility]) T82.898A       Isolation/Infection:   Isolation            No Isolation          Patient Infection Status       Infection Onset Added Last Indicated Last Indicated By Review Planned Expiration Resolved Resolved By    None active    Resolved    C-diff Rule Out 22 Clostridium Difficile Toxin/Antigen (Ordered)   22 Rule-Out Test Resulted    COVID-19 (Rule Out) 09/21/21 09/21/21 09/21/21 COVID-19, Rapid (Ordered)   09/21/21 Rule-Out Test Resulted    COVID-19 (Rule Out)  04/02/20 04/02/20 Pamella Hernandez RN   04/06/20 Pamella Hernandez RN    COVIDMariana19 (Rule Out) 03/31/20 03/31/20 03/31/20 COVID-19 (Ordered)   03/31/20 Rule-Out Test Resulted            Nurse Assessment:  Last Vital Signs: BP (!) 135/96   Pulse 84   Temp 98.9 °F (37.2 °C) (Oral)   Resp 16   Ht 5' 8\" (1.727 m)   Wt 143 lb (64.9 kg)   SpO2 100%   BMI 21.74 kg/m²     Last documented pain score (0-10 scale):    Last Weight:   Wt Readings from Last 1 Encounters:   07/07/22 143 lb (64.9 kg)     Mental Status:  {IP PT MENTAL STATUS:72853}    IV Access:  { ALO IV ACCESS:552559112}    Nursing Mobility/ADLs:  Walking   {CHP DME EGZH:067533305}  Transfer  {CHP DME MZZN:751828489}  Bathing  {CHP DME AXLK:421859047}  Dressing  {CHP DME EGHS:839805525}  Toileting  {CHP DME ENTO:939574564}  Feeding  {CHP DME RHPW:448531120}  Med Admin  {P DME QFXT:852643940}  Med Delivery   {[facility] MED Delivery:052857852}    Wound Care Documentation and Therapy:        Elimination:  Continence: Bowel: {YES / WT:59284}  Bladder: {YES / GT:82144}  Urinary Catheter: {Urinary Catheter:885459936}   Colostomy/Ileostomy/Ileal Conduit: {YES / DK:62388}       Date of Last BM: ***  No intake or output data in the 24 hours ending 07/07/22 1614  No intake/output data recorded.     Safety Concerns:     [employer] Safety Concerns:607396186}    Impairments/Disabilities:      [employer] Impairments/Disabilities:497713076}    Nutrition Therapy:  Current Nutrition Therapy:   [employer] Diet List:560812205}    Routes of Feeding: {CHP DME Other Feedings:425446879}  Liquids: {Slp liquid thickness:63837}  Daily Fluid Restriction: {CHP DME Yes amt example:603960868}  Last Modified Barium Swallow with Video (Video Swallowing Test): {Done Not Done [facility]:322278992}    Treatments at the Time of Hospital Discharge:   Respiratory Treatments: ***  Oxygen Therapy: {Therapy; copd oxygen:52888}  Ventilator:    { CC Vent SEKY:353935986}    Rehab Therapies: {THERAPEUTIC INTERVENTION:3994223480}  Weight Bearing Status/Restrictions: 50Gianni THORNTON Weight Bearin}  Other Medical Equipment (for information only, NOT a DME order):  {EQUIPMENT:767824472}  Other Treatments: ***    Patient's personal belongings (please select all that are sent with patient):  {CHP DME Belongings:052542394}    RN SIGNATURE:  {Esignature:027826933}    CASE MANAGEMENT/SOCIAL WORK SECTION    Inpatient Status Date: ***    Readmission Risk Assessment Score:  Readmission Risk              Risk of Unplanned Readmission:  0           Discharging to Facility/ [address]          [address], [address] [facility] [address]       Phone: [phone]       Fax: [phone]          Dialysis Facility (if applicable)   Name:  Address:  Dialysis Schedule:  Phone:  Fax:    / signature: Electronically signed by DINESH Blancas on 22 at 4:14 PM EDT    PHYSICIAN SECTION    Prognosis: {Prognosis:7680878135}    Condition at Discharge: 50Gianni Mallory Patient Condition:589786487}    Rehab Potential (if transferring to Rehab): {Prognosis:5322435614}    Recommended Labs or Other Treatments After Discharge: ***    Physician Certification: I certify the above information and transfer of Wendy Sr  is necessary for the continuing treatment of the diagnosis listed and that he requires {Admit to Appropriate Level of Care:91649} for {GREATER/LESS:877822588} 30 days.      Update Admission H&P: {CHP DME Changes in GAFVD:669045221}    PHYSICIAN SIGNATURE:  {Esignature:035800027}

## 2022-07-07 NOTE — ED TRIAGE NOTES
Pt brought in by ems after receiving dialysis, pt states they were unable to get his fistula to stop bleeding, pt arrived with clamp in place, bleeding controlled.  Pt states he has been to the ed 4-5 times for same

## 2022-07-07 NOTE — ED PROVIDER NOTES
1 [facility]  EMERGENCY DEPARTMENT ENCOUNTER          ATTENDING PHYSICIAN NOTE       Date of evaluation: 7/7/2022    Chief Complaint     Bleeding/Bruising      History of Present Illness     Nikolay Quintanilla is a 61 y.o. male COPD, DVT, PE, CHF (last Echo 3/2022 EF 35-40% with G3DD), Pulm HTN, ESRD on dialysis, HTN, HLD who presents to the ED today for evaluation of bleeding from his fistula. The patient was recently in the hospital last month for AV fistula thrombosis which required thrombectomy from IR. Patient states that he finished his dialysis session when they started having difficulty stopping the bleeding from his fistula in his left upper extremity. The patient had a dressing placed as well as a clamp and was sent to the emergency department for further evaluation. He states that he has not had any other episodes of bleeding from anywhere else. His breathing is at baseline and he does not have any new chest pain, nausea or vomiting. He denies any known fevers. Review of Systems     Review of Systems   HENT: Negative for congestion and ear discharge. Eyes: Negative for photophobia and visual disturbance. Respiratory: Negative for apnea. Cardiovascular: Negative for chest pain. Gastrointestinal: Negative for abdominal pain, blood in stool, nausea, rectal pain and vomiting. Endocrine: Negative for polydipsia. Genitourinary: Negative for difficulty urinating. Musculoskeletal: Negative for back pain. Skin: Negative for pallor. Neurological: Negative for dizziness and syncope. Hematological: Negative for adenopathy. Bruises/bleeds easily. All other systems reviewed and are negative.       Past Medical, Surgical, Family, and Social History     He has a past medical history of Cerebral artery occlusion with cerebral infarction [facility]), CHF (congestive heart failure) ([facility] Chronic kidney disease, COPD (chronic obstructive pulmonary disease) ([facility] [address] Dialysis patient ([facility] [address] Gout, Hemodialysis patient ([facility] Hx of blood clots, Hyperlipidemia, Hypertension, and Renal failure. He has a past surgical history that includes Dialysis fistula creation (Left) and Colonoscopy. His family history includes Cancer in his father and mother; Other in his sister. He reports that he has quit smoking. He has never used smokeless tobacco. He reports current alcohol use. He reports that he does not use drugs. Medications     Previous Medications    ALBUTEROL SULFATE HFA (VENTOLIN HFA) 108 (90 BASE) MCG/ACT INHALER    Inhale 2 puffs into the lungs every 6 hours as needed for Wheezing 90mcg/actuation    ALLOPURINOL (ZYLOPRIM) 100 MG TABLET    Take 100 mg by mouth Daily on MWF    APIXABAN (ELIQUIS) 2.5 MG TABS TABLET    Take 1 tablet by mouth 2 times daily    ATORVASTATIN (LIPITOR) 80 MG TABLET    Take 80 mg by mouth daily    CALCIUM CARB-CHOLECALCIFEROL 600-200 MG-UNIT TABS TABLET    Take 1 tablet by mouth daily    CARVEDILOL (COREG) 12.5 MG TABLET    Take 12.5 mg by mouth 2 times daily Unsure of dose     FERROUS SULFATE (FE TABS) 325 (65 FE) MG EC TABLET    Take 1 tablet by mouth every other day    FLUTICASONE-SALMETEROL (ADVAIR DISKUS) 500-50 MCG/ACT AEPB DISKUS INHALER    Inhale 1 puff into the lungs 2 times daily    FUROSEMIDE (LASIX) 20 MG TABLET    Take 20 mg by mouth daily    GABAPENTIN (NEURONTIN) 300 MG CAPSULE    Take 300 mg by mouth at bedtime.      OMEPRAZOLE (PRILOSEC) 20 MG DELAYED RELEASE CAPSULE    Take 20 mg by mouth daily    PSEUDOEPHEDRINE (SUDAFED) 30 MG TABLET    Take 30 mg by mouth every 6 hours as needed for Congestion    SILDENAFIL (REVATIO) 20 MG TABLET    Take 20 mg by mouth in the morning, at noon, and at bedtime    SODIUM CHLORIDE (OCEAN, BABY AYR) 0.65 % NASAL SPRAY    1 spray by Nasal route every 4 hours    UMECLIDINIUM BROMIDE (INCRUSE ELLIPTA) 62.5 MCG/INH AEPB    Inhale 1 puff into the lungs daily    VITAMIN D (ERGOCALCIFEROL) 1.25 MG (15450 UT) CAPS CAPSULE    Take 50,000 Units by mouth once a week       Allergies     He has No Known Allergies. Physical Exam     INITIAL VITALS: BP: (!) 145/94, Temp: 98.9 °F (37.2 °C), Heart Rate: 84, Resp: 18, SpO2: 100 %   Physical Exam  Vitals and nursing note reviewed. Constitutional:       General: He is not in acute distress. Appearance: Normal appearance. He is well-developed. He is not ill-appearing, toxic-appearing or diaphoretic. HENT:      Head: Normocephalic and atraumatic. Mouth/Throat:      Mouth: Mucous membranes are moist.   Eyes:      Pupils: Pupils are equal, round, and reactive to light. Cardiovascular:      Rate and Rhythm: Normal rate and regular rhythm. Pulses: Normal pulses. Heart sounds: Normal heart sounds. Pulmonary:      Effort: Pulmonary effort is normal.      Breath sounds: Normal breath sounds. Abdominal:      General: Bowel sounds are normal. There is no distension. Palpations: Abdomen is soft. Tenderness: There is no abdominal tenderness. Musculoskeletal:         General: No tenderness. Normal range of motion. Cervical back: Neck supple. Comments: Left upper extremity noted to have a white clamp with a dressing around it. Dressing taken down fistula noted to have good thrill and bruit. Pulsatile bleeding noted from the fistula site   Skin:     General: Skin is warm and dry. Capillary Refill: Capillary refill takes less than 2 seconds. Neurological:      General: No focal deficit present. Mental Status: He is alert and oriented to person, place, and time. Psychiatric:         Mood and Affect: Mood normal.         Diagnostic Results     RADIOLOGY:  No orders to display       LABS:   No results found for this visit on 07/07/22. ED BEDSIDE ULTRASOUND:  No results found.     RECENT VITALS:  BP: (!) 151/94,Temp: 98.9 °F (37.2 °C), Heart Rate: 81, Resp: 16, SpO2: 98 %     Procedures     Lac Repair    Date/Time: 7/7/2022 2:03 PM  Performed by: Ever Jacobson Mariusz Moon MD  Authorized by: Toyin Qiu MD     Consent:     Consent obtained:  Verbal    Consent given by:  Patient    Risks discussed:  Pain, infection, nerve damage and vascular damage    Alternatives discussed:  No treatment  Anesthesia (see MAR for exact dosages): Anesthesia method:  None  Laceration details:     Location:  Shoulder/arm    Shoulder/arm location:  L upper arm    Length (cm):  0.1  Repair type:     Repair type:  Simple  Exploration:     Hemostasis achieved with:  Direct pressure    Contaminated: no    Treatment:     Amount of cleaning:  Standard    Visualized foreign bodies/material removed: no    Skin repair:     Repair method:  Sutures    Suture size:  4-0    Suture material:  Prolene    Suture technique:  Figure eight    Number of sutures:  1  Approximation:     Approximation:  Close  Post-procedure details:     Dressing:  Non-adherent dressing    Patient tolerance of procedure: Tolerated well, no immediate complications  Comments:      Patient with bleeding AV fistula that was closed with a figure-of-eight stitch          ED Course     Nursing Notes, Past Medical Hx, Past Surgical Hx, Social Hx,Allergies, and Family Hx were reviewed. patient was given the following medications:  Orders Placed This Encounter   Medications    lidocaine-EPINEPHrine 1 %-1:[id number] injection 20 mL       CONSULTS:  IP CONSULT TO VASCULAR SURGERY  IP CONSULT TO [address] [place] [address] DECISIONMAKING / ASSESSMENT / Lyudmila Ilsa is a 61 y.o. male with a bleeding AV fistula. Upon arrival the patient had been dressing with a clamp which was removed and he was noted to have pulsatile bleeding from his fistula site. Direct pressure initially attempted without significant improvement. Dermabond was used in addition to this which helped briefly but the patient started bleeding again.   A figure-of-eight stitch was placed with hemostasis and his findings were discussed with surgery who will be admitting the patient at this time for observation. Clinical Impression     1. A-V fistula (HCC)        Disposition     PATIENT REFERRED TO:  No follow-up provider specified.     DISCHARGE MEDICATIONS:  New Prescriptions    No medications on file       DISPOSITION Decision To Admit 07/07/2022 02:52:32 PM       Svitlana Davison MD  07/07/22 1545

## 2022-07-08 PROCEDURE — G0378 HOSPITAL OBSERVATION PER HR: HCPCS

## 2022-08-04 ENCOUNTER — HOSPITAL ENCOUNTER (EMERGENCY)
Age: 67
Discharge: NURSING, REHAB OR LONG-TERM CARE | End: 2022-08-05
Attending: STUDENT IN AN ORGANIZED HEALTH CARE EDUCATION/TRAINING PROGRAM
Payer: MEDICARE

## 2022-08-04 DIAGNOSIS — D64.9 ANEMIA, UNSPECIFIED TYPE: Primary | ICD-10-CM

## 2022-08-04 LAB
ABO/RH: NORMAL
ANTIBODY SCREEN: NORMAL
BASOPHILS ABSOLUTE: 0 K/UL (ref 0–0.2)
BASOPHILS RELATIVE PERCENT: 0.9 %
EOSINOPHILS ABSOLUTE: 0.1 K/UL (ref 0–0.6)
EOSINOPHILS RELATIVE PERCENT: 4.7 %
HCT VFR BLD CALC: 17.7 % (ref 40.5–52.5)
HEMOGLOBIN: 5.6 G/DL (ref 13.5–17.5)
LYMPHOCYTES ABSOLUTE: 0.4 K/UL (ref 1–5.1)
LYMPHOCYTES RELATIVE PERCENT: 15.5 %
MCH RBC QN AUTO: 28.1 PG (ref 26–34)
MCHC RBC AUTO-ENTMCNC: 31.9 G/DL (ref 31–36)
MCV RBC AUTO: 87.9 FL (ref 80–100)
MONOCYTES ABSOLUTE: 0.2 K/UL (ref 0–1.3)
MONOCYTES RELATIVE PERCENT: 8 %
NEUTROPHILS ABSOLUTE: 1.7 K/UL (ref 1.7–7.7)
NEUTROPHILS RELATIVE PERCENT: 70.9 %
PDW BLD-RTO: 17.5 % (ref 12.4–15.4)
PLATELET # BLD: 62 K/UL (ref 135–450)
PMV BLD AUTO: 8 FL (ref 5–10.5)
RBC # BLD: 2.01 M/UL (ref 4.2–5.9)
REASON FOR REJECTION: NORMAL
REJECTED TEST: NORMAL
WBC # BLD: 2.4 K/UL (ref 4–11)

## 2022-08-04 PROCEDURE — 86923 COMPATIBILITY TEST ELECTRIC: CPT

## 2022-08-04 PROCEDURE — 99285 EMERGENCY DEPT VISIT HI MDM: CPT

## 2022-08-04 PROCEDURE — 36415 COLL VENOUS BLD VENIPUNCTURE: CPT

## 2022-08-04 PROCEDURE — 85025 COMPLETE CBC W/AUTO DIFF WBC: CPT

## 2022-08-04 PROCEDURE — 86900 BLOOD TYPING SEROLOGIC ABO: CPT

## 2022-08-04 PROCEDURE — 86850 RBC ANTIBODY SCREEN: CPT

## 2022-08-04 PROCEDURE — P9016 RBC LEUKOCYTES REDUCED: HCPCS

## 2022-08-04 PROCEDURE — 86901 BLOOD TYPING SEROLOGIC RH(D): CPT

## 2022-08-04 PROCEDURE — 36430 TRANSFUSION BLD/BLD COMPNT: CPT

## 2022-08-04 RX ORDER — SODIUM CHLORIDE 9 MG/ML
INJECTION, SOLUTION INTRAVENOUS PRN
Status: DISCONTINUED | OUTPATIENT
Start: 2022-08-04 | End: 2022-08-05 | Stop reason: HOSPADM

## 2022-08-04 ASSESSMENT — ENCOUNTER SYMPTOMS
NAUSEA: 0
PHOTOPHOBIA: 0
BLOOD IN STOOL: 0
RECTAL PAIN: 0
BACK PAIN: 0
ABDOMINAL PAIN: 0
VOMITING: 0
APNEA: 0

## 2022-08-04 NOTE — ED PROVIDER NOTES
[address]          ATTENDING PHYSICIAN NOTE       Date of evaluation: 8/4/2022    Chief Complaint     Other (Denies pain, HGB 6.8 sent in by doctor)      History of Present Illness     Ian Santiago is a 61 y.o. male CVA, CHF, ESRD on HD via LUE AV graft, HLD, and HTN who presents to the ED for evaluation of a low hemoglobin level. The patient states that he had dialysis today and tolerated that well. He is on Eliquis and had routine blood work done today which showed a hemoglobin of 6.8. He otherwise denies any new chest pain, shortness of breath. He denies any abdominal pain, nausea, vomiting, diarrhea or any changes in his stool color. He has not noticed bleeding from any orifice. He states that he is on his baseline 3 L of oxygen and doing well with that    Review of Systems     Review of Systems   Constitutional:  Negative for chills and fever. HENT:  Negative for congestion and ear discharge. Eyes:  Negative for photophobia and visual disturbance. Respiratory:  Negative for apnea. Cardiovascular:  Negative for chest pain. Gastrointestinal:  Negative for abdominal pain, blood in stool, nausea, rectal pain and vomiting. Endocrine: Negative for polydipsia. Genitourinary:  Negative for difficulty urinating. Musculoskeletal:  Negative for back pain. Skin:  Negative for pallor. Neurological:  Negative for dizziness and syncope. Hematological:  Negative for adenopathy. All other systems reviewed and are negative. Past Medical, Surgical, Family, and Social History     He has a past medical history of Cerebral artery occlusion with cerebral infarction ([facility] [address] CHF (congestive heart failure) ([facility] [address] Chronic kidney disease, COPD (chronic obstructive pulmonary disease) ([facility] [address] Dialysis patient ([facility] [address] Gout, Hemodialysis patient ([facility] [address] Hx of blood clots, Hyperlipidemia, Hypertension, and Renal failure.   He has a past surgical history that includes Dialysis fistula creation (Left) and Colonoscopy. His family history includes Cancer in his father and mother; Other in his sister. He reports that he has quit smoking. He has never used smokeless tobacco. He reports current alcohol use. He reports that he does not use drugs. Medications     Previous Medications    ALBUTEROL SULFATE HFA (VENTOLIN HFA) 108 (90 BASE) MCG/ACT INHALER    Inhale 2 puffs into the lungs every 6 hours as needed for Wheezing 90mcg/actuation    ALLOPURINOL (ZYLOPRIM) 100 MG TABLET    Take 100 mg by mouth Daily on MWF    APIXABAN (ELIQUIS) 2.5 MG TABS TABLET    Take 1 tablet by mouth 2 times daily    ATORVASTATIN (LIPITOR) 80 MG TABLET    Take 80 mg by mouth daily    CALCIUM CARB-CHOLECALCIFEROL 600-200 MG-UNIT TABS TABLET    Take 1 tablet by mouth daily    CARVEDILOL (COREG) 12.5 MG TABLET    Take 12.5 mg by mouth 2 times daily Unsure of dose     FERROUS SULFATE (FE TABS) 325 (65 FE) MG EC TABLET    Take 1 tablet by mouth every other day    FLUTICASONE-SALMETEROL (ADVAIR DISKUS) 500-50 MCG/ACT AEPB DISKUS INHALER    Inhale 1 puff into the lungs 2 times daily    FUROSEMIDE (LASIX) 20 MG TABLET    Take 20 mg by mouth daily    GABAPENTIN (NEURONTIN) 300 MG CAPSULE    Take 300 mg by mouth at bedtime. OMEPRAZOLE (PRILOSEC) 20 MG DELAYED RELEASE CAPSULE    Take 20 mg by mouth daily    PSEUDOEPHEDRINE (SUDAFED) 30 MG TABLET    Take 30 mg by mouth every 6 hours as needed for Congestion    SILDENAFIL (REVATIO) 20 MG TABLET    Take 20 mg by mouth in the morning, at noon, and at bedtime    SODIUM CHLORIDE (OCEAN, BABY AYR) 0.65 % NASAL SPRAY    1 spray by Nasal route every 4 hours    UMECLIDINIUM BROMIDE (INCRUSE ELLIPTA) 62.5 MCG/INH AEPB    Inhale 1 puff into the lungs daily    VITAMIN D (ERGOCALCIFEROL) 1.25 MG (76953 UT) CAPS CAPSULE    Take 50,000 Units by mouth once a week       Allergies     He has No Known Allergies.     Physical Exam     INITIAL VITALS: BP: (!) 144/79, Temp: 98.4 °F (36.9 °C), Heart Rate: 87, Resp: 21, SpO2: 100 %   Physical Exam  Vitals and nursing note reviewed. Constitutional:       General: He is not in acute distress. Appearance: Normal appearance. He is well-developed. He is not ill-appearing, toxic-appearing or diaphoretic. HENT:      Head: Normocephalic and atraumatic. Nose: Nose normal.      Mouth/Throat:      Mouth: Mucous membranes are dry. Eyes:      Pupils: Pupils are equal, round, and reactive to light. Cardiovascular:      Rate and Rhythm: Normal rate and regular rhythm. Heart sounds: Normal heart sounds. Pulmonary:      Effort: Pulmonary effort is normal.      Breath sounds: Normal breath sounds. Abdominal:      General: Bowel sounds are normal. There is no distension. Palpations: Abdomen is soft. Tenderness: There is no abdominal tenderness. Musculoskeletal:         General: No swelling, tenderness or deformity. Normal range of motion. Cervical back: Neck supple. Comments: Left upper extremity AV graft noted to have good thrill and bruit. Skin:     General: Skin is warm and dry. Neurological:      General: No focal deficit present. Mental Status: He is alert and oriented to person, place, and time.    Psychiatric:         Mood and Affect: Mood normal.       Diagnostic Results       RADIOLOGY:  No orders to display       LABS:   Results for orders placed or performed during the hospital encounter of 08/04/22   SPECIMEN REJECTION   Result Value Ref Range    Rejected Test cbc     Reason for Rejection see below    CBC with Auto Differential   Result Value Ref Range    WBC 2.4 (L) 4.0 - 11.0 K/uL    RBC 2.01 (L) 4.20 - 5.90 M/uL    Hemoglobin 5.6 (LL) 13.5 - 17.5 g/dL    Hematocrit 17.7 (LL) 40.5 - 52.5 %    MCV 87.9 80.0 - 100.0 fL    MCH 28.1 26.0 - 34.0 pg    MCHC 31.9 31.0 - 36.0 g/dL    RDW 17.5 (H) 12.4 - 15.4 %    Platelets 62 (L) 527 - 450 K/uL    MPV 8.0 5.0 - 10.5 fL    Neutrophils % 70.9 % Lymphocytes % 15.5 %    Monocytes % 8.0 %    Eosinophils % 4.7 %    Basophils % 0.9 %    Neutrophils Absolute 1.7 1.7 - 7.7 K/uL    Lymphocytes Absolute 0.4 (L) 1.0 - 5.1 K/uL    Monocytes Absolute 0.2 0.0 - 1.3 K/uL    Eosinophils Absolute 0.1 0.0 - 0.6 K/uL    Basophils Absolute 0.0 0.0 - 0.2 K/uL   TYPE AND SCREEN   Result Value Ref Range    ABO/Rh B POS     Antibody Screen NEG        ED BEDSIDE ULTRASOUND:  No results found. RECENT VITALS:  BP: 138/80,Temp: 98.4 °F (36.9 °C), Heart Rate: 85, Resp: 17, SpO2: 100 %     Procedures         ED Course     Nursing Notes, Past Medical Hx, Past Surgical Hx, Social Hx,Allergies, and Family Hx were reviewed. patient was given the following medications:  No orders of the defined types were placed in this encounter. CONSULTS:  None    MEDICAL DECISIONMAKING / ASSESSMENT / PLAN     Prince Thorpe is a 61 y.o. male with end-stage renal disease presenting with anemia with a hemoglobin of 5.2. Patient not having any obvious source of bleeding at this time. He will be getting transfused with 2 units and reassess his improvement to this. My findings were discussed with Dr. Man France who will see the patient's response and determine disposition appropriately    Critical Care:  Due to the immediate potential for life-threatening deterioration due to anemia, I spent 35 minutes providing critical care. This time excludes time spent performing procedures but includes time spent on direct patient care, history retrieval, review of the chart, and discussions with patient, family, and consultant(s). Clinical Impression     1. Anemia, unspecified type        Disposition     PATIENT REFERRED TO:  No follow-up provider specified.     DISCHARGE MEDICATIONS:  New Prescriptions    No medications on file       DISPOSITION           Myron Han MD  08/04/22 4835

## 2022-08-05 VITALS
OXYGEN SATURATION: 100 % | WEIGHT: 142 LBS | HEART RATE: 76 BPM | SYSTOLIC BLOOD PRESSURE: 94 MMHG | RESPIRATION RATE: 16 BRPM | DIASTOLIC BLOOD PRESSURE: 75 MMHG | TEMPERATURE: 97.7 F | HEIGHT: 68 IN | BODY MASS INDEX: 21.52 KG/M2

## 2022-08-05 LAB
BLOOD BANK DISPENSE STATUS: NORMAL
BLOOD BANK DISPENSE STATUS: NORMAL
BLOOD BANK PRODUCT CODE: NORMAL
BLOOD BANK PRODUCT CODE: NORMAL
BPU ID: NORMAL
BPU ID: NORMAL
DESCRIPTION BLOOD BANK: NORMAL
DESCRIPTION BLOOD BANK: NORMAL
HCT VFR BLD CALC: 25.9 % (ref 40.5–52.5)
HEMOGLOBIN: 8.5 G/DL (ref 13.5–17.5)

## 2022-08-05 PROCEDURE — 36430 TRANSFUSION BLD/BLD COMPNT: CPT

## 2022-08-05 PROCEDURE — 85014 HEMATOCRIT: CPT

## 2022-08-05 PROCEDURE — 36415 COLL VENOUS BLD VENIPUNCTURE: CPT

## 2022-08-05 PROCEDURE — 85018 HEMOGLOBIN: CPT

## 2022-08-05 NOTE — ED NOTES
Pt discharged from ED in stable condition. Discharge instruction explain, all question answered. Pt transport to nursing home [facility].     Reny Robbins RN  08/05/22 836 [address], RN  08/05/22 7823

## 2022-08-05 NOTE — ED PROVIDER NOTES
[address] [facility] [address] ENCOUNTER          ATTENDING PHYSICIAN NOTE       Date of evaluation: 8/4/2022    ADDENDUM:      Care of this patient was assumed from Dr. Savi Yanes. The patient was seen for Other (Denies pain, HGB 6.8 sent in by doctor)  . The patient's initial evaluation and plan have been discussed with the prior provider who initially evaluated the patient. Nursing Notes, Past Medical Hx, Past Surgical Hx, Social Hx, Allergies, and Family Hx were all reviewed.     Diagnostic Results     RADIOLOGY:  No orders to display       LABS:   Results for orders placed or performed during the hospital encounter of 08/04/22   SPECIMEN REJECTION   Result Value Ref Range    Rejected Test cbc     Reason for Rejection see below    CBC with Auto Differential   Result Value Ref Range    WBC 2.4 (L) 4.0 - 11.0 K/uL    RBC 2.01 (L) 4.20 - 5.90 M/uL    Hemoglobin 5.6 (LL) 13.5 - 17.5 g/dL    Hematocrit 17.7 (LL) 40.5 - 52.5 %    MCV 87.9 80.0 - 100.0 fL    MCH 28.1 26.0 - 34.0 pg    MCHC 31.9 31.0 - 36.0 g/dL    RDW 17.5 (H) 12.4 - 15.4 %    Platelets 62 (L) 287 - 450 K/uL    MPV 8.0 5.0 - 10.5 fL    Neutrophils % 70.9 %    Lymphocytes % 15.5 %    Monocytes % 8.0 %    Eosinophils % 4.7 %    Basophils % 0.9 %    Neutrophils Absolute 1.7 1.7 - 7.7 K/uL    Lymphocytes Absolute 0.4 (L) 1.0 - 5.1 K/uL    Monocytes Absolute 0.2 0.0 - 1.3 K/uL    Eosinophils Absolute 0.1 0.0 - 0.6 K/uL    Basophils Absolute 0.0 0.0 - 0.2 K/uL   TYPE AND SCREEN   Result Value Ref Range    ABO/Rh B POS     Antibody Screen NEG    PREPARE RBC (CROSSMATCH), 2 Units   Result Value Ref Range    Product Code Blood Bank [id number]     Description Blood Bank Red Blood Cells, Leuko-reduced     Unit Number [id number]     Dispense Status Blood Bank selected     Product Code Blood Bank [id number]     Description Blood Bank Red Blood Cells, Leuko-reduced     Unit Number [id number]     Dispense Status Blood Bank selected        RECENT VITALS:

## 2022-09-13 ENCOUNTER — HOSPITAL ENCOUNTER (INPATIENT)
Age: 67
LOS: 2 days | Discharge: NURSING, REHAB OR LONG-TERM CARE | DRG: 069 | End: 2022-09-15
Attending: EMERGENCY MEDICINE | Admitting: STUDENT IN AN ORGANIZED HEALTH CARE EDUCATION/TRAINING PROGRAM
Payer: MEDICARE

## 2022-09-13 ENCOUNTER — APPOINTMENT (OUTPATIENT)
Dept: CT IMAGING | Age: 67
DRG: 069 | End: 2022-09-13
Payer: MEDICARE

## 2022-09-13 ENCOUNTER — APPOINTMENT (OUTPATIENT)
Dept: MRI IMAGING | Age: 67
DRG: 069 | End: 2022-09-13
Payer: MEDICARE

## 2022-09-13 DIAGNOSIS — R29.818 NEUROLOGICAL DEFICIT PRESENT: Primary | ICD-10-CM

## 2022-09-13 PROBLEM — I63.9 ACUTE CEREBROVASCULAR ACCIDENT (CVA) (HCC): Status: ACTIVE | Noted: 2022-09-13

## 2022-09-13 LAB
ANION GAP SERPL CALCULATED.3IONS-SCNC: 9 MMOL/L (ref 3–16)
BASOPHILS ABSOLUTE: 0 K/UL (ref 0–0.2)
BASOPHILS RELATIVE PERCENT: 0.3 %
BUN BLDV-MCNC: 28 MG/DL (ref 7–20)
CALCIUM SERPL-MCNC: 8.5 MG/DL (ref 8.3–10.6)
CHLORIDE BLD-SCNC: 93 MMOL/L (ref 99–110)
CO2: 30 MMOL/L (ref 21–32)
CREAT SERPL-MCNC: 6.4 MG/DL (ref 0.8–1.3)
EKG ATRIAL RATE: 69 BPM
EKG DIAGNOSIS: NORMAL
EKG P AXIS: 80 DEGREES
EKG P-R INTERVAL: 332 MS
EKG Q-T INTERVAL: 436 MS
EKG QRS DURATION: 104 MS
EKG QTC CALCULATION (BAZETT): 467 MS
EKG R AXIS: -35 DEGREES
EKG T AXIS: 167 DEGREES
EKG VENTRICULAR RATE: 69 BPM
EOSINOPHILS ABSOLUTE: 0.1 K/UL (ref 0–0.6)
EOSINOPHILS RELATIVE PERCENT: 4.6 %
GFR AFRICAN AMERICAN: 11
GFR NON-AFRICAN AMERICAN: 9
GLUCOSE BLD-MCNC: 93 MG/DL (ref 70–99)
HCT VFR BLD CALC: 23.9 % (ref 40.5–52.5)
HEMOGLOBIN: 7.4 G/DL (ref 13.5–17.5)
INR BLD: 1.52 (ref 0.87–1.14)
LYMPHOCYTES ABSOLUTE: 0.4 K/UL (ref 1–5.1)
LYMPHOCYTES RELATIVE PERCENT: 15.8 %
MCH RBC QN AUTO: 27.6 PG (ref 26–34)
MCHC RBC AUTO-ENTMCNC: 30.8 G/DL (ref 31–36)
MCV RBC AUTO: 89.6 FL (ref 80–100)
MONOCYTES ABSOLUTE: 0.1 K/UL (ref 0–1.3)
MONOCYTES RELATIVE PERCENT: 5.4 %
NEUTROPHILS ABSOLUTE: 2 K/UL (ref 1.7–7.7)
NEUTROPHILS RELATIVE PERCENT: 73.9 %
PDW BLD-RTO: 17 % (ref 12.4–15.4)
PLATELET # BLD: 85 K/UL (ref 135–450)
PMV BLD AUTO: 8.5 FL (ref 5–10.5)
POTASSIUM REFLEX MAGNESIUM: 3.8 MMOL/L (ref 3.5–5.1)
PROTHROMBIN TIME: 18.3 SEC (ref 11.7–14.5)
RBC # BLD: 2.67 M/UL (ref 4.2–5.9)
SODIUM BLD-SCNC: 132 MMOL/L (ref 136–145)
WBC # BLD: 2.7 K/UL (ref 4–11)

## 2022-09-13 PROCEDURE — 85610 PROTHROMBIN TIME: CPT

## 2022-09-13 PROCEDURE — 85025 COMPLETE CBC W/AUTO DIFF WBC: CPT

## 2022-09-13 PROCEDURE — 2060000000 HC ICU INTERMEDIATE R&B

## 2022-09-13 PROCEDURE — 93005 ELECTROCARDIOGRAM TRACING: CPT | Performed by: EMERGENCY MEDICINE

## 2022-09-13 PROCEDURE — 6370000000 HC RX 637 (ALT 250 FOR IP): Performed by: STUDENT IN AN ORGANIZED HEALTH CARE EDUCATION/TRAINING PROGRAM

## 2022-09-13 PROCEDURE — 70450 CT HEAD/BRAIN W/O DYE: CPT

## 2022-09-13 PROCEDURE — 70551 MRI BRAIN STEM W/O DYE: CPT

## 2022-09-13 PROCEDURE — 70496 CT ANGIOGRAPHY HEAD: CPT

## 2022-09-13 PROCEDURE — 80048 BASIC METABOLIC PNL TOTAL CA: CPT

## 2022-09-13 PROCEDURE — 4A03X5D MEASUREMENT OF ARTERIAL FLOW, INTRACRANIAL, EXTERNAL APPROACH: ICD-10-PCS | Performed by: INTERNAL MEDICINE

## 2022-09-13 PROCEDURE — 36415 COLL VENOUS BLD VENIPUNCTURE: CPT

## 2022-09-13 PROCEDURE — 6360000004 HC RX CONTRAST MEDICATION: Performed by: EMERGENCY MEDICINE

## 2022-09-13 PROCEDURE — 99285 EMERGENCY DEPT VISIT HI MDM: CPT

## 2022-09-13 RX ORDER — POLYETHYLENE GLYCOL 3350 17 G/17G
17 POWDER, FOR SOLUTION ORAL DAILY PRN
Status: DISCONTINUED | OUTPATIENT
Start: 2022-09-13 | End: 2022-09-15 | Stop reason: HOSPADM

## 2022-09-13 RX ORDER — CALCITRIOL 0.25 UG/1
0.5 CAPSULE, LIQUID FILLED ORAL EVERY OTHER DAY
COMMUNITY

## 2022-09-13 RX ORDER — ALBUTEROL SULFATE 2.5 MG/3ML
2.5 SOLUTION RESPIRATORY (INHALATION) EVERY 6 HOURS PRN
Status: DISCONTINUED | OUTPATIENT
Start: 2022-09-13 | End: 2022-09-15 | Stop reason: HOSPADM

## 2022-09-13 RX ORDER — ONDANSETRON 4 MG/1
4 TABLET, ORALLY DISINTEGRATING ORAL EVERY 8 HOURS PRN
Status: DISCONTINUED | OUTPATIENT
Start: 2022-09-13 | End: 2022-09-15 | Stop reason: HOSPADM

## 2022-09-13 RX ORDER — GABAPENTIN 300 MG/1
300 CAPSULE ORAL NIGHTLY
Status: DISCONTINUED | OUTPATIENT
Start: 2022-09-13 | End: 2022-09-15 | Stop reason: HOSPADM

## 2022-09-13 RX ORDER — ERGOCALCIFEROL 1.25 MG/1
50000 CAPSULE ORAL
Status: DISCONTINUED | OUTPATIENT
Start: 2022-09-17 | End: 2022-09-15 | Stop reason: HOSPADM

## 2022-09-13 RX ORDER — CARVEDILOL 12.5 MG/1
12.5 TABLET ORAL 2 TIMES DAILY
Status: DISCONTINUED | OUTPATIENT
Start: 2022-09-13 | End: 2022-09-14

## 2022-09-13 RX ORDER — ATORVASTATIN CALCIUM 80 MG/1
80 TABLET, FILM COATED ORAL DAILY
Status: DISCONTINUED | OUTPATIENT
Start: 2022-09-14 | End: 2022-09-15 | Stop reason: HOSPADM

## 2022-09-13 RX ORDER — FUROSEMIDE 20 MG/1
20 TABLET ORAL DAILY
Status: DISCONTINUED | OUTPATIENT
Start: 2022-09-14 | End: 2022-09-15 | Stop reason: HOSPADM

## 2022-09-13 RX ORDER — SEVELAMER CARBONATE 800 MG/1
1 TABLET, FILM COATED ORAL
COMMUNITY

## 2022-09-13 RX ORDER — FERROUS SULFATE 325(65) MG
325 TABLET ORAL EVERY OTHER DAY
Status: DISCONTINUED | OUTPATIENT
Start: 2022-09-14 | End: 2022-09-15 | Stop reason: HOSPADM

## 2022-09-13 RX ORDER — SILDENAFIL CITRATE 20 MG/1
20 TABLET ORAL 3 TIMES DAILY
Status: DISCONTINUED | OUTPATIENT
Start: 2022-09-13 | End: 2022-09-15 | Stop reason: HOSPADM

## 2022-09-13 RX ORDER — ALLOPURINOL 100 MG/1
100 TABLET ORAL
Status: DISCONTINUED | OUTPATIENT
Start: 2022-09-14 | End: 2022-09-15 | Stop reason: HOSPADM

## 2022-09-13 RX ORDER — ONDANSETRON 2 MG/ML
4 INJECTION INTRAMUSCULAR; INTRAVENOUS EVERY 6 HOURS PRN
Status: DISCONTINUED | OUTPATIENT
Start: 2022-09-13 | End: 2022-09-15 | Stop reason: HOSPADM

## 2022-09-13 RX ORDER — PANTOPRAZOLE SODIUM 40 MG/1
40 TABLET, DELAYED RELEASE ORAL
Status: DISCONTINUED | OUTPATIENT
Start: 2022-09-14 | End: 2022-09-15 | Stop reason: HOSPADM

## 2022-09-13 RX ADMIN — IOPAMIDOL 75 ML: 755 INJECTION, SOLUTION INTRAVENOUS at 16:47

## 2022-09-13 RX ADMIN — APIXABAN 2.5 MG: 2.5 TABLET, FILM COATED ORAL at 22:50

## 2022-09-13 RX ADMIN — CARVEDILOL 12.5 MG: 12.5 TABLET, FILM COATED ORAL at 22:50

## 2022-09-13 RX ADMIN — GABAPENTIN 300 MG: 300 CAPSULE ORAL at 22:50

## 2022-09-13 ASSESSMENT — ENCOUNTER SYMPTOMS
GASTROINTESTINAL NEGATIVE: 1
BLOOD IN STOOL: 0
RESPIRATORY NEGATIVE: 1

## 2022-09-13 ASSESSMENT — PAIN - FUNCTIONAL ASSESSMENT: PAIN_FUNCTIONAL_ASSESSMENT: NONE - DENIES PAIN

## 2022-09-13 NOTE — ED PROVIDER NOTES
ED Attending Attestation Note     Date of evaluation: 9/13/2022    This patient was seen by the resident. I have seen and examined the patient, agree with the workup, evaluation, management and diagnosis. The care plan has been discussed. My assessment reveals a slender, overall well-appearing gentleman, uncomfortable appearing, but in no acute distress. He has a history of end-stage renal disease on hemodialysis, and also is anticoagulated on Eliquis. He presents today, after having completed his entire dialysis course, with bleeding from his fistula in the left upper extremity, which he states began when he returned home and took his sweater off. According to EMS, there was a significant amount of blood on the floor of the home, with active pulsatile bleeding from a puncture wound in the fistula. On arrival, a tourniquet was placed prior to my evaluation, but did not improve bleeding control, so it was taken down shortly after my arrival to the bedside. A punctate pulsatile source of bleeding was identified, and direct pressure was applied to that area with my thumb. That direct pressure was held for a total of approximately 15 minutes, while nursing staff attempted to obtain IV access. On reevaluation, the bleeding had resolved with direct pressure. A gauze and Coban pressure dressing was applied, with good pulses palpable after application at the wrist, and left in place for approximately an hour, after which there had been no return of bleeding. The pressure dressing was removed, and a simple gauze dressing was then applied, and the patient was observed in the emergency department for further hour, after which he had no return of bleeding.        Shefali Candelaria MD  09/13/22 9701        Addendum:  As the patient was being prepared for discharge, I was called to the bedside by the patient's nurse, who noted that the patient, since last time he was evaluated, seems to have developed a left-sided facial droop and slurred speech. At the time of my evaluation, the patient does have a slight facial droop on the left, although he is able to overcome it with smile. He has slightly slurred speech. He does feel that the sensation is slightly diminished on the left side of the face on the right side of the face. He has a history of a prior stroke, with residual right lower extremity weakness, but states that his previous stroke presented with all right-sided symptoms, and he has never had left-sided weakness in the past.  He is anticoagulated on Eliquis, and is therefore not a candidate for tPA, but head CT and CT angiogram of the head and neck were ordered, as well as basic laboratory studies, to evaluate for the possibility of large vessel occlusion.      Nitin Medrano MD  09/13/22 6769

## 2022-09-13 NOTE — ED PROVIDER NOTES
ED Attending Attestation Note     Date of evaluation: 9/13/2022    This patient was seen by the resident. I have seen and examined the patient, agree with the workup, evaluation, management and diagnosis. The care plan has been discussed. I have reviewed the ECG and concur with the resident's interpretation. My assessment reveals a 62-year-old male with past medical history of ESRD on hemodialysis and previous stroke with residual right-sided mild deficits who initially presented to the emergency department for bleeding from dialysis fistula. This was addressed by the previous provider and had resolved. Pending reassessment of the fistula the patient developed new left-sided facial droop and dysarthria. CT head, CTA showed no acute findings. Patient is not a candidate for fibrinolysis given his anticoagulation and there is no large vessel thrombus that would be amenable to endovascular therapy. NIHSS documented below. In short, patient's total score was 3 for left-sided facial droop, left facial sensory loss, mild dysarthria. Discussed admission for secondary prevention measures, patient agreeable. He will continue to be monitored closely in the ED until he is taken to his new treatment location. NIHSS was performed by myself at 17:15. NIHSS total score was 3.  Score breakdown is as follows: LOC = 0, LOC questions = 0, LOC commands = 0, best gaze = 0, visual = 0, facial palsy = 1, motor LUE = 0, motor RUE = 0, motor LLE = 0, motor RLE = 0, limb ataxia = 0, sensory = 1, best language = 0, dysarthria = 1, extinction/inattention = 0.    Conner Saenz MD  [facility] MD Pauly  09/13/22 6747

## 2022-09-13 NOTE — PROGRESS NOTES
Critical care consulted for change in neuro-deficits of slurred vision and left sided facial droop. Patient received a CT Head wo contrast without any significant acute intracranial change. CTA head and neck w contrast does not reveal any LVO. Patient not deemed to be a TNK candidate since already on Eliquis at home. I consulted Neurology for further recommendations and to evaluate patient if they need q1h neuro checks. I also discussed the patient with my attending, Dr. Mike Garcia who agrees. The ICU team is aware of the patient.

## 2022-09-13 NOTE — ED PROVIDER NOTES
[address] [facility] RESIDENT NOTE       Date of evaluation: 9/13/2022    Chief Complaint     Bleeding/Bruising (Uncontrolled bleeding from dialysis ports. )    of Present Illness     Terry Keene is a 61 y.o. male with a history of ESRD on HD Tuesday Thursday Saturday, hypertension, hyperlipidemia, COPD, previous CVA who presents the emergency department via EMS with a chief complaint of bleeding from left fistula. Patient states that he went to dialysis today. He states that his dialysis session was normal and he did not have any pain or bleeding. However, after he went home, he removed his sweater and subsequently developed pulsatile bleeding from his left fistula. He states that his fistula is not new and has been in place for 6 years without issue. Denies any recent pain, swelling, bleeding, fevers, chest pain, shortness of breath. Called EMS due to the bleeding. EMS applied direct pressure for unable to achieve hemostasis completely with pressure dressing. He denies being on blood thinners. No dizziness, syncope/presyncope. Review of Systems     Review of Systems   HENT: Negative. Respiratory: Negative. Cardiovascular: Negative. Gastrointestinal: Negative. Negative for blood in stool. Neurological:  Negative for dizziness, syncope and light-headedness. Hematological:  Does not bruise/bleed easily. All other systems reviewed and are negative. Past Medical, Surgical, Family, and Social History     He has a past medical history of Cerebral artery occlusion with cerebral infarction ([address] CHF (congestive heart failure) ([address] Chronic kidney disease, COPD (chronic obstructive pulmonary disease) ([address] Dialysis patient ([address] Gout, Hemodialysis patient ([address] Hx of blood clots, Hyperlipidemia, Hypertension, and Renal failure. He has a past surgical history that includes Dialysis fistula creation (Left) and Colonoscopy.   His family history includes Cancer in his father and mother; Other in his sister. He reports that he has quit smoking. He has never used smokeless tobacco. He reports current alcohol use. He reports that he does not use drugs. Medications     Previous Medications    ALBUTEROL SULFATE HFA (VENTOLIN HFA) 108 (90 BASE) MCG/ACT INHALER    Inhale 2 puffs into the lungs every 6 hours as needed for Wheezing 90mcg/actuation    ALLOPURINOL (ZYLOPRIM) 100 MG TABLET    Take 100 mg by mouth Daily on MWF    APIXABAN (ELIQUIS) 2.5 MG TABS TABLET    Take 1 tablet by mouth 2 times daily    ATORVASTATIN (LIPITOR) 80 MG TABLET    Take 80 mg by mouth daily    CALCIUM CARB-CHOLECALCIFEROL 600-200 MG-UNIT TABS TABLET    Take 1 tablet by mouth daily    CARVEDILOL (COREG) 12.5 MG TABLET    Take 12.5 mg by mouth 2 times daily Unsure of dose     FERROUS SULFATE (FE TABS) 325 (65 FE) MG EC TABLET    Take 1 tablet by mouth every other day    FLUTICASONE-SALMETEROL (ADVAIR DISKUS) 500-50 MCG/ACT AEPB DISKUS INHALER    Inhale 1 puff into the lungs 2 times daily    FUROSEMIDE (LASIX) 20 MG TABLET    Take 20 mg by mouth daily    GABAPENTIN (NEURONTIN) 300 MG CAPSULE    Take 300 mg by mouth at bedtime. OMEPRAZOLE (PRILOSEC) 20 MG DELAYED RELEASE CAPSULE    Take 20 mg by mouth daily    PSEUDOEPHEDRINE (SUDAFED) 30 MG TABLET    Take 30 mg by mouth every 6 hours as needed for Congestion    SILDENAFIL (REVATIO) 20 MG TABLET    Take 20 mg by mouth in the morning, at noon, and at bedtime    SODIUM CHLORIDE (OCEAN, BABY AYR) 0.65 % NASAL SPRAY    1 spray by Nasal route every 4 hours    UMECLIDINIUM BROMIDE (INCRUSE ELLIPTA) 62.5 MCG/INH AEPB    Inhale 1 puff into the lungs daily    VITAMIN D (ERGOCALCIFEROL) 1.25 MG (76790 UT) CAPS CAPSULE    Take 50,000 Units by mouth once a week       Allergies     He has No Known Allergies. Physical Exam     INITIAL VITALS: BP: 138/74,  , Heart Rate: 69, Resp: 20,     Physical Exam  Vitals reviewed.    Constitutional: Appearance: Normal appearance. HENT:      Head: Normocephalic and atraumatic. Right Ear: External ear normal.      Left Ear: External ear normal.      Nose: Nose normal.      Mouth/Throat:      Mouth: Mucous membranes are moist.      Pharynx: Oropharynx is clear. Eyes:      Extraocular Movements: Extraocular movements intact. Pupils: Pupils are equal, round, and reactive to light. Cardiovascular:      Rate and Rhythm: Normal rate and regular rhythm. Pulses: Normal pulses. Heart sounds: Normal heart sounds. Pulmonary:      Effort: Pulmonary effort is normal.      Breath sounds: Normal breath sounds. Abdominal:      General: Abdomen is flat. Palpations: Abdomen is soft. Musculoskeletal:         General: No swelling. Normal range of motion. Cervical back: Normal range of motion. Skin:     General: Skin is warm and dry. Capillary Refill: Capillary refill takes less than 2 seconds. Comments: Over the left upper extremity around the bicep, there is a small area of pulsatile bleeding overlying the present fistula with palpable thrill. Neurological:      General: No focal deficit present. Mental Status: He is alert and oriented to person, place, and time. Cranial Nerves: No cranial nerve deficit. Psychiatric:         Mood and Affect: Mood normal.         Behavior: Behavior normal.       DiagnosticResults     EKG   Interpreted in conjunction with emergencydepartment physician Viraj Goss MD  Rhythm: 1 degree AV block  Rate: normal  Axis: Borderline left axis deviation  Ectopy: none  Conduction: normal  ST Segments: no acute change  T Waves:inversion in  v3, v4, and v5  Q Waves: none  Clinical Impression: no acute changes  Comparison: No significant change from prior on 6/15/2022    RADIOLOGY:  CTA HEAD NECK W CONTRAST   Final Result      CTA Head:   1. No steno-occlusive disease or aneurysm. CTA Neck:   1. No steno-occlusive disease.    2. Moderate physician. Confirmed by MD, ER ([id number]),  Tristankrystal Fiorella ([phone]) on 9/13/2022 7:23:39 PM       ED BEDSIDE ULTRASOUND:  No results found. RECENT VITALS:  BP: 138/74,  , Heart Rate: 69,Resp: 20,       Procedures       ED Course     Nursing Notes, Past Medical Hx, Past Surgical Hx, Social Hx, Allergies, and Family Hx were reviewed. The patient was given the followingmedications:  No orders of the defined types were placed in this encounter. CONSULTS:  None    MEDICAL DECISION MAKING / ASSESSMENT / PLAN     Hany Rubi is a 61 y.o. male with a history of ESRD on HD presents emergency department with bleeding fistula. Upon initial evaluation, hemostasis had not yet been achieved. Prior to my entrance into the trauma bay, a tourniquet was placed by bedside nursing at 1305 but was subsequently taken down at 1308. After 10 minutes of direct pressure to point of pulsatile bleeding, hemostasis was achieved. Surgicel, gauze, and Coban was applied. Patient was monitored in the emergency department for about an hour after cessation of bleeding to ensure no recurrence. Still has a palpable thrill. Given acuity of the event, no lightheadedness or dizziness, hemodynamically stable, deferred labs at this time. Was able to complete all of dialysis. Plan was for outpatient follow-up, however nursing alerted attending physician at [phone] that patient had apparent new neurodeficit with dysarthria and left-sided facial drooping with patient stating his face felt odd. Patient is not a tPA candidate as patient is on Eliquis for CTEPH, but CT head Noncon and CTA was obtained to evaluate for LVO. No clear LVO, but on my repeat assessment does have left-sided facial droop the patient states that his new and he feels his voice sounds different. Subjective diminished sensation through V1, V2, and V3.   Discussed with patient that although not tPA candidate and no LVO, would benefit from likely MRI, neuro consult, frequent neurochecks, and optimization of medical risk factors in the inpatient setting. Given need for frequent neurochecks, may require ICU. Spoke with neurologist, who stated that every 2 hour neurochecks would be appropriate. Patient will be admitted to medicine. This patient was also evaluated by the attending physician. All care plans werediscussed and agreed upon. Clinical Impression     1. Neurological deficit present        Disposition     PATIENT REFERRED TO:  No follow-up provider specified. DISCHARGE MEDICATIONS:  New Prescriptions    No medications on file       DISPOSITION  -Decision to admit    Avelino Grove.  Damon Irwin MD  Emergency Medicine PGY-2        Shaun Gould MD  Resident  09/13/22 7269

## 2022-09-13 NOTE — ED NOTES
Visited pt and attempted to have pt arrange transportation home after DC. While speaking with pt, acute changes noted in patient speech pattern with obvious slurring of his words and left side facial droop noted that did not appear to be present prior to this interaction. Pt stated that he felt weird in his head and that the left side of his face felt different. Dr. Joanne Kendall immediately notified. Labs drawn from existing IV and pt transported to CT.        Andrés Hickey RN  09/13/22 2759

## 2022-09-14 LAB
ABO/RH: NORMAL
ANION GAP SERPL CALCULATED.3IONS-SCNC: 12 MMOL/L (ref 3–16)
ANISOCYTOSIS: ABNORMAL
ANTIBODY SCREEN: NORMAL
BASOPHILS ABSOLUTE: 0 K/UL (ref 0–0.2)
BASOPHILS RELATIVE PERCENT: 0.5 %
BLOOD BANK DISPENSE STATUS: NORMAL
BLOOD BANK PRODUCT CODE: NORMAL
BPU ID: NORMAL
BUN BLDV-MCNC: 31 MG/DL (ref 7–20)
CALCIUM SERPL-MCNC: 8.3 MG/DL (ref 8.3–10.6)
CHLORIDE BLD-SCNC: 93 MMOL/L (ref 99–110)
CHOLESTEROL, TOTAL: 77 MG/DL (ref 0–199)
CO2: 27 MMOL/L (ref 21–32)
CREAT SERPL-MCNC: 7.2 MG/DL (ref 0.8–1.3)
DESCRIPTION BLOOD BANK: NORMAL
EOSINOPHILS ABSOLUTE: 0.2 K/UL (ref 0–0.6)
EOSINOPHILS RELATIVE PERCENT: 5.5 %
GFR AFRICAN AMERICAN: 9
GFR NON-AFRICAN AMERICAN: 8
GLUCOSE BLD-MCNC: 109 MG/DL (ref 70–99)
HCT VFR BLD CALC: 20.3 % (ref 40.5–52.5)
HDLC SERPL-MCNC: 38 MG/DL (ref 40–60)
HEMOGLOBIN: 6.5 G/DL (ref 13.5–17.5)
LDL CHOLESTEROL CALCULATED: 30 MG/DL
LYMPHOCYTES ABSOLUTE: 0.4 K/UL (ref 1–5.1)
LYMPHOCYTES RELATIVE PERCENT: 15.9 %
MCH RBC QN AUTO: 28.6 PG (ref 26–34)
MCHC RBC AUTO-ENTMCNC: 32.2 G/DL (ref 31–36)
MCV RBC AUTO: 88.7 FL (ref 80–100)
MONOCYTES ABSOLUTE: 0.2 K/UL (ref 0–1.3)
MONOCYTES RELATIVE PERCENT: 8.3 %
NEUTROPHILS ABSOLUTE: 1.9 K/UL (ref 1.7–7.7)
NEUTROPHILS RELATIVE PERCENT: 69.8 %
OVALOCYTES: ABNORMAL
PDW BLD-RTO: 16.4 % (ref 12.4–15.4)
PLATELET # BLD: 75 K/UL (ref 135–450)
PLATELET SLIDE REVIEW: ABNORMAL
PMV BLD AUTO: 8 FL (ref 5–10.5)
POTASSIUM REFLEX MAGNESIUM: 3.8 MMOL/L (ref 3.5–5.1)
RBC # BLD: 2.29 M/UL (ref 4.2–5.9)
SCHISTOCYTES: ABNORMAL
SODIUM BLD-SCNC: 132 MMOL/L (ref 136–145)
TEAR DROP CELLS: ABNORMAL
TRIGL SERPL-MCNC: 45 MG/DL (ref 0–150)
VLDLC SERPL CALC-MCNC: 9 MG/DL
WBC # BLD: 2.7 K/UL (ref 4–11)

## 2022-09-14 PROCEDURE — 86900 BLOOD TYPING SEROLOGIC ABO: CPT

## 2022-09-14 PROCEDURE — 80048 BASIC METABOLIC PNL TOTAL CA: CPT

## 2022-09-14 PROCEDURE — 97535 SELF CARE MNGMENT TRAINING: CPT

## 2022-09-14 PROCEDURE — 80061 LIPID PANEL: CPT

## 2022-09-14 PROCEDURE — 97116 GAIT TRAINING THERAPY: CPT

## 2022-09-14 PROCEDURE — 86901 BLOOD TYPING SEROLOGIC RH(D): CPT

## 2022-09-14 PROCEDURE — 85025 COMPLETE CBC W/AUTO DIFF WBC: CPT

## 2022-09-14 PROCEDURE — 92523 SPEECH SOUND LANG COMPREHEN: CPT

## 2022-09-14 PROCEDURE — 83036 HEMOGLOBIN GLYCOSYLATED A1C: CPT

## 2022-09-14 PROCEDURE — 97530 THERAPEUTIC ACTIVITIES: CPT

## 2022-09-14 PROCEDURE — 92610 EVALUATE SWALLOWING FUNCTION: CPT

## 2022-09-14 PROCEDURE — 2700000000 HC OXYGEN THERAPY PER DAY

## 2022-09-14 PROCEDURE — 36430 TRANSFUSION BLD/BLD COMPNT: CPT

## 2022-09-14 PROCEDURE — 6370000000 HC RX 637 (ALT 250 FOR IP): Performed by: STUDENT IN AN ORGANIZED HEALTH CARE EDUCATION/TRAINING PROGRAM

## 2022-09-14 PROCEDURE — 6370000000 HC RX 637 (ALT 250 FOR IP): Performed by: INTERNAL MEDICINE

## 2022-09-14 PROCEDURE — APPNB60 APP NON BILLABLE TIME 46-60 MINS

## 2022-09-14 PROCEDURE — 86923 COMPATIBILITY TEST ELECTRIC: CPT

## 2022-09-14 PROCEDURE — 97162 PT EVAL MOD COMPLEX 30 MIN: CPT

## 2022-09-14 PROCEDURE — 97166 OT EVAL MOD COMPLEX 45 MIN: CPT

## 2022-09-14 PROCEDURE — 94761 N-INVAS EAR/PLS OXIMETRY MLT: CPT

## 2022-09-14 PROCEDURE — 86850 RBC ANTIBODY SCREEN: CPT

## 2022-09-14 PROCEDURE — 94640 AIRWAY INHALATION TREATMENT: CPT

## 2022-09-14 PROCEDURE — 36415 COLL VENOUS BLD VENIPUNCTURE: CPT

## 2022-09-14 PROCEDURE — 2060000000 HC ICU INTERMEDIATE R&B

## 2022-09-14 PROCEDURE — P9016 RBC LEUKOCYTES REDUCED: HCPCS

## 2022-09-14 RX ORDER — CARVEDILOL 6.25 MG/1
6.25 TABLET ORAL 2 TIMES DAILY
Status: DISCONTINUED | OUTPATIENT
Start: 2022-09-14 | End: 2022-09-15 | Stop reason: HOSPADM

## 2022-09-14 RX ORDER — SODIUM CHLORIDE 9 MG/ML
INJECTION, SOLUTION INTRAVENOUS PRN
Status: DISCONTINUED | OUTPATIENT
Start: 2022-09-14 | End: 2022-09-15 | Stop reason: HOSPADM

## 2022-09-14 RX ADMIN — GABAPENTIN 300 MG: 300 CAPSULE ORAL at 20:13

## 2022-09-14 RX ADMIN — Medication 1 TABLET: at 08:58

## 2022-09-14 RX ADMIN — FERROUS SULFATE TAB 325 MG (65 MG ELEMENTAL FE) 325 MG: 325 (65 FE) TAB at 08:58

## 2022-09-14 RX ADMIN — CARVEDILOL 12.5 MG: 12.5 TABLET, FILM COATED ORAL at 08:58

## 2022-09-14 RX ADMIN — SILDENAFIL 20 MG: 20 TABLET ORAL at 20:13

## 2022-09-14 RX ADMIN — SILDENAFIL 20 MG: 20 TABLET ORAL at 14:08

## 2022-09-14 RX ADMIN — ALLOPURINOL 100 MG: 100 TABLET ORAL at 22:52

## 2022-09-14 RX ADMIN — ATORVASTATIN CALCIUM 80 MG: 80 TABLET, FILM COATED ORAL at 08:58

## 2022-09-14 RX ADMIN — APIXABAN 2.5 MG: 2.5 TABLET, FILM COATED ORAL at 08:58

## 2022-09-14 RX ADMIN — FUROSEMIDE 20 MG: 20 TABLET ORAL at 08:58

## 2022-09-14 RX ADMIN — SILDENAFIL 20 MG: 20 TABLET ORAL at 08:58

## 2022-09-14 RX ADMIN — PANTOPRAZOLE SODIUM 40 MG: 40 TABLET, DELAYED RELEASE ORAL at 05:58

## 2022-09-14 RX ADMIN — APIXABAN 2.5 MG: 2.5 TABLET, FILM COATED ORAL at 20:13

## 2022-09-14 RX ADMIN — SILDENAFIL 20 MG: 20 TABLET ORAL at 00:30

## 2022-09-14 RX ADMIN — CARVEDILOL 6.25 MG: 6.25 TABLET, FILM COATED ORAL at 20:13

## 2022-09-14 RX ADMIN — MOMETASONE FUROATE AND FORMOTEROL FUMARATE DIHYDRATE 2 PUFF: 200; 5 AEROSOL RESPIRATORY (INHALATION) at 18:59

## 2022-09-14 RX ADMIN — TIOTROPIUM BROMIDE INHALATION SPRAY 2 PUFF: 3.12 SPRAY, METERED RESPIRATORY (INHALATION) at 11:22

## 2022-09-14 NOTE — PROGRESS NOTES
Physical Therapy  Facility/Department: [facility] 5T ORTHO/NEURO  Physical Therapy Initial Assessment / Treatment    Name: Monserrat Handy  : 1955  MRN: [id number]  Date of Service: 2022    Discharge Recommendations: Monserrat Handy scored a 17/24 on the AM-PAC short mobility form. Current research shows that an AM-PAC score of 17 or less is typically not associated with a discharge to the patient's home setting. Based on the patient's AM-PAC score and their current functional mobility deficits, it is recommended that the patient have 3-5 sessions per week of Physical Therapy at d/c to increase the patient's independence. Please see assessment section for further patient specific details. If patient discharges prior to next session this note will serve as a discharge summary. Please see below for the latest assessment towards goals. Patient Diagnosis(es): The encounter diagnosis was Neurological deficit present. Past Medical History:  has a past medical history of Cerebral artery occlusion with cerebral infarction ([facility] [address] CHF (congestive heart failure) ([facility] [address] [facility] Chronic kidney disease, COPD (chronic obstructive pulmonary disease) ([facility] [facility] Dialysis patient ([facility] [address] [facility] Gout, Hemodialysis patient ([facility] [address] Hx of blood clots, Hyperlipidemia, Hypertension, and Renal failure. Past Surgical History:  has a past surgical history that includes Dialysis fistula creation (Left) and Colonoscopy. Assessment   Body Structures, Functions, Activity Limitations Requiring Skilled Therapeutic Intervention: Decreased functional mobility ; Decreased strength;Decreased safe awareness;Decreased endurance;Decreased balance  Assessment: Pt is a y.o. male who is functioing below his baseline. Intact B LE sensation and coordination, limited coordination testing d/t LBP. Pt required CGA to SBA for functional mobility. Required VC for transfers and ambulation. Pt demonstrates moderate unsteadiness when ambulating w/ SPC.  Pt will be followed for remaining IP care. Pt would benefit from skilled therapy upon d/c to increase functional mobilty and safety awareness. Treatment Diagnosis: Decreased functional mobility  Therapy Prognosis: Good  Decision Making: Medium Complexity  Requires PT Follow-Up: Yes  Activity Tolerance  Activity Tolerance: Patient tolerated treatment well;Patient tolerated evaluation without incident  Activity Tolerance Comments: 98% O2 sats post 30ft ambulation. 2.5L od O2 at rest and 3L of O2 during ambulation via n/c     Plan   Plan  Plan:  (2-5x a week)  Current Treatment Recommendations: Patient/Caregiver education & training, Safety education & training, Endurance training, Strengthening, Balance training, Gait training, Functional mobility training, Transfer training  Safety Devices  Type of Devices: Left in chair, Nurse notified, Gait belt, Call light within reach, Chair alarm in place     Restrictions  Restrictions/Precautions  Restrictions/Precautions: Bed Alarm  Position Activity Restriction  Other position/activity restrictions: no mobility orders at this time 9/14     Subjective   General  Chart Reviewed: Yes  Patient assessed for rehabilitation services?: Yes  Additional Pertinent Hx: Pt reports to ED 9/13 d/t L UE fistula bleeding. Pt preparing to d/c when L facial droop and slurred speech appeared. Imaging: Head CT (-), brain MRI (+) mod small vessel ischemia and multiple chronic cerebellar infarction. (+) PRBCs; PMHx: ESRD, HTN, HLD, COPD, CKD, CHF, previous CVA w/ R NADINE willson. 3/22 LVEF 35-40%  Referring Practitioner: Elliott Odell MD  Referral Date : 09/13/22  Diagnosis: CVA  Follows Commands: Within Functional Limits  Subjective  Subjective: Pt in chair upon arrival, Pt agreeable to PT.      Social/Functional History  Social/Functional History  Type of Home:  (nursing facility / LTC)  Bathroom Shower/Tub: Walk-in shower  Bathroom Toilet: Handicap height  Bathroom Equipment: Shower chair  Home Equipment: [facility])  Has the patient had two or more falls in the past year or any fall with injury in the past year?:  (1: Pt fell coming off of commode, Pt notes he hit head. 2 -3 months ago)  Receives Help From:  (LTC staff)  ADL Assistance: Independent  Homemaking Responsibilities: No (lives LTC facility)  Ambulation Assistance:  Indep with cane for short distances in room (Pt notes he gets winded and uses a wc when going to meals at [facility]. Staff pushes wc)  Transfer Assistance: Independent  Active : No  Type of Occupation: used to work as a   Vision/Hearing  Vision  Vision: Impaired  Vision Exceptions: Wears glasses at all times  Hearing  Hearing: Within functional limits    Cognition   Orientation  Orientation Level: Oriented X4 (Pt knew month and year: required increased time)   Cognition  Overall Cognitive Status: Exceptions  Attention Span: Appears intact  Safety Judgement: Decreased awareness of need for assistance  Problem Solving: Decreased awareness of errors;Assistance required to generate solutions;Assistance required to identify errors made;Assistance required to correct errors made  Insights: Decreased awareness of deficits    Objective   AROM RLE (degrees)  RLE AROM: WFL  AROM LLE (degrees)  LLE AROM : WFL  Strength RLE  Comment: Grossly decreased strength compared to LLE seen during seated MMTs, at least 3+/5  Strength LLE  Strength LLE: WFL  Strength LUE  Comment: Pt has LBP w/ R LE hip flexion when seated     Transfers  Sit to Stand: Contact guard assistance  Stand to sit: Contact guard assistance  Comment: Pt required VC for proper hand placement  Ambulation  Surface: level tile  Device: Single point cane (held in R hand)  Other Apparatus: O2 (L via n/c)  Assistance: Contact guard assistance  Quality of Gait: Unsteady w/ no overt LOB. Anterior trunk lean. decreased gait speed  Distance: 30 ft (1 trial) 15ft (2 trials).  Rest breaks given after all trials of ambulation     Balance  Sitting - Static: (SBA)  Standing - Static:  (CGA w/ SPC in R hand)    AM-PAC Score  AM-PAC Inpatient Mobility Raw Score : 17 (09/14/22 1605)  AM-PAC Inpatient T-Scale Score : 42.13 (09/14/22 1605)  Mobility Inpatient CMS 0-100% Score: 50.57 (09/14/22 1605)  Mobility Inpatient CMS G-Code Modifier : CK (09/14/22 1605)     Goals  Short Term Goals  Time Frame for Short term goals: d/c  Short term goal 1: ambulate 100ft w/ LRAD at SBA  Short term goal 2: complete sit <--> at [facility]  Patient Goals   Patient goals : return to LTC facility     Education  Patient Education  Education Given To: Patient  Education Provided:  (role of therapy, transfer training)  Education Method: Verbal  Barriers to Learning: None  Education Outcome: Continued education needed;Verbalized understanding    Therapy Time   Individual Concurrent Group Co-treatment   Time In 0248         Time Out 0329         Minutes 41               Timed code treatment minutes: 26    Total treatment minutes: [address], Student Physical Therapist      Therapist was present, directed the patient's care, made skilled judgment, and was responsible for assessment and treatment of the patient. If patient discharges prior to next session this note will serve as a discharge summary.       Brit Bearden, PT, DPT  [id number]

## 2022-09-14 NOTE — CONSULTS
Neurology / Vincent Amabile Consult Note    Antoni Horowitz MD is requesting this consult. Reason for Consult: new neuro deficit, possible stroke   Admission Chief Complaint: uncontrolled bleeding from dialysis fistula    History of Present Illness     Wendy Sr is a 61 y.o. y/o male with PMH significant for ESRD (HD Tuesday, Thursday, Saturyday), CHF, COPD, DVT, cerebral infarct, CTEPH (Eliquis), HLD (Lipitor), and HTN. Per my interview with the patient he noticed bleeding from his fistula on arriving home from HD on 9/13. Patient called EMS as he wasn't able to control the bleeding. EMS brought the patient to [facility] ED where a pressure dressing was applied. While in [facility] ED, the patient developed new onset dysarthria and a L facial droop. A code stroke was called. CT head was negative for any intracranial abnormalities or bleeding. CTA head and neck showed no steno-occlusive disease or aneurysm. Patient was not a candidate for TNK as he is on Eliquis at home. Patient was admitted to [facility] 5T for further evaluation and treatment. MRI completed; shows with chronic small vessel ischemia and chronic cerebellar infarct and hemorrhage with no acute infarct. REVIEW OF SYSTEMS:   Constitutional- No weight loss, fevers, or recent illness. Eyes- No diplopia. No photophobia. Ears/nose/throat- No dysphagia. No Dysarthria   Cardiovascular- No palpitations. No chest pain   Respiratory- No dyspnea. No Cough   Gastrointestinal- No Abdominal pain. No Vomiting. Genitourinary- No incontinence. No urinary retention   Musculoskeletal- No myalgia. No arthralgia. C/o difficulty and pain with movement to R shoulder  Skin- No rash. No easy bruising. Psychiatric- No depression. No anxiety   Endocrine- No diabetes. No thyroid issues. Hematologic- No bleeding difficulty. No fatigue   Neurologic- Denies headache.  C/o blurred vision to R eye (started ~3 days ago), trouble talking, and word finding difficulty.      Past Medical, Surgical, Family, and Social History   PAST MEDICAL HISTORY:  Past Medical History:   Diagnosis Date    Cerebral artery occlusion with cerebral infarction ([facility]     CHF (congestive heart failure) ([facility])     Chronic kidney disease     COPD (chronic obstructive pulmonary disease) ([facility]     Dialysis patient ([facility]     Gout     Hemodialysis patient ([facility]     Hx of blood clots     Hyperlipidemia     Hypertension     Renal failure      SURGICAL HISTORY:  Past Surgical History:   Procedure Laterality Date    COLONOSCOPY      DIALYSIS FISTULA CREATION Left      FAMILY HISTORY & SOCIAL HISTORY:  Family history non-contributory  Family History   Problem Relation Age of Onset    Cancer Mother     Cancer Father     Other Sister      Social History     Tobacco Use    Smoking status: Former    Smokeless tobacco: Never   Vaping Use    Vaping Use: Never used   Substance Use Topics    Alcohol use: Yes     Comment: occasional    Drug use: Never         Allergies & Outpatient Medications   ALLERGIES:  No Known Allergies  HOME MEDICATIONS:  Current Discharge Medication List        CONTINUE these medications which have NOT CHANGED    Details   calcitRIOL (ROCALTROL) 0.25 MCG capsule Take 0.5 mcg by mouth every other day MWF      sevelamer (RENVELA) 800 MG tablet Take 1 tablet by mouth 3 times daily (with meals)      apixaban (ELIQUIS) 2.5 MG TABS tablet Take 1 tablet by mouth 2 times daily  Qty: 60 tablet, Refills: 1      sodium chloride (OCEAN, BABY AYR) 0.65 % nasal spray 1 spray by Nasal route every 4 hours  Qty: 1 each, Refills: 1      ferrous sulfate (FE TABS) 325 (65 Fe) MG EC tablet Take 1 tablet by mouth every other day  Qty: 15 tablet, Refills: 1      fluticasone-salmeterol (ADVAIR) 500-50 MCG/ACT AEPB diskus inhaler Inhale 1 puff into the lungs 2 times daily      atorvastatin (LIPITOR) 80 MG tablet Take 80 mg by mouth daily      calcium carb-cholecalciferol 600-200 MG-UNIT TABS tablet Take 1 tablet by mouth daily      furosemide (LASIX) 20 MG tablet Take 20 mg by mouth daily      Umeclidinium Bromide (INCRUSE ELLIPTA) 62.5 MCG/INH AEPB Inhale 1 puff into the lungs daily      pseudoephedrine (SUDAFED) 30 MG tablet Take 30 mg by mouth every 6 hours as needed for Congestion      sildenafil (REVATIO) 20 MG tablet Take 20 mg by mouth in the morning, at noon, and at bedtime      vitamin D (ERGOCALCIFEROL) 1.25 MG (13091 UT) CAPS capsule Take 50,000 Units by mouth once a week Saturdays      gabapentin (NEURONTIN) 300 MG capsule Take 300 mg by mouth at bedtime.        omeprazole (PRILOSEC) 20 MG delayed release capsule Take 20 mg by mouth daily      carvedilol (COREG) 12.5 MG tablet Take 12.5 mg by mouth 2 times daily Unsure of dose       albuterol sulfate HFA (PROVENTIL;VENTOLIN;PROAIR) 108 (90 Base) MCG/ACT inhaler Inhale 2 puffs into the lungs every 6 hours as needed for Wheezing 90mcg/actuation      allopurinol (ZYLOPRIM) 100 MG tablet Take 100 mg by mouth Daily on MWF               Physical Exam   PHYSICAL EXAM:  Vitals:    09/13/22 2159 09/14/22 0031 09/14/22 0226 09/14/22 0641   BP: 130/75 116/71 110/66 112/68   Pulse: 74  64 68   Resp: 16 16 16 16   Temp: 97.7 °F (36.5 °C) 97.8 °F (36.6 °C) 97.8 °F (36.6 °C) 97.6 °F (36.4 °C)   TempSrc: Oral Oral Oral Oral   SpO2: 98% 98% 98% 100%   Weight:       Height:             General: Alert, no distress, well-nourished  Neurologic  Mental status:   orientation to person, place, time, situation   Attention intact as able to attend well to the exam     Language mild dysarthria, expressive aphasia with word finding difficulty    Comprehension intact; follows simple commands    Cranial nerves:   CN2: Visual fields full w/o extinction on confrontational testing   CN 3,4,6: Pupils equal and reactive to light, extraocular muscles intact  CN5: Decreased sensation on L face  CN7: L facial droop  CN8: Hearing symmetric to spoken voice  CN9: Palate elevated symmetrically  CN11: Traps full strength on shoulder shrug  CN12: Tongue midline with protrusion    Motor Exam:   R  L    Deltoid 4  5   Biceps 5 5   Triceps 5 5   Wrist extension  5 5   Interossei 5 5      R  L    Hip flexion  5  5   Hip extension  5 5   Knee flexion  5 5   Knee extension  5 5   Ankle dorsiflexion  5 5   Ankle plantar flexion  5 5     Deep tendon reflexes:    R  L    Biceps  2  2   Brachioradialis  2 2   Patellar  2 2     Sensory: light touch intact and symmetric in all 4 extremities. No sensory extinction on bilateral simultaneous stimulation  Cerebellar/coordination: finger nose finger normal without ataxia  Tone: normal in all 4 extremities  Gait: held 2/2 patient safety    ABCD2 score = 4, moderate risk    OTHER SYSTEMS:  Cardiovascular: Warm, appears well perfused   Respiratory: Easy, non-labored respiratory pattern   Abdominal: Abdomen is without distention   Extremities: Upper and lower extremities are atraumatic in appearance without deformity. No swelling or erythema. Diagnostic Testing Results   IMAGES:  Images personally reviewed and agree w/ radiology interpretation. Head CT w/o Contrast:  Impression       No acute intracranial abnormality or significant change. Left maxillary sinus disease. CTA of Head / Neck w/ Contrast:  CTA Head:   1. No steno-occlusive disease or aneurysm. CTA Neck:   1. No steno-occlusive disease. 2. Moderate emphysema with right hilar lymph node. Recommend dedicated chest CT for further evaluation. MRI Brain w/o Contrast:  Impression       1. No acute infarction or recent hemorrhage     2. Moderate chronic small vessel ischemia   3. Multiple bilateral chronic cerebellar infarctions   4. Chronic microhemorrhages involving the basal ganglia and jenny compatible with sequela of chronic hypertension. Additional small chronic hemorrhagic insult involving the subcortical left lateral occipitotemporal lobe. LABS:  All results below personally reviewed. Pertinent positives & negatives are addressed in Impression & Recommendations below. LABS   Metabolic Panel Recent Labs     09/13/22  1705 09/14/22  0700   * 132*   K 3.8 3.8   CL 93* 93*   CO2 30 27   BUN 28* 31*   CREATININE 6.4* 7.2*   GLUCOSE 93 109*   CALCIUM 8.5 8.3      CBC / Coags Recent Labs     09/13/22  1705 09/14/22  0700   WBC 2.7* 2.7*   RBC 2.67* 2.29*   HGB 7.4* 6.5*   HCT 23.9* 20.3*   PLT 85* 75*   INR 1.52*  --       Other No results for input(s): LABA1C, LDLCALC, TRIG, TSH, [id number], FOLATE, LABSALI, COVID19 in the last 72 hours. No results for input(s): PHENYTOIN, KEPPRA, LACOSA, LAMO, VALPROATE, LACTSEPSIS, LACTA in the last 72 hours. CURRENT SCHEDULED MEDICATIONS   Inpatient Medications     gabapentin, 300 mg, Oral, Nightly    furosemide, 20 mg, Oral, Daily    sildenafil, 20 mg, Oral, TID    [START ON 9/17/2022] vitamin D, 50,000 Units, Oral, Once per day on Sat    pantoprazole, 40 mg, Oral, QAM AC    mometasone-formoterol, 2 puff, Inhalation, BID    tiotropium, 2 puff, Inhalation, Daily    ferrous sulfate, 325 mg, Oral, Every Other Day    allopurinol, 100 mg, Oral, Q MWF    atorvastatin, 80 mg, Oral, Daily    carvedilol, 12.5 mg, Oral, BID    calcium-cholecalciferol, 1 tablet, Oral, Daily    apixaban, 2.5 mg, Oral, BID   Infusions    sodium chloride        Antibiotics   Recent Abx Admin        No antibiotic orders with administrations found. IMPRESSION & RECOMMENDATIONS     IMPRESSION:  Silvana Carrera is a 62 yo male who presents with R eye blurry vision for the past 2-3 days, and new onset (9/13) L facial droop, decreased sensation on the L side of his face, dysarthria, and expressive aphasia. CT head, CTA head and neck, and MRI are all negative for any acute process. Pancytopenia noted on AM labs. Patient symptoms likely related to TIA.          RECOMMENDATIONS:  - q4h neuro checks  - SBP goal <140mmHg  - goal Hgb >7  - continue home lipitor and Eliquis dosing  - lipid panel and Hgb A1C ordered  -PT/OT/ST for eval and treat  - follow up with Iza Fonseca as an outpatient    Neurology will sign off. Please call with any questions or neuro changes. OFELIA Rock - CNP   Neurology & Neurocritical Care   Neurology Line: [phone]  [facility]: [facility] Neurology & Neuro Critical [facility] NPs  9/14/2022 10:14 AM    I spent 60 minutes in the care of this patient. Over 50% of that time was in face-to-face counseling regarding disease process, diagnostic testing, preventative measures, and answering patient and family questions.

## 2022-09-14 NOTE — H&P
Hospital Medicine History & Physical      PCP: Sagar Padron MD    Date of Admission: 9/13/2022    Date of Service: 9/13/2022    Pt seen/examined on 9/13/2022    Admitted to Inpatient with expected LOS greater than two midnights due to medical therapy. Chief Complaint:     Chief Complaint   Patient presents with    Bleeding/Bruising     Uncontrolled bleeding from dialysis ports. History Of Present Illness:      61 y.o. male with a history of multiple medical problems who presented to [facility] with a bleeding fistula site in his left arm after having completed dialysis. He was trying to take his sweater off when afterwards he noticed it bleeding. EMS arrived, found blood on floor, brought to ED. After direct pressure obtained for 15 min in ED, gauze applied and bleeding stopped after an hour. Pt was ready to be discharged from ED when he experienced a sudden left sided facial droop with slurred speech. He also complains of a diminished sensation on that side of the face. Was not a tPA candidate d/t being on Eliquis. CT obtained showing no abnormalities. He has no other complaints. Past Medical History:      Reviewed and non-contributory except as noted below      Diagnosis Date    Cerebral artery occlusion with cerebral infarction ([facility]     CHF (congestive heart failure) ([facility])     Chronic kidney disease     COPD (chronic obstructive pulmonary disease) ([address]     Dialysis patient ([facility]     Gout     Hemodialysis patient ([facility]     Hx of blood clots     Hyperlipidemia     Hypertension     Renal failure        Past Surgical History:      Reviewed and non-contributory except as noted below      Procedure Laterality Date    COLONOSCOPY      DIALYSIS FISTULA CREATION Left        Medications Prior to Admission:      Reviewed and non-contributory except as noted below  Prior to Admission medications    Medication Sig Start Date End Date Taking?  Authorizing Provider   apixaban (ELIQUIS) 2.5 MG TABS tablet Take 1 tablet by mouth 2 times daily 6/18/22   Sunny Vizcarra MD   sodium chloride (OCEAN, BABY AYR) 0.65 % nasal spray 1 spray by Nasal route every 4 hours 6/18/22 7/18/22  Sunny Vizcarra MD   ferrous sulfate (FE TABS) 325 (65 Fe) MG EC tablet Take 1 tablet by mouth every other day 6/18/22 8/17/22  Sunny Vizcarra MD   fluticasone-salmeterol (ADVAIR DISKUS) 500-50 MCG/ACT AEPB diskus inhaler Inhale 1 puff into the lungs 2 times daily    Historical Provider, MD   atorvastatin (LIPITOR) 80 MG tablet Take 80 mg by mouth daily    Historical Provider, MD   calcium carb-cholecalciferol 600-200 MG-UNIT TABS tablet Take 1 tablet by mouth daily    Historical Provider, MD   furosemide (LASIX) 20 MG tablet Take 20 mg by mouth daily    Historical Provider, MD   Umeclidinium Bromide (INCRUSE ELLIPTA) 62.5 MCG/INH AEPB Inhale 1 puff into the lungs daily    Historical Provider, MD   pseudoephedrine (SUDAFED) 30 MG tablet Take 30 mg by mouth every 6 hours as needed for Congestion    Historical Provider, MD   sildenafil (REVATIO) 20 MG tablet Take 20 mg by mouth in the morning, at noon, and at bedtime    Historical Provider, MD   vitamin D (ERGOCALCIFEROL) 1.25 MG (98961 UT) CAPS capsule Take 50,000 Units by mouth once a week    Historical Provider, MD   gabapentin (NEURONTIN) 300 MG capsule Take 300 mg by mouth at bedtime.      Historical Provider, MD   omeprazole (PRILOSEC) 20 MG delayed release capsule Take 20 mg by mouth daily    Historical Provider, MD   carvedilol (COREG) 12.5 MG tablet Take 12.5 mg by mouth 2 times daily Unsure of dose     Historical Provider, MD   albuterol sulfate HFA (VENTOLIN HFA) 108 (90 Base) MCG/ACT inhaler Inhale 2 puffs into the lungs every 6 hours as needed for Wheezing 90mcg/actuation    Historical Provider, MD   allopurinol (ZYLOPRIM) 100 MG tablet Take 100 mg by mouth Daily on MWF    Historical Provider, MD       Allergies:     Reviewed and non-contributory except as noted below   Patient has no known allergies. Social History:      Reviewed and non-contributory except as noted below    TOBACCO:   reports that he has quit smoking. He has never used smokeless tobacco.  ETOH:   reports current alcohol use. Family History:      Reviewed and non-contributory except as noted below        Problem Relation Age of Onset    Cancer Mother     Cancer Father     Other Sister        REVIEW OF SYSTEMS:   Pertinent positives and negatives as noted in the HPI. All other systems reviewed and negative. PHYSICAL EXAM PERFORMED:    /70   Pulse 71   Resp 16   Ht 5' 8\" (1.727 m)   Wt 140 lb (63.5 kg)   SpO2 (!) 87%   BMI 21.29 kg/m²     General appearance: No apparent distress, appears stated age and cooperative. HEENT: Pupils equal, round, and reactive to light. Conjunctivae/corneas clear. Neck: Supple, with full range of motion. No jugular venous distention. Trachea midline. Respiratory:  Normal respiratory effort. Clear to auscultation, bilaterally without Rales/Wheezes/Rhonchi. Cardiovascular: Regular rate and rhythm with normal S1/S2 without murmurs, rubs or gallops. Abdomen: Soft, non-tender, non-distended with normal bowel sounds. Musculoskeletal: No clubbing, cyanosis or edema bilaterally. Full range of motion without deformity. Skin: Skin color, texture, turgor normal.  LUE with gauze from bleed   Neurologic:  Slight left lower facial droop. Diminished sensation to touch on left face in all CNV branches  Psychiatric: Alert and oriented, thought content appropriate, normal insight    Labs:     Recent Labs     09/13/22  1705   WBC 2.7*   HGB 7.4*   HCT 23.9*   PLT 85*     Recent Labs     09/13/22  1705   *   K 3.8   CL 93*   CO2 30   BUN 28*   CREATININE 6.4*   CALCIUM 8.5     No results for input(s): AST, ALT, BILIDIR, BILITOT, ALKPHOS in the last 72 hours.   Recent Labs     09/13/22  1705   INR 1.52*     No results for input(s): [employer] [facility]      Thank you Parul Kraft MD for the opportunity to be involved in this patient's care. If you have any questions or concerns please feel free to contact me via [address].

## 2022-09-14 NOTE — PROGRESS NOTES
Speech Language Pathology  Facility/Department: [facility] 5T ORTHO/NEURO  Initial Speech/Language/Cognitive Assessment    NAME: Ian Santiago  : 1955   MRN: [id number]  ADMISSION DATE: 2022  ADMITTING DIAGNOSIS: has HCAP (healthcare-associated pneumonia); COPD exacerbation ([facility] ESRD on dialysis [facility]); COPD (chronic obstructive pulmonary disease) ([facility] Acute on chronic respiratory failure with hypoxia and hypercapnia ([facility] Chest pain; Hypokalemia; V-tach ([facility] Essential hypertension; Hyperlipidemia; Chronic combined systolic and diastolic CHF (congestive heart failure) (HCC); SOB (shortness of breath); AV fistula thrombosis, initial encounter ([facility] History of venous thromboembolism; Epistaxis; Anemia; Problem with dialysis access [facility]); and Acute cerebrovascular accident (CVA) ([facility] on their problem list.  DATE ONSET: 2022    Date of Eval: 2022   Evaluating Therapist: LAXMI Kebede    CT Head: (2022)     Impression       No acute intracranial abnormality or significant change. Left maxillary sinus disease. Recent CTA Head/Neck: (2022)  Impression       CTA Head:   1. No steno-occlusive disease or aneurysm. CTA Neck:   1. No steno-occlusive disease. 2. Moderate emphysema with right hilar lymph node. Recommend dedicated chest CT for further evaluation. Recent MRI Brain: (2022)  Impression       1. No acute infarction or recent hemorrhage     2. Moderate chronic small vessel ischemia   3. Multiple bilateral chronic cerebellar infarctions   4. Chronic microhemorrhages involving the basal ganglia and jenny compatible with sequela of chronic hypertension. Additional small chronic hemorrhagic insult involving the subcortical left lateral occipitotemporal lobe.      Primary Complaint: patient endorsing ongoing memory problems and difficulty talking    Pain:  Pain Assessment  Pain Assessment: None - Denies Pain    Vision/ Hearing  Vision  Vision Exceptions: Wears glasses at all times  Hearing  Hearing: Within functional limits    Assessment:  Cognitive Diagnosis: cognitive-communication deficit  Speech Diagnosis: dysarthria   Diagnosis: Patient presents with mild dysarthria and cognitive-communication impairment (scored 17/30 on the SLUMS exam); specific areas of difficulty included memory/recall, attention, problem solving, basic math calculations, divergent naming. Based on patient report, suspect cognitive difficulties may be baseline. Speech clarity 80% percent at conversation level. Recommend ongoing speech therapy at this and next level of care to address deficits, with recommendations for assistance with tasks involving those skills (e.g. managing medications, finances, etc.). Recommendations:  Recommendations  Requires SLP Intervention: Yes  Patient Education: Educated pt to purpose of visit, results, recommendations. Patient Education Response: Verbalizes understanding  Duration of Treatment: 1-2 wks or LOS  D/C Recommendations: Ongoing speech therapy is recommended at next level of care       Plan:   Speech Therapy Prognosis  Prognosis: Good  Individuals consulted  Consulted and agree with results and recommendations: Patient;RN  RN Name: Mega Duncan  Safety Devices in place: Yes  Type of devices: All fall risk precautions in place    Goals:  Short-term Goals  Timeframe for Short-term Goals: 1-2 wks or LOS  Goal 1: Patient will recall and utilize speech clarity strategies to improve intelligibility to 95% at the conversation level. Goal 2: Patient will participate in further assessment of cognitive-linguistic skills as appropriate. Patient/family involved in developing goals and treatment plan: the patient    Subjective:   Previous level of function and limitations:  Independent     Social/Functional History  Active : No  Type of Occupation: used to work as a   Vision  Vision Exceptions: Wears glasses at all times  Hearing  Hearing: Within functional limits       Objective:  Oral Motor   Oral Hygiene: Moist;Clean  Lingual: No impairment  Mandible: No impairment    Motor Speech  Dysarthric Characteristics: Blended word boundaries; Imprecise  Intelligibility: Impaired  Overall Impairment Severity: Mild    Auditory Comprehension  Comprehension: Within Functional Limits    Expression  Primary Mode of Expression: Verbal    Verbal Expression  Verbal Expression: Exceptions to functional limits  Divergent: Mild    Written Expression  Dominant Hand: Right    Pragmatics/Social Functioning  Pragmatics: Within functional limits    Cognition:   Orientation  Overall Orientation Status: Within Functional Limits  Attention  Attention: Exceptions to [facility]  Alternating Attention: Mild  Memory  Memory: Exceptions to [facility]  Short-term Memory: Moderate  Problem Solving  Problem Solving: Exceptions to [facility]  Numeric Reasoning  Numeric Reasoning: Exceptions to [facility]   Calculations: Mild  Money Management: Mild    Prognosis:  Speech Therapy Prognosis  Prognosis: Good  Individuals consulted  Consulted and agree with results and recommendations: Patient;RN  RN Name: Josette Hatchet    Education:  Patient Education: Educated pt to purpose of visit, results, recommendations. Patient Education Response: Verbalizes understanding  Safety Devices in place: Yes  Type of devices: All fall risk precautions in place    Therapy Time:  25 minutes    Néstor Miller, [address] [place], [phone]  Pg.  # [phone]

## 2022-09-14 NOTE — PROGRESS NOTES
Physician Progress Note      PATIENT:               Светлана Lawrence  CSN #:                  [id number]  :                       1955  ADMIT DATE:       2022 1:00 PM  [facility] DATE:  RESPONDING  PROVIDER #:        Bernice Hardwick MD          QUERY TEXT:    Pt admitted with TIA and uncontrolled bleeding from dialysis fistula and has   anemia documented. If possible, please document in progress notes and   discharge summary further specificity regarding the acuity and type of anemia:    The medical record reflects the following:  Risk Factors: 61year old male with hx ESRD  Clinical Indicators: Per ED provider note- According to EMS, there was a   significant amount of blood on the floor of the home, with active pulsatile   bleeding from a puncture wound in the fistula. On arrival, a tourniquet was   placed prior to my evaluation, but did not improve bleeding control, so it was   taken down shortly after my arrival to the bedside. A punctate pulsatile   source of bleeding was identified, and direct pressure was applied to that   area with my thumb. Per  IM progress note- Acute anemia, ESRD on HD, Hgb   low this AM. Required 1U pRBC transfusion. No sig  Treatment: Labs, PRBC x 1  Options provided:  -- Anemia due to acute blood loss  -- Anemia due to ESRD  -- Other - I will add my own diagnosis  -- Disagree - Not applicable / Not valid  -- Disagree - Clinically unable to determine / Unknown  -- Refer to Clinical Documentation Reviewer    PROVIDER RESPONSE TEXT:    This patient has acute blood loss anemia. Query created by:  Kendal Duncan on 2022 12:33 PM      Electronically signed by:  Bernice Hardwick MD 2022 4:06 PM

## 2022-09-14 NOTE — PROGRESS NOTES
Progress Note  Admit Date: 9/13/2022       CC:   Chief Complaint   Patient presents with    Bleeding/Bruising     Uncontrolled bleeding from dialysis ports. Subjective: No acute overnight events. Pt seen and examined at bedside. Feeling ok this AM. He's not quite sure about his medical history on questioning. Feels his facial droop is improved but still complaining of altered sensation, though this has improved as well. Scheduled Medications:    gabapentin  300 mg Oral Nightly    furosemide  20 mg Oral Daily    sildenafil  20 mg Oral TID    [START ON 9/17/2022] vitamin D  50,000 Units Oral Once per day on Sat    pantoprazole  40 mg Oral QAM AC    mometasone-formoterol  2 puff Inhalation BID    tiotropium  2 puff Inhalation Daily    ferrous sulfate  325 mg Oral Every Other Day    allopurinol  100 mg Oral Q MWF    atorvastatin  80 mg Oral Daily    carvedilol  12.5 mg Oral BID    calcium-cholecalciferol  1 tablet Oral Daily    apixaban  2.5 mg Oral BID      PRN Medications: sodium chloride, albuterol, ondansetron **OR** ondansetron, polyethylene glycol  Diet: ADULT DIET; Regular    Continuous Infusions:   sodium chloride         PHYSICAL EXAM:  /68   Pulse 64   Temp 97.6 °F (36.4 °C) (Oral)   Resp 18   Ht 5' 8\" (1.727 m)   Wt 140 lb (63.5 kg)   SpO2 100%   BMI 21.29 kg/m²   No results for input(s): POCGLU in the last 72 hours. Intake/Output Summary (Last 24 hours) at 9/14/2022 1131  Last data filed at 9/14/2022 1023  Gross per 24 hour   Intake 360 ml   Output --   Net 360 ml       General appearance: No apparent distress, appears stated age and cooperative. HEENT: Pupils equal, round, and reactive to light. Conjunctivae/corneas clear. Neck: Supple, with full range of motion. No jugular venous distention. Trachea midline. Respiratory:  Normal respiratory effort. Clear to auscultation, bilaterally without Rales/Wheezes/Rhonchi.   Cardiovascular: Regular rate and rhythm with normal S1/S2 without murmurs, rubs or gallops. Abdomen: Soft, non-tender, non-distended with normal bowel sounds. Musculoskeletal: No clubbing, cyanosis or edema bilaterally. Full range of motion without deformity. Skin: Skin color, texture, turgor normal.  No rashes or lesions. AVF left arm (wrapped, no bleeding)  Neurologic:  Neurovascularly intact without any focal motor deficits. Cranial nerves: II-XII intact, grossly non-focal.  Psychiatric: Alert and oriented, thought content appropriate, normal insight    LABS:  Recent Labs     09/13/22  1705 09/14/22  0700   WBC 2.7* 2.7*   HGB 7.4* 6.5*   HCT 23.9* 20.3*   PLT 85* 75*                                                                    Recent Labs     09/13/22  1705 09/14/22  0700   * 132*   K 3.8 3.8   CL 93* 93*   CO2 30 27   BUN 28* 31*   CREATININE 6.4* 7.2*   GLUCOSE 93 109*     No results for input(s): AST, ALT, ALB, BILITOT, ALKPHOS in the last 72 hours. No results for input(s): TROPONINI in the last 72 hours. No results for input(s): BNP in the last 72 hours. No results for input(s): CHOL, HDL in the last 72 hours. Invalid input(s): LDLCALCU  Recent Labs     09/13/22  1705   INR 1.52*       Assessment & Plan:      TIA - Pt with hx of CVA however those were right sided deficits, today he experienced ongoing left sided facial deficits concerning for new CVA with slurred speech and left sided facial droop. CT head w/o bleed. Not TPA candidate d/t Eliquis. Left sided droop improved this AM.  - MRI brain wo con - no acute infarction or hemorrhage. Multiple chronic infarcts present  - Lipitor   - Holding off on ASA until neuro recs as pt was recently bleeding from fistula site and is on Eliquis  - Neurology consulted, appreciate recs  - PT/OT/SLP     Acute anemia  ESRD on HD  - Dr. Queenie Velásquez consulted  - Hgb low this AM. Required 1U pRBC transfusion. No signs of ongoing blood loss.   Perhaps residual from yesterdays episode     Hx of DVT/ PE / CTEPH  - Cont

## 2022-09-14 NOTE — DISCHARGE INSTRUCTIONS
Extra Heart Failure sites:   [email or web address]/ --- this is American Heart Association interactive Healthier Living with Heart Failure guidebook. Please copy and paste link into search bar. Use your mouse to scroll through the pages. Lots and lots of info / tips    HF [employer] lito  --- free smart phone lito available for [employer] and [employer]. Use your phone to track sodium / fluid intake,  symptoms, weight, etc.    [employer] - website-- [employer] is a dialysis company. All dialysis patients follow a renal diet which IS low sodium!! This website offers free seasonal cookbooks.   Each quarter, they will release 25-30 new recipes with a breakdown of calories, sodium, glucose, etc    [email or web address] -- more free recipes

## 2022-09-14 NOTE — PROGRESS NOTES
Speech Language Pathology  Facility/Department: [facility] 5T ORTHO/NEURO   CLINICAL BEDSIDE SWALLOW EVALUATION/DC    NAME: Terry Keene  : 1955  MRN: [id number]    ADMISSION DATE: 2022  ADMITTING DIAGNOSIS: has HCAP (healthcare-associated pneumonia); COPD exacerbation ([facility] ESRD on dialysis ([facility] COPD (chronic obstructive pulmonary disease) ([facility] Acute on chronic respiratory failure with hypoxia and hypercapnia ([facility] Chest pain; Hypokalemia; V-tach ([facility] [facility] Essential hypertension; Hyperlipidemia; Chronic combined systolic and diastolic CHF (congestive heart failure) (HCC); SOB (shortness of breath); AV fistula thrombosis, initial encounter ([facility] History of venous thromboembolism; Epistaxis; Anemia; Problem with dialysis access [facility]); and Acute cerebrovascular accident (CVA) ([facility] on their problem list.  ONSET DATE: 2022    Recent Chest Xray/CT of Chest:  Not completed this admission    Date of Eval: 2022  Evaluating Therapist: LAXMI Littlejohn    Current Diet level:  Current Diet : Regular      Primary Complaint  Patient Complaint: Denies difficulty swallowing    Pain:  Pain Assessment  Pain Assessment: None - Denies Pain    Reason for Referral  Terry Keene was referred for a bedside swallow evaluation to assess the efficiency of his swallow function, identify signs and symptoms of aspiration and make recommendations regarding safe dietary consistencies, effective compensatory strategies, and safe eating environment. Impression  Dysphagia Diagnosis: Swallow function appears WFL  Dysphagia Impression : Swallow function appears grossly intact for PO trials assessed (thins via straw, minced/moist, thin liquids), with pt demonstrating timely swallow movement, good oral clearance, no overt signs of aspiration. Recommend continue thin liquids and regular texture diet.   Dysphagia Outcome Severity Scale: Level 7: Normal in all situations     Treatment Plan  Requires SLP Natasha Carrero    Education  Patient Education: Educated pt to purpose of visit, swallow function, recommendations. Patient Education Response: Verbalizes understanding  Safety Devices in place: Yes  Type of devices: All fall risk precautions in place       Therapy Time  20 minutes    Plan  Diet Recommendations: Regular solids / thin liquids / meds PO    Discharge Plan:  no further dysphagia therapy warranted at this time  Discussed with RN, Natasha Carrero. Needs within reach.     Electronically Signed by:  Yunier Betts M.A., [address]   Speech-Language Pathologist  Pager #[phone]

## 2022-09-14 NOTE — CARE COORDINATION
Patient is from [address] [place] [address] with [facility]. Pt has HD at [address] [place] [address]. Patient is returning to [facility] at discharge. Pt is able to return at discharge once medically stable. SNF will need will need the flowsheets from his dialysis treatments here. Pt may qualify for ARU vs. SNF, SW following for DC recs.    Electronically signed by DINESH Bui, MARICRUZ on 9/14/2022 at 5:36 PM  [phone]

## 2022-09-14 NOTE — PROGRESS NOTES
Pt A&O, VSS. Pt up x1 with gb and walker. Neuro checks remain unchanged this shift with an NIH of 1 due to slight facial droop. All fall and safety precautions in place, bed locked and in lowest position, call light and belongings within reach. Pt denies further needs at this time.

## 2022-09-14 NOTE — CONSENT
Informed Consent for Blood Component Transfusion Note    I have discussed with the patient the rationale for blood component transfusion; its benefits in treating or preventing fatigue, organ damage, or death; and its risk which includes mild transfusion reactions, rare risk of blood borne infection, or more serious but rare reactions. I have discussed the alternatives to transfusion, including the risk and consequences of not receiving transfusion. The patient had an opportunity to ask questions and had agreed to proceed with transfusion of blood components.     Electronically signed by Lety Oates MD on 9/14/22 at 11:29 AM EDT

## 2022-09-14 NOTE — PLAN OF CARE
Problem: Safety - Adult  Goal: Free from fall injury  9/14/2022 0847 by Walker Trujillo RN  Outcome: Progressing  All fall and safety precautions in place, bed locked and in lowest position, call light and belongings within reach     Problem: ABCDS Injury Assessment  Goal: Absence of physical injury  9/14/2022 0847 by Walker Trujillo RN  Outcome: Progressing  Pt up x1 with gb and walker.   All fall and safety precautions in place

## 2022-09-14 NOTE — PROGRESS NOTES
candidate for TPA due to Eliquis use. MRI of brain was negative for acute CVA. Pt states he lives in a LTC facility and was independent with all ADLs and functional transfers around room with [facility]. He required assist get to/from dining room for meals. Currently he presents below baseline, requiring CGA for functional transfers and mobility using SPC. He is CGA when performing ADLs in stance. Pt to benefit from continued skilled OT intervention in IP and upon discharge from IP to maximize independence with ADLs and functional transfers to ensure safe return home. If pt to return home, recommend initial 24 hr assist. OT will continue to follow. Treatment Diagnosis: Impaired activity tolerance, ADLs, and functional mobility. Prognosis: Good  Decision Making: Medium Complexity  REQUIRES OT FOLLOW-UP: Yes  Activity Tolerance  Activity Tolerance: Patient Tolerated treatment well  Activity Tolerance Comments: Pt performed functional mobility with 3L O2 via n/c. Following functional mobility around room, O2 sat 92%.         Plan   Plan  Times per Week: 5-7  Times per Day: Daily  Current Treatment Recommendations: Strengthening, Balance training, Functional mobility training, Endurance training, Neuromuscular re-education, Safety education & training, Self-Care / ADL, Patient/Caregiver education & training     Restrictions  Restrictions/Precautions  Restrictions/Precautions: Bed Alarm  Position Activity Restriction  Other position/activity restrictions: no mobility orders at this time 9/14    Subjective   General  Chart Reviewed: Yes  Patient assessed for rehabilitation services?: Yes  Family / Caregiver Present: No  Referring Practitioner: Rosa Chang MD  Subjective  Subjective: Pt received seated in recliner chair and agreeable to OT eval.  General Comment  Comments: No c/o pain     Social/Functional History  Social/Functional History  Type of Home:  (nursing facility / LTC)  Bathroom Shower/Tub: Walk-in shower  Bathroom Toilet: Handicap height  Bathroom Equipment: Shower chair  Home Equipment:  [facility])  Has the patient had two or more falls in the past year or any fall with injury in the past year?:  (1: Pt fell coming off of commode, Pt notes he hit head. 2 -3 months ago)  Receives Help From:  (LTC staff)  ADL Assistance: Independent  Homemaking Responsibilities: No (lives LTC facility)  Ambulation Assistance:  (Pt notes he gets winded and uses a wc when going to meals at [facility]. Staff pushes wc)  Transfer Assistance: Independent  Active : No  Type of Occupation: used to work as a        Objective           Observation/Palpation  Observation: Pt on 2.5L O2 via n/c. Safety Devices  Type of Devices: Nurse notified; Left in chair;Chair alarm in place;Call light within reach     Toilet Transfers  Toilet - Technique: Ambulating (using SPC)  Equipment Used: Standard bedside commode (over toilet)  Toilet Transfer: Contact guard assistance  Toilet Transfers Comments: cues for turning direction and backing up to commode to ensure safety before sitting  AROM:  (RUE impaired to about 80 degrees of shoulder flexion; LUE WNL)  Strength: Generally decreased, functional  Coordination: Within functional limits  ADL  LE Dressing: Contact guard assistance  LE Dressing Skilled Clinical Factors: CGA for balance  Toileting: Contact guard assistance  Toileting Skilled Clinical Factors: CGA for balance in stance     Activity Tolerance  Activity Tolerance: Patient tolerated treatment well;Patient tolerated evaluation without incident  Activity Tolerance Comments: 98% O2 sats post 30ft ambulation        Vision  Vision: Impaired  Vision Exceptions: Wears glasses at all times  Hearing  Hearing: Within functional limits  Cognition  Overall Cognitive Status: Exceptions  Attention Span: Appears intact  Safety Judgement: Decreased awareness of need for assistance  Problem Solving: Decreased awareness of errors;Assistance required to generate solutions;Assistance required to identify errors made;Assistance required to correct errors made  Insights: Decreased awareness of deficits  Initiation: Does not require cues  Orientation  Orientation Level: Oriented X4               Functional Mobility Circuit Training: Pt performed 15' from chair to hallway and then 15' back to chair with CGA using SPC. Following short seated rest break, pt performed another 15' from bed to commode in BR and then back again to recliner with CGA using SPC. Education Given To: Patient  Education Provided: Role of Therapy;Transfer Training;Plan of Care  Education Method: Demonstration  Barriers to Learning: None  Education Outcome: Verbalized understanding;Continued education needed  LUE AROM (degrees)  LUE AROM : WNL  Left Hand AROM (degrees)  Left Hand AROM: WNL  RUE AROM (degrees)  RUE AROM : Exceptions  R Shoulder Flexion 0-180: about 80 degrees  R Shoulder Extension 0-45: WNL  R Elbow Flexion 0-145: WNL  R Elbow Extension 145-0: WNL  Right Hand AROM (degrees)  Right Hand AROM: WNL                  AM-PAC Score        AM-PAC Inpatient Daily Activity Raw Score: 19 (09/14/22 1540)  AM-PAC Inpatient ADL T-Scale Score : 40.22 (09/14/22 1540)  ADL Inpatient CMS 0-100% Score: 42.8 (09/14/22 1540)  ADL Inpatient CMS G-Code Modifier : CK (09/14/22 1540)    Goals  Short Term Goals  Time Frame for Short term goals: D/C  Short Term Goal 1: Pt to perform toilet transfer with SBA using SPC. Short Term Goal 2: Pt to tolerate 5 min standing to complete self care tasks. Short Term Goal 3: Pt to perform toileting with SBA. Patient Goals   Patient goals :  To return home       Therapy Time   Individual Concurrent Group Co-treatment   Time In 1448         Time Out 1529         Minutes 41         Timed Code Treatment Minutes: 26 Minutes     Total Minutes: [address] [place], [place]

## 2022-09-14 NOTE — PLAN OF CARE
Problem: Discharge Planning  Goal: Discharge to home or other facility with appropriate resources  9/14/2022 0135 by Randy Panda RN  Outcome: Progressing     Problem: Safety - Adult  Goal: Free from fall injury  9/14/2022 0135 by Randy Panda RN  Outcome: Progressing     Problem: ABCDS Injury Assessment  Goal: Absence of physical injury  9/14/2022 0135 by Randy Panda RN  Outcome: Progressing

## 2022-09-14 NOTE — CONSULTS
Nephrology Consult Note  [phone]   [email or web address]        Reason for Consult:  ESKD    HISTORY OF PRESENT ILLNESS:                This is a patient with significant past medical history of ESKD on HD TTS, last HD yesterday, HTN, CVA, CHF  who presented to ED with bleeding from AVF-hemostasis was achieved in ED, was ready for discharge but noted to have facial drop/slur speech. CT scan was negative for acute CVA-not a candidate for TPA due to Eliquis use. MRI of brain was negative for acute CVA but showed \"  1. No acute infarction or recent hemorrhage     2. Moderate chronic small vessel ischemia   3. Multiple bilateral chronic cerebellar infarctions   4. Chronic microhemorrhages involving the basal ganglia and jenny compatible with sequela of chronic hypertension. Additional small chronic hemorrhagic insult involving the subcortical left lateral occipitotemporal lobe   \"  -he denies any fever/chills/N/V  -plan for PRBC for ABLA    Past Medical History:        Diagnosis Date    Cerebral artery occlusion with cerebral infarction (HCC)     CHF (congestive heart failure) (HCC)     Chronic kidney disease     COPD (chronic obstructive pulmonary disease) ([facility]     Dialysis patient ([facility] [address]     Gout     Hemodialysis patient ([facility] [address]     Hx of blood clots     Hyperlipidemia     Hypertension     Renal failure        Past Surgical History:        Procedure Laterality Date    COLONOSCOPY      DIALYSIS FISTULA CREATION Left        Current Medications:    No current facility-administered medications on file prior to encounter.      Current Outpatient Medications on File Prior to Encounter   Medication Sig Dispense Refill    calcitRIOL (ROCALTROL) 0.25 MCG capsule Take 0.5 mcg by mouth every other day MWF      sevelamer (RENVELA) 800 MG tablet Take 1 tablet by mouth 3 times daily (with meals)      apixaban (ELIQUIS) 2.5 MG TABS tablet Take 1 tablet by mouth 2 times daily 60 tablet 1    sodium chloride (OCEAN, BABY AYR) 0.65 % nasal spray 1 spray by Nasal route every 4 hours 1 each 1    ferrous sulfate (FE TABS) 325 (65 Fe) MG EC tablet Take 1 tablet by mouth every other day 15 tablet 1    fluticasone-salmeterol (ADVAIR) 500-50 MCG/ACT AEPB diskus inhaler Inhale 1 puff into the lungs 2 times daily      atorvastatin (LIPITOR) 80 MG tablet Take 80 mg by mouth daily      calcium carb-cholecalciferol 600-200 MG-UNIT TABS tablet Take 1 tablet by mouth daily      furosemide (LASIX) 20 MG tablet Take 20 mg by mouth daily      Umeclidinium Bromide (INCRUSE ELLIPTA) 62.5 MCG/INH AEPB Inhale 1 puff into the lungs daily      pseudoephedrine (SUDAFED) 30 MG tablet Take 30 mg by mouth every 6 hours as needed for Congestion      sildenafil (REVATIO) 20 MG tablet Take 20 mg by mouth in the morning, at noon, and at bedtime      vitamin D (ERGOCALCIFEROL) 1.25 MG (20181 UT) CAPS capsule Take 50,000 Units by mouth once a week Saturdays      gabapentin (NEURONTIN) 300 MG capsule Take 300 mg by mouth at bedtime. omeprazole (PRILOSEC) 20 MG delayed release capsule Take 20 mg by mouth daily      carvedilol (COREG) 12.5 MG tablet Take 12.5 mg by mouth 2 times daily Unsure of dose       albuterol sulfate HFA (PROVENTIL;VENTOLIN;PROAIR) 108 (90 Base) MCG/ACT inhaler Inhale 2 puffs into the lungs every 6 hours as needed for Wheezing 90mcg/actuation      allopurinol (ZYLOPRIM) 100 MG tablet Take 100 mg by mouth Daily on MWF         Allergies:  Patient has no known allergies.     Social History:    Social History     Socioeconomic History    Marital status: Single     Spouse name: Not on file    Number of children: Not on file    Years of education: Not on file    Highest education level: Not on file   Occupational History    Not on file   Tobacco Use    Smoking status: Former    Smokeless tobacco: Never   Vaping Use    Vaping Use: Never used   Substance and Sexual Activity    Alcohol use: Yes     Comment: occasional    Drug use: Never    Sexual activity: Not Currently   Other Topics Concern    Not on file   Social History Narrative    Not on file     Social Determinants of Health     Financial Resource Strain: Not on file   Food Insecurity: Not on file   Transportation Needs: Not on file   Physical Activity: Not on file   Stress: Not on file   Social Connections: Not on file   Intimate Partner Violence: Not on file   Housing Stability: Not on file       Family History:       Problem Relation Age of Onset    Cancer Mother     Cancer Father     Other Sister          Review of Systems:  a comprehensive Review of systems was negative except as noted in HPI     PHYSICAL EXAM:    Vitals:    /68   Pulse 64   Temp 97.6 °F (36.4 °C) (Oral)   Resp 18   Ht 5' 8\" (1.727 m)   Wt 140 lb (63.5 kg)   SpO2 100%   BMI 21.29 kg/m²   No intake/output data recorded. I/O this shift:  In: 360 [P.O.:360]  Out: -     Physical Exam:  Gen: Resting in bed, NAD. HEENT: anicteric, NC/AT  CV: +S1/S2, RRR  Lungs: Good respiratory effort, clear air entry   Abd: S/NT +BS  Ext: No edema, no cyanosis  Skin: Warm. No rashes appreciated. : No TTP over bladder, nondistended. Neuro: Alert and oriented x 3, nonfocal.  Joints: No erythema noted over joints.   Access: JAIME AVF + T/B, no bleeding    DATA:    CBC:   Lab Results   Component Value Date/Time    WBC 2.7 09/14/2022 07:00 AM    RBC 2.29 09/14/2022 07:00 AM    HGB 6.5 09/14/2022 07:00 AM    HCT 20.3 09/14/2022 07:00 AM    MCV 88.7 09/14/2022 07:00 AM    MCH 28.6 09/14/2022 07:00 AM    MCHC 32.2 09/14/2022 07:00 AM    RDW 16.4 09/14/2022 07:00 AM    PLT 75 09/14/2022 07:00 AM    MPV 8.0 09/14/2022 07:00 AM     BMP:    Lab Results   Component Value Date/Time     09/14/2022 07:00 AM    K 3.8 09/14/2022 07:00 AM    CL 93 09/14/2022 07:00 AM    CO2 27 09/14/2022 07:00 AM    BUN 31 09/14/2022 07:00 AM    LABALBU 3.5 06/16/2022 06:59 AM    CREATININE 7.2 09/14/2022 07:00 AM    CALCIUM 8.3 09/14/2022 07:00 AM    GFRAA 9 09/14/2022 07:00 AM    LABGLOM 8 09/14/2022 07:00 AM    GLUCOSE 109 09/14/2022 07:00 AM       IMPRESSION/RECOMMENDATIONS:      ESKD: [facility]-followed by  Nephrology  -no indication for HD today  -will plan for HD in am    Acute CVA: CT head negative, MRI noted  -neurology consulted    HTN:   -trending lower-add hold parameter for carvedilol and lower dose    Hyponatremia:  -mild-monitor    ABLA: prolong bleeding from AVF - on Elquis, ? Stenosis-will need outpatient fistulogram to exlcude access stenosis  -superimposed on ACD  -getting PRBC-will add venofer and epogen with HD    Leukopenia: monitor  -stable today    Thrombocytopenia: worsening, monitor    CHF: compensated    H/o VT/PE: on Eliquis        Thank you for allowing me to participate in the care of this patient. I will continue to follow along. Please call with questions.     Yessica Albrecht MD

## 2022-09-14 NOTE — PROGRESS NOTES
Patient is alert. He is oriented at times. He is forgetful and has a hard time answering questions at times. Patient has a NIH of 2. Very slight facial droop and unable to remember where he lives and at times unable to tell me the year. Patient had a slight nose bleed this shift. States he gets them often from oxygen. Patient is resting in bed with all fall precautions in place. Will continue to monitor.

## 2022-09-14 NOTE — PROGRESS NOTES
4 Eyes Admission Assessment     I agree as the admission nurse that 2 RN's have performed a thorough Head to Toe Skin Assessment on the patient. ALL assessment sites listed below have been assessed on admission. Areas assessed by both nurses:   [x]   Head, Face, and Ears   [x]   Shoulders, Back, and Chest  [x]   Arms, Elbows, and Hands   [x]   Coccyx, Sacrum, and Ischium  [x]   Legs, Feet, and Heels        Does the Patient have Skin Breakdown?   No       Scabs to legs  Mauricio Prevention initiated:  NA   Wound Care Orders initiated:  NA      WOC nurse consulted for Pressure Injury (Stage 3,4, Unstageable, DTI, NWPT, and Complex wounds) or Mauricio score 18 or lower:  NA      Nurse 1 eSignature: Electronically signed by Mery Oakley RN on 9/14/22 at 5:01 AM EDT    **SHARE this note so that the co-signing nurse is able to place an eSignature**    Nurse 2 eSignature: Electronically signed by Chele Trevizo RN on 9/14/22 at 9:58 AM EDT

## 2022-09-14 NOTE — PROGRESS NOTES
Physical Therapy/Occupational therapy  HOLD    Orders received, chart reviewed. Pt with low hgb (6.5). Will hold therapy evals at this time and f/u later today vs 9/15. RN aware.     Charis Sebastian, PT, DPT  [id number]  Todd Phillips, OT [phone]

## 2022-09-15 VITALS
DIASTOLIC BLOOD PRESSURE: 69 MMHG | TEMPERATURE: 97.6 F | RESPIRATION RATE: 16 BRPM | WEIGHT: 141.09 LBS | BODY MASS INDEX: 21.38 KG/M2 | HEART RATE: 76 BPM | SYSTOLIC BLOOD PRESSURE: 130 MMHG | HEIGHT: 68 IN | OXYGEN SATURATION: 99 %

## 2022-09-15 LAB
ANION GAP SERPL CALCULATED.3IONS-SCNC: 17 MMOL/L (ref 3–16)
BASOPHILS ABSOLUTE: 0.1 K/UL (ref 0–0.2)
BASOPHILS RELATIVE PERCENT: 1.5 %
BUN BLDV-MCNC: 35 MG/DL (ref 7–20)
CALCIUM SERPL-MCNC: 8.6 MG/DL (ref 8.3–10.6)
CHLORIDE BLD-SCNC: 92 MMOL/L (ref 99–110)
CO2: 21 MMOL/L (ref 21–32)
CREAT SERPL-MCNC: 8.2 MG/DL (ref 0.8–1.3)
EOSINOPHILS ABSOLUTE: 0.2 K/UL (ref 0–0.6)
EOSINOPHILS RELATIVE PERCENT: 6.5 %
ESTIMATED AVERAGE GLUCOSE: 102.5 MG/DL
GFR AFRICAN AMERICAN: 8
GFR NON-AFRICAN AMERICAN: 7
GLUCOSE BLD-MCNC: 87 MG/DL (ref 70–99)
HBA1C MFR BLD: 5.2 %
HCT VFR BLD CALC: 25.4 % (ref 40.5–52.5)
HEMOGLOBIN: 8.2 G/DL (ref 13.5–17.5)
LYMPHOCYTES ABSOLUTE: 0.6 K/UL (ref 1–5.1)
LYMPHOCYTES RELATIVE PERCENT: 17.2 %
MCH RBC QN AUTO: 28.8 PG (ref 26–34)
MCHC RBC AUTO-ENTMCNC: 32.3 G/DL (ref 31–36)
MCV RBC AUTO: 89.1 FL (ref 80–100)
MONOCYTES ABSOLUTE: 0.3 K/UL (ref 0–1.3)
MONOCYTES RELATIVE PERCENT: 7.3 %
NEUTROPHILS ABSOLUTE: 2.3 K/UL (ref 1.7–7.7)
NEUTROPHILS RELATIVE PERCENT: 67.5 %
PDW BLD-RTO: 17 % (ref 12.4–15.4)
PLATELET # BLD: 73 K/UL (ref 135–450)
PMV BLD AUTO: 7.9 FL (ref 5–10.5)
POTASSIUM REFLEX MAGNESIUM: 4.5 MMOL/L (ref 3.5–5.1)
RBC # BLD: 2.85 M/UL (ref 4.2–5.9)
SODIUM BLD-SCNC: 130 MMOL/L (ref 136–145)
WBC # BLD: 3.5 K/UL (ref 4–11)

## 2022-09-15 PROCEDURE — 6370000000 HC RX 637 (ALT 250 FOR IP): Performed by: STUDENT IN AN ORGANIZED HEALTH CARE EDUCATION/TRAINING PROGRAM

## 2022-09-15 PROCEDURE — 6370000000 HC RX 637 (ALT 250 FOR IP): Performed by: INTERNAL MEDICINE

## 2022-09-15 PROCEDURE — 94761 N-INVAS EAR/PLS OXIMETRY MLT: CPT

## 2022-09-15 PROCEDURE — 97530 THERAPEUTIC ACTIVITIES: CPT

## 2022-09-15 PROCEDURE — 36415 COLL VENOUS BLD VENIPUNCTURE: CPT

## 2022-09-15 PROCEDURE — 85025 COMPLETE CBC W/AUTO DIFF WBC: CPT

## 2022-09-15 PROCEDURE — 94640 AIRWAY INHALATION TREATMENT: CPT

## 2022-09-15 PROCEDURE — 80048 BASIC METABOLIC PNL TOTAL CA: CPT

## 2022-09-15 PROCEDURE — 2700000000 HC OXYGEN THERAPY PER DAY

## 2022-09-15 RX ORDER — GABAPENTIN 100 MG/1
100 CAPSULE ORAL NIGHTLY
Qty: 90 CAPSULE | Refills: 0 | Status: SHIPPED | OUTPATIENT
Start: 2022-09-15 | End: 2022-10-15

## 2022-09-15 RX ORDER — METHOCARBAMOL 500 MG/1
500 TABLET, FILM COATED ORAL ONCE
Status: COMPLETED | OUTPATIENT
Start: 2022-09-15 | End: 2022-09-15

## 2022-09-15 RX ADMIN — DICLOFENAC SODIUM 2 G: 10 GEL TOPICAL at 11:05

## 2022-09-15 RX ADMIN — TIOTROPIUM BROMIDE INHALATION SPRAY 2 PUFF: 3.12 SPRAY, METERED RESPIRATORY (INHALATION) at 11:30

## 2022-09-15 RX ADMIN — ATORVASTATIN CALCIUM 80 MG: 80 TABLET, FILM COATED ORAL at 09:11

## 2022-09-15 RX ADMIN — METHOCARBAMOL 500 MG: 500 TABLET ORAL at 04:20

## 2022-09-15 RX ADMIN — SILDENAFIL 20 MG: 20 TABLET ORAL at 09:11

## 2022-09-15 RX ADMIN — MOMETASONE FUROATE AND FORMOTEROL FUMARATE DIHYDRATE 2 PUFF: 200; 5 AEROSOL RESPIRATORY (INHALATION) at 11:31

## 2022-09-15 RX ADMIN — CARVEDILOL 6.25 MG: 6.25 TABLET, FILM COATED ORAL at 09:11

## 2022-09-15 RX ADMIN — FUROSEMIDE 20 MG: 20 TABLET ORAL at 09:11

## 2022-09-15 RX ADMIN — PANTOPRAZOLE SODIUM 40 MG: 40 TABLET, DELAYED RELEASE ORAL at 06:35

## 2022-09-15 RX ADMIN — SILDENAFIL 20 MG: 20 TABLET ORAL at 15:02

## 2022-09-15 RX ADMIN — Medication 1 TABLET: at 09:11

## 2022-09-15 RX ADMIN — APIXABAN 2.5 MG: 2.5 TABLET, FILM COATED ORAL at 09:11

## 2022-09-15 RX ADMIN — FERROUS SULFATE TAB 325 MG (65 MG ELEMENTAL FE) 325 MG: 325 (65 FE) TAB at 09:11

## 2022-09-15 NOTE — PROGRESS NOTES
[facility]. Unable to reach RN so left message with cell phone information.

## 2022-09-15 NOTE — DISCHARGE SUMMARY
Hospital Medicine Discharge Summary    Patient ID: Ian Santiago      Patient's PCP: Isabella Barron MD    Admit Date: 9/13/2022     Discharge Date:   09/15/22    Admitting Provider: Rosa Chang MD     Discharge Provider: Shar Batres DO     Discharge Diagnoses: Active Hospital Problems    Diagnosis     Acute cerebrovascular accident (CVA) [facility]) [I63.9]      Priority: Medium       The patient was seen and examined on day of discharge and this discharge summary is in conjunction with any daily progress note from day of discharge. Hospital Course: 61 y.o. male with a history of multiple medical problems who presented to [facility] with a bleeding fistula site in his left arm after having completed dialysis. He was trying to take his sweater off when afterwards he noticed it bleeding. EMS arrived, found blood on floor, brought to ED. After direct pressure obtained for 15 min in ED, gauze applied and bleeding stopped after an hour. Pt was ready to be discharged from ED when he experienced a sudden left sided facial droop with slurred speech. He also complains of a diminished sensation on that side of the face. Was not a tPA candidate d/t being on Eliquis. CT obtained showing no abnormalities. He has no other complaints. TIA - Pt with hx of CVA however those were right sided deficits, today he experienced ongoing left sided facial deficits concerning for new CVA with slurred speech and left sided facial droop. CT head w/o bleed. Not TPA candidate d/t Eliquis. Left sided droop improved this AM.  - MRI brain wo con - no acute infarction or hemorrhage. Multiple chronic infarcts present  - Lipitor   - Holding off on ASA until neuro recs as pt was recently bleeding from fistula site and is on Eliquis  - Neurology consulted, no need for further work up  - PT/OT/SLP  - continue Eliquis ok to discharge today back to long term care facility [facility].       Acute anemia  ESRD on HD  - Dr. Teddy Love consulted  - Hgb low this AM. Required 1U pRBC transfusion. No signs of ongoing blood loss. Perhaps residual from yesterdays episode     Hx of DVT/ PE / CTEPH  - Cont Eliquis 2.5mg BID  - Cont Revatio     CHFrEF, not in acute exacerbation - LVEF 35-40% 3/2022  - Cont home meds when able to take oral     Gout  - Cont allopurinol when able to take oral      The patient and / or the family were informed of the results of any tests, a time was given to answer questions, a plan was proposed and they agreed with plan. DVT Prophylaxis: Eliquis  Diet: No diet orders on file  Code Status: Prior     PT/OT Eval Status: Ongoing          Physical Exam Performed:     /69   Pulse 76   Temp 97.6 °F (36.4 °C) (Oral)   Resp 16   Ht 5' 8\" (1.727 m)   Wt 141 lb 1.5 oz (64 kg)   SpO2 99%   BMI 21.45 kg/m²       General appearance:  No apparent distress, appears stated age and cooperative. HEENT:  Normal cephalic, atraumatic without obvious deformity. Pupils equal, round, and reactive to light. Extra ocular muscles intact. Conjunctivae/corneas clear. Neck: Supple, with full range of motion. No jugular venous distention. Trachea midline. Respiratory:  Normal respiratory effort. Clear to auscultation, bilaterally without Rales/Wheezes/Rhonchi. Cardiovascular:  Regular rate and rhythm with normal S1/S2 without murmurs, rubs or gallops. Abdomen: Soft, non-tender, non-distended with normal bowel sounds. Musculoskeletal:  No clubbing, cyanosis or edema bilaterally. Full range of motion without deformity. Skin: Skin color, texture, turgor normal.  No rashes or lesions. Neurologic:  Neurovascularly intact without any focal sensory/motor deficits. Cranial nerves: II-XII intact, grossly non-focal.  Psychiatric:  Alert and oriented, thought content appropriate, normal insight  Capillary Refill: Brisk,< 3 seconds   Peripheral Pulses: +2 palpable, equal bilaterally       Labs:  For convenience and continuity at follow-up the following most recent labs are provided:      CBC:    Lab Results   Component Value Date/Time    WBC 3.5 09/15/2022 06:39 AM    HGB 8.2 09/15/2022 06:39 AM    HCT 25.4 09/15/2022 06:39 AM    PLT 73 09/15/2022 06:39 AM       Renal:    Lab Results   Component Value Date/Time     09/15/2022 06:39 AM    K 4.5 09/15/2022 06:39 AM    CL 92 09/15/2022 06:39 AM    CO2 21 09/15/2022 06:39 AM    BUN 35 09/15/2022 06:39 AM    CREATININE 8.2 09/15/2022 06:39 AM    CALCIUM 8.6 09/15/2022 06:39 AM    PHOS 4.1 06/17/2022 06:53 AM         Significant Diagnostic Studies    Radiology:   MRI BRAIN WO CONTRAST   Final Result      1. No acute infarction or recent hemorrhage     2. Moderate chronic small vessel ischemia   3. Multiple bilateral chronic cerebellar infarctions   4. Chronic microhemorrhages involving the basal ganglia and jenny compatible with sequela of chronic hypertension. Additional small chronic hemorrhagic insult involving the subcortical left lateral occipitotemporal lobe. CTA HEAD NECK W CONTRAST   Final Result      CTA Head:   1. No steno-occlusive disease or aneurysm. CTA Neck:   1. No steno-occlusive disease. 2. Moderate emphysema with right hilar lymph node. Recommend dedicated chest CT for further evaluation. INCIDENTAL FINDINGS:  INCIDENTAL FINDING OTHER         CT HEAD WO CONTRAST   Final Result      No acute intracranial abnormality or significant change. Left maxillary sinus disease.              Consults:     IP CONSULT TO HOSPITALIST  IP CONSULT TO CRITICAL CARE  IP CONSULT TO CRITICAL CARE  IP CONSULT TO NEUROLOGY  IP CONSULT TO NEUROLOGY  IP CONSULT TO NEPHROLOGY    Disposition:  long term care     Condition at Discharge: Stable    Discharge Instructions/Follow-up:  PCP    Code Status:  Full Code     Activity: activity as tolerated    Diet: renal diet      Discharge Medications:     Current Discharge Medication List             Details   diclofenac sodium (VOLTAREN) 1 % GEL Apply 2 g topically 2 times daily  Qty: 50 g, Refills: 0                Details   gabapentin (NEURONTIN) 100 MG capsule Take 1 capsule by mouth at bedtime for 30 days. Qty: 90 capsule, Refills: 0                Details   calcitRIOL (ROCALTROL) 0.25 MCG capsule Take 0.5 mcg by mouth every other day [facility]      sevelamer (RENVELA) 800 MG tablet Take 1 tablet by mouth 3 times daily (with meals)      apixaban (ELIQUIS) 2.5 MG TABS tablet Take 1 tablet by mouth 2 times daily  Qty: 60 tablet, Refills: 1      ferrous sulfate (FE TABS) 325 (65 Fe) MG EC tablet Take 1 tablet by mouth every other day  Qty: 15 tablet, Refills: 1      fluticasone-salmeterol (ADVAIR) 500-50 MCG/ACT AEPB diskus inhaler Inhale 1 puff into the lungs 2 times daily      atorvastatin (LIPITOR) 80 MG tablet Take 80 mg by mouth daily      calcium carb-cholecalciferol 600-200 MG-UNIT TABS tablet Take 1 tablet by mouth daily      furosemide (LASIX) 20 MG tablet Take 20 mg by mouth daily      Umeclidinium Bromide (INCRUSE ELLIPTA) 62.5 MCG/INH AEPB Inhale 1 puff into the lungs daily      sildenafil (REVATIO) 20 MG tablet Take 20 mg by mouth in the morning, at noon, and at bedtime      vitamin D (ERGOCALCIFEROL) 1.25 MG (53205 UT) CAPS capsule Take 50,000 Units by mouth once a week Saturdays      omeprazole (PRILOSEC) 20 MG delayed release capsule Take 20 mg by mouth daily      carvedilol (COREG) 12.5 MG tablet Take 12.5 mg by mouth 2 times daily Unsure of dose       albuterol sulfate HFA (PROVENTIL;VENTOLIN;PROAIR) 108 (90 Base) MCG/ACT inhaler Inhale 2 puffs into the lungs every 6 hours as needed for Wheezing 90mcg/actuation      allopurinol (ZYLOPRIM) 100 MG tablet Take 100 mg by mouth Daily on MWF             Time Spent on discharge is more than 45 minutes in the examination, evaluation, counseling and review of medications and discharge plan. Signed:     Ismael Gupta DO   9/15/2022      Thank you Ethelda Lesches, MD for the opportunity to be involved in this patient's care. If you have any questions or concerns, please feel free to contact me at [phone].

## 2022-09-15 NOTE — DISCHARGE INSTR - COC
Clostridium Difficile Toxin/Antigen (Ordered)   03/03/22 Rule-Out Test Resulted    COVID-19 (Rule Out) 09/21/21 09/21/21 09/21/21 COVID-19, Rapid (Ordered)   09/21/21 Rule-Out Test Resulted    COVID-19 (Rule Out)  04/02/20 04/02/20 Stacey Parra RN   04/06/20 Stacey Parra RN    COVID-19 (Rule Out) 03/31/20 03/31/20 03/31/20 COVID-19 (Ordered)   03/31/20 Rule-Out Test Resulted            Nurse Assessment:  Last Vital Signs: BP (!) 143/91   Pulse 80   Temp 97.7 °F (36.5 °C) (Oral)   Resp 16   Ht 5' 8\" (1.727 m)   Wt 141 lb 1.5 oz (64 kg)   SpO2 100%   BMI 21.45 kg/m²     Last documented pain score (0-10 scale):    Last Weight:   Wt Readings from Last 1 Encounters:   09/15/22 141 lb 1.5 oz (64 kg)     Mental Status:  disoriented, oriented, and alert    IV Access:  - None    Nursing Mobility/ADLs:  Walking   Assisted  Transfer  Assisted  Bathing  Assisted  Dressing  Assisted  Toileting  Assisted  Feeding  [address]  Independent  Med Delivery   whole    Wound Care Documentation and Therapy:        Elimination:  Continence: Bowel: Yes  Bladder: Yes  Urinary Catheter: None   Colostomy/Ileostomy/Ileal Conduit: No       Date of Last BM: 9/15/2022    Intake/Output Summary (Last 24 hours) at 9/15/2022 1039  Last data filed at 9/15/2022 0915  Gross per 24 hour   Intake 1440 ml   Output --   Net 1440 ml     I/O last 3 completed shifts: In: 1440 [P.O.:840; Blood:600]  Out: -     Safety Concerns: At Risk for Falls    Impairments/Disabilities:      None    Nutrition Therapy:  Current Nutrition Therapy:   - Oral Diet:  Renal    Routes of Feeding: Oral  Liquids: Thin Liquids  Daily Fluid Restriction: no  Last Modified Barium Swallow with Video (Video Swallowing Test): not done    Treatments at the Time of Hospital Discharge:   Respiratory Treatments: ***  Oxygen Therapy:  is on oxygen at 2-3 L/min per nasal cannula.   Ventilator:    - No ventilator support    Rehab Therapies: Physical Therapy and Occupational Therapy  Weight Bearing Status/Restrictions: No weight bearing restrictions  Other Medical Equipment (for information only, NOT a DME order):  cane  Other Treatments: ***    Patient's personal belongings (please select all that are sent with patient):  Blaine DENNY SIGNATURE:  Electronically signed by Angeli Murphy RN on 9/15/22 at 4:41 PM EDT    CASE MANAGEMENT/SOCIAL WORK SECTION    Inpatient Status Date: 9/13/22    Readmission Risk Assessment Score:  Readmission Risk              Risk of Unplanned Readmission:  41           Discharging to Facility/ Agency   Name: [facility]   Address: [address] [place] [address]   Phone: [phone]  Fax: [phone]    Dialysis Facility (if applicable)   Name:  Address:  Dialysis Schedule:  Phone:  Fax:    / signature: Electronically signed by Ivonne Dakins, MSW, HEMALW on 9/15/22 at 4:06 PM EDT    PHYSICIAN SECTION    Prognosis: Good    Condition at Discharge: Stable    Rehab Potential (if transferring to Rehab): Good    Recommended Labs or Other Treatments After Discharge: PT/OT/Speech, RN care    Physician Certification: I certify the above information and transfer of Jocelyn Ovalle  is necessary for the continuing treatment of the diagnosis listed and that he requires [facility] for less 30 days.      Update Admission H&P: No change in H&P    PHYSICIAN SIGNATURE:  Electronically signed by Joyce Barroso DO on 9/15/22 at 10:40 AM EDT

## 2022-09-15 NOTE — PROGRESS NOTES
No acute events, VSS, patient c/o pulled muscle in shoulder and was medicated per MAR. Patient is oriented with intermittent confusion. NIH-1 for slight facial droop.

## 2022-09-15 NOTE — PLAN OF CARE
Problem: Discharge Planning  Goal: Discharge to home or other facility with appropriate resources  Outcome: Progressing     Problem: Safety - Adult  Goal: Free from fall injury  9/14/2022 2240 by Jeaneth Pierce RN  Outcome: Progressing  9/14/2022 0847 by Albin Camarillo RN  Outcome: Progressing     Problem: ABCDS Injury Assessment  Goal: Absence of physical injury  9/14/2022 2240 by Jeaneth Pierce RN  Outcome: Progressing  9/14/2022 0847 by Albin Camarillo RN  Outcome: Progressing     Problem: SLP Adult - Impaired Communication  Goal: By Discharge: Demonstrates communication skills at highest level of function for planned discharge setting. See evaluation for individualized goals.   9/14/2022 1550 by LAXMI Renner  Outcome: Progressing

## 2022-09-15 NOTE — CARE COORDINATION
Case Management Assessment            Discharge Note                    Date / Time of Note: 9/15/2022 4:01 PM                  Discharge Note Completed by: DINESH Pena, MARICRUZ    Patient Name: Brian Ontiveros   YOB: 1955  Diagnosis: Neurological deficit present [R29.818]  Acute cerebrovascular accident (CVA) [facility]) [I63.9]   Date / Time: 9/13/2022  1:00 PM    Current PCP: Carlos Anna MD  Clinic patient: No    Hospitalization in the last 30 days: No    Advance Directives:  Code Status: Full Code  [place] DNR form completed and on chart: No    Financial:  Payor: Jonne Boast / Plan: Georges Ramirez / Product Type: *No Product type* /      Pharmacy:    [address] [place] [address] [phone] - F [phone]  [address] [place] [address]  Phone: [phone] Fax: [phone]    [place] [address] [id number] [address] [phone] - F [phone]  [place] [address]  [address] [place] [address]  Phone: [phone] Fax: [phone]      Assistance purchasing medications?: Potential Assistance Purchasing Medications: No  Assistance provided by Case Management: None at this time    Does patient want to participate in local refill/ meds to beds program?: Yes    Meds To Beds General Rules:  1. Can ONLY be done Monday- Friday between 8:30am-5pm  2. Prescription(s) must be in pharmacy by 3pm to be filled same day  3. Copy of patient's insurance/ prescription drug card and patient face sheet must be sent along with the prescription(s)  4. Cost of Rx cannot be added to hospital bill. If financial assistance is needed, please contact unit  or ;  or  CANNOT provide pharmacy voucher for patients co-pays  5. Patients can then  the prescription on their way out of the hospital at discharge, or pharmacy can deliver to the bedside if staff is available. (payment due at time of pick-up or delivery - cash, check, or card accepted)     Able to afford home medications/ co-pay costs: Yes    ADLS:  Current PT AM-PAC Score: 17 /24  Current OT AM-PAC Score: 19 /24      DISCHARGE Disposition: Lauren (LTC): [facility] [address]  Phone: [phone]  Fax: n/a    LOC at discharge: [address] Completed: Yes    Notification completed in HENS/PAS?:  Not Applicable    IMM Completed:   Not Indicated    Transportation:  Transportation PLAN for discharge: EMS transportation   Mode of Transport: Ambulance stretcher - BLS  Reason for medical transport: Bed confined: Meets the following criteria 1) unable to get out of bed without assistance or ambulate, 2) unable to safely sit up in a wheelchair, 3) unable to maintain erect seating position in a chair for time needed for transport  Name of Transport Company:  [employer]   Phone: [phone]  Time of Transport: 6;30pm    Transport form completed: Yes    Home Care:  [address] ordered at discharge: No    Durable Medical Equipment:  DME Provider: none  Equipment obtained during hospitalization: noen    Home Oxygen and Respiratory Equipment:  Oxygen needed at discharge?: Yes  [address]: Not Applicable  Portable tank available for discharge?: Yes    Dialysis:  Dialysis patient: Yes    Dialysis Center:  At [facility]     Referrals made at [facility] for outpatient continued care:  Not Applicable    Additional CM Notes:     Pt is from LTC at [facility]. Pt will return to LT. SW confirmed w/[facility] they are sofie to take pt back even w/him refusing HD.  [facility] scheduled transport via [employer] for 6;30pm.     The Plan for Transition of Care is related to the following treatment goals of Neurological deficit present [R29.818]  Acute cerebrovascular accident (CVA) ([facility] [address] [I63.9]    The Patient and/or patient representative Kathie Odell and his family were provided with a choice of provider and agrees with the discharge plan Yes    Freedom of choice list was provided with basic dialogue that supports the patient's individualized plan of care/goals and shares the quality data associated with the providers.  Yes    Care Transitions patient: No    DINESH Neri, [facility]  Case Management Department  Ph: [phone]

## 2022-09-15 NOTE — PLAN OF CARE
Chronic Conditions and Co-morbidities  Goal: Patient's chronic conditions and co-morbidity symptoms are monitored and maintained or improved  Outcome: Progressing     Safety - Adult  Goal: Free from fall injury  Outcome: Progressing

## 2022-09-15 NOTE — PROGRESS NOTES
Occupational Therapy  Facility/Department: [facility] [address]  Occupational Therapy Treatment    Name: Margarita Farooq  : 1955  MRN: [id number]  Date of Service: 9/15/2022    Discharge Recommendations: Margarita Farooq scored a 19/24 on the AM-PAC ADL Inpatient form. Current research shows that an AM-PAC score of 17 or less is typically not associated with a discharge to the patient's home setting. Based on the patient's AM-PAC score and their current ADL deficits, it is recommended that the patient have 3-5 sessions per week of Occupational Therapy at d/c to increase the patient's independence. Please see assessment section for further patient specific details. If patient discharges prior to next session this note will serve as a discharge summary. Please see below for the latest assessment towards goals. Patient Diagnosis(es): The encounter diagnosis was Neurological deficit present. Past Medical History:  has a past medical history of Cerebral artery occlusion with cerebral infarction ([facility] [address] CHF (congestive heart failure) ([facility] [address] Chronic kidney disease, COPD (chronic obstructive pulmonary disease) ([facility] [address] Dialysis patient ([address] Gout, Hemodialysis patient ([facility] [address] Hx of blood clots, Hyperlipidemia, Hypertension, and Renal failure. Past Surgical History:  has a past surgical history that includes Dialysis fistula creation (Left) and Colonoscopy. Treatment Diagnosis: Impaired activity tolerance, ADLs, and functional mobility. Assessment   Performance deficits / Impairments: Decreased functional mobility ; Decreased safe awareness;Decreased balance;Decreased ADL status; Decreased endurance;Decreased strength  Assessment:  Currently requiring SBA-CGA for functional mobility and ADL's with cane. Pt walked in hallway, was able to verbalize when he fatigued. Pt on 2-3LO2 at baseline.  Currently presenting below baseline, could continue to benefit from acute OT services to increase safety awareness and maximize independence. OT recommending continued IP OT at d/c. Treatment Diagnosis: Impaired activity tolerance, ADLs, and functional mobility. Activity Tolerance  Activity Tolerance: Patient Tolerated treatment well  Activity Tolerance Comments: [address]  Times per Week: 5-7  Times per Day: Daily  Current Treatment Recommendations: Strengthening, Balance training, Functional mobility training, Endurance training, Neuromuscular re-education, Safety education & training, Self-Care / ADL, Patient/Caregiver education & training     Restrictions  Restrictions/Precautions  Restrictions/Precautions: Bed Alarm  Position Activity Restriction  Other position/activity restrictions: no mobility orders at this time 9/14    Subjective   General  Chart Reviewed: Yes  Patient assessed for rehabilitation services?: Yes  Family / Caregiver Present: No  Referring Practitioner: Juliocesar Arciniega MD  Subjective  Subjective: supine in bed, agreeable to therapy. General Comment  Comments: No c/o pain     Social/Functional History  Social/Functional History  Type of Home:  (nursing facility / LTC)  Bathroom Shower/Tub: Walk-in shower  Bathroom Toilet: Handicap height  Bathroom Equipment: Shower chair  Home Equipment:  [facility])  Has the patient had two or more falls in the past year or any fall with injury in the past year?:  (1: Pt fell coming off of commode, Pt notes he hit head. 2 -3 months ago)  Receives Help From:  (LTC staff)  ADL Assistance: Independent  Homemaking Responsibilities: No (lives LTC facility)  Ambulation Assistance:  (Pt notes he gets winded and uses a wc when going to meals at LT. Staff pushes wc)  Transfer Assistance: Independent  Active : No  Type of Occupation: used to work as a        Safety Devices  Type of Devices: Left in chair;Nurse notified;Gait belt;Call light within reach; Chair alarm in place; All fall risk precautions in place; Patient at risk for falls  Restraints  Restraints Initially in Place: No  Bed Mobility Training  Bed Mobility Training: Yes  Overall Level of Assistance: Supervision  Supine to Sit: Supervision  Balance  Sitting: Intact  Standing: With support  Transfer Training  Transfer Training: Yes  Overall Level of Assistance: Stand-by assistance; Adaptive equipment (straight cane)  Sit to Stand: Stand-by assistance; Adaptive equipment  Gait  Overall Level of Assistance: Stand-by assistance;Contact-guard assistance; Adaptive equipment; Additional time (pt walked in hallway with straight cane, able to verbalize when fatigued)  Assistive Device: Cane, straight        ADL  Additional Comments: declined ADL's at this date. \"I dont have to go to the bathroom\"                                    Education Given To: Patient  Education Provided: Role of Therapy;Transfer Training;Plan of Care  Education Method: Demonstration  Barriers to Learning: None  Education Outcome: Verbalized understanding;Continued education needed         AM-PAC Score        AM-PAC Inpatient Daily Activity Raw Score: 19 (09/15/22 1505)  AM-PAC Inpatient ADL T-Scale Score : 40.22 (09/15/22 1505)  ADL Inpatient CMS 0-100% Score: 42.8 (09/15/22 1505)  ADL Inpatient CMS G-Code Modifier : CK (09/15/22 1505)           Goals  Short Term Goals  Time Frame for Short term goals: D/C  Short Term Goal 1: Pt to perform toilet transfer with SBA using SPC. - not addressed 9/15  Short Term Goal 2: Pt to tolerate 5 min standing to complete self care tasks. - addressec, keep for consistency  Short Term Goal 3: Pt to perform toileting with SBA. - not addressed 9/15  Patient Goals   Patient goals :  To return home       Therapy Time   Individual Concurrent Group Co-treatment   Time In 1420         Time Out 1443         Minutes 23            Total Treatment Minutes:  Padmini 1574, OTS      Therapist was present, directed the patients care, made skilled judgement and was responsible for assessment and treatment of the patient.      Alessio Aguilar, MOT, OTR/L

## 2022-09-15 NOTE — PROGRESS NOTES
Mr. Anay Jacobson continues to refuse dialysis. Educated him about his creatinine level and encouraged him in going due to the possible benefits but PT continues to decline.  Notified dialysis team.

## 2022-09-15 NOTE — PROGRESS NOTES
Nephrology Progress Note  [phone]   [facility]        Reason for Consult:  ESKD    HISTORY OF PRESENT ILLNESS:                This is a patient with significant past medical history of ESKD on HD TTS, last HD yesterday, HTN, CVA, CHF  who presented to ED with bleeding from AVF-hemostasis was achieved in ED, was ready for discharge but noted to have facial drop/slur speech. CT scan was negative for acute CVA-not a candidate for TPA due to Eliquis use. MRI of brain was negative for acute CVA but showed \"  1. No acute infarction or recent hemorrhage     2. Moderate chronic small vessel ischemia   3. Multiple bilateral chronic cerebellar infarctions   4. Chronic microhemorrhages involving the basal ganglia and jenny compatible with sequela of chronic hypertension. Additional small chronic hemorrhagic insult involving the subcortical left lateral occipitotemporal lobe   \"  -he denies any fever/chills/N/V  -plan for PRBC for ABLA    Subjective:  -pt seen and examined  -PMSHx and meds reviewed  -No family at bedside    No acute events ON  Plan for HD today  He is refusing HD today    ROS: neg chest pain/SOB      PHYSICAL EXAM:    Vitals:    BP (!) 143/91   Pulse 80   Temp 97.7 °F (36.5 °C) (Oral)   Resp 16   Ht 5' 8\" (1.727 m)   Wt 141 lb 1.5 oz (64 kg)   SpO2 100%   BMI 21.45 kg/m²   I/O last 3 completed shifts: In: 1440 [P.O.:840; Blood:600]  Out: -   No intake/output data recorded. Physical Exam:  Gen: Resting in bed, NAD. HEENT: anicteric, NC/AT  CV: +S1/S2, RRR  Lungs: Good respiratory effort, clear air entry   Abd: S/NT +BS  Ext: No edema, no cyanosis  Skin: Warm. No rashes appreciated. : No TTP over bladder, nondistended. Neuro: Alert and oriented x 3, nonfocal.  Joints: No erythema noted over joints.   Access: JAIME AVF + T/B, no bleeding    DATA:    CBC:   Lab Results   Component Value Date/Time    WBC 3.5 09/15/2022 06:39 AM    RBC 2.85 09/15/2022 06:39 AM    HGB 8.2

## 2022-09-15 NOTE — PROGRESS NOTES
Patient called RN in, patient informed this RN that he's not going to dialysis today. Patient stated he \"doesn't feel like it. \" Will inform dayshift RN to make team aware.

## 2022-10-06 ENCOUNTER — HOSPITAL ENCOUNTER (OUTPATIENT)
Age: 67
Setting detail: OBSERVATION
Discharge: HOME | End: 2022-10-09
Attending: EMERGENCY MEDICINE | Admitting: INTERNAL MEDICINE
Payer: MEDICARE

## 2022-10-06 DIAGNOSIS — T82.590A DIALYSIS AV FISTULA MALFUNCTION, INITIAL ENCOUNTER (HCC): Primary | ICD-10-CM

## 2022-10-06 PROBLEM — J96.12 CHRONIC RESPIRATORY FAILURE WITH HYPOXIA AND HYPERCAPNIA (HCC): Status: ACTIVE | Noted: 2022-10-06

## 2022-10-06 PROBLEM — J96.11 CHRONIC RESPIRATORY FAILURE WITH HYPOXIA AND HYPERCAPNIA (HCC): Status: ACTIVE | Noted: 2022-10-06

## 2022-10-06 PROBLEM — D61.818 PANCYTOPENIA (HCC): Status: ACTIVE | Noted: 2022-06-17

## 2022-10-06 PROBLEM — T82.898A AV FISTULA OCCLUSION, INITIAL ENCOUNTER (HCC): Status: ACTIVE | Noted: 2022-10-06

## 2022-10-06 LAB
ALBUMIN SERPL-MCNC: 3.3 G/DL (ref 3.4–5)
ANION GAP SERPL CALCULATED.3IONS-SCNC: 12 MMOL/L (ref 3–16)
BASOPHILS ABSOLUTE: 0 K/UL (ref 0–0.2)
BASOPHILS RELATIVE PERCENT: 0.5 %
BUN BLDV-MCNC: 31 MG/DL (ref 7–20)
CALCIUM SERPL-MCNC: 8.4 MG/DL (ref 8.3–10.6)
CHLORIDE BLD-SCNC: 93 MMOL/L (ref 99–110)
CO2: 28 MMOL/L (ref 21–32)
CREAT SERPL-MCNC: 9.4 MG/DL (ref 0.8–1.3)
EOSINOPHILS ABSOLUTE: 0.1 K/UL (ref 0–0.6)
EOSINOPHILS RELATIVE PERCENT: 4.2 %
GFR AFRICAN AMERICAN: 7
GFR NON-AFRICAN AMERICAN: 6
GLUCOSE BLD-MCNC: 80 MG/DL (ref 70–99)
HCT VFR BLD CALC: 21.1 % (ref 40.5–52.5)
HEMOGLOBIN: 6.8 G/DL (ref 13.5–17.5)
LYMPHOCYTES ABSOLUTE: 0.6 K/UL (ref 1–5.1)
LYMPHOCYTES RELATIVE PERCENT: 18.5 %
MAGNESIUM: 2.4 MG/DL (ref 1.8–2.4)
MCH RBC QN AUTO: 28.7 PG (ref 26–34)
MCHC RBC AUTO-ENTMCNC: 32.2 G/DL (ref 31–36)
MCV RBC AUTO: 89 FL (ref 80–100)
MONOCYTES ABSOLUTE: 0.3 K/UL (ref 0–1.3)
MONOCYTES RELATIVE PERCENT: 9.2 %
NEUTROPHILS ABSOLUTE: 2.2 K/UL (ref 1.7–7.7)
NEUTROPHILS RELATIVE PERCENT: 67.6 %
PDW BLD-RTO: 17.2 % (ref 12.4–15.4)
PHOSPHORUS: 3.3 MG/DL (ref 2.5–4.9)
PLATELET # BLD: 54 K/UL (ref 135–450)
PMV BLD AUTO: 7.8 FL (ref 5–10.5)
POTASSIUM SERPL-SCNC: 4.1 MMOL/L (ref 3.5–5.1)
RBC # BLD: 2.37 M/UL (ref 4.2–5.9)
SODIUM BLD-SCNC: 133 MMOL/L (ref 136–145)
WBC # BLD: 3.2 K/UL (ref 4–11)

## 2022-10-06 PROCEDURE — 86923 COMPATIBILITY TEST ELECTRIC: CPT

## 2022-10-06 PROCEDURE — 83735 ASSAY OF MAGNESIUM: CPT

## 2022-10-06 PROCEDURE — P9016 RBC LEUKOCYTES REDUCED: HCPCS

## 2022-10-06 PROCEDURE — 86901 BLOOD TYPING SEROLOGIC RH(D): CPT

## 2022-10-06 PROCEDURE — 99285 EMERGENCY DEPT VISIT HI MDM: CPT

## 2022-10-06 PROCEDURE — 36415 COLL VENOUS BLD VENIPUNCTURE: CPT

## 2022-10-06 PROCEDURE — 85025 COMPLETE CBC W/AUTO DIFF WBC: CPT

## 2022-10-06 PROCEDURE — 86850 RBC ANTIBODY SCREEN: CPT

## 2022-10-06 PROCEDURE — 85610 PROTHROMBIN TIME: CPT

## 2022-10-06 PROCEDURE — 86900 BLOOD TYPING SEROLOGIC ABO: CPT

## 2022-10-06 PROCEDURE — G0378 HOSPITAL OBSERVATION PER HR: HCPCS

## 2022-10-06 PROCEDURE — 85018 HEMOGLOBIN: CPT

## 2022-10-06 PROCEDURE — 85014 HEMATOCRIT: CPT

## 2022-10-06 PROCEDURE — 80069 RENAL FUNCTION PANEL: CPT

## 2022-10-06 RX ORDER — SODIUM CHLORIDE 9 MG/ML
INJECTION, SOLUTION INTRAVENOUS PRN
Status: DISCONTINUED | OUTPATIENT
Start: 2022-10-06 | End: 2022-10-09 | Stop reason: HOSPADM

## 2022-10-06 RX ORDER — ECHINACEA PURPUREA EXTRACT 125 MG
1 TABLET ORAL PRN
Status: ON HOLD | COMMUNITY
End: 2022-10-15 | Stop reason: SDUPTHER

## 2022-10-06 RX ORDER — FUROSEMIDE 40 MG/1
20 TABLET ORAL DAILY
Status: DISCONTINUED | OUTPATIENT
Start: 2022-10-07 | End: 2022-10-09 | Stop reason: HOSPADM

## 2022-10-06 RX ORDER — HEPARIN SODIUM 5000 [USP'U]/ML
5000 INJECTION, SOLUTION INTRAVENOUS; SUBCUTANEOUS EVERY 8 HOURS SCHEDULED
Status: DISCONTINUED | OUTPATIENT
Start: 2022-10-06 | End: 2022-10-06

## 2022-10-06 RX ORDER — PSEUDOEPHEDRINE HYDROCHLORIDE 30 MG/1
30 TABLET ORAL EVERY 4 HOURS PRN
Status: ON HOLD | COMMUNITY
End: 2022-10-15 | Stop reason: HOSPADM

## 2022-10-06 RX ORDER — SODIUM CHLORIDE 0.9 % (FLUSH) 0.9 %
5-40 SYRINGE (ML) INJECTION EVERY 12 HOURS SCHEDULED
Status: DISCONTINUED | OUTPATIENT
Start: 2022-10-06 | End: 2022-10-09 | Stop reason: HOSPADM

## 2022-10-06 RX ORDER — ALLOPURINOL 100 MG/1
100 TABLET ORAL DAILY
Status: DISCONTINUED | OUTPATIENT
Start: 2022-10-07 | End: 2022-10-09 | Stop reason: HOSPADM

## 2022-10-06 RX ORDER — POLYETHYLENE GLYCOL 3350 17 G/17G
17 POWDER, FOR SOLUTION ORAL DAILY PRN
Status: DISCONTINUED | OUTPATIENT
Start: 2022-10-06 | End: 2022-10-09 | Stop reason: HOSPADM

## 2022-10-06 RX ORDER — SODIUM CHLORIDE 0.9 % (FLUSH) 0.9 %
5-40 SYRINGE (ML) INJECTION PRN
Status: DISCONTINUED | OUTPATIENT
Start: 2022-10-06 | End: 2022-10-09 | Stop reason: HOSPADM

## 2022-10-06 RX ORDER — CALCIUM CARBONATE-CHOLECALCIFEROL TAB 250 MG-125 UNIT 250-125 MG-UNIT
1 TAB ORAL DAILY
Status: DISCONTINUED | OUTPATIENT
Start: 2022-10-07 | End: 2022-10-09 | Stop reason: HOSPADM

## 2022-10-06 RX ORDER — FERROUS SULFATE 325(65) MG
325 TABLET ORAL
Status: DISCONTINUED | OUTPATIENT
Start: 2022-10-07 | End: 2022-10-09 | Stop reason: HOSPADM

## 2022-10-06 RX ORDER — ONDANSETRON 2 MG/ML
4 INJECTION INTRAMUSCULAR; INTRAVENOUS EVERY 6 HOURS PRN
Status: DISCONTINUED | OUTPATIENT
Start: 2022-10-06 | End: 2022-10-09 | Stop reason: HOSPADM

## 2022-10-06 RX ORDER — ATORVASTATIN CALCIUM 40 MG/1
80 TABLET, FILM COATED ORAL DAILY
Status: DISCONTINUED | OUTPATIENT
Start: 2022-10-07 | End: 2022-10-09 | Stop reason: HOSPADM

## 2022-10-06 RX ORDER — ALBUTEROL SULFATE 2.5 MG/3ML
2.5 SOLUTION RESPIRATORY (INHALATION) EVERY 4 HOURS PRN
Status: DISCONTINUED | OUTPATIENT
Start: 2022-10-06 | End: 2022-10-09 | Stop reason: HOSPADM

## 2022-10-06 RX ORDER — PANTOPRAZOLE SODIUM 40 MG/1
40 TABLET, DELAYED RELEASE ORAL
Status: DISCONTINUED | OUTPATIENT
Start: 2022-10-07 | End: 2022-10-09 | Stop reason: HOSPADM

## 2022-10-06 RX ORDER — CARVEDILOL 12.5 MG/1
12.5 TABLET ORAL 2 TIMES DAILY
Status: DISCONTINUED | OUTPATIENT
Start: 2022-10-06 | End: 2022-10-09 | Stop reason: HOSPADM

## 2022-10-06 RX ORDER — SEVELAMER CARBONATE 800 MG/1
800 TABLET, FILM COATED ORAL
Status: DISCONTINUED | OUTPATIENT
Start: 2022-10-07 | End: 2022-10-09 | Stop reason: HOSPADM

## 2022-10-06 RX ORDER — ACETAMINOPHEN 650 MG/1
650 SUPPOSITORY RECTAL EVERY 6 HOURS PRN
Status: DISCONTINUED | OUTPATIENT
Start: 2022-10-06 | End: 2022-10-09 | Stop reason: HOSPADM

## 2022-10-06 RX ORDER — LEVOTHYROXINE SODIUM 0.1 MG/1
100 TABLET ORAL DAILY
Status: DISCONTINUED | OUTPATIENT
Start: 2022-10-07 | End: 2022-10-09 | Stop reason: HOSPADM

## 2022-10-06 RX ORDER — GABAPENTIN 100 MG/1
100 CAPSULE ORAL NIGHTLY
Status: DISCONTINUED | OUTPATIENT
Start: 2022-10-06 | End: 2022-10-09 | Stop reason: HOSPADM

## 2022-10-06 RX ORDER — LEVOTHYROXINE SODIUM 0.1 MG/1
100 TABLET ORAL DAILY
COMMUNITY

## 2022-10-06 RX ORDER — ERGOCALCIFEROL 1.25 MG/1
50000 CAPSULE ORAL WEEKLY
Status: DISCONTINUED | OUTPATIENT
Start: 2022-10-06 | End: 2022-10-09 | Stop reason: HOSPADM

## 2022-10-06 RX ORDER — ONDANSETRON 4 MG/1
4 TABLET, ORALLY DISINTEGRATING ORAL EVERY 8 HOURS PRN
Status: DISCONTINUED | OUTPATIENT
Start: 2022-10-06 | End: 2022-10-09 | Stop reason: HOSPADM

## 2022-10-06 RX ORDER — CALCITRIOL 0.25 UG/1
0.5 CAPSULE, LIQUID FILLED ORAL EVERY OTHER DAY
Status: DISCONTINUED | OUTPATIENT
Start: 2022-10-07 | End: 2022-10-09 | Stop reason: HOSPADM

## 2022-10-06 RX ORDER — ACETAMINOPHEN 325 MG/1
650 TABLET ORAL EVERY 6 HOURS PRN
Status: DISCONTINUED | OUTPATIENT
Start: 2022-10-06 | End: 2022-10-09 | Stop reason: HOSPADM

## 2022-10-06 ASSESSMENT — PAIN - FUNCTIONAL ASSESSMENT: PAIN_FUNCTIONAL_ASSESSMENT: 0-10

## 2022-10-06 ASSESSMENT — ENCOUNTER SYMPTOMS
NAUSEA: 0
VOMITING: 0
SHORTNESS OF BREATH: 0
ABDOMINAL PAIN: 0

## 2022-10-06 ASSESSMENT — PAIN SCALES - GENERAL
PAINLEVEL_OUTOF10: 0
PAINLEVEL_OUTOF10: 0

## 2022-10-06 NOTE — CONSULTS
Vascular Surgery   Resident Consult Note    Reason for Consult: AV fistula bleed    History of Present Illness:   Terry Odell is a 61 y.o. male with Hx of CVA, CHF, COPD, ESRD on HD via LUE AV fistula graft on T/Th/Sat, HTN and HLD presents to [facility] ED with concern of bleeding from his LUE AV graft after HD for more than 90 minutes, and for whom vascular surgery is consulted. Patient states that these bleeding episodes have happened to him in the past and normally resolve within several hours of pressure. The patient had the AV fistula created approximately 5 years ago at [facility] but does not recall which vascular surgeon he saw. The patient states that the last time he had the fistula examined was with IR for stenosis and thrombosis for which he received balloon angioplasty and declotting with several clots removed. The patient presented in July 2022 and was discharged with plans for follow up for a fistulogram but was lost to follow up. Patient today denies numbness, tingling or decreased motor function to the extremity, he denies pain or decreased mobility of the extremity. He denies any light headedness or dizziness. Of note, the patient last took Eliquis on 10/5 and his last dialysis was today. Past Medical History:        Diagnosis Date    Cerebral artery occlusion with cerebral infarction ([facility] [address]     CHF (congestive heart failure) ([facility])     Chronic kidney disease     COPD (chronic obstructive pulmonary disease) ([facility] [address]     Dialysis patient ([facility] [address]     Gout     Hemodialysis patient ([facility] [address]     Hx of blood clots     Hyperlipidemia     Hypertension     Renal failure        Past Surgical History:        Procedure Laterality Date    COLONOSCOPY      DIALYSIS FISTULA CREATION Left        Allergies:  Nitroglycerin and Oxymetazoline    Medications:   Home Meds  No current facility-administered medications on file prior to encounter.      Current Outpatient Medications on File Prior to Encounter   Medication Sig Dispense Refill    gabapentin (NEURONTIN) 100 MG capsule Take 1 capsule by mouth at bedtime for 30 days. 90 capsule 0    diclofenac sodium (VOLTAREN) 1 % GEL Apply 2 g topically 2 times daily 50 g 0    calcitRIOL (ROCALTROL) 0.25 MCG capsule Take 0.5 mcg by mouth every other day [facility]      sevelamer (RENVELA) 800 MG tablet Take 1 tablet by mouth 3 times daily (with meals)      apixaban (ELIQUIS) 2.5 MG TABS tablet Take 1 tablet by mouth 2 times daily 60 tablet 1    ferrous sulfate (FE TABS) 325 (65 Fe) MG EC tablet Take 1 tablet by mouth every other day 15 tablet 1    [DISCONTINUED] sodium chloride (OCEAN, BABY AYR) 0.65 % nasal spray 1 spray by Nasal route every 4 hours 1 each 1    fluticasone-salmeterol (ADVAIR) 500-50 MCG/ACT AEPB diskus inhaler Inhale 1 puff into the lungs 2 times daily      atorvastatin (LIPITOR) 80 MG tablet Take 80 mg by mouth daily      calcium carb-cholecalciferol 600-200 MG-UNIT TABS tablet Take 1 tablet by mouth daily      furosemide (LASIX) 20 MG tablet Take 20 mg by mouth daily      Umeclidinium Bromide (INCRUSE ELLIPTA) 62.5 MCG/INH AEPB Inhale 1 puff into the lungs daily      sildenafil (REVATIO) 20 MG tablet Take 20 mg by mouth in the morning, at noon, and at bedtime      vitamin D (ERGOCALCIFEROL) 1.25 MG (57700 UT) CAPS capsule Take 50,000 Units by mouth once a week Saturdays      omeprazole (PRILOSEC) 20 MG delayed release capsule Take 20 mg by mouth daily      carvedilol (COREG) 12.5 MG tablet Take 12.5 mg by mouth 2 times daily Unsure of dose       albuterol sulfate HFA (PROVENTIL;VENTOLIN;PROAIR) 108 (90 Base) MCG/ACT inhaler Inhale 2 puffs into the lungs every 6 hours as needed for Wheezing 90mcg/actuation      allopurinol (ZYLOPRIM) 100 MG tablet Take 100 mg by mouth Daily on MW         Current Meds  No current facility-administered medications for this encounter.       Family History:   Family History   Problem Relation Age of Onset    Cancer Mother     Cancer Father     Other Sister Social History:   TOBACCO:   reports that he has quit smoking. He has never used smokeless tobacco.  ETOH:   reports current alcohol use. DRUGS:   reports no history of drug use. Review of Systems:   A 14 point review of systems was conducted, significant findings as noted in HPI. All other systems negative. Physical exam:    Vitals:    10/06/22 1748 10/06/22 1753 10/06/22 1754   BP:   (!) 133/95   Pulse: 82     Resp: 18     Temp: 98 °F (36.7 °C)     TempSrc: Oral     SpO2: 97%     Weight:  141 lb 1.5 oz (64 kg)    Height: 5' 8\" (1.727 m)         General appearance: alert, no acute distress, grooming appropriate  Eyes: No scleral icterus, EOM grossly intact  Neck: trachea midline, no JVD, no lymphadenopathy, neck supple  Chest/Lungs: Normal effort with no accessory muscle use on RA  Cardiovascular: RRR  Abdomen: Soft, non-tender, non-distended, no rebound, guarding, or rigidity. Skin: warm and dry, no rashes  Extremities: no edema, no cyanosis, LUE fistula with palpable bruit, palpable radial pulse, sensation and motor is intact  Neuro: A&Ox3, no focal deficits, sensation intact    Labs:    CBC: No results for input(s): WBC, HGB, HCT, MCV, PLT in the last 72 hours. BMP: No results for input(s): NA, K, CL, CO2, PHOS, BUN, CREATININE, CA in the last 72 hours. PT/INR: No results for input(s): PROTIME, INR in the last 72 hours. APTT: No results for input(s): APTT in the last 72 hours.   Liver Profile:   Lab Results   Component Value Date/Time    AST 19 06/16/2022 06:59 AM    ALT <5 06/16/2022 06:59 AM    BILIDIR 0.3 06/16/2022 06:59 AM    BILITOT 0.7 06/16/2022 06:59 AM    ALKPHOS 238 06/16/2022 06:59 AM     Lab Results   Component Value Date/Time    CHOL 77 09/14/2022 07:00 AM    HDL 38 09/14/2022 07:00 AM    TRIG 45 09/14/2022 07:00 AM     UA: No results found for: NITRITE, COLORU, PHUR, LABCAST, WBCUA, RBCUA, MUCUS, TRICHOMONAS, YEAST, BACTERIA, CLARITYU, SPECGRAV, LEUKOCYTESUR, UROBILINOGEN, BILIRUBINUR, BLOODU, GLUCOSEU, AMORPHOUS    Imaging:   No orders to display         Assessment/Plan: This is a 61 y.o. male with Hx of CVA, CHF, ESRD on HD via LUE AV graft, HLD, and HTN who presents to ED with concern of bleeding from his LUE AV graft after hemodialysis. Vascular surgery and been consulted for assistance with hemostasis and planning. Patient takes Eliquis for chronic pulmonary embolism and pulmonary hypertension, last taken 10/5/22.    - Recommend labs: CBC, renal, magnesium   - Recommend admission to Medicine for medical management  - Consult to nephrology for management of CKD and HD while inpatient  - Consult to IR (Dr. Manuel Hodge) for possible IR fistulogram   - Recommend holding Eliquis pending plans for fistula interrogation.   - Will attempt D-STAT and pressure dressing for increased hemostasis. - Patient discussed with vascular surgery on call attending Dr. Tessa Cruz.      Chichi Espinoza,   10/06/22  7:21 PM

## 2022-10-07 ENCOUNTER — APPOINTMENT (OUTPATIENT)
Dept: INTERVENTIONAL RADIOLOGY/VASCULAR | Age: 67
End: 2022-10-07
Payer: MEDICARE

## 2022-10-07 PROBLEM — T82.590A DIALYSIS AV FISTULA MALFUNCTION, INITIAL ENCOUNTER (HCC): Status: ACTIVE | Noted: 2022-10-07

## 2022-10-07 LAB
ABO/RH: NORMAL
ALBUMIN SERPL-MCNC: 3.5 G/DL (ref 3.4–5)
ANION GAP SERPL CALCULATED.3IONS-SCNC: 10 MMOL/L (ref 3–16)
ANTIBODY SCREEN: NORMAL
BASOPHILS ABSOLUTE: 0 K/UL (ref 0–0.2)
BASOPHILS ABSOLUTE: 0 K/UL (ref 0–0.2)
BASOPHILS RELATIVE PERCENT: 0.3 %
BASOPHILS RELATIVE PERCENT: 0.4 %
BLOOD BANK DISPENSE STATUS: NORMAL
BLOOD BANK PRODUCT CODE: NORMAL
BPU ID: NORMAL
BUN BLDV-MCNC: 32 MG/DL (ref 7–20)
CALCIUM SERPL-MCNC: 8.1 MG/DL (ref 8.3–10.6)
CHLORIDE BLD-SCNC: 92 MMOL/L (ref 99–110)
CO2: 30 MMOL/L (ref 21–32)
CREAT SERPL-MCNC: 9.9 MG/DL (ref 0.8–1.3)
D DIMER: 2.69 UG/ML FEU (ref 0–0.6)
DESCRIPTION BLOOD BANK: NORMAL
EOSINOPHILS ABSOLUTE: 0.1 K/UL (ref 0–0.6)
EOSINOPHILS ABSOLUTE: 0.2 K/UL (ref 0–0.6)
EOSINOPHILS RELATIVE PERCENT: 4.8 %
EOSINOPHILS RELATIVE PERCENT: 5.1 %
GFR AFRICAN AMERICAN: 6
GFR NON-AFRICAN AMERICAN: 5
GLUCOSE BLD-MCNC: 70 MG/DL (ref 70–99)
HCT VFR BLD CALC: 20.9 % (ref 40.5–52.5)
HCT VFR BLD CALC: 23.2 % (ref 40.5–52.5)
HCT VFR BLD CALC: 24.5 % (ref 40.5–52.5)
HEMOGLOBIN: 6.8 G/DL (ref 13.5–17.5)
HEMOGLOBIN: 7.3 G/DL (ref 13.5–17.5)
HEMOGLOBIN: 7.8 G/DL (ref 13.5–17.5)
INR BLD: 1.38 (ref 0.87–1.14)
LYMPHOCYTES ABSOLUTE: 0.5 K/UL (ref 1–5.1)
LYMPHOCYTES ABSOLUTE: 0.6 K/UL (ref 1–5.1)
LYMPHOCYTES RELATIVE PERCENT: 17.7 %
LYMPHOCYTES RELATIVE PERCENT: 18.8 %
MAGNESIUM: 2.6 MG/DL (ref 1.8–2.4)
MCH RBC QN AUTO: 28.1 PG (ref 26–34)
MCH RBC QN AUTO: 28.6 PG (ref 26–34)
MCHC RBC AUTO-ENTMCNC: 32 G/DL (ref 31–36)
MCHC RBC AUTO-ENTMCNC: 32.4 G/DL (ref 31–36)
MCV RBC AUTO: 87.9 FL (ref 80–100)
MCV RBC AUTO: 88.1 FL (ref 80–100)
MONOCYTES ABSOLUTE: 0.3 K/UL (ref 0–1.3)
MONOCYTES ABSOLUTE: 0.3 K/UL (ref 0–1.3)
MONOCYTES RELATIVE PERCENT: 9.1 %
MONOCYTES RELATIVE PERCENT: 9.2 %
NEUTROPHILS ABSOLUTE: 2 K/UL (ref 1.7–7.7)
NEUTROPHILS ABSOLUTE: 2.1 K/UL (ref 1.7–7.7)
NEUTROPHILS RELATIVE PERCENT: 66.7 %
NEUTROPHILS RELATIVE PERCENT: 67.9 %
PDW BLD-RTO: 16.6 % (ref 12.4–15.4)
PDW BLD-RTO: 16.7 % (ref 12.4–15.4)
PHOSPHORUS: 4 MG/DL (ref 2.5–4.9)
PLATELET # BLD: 49 K/UL (ref 135–450)
PLATELET # BLD: 51 K/UL (ref 135–450)
PMV BLD AUTO: 7.7 FL (ref 5–10.5)
PMV BLD AUTO: 7.9 FL (ref 5–10.5)
POTASSIUM SERPL-SCNC: 4.3 MMOL/L (ref 3.5–5.1)
PRO-BNP: ABNORMAL PG/ML (ref 0–124)
PROTHROMBIN TIME: 16.9 SEC (ref 11.7–14.5)
RBC # BLD: 2.37 M/UL (ref 4.2–5.9)
RBC # BLD: 2.78 M/UL (ref 4.2–5.9)
SODIUM BLD-SCNC: 132 MMOL/L (ref 136–145)
TROPONIN: 0.08 NG/ML
WBC # BLD: 2.9 K/UL (ref 4–11)
WBC # BLD: 3.1 K/UL (ref 4–11)

## 2022-10-07 PROCEDURE — 6360000004 HC RX CONTRAST MEDICATION: Performed by: RADIOLOGY

## 2022-10-07 PROCEDURE — 36901 INTRO CATH DIALYSIS CIRCUIT: CPT

## 2022-10-07 PROCEDURE — 36430 TRANSFUSION BLD/BLD COMPNT: CPT

## 2022-10-07 PROCEDURE — G0378 HOSPITAL OBSERVATION PER HR: HCPCS

## 2022-10-07 PROCEDURE — C1894 INTRO/SHEATH, NON-LASER: HCPCS

## 2022-10-07 PROCEDURE — 84484 ASSAY OF TROPONIN QUANT: CPT

## 2022-10-07 PROCEDURE — 2580000003 HC RX 258: Performed by: INTERNAL MEDICINE

## 2022-10-07 PROCEDURE — 85025 COMPLETE CBC W/AUTO DIFF WBC: CPT

## 2022-10-07 PROCEDURE — 83735 ASSAY OF MAGNESIUM: CPT

## 2022-10-07 PROCEDURE — 83880 ASSAY OF NATRIURETIC PEPTIDE: CPT

## 2022-10-07 PROCEDURE — 85379 FIBRIN DEGRADATION QUANT: CPT

## 2022-10-07 PROCEDURE — 6370000000 HC RX 637 (ALT 250 FOR IP): Performed by: INTERNAL MEDICINE

## 2022-10-07 PROCEDURE — 80069 RENAL FUNCTION PANEL: CPT

## 2022-10-07 PROCEDURE — 99232 SBSQ HOSP IP/OBS MODERATE 35: CPT | Performed by: SURGERY

## 2022-10-07 PROCEDURE — 36415 COLL VENOUS BLD VENIPUNCTURE: CPT

## 2022-10-07 PROCEDURE — 99152 MOD SED SAME PHYS/QHP 5/>YRS: CPT

## 2022-10-07 RX ORDER — SODIUM CHLORIDE 9 MG/ML
INJECTION, SOLUTION INTRAVENOUS PRN
Status: DISCONTINUED | OUTPATIENT
Start: 2022-10-07 | End: 2022-10-09 | Stop reason: HOSPADM

## 2022-10-07 RX ADMIN — FERROUS SULFATE TAB 325 MG (65 MG ELEMENTAL FE) 325 MG: 325 (65 FE) TAB at 12:15

## 2022-10-07 RX ADMIN — CARVEDILOL 12.5 MG: 12.5 TABLET, FILM COATED ORAL at 20:51

## 2022-10-07 RX ADMIN — ALLOPURINOL 100 MG: 100 TABLET ORAL at 12:15

## 2022-10-07 RX ADMIN — SEVELAMER CARBONATE 800 MG: 800 TABLET, FILM COATED ORAL at 12:16

## 2022-10-07 RX ADMIN — FUROSEMIDE 20 MG: 40 TABLET ORAL at 12:15

## 2022-10-07 RX ADMIN — CALCITRIOL CAPSULES 0.25 MCG 0.5 MCG: 0.25 CAPSULE ORAL at 12:16

## 2022-10-07 RX ADMIN — CARVEDILOL 12.5 MG: 12.5 TABLET, FILM COATED ORAL at 12:16

## 2022-10-07 RX ADMIN — GABAPENTIN 100 MG: 100 CAPSULE ORAL at 20:51

## 2022-10-07 RX ADMIN — CARVEDILOL 12.5 MG: 12.5 TABLET, FILM COATED ORAL at 00:54

## 2022-10-07 RX ADMIN — ERGOCALCIFEROL 50000 UNITS: 1.25 CAPSULE ORAL at 00:55

## 2022-10-07 RX ADMIN — SEVELAMER CARBONATE 800 MG: 800 TABLET, FILM COATED ORAL at 18:29

## 2022-10-07 RX ADMIN — Medication 10 ML: at 01:07

## 2022-10-07 RX ADMIN — ATORVASTATIN CALCIUM 80 MG: 40 TABLET, FILM COATED ORAL at 12:15

## 2022-10-07 RX ADMIN — Medication 10 ML: at 20:52

## 2022-10-07 RX ADMIN — LEVOTHYROXINE SODIUM 100 MCG: 0.1 TABLET ORAL at 12:16

## 2022-10-07 RX ADMIN — IOHEXOL 50 ML: 350 INJECTION, SOLUTION INTRAVENOUS at 11:24

## 2022-10-07 NOTE — PROGRESS NOTES
worsening dyspnea on exertion, fatigue but denies any orthopnea or PND. He has chronic lower extremity edema which does not appear to be worse. He also denies any fevers, chills, cough, chest pain, headaches, malaise or myalgias, diarrhea, nausea or vomiting. CC bleeding from AV fistula site     Subjective:  .   Patient is comfortable, patient is sitting in the chair,      Reviewed interval ancillary notes    Current Medications  perflutren lipid microspheres (DEFINITY) injection 1.65 mg, ONCE PRN  0.9 % sodium chloride infusion, PRN  0.9 % sodium chloride infusion, PRN  0.9 % sodium chloride infusion, PRN  sodium chloride flush 0.9 % injection 5-40 mL, 2 times per day  sodium chloride flush 0.9 % injection 5-40 mL, PRN  0.9 % sodium chloride infusion, PRN  ondansetron (ZOFRAN-ODT) disintegrating tablet 4 mg, Q8H PRN   Or  ondansetron (ZOFRAN) injection 4 mg, Q6H PRN  polyethylene glycol (GLYCOLAX) packet 17 g, Daily PRN  acetaminophen (TYLENOL) tablet 650 mg, Q6H PRN   Or  acetaminophen (TYLENOL) suppository 650 mg, Q6H PRN  albuterol (PROVENTIL) nebulizer solution 2.5 mg, Q4H PRN  allopurinol (ZYLOPRIM) tablet 100 mg, Daily  [Held by provider] apixaban (ELIQUIS) tablet 2.5 mg, BID  atorvastatin (LIPITOR) tablet 80 mg, Daily  calcitRIOL (ROCALTROL) capsule 0.5 mcg, Every Other Day  calcium carb-cholecalciferol 250-125 MG-UNIT per tab 1 tablet, Daily  carvedilol (COREG) tablet 12.5 mg, BID  [Held by provider] diclofenac sodium (VOLTAREN) 1 % gel 2 g, BID  ferrous sulfate (IRON 325) tablet 325 mg, Daily with breakfast  furosemide (LASIX) tablet 20 mg, Daily  gabapentin (NEURONTIN) capsule 100 mg, Nightly  levothyroxine (SYNTHROID) tablet 100 mcg, Daily  pantoprazole (PROTONIX) tablet 40 mg, QAM AC  sevelamer (RENVELA) tablet 800 mg, TID WC  sodium chloride (OCEAN, BABY AYR) 0.65 % nasal spray 1 spray, PRN  tiotropium (SPIRIVA RESPIMAT) 2.5 MCG/ACT inhaler 2 puff, Daily  vitamin D (ERGOCALCIFEROL) capsule 50,000 Units, Weekly        Objective:  /77   Pulse 75   Temp 98.2 °F (36.8 °C) (Oral)   Resp 16   Ht 5' 8\" (1.727 m)   Wt 141 lb 1.5 oz (64 kg)   SpO2 97%   BMI 21.45 kg/m²     Intake/Output Summary (Last 24 hours) at 10/7/2022 1239  Last data filed at 10/7/2022 0745  Gross per 24 hour   Intake 310 ml   Output --   Net 310 ml      Wt Readings from Last 3 Encounters:   10/06/22 141 lb 1.5 oz (64 kg)   09/15/22 141 lb 1.5 oz (64 kg)   08/04/22 142 lb (64.4 kg)     AV fistula left arm  General appearance:  Appears comfortable  Eyes: Sclera clear. Pupils equal.  ENT: Moist oral mucosa. Trachea midline, no adenopathy. Cardiovascular: Regular rhythm, normal S1, S2. No murmur edema in lower extremities  Respiratory: Not using accessory muscles. Good inspiratory effort. Clear to auscultation bilaterally, no wheeze or crackles. GI: Abdomen soft, no tenderness, not distended, normal bowel sounds  Musculoskeletal: No cyanosis in digits, neck supple  Neurology: CN 2-12 grossly intact. No speech or motor deficits  Psych: Normal affect. Alert and oriented in time, place and person  Skin: Warm, dry, normal turgor  Extremity exam shows brisk capillary refill. Peripheral pulses are palpable in lower extremities     Labs and Tests:  CBC:   Recent Labs     10/06/22  2009 10/06/22  2350 10/07/22  0528 10/07/22  0945   WBC 3.2*  --  2.9* 3.1*   HGB 6.8* 7.3* 6.8* 7.8*   PLT 54*  --  51* 49*     BMP:    Recent Labs     10/06/22  2009   *   K 4.1   CL 93*   CO2 28   BUN 31*   CREATININE 9.4*   GLUCOSE 80     Hepatic: No results for input(s): AST, ALT, ALB, BILITOT, ALKPHOS in the last 72 hours.   IR FISTULAGRAM    (Results Pending)             Maylin Winchester MD   10/7/2022 12:39 PM

## 2022-10-07 NOTE — ED PROVIDER NOTES
1 [facility]  EMERGENCY DEPARTMENT ENCOUNTER          ATTENDING PHYSICIAN NOTE       Date of evaluation: 10/6/2022    Chief Complaint     Vascular Access Problem      History of Present Illness     Saloni Anne is a 61 y.o. male who presents with bleeding from his left arm AV fistula. The patient was able to complete dialysis today and initially once he was decannulated, he had no bleeding, he got home and the bleeding ensued with pulsatile bleeding. He was brought to the hospital for better hemostasis control. On arrival, he had no complaints of any pain or lightheadedness. He had no other physical complaints to mention other than the persistent bleeding. Review of Systems     Review of Systems   Constitutional:  Negative for chills and fever. Respiratory:  Negative for shortness of breath. Cardiovascular:  Negative for chest pain. Gastrointestinal:  Negative for abdominal pain, nausea and vomiting. Neurological:  Negative for light-headedness. All other systems reviewed and are negative. Past Medical, Surgical, Family, and Social History     He has a past medical history of Cerebral artery occlusion with cerebral infarction ([facility] [address] CHF (congestive heart failure) ([facility] [address] Chronic kidney disease, COPD (chronic obstructive pulmonary disease) ([facility] [address] Dialysis patient ([facility] [address] Gout, Hemodialysis patient ([facility] [address] Hx of blood clots, Hyperlipidemia, Hypertension, and Renal failure. He has a past surgical history that includes Dialysis fistula creation (Left) and Colonoscopy. His family history includes Cancer in his father and mother; Other in his sister. He reports that he has quit smoking. He has never used smokeless tobacco. He reports current alcohol use. He reports that he does not use drugs.     Medications     Previous Medications    ALBUTEROL SULFATE HFA (PROVENTIL;VENTOLIN;PROAIR) 108 (90 BASE) MCG/ACT INHALER    Inhale 2 puffs into the lungs every 6 hours as needed for Wheezing 90mcg/actuation    ALLOPURINOL (ZYLOPRIM) 100 MG TABLET    Take 100 mg by mouth Daily on MWF    APIXABAN (ELIQUIS) 2.5 MG TABS TABLET    Take 1 tablet by mouth 2 times daily    ATORVASTATIN (LIPITOR) 80 MG TABLET    Take 80 mg by mouth daily    CALCITRIOL (ROCALTROL) 0.25 MCG CAPSULE    Take 0.5 mcg by mouth every other day MW    CALCIUM CARB-CHOLECALCIFEROL 600-200 MG-UNIT TABS TABLET    Take 1 tablet by mouth daily    CARVEDILOL (COREG) 12.5 MG TABLET    Take 12.5 mg by mouth 2 times daily Unsure of dose     DICLOFENAC SODIUM (VOLTAREN) 1 % GEL    Apply 2 g topically 2 times daily    FERROUS SULFATE (FE TABS) 325 (65 FE) MG EC TABLET    Take 1 tablet by mouth every other day    FLUTICASONE-SALMETEROL (ADVAIR) 500-50 MCG/ACT AEPB DISKUS INHALER    Inhale 1 puff into the lungs 2 times daily    FUROSEMIDE (LASIX) 20 MG TABLET    Take 20 mg by mouth daily    GABAPENTIN (NEURONTIN) 100 MG CAPSULE    Take 1 capsule by mouth at bedtime for 30 days. OMEPRAZOLE (PRILOSEC) 20 MG DELAYED RELEASE CAPSULE    Take 20 mg by mouth daily    SEVELAMER (RENVELA) 800 MG TABLET    Take 1 tablet by mouth 3 times daily (with meals)    SILDENAFIL (REVATIO) 20 MG TABLET    Take 20 mg by mouth in the morning, at noon, and at bedtime    UMECLIDINIUM BROMIDE (INCRUSE ELLIPTA) 62.5 MCG/INH AEPB    Inhale 1 puff into the lungs daily    VITAMIN D (ERGOCALCIFEROL) 1.25 MG (71613 UT) CAPS CAPSULE    Take 50,000 Units by mouth once a week Saturdays       Allergies     He is allergic to nitroglycerin and oxymetazoline. Physical Exam     INITIAL VITALS: BP: (!) 133/95, Temp: 98 °F (36.7 °C), Heart Rate: 82, Resp: 18, SpO2: 97 %   Physical Exam  Vitals and nursing note reviewed. Constitutional:       General: He is not in acute distress. Appearance: He is well-developed. He is not diaphoretic. Cardiovascular:      Rate and Rhythm: Normal rate and regular rhythm.    Pulmonary:      Effort: Pulmonary effort is normal.   Abdominal: General: There is no distension. Palpations: Abdomen is soft. Musculoskeletal:      Left upper arm: No tenderness. Comments: Pulsatile bleeding from the AV fistula site with palpable thrill   Skin:     General: Skin is warm and dry. Findings: No rash. Neurological:      Mental Status: He is alert and oriented to person, place, and time. Psychiatric:         Behavior: Behavior normal.       Diagnostic Results     RADIOLOGY:  No orders to display       LABS:   Results for orders placed or performed during the hospital encounter of 10/06/22   CBC with Auto Differential   Result Value Ref Range    WBC 3.2 (L) 4.0 - 11.0 K/uL    RBC 2.37 (L) 4.20 - 5.90 M/uL    Hemoglobin 6.8 (LL) 13.5 - 17.5 g/dL    Hematocrit 21.1 (L) 40.5 - 52.5 %    MCV 89.0 80.0 - 100.0 fL    MCH 28.7 26.0 - 34.0 pg    MCHC 32.2 31.0 - 36.0 g/dL    RDW 17.2 (H) 12.4 - 15.4 %    Platelets 54 (L) 692 - 450 K/uL    MPV 7.8 5.0 - 10.5 fL    Neutrophils % 67.6 %    Lymphocytes % 18.5 %    Monocytes % 9.2 %    Eosinophils % 4.2 %    Basophils % 0.5 %    Neutrophils Absolute 2.2 1.7 - 7.7 K/uL    Lymphocytes Absolute 0.6 (L) 1.0 - 5.1 K/uL    Monocytes Absolute 0.3 0.0 - 1.3 K/uL    Eosinophils Absolute 0.1 0.0 - 0.6 K/uL    Basophils Absolute 0.0 0.0 - 0.2 K/uL   Renal Function Panel   Result Value Ref Range    Sodium 133 (L) 136 - 145 mmol/L    Potassium 4.1 3.5 - 5.1 mmol/L    Chloride 93 (L) 99 - 110 mmol/L    CO2 28 21 - 32 mmol/L    Anion Gap 12 3 - 16    Glucose 80 70 - 99 mg/dL    BUN 31 (H) 7 - 20 mg/dL    Creatinine 9.4 (HH) 0.8 - 1.3 mg/dL    GFR Non- 6 (A) >60    GFR  7 (A) >60    Calcium 8.4 8.3 - 10.6 mg/dL    Phosphorus 3.3 2.5 - 4.9 mg/dL    Albumin 3.3 (L) 3.4 - 5.0 g/dL   Magnesium   Result Value Ref Range    Magnesium 2.40 1.80 - 2.40 mg/dL       ED BEDSIDE ULTRASOUND:  No results found.     RECENT VITALS:  BP: (!) 133/95,Temp: 98 °F (36.7 °C), Heart Rate: 82, Resp: 18, SpO2: 97 % ED Course     Nursing Notes, Past Medical Hx, Past Surgical Hx, Social Hx,Allergies, and Family Hx were reviewed. patient was given the following medications:  Orders Placed This Encounter   Medications    0.9 % sodium chloride infusion         CONSULTS:  IP CONSULT TO VASCULAR SURGERY  IP CONSULT TO HOSPITALIST    MEDICAL DECISIONMAKING / ASSESSMENT / Lisa Pineda is a 61 y.o. male presenting with bleeding from his left AV fistula which began after dialysis today. The patient has had multiple problems with this fistula recently and is anticoagulated on Eliquis. The patient was noted to have pulsatile bleeding from this area and hemostasis was initially achieved with accommodation of a tourniquet and pressure dressing however began to rebleed once examined by the surgical residents. Other hemostatic measures were used by surgery to temporize the bleeding. Per their consult, the patient will need to undergo a fistulogram tomorrow and they would like the patient admitted to the medicine service so that can be done by IR tomorrow. Hemoglobin returned at 6.8 and he will be transfused a unit of blood. Clinical Impression     1.  Dialysis AV fistula malfunction, initial encounter [facility])        Disposition     DISPOSITION Decision To Admit 10/06/2022 08:14:20 PM         Aryan Garcia MD  10/06/22 2022       Aryan Garcia MD  10/06/22 2128

## 2022-10-07 NOTE — PROCEDURES
IR Brief Postoperative Note    Elfego Adames  YOB: 1955  [id number]    Pre-operative Diagnosis: AV fistula bleeding    Post-operative Diagnosis: Same    Procedure: LUE AV fistulagram. No stenosis.  Normal flow    Anesthesia: moderate    Surgeons/Assistants: marie    Estimated Blood Loss: Minimal    Complications: none    Specimens: were not obtained    See full procedure dictation to follow      Juliann Boas, MD MD  10/7/2022

## 2022-10-07 NOTE — CARE COORDINATION
Case Management Assessment  Initial Evaluation    Date/Time of Evaluation: 10/7/2022 4:37 PM  Assessment Completed by: Mallory Parnell RN    If patient is discharged prior to next notation, then this note serves as note for discharge by case management. Patient Name: Hue Wall                   YOB: 1955  Diagnosis: Dialysis AV fistula malfunction, initial encounter [facility]) Tiffany Lund  AV fistula occlusion, initial encounter [facility]) Vincent Enter                   Date / Time: 10/6/2022  6:04 PM    Patient Admission Status: Observation   Readmission Risk (Low < 19, Mod (19-27), High > 27): Readmission Risk Score: 27.3    Current PCP: Suzanna Baer MD  PCP verified by CM? No    Chart Reviewed: Yes      History Provided by: Medical Record  Patient Orientation: Unable to Assess    Patient Cognition:      Hospitalization in the last 30 days (Readmission):  Yes    If yes, Readmission Assessment in CM Navigator will be completed. Advance Directives:      Code Status: Full Code   Patient's Primary Decision Maker is: Legal Next of Kin    Primary Decision MakePaola Aldana Child - [phone]    Discharge Planning:    Patient lives with: Alone Type of Home: Assisted living [facility])  Primary Care Giver:  [facility] [address]  Patient Support Systems include: Children, Family Members   Current Financial resources:    Current community resources:    Current services prior to admission: Oxygen Therapy, Durable Medical Equipment            Current DME: Cane            Type of Home Care services:  None    ADLS  Prior functional level: Assistance with the following:  Current functional level: Assistance with the following:    PT AM-PAC:   /24  OT AM-PAC:   /24    Family can provide assistance at DC: Other (comment) (LTC)  Would you like Case Management to discuss the discharge plan with any other family members/significant others, and if so, who?  No  Plans to Return to Present Housing: Yes  Other Identified Issues/Barriers to RETURNING to current housing:   Potential Assistance needed at discharge: N/A            Potential DME:    Patient expects to discharge to: Assisted living  Plan for transportation at discharge: Family    Financial    Payor: Saji Dia / Plan: Lamont Randolph I-SNP / Product Type: *No Product type* /     Does insurance require precert for SNF: Yes    Potential assistance Purchasing Medications: No  Meds-to-Beds request:        [address], [address] [phone] - F [phone]  [address] [place] [address]  Phone: [phone] Fax: [phone]    [facility] [id number] [facility] [phone] - F [phone]  [facility] [address]  [address] [place] [address]  Phone: [phone] Fax: [phone]      Notes:    Factors facilitating achievement of predicted outcomes: Family support    Barriers to discharge: Medical complications    Additional Case Management Notes: Pt from [facility]. Pt to return at time of dc. At the time of rounding, pt wa off the unit in IR for fistula revision and angio. Pt HD patient of  nephrology; TTS at [facility]. The Plan for Transition of Care is related to the following treatment goals of Dialysis AV fistula malfunction, initial encounter ([facility] [address] [T82.590A]  AV fistula occlusion, initial encounter ([facility] [address] [H63.759E]    IF APPLICABLE: The Patient and/or patient representative Liseth Moore and his family were provided with a choice of provider and agrees with the discharge plan. Freedom of choice list with basic dialogue that supports the patient's individualized plan of care/goals and shares the quality data associated with the providers was provided to: Patient   Patient Representative Name:       The Patient and/or Patient Representative Agree with the Discharge Plan?  Yes    Jinnie Kayser, RN  Case Management Department  Ph: [phone] Fax: [phone]

## 2022-10-07 NOTE — CONSULTS
Nephrology Consult Note  [phone]   [email or web address]        Reason for Consult:  ESRD    HISTORY OF PRESENT ILLNESS:                This is a patient with significant past medical history of ESRD on dialsis at [facility]  who presents with excessive bleeding form AVf. Today fistulogram showed no stenosis. His eliquis was held today     Past Medical History:        Diagnosis Date    Acute HFrEF (heart failure with reduced ejection fraction) ([facility])     Cerebral artery occlusion with cerebral infarction ([facility] [address]     Chronic kidney disease     COPD (chronic obstructive pulmonary disease) ([facility] [address]     Dialysis patient ([facility] [address]     Gout     Hemodialysis patient ([facility] [address]     Hx of blood clots     Hyperlipidemia     Hypertension     Renal failure        Past Surgical History:        Procedure Laterality Date    COLONOSCOPY      DIALYSIS FISTULA CREATION Left        Current Medications:    No current facility-administered medications on file prior to encounter. Current Outpatient Medications on File Prior to Encounter   Medication Sig Dispense Refill    pseudoephedrine (SUDAFED) 30 MG tablet Take 30 mg by mouth every 4 hours as needed for Congestion      levothyroxine (SYNTHROID) 100 MCG tablet Take 100 mcg by mouth Daily      sodium chloride (OCEAN) 0.65 % nasal spray 1 spray by Nasal route as needed for Congestion      gabapentin (NEURONTIN) 100 MG capsule Take 1 capsule by mouth at bedtime for 30 days.  90 capsule 0    diclofenac sodium (VOLTAREN) 1 % GEL Apply 2 g topically 2 times daily 50 g 0    calcitRIOL (ROCALTROL) 0.25 MCG capsule Take 0.5 mcg by mouth every other day MWF      sevelamer (RENVELA) 800 MG tablet Take 1 tablet by mouth 3 times daily (with meals)      apixaban (ELIQUIS) 2.5 MG TABS tablet Take 1 tablet by mouth 2 times daily 60 tablet 1    ferrous sulfate (FE TABS) 325 (65 Fe) MG EC tablet Take 1 tablet by mouth every other day (Patient taking differently: Take 325 mg by mouth daily (with breakfast)) 15 tablet 1    atorvastatin (LIPITOR) 80 MG tablet Take 80 mg by mouth daily      calcium carb-cholecalciferol 600-200 MG-UNIT TABS tablet Take 1 tablet by mouth daily      furosemide (LASIX) 20 MG tablet Take 20 mg by mouth daily      Umeclidinium Bromide (INCRUSE ELLIPTA) 62.5 MCG/INH AEPB Inhale 1 puff into the lungs daily      vitamin D (ERGOCALCIFEROL) 1.25 MG (00747 UT) CAPS capsule Take 50,000 Units by mouth once a week Saturdays      omeprazole (PRILOSEC) 20 MG delayed release capsule Take 20 mg by mouth daily      carvedilol (COREG) 12.5 MG tablet Take 12.5 mg by mouth 2 times daily Unsure of dose       albuterol sulfate HFA (PROVENTIL;VENTOLIN;PROAIR) 108 (90 Base) MCG/ACT inhaler Inhale 2 puffs into the lungs every 6 hours as needed for Wheezing 90mcg/actuation      allopurinol (ZYLOPRIM) 100 MG tablet Take 100 mg by mouth Daily on [facility]         Allergies:  Nitroglycerin and Oxymetazoline    Social History:    Social History     Socioeconomic History    Marital status: Single     Spouse name: Not on file    Number of children: Not on file    Years of education: Not on file    Highest education level: Not on file   Occupational History    Not on file   Tobacco Use    Smoking status: Former    Smokeless tobacco: Never   Vaping Use    Vaping Use: Never used   Substance and Sexual Activity    Alcohol use: Yes     Comment: occasional    Drug use: Never    Sexual activity: Not Currently   Other Topics Concern    Not on file   Social History Narrative    Not on file     Social Determinants of Health     Financial Resource Strain: Not on file   Food Insecurity: Not on file   Transportation Needs: Not on file   Physical Activity: Not on file   Stress: Not on file   Social Connections: Not on file   Intimate Partner Violence: Not on file   Housing Stability: Not on file       Family History:       Problem Relation Age of Onset    Cancer Mother     Cancer Father     Other Sister          Review of Systems:  a comprehensive Review of systems was negative except as noted in HPI     PHYSICAL EXAM:    Vitals:    /77   Pulse 75   Temp 98.2 °F (36.8 °C) (Oral)   Resp 16   Ht 5' 8\" (1.727 m)   Wt 141 lb 1.5 oz (64 kg)   SpO2 97%   BMI 21.45 kg/m²   I/O last 3 completed shifts: In: 10 [I.V.:10]  Out: -   I/O this shift:  In: 300 [Blood:300]  Out: -     Physical Exam:  Gen: Resting in bed, NAD. HEENT: MMM, OP clear. CV: RRR no m/r/g. No S3.  Lungs: Good respiratory effort, clear air entry   Abd: S/NT +BS  Ext: No edema, no cyanosis  Skin: Warm. No rashes appreciated. : No TTP over bladder, nondistended. Neuro: Alert   Joints: No erythema noted over joints. DATA:    CBC:   Lab Results   Component Value Date/Time    WBC 3.1 10/07/2022 09:45 AM    RBC 2.78 10/07/2022 09:45 AM    HGB 7.8 10/07/2022 09:45 AM    HCT 24.5 10/07/2022 09:45 AM    MCV 87.9 10/07/2022 09:45 AM    MCH 28.1 10/07/2022 09:45 AM    MCHC 32.0 10/07/2022 09:45 AM    RDW 16.7 10/07/2022 09:45 AM    PLT 49 10/07/2022 09:45 AM    MPV 7.7 10/07/2022 09:45 AM     BMP:    Lab Results   Component Value Date/Time     10/06/2022 08:09 PM    K 4.1 10/06/2022 08:09 PM    K 4.5 09/15/2022 06:39 AM    CL 93 10/06/2022 08:09 PM    CO2 28 10/06/2022 08:09 PM    BUN 31 10/06/2022 08:09 PM    LABALBU 3.3 10/06/2022 08:09 PM    CREATININE 9.4 10/06/2022 08:09 PM    CALCIUM 8.4 10/06/2022 08:09 PM    GFRAA 7 10/06/2022 08:09 PM    LABGLOM 6 10/06/2022 08:09 PM    GLUCOSE 80 10/06/2022 08:09 PM       IMPRESSION/RECOMMENDATIONS:      ESRD followed by  nephrology now at [facility]   We  will arrange dialysis as needed  no need today   Plan next dialysis in am   Anemia will give TEODORA as needed   HTN monitor  on current meds   AVF looks ok per radiology   Eliquis Held need to clarify if still needs it for prior DVT  Thrombocytopenia     Thank you for allowing me to participate in the care of this patient.   I will continue to follow along.  Please call with questions.     Sulma Tafoya MD, MD    ESRD

## 2022-10-07 NOTE — PROGRESS NOTES
List of Home Medications the patient is currently taking is complete. Home Medication list in EPIC updated to reflect changes noted below. Source of medications in list is the medication list from [facility]. I was unable to contact the nurse at [facility] so I do not know when his medications were last taken. The following medications were added to admission medication list:  Pseudophedrine  Levothyroxine    The following medications were removed from admission medication list:  Advair inhaler  Sildenafil    The following medication instructions/doses were adjusted to reflect how patient is taking:  Ferrous sulfate every other day changed to QD      Current Outpatient Medications    Medication Sig    pseudoephedrine (SUDAFED) 30 MG tablet Take 30 mg by mouth every 4 hours as needed for Congestion    levothyroxine (SYNTHROID) 100 MCG tablet Take 100 mcg by mouth Daily    sodium chloride (OCEAN) 0.65 % nasal spray 1 spray by Nasal route as needed for Congestion    gabapentin (NEURONTIN) 100 MG capsule Take 1 capsule by mouth at bedtime for 30 days.     diclofenac sodium (VOLTAREN) 1 % GEL Apply 2 g topically 2 times daily    calcitRIOL (ROCALTROL) 0.25 MCG capsule Take 0.5 mcg by mouth every other day MWF    sevelamer (RENVELA) 800 MG tablet Take 1 tablet by mouth 3 times daily (with meals)    apixaban (ELIQUIS) 2.5 MG TABS tablet Take 1 tablet by mouth 2 times daily    ferrous sulfate (FE TABS) 325 (65 Fe) MG EC tablet Take 325 mg by mouth daily with breakfast    atorvastatin (LIPITOR) 80 MG tablet Take 80 mg by mouth daily    calcium carb-cholecalciferol 600-200 MG-UNIT TABS tablet Take 1 tablet by mouth daily    furosemide (LASIX) 20 MG tablet Take 20 mg by mouth daily    Umeclidinium Bromide (INCRUSE ELLIPTA) 62.5 MCG/INH AEPB Inhale 1 puff into the lungs daily    vitamin D (ERGOCALCIFEROL) 1.25 MG (83677 UT) CAPS capsule Take 50,000 Units by mouth once a week Saturdays omeprazole (PRILOSEC) 20 MG delayed release capsule Take 20 mg by mouth daily    carvedilol (COREG) 12.5 MG tablet Take 12.5 mg by mouth 2 times daily Unsure of dose     albuterol sulfate HFA (PROVENTIL;VENTOLIN;PROAIR) 108 (90 Base) MCG/ACT inhaler Inhale 2 puffs into the lungs every 6 hours as needed for Wheezing 90mcg/actuation    allopurinol (ZYLOPRIM) 100 MG tablet Take 100 mg by mouth Daily on MWF         Please call with questions.   Patsy Higginbotham  PharmD Candidate Class [employer] [id number]  10/6/2022 10:16 PM

## 2022-10-07 NOTE — PROGRESS NOTES
Vascular Surgery   Daily Progress Note  Patient: Naomy Bolus      CC: AV fistula bleed    SUBJECTIVE:   Patient rested well overnight. Afebrile and HDS. Pain is controlled. Tolerating diet without nausea or vomiting. Voiding appropriately without issues. Currently receiving 1 unit pRBCs. Bleeding has resolved. ROS:   A 14 point review of systems was conducted, significant findings as noted above. All other systems negative. OBJECTIVE:    PHYSICAL EXAM:    Vitals:    10/07/22 0540 10/07/22 0545 10/07/22 0550 10/07/22 0555   BP: 97/62 112/67 121/69 108/68   Pulse: 60 60 65 62   Resp: 18 16 16 16   Temp: 97.5 °F (36.4 °C) 97.8 °F (36.6 °C) 97.4 °F (36.3 °C) 97.8 °F (36.6 °C)   TempSrc: Oral Oral Oral Oral   SpO2: 100% 100% 100% 100%   Weight:       Height:         General appearance: Alert, no acute distress, grooming appropriate  Eyes: No scleral icterus, EOM grossly intact  Neck: Trachea midline, no JVD, no lymphadenopathy, neck supple  Chest/Lungs: Normal effort with no accessory muscle use on RA  Cardiovascular: RRR  Abdomen: Soft, non-tender, non-distended, no rebound, guarding, or rigidity. Skin: Warm and dry, no rashes  Extremities: No edema, no cyanosis, LUE fistula with palpable thrill, palpable radial pulse, sensation and motor is intact  Neuro: A&Ox3, no focal deficits, sensation intact    LABS:   Recent Labs     10/06/22  2009 10/06/22  2350 10/07/22  0528   WBC 3.2*  --  2.9*   HGB 6.8* 7.3* 6.8*   HCT 21.1* 23.2* 20.9*   MCV 89.0  --  88.1   PLT 54*  --  51*        Recent Labs     10/06/22  2009   *   K 4.1   CL 93*   CO2 28   PHOS 3.3   BUN 31*   CREATININE 9.4*      No results for input(s): AST, ALT, ALB, BILIDIR, BILITOT, ALKPHOS in the last 72 hours. No results for input(s): LIPASE, AMYLASE in the last 72 hours. Recent Labs     10/06/22  2350   INR 1.38*      No results for input(s): CKTOTAL, CKMB, CKMBINDEX, TROPONINI in the last 72 hours.       ASSESSMENT & PLAN:   This is a 61 y.o. male with Hx of CVA, CHF, ESRD on HD via LUE AV graft, HLD, and HTN who presents to ED with concern of bleeding from his LUE AV graft after hemodialysis. Vascular surgery and been consulted for assistance with hemostasis and planning.  Patient takes Eliquis for chronic pulmonary embolism and pulmonary hypertension, last taken 10/5/22.    - Consult IR today for fistulogram and possible angioplasty concern for AV graft stenosis causing prolonged bleeding after access  - Consult nephrology for dialysis  - Hold Eliquis for IR  - Continue NPO  - Remainder of management per primary      Kely Boykin DO, [address]  PGY1, General Surgery  10/07/22  6:28 AM  [employer]  Pager: [phone]

## 2022-10-07 NOTE — H&P
Patient:  Darrell Mejia   :   1955      Relevant clinical history, particularly as it involves the pending procedure, was reviewed and discussed. The procedure including risks and benefits was discussed at length with the patient (or designated family member) and all questions were answered. Informed consent to proceed with the procedure was given. Vital signs were monitored and documented by the Radiology nurse. Targeted physical examination  Heart : regular rate and rhythm  Lungs : clear, breathing easily  Condition : stable    [facility] nurses notes reviewed and agreed. Past Medical History:        Diagnosis Date    Acute HFrEF (heart failure with reduced ejection fraction) ([facility])     Cerebral artery occlusion with cerebral infarction ([facility]     Chronic kidney disease     COPD (chronic obstructive pulmonary disease) ([facility]     Dialysis patient ([facility]     Gout     Hemodialysis patient ([facility]     Hx of blood clots     Hyperlipidemia     Hypertension     Renal failure        Past Surgical History:           Procedure Laterality Date    COLONOSCOPY      DIALYSIS FISTULA CREATION Left        Allergies:  Nitroglycerin and Oxymetazoline    Medications:   Home Meds  No current facility-administered medications on file prior to encounter. Current Outpatient Medications on File Prior to Encounter   Medication Sig Dispense Refill    pseudoephedrine (SUDAFED) 30 MG tablet Take 30 mg by mouth every 4 hours as needed for Congestion      levothyroxine (SYNTHROID) 100 MCG tablet Take 100 mcg by mouth Daily      sodium chloride (OCEAN) 0.65 % nasal spray 1 spray by Nasal route as needed for Congestion      gabapentin (NEURONTIN) 100 MG capsule Take 1 capsule by mouth at bedtime for 30 days.  90 capsule 0    diclofenac sodium (VOLTAREN) 1 % GEL Apply 2 g topically 2 times daily 50 g 0    calcitRIOL (ROCALTROL) 0.25 MCG capsule Take 0.5 mcg by mouth every other day MWF      sevelamer (RENVELA) 800 MG tablet Take 1 tablet by mouth 3 times daily (with meals)      apixaban (ELIQUIS) 2.5 MG TABS tablet Take 1 tablet by mouth 2 times daily 60 tablet 1    ferrous sulfate (FE TABS) 325 (65 Fe) MG EC tablet Take 1 tablet by mouth every other day (Patient taking differently: Take 325 mg by mouth daily (with breakfast)) 15 tablet 1    atorvastatin (LIPITOR) 80 MG tablet Take 80 mg by mouth daily      calcium carb-cholecalciferol 600-200 MG-UNIT TABS tablet Take 1 tablet by mouth daily      furosemide (LASIX) 20 MG tablet Take 20 mg by mouth daily      Umeclidinium Bromide (INCRUSE ELLIPTA) 62.5 MCG/INH AEPB Inhale 1 puff into the lungs daily      vitamin D (ERGOCALCIFEROL) 1.25 MG (41192 UT) CAPS capsule Take 50,000 Units by mouth once a week Saturdays      omeprazole (PRILOSEC) 20 MG delayed release capsule Take 20 mg by mouth daily      carvedilol (COREG) 12.5 MG tablet Take 12.5 mg by mouth 2 times daily Unsure of dose       albuterol sulfate HFA (PROVENTIL;VENTOLIN;PROAIR) 108 (90 Base) MCG/ACT inhaler Inhale 2 puffs into the lungs every 6 hours as needed for Wheezing 90mcg/actuation      allopurinol (ZYLOPRIM) 100 MG tablet Take 100 mg by mouth Daily on [facility]         Current Meds  perflutren lipid microspheres (DEFINITY) injection 1.65 mg, ONCE PRN  0.9 % sodium chloride infusion, PRN  0.9 % sodium chloride infusion, PRN  0.9 % sodium chloride infusion, PRN  sodium chloride flush 0.9 % injection 5-40 mL, 2 times per day  sodium chloride flush 0.9 % injection 5-40 mL, PRN  0.9 % sodium chloride infusion, PRN  ondansetron (ZOFRAN-ODT) disintegrating tablet 4 mg, Q8H PRN   Or  ondansetron (ZOFRAN) injection 4 mg, Q6H PRN  polyethylene glycol (GLYCOLAX) packet 17 g, Daily PRN  acetaminophen (TYLENOL) tablet 650 mg, Q6H PRN   Or  acetaminophen (TYLENOL) suppository 650 mg, Q6H PRN  albuterol (PROVENTIL) nebulizer solution 2.5 mg, Q4H PRN  allopurinol (ZYLOPRIM) tablet 100 mg, Daily  [Held by provider] apixaban (ELIQUIS) tablet 2.5 mg, BID  atorvastatin (LIPITOR) tablet 80 mg, Daily  calcitRIOL (ROCALTROL) capsule 0.5 mcg, Every Other Day  calcium carb-cholecalciferol 250-125 MG-UNIT per tab 1 tablet, Daily  carvedilol (COREG) tablet 12.5 mg, BID  [Held by provider] diclofenac sodium (VOLTAREN) 1 % gel 2 g, BID  ferrous sulfate (IRON 325) tablet 325 mg, Daily with breakfast  furosemide (LASIX) tablet 20 mg, Daily  gabapentin (NEURONTIN) capsule 100 mg, Nightly  levothyroxine (SYNTHROID) tablet 100 mcg, Daily  pantoprazole (PROTONIX) tablet 40 mg, QAM AC  sevelamer (RENVELA) tablet 800 mg, TID WC  sodium chloride (OCEAN, BABY AYR) 0.65 % nasal spray 1 spray, PRN  tiotropium (SPIRIVA RESPIMAT) 2.5 MCG/ACT inhaler 2 puff, Daily  vitamin D (ERGOCALCIFEROL) capsule 50,000 Units, Weekly          ASA 2 - Patient with mild systemic disease with no functional limitations    II (soft palate, uvula, fauces visible)    Activity:  2 - Able to move 4 extremities voluntarily on command  Respiration:  2 - Able to breathe deeply and cough freely  Circulation:  2 - BP+/- 20mmHg of normal  Consciousness:  2 - Fully awake  Oxygen Saturation (color):  2 - Able to maintain oxygen saturation >92% on room air    Sedation : Moderate sedation planned

## 2022-10-07 NOTE — H&P
Hospital Medicine History & Physical      PCP: Erick Bajwa MD    Date of Admission: 10/6/2022    Date of Service: Pt seen/examined on 10/6/2022 and Placed in Observation. Chief Complaint: Bleeding from the dialysis fistula      History Of Present Illness:    61 y.o. male with a significant past medical history of COPD with chronic hypoxic and hypercapnic respiratory failure, combined systolic and diastolic heart failure, on chronic anticoagulation for history of DVT, end-stage renal disease on hemodialysis who presented to [facility] ED with bleeding from the fistula a few hours after dialysis today. Patient reportedly has had at least 5 or 6 spontaneous bleeding episodes from his fistula over the past 6 to 9 months. He is also complaining of worsening dyspnea on exertion, fatigue but denies any orthopnea or PND. He has chronic lower extremity edema which does not appear to be worse. He also denies any fevers, chills, cough, chest pain, headaches, malaise or myalgias, diarrhea, nausea or vomiting. Past Medical History:          Diagnosis Date    Cerebral artery occlusion with [facility] infarction ([facility]     CHF (congestive heart failure) ([facility]     Chronic kidney disease     COPD (chronic obstructive pulmonary disease) ([facility]     Dialysis patient ([facility]     Gout     Hemodialysis patient ([facility]     Hx of blood clots     Hyperlipidemia     Hypertension     Renal failure        Past Surgical History:          Procedure Laterality Date    COLONOSCOPY      DIALYSIS FISTULA CREATION Left        Medications Prior to Admission:      Prior to Admission medications    Medication Sig Start Date End Date Taking?  Authorizing Provider   pseudoephedrine (SUDAFED) 30 MG tablet Take 30 mg by mouth every 4 hours as needed for Congestion   Yes Historical Provider, MD   levothyroxine (SYNTHROID) 100 MCG tablet Take 100 mcg by mouth Daily   Yes Historical Provider, MD   sodium chloride (OCEAN) 0.65 % nasal spray 1 spray by Nasal route as needed for Congestion   Yes Historical Provider, MD   gabapentin (NEURONTIN) 100 MG capsule Take 1 capsule by mouth at bedtime for 30 days.  9/15/22 10/15/22  Nuvia Evans DO   diclofenac sodium (VOLTAREN) 1 % GEL Apply 2 g topically 2 times daily 9/15/22   Nuvia Evans DO   calcitRIOL (ROCALTROL) 0.25 MCG capsule Take 0.5 mcg by mouth every other day [facility]    Historical Provider, MD   sevelamer (RENVELA) 800 MG tablet Take 1 tablet by mouth 3 times daily (with meals)    Historical Provider, MD   apixaban (ELIQUIS) 2.5 MG TABS tablet Take 1 tablet by mouth 2 times daily 6/18/22   Titus Tiwari MD   ferrous sulfate (FE TABS) 325 (65 Fe) MG EC tablet Take 1 tablet by mouth every other day  Patient taking differently: Take 325 mg by mouth daily (with breakfast) 6/18/22 8/17/22  Titus Tiwari MD   atorvastatin (LIPITOR) 80 MG tablet Take 80 mg by mouth daily    Historical Provider, MD   calcium carb-cholecalciferol 600-200 MG-UNIT TABS tablet Take 1 tablet by mouth daily    Historical Provider, MD   furosemide (LASIX) 20 MG tablet Take 20 mg by mouth daily    Historical Provider, MD   Umeclidinium Bromide (INCRUSE ELLIPTA) 62.5 MCG/INH AEPB Inhale 1 puff into the lungs daily    Historical Provider, MD   vitamin D (ERGOCALCIFEROL) 1.25 MG (13819 UT) CAPS capsule Take 50,000 Units by mouth once a week Saturdays    Historical Provider, MD   omeprazole (PRILOSEC) 20 MG delayed release capsule Take 20 mg by mouth daily    Historical Provider, MD   carvedilol (COREG) 12.5 MG tablet Take 12.5 mg by mouth 2 times daily Unsure of dose     Historical Provider, MD   albuterol sulfate HFA (PROVENTIL;VENTOLIN;PROAIR) 108 (90 Base) MCG/ACT inhaler Inhale 2 puffs into the lungs every 6 hours as needed for Wheezing 90mcg/actuation    Historical Provider, MD   allopurinol (ZYLOPRIM) 100 MG tablet Take 100 mg by mouth Daily on MWF    Historical Provider, MD       Allergies:  Nitroglycerin and Oxymetazoline    Social History:      The patient currently lives at home. TOBACCO:   reports that he has quit smoking. He has never used smokeless tobacco.  ETOH:   reports current alcohol use. E-cigarette/Vaping       Questions Responses    E-cigarette/Vaping Use Never User    Start Date     Passive Exposure     Quit Date     Counseling Given     Comments               Family History:       Reviewed and negative in regards to presenting illness/complaint. Problem Relation Age of Onset    Cancer Mother     Cancer Father     Other Sister        REVIEW OF SYSTEMS COMPLETED:   Pertinent positives as noted in the HPI. All other systems reviewed and negative. PHYSICAL EXAM PERFORMED:    BP (!) 133/95   Pulse 82   Temp 98 °F (36.7 °C) (Oral)   Resp 18   Ht 5' 8\" (1.727 m)   Wt 141 lb 1.5 oz (64 kg)   SpO2 97%   BMI 21.45 kg/m²     General appearance: Frail, chronically ill appearing in no apparent distress. HEENT:  Normal cephalic, atraumatic without obvious deformity. Pupils equal, round, and reactive to light. Extra ocular muscles intact. Conjunctivae/corneas clear. Dry mucous membranes. Neck: Supple, with full range of motion. No jugular venous distention. Trachea midline. Respiratory: Speaking in short sentences but clear to auscultation, bilaterally without Rales/Wheezes/Rhonchi. Cardiovascular:  Regular rate and rhythm with normal S1/S2   Abdomen: Soft, non-tender, non-distended with normal bowel sounds. Musculoskeletal:  No clubbing, cyanosis, 1+ pitting edema to mid shins bilaterally. Nonpitting edema of the left upper extremity distal to the pressure dressing in the arm. Decreased muscle mass and.    Skin: Dry without petechiae, rashes or lesions. Neurologic:  Neurovascularly intact without any focal sensory/motor deficits.  Cranial nerves: II-XII intact, grossly non-focal.  Psychiatric:  Alert and oriented, thought content appropriate, normal insight  Capillary Refill: Brisk,3 comorbidities  -Continue home regimen for chronic stable conditions      DVT Prophylaxis: Heparin  Diet: Renal   Code Status: Full    PT/OT Eval Status: n/a    Dispo -home       Jc Carmona MD    Thank you Pat Carr MD for the opportunity to be involved in this patient's care. If you have any questions or concerns please feel free to contact me at [phone].

## 2022-10-07 NOTE — PROGRESS NOTES
4 Eyes Admission Assessment     I agree as the admission nurse that 2 RN's have performed a thorough Head to Toe Skin Assessment on the patient. ALL assessment sites listed below have been assessed on admission. Areas assessed by both nurses:   [x]   Head, Face, and Ears   [x]   Shoulders, Back, and Chest  [x]   Arms, Elbows, and Hands   [x]   Coccyx, Sacrum, and Ischium  [x]   Legs, Feet, and Heels    ** L arm dialysis fistula - pressure dressing for prior bleed. ** bilateral shin dry skin/scabs         Does the Patient have Skin Breakdown?   No         Mauricio Prevention initiated:  No   Wound Care Orders initiated:  No      [facility] nurse consulted for Pressure Injury (Stage 3,4, Unstageable, DTI, NWPT, and Complex wounds) or Mauricio score 18 or lower:  No      Nurse 1 eSignature: Electronically signed by Jazmin Servin RN on 10/7/22 at 8:39 AM EDT    **SHARE this note so that the co-signing nurse is able to place an eSignature**    Nurse 2 eSignature: Electronically signed by Luc Hillman RN on 10/7/22 at 8:43 AM EDT

## 2022-10-07 NOTE — PLAN OF CARE
Problem: Hematologic - Adult  Goal: Maintains hematologic stability  Outcome: Progressing  Flowsheets (Taken 10/7/2022 1844)  Maintains hematologic stability:   Assess for signs and symptoms of bleeding or hemorrhage   Administer blood products/factors as ordered   Monitor labs for bleeding or clotting disorders  Note: HGB 7.8 post transfusion this am     Problem: Pain  Goal: Verbalizes/displays adequate comfort level or baseline comfort level  Outcome: Progressing  Flowsheets (Taken 10/7/2022 1844)  Verbalizes/displays adequate comfort level or baseline comfort level:   Encourage patient to monitor pain and request assistance   Assess pain using appropriate pain scale  Note: Denies any pain or discomfort qshift

## 2022-10-08 LAB
ALBUMIN SERPL-MCNC: 3.2 G/DL (ref 3.4–5)
ANION GAP SERPL CALCULATED.3IONS-SCNC: 10 MMOL/L (ref 3–16)
BASOPHILS ABSOLUTE: 0 K/UL (ref 0–0.2)
BASOPHILS RELATIVE PERCENT: 0.4 %
BUN BLDV-MCNC: 41 MG/DL (ref 7–20)
CALCIUM SERPL-MCNC: 8.4 MG/DL (ref 8.3–10.6)
CHLORIDE BLD-SCNC: 92 MMOL/L (ref 99–110)
CO2: 29 MMOL/L (ref 21–32)
CREAT SERPL-MCNC: 11 MG/DL (ref 0.8–1.3)
EOSINOPHILS ABSOLUTE: 0.1 K/UL (ref 0–0.6)
EOSINOPHILS RELATIVE PERCENT: 4.3 %
GFR AFRICAN AMERICAN: 6
GFR NON-AFRICAN AMERICAN: 5
GLUCOSE BLD-MCNC: 74 MG/DL (ref 70–99)
HCT VFR BLD CALC: 22.6 % (ref 40.5–52.5)
HEMOGLOBIN: 7.4 G/DL (ref 13.5–17.5)
LYMPHOCYTES ABSOLUTE: 0.6 K/UL (ref 1–5.1)
LYMPHOCYTES RELATIVE PERCENT: 20.8 %
MAGNESIUM: 2.5 MG/DL (ref 1.8–2.4)
MCH RBC QN AUTO: 28.4 PG (ref 26–34)
MCHC RBC AUTO-ENTMCNC: 32.5 G/DL (ref 31–36)
MCV RBC AUTO: 87.5 FL (ref 80–100)
MONOCYTES ABSOLUTE: 0.2 K/UL (ref 0–1.3)
MONOCYTES RELATIVE PERCENT: 7.6 %
NEUTROPHILS ABSOLUTE: 2.1 K/UL (ref 1.7–7.7)
NEUTROPHILS RELATIVE PERCENT: 66.9 %
PDW BLD-RTO: 16.6 % (ref 12.4–15.4)
PHOSPHORUS: 4.5 MG/DL (ref 2.5–4.9)
PLATELET # BLD: 48 K/UL (ref 135–450)
PMV BLD AUTO: 7.9 FL (ref 5–10.5)
POTASSIUM SERPL-SCNC: 4.8 MMOL/L (ref 3.5–5.1)
RBC # BLD: 2.59 M/UL (ref 4.2–5.9)
SODIUM BLD-SCNC: 131 MMOL/L (ref 136–145)
WBC # BLD: 3.1 K/UL (ref 4–11)

## 2022-10-08 PROCEDURE — 80069 RENAL FUNCTION PANEL: CPT

## 2022-10-08 PROCEDURE — 94761 N-INVAS EAR/PLS OXIMETRY MLT: CPT

## 2022-10-08 PROCEDURE — 85025 COMPLETE CBC W/AUTO DIFF WBC: CPT

## 2022-10-08 PROCEDURE — 94640 AIRWAY INHALATION TREATMENT: CPT

## 2022-10-08 PROCEDURE — 36415 COLL VENOUS BLD VENIPUNCTURE: CPT

## 2022-10-08 PROCEDURE — G0378 HOSPITAL OBSERVATION PER HR: HCPCS

## 2022-10-08 PROCEDURE — 2580000003 HC RX 258: Performed by: INTERNAL MEDICINE

## 2022-10-08 PROCEDURE — 6370000000 HC RX 637 (ALT 250 FOR IP): Performed by: INTERNAL MEDICINE

## 2022-10-08 PROCEDURE — 2700000000 HC OXYGEN THERAPY PER DAY

## 2022-10-08 PROCEDURE — 94664 DEMO&/EVAL PT USE INHALER: CPT

## 2022-10-08 PROCEDURE — 83735 ASSAY OF MAGNESIUM: CPT

## 2022-10-08 PROCEDURE — 90935 HEMODIALYSIS ONE EVALUATION: CPT

## 2022-10-08 RX ADMIN — ATORVASTATIN CALCIUM 80 MG: 40 TABLET, FILM COATED ORAL at 09:12

## 2022-10-08 RX ADMIN — CARVEDILOL 12.5 MG: 12.5 TABLET, FILM COATED ORAL at 09:11

## 2022-10-08 RX ADMIN — CARVEDILOL 12.5 MG: 12.5 TABLET, FILM COATED ORAL at 19:59

## 2022-10-08 RX ADMIN — Medication 5 ML: at 09:18

## 2022-10-08 RX ADMIN — SEVELAMER CARBONATE 800 MG: 800 TABLET, FILM COATED ORAL at 09:13

## 2022-10-08 RX ADMIN — GABAPENTIN 100 MG: 100 CAPSULE ORAL at 21:49

## 2022-10-08 RX ADMIN — FUROSEMIDE 20 MG: 40 TABLET ORAL at 09:12

## 2022-10-08 RX ADMIN — Medication 10 ML: at 19:59

## 2022-10-08 RX ADMIN — TIOTROPIUM BROMIDE INHALATION SPRAY 2 PUFF: 3.12 SPRAY, METERED RESPIRATORY (INHALATION) at 08:20

## 2022-10-08 RX ADMIN — SEVELAMER CARBONATE 800 MG: 800 TABLET, FILM COATED ORAL at 17:15

## 2022-10-08 RX ADMIN — ALLOPURINOL 100 MG: 100 TABLET ORAL at 09:13

## 2022-10-08 RX ADMIN — FERROUS SULFATE TAB 325 MG (65 MG ELEMENTAL FE) 325 MG: 325 (65 FE) TAB at 09:13

## 2022-10-08 RX ADMIN — PANTOPRAZOLE SODIUM 40 MG: 40 TABLET, DELAYED RELEASE ORAL at 05:27

## 2022-10-08 RX ADMIN — Medication 1 TABLET: at 09:18

## 2022-10-08 NOTE — PROGRESS NOTES
Vascular Surgery   Daily Progress Note  Patient: Cammie Lebron      CC: AV fistula bleed    SUBJECTIVE:   Patient rested well overnight. Afebrile and HDS, on 3L NC. Pain is controlled. Tolerating diet without nausea or vomiting. Voiding appropriately without issues. Patient had 2 episodes of epistaxis overnight that has resolved. He is on oxygen at home. No complaints. ROS:   A 14 point review of systems was conducted, significant findings as noted above. All other systems negative. OBJECTIVE:    PHYSICAL EXAM:    Vitals:    10/07/22 1534 10/07/22 2046 10/07/22 2255 10/08/22 0331   BP: 109/68 110/66 110/68 (!) 142/68   Pulse: 72 76 77 66   Resp: 18 16 15 16   Temp: 97.8 °F (36.6 °C) 97.9 °F (36.6 °C) 98 °F (36.7 °C) 97.9 °F (36.6 °C)   TempSrc: Oral Oral Oral Oral   SpO2: 99% 100% 99% 100%   Weight:    133 lb 2.5 oz (60.4 kg)   Height:         General appearance: Alert, no acute distress, grooming appropriate  Eyes: No scleral icterus, EOM grossly intact  Neck: Trachea midline, no JVD, no lymphadenopathy, neck supple  Chest/Lungs: Normal effort with no accessory muscle use on RA  Cardiovascular: RRR  Abdomen: Soft, non-tender, non-distended, no rebound, guarding, or rigidity. Skin: Warm and dry, no rashes  Extremities: No edema, no cyanosis, LUE fistula with palpable thrill, palpable radial pulse, sensation and motor is intact  Neuro: A&Ox3, no focal deficits, sensation intact    LABS:   Recent Labs     10/07/22  0945 10/08/22  0440   WBC 3.1* 3.1*   HGB 7.8* 7.4*   HCT 24.5* 22.6*   MCV 87.9 87.5   PLT 49* 48*          Recent Labs     10/07/22  0945 10/08/22  0440   * 131*   K 4.3 4.8   CL 92* 92*   CO2 30 29   PHOS 4.0 4.5   BUN 32* 41*   CREATININE 9.9* 11.0*        No results for input(s): AST, ALT, ALB, BILIDIR, BILITOT, ALKPHOS in the last 72 hours. No results for input(s): LIPASE, AMYLASE in the last 72 hours.      Recent Labs     10/06/22  2350   INR 1.38*        Recent Labs     10/07/22  0576 TROPONINI 0.08*       ASSESSMENT & PLAN:   This is a 61 y.o. male with Hx of CVA, CHF, ESRD on HD via LUE AV graft, HLD, and HTN who presents to ED with concern of bleeding from his LUE AV graft after hemodialysis. Vascular surgery and been consulted for assistance with hemostasis and planning. Patient takes Eliquis for chronic pulmonary embolism and pulmonary hypertension, last taken 10/5/22.    - Fistulagram of LUE AVF shows no significant stenosis of outflow to account for prolonged bleeding post-dialysis  - Hgb 7.4 from 7.8.  Will continue to monitor  - Okay to access AVF for HD  - Restart Eliquis for hx of DVT  - Continue diet  - Remainder of management per primary  - Okay for dispo today from surgery standpoint      Paul Chowdhury DO, [address]  PGY1, General Surgery  10/08/22  6:54 AM  [employer]  Pager: [phone]

## 2022-10-08 NOTE — PROGRESS NOTES
Treatment time: 3 hours     Net UF: 1 L     Pre weight: 60.4kg   Post weight: 59.4kg  EDW:     Access used: Left upper arm graft   Access function: well, tolerated 350 BFR, 15g     Medications or blood products given: NA     Regular outpatient schedule: TTS     Summary of response to treatment: tolerated well, no issues     Copy of dialysis treatment record placed in chart, to be scanned into EMR. 0

## 2022-10-08 NOTE — PROGRESS NOTES
Patient reports still having nosebleeds, declined to take blood thinner that was restarted today and afraid when he's in HD LUE will start bleeding. Stated it doesn't make sense to take blood thinner if I'm bleeding. Will inform MD. Loree Yates to HD RN who asked for patient to be transported to [place] [address] for dialysis.

## 2022-10-08 NOTE — PROGRESS NOTES
HOSPITALISTS PROGRESS NOTE    10/8/2022 12:56 PM        Name: Maricel Vazquez . Admitted: 10/6/2022  Primary Care Provider: Pat Carr MD (Tel: [phone])      Problem List  Principal Problem:    Problem with dialysis access [facility])  Active Problems:    Pancytopenia ([facility]    Chronic respiratory failure with hypoxia and hypercapnia ([facility]    Dialysis AV fistula malfunction, initial encounter ([facility]    ESRD on dialysis ([facility]    COPD (chronic obstructive pulmonary disease) ([facility]    Essential hypertension    Hyperlipidemia    Chronic combined systolic and diastolic CHF (congestive heart failure) ([facility]  Resolved Problems:    * No resolved hospital problems. *       Assessment & Plan:   Acute blood loss anemia  Status post 1 unit of packed RBC  Monitor H&H  Eliquis on hold, patient mentioned that he has multiple episodes of DVT and PE in the past  Plan was to discharge the patient after dialysis, looking at the chart it appears that patient started having nosebleeds I have to monitor the patient till nosebleed stops, unfortunately given his history of multiple blood clots he needs anticoagulation      Pancytopenia appears chronic  Has been seen by hematology in 2021    ESRD on hemodialysis left AV fistula  Fistulogram done  History of COPD with chronic hypoxic and hypercarbic respiratory failure  History of systolic and diastolic combined heart failure  Nursing home resident from last for 6 months    IV Access: Peripheral  Chen: No  Diet: ADULT DIET; Regular; Low Sodium (2 gm); Low Potassium (Less than 3000 mg/day);  Low Phosphorus (Less than 1000 mg)  Code:Full Code  DVT PPX SCDs  Disposition  Hospital givenhave to monitor in the hospital for now      Brief Course:  61 y.o. male with a significant past medical history of COPD with chronic hypoxic and hypercapnic respiratory failure, combined systolic and diastolic heart failure, on chronic anticoagulation for history of DVT, end-stage renal disease on hemodialysis who presented to [facility] ED with bleeding from the fistula a few hours after dialysis today. Patient reportedly has had at least 5 or 6 spontaneous bleeding episodes from his fistula over the past 6 to 9 months. He is also complaining of worsening dyspnea on exertion, fatigue but denies any orthopnea or PND. He has chronic lower extremity edema which does not appear to be worse. He also denies any fevers, chills, cough, chest pain, headaches, malaise or myalgias, diarrhea, nausea or vomiting. CC bleeding from AV fistula site     Subjective:  .   Patient is status post fistulogram yesterday no source of bleeding has been identified  Reviewed interval ancillary notes    Current Medications  perflutren lipid microspheres (DEFINITY) injection 1.65 mg, ONCE PRN  0.9 % sodium chloride infusion, PRN  0.9 % sodium chloride infusion, PRN  0.9 % sodium chloride infusion, PRN  sodium chloride flush 0.9 % injection 5-40 mL, 2 times per day  sodium chloride flush 0.9 % injection 5-40 mL, PRN  0.9 % sodium chloride infusion, PRN  ondansetron (ZOFRAN-ODT) disintegrating tablet 4 mg, Q8H PRN   Or  ondansetron (ZOFRAN) injection 4 mg, Q6H PRN  polyethylene glycol (GLYCOLAX) packet 17 g, Daily PRN  acetaminophen (TYLENOL) tablet 650 mg, Q6H PRN   Or  acetaminophen (TYLENOL) suppository 650 mg, Q6H PRN  albuterol (PROVENTIL) nebulizer solution 2.5 mg, Q4H PRN  allopurinol (ZYLOPRIM) tablet 100 mg, Daily  apixaban (ELIQUIS) tablet 2.5 mg, BID  atorvastatin (LIPITOR) tablet 80 mg, Daily  calcitRIOL (ROCALTROL) capsule 0.5 mcg, Every Other Day  calcium carb-cholecalciferol 250-125 MG-UNIT per tab 1 tablet, Daily  carvedilol (COREG) tablet 12.5 mg, BID  [Held by provider] diclofenac sodium (VOLTAREN) 1 % gel 2 g, BID  ferrous sulfate (IRON 325) tablet 325 mg, Daily with breakfast  furosemide (LASIX) tablet 20 mg, Daily  gabapentin (NEURONTIN) capsule 100 mg, Nightly  levothyroxine (SYNTHROID) tablet 100 mcg, Daily  pantoprazole (PROTONIX) tablet 40 mg, QAM AC  sevelamer (RENVELA) tablet 800 mg, TID WC  sodium chloride (OCEAN, BABY AYR) 0.65 % nasal spray 1 spray, PRN  tiotropium (SPIRIVA RESPIMAT) 2.5 MCG/ACT inhaler 2 puff, Daily  vitamin D (ERGOCALCIFEROL) capsule 50,000 Units, Weekly      Objective:  /67   Pulse 66   Temp 97.7 °F (36.5 °C)   Resp 18   Ht 5' 8\" (1.727 m)   Wt 133 lb 2.5 oz (60.4 kg)   SpO2 98%   BMI 20.25 kg/m²     Intake/Output Summary (Last 24 hours) at 10/8/2022 1256  Last data filed at 10/8/2022 0957  Gross per 24 hour   Intake 600 ml   Output --   Net 600 ml      Wt Readings from Last 3 Encounters:   10/08/22 133 lb 2.5 oz (60.4 kg)   09/15/22 141 lb 1.5 oz (64 kg)   08/04/22 142 lb (64.4 kg)     AV fistula left arm  General appearance:  Appears comfortable  Eyes: Sclera clear. Pupils equal.  ENT: Moist oral mucosa. Trachea midline, no adenopathy. Cardiovascular: Regular rhythm, normal S1, S2. No murmur edema in lower extremities  Respiratory: Not using accessory muscles. Good inspiratory effort. Clear to auscultation bilaterally, no wheeze or crackles. GI: Abdomen soft, no tenderness, not distended, normal bowel sounds  Musculoskeletal: No cyanosis in digits, neck supple  Neurology: CN 2-12 grossly intact. No speech or motor deficits  Psych: Normal affect. Alert and oriented in time, place and person  Skin: Warm, dry, normal turgor  Extremity exam shows brisk capillary refill.   Peripheral pulses are palpable in lower extremities     Labs and Tests:  CBC:   Recent Labs     10/07/22  0528 10/07/22  0945 10/08/22  0440   WBC 2.9* 3.1* 3.1*   HGB 6.8* 7.8* 7.4*   PLT 51* 49* 48*     BMP:    Recent Labs     10/06/22  2009 10/07/22  0945 10/08/22  0440   * 132* 131*   K 4.1 4.3 4.8   CL 93* 92* 92*   CO2 28 30 29   BUN 31* 32* 41*   CREATININE 9.4* 9.9* 11.0*   GLUCOSE 80 70 74     Hepatic: No results for input(s): AST, ALT, ALB, BILITOT, ALKPHOS in the last 72 hours. IR FISTULAGRAM   Final Result   Impression: Technically successful left upper extremity arteriovenous fistulogram. There is no significant stenosis. Specifically, no stenosis of the venous outflow or central venous stenosis to account for the patient's prolonged bleeding post dialysis.                 Tyler Dumont MD   10/8/2022 12:56 PM

## 2022-10-08 NOTE — PROGRESS NOTES
Returned from HD, no active bleeding noted from LUE graft, drsg CDI. Assisted to BR had BM. O2 @ 3 LPNC @ baseline humidified.  H/H 7.4/22.6 continue to monitor

## 2022-10-09 VITALS
OXYGEN SATURATION: 98 % | TEMPERATURE: 98.1 F | HEART RATE: 67 BPM | HEIGHT: 68 IN | BODY MASS INDEX: 20.18 KG/M2 | WEIGHT: 133.16 LBS | DIASTOLIC BLOOD PRESSURE: 61 MMHG | RESPIRATION RATE: 18 BRPM | SYSTOLIC BLOOD PRESSURE: 97 MMHG

## 2022-10-09 LAB
ALBUMIN SERPL-MCNC: 3.2 G/DL (ref 3.4–5)
ANION GAP SERPL CALCULATED.3IONS-SCNC: 9 MMOL/L (ref 3–16)
ANISOCYTOSIS: ABNORMAL
BASOPHILS ABSOLUTE: 0 K/UL (ref 0–0.2)
BASOPHILS RELATIVE PERCENT: 0.4 %
BUN BLDV-MCNC: 19 MG/DL (ref 7–20)
CALCIUM SERPL-MCNC: 8.5 MG/DL (ref 8.3–10.6)
CHLORIDE BLD-SCNC: 98 MMOL/L (ref 99–110)
CO2: 26 MMOL/L (ref 21–32)
CREAT SERPL-MCNC: 6.7 MG/DL (ref 0.8–1.3)
EOSINOPHILS ABSOLUTE: 0.1 K/UL (ref 0–0.6)
EOSINOPHILS RELATIVE PERCENT: 3.9 %
GFR AFRICAN AMERICAN: 10
GFR NON-AFRICAN AMERICAN: 8
GLUCOSE BLD-MCNC: 76 MG/DL (ref 70–99)
HCT VFR BLD CALC: 22.6 % (ref 40.5–52.5)
HEMOGLOBIN: 7.3 G/DL (ref 13.5–17.5)
LYMPHOCYTES ABSOLUTE: 0.5 K/UL (ref 1–5.1)
LYMPHOCYTES RELATIVE PERCENT: 16.6 %
MAGNESIUM: 2 MG/DL (ref 1.8–2.4)
MCH RBC QN AUTO: 28.7 PG (ref 26–34)
MCHC RBC AUTO-ENTMCNC: 32.4 G/DL (ref 31–36)
MCV RBC AUTO: 88.6 FL (ref 80–100)
MONOCYTES ABSOLUTE: 0.3 K/UL (ref 0–1.3)
MONOCYTES RELATIVE PERCENT: 9.4 %
NEUTROPHILS ABSOLUTE: 2.2 K/UL (ref 1.7–7.7)
NEUTROPHILS RELATIVE PERCENT: 69.7 %
OVALOCYTES: ABNORMAL
PDW BLD-RTO: 16.7 % (ref 12.4–15.4)
PHOSPHORUS: 3.5 MG/DL (ref 2.5–4.9)
PLATELET # BLD: 57 K/UL (ref 135–450)
PLATELET SLIDE REVIEW: ABNORMAL
PMV BLD AUTO: 8.7 FL (ref 5–10.5)
POTASSIUM SERPL-SCNC: 4.6 MMOL/L (ref 3.5–5.1)
RBC # BLD: 2.56 M/UL (ref 4.2–5.9)
SCHISTOCYTES: ABNORMAL
SODIUM BLD-SCNC: 133 MMOL/L (ref 136–145)
TARGET CELLS: ABNORMAL
TEAR DROP CELLS: ABNORMAL
WBC # BLD: 3.1 K/UL (ref 4–11)

## 2022-10-09 PROCEDURE — 94761 N-INVAS EAR/PLS OXIMETRY MLT: CPT

## 2022-10-09 PROCEDURE — 85025 COMPLETE CBC W/AUTO DIFF WBC: CPT

## 2022-10-09 PROCEDURE — 94640 AIRWAY INHALATION TREATMENT: CPT

## 2022-10-09 PROCEDURE — G0378 HOSPITAL OBSERVATION PER HR: HCPCS

## 2022-10-09 PROCEDURE — 6370000000 HC RX 637 (ALT 250 FOR IP): Performed by: INTERNAL MEDICINE

## 2022-10-09 PROCEDURE — 36415 COLL VENOUS BLD VENIPUNCTURE: CPT

## 2022-10-09 PROCEDURE — 80069 RENAL FUNCTION PANEL: CPT

## 2022-10-09 PROCEDURE — 2700000000 HC OXYGEN THERAPY PER DAY

## 2022-10-09 PROCEDURE — 83735 ASSAY OF MAGNESIUM: CPT

## 2022-10-09 PROCEDURE — 2580000003 HC RX 258: Performed by: INTERNAL MEDICINE

## 2022-10-09 RX ADMIN — FUROSEMIDE 20 MG: 40 TABLET ORAL at 08:37

## 2022-10-09 RX ADMIN — SEVELAMER CARBONATE 800 MG: 800 TABLET, FILM COATED ORAL at 08:36

## 2022-10-09 RX ADMIN — CARVEDILOL 12.5 MG: 12.5 TABLET, FILM COATED ORAL at 08:39

## 2022-10-09 RX ADMIN — ALLOPURINOL 100 MG: 100 TABLET ORAL at 08:36

## 2022-10-09 RX ADMIN — FERROUS SULFATE TAB 325 MG (65 MG ELEMENTAL FE) 325 MG: 325 (65 FE) TAB at 08:37

## 2022-10-09 RX ADMIN — Medication 10 ML: at 08:37

## 2022-10-09 RX ADMIN — ATORVASTATIN CALCIUM 80 MG: 40 TABLET, FILM COATED ORAL at 08:36

## 2022-10-09 RX ADMIN — LEVOTHYROXINE SODIUM 100 MCG: 0.1 TABLET ORAL at 06:11

## 2022-10-09 RX ADMIN — CALCITRIOL CAPSULES 0.25 MCG 0.5 MCG: 0.25 CAPSULE ORAL at 08:36

## 2022-10-09 RX ADMIN — TIOTROPIUM BROMIDE INHALATION SPRAY 2 PUFF: 3.12 SPRAY, METERED RESPIRATORY (INHALATION) at 08:00

## 2022-10-09 RX ADMIN — PANTOPRAZOLE SODIUM 40 MG: 40 TABLET, DELAYED RELEASE ORAL at 06:11

## 2022-10-09 RX ADMIN — APIXABAN 2.5 MG: 5 TABLET, FILM COATED ORAL at 08:36

## 2022-10-09 ASSESSMENT — PAIN SCALES - GENERAL: PAINLEVEL_OUTOF10: 0

## 2022-10-09 NOTE — PROGRESS NOTES
Patient alert and oriented x4. Denies any pain. On 3 L O2 (baseline). Fistula to LUE clean dry and intact. No signs of active bleeding. OK for patient to discharge to [facility] today. All needs met at this time. Will continue to monitor.

## 2022-10-09 NOTE — CARE COORDINATION
Case Management Assessment            Discharge Note                    Date / Time of Note: 10/9/2022 10:59 AM                  Discharge Note Completed by: Gi Pizarro RN    Mr. Lieutenant Roland will return to [facility] [place] [address]. today at 3p via [employer]. RN CALL REPORT [phone]  Avs [phone]    Patient Name: Carolyn Gordon   YOB: 1955  Diagnosis: Dialysis AV fistula malfunction, initial encounter [facility]) [F56.419V]  AV fistula occlusion, initial encounter [facility]) Anamaria Fallow   Date / Time: 10/6/2022  6:04 PM    Current PCP: Delmy Travis MD    Advance Directives:  Code Status: Full Code    Financial:  Payor: Miladis Benjamin I-SNP / Plan: Miladis Benjamin I-SNP / Product Type: *No Product type* /      Pharmacy:    [address] [place] [phone]  [address] [place] [address]  Phone: [phone] Fax: [phone]    [address] [phone] [address] [place] [facility] E [phone] - F [phone]  [place] [id number]  Phone: [phone] Fax: [phone]      DISCHARGE Disposition: [place] ([place] [address].    LOC at discharge: [address] [facility]  ALO Completed: Yes    IMM Completed:   Yes, Case management has presented and reviewed IMM letter #2 to the patient and/or family/ POA. Patient and/or family/POA verbalized understanding of their medicare rights and appeal process if needed. Patient and/or family/POA has signed, initialed and placed today's date (10/9/2022) and time (1100) on IMM letter #2 on the the appropriate lines. Patient and/or family/POA, copy of letter offered and they are aware that this original copy of IMM letter #2 is available prior to discharge from the paper chart on the unit. Electronic documentation has been entered into epic for IMM letter #2 and original paper copy has been added to the paper chart at the nurses station. Transportation:  Transportation PLAN for discharge: EMS transportation   Mode of Transport: Ambulance stretcher - BLS  Name of Transport Company:[employer] [phone]  Time of Transport: 1500    Transport form completed: Yes    Dialysis:  Dialysis patient: Yes    Dialysis Center: On site at [facility]    The Patient and/or patient representative Ginny Cabezas and his family were provided with a choice of provider and agrees with the discharge plan Yes    Freedom of choice list was provided with basic dialogue that supports the patient's individualized plan of care/goals and shares the quality data associated with the providers.  Yes    Dana Singh RN  [facility] [employer] [employer]  Case Management Department  [phone]

## 2022-10-09 NOTE — PROGRESS NOTES
Vascular Surgery   Daily Progress Note  Patient: Agusto Sosa      CC: AV fistula bleed    SUBJECTIVE:   Patient rested well overnight. Afebrile and HDS, on 3L NC. Pain is controlled. Tolerating diet without nausea or vomiting. Voiding appropriately without issues. Tolerated HD yesterday. Patient kept for expistaxis, currently hemostatic. No acute complaints this AM. Hungry. ROS:   A 14 point review of systems was conducted, significant findings as noted above. All other systems negative. OBJECTIVE:    PHYSICAL EXAM:    Vitals:    10/08/22 1956 10/08/22 2222 10/08/22 2226 10/09/22 0330   BP: 124/79 122/73  119/72   Pulse: 76 75  72   Resp: 16 17  18   Temp: 97.4 °F (36.3 °C) 98 °F (36.7 °C)  98.4 °F (36.9 °C)   TempSrc: Oral Oral  Oral   SpO2: 100% 93% 96% 100%   Weight:       Height:         General appearance: Alert, no acute distress, grooming appropriate  Eyes: No scleral icterus, EOM grossly intact  Neck: Trachea midline, no JVD, no lymphadenopathy, neck supple  Chest/Lungs: Normal effort with no accessory muscle use on RA  Cardiovascular: RRR  Abdomen: Soft, non-tender, non-distended, no rebound, guarding, or rigidity. Skin: Warm and dry, no rashes  Extremities: No edema, no cyanosis, LUE fistula with palpable thrill, palpable radial pulse, sensation and motor is intact  Neuro: A&Ox3, no focal deficits, sensation intact    LABS:   Recent Labs     10/07/22  0945 10/08/22  0440   WBC 3.1* 3.1*   HGB 7.8* 7.4*   HCT 24.5* 22.6*   MCV 87.9 87.5   PLT 49* 48*       Recent Labs     10/07/22  0945 10/08/22  0440   * 131*   K 4.3 4.8   CL 92* 92*   CO2 30 29   PHOS 4.0 4.5   BUN 32* 41*   CREATININE 9.9* 11.0*        No results for input(s): AST, ALT, ALB, BILIDIR, BILITOT, ALKPHOS in the last 72 hours. No results for input(s): LIPASE, AMYLASE in the last 72 hours.      Recent Labs     10/06/22  2350   INR 1.38*        Recent Labs     10/07/22  0528   TROPONINI 0.08*       ASSESSMENT & PLAN:   This is a 61 y.o. male with Hx of CVA, CHF, ESRD on HD via LUE AV graft, HLD, and HTN who presents to ED with concern of bleeding from his LUE AV graft after hemodialysis. Vascular surgery and been consulted for assistance with hemostasis and planning.  Patient takes Eliquis for chronic pulmonary embolism and pulmonary hypertension, last taken 10/5/22.    - Had HD yesterday without issues of LUE AV graft bleeding  - Had 1 episode of epistaxis after HD but has not had any issues since  - Continue Eliquis for hx of DVT  - Continue diet  - Remainder of management per primary  - Okay for dispo today from surgery standpoint  - Please call with questions      Andrey Hernandes DO, [address] [facility]  PGY1, General Surgery  10/09/22  7:14 AM  [employer]  Pager: [phone]

## 2022-10-09 NOTE — DISCHARGE SUMMARY
HOSPITALISTS DISCHARGE SUMMARY    Patient Demographics    Patient. Agusto Sosa  Date of Birth. 1955  MRN. [id number]     Primary care provider. Cong Huertas MD  (Tel: [phone])    Admit date: 10/6/2022    Discharge date (blank if same as Note Date): 10/9/2022  Note Date: 10/9/2022     Reason for Hospitalization. Chief Complaint   Patient presents with    Vascular Access Problem         Significant Findings. Principal Problem:    Problem with dialysis access [facility])  Active Problems:    Pancytopenia (HCC)    Chronic respiratory failure with hypoxia and hypercapnia (HCC)    Dialysis AV fistula malfunction, initial encounter ([facility]    [facility]    COPD (chronic obstructive pulmonary disease) ([facility])    Essential hypertension    Hyperlipidemia    Chronic combined systolic and diastolic CHF (congestive heart failure) ([facility]  Resolved Problems:    * No resolved hospital problems. *       Problems and results from this hospitalization that need follow up. Pancytopenia    Significant test results and incidental findings. IR FISTULAGRAM   Final Result   Impression: Technically successful left upper extremity arteriovenous fistulogram. There is no significant stenosis. Specifically, no stenosis of the venous outflow or central venous stenosis to account for the patient's prolonged bleeding post dialysis. Invasive procedures and treatments. Pre-operative Diagnosis: AV fistula bleeding     Post-operative Diagnosis: Same     Procedure: LUE AV fistulagram. No stenosis. Normal flow     History Of Present Illness:    61 y.o. male with a significant past medical history of COPD with chronic hypoxic and hypercapnic respiratory failure, combined systolic and diastolic heart failure, on chronic anticoagulation for history of DVT, end-stage renal disease on hemodialysis who presented to [facility] ED with bleeding from the fistula a few hours after dialysis today.   Patient reportedly has had at least 5 or 6 spontaneous bleeding episodes from his fistula over the past 6 to 9 months. He is also complaining of worsening dyspnea on exertion, fatigue but denies any orthopnea or PND. He has chronic lower extremity edema which does not appear to be worse. He also denies any fevers, chills, cough, chest pain, headaches, malaise or myalgias, diarrhea, nausea or vomiting  Problem-based Hospital Course. 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 Fistulogram was done no obvious course of bleeding was identified. Patient did receive 1 unit of packed RBC. Patient apparently had mild epistaxis during the stay. Discussed with patient in detail given history of recurrent DVT and PE he will need Eliquis. I think if patient continues to have significant bleeding might have to consider stopping Eliquis and consider IVC filter placement. Nephrology, interventional radiology and vascular surgery were consulted during hospitalization. Consults. IP CONSULT TO VASCULAR SURGERY  IP CONSULT TO HOSPITALIST  IP CONSULT TO VASCULAR SURGERY  IP CONSULT TO NEPHROLOGY    Physical examination on discharge day. BP 97/61   Pulse 67   Temp 98.1 °F (36.7 °C) (Oral)   Resp 18   Ht 5' 8\" (1.727 m)   Wt 133 lb 2.5 oz (60.4 kg)   SpO2 98%   BMI 20.25 kg/m²   General appearance. Alert. Looks comfortable. HEENT. Sclera clear. Moist mucus membranes. Cardiovascular. Regular rate and rhythm, normal S1, S2. No murmur. Respiratory. Not using accessory muscles. Clear to auscultation bilaterally, no wheeze. Gastrointestinal. Abdomen soft, non-tender, not distended, normal bowel sounds  Neurology. Facial symmetry. No speech deficits. Moving all extremities equally. Extremities. No edema in lower extremities. Skin.  Warm, dry, normal turgor  Left arm AV fistula  On chronic oxygen  No significant pedal edema      Condition at time of discharge stable    Medication instructions provided to patient at discharge. Medication List        CHANGE how you take these medications      ferrous sulfate 325 (65 Fe) MG EC tablet  Commonly known as: Fe Tabs  Take 1 tablet by mouth every other day  What changed: when to take this     sodium chloride 0.65 % nasal spray  Commonly known as: OCEAN  What changed: Another medication with the same name was removed. Continue taking this medication, and follow the directions you see here. CONTINUE taking these medications      albuterol sulfate  (90 Base) MCG/ACT inhaler  Commonly known as: PROVENTIL;VENTOLIN;PROAIR     allopurinol 100 MG tablet  Commonly known as: ZYLOPRIM     apixaban 2.5 MG Tabs tablet  Commonly known as: ELIQUIS  Take 1 tablet by mouth 2 times daily     atorvastatin 80 MG tablet  Commonly known as: LIPITOR     calcitRIOL 0.25 MCG capsule  Commonly known as: ROCALTROL     calcium carb-cholecalciferol 600-200 MG-UNIT Tabs tablet     carvedilol 12.5 MG tablet  Commonly known as: COREG     diclofenac sodium 1 % Gel  Commonly known as: VOLTAREN  Apply 2 g topically 2 times daily     furosemide 20 MG tablet  Commonly known as: LASIX     gabapentin 100 MG capsule  Commonly known as: NEURONTIN  Take 1 capsule by mouth at bedtime for 30 days. Incruse Ellipta 62.5 MCG/INH Aepb  Generic drug: Umeclidinium Bromide     levothyroxine 100 MCG tablet  Commonly known as: SYNTHROID     omeprazole 20 MG delayed release capsule  Commonly known as: PRILOSEC     sevelamer 800 MG tablet  Commonly known as: RENVELA     Sudafed 30 MG tablet  Generic drug: pseudoephedrine     vitamin D 1.25 MG (23271 UT) Caps capsule  Commonly known as: ERGOCALCIFEROL              Discharge recommendations given to patient. Follow Up. Primary care provider and nephrology  Disposition. SNF  Activity. activity as tolerated  Diet: ADULT DIET; Regular; Low Sodium (2 gm);  Low Potassium (Less than 3000 mg/day); Low Phosphorus (Less than 1000 mg)      Spent 35 minutes in discharge process.     Signed:  Yuli Wei MD     10/9/2022 4:49 PM

## 2022-10-09 NOTE — DISCHARGE INSTR - COC
Continuity of Care Form    Patient Name: Naomy Yoon   :  1955  MRN:  [id number]    Admit date:  10/6/2022  Discharge date:  10/9/2022    Code Status Order: Full Code   Advance Directives:     Admitting Physician:  Fortino Arenas MD  PCP: Christel Tyler MD    Discharging Nurse: [facility] Unit/Room#: 0376/2207-77  Discharging Unit Phone Number: ***    Emergency Contact:   Extended Emergency Contact Information  Primary Emergency Contact: [address], [place] [address] Phone: [phone]  Relation: Child   needed?  No    Past Surgical History:  Past Surgical History:   Procedure Laterality Date    COLONOSCOPY      DIALYSIS FISTULA CREATION Left        Immunization History:   Immunization History   Administered Date(s) Administered    COVID-19, PFIZER GRAY top, DO NOT Dilute, (age 15 y+), IM, 27 mcg/0.3 mL 2022       Active Problems:  Patient Active Problem List   Diagnosis Code    HCAP (healthcare-associated pneumonia) J18.9    COPD exacerbation ([facility] J44.1    ESRD on dialysis ([facility] N18.6, Z99.2    COPD (chronic obstructive pulmonary disease) ([facility] J44.9    Acute on chronic respiratory failure with hypoxia and hypercapnia ([facility]) J96.21, J96.22    Chest pain R07.9    Hypokalemia E87.6    V-tach I47.20    Essential hypertension I10    Hyperlipidemia E78.5    Chronic combined systolic and diastolic CHF (congestive heart failure) ([facility]) I50.42    SOB (shortness of breath) R06.02    AV fistula thrombosis, initial encounter ([facility] E05.719A    History of venous thromboembolism Z86.718    Epistaxis R04.0    Pancytopenia ([facility]) D61.818    Problem with dialysis access [facility]) T82.898A    Acute [facility]vascular accident ([facility]) [facility] I63.9    Chronic respiratory failure with hypoxia and hypercapnia ([facility]) J96.11, J96.12    Dialysis AV fistula malfunction, initial encounter ([facility] T82.590A       Isolation/Infection:   Isolation            No Isolation          Patient Infection Status Infection Onset Added Last Indicated Last Indicated By Review Planned Expiration Resolved Resolved By    None active    Resolved    C-diff Rule Out 03/02/22 03/02/22 03/03/22 Clostridium Difficile Toxin/Antigen (Ordered)   03/03/22 Rule-Out Test Resulted    COVID-19 (Rule Out) 09/21/21 09/21/21 09/21/21 COVID-19, Rapid (Ordered)   09/21/21 Rule-Out Test Resulted    COVID-19 (Rule Out)  04/02/20 04/02/20 Loree Nava RN   04/06/20 Loree Nava RN    COVID-19 (Rule Out) 03/31/20 03/31/20 03/31/20 COVID-19 (Ordered)   03/31/20 Rule-Out Test Resulted            Nurse Assessment:  Last Vital Signs: /70   Pulse 69   Temp 98.2 °F (36.8 °C) (Oral)   Resp 18   Ht 5' 8\" (1.727 m)   Wt 133 lb 2.5 oz (60.4 kg)   SpO2 100%   BMI 20.25 kg/m²     Last documented pain score (0-10 scale): Pain Level: 0  Last Weight:   Wt Readings from Last 1 Encounters:   10/08/22 133 lb 2.5 oz (60.4 kg)     Mental Status:  oriented and alert    IV Access:  - None Fistula to LUE    Nursing Mobility/ADLs:  Walking   Assisted  Transfer  Independent  Bathing  Independent  Dressing  Independent  Toileting  Independent  Feeding  [address]  Independent  Med Delivery   whole    Wound Care Documentation and Therapy:        Elimination:  Continence: Bowel: Yes  Bladder: Yes  Urinary Catheter: None   Colostomy/Ileostomy/Ileal Conduit: No       Date of Last BM: 10/9      Intake/Output Summary (Last 24 hours) at 10/9/2022 1050  Last data filed at 10/8/2022 2345  Gross per 24 hour   Intake 222 ml   Output --   Net 222 ml     I/O last 3 completed shifts: In: 12 [P.O.:462]  Out: -     Safety Concerns:     History of Falls (last 30 days)    Impairments/Disabilities:      None    Nutrition Therapy:  Current Nutrition Therapy:   - Oral Diet:  General    Routes of Feeding: Oral  Liquids:  Thin Liquids  Daily Fluid Restriction: no  Last Modified Barium Swallow with Video (Video Swallowing Test): not done    Treatments at the Time of Hospital Discharge:   Respiratory Treatments:   Oxygen Therapy:  3 L O2 nasal canula   Ventilator:    - No ventilator support    Rehab Therapies: Physical Therapy  Weight Bearing Status/Restrictions: No weight bearing restrictions  Other Medical Equipment (for information only, NOT a DME order): Other Treatments:     Patient's personal belongings (please select all that are sent with patient):  None    RN SIGNATURE:  Electronically signed by Jennifer Keller RN on 10/9/22 at 1:02 PM EDT    CASE MANAGEMENT/SOCIAL WORK SECTION    Inpatient Status Date: ***    Readmission Risk Assessment Score:  Readmission Risk              Risk of Unplanned Readmission:  0           Discharging to Facility/ [address] [place] [address]  [phone]    / signature: Electronically signed by Balwinder Lopez RN on 10/9/22 at 11:07 AM EDT    PHYSICIAN SECTION    Prognosis: Fair    Condition at Discharge: Stable    Rehab Potential (if transferring to Rehab): Fair    Recommended Labs or Other Treatments After Discharge: pt/ot/ check cbc in a week    Physician Certification: I certify the above information and transfer of Naz Zuleta  is necessary for the continuing treatment of the diagnosis listed and that he requires [facility] for less 30 days.      Update Admission H&P: No change in H&P    PHYSICIAN SIGNATURE:  Electronically signed by Jonathan Cartwright MD on 10/9/22 at 10:50 AM EDT

## 2022-10-09 NOTE — PLAN OF CARE
Problem: Hematologic - Adult  Goal: Maintains hematologic stability  Outcome: Progressing     Problem: Discharge Planning  Goal: Discharge to home or other facility with appropriate resources  Outcome: Progressing     Problem: Pain  Goal: Verbalizes/displays adequate comfort level or baseline comfort level  Outcome: Progressing

## 2022-10-09 NOTE — PROGRESS NOTES
Patient A&Ox4. VSS. Pt had nose bleed during night. Pt refused eliquis. Pt on 3L O2 per baseline. Pt up to bathroom multiple times during the night- contact guard. Pt takes pills whole. Bed is in lowest setting with bed alarm on. Call light is within reach.

## 2022-10-09 NOTE — PROGRESS NOTES
Visit us at [facility]. com or call us at [address] [place] [address] NOTE    Patient: Agusto Sosa MRN: [id number]     YOB: 1955  Age: 61 y.o. Sex: male    Unit: [address] [facility] Room/Bed: 8643/7602-59 Location: [address] [place] [address]     Admitting Physician: Iliana Schafer    Primary Care Physician: Cong Huertas MD          LOS: 0 days       Reason for evaluation:   ESRD      SUBJECTIVE:      The patient was seen and examined. Notes and labs reviewed. There were no complications over night. Patient's review of systems: had HD earlier today with no issues. Reports on/off epistaxis. Had 1 episode post HD. OBJECTIVE:     Vitals:    10/08/22 0910 10/08/22 1048 10/08/22 1530 10/08/22 1956   BP: 116/67 103/67 118/75 124/79   Pulse: 73 66 67 76   Resp: 16 18 17 16   Temp: 96.9 °F (36.1 °C) 97.7 °F (36.5 °C) 97.8 °F (36.6 °C) 97.4 °F (36.3 °C)   TempSrc: Oral  Oral Oral   SpO2: 98%  97% 100%   Weight:  133 lb 2.5 oz (60.4 kg)     Height:           Intake and Output:      Intake/Output Summary (Last 24 hours) at 10/8/2022 2024  Last data filed at 10/8/2022 0957  Gross per 24 hour   Intake 240 ml   Output --   Net 240 ml       Continuous Infusions:   sodium chloride      sodium chloride      sodium chloride      sodium chloride         Exam:   CONSTITUTIONAL/PSYCHIATRY: awake, alert and oriented x3. Not in acute distress   EYES: Conjunctivae: normal. Pupils: reactive to light  RESPIRATORY: Respiratory effort: normal. Auscultation: CTA  CARDIOVASCULAR: Auscultation: RRR. Edema: none. Graft: Left UA loop AVG with good T/B  GASTROINTESTINAL: Soft, nontender, nondistended. EXTREMITIES:  No cyanosis or clubbing. SKIN: Warm and dry.  No significant rashes      LABS:   RFP:   Recent Labs     10/06/22  2009 10/07/22  0945 10/08/22  0440   * 132* 131*   K 4.1 4.3 4.8   CL 93* 92* 92*   CO2 28 30 29   BUN 31* 32* 41*   CREATININE 9.4* 9.9* 11.0*   CALCIUM 8.4 8.1* 8.4   MG 2. 40 2.60* 2.50*   PHOS 3.3 4.0 4.5   GFRAA 7* 6* 6*       Liver panel:  No results for input(s): ALB, TP, AST, ALT in the last 72 hours. Invalid input(s): TBIL    Endocrine:   @[id number](VitD,PTH,TSH,aldosterone,renin activity,cortisol,metanephrine)@    CBC:   Recent Labs     10/07/22  0528 10/07/22  0945 10/08/22  0440   WBC 2.9* 3.1* 3.1*   HGB 6.8* 7.8* 7.4*   HCT 20.9* 24.5* 22.6*   MCV 88.1 87.9 87.5   PLT 51* 49* 48*       Iron Panel:   @[id number](FERA,FE)@    Serology: @[id number](ANAS,ANCA,C3,C4,RNP,AGBM,DNA,SSA,SSB,SPEP,UPEP,ESR,HEPT)@    Urine studies: @[id number](UAPR,UCOL,UNAR,UUNR,UCRR,UCLR,UKR,UUAR,UOSM,EOS)@        ASSESSMENT and PLAN:     ESRD: HD TTS. Followed by  nephrology, now at [address] [place] [address]. Had prolonged bleeding from left UA loop AVG at [facility]. Graft angiogram on 10/7 was unremarkable per IR.    - HD earlier today. No issues. 2. Anemia of CKD with ABLA: s/p 1 unit PRBC. - Resume Retacrit 15,000units IV qHD on TTS upon discharge     3. HTN: BP is noted to be on the low side. 4. H/o DVT/PE on Eliquis, it is held now    5. Thrombocytopenia     6. Epistaxis: mild, recurrent.  per IM team        Signed By: Saul Umanzor MD

## 2022-10-10 LAB
BLOOD BANK DISPENSE STATUS: NORMAL
BLOOD BANK PRODUCT CODE: NORMAL
BPU ID: NORMAL
DESCRIPTION BLOOD BANK: NORMAL

## 2022-10-12 ENCOUNTER — HOSPITAL ENCOUNTER (INPATIENT)
Age: 67
LOS: 6 days | Discharge: HOME | DRG: 314 | End: 2022-10-18
Attending: STUDENT IN AN ORGANIZED HEALTH CARE EDUCATION/TRAINING PROGRAM | Admitting: STUDENT IN AN ORGANIZED HEALTH CARE EDUCATION/TRAINING PROGRAM
Payer: MEDICARE

## 2022-10-12 DIAGNOSIS — T82.838A HEMORRHAGE OF ARTERIOVENOUS FISTULA, INITIAL ENCOUNTER (HCC): Primary | ICD-10-CM

## 2022-10-12 LAB
ANION GAP SERPL CALCULATED.3IONS-SCNC: 10 MMOL/L (ref 3–16)
BASOPHILS ABSOLUTE: 0 K/UL (ref 0–0.2)
BASOPHILS RELATIVE PERCENT: 0.2 %
BUN BLDV-MCNC: 20 MG/DL (ref 7–20)
CALCIUM SERPL-MCNC: 9 MG/DL (ref 8.3–10.6)
CHLORIDE BLD-SCNC: 95 MMOL/L (ref 99–110)
CO2: 29 MMOL/L (ref 21–32)
CREAT SERPL-MCNC: 6.7 MG/DL (ref 0.8–1.3)
EOSINOPHILS ABSOLUTE: 0.1 K/UL (ref 0–0.6)
EOSINOPHILS RELATIVE PERCENT: 3.1 %
GFR AFRICAN AMERICAN: 10
GFR NON-AFRICAN AMERICAN: 8
GLUCOSE BLD-MCNC: 89 MG/DL (ref 70–99)
HCT VFR BLD CALC: 25.7 % (ref 40.5–52.5)
HEMOGLOBIN: 8 G/DL (ref 13.5–17.5)
LYMPHOCYTES ABSOLUTE: 0.6 K/UL (ref 1–5.1)
LYMPHOCYTES RELATIVE PERCENT: 14.9 %
MCH RBC QN AUTO: 28.5 PG (ref 26–34)
MCHC RBC AUTO-ENTMCNC: 31.1 G/DL (ref 31–36)
MCV RBC AUTO: 91.8 FL (ref 80–100)
MONOCYTES ABSOLUTE: 0.4 K/UL (ref 0–1.3)
MONOCYTES RELATIVE PERCENT: 8.7 %
NEUTROPHILS ABSOLUTE: 3.2 K/UL (ref 1.7–7.7)
NEUTROPHILS RELATIVE PERCENT: 73.1 %
PDW BLD-RTO: 16.8 % (ref 12.4–15.4)
PLATELET # BLD: 66 K/UL (ref 135–450)
PMV BLD AUTO: 8 FL (ref 5–10.5)
POTASSIUM REFLEX MAGNESIUM: 4 MMOL/L (ref 3.5–5.1)
RBC # BLD: 2.8 M/UL (ref 4.2–5.9)
SODIUM BLD-SCNC: 134 MMOL/L (ref 136–145)
WBC # BLD: 4.3 K/UL (ref 4–11)

## 2022-10-12 PROCEDURE — 85025 COMPLETE CBC W/AUTO DIFF WBC: CPT

## 2022-10-12 PROCEDURE — 6370000000 HC RX 637 (ALT 250 FOR IP): Performed by: STUDENT IN AN ORGANIZED HEALTH CARE EDUCATION/TRAINING PROGRAM

## 2022-10-12 PROCEDURE — 2580000003 HC RX 258: Performed by: STUDENT IN AN ORGANIZED HEALTH CARE EDUCATION/TRAINING PROGRAM

## 2022-10-12 PROCEDURE — 80048 BASIC METABOLIC PNL TOTAL CA: CPT

## 2022-10-12 PROCEDURE — 1200000000 HC SEMI PRIVATE

## 2022-10-12 PROCEDURE — 0HQCXZZ REPAIR LEFT UPPER ARM SKIN, EXTERNAL APPROACH: ICD-10-PCS | Performed by: STUDENT IN AN ORGANIZED HEALTH CARE EDUCATION/TRAINING PROGRAM

## 2022-10-12 PROCEDURE — 12001 RPR S/N/AX/GEN/TRNK 2.5CM/<: CPT

## 2022-10-12 PROCEDURE — 99285 EMERGENCY DEPT VISIT HI MDM: CPT

## 2022-10-12 RX ORDER — ATORVASTATIN CALCIUM 80 MG/1
80 TABLET, FILM COATED ORAL DAILY
Status: DISCONTINUED | OUTPATIENT
Start: 2022-10-13 | End: 2022-10-13

## 2022-10-12 RX ORDER — FERROUS SULFATE 325(65) MG
325 TABLET ORAL
Status: DISCONTINUED | OUTPATIENT
Start: 2022-10-13 | End: 2022-10-18 | Stop reason: HOSPADM

## 2022-10-12 RX ORDER — OXYCODONE HYDROCHLORIDE 5 MG/1
5 TABLET ORAL ONCE
Status: COMPLETED | OUTPATIENT
Start: 2022-10-12 | End: 2022-10-12

## 2022-10-12 RX ORDER — CARVEDILOL 12.5 MG/1
12.5 TABLET ORAL 2 TIMES DAILY
Status: DISCONTINUED | OUTPATIENT
Start: 2022-10-12 | End: 2022-10-18 | Stop reason: HOSPADM

## 2022-10-12 RX ORDER — ONDANSETRON 2 MG/ML
4 INJECTION INTRAMUSCULAR; INTRAVENOUS EVERY 6 HOURS PRN
Status: DISCONTINUED | OUTPATIENT
Start: 2022-10-12 | End: 2022-10-18 | Stop reason: HOSPADM

## 2022-10-12 RX ORDER — SODIUM CHLORIDE 0.9 % (FLUSH) 0.9 %
5-40 SYRINGE (ML) INJECTION PRN
Status: DISCONTINUED | OUTPATIENT
Start: 2022-10-12 | End: 2022-10-18 | Stop reason: HOSPADM

## 2022-10-12 RX ORDER — CALCITRIOL 0.25 UG/1
0.5 CAPSULE, LIQUID FILLED ORAL
Status: DISCONTINUED | OUTPATIENT
Start: 2022-10-12 | End: 2022-10-18 | Stop reason: HOSPADM

## 2022-10-12 RX ORDER — SODIUM CHLORIDE 0.9 % (FLUSH) 0.9 %
5-40 SYRINGE (ML) INJECTION EVERY 12 HOURS SCHEDULED
Status: DISCONTINUED | OUTPATIENT
Start: 2022-10-12 | End: 2022-10-18 | Stop reason: HOSPADM

## 2022-10-12 RX ORDER — ACETAMINOPHEN 325 MG/1
650 TABLET ORAL EVERY 6 HOURS PRN
Status: DISCONTINUED | OUTPATIENT
Start: 2022-10-12 | End: 2022-10-18 | Stop reason: HOSPADM

## 2022-10-12 RX ORDER — ONDANSETRON 4 MG/1
4 TABLET, ORALLY DISINTEGRATING ORAL EVERY 8 HOURS PRN
Status: DISCONTINUED | OUTPATIENT
Start: 2022-10-12 | End: 2022-10-18 | Stop reason: HOSPADM

## 2022-10-12 RX ORDER — ALLOPURINOL 100 MG/1
100 TABLET ORAL
Status: DISCONTINUED | OUTPATIENT
Start: 2022-10-12 | End: 2022-10-18 | Stop reason: HOSPADM

## 2022-10-12 RX ORDER — FERROUS SULFATE 325(65) MG
325 TABLET ORAL
Status: ON HOLD | COMMUNITY
End: 2022-10-15 | Stop reason: HOSPADM

## 2022-10-12 RX ORDER — LEVOTHYROXINE SODIUM 0.1 MG/1
100 TABLET ORAL
Status: DISCONTINUED | OUTPATIENT
Start: 2022-10-13 | End: 2022-10-18 | Stop reason: HOSPADM

## 2022-10-12 RX ORDER — POLYETHYLENE GLYCOL 3350 17 G/17G
17 POWDER, FOR SOLUTION ORAL DAILY PRN
Status: DISCONTINUED | OUTPATIENT
Start: 2022-10-12 | End: 2022-10-18 | Stop reason: HOSPADM

## 2022-10-12 RX ORDER — SEVELAMER CARBONATE 800 MG/1
800 TABLET, FILM COATED ORAL
Status: DISCONTINUED | OUTPATIENT
Start: 2022-10-13 | End: 2022-10-18 | Stop reason: HOSPADM

## 2022-10-12 RX ORDER — ERGOCALCIFEROL 1.25 MG/1
50000 CAPSULE ORAL
Status: DISCONTINUED | OUTPATIENT
Start: 2022-10-15 | End: 2022-10-18 | Stop reason: HOSPADM

## 2022-10-12 RX ORDER — PANTOPRAZOLE SODIUM 40 MG/1
40 TABLET, DELAYED RELEASE ORAL
Status: DISCONTINUED | OUTPATIENT
Start: 2022-10-13 | End: 2022-10-18 | Stop reason: HOSPADM

## 2022-10-12 RX ORDER — ALBUTEROL SULFATE 2.5 MG/3ML
2.5 SOLUTION RESPIRATORY (INHALATION) EVERY 4 HOURS PRN
Status: DISCONTINUED | OUTPATIENT
Start: 2022-10-12 | End: 2022-10-18 | Stop reason: HOSPADM

## 2022-10-12 RX ORDER — FUROSEMIDE 20 MG/1
20 TABLET ORAL DAILY
Status: DISCONTINUED | OUTPATIENT
Start: 2022-10-13 | End: 2022-10-18 | Stop reason: HOSPADM

## 2022-10-12 RX ORDER — ACETAMINOPHEN 650 MG/1
650 SUPPOSITORY RECTAL EVERY 6 HOURS PRN
Status: DISCONTINUED | OUTPATIENT
Start: 2022-10-12 | End: 2022-10-18 | Stop reason: HOSPADM

## 2022-10-12 RX ORDER — SODIUM CHLORIDE 9 MG/ML
INJECTION, SOLUTION INTRAVENOUS PRN
Status: DISCONTINUED | OUTPATIENT
Start: 2022-10-12 | End: 2022-10-18 | Stop reason: HOSPADM

## 2022-10-12 RX ORDER — GABAPENTIN 100 MG/1
100 CAPSULE ORAL NIGHTLY
Status: DISCONTINUED | OUTPATIENT
Start: 2022-10-12 | End: 2022-10-18 | Stop reason: HOSPADM

## 2022-10-12 RX ADMIN — OXYCODONE 5 MG: 5 TABLET ORAL at 16:25

## 2022-10-12 RX ADMIN — SODIUM CHLORIDE, PRESERVATIVE FREE 10 ML: 5 INJECTION INTRAVENOUS at 20:26

## 2022-10-12 ASSESSMENT — ENCOUNTER SYMPTOMS
BACK PAIN: 0
BLOOD IN STOOL: 0
RECTAL PAIN: 0
APNEA: 0
ABDOMINAL PAIN: 0
VOMITING: 0
NAUSEA: 0
PHOTOPHOBIA: 0

## 2022-10-12 NOTE — PROGRESS NOTES
4 Eyes Admission Assessment     I agree as the admission nurse that 2 RN's have performed a thorough Head to Toe Skin Assessment on the patient. ALL assessment sites listed below have been assessed on admission. Areas assessed by both nurses:   [x]   Head, Face, and Ears   [x]   Shoulders, Back, and Chest  [x]   Arms, Elbows, and Hands   [x]   Coccyx, Sacrum, and Ischium  [x]   Legs, Feet, and Heels        Does the Patient have Skin Breakdown?   No         Mauricio Prevention initiated:  Yes   Wound Care Orders initiated:  No      [facility] nurse consulted for Pressure Injury (Stage 3,4, Unstageable, DTI, NWPT, and Complex wounds) or Mauricio score 18 or lower:  NA      Nurse 1 eSignature: Electronically signed by Timothy Good RN on 10/12/22 at 6:03 PM EDT    **SHARE this note so that the co-signing nurse is able to place an eSignature**    Nurse 2 eSignature: {Esignature:[id number]}

## 2022-10-12 NOTE — CONSULTS
Vascular Surgery   Resident Consult Note    Reason for Consult: Bleeding LUE brachiobasilic AV graft    History of Present Illness:   Betsey Pope is a 61 y.o. male with Hx of pulmonary embolism and pulmonary HTN on Eliquis, CVA, CHF, ESRD on HD via LUE AV graft, HLD, and HTN who presents to the ED for a bleeding from his AV graft site. He went to hemodialysis today and was able to complete HD without problems. However, when he arrived home to take off his sweater, the Band-Aid that was placed on his HD site was pulled off resulting in his bleeding. He presented to the ED and the ED doctor placed a nylon suture to attempt hemostasis, and then applied Kerlix and Coband over the bleed. Patient reports that he last took his Eliquis yesterday morning. He denies any lightheadedness, dizziness or palpitations. Of note, he was recently admitted to the hospital for the same complaint on 10/06. A fistulagram was done during that admission that showed no significant stenosis of the venous outflow or central venous stenosis to account for the patient's prolonged bleeding post-dialysis. Past Medical History:        Diagnosis Date    Acute HFrEF (heart failure with reduced ejection fraction) ([facility])     Cerebral artery occlusion with cerebral infarction ([facility]     Chronic kidney disease     COPD (chronic obstructive pulmonary disease) ([facility]     Dialysis patient ([facility]     Gout     Hemodialysis patient ([facility]     Hx of blood clots     Hyperlipidemia     Hypertension     Renal failure        Past Surgical History:        Procedure Laterality Date    COLONOSCOPY      DIALYSIS FISTULA CREATION Left        Allergies:  Nitroglycerin and Oxymetazoline    Medications:   Home Meds  No current facility-administered medications on file prior to encounter.      Current Outpatient Medications on File Prior to Encounter   Medication Sig Dispense Refill    pseudoephedrine (SUDAFED) 30 MG tablet Take 30 mg by mouth every 4 hours as needed for Congestion      levothyroxine (SYNTHROID) 100 MCG tablet Take 100 mcg by mouth Daily      sodium chloride (OCEAN) 0.65 % nasal spray 1 spray by Nasal route as needed for Congestion      gabapentin (NEURONTIN) 100 MG capsule Take 1 capsule by mouth at bedtime for 30 days.  90 capsule 0    diclofenac sodium (VOLTAREN) 1 % GEL Apply 2 g topically 2 times daily 50 g 0    calcitRIOL (ROCALTROL) 0.25 MCG capsule Take 0.5 mcg by mouth every other day [facility]      sevelamer (RENVELA) 800 MG tablet Take 1 tablet by mouth 3 times daily (with meals)      apixaban (ELIQUIS) 2.5 MG TABS tablet Take 1 tablet by mouth 2 times daily 60 tablet 1    ferrous sulfate (FE TABS) 325 (65 Fe) MG EC tablet Take 1 tablet by mouth every other day (Patient taking differently: Take 325 mg by mouth daily (with breakfast)) 15 tablet 1    atorvastatin (LIPITOR) 80 MG tablet Take 80 mg by mouth daily      calcium carb-cholecalciferol 600-200 MG-UNIT TABS tablet Take 1 tablet by mouth daily      furosemide (LASIX) 20 MG tablet Take 20 mg by mouth daily      Umeclidinium Bromide (INCRUSE ELLIPTA) 62.5 MCG/INH AEPB Inhale 1 puff into the lungs daily      vitamin D (ERGOCALCIFEROL) 1.25 MG (65055 UT) CAPS capsule Take 50,000 Units by mouth once a week Saturdays      omeprazole (PRILOSEC) 20 MG delayed release capsule Take 20 mg by mouth daily      carvedilol (COREG) 12.5 MG tablet Take 12.5 mg by mouth 2 times daily Unsure of dose       albuterol sulfate HFA (PROVENTIL;VENTOLIN;PROAIR) 108 (90 Base) MCG/ACT inhaler Inhale 2 puffs into the lungs every 6 hours as needed for Wheezing 90mcg/actuation      allopurinol (ZYLOPRIM) 100 MG tablet Take 100 mg by mouth Daily on [facility]         Current Meds  lidocaine-EPINEPHrine 1 percent-[id number] injection 20 mL, Once  oxyCODONE (ROXICODONE) immediate release tablet 5 mg, Once        Family History:   Family History   Problem Relation Age of Onset    Cancer Mother     Cancer Father     Other Sister        Social History:   TOBACCO:   reports that he has quit smoking. He has never used smokeless tobacco.  ETOH:   reports current alcohol use. DRUGS:   reports no history of drug use. Review of Systems:   A 14 point review of systems was conducted, significant findings as noted in HPI. All other systems negative. Physical exam:    Vitals:    10/12/22 1501   BP: 134/74   Pulse: 80   Resp: 16   Temp: 98.4 °F (36.9 °C)   TempSrc: Oral   SpO2: 96%       General appearance: Alert, no acute distress, grooming appropriate  Eyes: No scleral icterus, EOM grossly intact  Neck: Trachea midline, no JVD, no lymphadenopathy, neck supple  Chest/Lungs: Normal effort with no accessory muscle use on RA  Cardiovascular: RRR, well-perfused  Abdomen: Soft, non-tender, non-distended, no rebound, guarding, or rigidity. Skin: Warm and dry, no rashes  Extremities: No edema, no cyanosis. LUE forearm with palpable thrill, pulsatile bleeding; 2/4 L radial pulse palpable  Genitourinary: Deferred  Neuro: A&Ox3, no focal deficits, sensation intact    Labs:    CBC: No results for input(s): WBC, HGB, HCT, MCV, PLT in the last 72 hours. BMP: No results for input(s): NA, K, CL, CO2, PHOS, BUN, CREATININE, CA in the last 72 hours. PT/INR: No results for input(s): PROTIME, INR in the last 72 hours. APTT: No results for input(s): APTT in the last 72 hours.   Liver Profile:   Lab Results   Component Value Date/Time    AST 19 06/16/2022 06:59 AM    ALT <5 06/16/2022 06:59 AM    BILIDIR 0.3 06/16/2022 06:59 AM    BILITOT 0.7 06/16/2022 06:59 AM    ALKPHOS 238 06/16/2022 06:59 AM     Lab Results   Component Value Date/Time    CHOL 77 09/14/2022 07:00 AM    HDL 38 09/14/2022 07:00 AM    TRIG 45 09/14/2022 07:00 AM     UA: No results found for: NITRITE, COLORU, PHUR, LABCAST, WBCUA, RBCUA, MUCUS, TRICHOMONAS, YEAST, BACTERIA, CLARITYU, SPECGRAV, LEUKOCYTESUR, UROBILINOGEN, BILIRUBINUR, BLOODU, GLUCOSEU, AMORPHOUS    Imaging:   No orders to display Assessment/Plan: This is a 61 y.o. male with Hx of of pulmonary embolism and pulmonary HTN on Eliquis, CVA, CHF, ESRD on HD via LUE AV graft, HLD, and HTN who presents to the ED for bleeding from his AV graft site a few hours after receiving hemodialysis. Vascular surgery was consulted for evaluation. On exam, patient has palpable thrill of the L AV graft with palpable 2/4 L radial pulse. He is hemodynamically stable without complaints of lightheadedness or palpitations. His LUE forearm was re-wrapped with D-stat, Kerlix and Coband.    - Patient may need a repeat fistulagram and angioplasty at the basilic graft anastomosis. Will discuss with staff  - Please keep patient NPO after midnight  - Hold Eliquis  - Admit to medicine  - Follow-up labs    Patient was seen by senior resident and discussed with Dr. Moe Bowman.       Daryle Horseman, DO, [address] [facility]  PGY1, General Surgery  10/12/22  5:12 PM  [employer]  Pager: [phone]

## 2022-10-13 LAB
ALBUMIN SERPL-MCNC: 3.6 G/DL (ref 3.4–5)
ALBUMIN SERPL-MCNC: 3.6 G/DL (ref 3.4–5)
ALP BLD-CCNC: 204 U/L (ref 40–129)
ALT SERPL-CCNC: <5 U/L (ref 10–40)
ANION GAP SERPL CALCULATED.3IONS-SCNC: 13 MMOL/L (ref 3–16)
AST SERPL-CCNC: 13 U/L (ref 15–37)
BASOPHILS ABSOLUTE: 0 K/UL (ref 0–0.2)
BASOPHILS RELATIVE PERCENT: 0.6 %
BILIRUB SERPL-MCNC: 0.4 MG/DL (ref 0–1)
BILIRUBIN DIRECT: <0.2 MG/DL (ref 0–0.3)
BILIRUBIN, INDIRECT: ABNORMAL MG/DL (ref 0–1)
BLOOD SMEAR REVIEW: NORMAL
BUN BLDV-MCNC: 23 MG/DL (ref 7–20)
CALCIUM SERPL-MCNC: 8.9 MG/DL (ref 8.3–10.6)
CHLORIDE BLD-SCNC: 94 MMOL/L (ref 99–110)
CO2: 28 MMOL/L (ref 21–32)
CREAT SERPL-MCNC: 8.1 MG/DL (ref 0.8–1.3)
EOSINOPHILS ABSOLUTE: 0.2 K/UL (ref 0–0.6)
EOSINOPHILS RELATIVE PERCENT: 4 %
FERRITIN: 1233 NG/ML (ref 30–400)
GFR AFRICAN AMERICAN: 8
GFR NON-AFRICAN AMERICAN: 7
GLUCOSE BLD-MCNC: 73 MG/DL (ref 70–99)
HCT VFR BLD CALC: 25.8 % (ref 40.5–52.5)
HCT VFR BLD CALC: 26.4 % (ref 40.5–52.5)
HEMOGLOBIN: 8.3 G/DL (ref 13.5–17.5)
IMMATURE RETIC FRACT: 0.61 (ref 0.21–0.37)
LACTATE DEHYDROGENASE: 244 U/L (ref 100–190)
LYMPHOCYTES ABSOLUTE: 0.6 K/UL (ref 1–5.1)
LYMPHOCYTES RELATIVE PERCENT: 16.4 %
MAGNESIUM: 2.4 MG/DL (ref 1.8–2.4)
MCH RBC QN AUTO: 28.7 PG (ref 26–34)
MCHC RBC AUTO-ENTMCNC: 32.1 G/DL (ref 31–36)
MCV RBC AUTO: 89.4 FL (ref 80–100)
MONOCYTES ABSOLUTE: 0.3 K/UL (ref 0–1.3)
MONOCYTES RELATIVE PERCENT: 7 %
NEUTROPHILS ABSOLUTE: 2.8 K/UL (ref 1.7–7.7)
NEUTROPHILS RELATIVE PERCENT: 72 %
PDW BLD-RTO: 17 % (ref 12.4–15.4)
PHOSPHORUS: 3.1 MG/DL (ref 2.5–4.9)
PLATELET # BLD: 78 K/UL (ref 135–450)
PMV BLD AUTO: 8.5 FL (ref 5–10.5)
POTASSIUM SERPL-SCNC: 4.1 MMOL/L (ref 3.5–5.1)
RBC # BLD: 2.89 M/UL (ref 4.2–5.9)
RETICULOCYTE ABSOLUTE COUNT: 0.06 M/UL
RETICULOCYTE COUNT PCT: 1.98 % (ref 0.5–2.18)
SODIUM BLD-SCNC: 135 MMOL/L (ref 136–145)
TOTAL PROTEIN: 6 G/DL (ref 6.4–8.2)
WBC # BLD: 3.8 K/UL (ref 4–11)

## 2022-10-13 PROCEDURE — 94761 N-INVAS EAR/PLS OXIMETRY MLT: CPT

## 2022-10-13 PROCEDURE — 80069 RENAL FUNCTION PANEL: CPT

## 2022-10-13 PROCEDURE — 2580000003 HC RX 258: Performed by: STUDENT IN AN ORGANIZED HEALTH CARE EDUCATION/TRAINING PROGRAM

## 2022-10-13 PROCEDURE — 83550 IRON BINDING TEST: CPT

## 2022-10-13 PROCEDURE — 83540 ASSAY OF IRON: CPT

## 2022-10-13 PROCEDURE — 82728 ASSAY OF FERRITIN: CPT

## 2022-10-13 PROCEDURE — 83615 LACTATE (LD) (LDH) ENZYME: CPT

## 2022-10-13 PROCEDURE — 82668 ASSAY OF ERYTHROPOIETIN: CPT

## 2022-10-13 PROCEDURE — 2700000000 HC OXYGEN THERAPY PER DAY

## 2022-10-13 PROCEDURE — 1200000000 HC SEMI PRIVATE

## 2022-10-13 PROCEDURE — 6360000002 HC RX W HCPCS

## 2022-10-13 PROCEDURE — 6370000000 HC RX 637 (ALT 250 FOR IP): Performed by: SURGERY

## 2022-10-13 PROCEDURE — 85025 COMPLETE CBC W/AUTO DIFF WBC: CPT

## 2022-10-13 PROCEDURE — 83010 ASSAY OF HAPTOGLOBIN QUANT: CPT

## 2022-10-13 PROCEDURE — 83883 ASSAY NEPHELOMETRY NOT SPEC: CPT

## 2022-10-13 PROCEDURE — 6370000000 HC RX 637 (ALT 250 FOR IP): Performed by: STUDENT IN AN ORGANIZED HEALTH CARE EDUCATION/TRAINING PROGRAM

## 2022-10-13 PROCEDURE — 36415 COLL VENOUS BLD VENIPUNCTURE: CPT

## 2022-10-13 PROCEDURE — 85045 AUTOMATED RETICULOCYTE COUNT: CPT

## 2022-10-13 PROCEDURE — 82784 ASSAY IGA/IGD/IGG/IGM EACH: CPT

## 2022-10-13 PROCEDURE — 80076 HEPATIC FUNCTION PANEL: CPT

## 2022-10-13 PROCEDURE — 83735 ASSAY OF MAGNESIUM: CPT

## 2022-10-13 RX ORDER — ATORVASTATIN CALCIUM 80 MG/1
80 TABLET, FILM COATED ORAL NIGHTLY
Status: DISCONTINUED | OUTPATIENT
Start: 2022-10-13 | End: 2022-10-18 | Stop reason: HOSPADM

## 2022-10-13 RX ORDER — HEPARIN SODIUM 5000 [USP'U]/ML
5000 INJECTION, SOLUTION INTRAVENOUS; SUBCUTANEOUS EVERY 8 HOURS SCHEDULED
Status: DISCONTINUED | OUTPATIENT
Start: 2022-10-13 | End: 2022-10-13

## 2022-10-13 RX ADMIN — ALLOPURINOL 100 MG: 100 TABLET ORAL at 00:13

## 2022-10-13 RX ADMIN — GABAPENTIN 100 MG: 100 CAPSULE ORAL at 00:13

## 2022-10-13 RX ADMIN — CARVEDILOL 12.5 MG: 12.5 TABLET, FILM COATED ORAL at 08:25

## 2022-10-13 RX ADMIN — FERROUS SULFATE TAB 325 MG (65 MG ELEMENTAL FE) 325 MG: 325 (65 FE) TAB at 08:25

## 2022-10-13 RX ADMIN — ATORVASTATIN CALCIUM 80 MG: 80 TABLET, FILM COATED ORAL at 20:00

## 2022-10-13 RX ADMIN — SEVELAMER CARBONATE 800 MG: 800 TABLET, FILM COATED ORAL at 12:35

## 2022-10-13 RX ADMIN — HEPARIN SODIUM 5000 UNITS: 5000 INJECTION INTRAVENOUS; SUBCUTANEOUS at 05:56

## 2022-10-13 RX ADMIN — GABAPENTIN 100 MG: 100 CAPSULE ORAL at 20:00

## 2022-10-13 RX ADMIN — FUROSEMIDE 20 MG: 20 TABLET ORAL at 08:25

## 2022-10-13 RX ADMIN — CALCITRIOL CAPSULES 0.25 MCG 0.5 MCG: 0.25 CAPSULE ORAL at 00:13

## 2022-10-13 RX ADMIN — SEVELAMER CARBONATE 800 MG: 800 TABLET, FILM COATED ORAL at 17:37

## 2022-10-13 RX ADMIN — CARVEDILOL 12.5 MG: 12.5 TABLET, FILM COATED ORAL at 00:15

## 2022-10-13 RX ADMIN — CARVEDILOL 12.5 MG: 12.5 TABLET, FILM COATED ORAL at 20:00

## 2022-10-13 RX ADMIN — SODIUM CHLORIDE, PRESERVATIVE FREE 10 ML: 5 INJECTION INTRAVENOUS at 08:26

## 2022-10-13 RX ADMIN — APIXABAN 2.5 MG: 2.5 TABLET, FILM COATED ORAL at 20:00

## 2022-10-13 RX ADMIN — APIXABAN 2.5 MG: 2.5 TABLET, FILM COATED ORAL at 12:35

## 2022-10-13 RX ADMIN — SODIUM CHLORIDE, PRESERVATIVE FREE 10 ML: 5 INJECTION INTRAVENOUS at 20:01

## 2022-10-13 NOTE — DISCHARGE INSTRUCTIONS
Vascular surgery: Follow up with Dr. Michelle Haro as needed. Her office phone number is [phone]    Extra Heart Failure sites:   [email or web address]://TV Volume Wizard App.TestCred/ --- this is American Heart Association interactive Healthier Living with Heart Failure guidebook. Please copy and paste link into search bar. Use your mouse to scroll through the pages. Lots and lots of info / tips    HF [place] lito  --- free smart phone lito available for [employer] and [employer]. Use your phone to track sodium / fluid intake,  symptoms, weight, etc.    Splunk. Roozz.com - website-- [employer] is a dialysis company. All dialysis patients follow a renal diet which IS low sodium!! This website offers free seasonal cookbooks.   Each quarter, they will release 25-30 new recipes with a breakdown of calories, sodium, glucose, etc    www.Voice Of TV.TestCred/recipes -- more free recipes

## 2022-10-13 NOTE — CONSULTS
Nephrology Consult Note  [phone]   [facility]. [facility]        Reason for Consult:  ESRD     HISTORY OF PRESENT ILLNESS:                          This is a patient with significant past medical history of ESRD on dialsis at [facility]  who presents with excessive bleeding from Left UE loop AVG . Last week a fistulogram showed no stenosis. He presents now with bleeding from AVG  once again after HD at [facility], Vascular surgery saw him and achieved hemostasis. They are evaluating his AVG again . Of note he is on Eliquis . This has been HELD . Today he feels fine, we will arrange HD on MWF while he is here . Past Medical History:        Diagnosis Date    Acute HFrEF (heart failure with reduced ejection fraction) ([facility])     Cerebral artery occlusion with cerebral infarction ([facility])     Chronic kidney disease     COPD (chronic obstructive pulmonary disease) ([facility]     Dialysis patient ([facility]     Gout     Hemodialysis patient ([facility]     Hx of blood clots     Hyperlipidemia     Hypertension     Renal failure        Past Surgical History:        Procedure Laterality Date    COLONOSCOPY      DIALYSIS FISTULA CREATION Left        Current Medications:    No current facility-administered medications on file prior to encounter. Current Outpatient Medications on File Prior to Encounter   Medication Sig Dispense Refill    ferrous sulfate (IRON 325) 325 (65 Fe) MG tablet Take 325 mg by mouth daily (with breakfast)      pseudoephedrine (SUDAFED) 30 MG tablet Take 30 mg by mouth every 4 hours as needed for Congestion      levothyroxine (SYNTHROID) 100 MCG tablet Take 100 mcg by mouth Daily      sodium chloride (OCEAN) 0.65 % nasal spray 1 spray by Nasal route as needed for Congestion      gabapentin (NEURONTIN) 100 MG capsule Take 1 capsule by mouth at bedtime for 30 days.  90 capsule 0    diclofenac sodium (VOLTAREN) 1 % GEL Apply 2 g topically 2 times daily (Patient taking differently: Apply 2 g topically 2 on file     Social Determinants of Health     Financial Resource Strain: Not on file   Food Insecurity: Not on file   Transportation Needs: Not on file   Physical Activity: Not on file   Stress: Not on file   Social Connections: Not on file   Intimate Partner Violence: Not on file   Housing Stability: Not on file       Family History:       Problem Relation Age of Onset    Cancer Mother     Cancer Father     Other Sister          Review of Systems:  a comprehensive Review of systems was negative except as noted in HPI     PHYSICAL EXAM:    Vitals:    /64   Pulse 73   Temp 97.9 °F (36.6 °C) (Oral)   Resp 18   Ht 5' 8\" (1.727 m)   Wt 129 lb 6.6 oz (58.7 kg)   SpO2 98%   BMI 19.68 kg/m²   No intake/output data recorded. No intake/output data recorded. Physical Exam:  Gen: Resting in bed, NAD. HEENT: MMM, OP clear. CV: RRR no m/r/g. No S3.  Lungs: Good respiratory effort, clear air entry   Abd: S/NT +BS  Ext: No edema, no cyanosis  Skin: Warm. No rashes appreciated. : No TTP over bladder, nondistended. Neuro: Alert and oriented x 3, nonfocal.  Joints: No erythema noted over joints.     DATA:    CBC:   Lab Results   Component Value Date/Time    WBC 3.8 10/13/2022 06:36 AM    RBC 2.89 10/13/2022 06:36 AM    HGB 8.3 10/13/2022 06:36 AM    HCT 25.8 10/13/2022 06:36 AM    MCV 89.4 10/13/2022 06:36 AM    MCH 28.7 10/13/2022 06:36 AM    MCHC 32.1 10/13/2022 06:36 AM    RDW 17.0 10/13/2022 06:36 AM    PLT 78 10/13/2022 06:36 AM    MPV 8.5 10/13/2022 06:36 AM     BMP:    Lab Results   Component Value Date/Time     10/13/2022 06:36 AM    K 4.1 10/13/2022 06:36 AM    K 4.0 10/12/2022 05:16 PM    CL 94 10/13/2022 06:36 AM    CO2 28 10/13/2022 06:36 AM    BUN 23 10/13/2022 06:36 AM    LABALBU 3.6 10/13/2022 06:36 AM    CREATININE 8.1 10/13/2022 06:36 AM    CALCIUM 8.9 10/13/2022 06:36 AM    GFRAA 8 10/13/2022 06:36 AM    LABGLOM 7 10/13/2022 06:36 AM    GLUCOSE 73 10/13/2022 06:36 AM IMPRESSION/RECOMMENDATIONS:      ESRD will arrange dialysis on schedule, orders reviewed with dialysis  RN   Transfuse as needed for HgB <7   Anemia will give TEODORA as needed   Prolonged bleeding rfom AVG   Appreciate Vascular surgery help   On renvela     Thank you for allowing me to participate in the care of this patient. I will continue to follow along. Please call with questions.     Trenton Leary MD, MD

## 2022-10-13 NOTE — H&P
Hospital Medicine History & Physical      PCP: Christel Tyler MD    Date of Admission: 10/12/2022    Date of Service: Pt seen/examined on 10/12/2022 and Admitted to Inpatient with expected LOS greater than two midnights due to medical therapy. Chief Complaint:  Bleeding      History Of Present Illness:      61 y.o. male with a history of end-stage renal disease, blood clots on anticoagulant medication who presented to [facility] with bleeding from his AV fistula site. He was at HD today and tolerated session well but after it ended noted bleeding from AV fistula site. This has happened to him multiple times in the past per his report. He denies any trauma. No chest pain, dyspnea, vomiting, or bleeding at other sites. ED Course: Hemodynamically stable. Bedside suture attempted without improvement in bleeding. Vascular surgery consulted and evaluated patient. Achieved hemostasis with Coband. Admitted for further monitoring. Past Medical History:          Diagnosis Date    Acute HFrEF (heart failure with reduced ejection fraction) ([facility])     Cerebral artery occlusion with cerebral infarction ([facility])     Chronic kidney disease     COPD (chronic obstructive pulmonary disease) ([facility]     Dialysis patient ([facility]     Gout     Hemodialysis patient ([facility]     Hx of blood clots     Hyperlipidemia     Hypertension     Renal failure        Past Surgical History:          Procedure Laterality Date    COLONOSCOPY      DIALYSIS FISTULA CREATION Left        Medications Prior to Admission:      Prior to Admission medications    Medication Sig Start Date End Date Taking?  Authorizing Provider   ferrous sulfate (IRON 325) 325 (65 Fe) MG tablet Take 325 mg by mouth daily (with breakfast)   Yes Historical Provider, MD   pseudoephedrine (SUDAFED) 30 MG tablet Take 30 mg by mouth every 4 hours as needed for Congestion    Historical Provider, MD   levothyroxine (SYNTHROID) 100 MCG tablet Take 100 mcg by mouth Daily Historical Provider, MD   sodium chloride (OCEAN) 0.65 % nasal spray 1 spray by Nasal route as needed for Congestion    Historical Provider, MD   gabapentin (NEURONTIN) 100 MG capsule Take 1 capsule by mouth at bedtime for 30 days.  9/15/22 10/15/22  Colin Gonzalez DO   diclofenac sodium (VOLTAREN) 1 % GEL Apply 2 g topically 2 times daily  Patient taking differently: Apply 2 g topically 2 times daily R shoulder 9/15/22   Colin Gonzalez DO   calcitRIOL (ROCALTROL) 0.25 MCG capsule Take 0.5 mcg by mouth every other day [facility]    Historical Provider, MD   sevelamer (RENVELA) 800 MG tablet Take 1 tablet by mouth 3 times daily (with meals)    Historical Provider, MD   apixaban (ELIQUIS) 2.5 MG TABS tablet Take 1 tablet by mouth 2 times daily 6/18/22   Bhupinder Ferguson MD   ferrous sulfate (FE TABS) 325 (65 Fe) MG EC tablet Take 1 tablet by mouth every other day  Patient taking differently: Take 325 mg by mouth daily (with breakfast) 6/18/22 8/17/22  Bhupinder Ferguson MD   atorvastatin (LIPITOR) 80 MG tablet Take 80 mg by mouth daily    Historical Provider, MD   calcium carb-cholecalciferol 600-200 MG-UNIT TABS tablet Take 1 tablet by mouth daily    Historical Provider, MD   furosemide (LASIX) 20 MG tablet Take 20 mg by mouth daily    Historical Provider, MD   Umeclidinium Bromide (INCRUSE ELLIPTA) 62.5 MCG/INH AEPB Inhale 1 puff into the lungs daily    Historical Provider, MD   vitamin D (ERGOCALCIFEROL) 1.25 MG (64748 UT) CAPS capsule Take 50,000 Units by mouth once a week Saturdays    Historical Provider, MD   omeprazole (PRILOSEC) 20 MG delayed release capsule Take 20 mg by mouth daily    Historical Provider, MD   carvedilol (COREG) 12.5 MG tablet Take 12.5 mg by mouth 2 times daily Unsure of dose     Historical Provider, MD   albuterol sulfate HFA (PROVENTIL;VENTOLIN;PROAIR) 108 (90 Base) MCG/ACT inhaler Inhale 2 puffs into the lungs every 6 hours as needed for Wheezing 90mcg/actuation    Historical Provider, MD   allopurinol (ZYLOPRIM) 100 MG tablet Take 100 mg by mouth Daily on MWF    Historical Provider, MD       Allergies:  Nitroglycerin and Oxymetazoline    Social History:      The patient currently lives at [facility]    TOBACCO:   reports that he has quit smoking. He has never used smokeless tobacco.  ETOH:   reports current alcohol use. E-cigarette/Vaping       Questions Responses    E-cigarette/Vaping Use Never User    Start Date     Passive Exposure     Quit Date     Counseling Given     Comments               Family History:       Reviewed and as follows. Problem Relation Age of Onset    Cancer Mother     Cancer Father     Other Sister        REVIEW OF SYSTEMS COMPLETED:   Pertinent positives as noted in the HPI. All other systems reviewed and negative. PHYSICAL EXAM PERFORMED:    /82   Pulse 67   Temp 98.2 °F (36.8 °C) (Oral)   Resp 18   Ht 5' 8\" (1.727 m)   Wt 133 lb (60.3 kg)   SpO2 100%   BMI 20.22 kg/m²     General appearance:  No apparent distress, appears stated age and cooperative. HEENT:  Normal cephalic, atraumatic without obvious deformity. Pupils equal, round, and reactive to light. Extra ocular muscles intact. Conjunctivae/corneas clear. Neck: Supple, with full range of motion. No jugular venous distention. Trachea midline. Respiratory:  Normal respiratory effort. Clear to auscultation, bilaterally without Rales/Wheezes/Rhonchi. Cardiovascular:  Regular rate and rhythm with normal S1/S2 without murmurs, rubs or gallops. Abdomen: Soft, non-tender, non-distended with normal bowel sounds. Musculoskeletal:  No clubbing, cyanosis or edema bilaterally. Full range of motion without deformity. Skin: Skin color, texture, turgor normal.  No rashes or lesions. L arm wrapped in bandage, no bleeding or oozing observed through bandage  Neurologic:  Neurovascularly intact without any focal sensory/motor deficits.  Cranial nerves: II-XII intact, grossly non-focal.  Psychiatric:  Alert and oriented, thought content appropriate, normal insight  Capillary Refill: Brisk,3 seconds, normal  Peripheral Pulses: +2 palpable, equal bilaterally       Labs:     Recent Labs     10/12/22  1716   WBC 4.3   HGB 8.0*   HCT 25.7*   PLT 66*     Recent Labs     10/12/22  1716   *   K 4.0   CL 95*   CO2 29   BUN 20   CREATININE 6.7*   CALCIUM 9.0     No results for input(s): AST, ALT, BILIDIR, BILITOT, ALKPHOS in the last 72 hours. No results for input(s): INR in the last 72 hours. No results for input(s): Corky Stallion in the last 72 hours. Urinalysis:    No results found for: [place] Minor, [address], BACTERIA, RBCUA, BLOODU, [address], [address]    Radiology:       No orders to display       Consults:    [id number] [place] [address] Avenue TO HOSPITALIST  IP CONSULT TO NEPHROLOGY    ASSESSMENT:    Active Hospital Problems    Diagnosis Date Noted    Hemorrhage of arteriovenous fistula, initial encounter ([facility] Melissa Siddiqi 10/12/2022     Priority: Medium    History of venous thromboembolism [Z86.718] 06/17/2022     Priority: Medium    Essential hypertension [I10] 03/02/2022    Hyperlipidemia [E78.5] 03/02/2022    Chronic combined systolic and diastolic CHF (congestive heart failure) ([facility] [I50.42] 03/02/2022    COPD (chronic obstructive pulmonary disease) ([facility] [J44.9] 09/10/2020    ESRD on dialysis ([facility] [N18.6, Z99.2] 03/31/2020         PLAN:  - Vascular surgery consulted, will contact if bleeding occurs again  - Hold DOAC  - NPO after midnight for probable fistulogram tomorrow  - Consult Nephro for HD needs while inpatient  - Continue other home meds (Coreg, Lasix, Gabapentin, levothyroxine, allopurinol, sevelamer, atorvastatin, inhalers)  - Trend CBC and RFP      DVT Prophylaxis: SCDs, HOLD Apixaban (home med) given bleeding  Diet: ADULT DIET; Regular; Low Sodium (2 gm); Low Potassium (Less than 3000 mg/day);  Low Phosphorus (Less than 1000 mg)  Diet NPO  Code Status: Full Code    PT/OT Eval Status:

## 2022-10-13 NOTE — PLAN OF CARE
Patient remains free from falls and injury. Patient plans to discharge back to facility when order is written.   Problem: Safety - Adult  Goal: Free from fall injury  Outcome: Progressing     Problem: Discharge Planning  Goal: Discharge to home or other facility with appropriate resources  Outcome: Progressing  Flowsheets (Taken 10/12/2022 1646 by Lacy Brumfield RN)  Discharge to home or other facility with appropriate resources:   Identify barriers to discharge with patient and caregiver   Identify discharge learning needs (meds, wound care, etc)

## 2022-10-13 NOTE — CARE COORDINATION
Case Management Assessment  Initial Evaluation    Date/Time of Evaluation: 10/13/2022 9:58 AM  Assessment Completed by: DINESH Iqbal, MARICRUZ    If patient is discharged prior to next notation, then this note serves as note for discharge by case management. Patient Name: Saloni Anne                   YOB: 1955  Diagnosis: Hemorrhage of arteriovenous fistula, initial encounter [facility]) [J25.066L]                   Date / Time: 10/12/2022  2:52 PM    Patient Admission Status: Inpatient   Readmission Risk (Low < 19, Mod (19-27), High > 27): Readmission Risk Score: 29.1    Current PCP: Allan Ayers MD  PCP verified by CM? Yes    Chart Reviewed: Yes      History Provided by: Patient, Medical Record  Patient Orientation: Alert and Oriented    Patient Cognition: Alert    Hospitalization in the last 30 days (Readmission):  Yes    If yes, Readmission Assessment in CM Navigator will be completed.     Readmission Assessment  Number of Days since last admission?: 1-7 days  Previous Disposition: [address] [place] [address]  Who is being Interviewed: Patient  What was the patient's/caregiver's perception as to why they think they needed to return back to the hospital?: Other (Comment) (recurring medical issue)  Did you visit your Primary Care Physician after you left the hospital, before you returned this time?: No  Why weren't you able to visit your PCP?: Did not have an appointment  Did you see a specialist, such as Cardiac, Pulmonary, Orthopedic Physician, etc. after you left the hospital?: No  Who advised the patient to return to the hospital?: Caregiver  Does the patient report anything that got in the way of taking their medications?: No  In our efforts to provide the best possible care to you and others like you, can you think of anything that we could have done to help you after you left the hospital the first time, so that you might not have needed to return so soon?: Other (Comment) (NA)     Advance Directives:      Code Status: Full Code   Patient's Primary Decision Maker is:      Primary Decision Maker: Cliff Walker - [phone]    Discharge Planning:    Patient lives with: Alone Type of Home: Long-Term Care  Primary Care Giver: Other (Comment) (LTC facility staff)  Patient Support Systems include: Children, Other (Comment) (LTC facility staff)   Current Financial resources:    Current community resources:    Current services prior to admission: Oxygen Therapy            Current DME: Cane            Type of Home Care services:  None    ADLS  Prior functional level:    Current functional level:      PT AM-PAC:   /24  OT AM-PAC:   /24    Family can provide assistance at DC: Would you like Case Management to discuss the discharge plan with any other family members/significant others, and if so, who?     Plans to Return to Present Housing: Yes  Other Identified Issues/Barriers to RETURNING to current housing: NA  Potential Assistance needed at discharge: N/A            Potential DME:    Patient expects to discharge to: Long-term care  Plan for transportation at discharge:      Financial    Payor: Paula De Paz / Plan: Wayne Chavez I-SNP / Product Type: *No Product type* /     Does insurance require precert for SNF: Yes    Potential assistance Purchasing Medications: No  Meds-to-Beds request:        [address] [facility] [address], [address] [place] [phone] - F [phone]  [address] [facility] [address]  Phone: [phone] Fax: [phone]    [facility] [id number] [facility] [facility] [address] [phone] - F [phone]  [address] [address]  Phone: [phone] Fax: [phone]      Notes:    Factors facilitating achievement of predicted outcomes: Caregiver support, Has needed Durable Medical Equipment at home, and Home is wheelchair accessible    Barriers to discharge: Medical complications    Additional Case Management Notes:  Pt is from [address], will return at DC. CM spoke with Brice Lux in admissions [phone] to confirm bed hold. CM will fax HD flow sheets at DC. CM will continue to follow for DC needs and recs. The Plan for Transition of Care is related to the following treatment goals of Hemorrhage of arteriovenous fistula, initial encounter ([facility] [address] [R47.459N]    IF APPLICABLE: The Patient and/or patient representative Zuleyma Zuniga and his family were provided with a choice of provider and agrees with the discharge plan. Freedom of choice list with basic dialogue that supports the patient's individualized plan of care/goals and shares the quality data associated with the providers was provided to: Patient   Patient Representative Name:       The Patient and/or Patient Representative Agree with the Discharge Plan?  Yes    DINESH Johansen, [facility]  Case Management Department  Ph: [phone] Fax: [phone]

## 2022-10-13 NOTE — PLAN OF CARE
Problem: Safety - Adult  Goal: Free from fall injury  Outcome: Progressing  Flowsheets (Taken 10/13/2022 1525)  Free From Fall Injury: Instruct family/caregiver on patient safety  Note: Patient at risk for falls. Patient resting quietly in bed. Side rails up x 2/4. Bed locked in lowest position. Bed alarm on. Bedside table and call light within reach. Patient instructed to call for assistance. Patient verbalized understanding. Will continue to monitor. Problem: Discharge Planning  Goal: Discharge to home or other facility with appropriate resources  Outcome: Progressing  Flowsheets (Taken 10/12/2022 1646 by Marley Chavez RN)  Discharge to home or other facility with appropriate resources:   Identify barriers to discharge with patient and caregiver   Identify discharge learning needs (meds, wound care, etc)  Note: Pt is LTC at [facility]. Will return at discharge.

## 2022-10-13 NOTE — CONSULTS
[facility] Consult Note       Attending Physician: Ronny Gamboa DO    Primary Care: Matthew Gay MD       Referring MD: No referring provider defined for this encounter. Name: Connie Anderson :  1955  MRN:  [id number]    Admission: 10/12/2022      Date: 10/13/2022    Reason for Admission: bleeding at fistula site    Reason for consult: pancytopenia    History of Present Illness:   Mr. Kalie Avina is a 82-year-old male with history of PE on Eliquis, end-stage renal disease on hemodialysis with left upper extremity AV fistula. Patient was at hemodialysis and was bleeding at the site of his AV fistula hence sent to the ER patient is on Eliquis for history of pulmonary embolism he is unable to tell me the date or time of his embolism. He is admitted to the hospital for evaluation of his bleeding. He was noted to be pancytopenic. His admission hence hematology is consulted. Patient white blood cell count is 3.8, hemoglobin is 8.3 and platelets are 78. Patient is unsure if he receives erythropoietin during dialysis. He denies any other bleeding no GI bleeding. Per patient he is always had low blood counts and he has seen a hematologist in the past but not sure where exactly. He also says he had a bone marrow biopsy however is not sure of the results or where it was performed. He denies any alcohol abuse. Denies any history of cirrhosis.       Past Surgical History:   Procedure Laterality Date    COLONOSCOPY      DIALYSIS FISTULA CREATION Left        Past Medical History:   Diagnosis Date    Acute HFrEF (heart failure with reduced ejection fraction) (HCC)     Cerebral artery occlusion with cerebral infarction (HCC)     Chronic kidney disease     COPD (chronic obstructive pulmonary disease) ([facility] [address]     Dialysis patient ([facility] [address]     Gout     Hemodialysis patient ([facility] [address]     Hx of blood clots     Hyperlipidemia     Hypertension     Renal failure        Prior to Admission medications Medication Sig Start Date End Date Taking? Authorizing Provider   ferrous sulfate (IRON 325) 325 (65 Fe) MG tablet Take 325 mg by mouth daily (with breakfast)   Yes Historical Provider, MD   pseudoephedrine (SUDAFED) 30 MG tablet Take 30 mg by mouth every 4 hours as needed for Congestion    Historical Provider, MD   levothyroxine (SYNTHROID) 100 MCG tablet Take 100 mcg by mouth Daily    Historical Provider, MD   sodium chloride (OCEAN) 0.65 % nasal spray 1 spray by Nasal route as needed for Congestion    Historical Provider, MD   gabapentin (NEURONTIN) 100 MG capsule Take 1 capsule by mouth at bedtime for 30 days.  9/15/22 10/15/22  Brien Dumont,    diclofenac sodium (VOLTAREN) 1 % GEL Apply 2 g topically 2 times daily  Patient taking differently: Apply 2 g topically 2 times daily R shoulder 9/15/22   Brien Dumont DO   calcitRIOL (ROCALTROL) 0.25 MCG capsule Take 0.5 mcg by mouth every other day [facility]    Historical Provider, MD   sevelamer (RENVELA) 800 MG tablet Take 1 tablet by mouth 3 times daily (with meals)    Historical Provider, MD   apixaban (ELIQUIS) 2.5 MG TABS tablet Take 1 tablet by mouth 2 times daily 6/18/22   Izzy Pulido MD   ferrous sulfate (FE TABS) 325 (65 Fe) MG EC tablet Take 1 tablet by mouth every other day  Patient taking differently: Take 325 mg by mouth daily (with breakfast) 6/18/22 8/17/22  Izzy Pulido MD   atorvastatin (LIPITOR) 80 MG tablet Take 80 mg by mouth daily    Historical Provider, MD   calcium carb-cholecalciferol 600-200 MG-UNIT TABS tablet Take 1 tablet by mouth daily    Historical Provider, MD   furosemide (LASIX) 20 MG tablet Take 20 mg by mouth daily    Historical Provider, MD   Umeclidinium Bromide (INCRUSE ELLIPTA) 62.5 MCG/INH AEPB Inhale 1 puff into the lungs daily    Historical Provider, MD   vitamin D (ERGOCALCIFEROL) 1.25 MG (00284 UT) CAPS capsule Take 50,000 Units by mouth once a week Saturdays    Historical Provider, MD   omeprazole (PRILOSEC) 20 MG delayed release capsule Take 20 mg by mouth daily    Historical Provider, MD   carvedilol (COREG) 12.5 MG tablet Take 12.5 mg by mouth 2 times daily Unsure of dose     Historical Provider, MD   albuterol sulfate HFA (PROVENTIL;VENTOLIN;PROAIR) 108 (90 Base) MCG/ACT inhaler Inhale 2 puffs into the lungs every 6 hours as needed for Wheezing 90mcg/actuation    Historical Provider, MD   allopurinol (ZYLOPRIM) 100 MG tablet Take 100 mg by mouth Daily on MWF    Historical Provider, MD       Allergies   Allergen Reactions    Nitroglycerin      Other reaction(s): Unknown    Oxymetazoline      Other reaction(s): Unknown       Family History   Problem Relation Age of Onset    Cancer Mother     Cancer Father     Other Sister         Social History     Socioeconomic History    Marital status: Single     Spouse name: Not on file    Number of children: Not on file    Years of education: Not on file    Highest education level: Not on file   Occupational History    Not on file   Tobacco Use    Smoking status: Former    Smokeless tobacco: Never   Vaping Use    Vaping Use: Never used   Substance and Sexual Activity    Alcohol use: Yes     Comment: occasional    Drug use: Never    Sexual activity: Not Currently   Other Topics Concern    Not on file   Social History Narrative    Not on file     Social Determinants of Health     Financial Resource Strain: Not on file   Food Insecurity: Not on file   Transportation Needs: Not on file   Physical Activity: Not on file   Stress: Not on file   Social Connections: Not on file   Intimate Partner Violence: Not on file   Housing Stability: Not on file        ROS:  As noted above, otherwise remainder of 10-point ROS negative    Physical Exam:     Vital Signs:  /66   Pulse 70   Temp 97.2 °F (36.2 °C) (Oral)   Resp 16   Ht 5' 8\" (1.727 m)   Wt 129 lb 6.6 oz (58.7 kg)   SpO2 100%   BMI 19.68 kg/m²     Weight:    Wt Readings from Last 3 Encounters:   10/13/22 129 lb 6.6 oz (58.7 kg)   10/08/22 133 lb 2.5 oz (60.4 kg)   09/15/22 141 lb 1.5 oz (64 kg)           General: Awake, alert and oriented. HEENT: normocephalic, PERRL, no scleral erythema or icterus, Oral mucosa moist and intact, throat clear  NECK: supple without palpable adenopathy  BACK: Straight negative CVAT  SKIN: warm dry and intact without lesions rashes or masses  CHEST: CTA bilaterally without use of accessory muscles  CV: Normal S1 S2, RRR, no MRG  ABD: NT ND normoactive BS, no palpable masses or hepatosplenomegaly  EXTREMITIES: without edema, denies calf tenderness  NEURO: CN II - XII grossly intact      Laboratory Data:   CBC:   Recent Labs     10/12/22  1716 10/13/22  0636   WBC 4.3 3.8*   HGB 8.0* 8.3*   HCT 25.7* 26.4*  25.8*   MCV 91.8 89.4   PLT 66* 78*     BMP/Mag:  Recent Labs     10/12/22  1716 10/13/22  0636   * 135*   K 4.0 4.1   CL 95* 94*   CO2 29 28   PHOS  --  3.1   BUN 20 23*   CREATININE 6.7* 8.1*   MG  --  2.40     LIVP: No results for input(s): AST, ALT, LIPASE, BILIDIR, BILITOT, ALKPHOS in the last 72 hours. Invalid input(s): AMYLASE,  ALB  Coags: No results for input(s): PROTIME, INR, APTT in the last 72 hours. Uric Acid No results for input(s): LABURIC in the last 72 hours. ASSESSMENT AND PLAN:           Pancytopenia  ESRD  COPD  PE on eliquis   CHF    Plan   - pancytopenia appears to be chronic  - Per patient seen by hematology and had a bone marrow bx, unable to provide details.  Did not see any records in [facility] or care everywhere  - Will obtain basic labs  - Discussed bone marrow bx with patient, he will think about   - Transfuse to keep hg >7 and plts >50 with bleeding and being on [facility]  - ok to resume eliquis when bleeding resolved and stable     Fredrick Gains, DO

## 2022-10-13 NOTE — PROGRESS NOTES
Vascular Surgery   Daily Progress Note  Patient: Janusz Weller      CC: Bleeding LUE brachiobasilic AV graft    SUBJECTIVE:   Patient rested well overnight. Denies any pain. Denies any lightheadedness or palpitations. Afebrile and HDS, currently on 3L NC. Has been NPO since midnight. Denies complaints. ROS:   A 14 point review of systems was conducted, significant findings as noted above. All other systems negative. OBJECTIVE:    PHYSICAL EXAM:    Vitals:    10/12/22 2211 10/12/22 2322 10/13/22 0306 10/13/22 0554   BP:  118/67 110/67    Pulse:  73 66    Resp:  18 18    Temp:  98.1 °F (36.7 °C) 97.8 °F (36.6 °C)    TempSrc:  Oral Oral    SpO2:  100% 100%    Weight: 133 lb (60.3 kg)   129 lb 6.6 oz (58.7 kg)   Height: 5' 8\" (1.727 m)          General appearance: Alert, no acute distress, grooming appropriate  Eyes: No scleral icterus, EOM grossly intact  Neck: Trachea midline, no JVD, no lymphadenopathy, neck supple  Chest/Lungs: Normal effort with no accessory muscle use on RA  Cardiovascular: RRR, well-perfused  Abdomen: Soft, non-tender, non-distended, no rebound, guarding, or rigidity. Skin: Warm and dry, no rashes  Extremities: No edema, no cyanosis. LUE forearm with palpable thrill, no hematoma; 2/4 L radial pulse palpable. Hemostatic  Genitourinary: Deferred  Neuro: A&Ox3, no focal deficits, sensation intact    LABS:   Recent Labs     10/12/22  1716   WBC 4.3   HGB 8.0*   HCT 25.7*   MCV 91.8   PLT 66*        Recent Labs     10/12/22  1716   *   K 4.0   CL 95*   CO2 29   BUN 20   CREATININE 6.7*      No results for input(s): AST, ALT, ALB, BILIDIR, BILITOT, ALKPHOS in the last 72 hours. No results for input(s): LIPASE, AMYLASE in the last 72 hours. No results for input(s): PROT, INR, APTT in the last 72 hours. No results for input(s): CKTOTAL, CKMB, CKMBINDEX, TROPONINI in the last 72 hours.       ASSESSMENT & PLAN:   This is a 61 y.o. male with Hx of of pulmonary embolism and pulmonary HTN on Eliquis, CVA, CHF, ESRD on HD via LUE AV graft, HLD, and HTN who presents to the ED for bleeding from his AV graft site a few hours after receiving hemodialysis on 10/12.     - Will discuss with staff regarding possible fistulagram today  - Continue NPO for now  - Continue DVT prophylaxis      Kely Boykin DO, [address] [facility]  PGY1, General Surgery  10/13/22  6:06 AM  [employer]  Pager: [phone]

## 2022-10-13 NOTE — PROGRESS NOTES
Hospitalist Progress Note      PCP: Marv Petersen MD    Date of Admission: 10/12/2022    Chief Complaint: Recurrent bleed     History Of Present Illness:    61 y.o. male with a significant past medical history of COPD with chronic hypoxic and hypercapnic respiratory failure, combined systolic and diastolic heart failure, on chronic anticoagulation for history of DVT, pancytopenia, end-stage renal disease on hemodialysis, who presented with bleeding from his AV graft site when his band-aid accidentally pulled off. Subjective:      Bleeding has stopped, he is feeling better. He had negative BMT in 2021 but since then he reports he has had no follow-up. Medications:  Reviewed    Infusion Medications    sodium chloride       Scheduled Medications    heparin (porcine)  5,000 Units SubCUTAneous 3 times per day    atorvastatin  80 mg Oral Nightly    lidocaine-EPINEPHrine  20 mL IntraDERmal Once    sodium chloride flush  5-40 mL IntraVENous 2 times per day    ferrous sulfate  325 mg Oral Daily with breakfast    carvedilol  12.5 mg Oral BID    [START ON 10/15/2022] vitamin D  50,000 Units Oral Q7 Days    calcitRIOL  0.5 mcg Oral Q MWF    allopurinol  100 mg Oral Q MWF    furosemide  20 mg Oral Daily    gabapentin  100 mg Oral Nightly    levothyroxine  100 mcg Oral QAM AC    pantoprazole  40 mg Oral QAM AC    tiotropium  2 puff Inhalation Daily    sevelamer  800 mg Oral TID WC     PRN Meds: sodium chloride flush, sodium chloride, ondansetron **OR** ondansetron, polyethylene glycol, acetaminophen **OR** acetaminophen, albuterol, sodium chloride    No intake or output data in the 24 hours ending 10/13/22 0854    Exam:    /73   Pulse 70   Temp 97.5 °F (36.4 °C) (Oral)   Resp 15   Ht 5' 8\" (1.727 m)   Wt 129 lb 6.6 oz (58.7 kg)   SpO2 100%   BMI 19.68 kg/m²     General appearance: No apparent distress, appears stated age and cooperative. HEENT: Pupils equal, round, and reactive to light. Conjunctivae/corneas clear. Neck: Supple, with full range of motion. No jugular venous distention. Trachea midline. Respiratory:  Normal respiratory effort. Clear to auscultation, bilaterally without Rales/Wheezes/Rhonchi. Cardiovascular: Regular rate and rhythm with normal S1/S2 without murmurs, rubs or gallops. Abdomen: Soft, non-tender, non-distended with normal bowel sounds. Musculoskelatal: No clubbing, cyanosis or edema bilaterally. Skin: Skin color, texture, turgor normal.  No rashes or lesions. Neurologic:  Cranial nerves: II-XII intact, grossly non-focal.  Psychiatric: Alert and oriented, thought content appropriate, normal insight    Labs:   Recent Labs     10/12/22  1716 10/13/22  0636   WBC 4.3 3.8*   HGB 8.0* 8.3*   HCT 25.7* 25.8*   PLT 66* 78*     Recent Labs     10/12/22  1716 10/13/22  0636   * 135*   K 4.0 4.1   CL 95* 94*   CO2 29 28   BUN 20 23*   CREATININE 6.7* 8.1*   CALCIUM 9.0 8.9   PHOS  --  3.1     No results for input(s): AST, ALT, BILIDIR, BILITOT, ALKPHOS in the last 72 hours. No results for input(s): INR in the last 72 hours. No results for input(s): Chele Beets in the last 72 hours.     Studies:  No orders to display       Assessment/Plan:    Active Hospital Problems    Diagnosis Date Noted    Hemorrhage of arteriovenous fistula, initial encounter [facility] 10/12/2022     Priority: Medium    History of venous thromboembolism [Z86.718] 06/17/2022     Priority: Medium    Essential hypertension [I10] 03/02/2022    Hyperlipidemia [E78.5] 03/02/2022    Chronic combined systolic and diastolic CHF (congestive heart failure) ([facility] [address] [I50.42] 03/02/2022    [facility] [J44.9] 09/10/2020    ESRD on dialysis ([facility] [N18.6, Z99.2] 03/31/2020     PLAN:  - Vascular surgery consulted, no intervention required at this time  - Eliquis resumed  - Monitor for further bleeding    Recurrent bleeding episodes with pancytopenia  -Has not had follow-up with H/O, high risk for readmission with bleeding  -H/O consulted    ESRD  - Consult Nephro for HD needs while inpatient  - Continue other home meds (Coreg, Lasix, Gabapentin, levothyroxine, allopurinol, sevelamer, atorvastatin, inhalers)  - Trend CBC and RFP       DVT Prophylaxis: Eliquis  Diet: Diet NPO  Code Status: Full Code    PT/OT Eval Status:     Dispo - Inpatient    Janice Hill DO

## 2022-10-14 LAB
ABO/RH: NORMAL
ALBUMIN SERPL-MCNC: 3 G/DL (ref 3.4–5)
ANION GAP SERPL CALCULATED.3IONS-SCNC: 9 MMOL/L (ref 3–16)
ANTIBODY SCREEN: NORMAL
BASOPHILS ABSOLUTE: 0 K/UL (ref 0–0.2)
BASOPHILS RELATIVE PERCENT: 0.6 %
BLOOD BANK DISPENSE STATUS: NORMAL
BLOOD BANK PRODUCT CODE: NORMAL
BPU ID: NORMAL
BUN BLDV-MCNC: 28 MG/DL (ref 7–20)
CALCIUM SERPL-MCNC: 8.1 MG/DL (ref 8.3–10.6)
CHLORIDE BLD-SCNC: 95 MMOL/L (ref 99–110)
CO2: 29 MMOL/L (ref 21–32)
CREAT SERPL-MCNC: 9.6 MG/DL (ref 0.8–1.3)
DESCRIPTION BLOOD BANK: NORMAL
EOSINOPHILS ABSOLUTE: 0.1 K/UL (ref 0–0.6)
EOSINOPHILS RELATIVE PERCENT: 3.4 %
GFR AFRICAN AMERICAN: 7
GFR NON-AFRICAN AMERICAN: 6
GLUCOSE BLD-MCNC: 82 MG/DL (ref 70–99)
HCT VFR BLD CALC: 20.4 % (ref 40.5–52.5)
HCT VFR BLD CALC: 20.7 % (ref 40.5–52.5)
HCT VFR BLD CALC: 25.6 % (ref 40.5–52.5)
HEMOGLOBIN: 6.7 G/DL (ref 13.5–17.5)
HEMOGLOBIN: 6.8 G/DL (ref 13.5–17.5)
HEMOGLOBIN: 8.4 G/DL (ref 13.5–17.5)
IGA: 180 MG/DL (ref 70–400)
IGG: 827 MG/DL (ref 700–1600)
IGM: 44 MG/DL (ref 40–230)
KAPPA, FREE LIGHT CHAINS, SERUM: 127.8 MG/L (ref 3.3–19.4)
KAPPA/LAMBDA RATIO: 0.91 (ref 0.26–1.65)
KAPPA/LAMBDA TEST COMMENT: ABNORMAL
LAMBDA, FREE LIGHT CHAINS, SERUM: 140.51 MG/L (ref 5.71–26.3)
LYMPHOCYTES ABSOLUTE: 0.7 K/UL (ref 1–5.1)
LYMPHOCYTES RELATIVE PERCENT: 20.3 %
MAGNESIUM: 2.3 MG/DL (ref 1.8–2.4)
MCH RBC QN AUTO: 28.8 PG (ref 26–34)
MCHC RBC AUTO-ENTMCNC: 32.6 G/DL (ref 31–36)
MCV RBC AUTO: 88.3 FL (ref 80–100)
MONOCYTES ABSOLUTE: 0.4 K/UL (ref 0–1.3)
MONOCYTES RELATIVE PERCENT: 10.8 %
NEUTROPHILS ABSOLUTE: 2.3 K/UL (ref 1.7–7.7)
NEUTROPHILS RELATIVE PERCENT: 64.9 %
PDW BLD-RTO: 16.5 % (ref 12.4–15.4)
PHOSPHORUS: 3.2 MG/DL (ref 2.5–4.9)
PLATELET # BLD: 78 K/UL (ref 135–450)
PMV BLD AUTO: 8.5 FL (ref 5–10.5)
POTASSIUM SERPL-SCNC: 4.9 MMOL/L (ref 3.5–5.1)
RBC # BLD: 2.31 M/UL (ref 4.2–5.9)
REASON FOR REJECTION: NORMAL
REJECTED TEST: NORMAL
SODIUM BLD-SCNC: 133 MMOL/L (ref 136–145)
WBC # BLD: 3.5 K/UL (ref 4–11)

## 2022-10-14 PROCEDURE — 6370000000 HC RX 637 (ALT 250 FOR IP): Performed by: STUDENT IN AN ORGANIZED HEALTH CARE EDUCATION/TRAINING PROGRAM

## 2022-10-14 PROCEDURE — 85018 HEMOGLOBIN: CPT

## 2022-10-14 PROCEDURE — 94640 AIRWAY INHALATION TREATMENT: CPT

## 2022-10-14 PROCEDURE — 90935 HEMODIALYSIS ONE EVALUATION: CPT

## 2022-10-14 PROCEDURE — 1200000000 HC SEMI PRIVATE

## 2022-10-14 PROCEDURE — 86901 BLOOD TYPING SEROLOGIC RH(D): CPT

## 2022-10-14 PROCEDURE — 85025 COMPLETE CBC W/AUTO DIFF WBC: CPT

## 2022-10-14 PROCEDURE — 94761 N-INVAS EAR/PLS OXIMETRY MLT: CPT

## 2022-10-14 PROCEDURE — 86923 COMPATIBILITY TEST ELECTRIC: CPT

## 2022-10-14 PROCEDURE — 80069 RENAL FUNCTION PANEL: CPT

## 2022-10-14 PROCEDURE — 2580000003 HC RX 258: Performed by: STUDENT IN AN ORGANIZED HEALTH CARE EDUCATION/TRAINING PROGRAM

## 2022-10-14 PROCEDURE — 5A1D70Z PERFORMANCE OF URINARY FILTRATION, INTERMITTENT, LESS THAN 6 HOURS PER DAY: ICD-10-PCS | Performed by: INTERNAL MEDICINE

## 2022-10-14 PROCEDURE — 86850 RBC ANTIBODY SCREEN: CPT

## 2022-10-14 PROCEDURE — 6370000000 HC RX 637 (ALT 250 FOR IP): Performed by: SURGERY

## 2022-10-14 PROCEDURE — P9016 RBC LEUKOCYTES REDUCED: HCPCS

## 2022-10-14 PROCEDURE — 83735 ASSAY OF MAGNESIUM: CPT

## 2022-10-14 PROCEDURE — 2700000000 HC OXYGEN THERAPY PER DAY

## 2022-10-14 PROCEDURE — 86900 BLOOD TYPING SEROLOGIC ABO: CPT

## 2022-10-14 PROCEDURE — 85014 HEMATOCRIT: CPT

## 2022-10-14 PROCEDURE — 36415 COLL VENOUS BLD VENIPUNCTURE: CPT

## 2022-10-14 PROCEDURE — 36430 TRANSFUSION BLD/BLD COMPNT: CPT

## 2022-10-14 RX ORDER — SODIUM CHLORIDE 9 MG/ML
INJECTION, SOLUTION INTRAVENOUS PRN
Status: DISCONTINUED | OUTPATIENT
Start: 2022-10-14 | End: 2022-10-18 | Stop reason: HOSPADM

## 2022-10-14 RX ADMIN — SODIUM CHLORIDE, PRESERVATIVE FREE 5 ML: 5 INJECTION INTRAVENOUS at 20:59

## 2022-10-14 RX ADMIN — TIOTROPIUM BROMIDE INHALATION SPRAY 2 PUFF: 3.12 SPRAY, METERED RESPIRATORY (INHALATION) at 07:47

## 2022-10-14 RX ADMIN — FUROSEMIDE 20 MG: 20 TABLET ORAL at 12:00

## 2022-10-14 RX ADMIN — APIXABAN 2.5 MG: 2.5 TABLET, FILM COATED ORAL at 20:58

## 2022-10-14 RX ADMIN — CARVEDILOL 12.5 MG: 12.5 TABLET, FILM COATED ORAL at 12:00

## 2022-10-14 RX ADMIN — SEVELAMER CARBONATE 800 MG: 800 TABLET, FILM COATED ORAL at 18:08

## 2022-10-14 RX ADMIN — APIXABAN 2.5 MG: 2.5 TABLET, FILM COATED ORAL at 12:00

## 2022-10-14 RX ADMIN — ATORVASTATIN CALCIUM 80 MG: 80 TABLET, FILM COATED ORAL at 20:58

## 2022-10-14 RX ADMIN — SODIUM CHLORIDE, PRESERVATIVE FREE 10 ML: 5 INJECTION INTRAVENOUS at 12:00

## 2022-10-14 RX ADMIN — GABAPENTIN 100 MG: 100 CAPSULE ORAL at 20:58

## 2022-10-14 RX ADMIN — PANTOPRAZOLE SODIUM 40 MG: 40 TABLET, DELAYED RELEASE ORAL at 06:14

## 2022-10-14 RX ADMIN — CARVEDILOL 12.5 MG: 12.5 TABLET, FILM COATED ORAL at 20:58

## 2022-10-14 RX ADMIN — FERROUS SULFATE TAB 325 MG (65 MG ELEMENTAL FE) 325 MG: 325 (65 FE) TAB at 12:00

## 2022-10-14 RX ADMIN — SEVELAMER CARBONATE 800 MG: 800 TABLET, FILM COATED ORAL at 12:00

## 2022-10-14 NOTE — PLAN OF CARE
Problem: Safety - Adult  Goal: Free from fall injury  Outcome: Progressing  Flowsheets (Taken 10/13/2022 1525)  Free From Fall Injury: Instruct family/caregiver on patient safety  Note: Patient at risk for falls. Patient resting quietly in bed. Side rails up x 2/4. Bed locked in lowest position. Bed alarm on. Bedside table and call light within reach. Patient instructed to call for assistance. Patient verbalized understanding. Will continue to monitor. Problem: Discharge Planning  Goal: Discharge to home or other facility with appropriate resources  Outcome: Progressing  Discharge to home or other facility with appropriate resources:   Identify barriers to discharge with patient and caregiver   Identify discharge learning needs (meds, wound care, etc)  Note: Plans to return to [place] [address] when stable. Problem: Chronic Conditions and Co-morbidities  Goal: Patient's chronic conditions and co-morbidity symptoms are monitored and maintained or improved  Outcome: Progressing  Flowsheets (Taken 10/14/2022 1306)  Care Plan - Patient's Chronic Conditions and Co-Morbidity Symptoms are Monitored and Maintained or Improved: Monitor and assess patient's chronic conditions and comorbid symptoms for stability, deterioration, or improvement  Note: Pt has CKD, received scheduled dialysis today.

## 2022-10-14 NOTE — PROGRESS NOTES
Vascular Surgery   Daily Progress Note  Patient: Agusto Sosa      CC: Bleeding LUE brachiobasilic AV graft    SUBJECTIVE:   Patient rested well overnight. Denies any pain. Denies any lightheadedness or palpitations. Afebrile and HDS, currently on 3L NC. Denies complaints this AM.    ROS:   A 14 point review of systems was conducted, significant findings as noted above. All other systems negative. OBJECTIVE:    PHYSICAL EXAM:    Vitals:    10/13/22 1556 10/13/22 2045 10/13/22 2358 10/14/22 0300   BP: 114/66 102/64 104/62 101/64   Pulse: 70 65 65 68   Resp: 16 16 16 16   Temp: 97.2 °F (36.2 °C) 97.6 °F (36.4 °C) 97.8 °F (36.6 °C) 98 °F (36.7 °C)   TempSrc: Oral Oral Oral Oral   SpO2: 100% 100% 99% 98%   Weight:       Height:           General appearance: Alert, no acute distress, grooming appropriate  Eyes: No scleral icterus, EOM grossly intact  Neck: Trachea midline, no JVD, no lymphadenopathy, neck supple  Chest/Lungs: Normal effort with no accessory muscle use on 3L NC  Cardiovascular: RRR, well-perfused  Abdomen: Soft, non-tender, non-distended, no rebound, guarding, or rigidity. Skin: Warm and dry, no rashes  Extremities: No edema, no cyanosis. LUE forearm with palpable thrill, no hematoma; 2/4 L radial pulse palpable. Hemostatic. Nylon stitch present  Genitourinary: Deferred  Neuro: A&Ox3, no focal deficits, sensation intact    LABS:   Recent Labs     10/12/22  1716 10/13/22  0636   WBC 4.3 3.8*   HGB 8.0* 8.3*   HCT 25.7* 26.4*  25.8*   MCV 91.8 89.4   PLT 66* 78*          Recent Labs     10/12/22  1716 10/13/22  0636   * 135*   K 4.0 4.1   CL 95* 94*   CO2 29 28   PHOS  --  3.1   BUN 20 23*   CREATININE 6.7* 8.1*          Recent Labs     10/13/22  0636   AST 13*   ALT <5*   BILIDIR <0.2   BILITOT 0.4   ALKPHOS 204*      No results for input(s): LIPASE, AMYLASE in the last 72 hours.      Recent Labs     10/13/22  0636   PROT 6.0*      No results for input(s): CKTOTAL, CKMB, CKMBINDEX, TROPONINI in the last 72 hours. ASSESSMENT & PLAN:   This is a 61 y.o. male with Hx of of pulmonary embolism and pulmonary HTN on Eliquis, CVA, CHF, ESRD on HD via LUE AV graft, HLD, and HTN who presents to the ED for bleeding from his AV graft site a few hours after receiving hemodialysis on 10/12.     - Nylon suture can be removed at dialysis center  - [place] [id number] for hemodialysis  - Continue Eliquis  - Continue DVT prophylaxis  - Rest of medical management per primary      Bassam Castañeda DO, [address] [facility]  PGY1, General Surgery  10/14/22  6:07 AM  Ana  Pager: [phone]

## 2022-10-14 NOTE — PROGRESS NOTES
Treatment time  3.0 hours  Net UF: 900 ml     Pre weight:  kg  Post weight: kg    Crit Line Used: no   Access used: CVC    Access function: good with  ml/min     Medications or blood products given: none     Regular outpatient schedule: MWF     Summary of response to treatment: Patient tolerated treatment fair and without any complications. Patient remained stable throughout entire treatment and upon the  exit post report given     Report given to RN and copy of dialysis treatment record placed in chart, to be scanned into EMR.

## 2022-10-14 NOTE — PROGRESS NOTES
Hospitalist Progress Note      PCP: Elsa Arriaga MD    Date of Admission: 10/12/2022    Chief Complaint: Recurrent bleed     History Of Present Illness:    61 y.o. male with a significant past medical history of COPD with chronic hypoxic and hypercapnic respiratory failure, combined systolic and diastolic heart failure, on chronic anticoagulation for history of DVT, pancytopenia, end-stage renal disease on hemodialysis, who presented with bleeding from his AV graft site when his band-aid accidentally pulled off. Subjective:      Hgb lower this am, patient is getting blood transfusion. Has had no further bleeding. No new complaints today.      Medications:  Reviewed    Infusion Medications    sodium chloride      sodium chloride       Scheduled Medications    atorvastatin  80 mg Oral Nightly    apixaban  2.5 mg Oral BID    lidocaine-EPINEPHrine  20 mL IntraDERmal Once    sodium chloride flush  5-40 mL IntraVENous 2 times per day    ferrous sulfate  325 mg Oral Daily with breakfast    carvedilol  12.5 mg Oral BID    [START ON 10/15/2022] vitamin D  50,000 Units Oral Q7 Days    calcitRIOL  0.5 mcg Oral Q MWF    allopurinol  100 mg Oral Q MWF    furosemide  20 mg Oral Daily    gabapentin  100 mg Oral Nightly    levothyroxine  100 mcg Oral QAM AC    pantoprazole  40 mg Oral QAM AC    tiotropium  2 puff Inhalation Daily    sevelamer  800 mg Oral TID WC     PRN Meds: sodium chloride, sodium chloride flush, sodium chloride, ondansetron **OR** ondansetron, polyethylene glycol, acetaminophen **OR** acetaminophen, albuterol, sodium chloride      Intake/Output Summary (Last 24 hours) at 10/14/2022 1648  Last data filed at 10/14/2022 1602  Gross per 24 hour   Intake 385.42 ml   Output --   Net 385.42 ml       Exam:    /70   Pulse 72   Temp 98.2 °F (36.8 °C) (Oral)   Resp 18   Ht 5' 8\" (1.727 m)   Wt 132 lb 15 oz (60.3 kg)   SpO2 100%   BMI 20.21 kg/m²     General appearance: No apparent distress, appears stated age and cooperative. HEENT: Pupils equal, round, and reactive to light. Conjunctivae/corneas clear. Neck: Supple, with full range of motion. No jugular venous distention. Trachea midline. Respiratory:  Normal respiratory effort. Clear to auscultation, bilaterally without Rales/Wheezes/Rhonchi. Cardiovascular: Regular rate and rhythm with normal S1/S2 without murmurs, rubs or gallops. Abdomen: Soft, non-tender, non-distended with normal bowel sounds. Musculoskelatal: No clubbing, cyanosis or edema bilaterally. Skin: Skin color, texture, turgor normal.  No rashes or lesions. Neurologic:  Cranial nerves: II-XII intact, grossly non-focal.  Psychiatric: Alert and oriented, thought content appropriate, normal insight    Labs:   Recent Labs     10/12/22  1716 10/13/22  0636 10/14/22  0545 10/14/22  0649   WBC 4.3 3.8* 3.5*  --    HGB 8.0* 8.3* 6.7* 6.8*   HCT 25.7* 26.4*  25.8* 20.4* 20.7*   PLT 66* 78* 78*  --        Recent Labs     10/12/22  1716 10/13/22  0636 10/14/22  0545   * 135* 133*   K 4.0 4.1 4.9   CL 95* 94* 95*   CO2 29 28 29   BUN 20 23* 28*   CREATININE 6.7* 8.1* 9.6*   CALCIUM 9.0 8.9 8.1*   PHOS  --  3.1 3.2       Recent Labs     10/13/22  0636   AST 13*   ALT <5*   BILIDIR <0.2   BILITOT 0.4   ALKPHOS 204*     No results for input(s): INR in the last 72 hours. No results for input(s): Lattie Conrado in the last 72 hours.     Studies:  No orders to display       Assessment/Plan:    Active Hospital Problems    Diagnosis Date Noted    Hemorrhage of arteriovenous fistula, initial encounter [facility]) Alexandra Tadeo 10/12/2022     Priority: Medium    History of venous thromboembolism [Z86.718] 06/17/2022     Priority: Medium    Essential hypertension [I10] 03/02/2022    Hyperlipidemia [E78.5] 03/02/2022    Chronic combined systolic and diastolic CHF (congestive heart failure) ([facility] [I50.42] 03/02/2022    COPD (chronic obstructive pulmonary disease) ([facility] [J44.9] 09/10/2020    ESRD on dialysis [facility]) [N18.6, Z99.2] 03/31/2020     PLAN:  - Vascular surgery consulted, no intervention required at this time  - Eliquis resumed by vascular as no further bleeding  - Stitch can be removed at future dialysis  - Monitor for further bleeding    Recurrent bleeding episodes with pancytopenia  -Has not had follow-up with H/O, high risk for readmission with bleeding  -H/O consulted, appreciate recs    ESRD  - Consult Nephro for HD needs while inpatient  - Continue other home meds (Coreg, Lasix, Gabapentin, levothyroxine, allopurinol, sevelamer, atorvastatin, inhalers)  - Trend CBC and RFP     DVT Prophylaxis: Eliquis  Diet: ADULT DIET; Regular; Low Sodium (2 gm); Low Potassium (Less than 3000 mg/day); Low Phosphorus (Less than 1000 mg); 60 to 80 gm  Code Status: Full Code    PT/OT Eval Status:     Dispo - Inpatient, transfusion today, likely dc tomorrow if no further bleeding.     Janice Hill DO

## 2022-10-14 NOTE — CARE COORDINATION
CM following. Pt is from [address], will return at NC. CM spoke with Bing Luciano in admissions [phone] to confirm bed hold. CM will fax HD flow sheets at NC. CM will continue to follow for DC needs and recs.         Electronically signed by DINESH Chakraborty, LSW on 10/14/2022 at 4:28 PM No

## 2022-10-14 NOTE — PROGRESS NOTES
Nephrology Progress Note  [phone]   [place] [facility]. [employer]        Reason for Consult:  ESRD     HISTORY OF PRESENT ILLNESS:                          This is a patient with significant past medical history of ESRD on dialsis at [facility]  who presents with excessive bleeding from Left UE loop AVG . Last week a fistulogram showed no stenosis. He presents now with bleeding from AVG  once again after HD at [facility], Vascular surgery saw him and achieved hemostasis. They are evaluating his AVG again . Of note he is on Eliquis . This has been HELD . Today he feels fine, Pt is seen while on HD   Orders reviewed with RN   No issues with cannulation   Will see how he does post HD        Review of Systems:  a comprehensive Review of systems was negative except as noted in HPI     PHYSICAL EXAM:    Vitals:    /64   Pulse 63   Temp 97 °F (36.1 °C)   Resp 18   Ht 5' 8\" (1.727 m)   Wt 132 lb 15 oz (60.3 kg)   SpO2 100%   BMI 20.21 kg/m²   No intake/output data recorded. No intake/output data recorded. Physical Exam:  Gen: Resting in bed, NAD. HEENT: MMM, OP clear. CV: RRR no m/r/g. No S3.  Lungs: Good respiratory effort, clear air entry   Abd: S/NT +BS  Ext: No edema, no cyanosis  Skin: Warm. No rashes appreciated.   AVG cannulated     DATA:    CBC:   Lab Results   Component Value Date/Time    WBC 3.5 10/14/2022 05:45 AM    RBC 2.31 10/14/2022 05:45 AM    HGB 6.8 10/14/2022 06:49 AM    HCT 20.7 10/14/2022 06:49 AM    MCV 88.3 10/14/2022 05:45 AM    MCH 28.8 10/14/2022 05:45 AM    MCHC 32.6 10/14/2022 05:45 AM    RDW 16.5 10/14/2022 05:45 AM    PLT 78 10/14/2022 05:45 AM    MPV 8.5 10/14/2022 05:45 AM     BMP:    Lab Results   Component Value Date/Time     10/14/2022 05:45 AM    K 4.9 10/14/2022 05:45 AM    K 4.0 10/12/2022 05:16 PM    CL 95 10/14/2022 05:45 AM    CO2 29 10/14/2022 05:45 AM    BUN 28 10/14/2022 05:45 AM    LABALBU 3.0 10/14/2022 05:45 AM    CREATININE 9.6 10/14/2022 05:45 AM    CALCIUM 8.1 10/14/2022 05:45 AM    GFRAA 7 10/14/2022 05:45 AM    LABGLOM 6 10/14/2022 05:45 AM    GLUCOSE 82 10/14/2022 05:45 AM       IMPRESSION/RECOMMENDATIONS:      ESRD will arrange dialysis on schedule, orders reviewed with dialysis  RN   Transfuse as needed for HgB <7   Anemia will give TEODORA as needed   Prolonged bleeding  from  AVG suspect largely related to Eliquis   Appreciate Vascular surgery help   On renvela for hyperphosphatemia     Thank you for allowing me to participate in the care of this patient. I will continue to follow along. Please call with questions.     Neha Junior MD

## 2022-10-15 LAB
ERYTHROPOIETIN: 503 MU/ML (ref 4–27)
HAPTOGLOBIN: 97 MG/DL (ref 30–200)
IRON SATURATION: 27 % (ref 20–50)
IRON: 54 UG/DL (ref 59–158)
TOTAL IRON BINDING CAPACITY: 200 UG/DL (ref 260–445)

## 2022-10-15 PROCEDURE — 1200000000 HC SEMI PRIVATE

## 2022-10-15 PROCEDURE — 6370000000 HC RX 637 (ALT 250 FOR IP): Performed by: SURGERY

## 2022-10-15 PROCEDURE — 6370000000 HC RX 637 (ALT 250 FOR IP): Performed by: STUDENT IN AN ORGANIZED HEALTH CARE EDUCATION/TRAINING PROGRAM

## 2022-10-15 PROCEDURE — 2580000003 HC RX 258: Performed by: STUDENT IN AN ORGANIZED HEALTH CARE EDUCATION/TRAINING PROGRAM

## 2022-10-15 PROCEDURE — 94761 N-INVAS EAR/PLS OXIMETRY MLT: CPT

## 2022-10-15 PROCEDURE — 2700000000 HC OXYGEN THERAPY PER DAY

## 2022-10-15 PROCEDURE — 94640 AIRWAY INHALATION TREATMENT: CPT

## 2022-10-15 RX ORDER — ECHINACEA PURPUREA EXTRACT 125 MG
TABLET ORAL
Qty: 1 EACH | Refills: 3 | Status: SHIPPED
Start: 2022-10-15

## 2022-10-15 RX ADMIN — ALLOPURINOL 100 MG: 100 TABLET ORAL at 01:30

## 2022-10-15 RX ADMIN — FUROSEMIDE 20 MG: 20 TABLET ORAL at 08:46

## 2022-10-15 RX ADMIN — ATORVASTATIN CALCIUM 80 MG: 80 TABLET, FILM COATED ORAL at 21:06

## 2022-10-15 RX ADMIN — APIXABAN 2.5 MG: 2.5 TABLET, FILM COATED ORAL at 21:05

## 2022-10-15 RX ADMIN — SEVELAMER CARBONATE 800 MG: 800 TABLET, FILM COATED ORAL at 17:30

## 2022-10-15 RX ADMIN — SEVELAMER CARBONATE 800 MG: 800 TABLET, FILM COATED ORAL at 08:46

## 2022-10-15 RX ADMIN — TIOTROPIUM BROMIDE INHALATION SPRAY 2 PUFF: 3.12 SPRAY, METERED RESPIRATORY (INHALATION) at 10:51

## 2022-10-15 RX ADMIN — CARVEDILOL 12.5 MG: 12.5 TABLET, FILM COATED ORAL at 21:05

## 2022-10-15 RX ADMIN — SEVELAMER CARBONATE 800 MG: 800 TABLET, FILM COATED ORAL at 12:29

## 2022-10-15 RX ADMIN — SODIUM CHLORIDE, PRESERVATIVE FREE 5 ML: 5 INJECTION INTRAVENOUS at 21:06

## 2022-10-15 RX ADMIN — FERROUS SULFATE TAB 325 MG (65 MG ELEMENTAL FE) 325 MG: 325 (65 FE) TAB at 08:46

## 2022-10-15 RX ADMIN — APIXABAN 2.5 MG: 2.5 TABLET, FILM COATED ORAL at 08:46

## 2022-10-15 RX ADMIN — SODIUM CHLORIDE, PRESERVATIVE FREE 10 ML: 5 INJECTION INTRAVENOUS at 08:49

## 2022-10-15 RX ADMIN — ERGOCALCIFEROL 50000 UNITS: 1.25 CAPSULE ORAL at 08:46

## 2022-10-15 RX ADMIN — CALCITRIOL CAPSULES 0.25 MCG 0.5 MCG: 0.25 CAPSULE ORAL at 01:30

## 2022-10-15 RX ADMIN — PANTOPRAZOLE SODIUM 40 MG: 40 TABLET, DELAYED RELEASE ORAL at 06:04

## 2022-10-15 RX ADMIN — LEVOTHYROXINE SODIUM 100 MCG: 100 TABLET ORAL at 06:04

## 2022-10-15 RX ADMIN — GABAPENTIN 100 MG: 100 CAPSULE ORAL at 21:06

## 2022-10-15 RX ADMIN — CARVEDILOL 12.5 MG: 12.5 TABLET, FILM COATED ORAL at 08:46

## 2022-10-15 ASSESSMENT — PAIN SCALES - GENERAL
PAINLEVEL_OUTOF10: 0
PAINLEVEL_OUTOF10: 0

## 2022-10-15 NOTE — DISCHARGE INSTR - DIET
Good nutrition is important when healing from an illness, injury, or surgery. Follow any nutrition recommendations given to you during your hospital stay. If you were given an oral nutrition supplement while in the hospital, continue to take this supplement at home. You can take it with meals, in-between meals, and/or before bedtime. These supplements can be purchased at most local grocery stores, pharmacies, and chain [employer]-stores. If you have any questions about your diet or nutrition, call the hospital and ask for the dietitian.     2gm Na2, low potassium (less than 3000gm/day), low phosphorus (less than 1000mg)

## 2022-10-15 NOTE — PLAN OF CARE
Problem: Safety - Adult  Goal: Free from fall injury  Outcome: Progressing   Remains free from falls

## 2022-10-15 NOTE — PROGRESS NOTES
Nephrology Progress Note  [phone]  [phone]   [place] [facility]. com        Reason for Consult:  ESRD     HISTORY OF PRESENT ILLNESS:                          This is a patient with significant past medical history of ESRD on dialsis at [facility]  who presents with excessive bleeding from Left UE loop AVG . Last week a fistulogram showed no stenosis. He presents now with bleeding from AVG  once again after HD at [facility], Vascular surgery saw him and achieved hemostasis. They are evaluating his AVG again . Of note he is on Eliquis . This has been HELD . Doing well  HD uneventful yesterday      PHYSICAL EXAM:    Vitals:    /74   Pulse 79   Temp 98.2 °F (36.8 °C) (Oral)   Resp 18   Ht 5' 8\" (1.727 m)   Wt 132 lb 15 oz (60.3 kg)   SpO2 99%   BMI 20.21 kg/m²   I/O last 3 completed shifts: In: 385.4 [Blood:385.4]  Out: -   No intake/output data recorded. Physical Exam:  Gen: Resting in bed, NAD. HEENT: MMM, OP clear. CV: RRR no m/r/g. No S3.  Lungs: Good respiratory effort, clear air entry   Abd: S/NT +BS  Ext: No edema, no cyanosis  Skin: Warm. No rashes appreciated. AVG left upper arm.  Soft pulse pressure     DATA:    CBC:   Lab Results   Component Value Date/Time    WBC 3.5 10/14/2022 05:45 AM    RBC 2.31 10/14/2022 05:45 AM    HGB 8.4 10/14/2022 06:39 PM    HCT 25.6 10/14/2022 06:39 PM    MCV 88.3 10/14/2022 05:45 AM    MCH 28.8 10/14/2022 05:45 AM    MCHC 32.6 10/14/2022 05:45 AM    RDW 16.5 10/14/2022 05:45 AM    PLT 78 10/14/2022 05:45 AM    MPV 8.5 10/14/2022 05:45 AM     BMP:    Lab Results   Component Value Date/Time     10/14/2022 05:45 AM    K 4.9 10/14/2022 05:45 AM    K 4.0 10/12/2022 05:16 PM    CL 95 10/14/2022 05:45 AM    CO2 29 10/14/2022 05:45 AM    BUN 28 10/14/2022 05:45 AM    LABALBU 3.0 10/14/2022 05:45 AM    CREATININE 9.6 10/14/2022 05:45 AM    CALCIUM 8.1 10/14/2022 05:45 AM    GFRAA 7 10/14/2022 05:45 AM    LABGLOM 6 10/14/2022 05:45 AM    GLUCOSE 82 10/14/2022 05:45 AM       IMPRESSION/RECOMMENDATIONS:      ESRD will arrange dialysis on schedule   Anemia will give TEODORA as needed, was transfused   Prolonged bleeding  from  AVG suspect largely related to Eliquis. No evidence of venous outflow obstruction of angiogram    Appreciate Vascular surgery help   On renvela for hyperphosphatemia     Ok for d/c from my standpoint     Thank you for allowing me to participate in the care of this patient. I will continue to follow along. Please call with questions.     Ciara Morataya MD

## 2022-10-15 NOTE — DISCHARGE INSTR - COC
Continuity of Care Form    Patient Name: Temitope Banks   :  1955  MRN:  [id number]    Admit date:  10/12/2022  Discharge date:  10/18/22    Code Status Order: Full Code   Advance Directives:     Admitting Physician:  Christina Guadarrama MD  PCP: Gail Zapata MD    Discharging Nurse: April, [facility] Unit/Room#: 2633/4999-89  Discharging Unit Phone Number: [phone]    Emergency Contact:   Extended Emergency Contact Information  Primary Emergency Contact: [address], [place] Phone: [phone]  Relation: Child   needed?  No    Past Surgical History:  Past Surgical History:   Procedure Laterality Date    COLONOSCOPY      DIALYSIS FISTULA CREATION Left        Immunization History:   Immunization History   Administered Date(s) Administered    COVID-19, PFIZER GRAY top, DO NOT Dilute, (age 15 y+), IM, 27 mcg/0.3 mL 2022       Active Problems:  Patient Active Problem List   Diagnosis Code    HCAP (healthcare-associated pneumonia) J18.9    COPD exacerbation ([facility] J44.1    ESRD on dialysis ([facility] N18.6, Z99.2    COPD (chronic obstructive pulmonary disease) ([facility]) J44.9    Acute on chronic respiratory failure with hypoxia and hypercapnia ([facility]) J96.21, J96.22    Chest pain R07.9    Hypokalemia E87.6    V-tach I47.20    Essential hypertension I10    Hyperlipidemia E78.5    Chronic combined systolic and diastolic CHF (congestive heart failure) ([facility]) I50.42    SOB (shortness of breath) R06.02    AV fistula thrombosis, initial encounter ([facility] X04.989U    History of venous thromboembolism Z86.718    Epistaxis R04.0    Pancytopenia ([facility]) D61.818    Problem with dialysis access [facility]) T82.898A    Acute cerebrovascular accident (CVA) ([facility] I63.9    Chronic respiratory failure with hypoxia and hypercapnia ([facility]) J96.11, J96.12    Dialysis AV fistula malfunction, initial encounter ([facility] T82.590A    Hemorrhage of arteriovenous fistula, initial encounter ([facility] C56.742Y Isolation/Infection:   Isolation            No Isolation          Patient Infection Status       Infection Onset Added Last Indicated Last Indicated By Review Planned Expiration Resolved Resolved By    None active    Resolved    C-diff Rule Out 03/02/22 03/02/22 03/03/22 Clostridium Difficile Toxin/Antigen (Ordered)   03/03/22 Rule-Out Test Resulted    COVID-19 (Rule Out) 09/21/21 09/21/21 09/21/21 COVID-19, Rapid (Ordered)   09/21/21 Rule-Out Test Resulted    COVID-19 (Rule Out)  04/02/20 04/02/20 Karla Estrada RN   04/06/20 Karla Estrada RN    COVID-19 (Rule Out) 03/31/20 03/31/20 03/31/20 COVID-19 (Ordered)   03/31/20 Rule-Out Test Resulted            Nurse Assessment:  Last Vital Signs: /63   Pulse 67   Temp 98 °F (36.7 °C) (Oral)   Resp 17   Ht 5' 8\" (1.727 m)   Wt 127 lb 6.8 oz (57.8 kg)   SpO2 99%   BMI 19.37 kg/m²     Last documented pain score (0-10 scale):    Last Weight:   Wt Readings from Last 1 Encounters:   10/15/22 127 lb 6.8 oz (57.8 kg)     Mental Status:  oriented and alert    IV Access:  - None    Nursing Mobility/ADLs:  Walking   Assisted  Transfer  Assisted  Bathing  Assisted  Dressing  Assisted  Toileting  Assisted  Feeding  Assisted  Med Admin  Assisted  Med Delivery   whole    Wound Care Documentation and Therapy:        Elimination:  Continence: Bowel: Yes  Bladder: Yes  Urinary Catheter: None   Colostomy/Ileostomy/Ileal Conduit: No       Date of Last BM: 10/15/2022    Intake/Output Summary (Last 24 hours) at 10/15/2022 1012  Last data filed at 10/15/2022 0905  Gross per 24 hour   Intake 505.42 ml   Output --   Net 505.42 ml     I/O last 3 completed shifts: In: 385.4 [Blood:385.4]  Out: -     Safety Concerns: At Risk for Falls    Impairments/Disabilities:      None    Nutrition Therapy:  Current Nutrition Therapy:   - Oral Diet:  Renal and Low Sodium (2gm)    Routes of Feeding: Oral  Liquids:  Thin Liquids  Daily Fluid Restriction: no  Last Modified Barium Swallow with Video (Video Swallowing Test): not done    Treatments at the Time of Hospital Discharge:   Respiratory Treatments:   Oxygen Therapy:  is on oxygen at 3 L/min per nasal cannula. Ventilator:    - No ventilator support    Heart Failure Instructions for Daily Management  Patient was treated for chronic systolic heart failure. he  will require the following:    Please weigh daily on the same scale and approximately the same time of day. Report weight gain of 3 pounds/day or 5 pounds/week to : facility MD and [facility] [phone]. Please use hospital discharge weight as baseline reference. Please monitor for signs and symptoms of and report to MD:  Worsening Heart Failure: sudden weight gain, shortness of breath, lower extremity or general edema/swelling, abdominal bloating/swelling, inability to lie flat, intolerance to usual activity, or cough (especially at night). Report these finding even if no increase in weight. Dehydration:  having difficulty or a decrease in urination, dizziness, worsening fatigue, or new onset/worsening of generalized weakness. Please continue a LOW SODIUM diet and LIMIT fluid intake to 48 - 64 ounces ( 1.5 - 2 liters) per day. Call [facility]i Luque [phone] and facility MD with any questions or concerns. Please continue heart failure education to patient and family/support system. See After Visit Summary for hospital follow up appointment details. Consider spiritual care referral for support and/or completion of advance directives . Consider: having the facility MD complete required 7 day follow up, [place] [address] telehealth program if patient agreeable and able to participate, and palliative care consult for ongoing goals of care, end of life, and/or chronic disease management discussions.         Rehab Therapies: Physical Therapy and Occupational Therapy  Weight Bearing Status/Restrictions: No weight bearing restrictions  Other Medical Equipment (for information only, NOT a DME order):  hospital bed  Other Treatments:     Patient's personal belongings (please select all that are sent with patient):  None    RN SIGNATURE:  Electronically signed by Nadiya Love RN on 10/15/22 at 11:31 AM EDT    CASE MANAGEMENT/SOCIAL WORK SECTION    Inpatient Status Date: 10/12/22    Readmission Risk Assessment Score:  Readmission Risk              Risk of Unplanned Readmission:  51           Discharging to Facility/ Agency   Name:   Address:  Phone:  Fax:    Dialysis Facility (if applicable)   Name:  Address:  Dialysis Schedule:  Phone:  Fax:    / signature: {Esignature:[id number]}      PHYSICIAN SECTION    Prognosis: Good    Condition at Discharge: Stable    Rehab Potential (if transferring to Rehab): Good    Recommended Labs or Other Treatments After Discharge:     AF GRAFT INSTRUCTIONS:  - Recommend no accessing the graft in areas where the skin is thin  - If the point of access bleeds please apply pressure for several minutes until hemostasis is achieved  - The patient is on anticoagulation and thus pressure should be held of longer than usual.     Saline spray for nose daily  Wean O2 as tolerated  Follow-up with Hematology Oncology for pancytopenia  Confirm med/rec    CBC in 5 days and then as needed if stable and no further bleed. Physician Certification: I certify the above information and transfer of Maylin Croft  is necessary for the continuing treatment of the diagnosis listed and that he requires Intermediate Nursing Care for greater 30 days.      Update Admission H&P: No change in H&P    PHYSICIAN SIGNATURE:  Electronically signed by Sony Meyers MD on 10/18/22 at 10:14 AM EDT

## 2022-10-15 NOTE — DISCHARGE SUMMARY
Hospital Medicine Discharge Summary    Patient: Levon Guerrero     Gender: male  : 1955   Age: 61 y.o. MRN: [id number]    Admitting Physician: Carter Nissen, MD  Discharge Physician: Duc Garcia DO    Code Status: Full Code     Admit Date: 10/12/2022   Discharge Date:  10/15/2022     Disposition:  LTC    Discharge Diagnoses: Active Hospital Problems    Diagnosis Date Noted    Hemorrhage of arteriovenous fistula, initial encounter [facility]) Jun Obrien 10/12/2022     Priority: Medium    History of venous thromboembolism [Z86.718] 2022     Priority: Medium    Essential hypertension [I10] 2022    Hyperlipidemia [E78.5] 2022    Chronic combined systolic and diastolic CHF (congestive heart failure) ([facility] [I50.42] 2022    COPD (chronic obstructive pulmonary disease) ([facility] [J44.9] 09/10/2020    [facility] [N18.6, Z99.2] 2020       Outpatient to do list:     1) Follow-up appointments:    Primary care provider  H/O    Condition at Discharge: [address] Course:   Patient was admitted for evaluation of recurrent bleeding from the AV graft site after patient removed his sweater and a few hours after dialysis. Eliquis briefly held. Vascular was consulted. No need for repeat fistulogram. Patient has nylon suture that can be removed at dialysis center. Eliquis home dose resumed by vascular (hx of recurrent DVT/PE). H/O was consulted as patient has recurrent DVT but also has pancytopenia of unknown cause which may be contributory. Platelets < 50 on a prior admission. On this admission platelets were stable. He did receive one unit of pRBCs. Patient was monitored over 2 nights during the above assessment but had no further bleeding while on Eliquis. Recommended to follow-up with H/O on discharge, also recommend outpatient CBC monitoring.      Additional findings or notes to primary provider:  None at this time    Discharge Medications:   Current Discharge Medication List        Current Discharge Medication List        CONTINUE these medications which have CHANGED    Details   sodium chloride (OCEAN) 0.65 % nasal spray 1-2 sprays both nostrils at least daily and as needed to prevent dry mucosa. Qty: 1 each, Refills: 3           Current Discharge Medication List        CONTINUE these medications which have NOT CHANGED    Details   levothyroxine (SYNTHROID) 100 MCG tablet Take 100 mcg by mouth Daily      gabapentin (NEURONTIN) 100 MG capsule Take 1 capsule by mouth at bedtime for 30 days.   Qty: 90 capsule, Refills: 0      diclofenac sodium (VOLTAREN) 1 % GEL Apply 2 g topically 2 times daily  Qty: 50 g, Refills: 0      calcitRIOL (ROCALTROL) 0.25 MCG capsule Take 0.5 mcg by mouth every other day [facility]      sevelamer (RENVELA) 800 MG tablet Take 1 tablet by mouth 3 times daily (with meals)      apixaban (ELIQUIS) 2.5 MG TABS tablet Take 1 tablet by mouth 2 times daily  Qty: 60 tablet, Refills: 1      ferrous sulfate (FE TABS) 325 (65 Fe) MG EC tablet Take 1 tablet by mouth every other day  Qty: 15 tablet, Refills: 1      atorvastatin (LIPITOR) 80 MG tablet Take 80 mg by mouth daily      calcium carb-cholecalciferol 600-200 MG-UNIT TABS tablet Take 1 tablet by mouth daily      furosemide (LASIX) 20 MG tablet Take 20 mg by mouth daily      Umeclidinium Bromide (INCRUSE ELLIPTA) 62.5 MCG/INH AEPB Inhale 1 puff into the lungs daily      vitamin D (ERGOCALCIFEROL) 1.25 MG (68439 UT) CAPS capsule Take 50,000 Units by mouth once a week Saturdays      omeprazole (PRILOSEC) 20 MG delayed release capsule Take 20 mg by mouth daily      carvedilol (COREG) 12.5 MG tablet Take 12.5 mg by mouth 2 times daily Unsure of dose       albuterol sulfate HFA (PROVENTIL;VENTOLIN;PROAIR) 108 (90 Base) MCG/ACT inhaler Inhale 2 puffs into the lungs every 6 hours as needed for Wheezing 90mcg/actuation      allopurinol (ZYLOPRIM) 100 MG tablet Take 100 mg by mouth Daily on MWF           Current Discharge Medication List        STOP taking these medications       ferrous sulfate (IRON 325) 325 (65 Fe) MG tablet Comments:   Reason for Stopping:         pseudoephedrine (SUDAFED) 30 MG tablet Comments:   Reason for Stopping:               Discharge ROS:  A complete review of systems was asked and negative. Discharge Exam:    /63   Pulse 67   Temp 98 °F (36.7 °C) (Oral)   Resp 17   Ht 5' 8\" (1.727 m)   Wt 127 lb 6.8 oz (57.8 kg)   SpO2 99%   BMI 19.37 kg/m²   General appearance:  NAD  HEENT:   Normal cephalic, atraumatic, moist mucous membranes, no oropharyngeal erythema or exudate  Neck: Supple, trachea midline, no anterior cervical or SC LAD  Heart[de-identified] Normal s1/s2, RRR, no murmurs, gallops, or rubs. No leg edema  Lungs:  Clear to auscultation bilaterally, no wheeze, rales or rhonchi, no use of accessory musclesNormal respiratory effort. Clear to auscultation, bilaterally without Rales/Wheezes/Rhonchi. Abdomen: Soft, non-tender, non-distended, bowel sounds present, no masses  Musculoskeletal:  No clubbing, no cyanosis, or edema  Skin: No lesion or masses  Neurologic:  Neurovascularly intact without any focal sensory/motor deficits. Cranial nerves: II-XII intact, grossly non-focal.  Psychiatric:  Alert and oriented, thought content appropriate    Labs:  For convenience and continuity at follow-up the following most recent labs are provided:    Lab Results   Component Value Date/Time    WBC 3.5 10/14/2022 05:45 AM    HGB 8.4 10/14/2022 06:39 PM    HCT 25.6 10/14/2022 06:39 PM    MCV 88.3 10/14/2022 05:45 AM    PLT 78 10/14/2022 05:45 AM     10/14/2022 05:45 AM    K 4.9 10/14/2022 05:45 AM    K 4.0 10/12/2022 05:16 PM    CL 95 10/14/2022 05:45 AM    CO2 29 10/14/2022 05:45 AM    BUN 28 10/14/2022 05:45 AM    CREATININE 9.6 10/14/2022 05:45 AM    CALCIUM 8.1 10/14/2022 05:45 AM    PHOS 3.2 10/14/2022 05:45 AM    ALKPHOS 204 10/13/2022 06:36 AM    ALT <5 10/13/2022 06:36 AM    AST 13 10/13/2022 06:36 AM BILITOT 0.4 10/13/2022 06:36 AM    BILIDIR <0.2 10/13/2022 06:36 AM    LABALBU 3.0 10/14/2022 05:45 AM    LDLCALC 30 09/14/2022 07:00 AM    TRIG 45 09/14/2022 07:00 AM     Lab Results   Component Value Date    INR 1.38 (H) 10/06/2022    INR 1.52 (H) 09/13/2022    INR 1.48 (H) 06/15/2022       Radiology:  IR FISTULAGRAM    Result Date: 10/7/2022  IR FISTULAGRAM Indication: Prolonged bleeding postdialysis. : Dr. Kathie Rowell Technique: Individualized dose optimization technique was used in order to meet ALARA standards for radiation dose reduction. In addition to vendor specific dose reduction algorithms, the dose reduction techniques vary based on the specific scanner utilized but frequently include automated exposure control, adjustment of the mA and/or kV according to patient size, and use of iterative reconstruction technique. Procedure: The patient was advised of the benefits, risks, and alternatives of the procedure and informed consent was obtained. A timeout was performed with verification of the patient's name, MRN, and procedure to be performed. The patient was positioned in the supine position on the angiographic table. The left arm was prepped with 2% chlorhexidine and draped with a large sterile sheet. All operators wore hat, mask, gown, and sterile gloves and utilized appropriate hand hygiene. Moderate sedation was performed by the physician including the presence of an independent trained observers that assisted in monitoring the patient's level of consciousness and physiological status. Following the administration of Midazolam and Fentanyl the physician spent continuous face-to-face time with the patient. Sedation start time: 1106 Sedation end time: 1121 Lidocaine was used for local anesthesia. Ultrasound visualization of the fistula demonstrates sonographic patency and compressibility. The left upper extremity AV fistula measures 6 mm.  A permanent sonographic image was saved to the electronic medical record. Under ultrasound visualization, micropuncture access in the venous direction of the fistula. Access was then upsized to accept a micropuncture sheath. Digital subtraction venography was performed of the fistula demonstrating wide sonographic patency. There is no stenosis within the fistula. The venous anastomosis is widely patent. Central venogram demonstrates wide patency of the left subclavian vein, left brachiocephalic vein and superior vena cava. Repeat sonography of the fistula was performed at an oblique angle demonstrating patency of the venous anastomosis. Subsequently, reflux venography was performed with compression of the venous outflow demonstrating wide patency of the arterial anastomosis. The micropuncture sheath was removed after purse string suture with 3-0 Vicryl placed. A strong thrill is appreciated post procedure. The patient tolerated the procedure well and was returned to the recovery area in stable condition. Estimated blood loss: Less than 5 mL. Complications: None. Fluoroscopy time: 0.2 minutes minutes Total fluoroscopic images saved: 10     Impression: Technically successful left upper extremity arteriovenous fistulogram. There is no significant stenosis. Specifically, no stenosis of the venous outflow or central venous stenosis to account for the patient's prolonged bleeding post dialysis. The patient was seen and examined on day of discharge and this discharge summary is in conjunction with any daily progress note from day of discharge. Time Spent on discharge is more than 30 minutes in the examination, evaluation, counseling and review of medications and discharge plan. Maday Rios DO   10/15/2022      Thank you Gui Luque MD for the opportunity to be involved in this patient's care. If you have any questions or concerns please feel free to contact me at [facility].

## 2022-10-15 NOTE — CARE COORDINATION
OSMIN spoke with Tiff Gaimno in admissions at [facility]. Mr. Kalie Avina is a LTC resident at [facility]. She has to approve his return with [facility] which provides onsite HD at the facility. No one will be available until Monday. DC delayed until Monday.     Electronically signed by JULIAN Gonzales RN-[facility]  Case Management  [phone]

## 2022-10-15 NOTE — PLAN OF CARE
Problem: Safety - Adult  Goal: Free from fall injury  10/15/2022 1105 by Barrie Parikh RN  Outcome: Progressing     Problem: Discharge Planning  Goal: Discharge to home or other facility with appropriate resources  10/15/2022 1105 by Barrie Parikh RN  Outcome: Progressing     Problem: Chronic Conditions and Co-morbidities  Goal: Patient's chronic conditions and co-morbidity symptoms are monitored and maintained or improved  10/15/2022 1105 by Barrie Parikh RN  Outcome: Progressing

## 2022-10-15 NOTE — PROGRESS NOTES
RN notified patient of discharge plan per CM, patient not happy. RN attempted to explain, patient would like to speak with CM.  RN notified CM of request.

## 2022-10-16 LAB
BASOPHILS ABSOLUTE: 0 K/UL (ref 0–0.2)
BASOPHILS RELATIVE PERCENT: 0.2 %
EOSINOPHILS ABSOLUTE: 0.1 K/UL (ref 0–0.6)
EOSINOPHILS RELATIVE PERCENT: 3.2 %
HCT VFR BLD CALC: 24.6 % (ref 40.5–52.5)
HEMOGLOBIN: 7.8 G/DL (ref 13.5–17.5)
LYMPHOCYTES ABSOLUTE: 0.6 K/UL (ref 1–5.1)
LYMPHOCYTES RELATIVE PERCENT: 18.4 %
MCH RBC QN AUTO: 28.2 PG (ref 26–34)
MCHC RBC AUTO-ENTMCNC: 31.8 G/DL (ref 31–36)
MCV RBC AUTO: 88.8 FL (ref 80–100)
MONOCYTES ABSOLUTE: 0.3 K/UL (ref 0–1.3)
MONOCYTES RELATIVE PERCENT: 8.8 %
NEUTROPHILS ABSOLUTE: 2.2 K/UL (ref 1.7–7.7)
NEUTROPHILS RELATIVE PERCENT: 69.4 %
PDW BLD-RTO: 16.9 % (ref 12.4–15.4)
PLATELET # BLD: 82 K/UL (ref 135–450)
PMV BLD AUTO: 8 FL (ref 5–10.5)
RBC # BLD: 2.77 M/UL (ref 4.2–5.9)
WBC # BLD: 3.1 K/UL (ref 4–11)

## 2022-10-16 PROCEDURE — 6370000000 HC RX 637 (ALT 250 FOR IP): Performed by: SURGERY

## 2022-10-16 PROCEDURE — 6370000000 HC RX 637 (ALT 250 FOR IP): Performed by: STUDENT IN AN ORGANIZED HEALTH CARE EDUCATION/TRAINING PROGRAM

## 2022-10-16 PROCEDURE — 36415 COLL VENOUS BLD VENIPUNCTURE: CPT

## 2022-10-16 PROCEDURE — 2580000003 HC RX 258: Performed by: STUDENT IN AN ORGANIZED HEALTH CARE EDUCATION/TRAINING PROGRAM

## 2022-10-16 PROCEDURE — 85025 COMPLETE CBC W/AUTO DIFF WBC: CPT

## 2022-10-16 PROCEDURE — 1200000000 HC SEMI PRIVATE

## 2022-10-16 RX ADMIN — FUROSEMIDE 20 MG: 20 TABLET ORAL at 08:34

## 2022-10-16 RX ADMIN — PANTOPRAZOLE SODIUM 40 MG: 40 TABLET, DELAYED RELEASE ORAL at 06:41

## 2022-10-16 RX ADMIN — SEVELAMER CARBONATE 800 MG: 800 TABLET, FILM COATED ORAL at 08:34

## 2022-10-16 RX ADMIN — APIXABAN 2.5 MG: 2.5 TABLET, FILM COATED ORAL at 08:34

## 2022-10-16 RX ADMIN — FERROUS SULFATE TAB 325 MG (65 MG ELEMENTAL FE) 325 MG: 325 (65 FE) TAB at 08:34

## 2022-10-16 RX ADMIN — LEVOTHYROXINE SODIUM 100 MCG: 100 TABLET ORAL at 06:40

## 2022-10-16 RX ADMIN — CARVEDILOL 12.5 MG: 12.5 TABLET, FILM COATED ORAL at 19:53

## 2022-10-16 RX ADMIN — CARVEDILOL 12.5 MG: 12.5 TABLET, FILM COATED ORAL at 08:34

## 2022-10-16 RX ADMIN — ATORVASTATIN CALCIUM 80 MG: 80 TABLET, FILM COATED ORAL at 19:53

## 2022-10-16 RX ADMIN — GABAPENTIN 100 MG: 100 CAPSULE ORAL at 19:53

## 2022-10-16 RX ADMIN — SODIUM CHLORIDE, PRESERVATIVE FREE 10 ML: 5 INJECTION INTRAVENOUS at 19:54

## 2022-10-16 RX ADMIN — SEVELAMER CARBONATE 800 MG: 800 TABLET, FILM COATED ORAL at 12:49

## 2022-10-16 RX ADMIN — SEVELAMER CARBONATE 800 MG: 800 TABLET, FILM COATED ORAL at 17:23

## 2022-10-16 RX ADMIN — APIXABAN 2.5 MG: 2.5 TABLET, FILM COATED ORAL at 19:53

## 2022-10-16 RX ADMIN — SODIUM CHLORIDE, PRESERVATIVE FREE 10 ML: 5 INJECTION INTRAVENOUS at 09:30

## 2022-10-16 ASSESSMENT — PAIN SCALES - GENERAL: PAINLEVEL_OUTOF10: 0

## 2022-10-16 NOTE — PROGRESS NOTES
Hospitalist Progress Note      PCP: Elsa Arriaag MD    Date of Admission: 10/12/2022    Chief Complaint: Recurrent bleed     History Of Present Illness:    61 y.o. male with a significant past medical history of COPD with chronic hypoxic and hypercapnic respiratory failure, combined systolic and diastolic heart failure, on chronic anticoagulation for history of DVT, pancytopenia, end-stage renal disease on hemodialysis, who presented with bleeding from his AV graft site when his band-aid accidentally pulled off. Subjective:      Patient feels well, has not had any further bleeding. Medications:  Reviewed    Infusion Medications    sodium chloride      sodium chloride       Scheduled Medications    atorvastatin  80 mg Oral Nightly    apixaban  2.5 mg Oral BID    lidocaine-EPINEPHrine  20 mL IntraDERmal Once    sodium chloride flush  5-40 mL IntraVENous 2 times per day    ferrous sulfate  325 mg Oral Daily with breakfast    carvedilol  12.5 mg Oral BID    vitamin D  50,000 Units Oral Q7 Days    calcitRIOL  0.5 mcg Oral Q MWF    allopurinol  100 mg Oral Q MWF    furosemide  20 mg Oral Daily    gabapentin  100 mg Oral Nightly    levothyroxine  100 mcg Oral QAM AC    pantoprazole  40 mg Oral QAM AC    tiotropium  2 puff Inhalation Daily    sevelamer  800 mg Oral TID WC     PRN Meds: sodium chloride, sodium chloride flush, sodium chloride, ondansetron **OR** ondansetron, polyethylene glycol, acetaminophen **OR** acetaminophen, albuterol, sodium chloride      Intake/Output Summary (Last 24 hours) at 10/16/2022 0930  Last data filed at 10/15/2022 2107  Gross per 24 hour   Intake 50 ml   Output --   Net 50 ml         Exam:    /65   Pulse 71   Temp 97.7 °F (36.5 °C) (Axillary)   Resp 19   Ht 5' 8\" (1.727 m)   Wt 125 lb 3.5 oz (56.8 kg)   SpO2 100%   BMI 19.04 kg/m²     General appearance: No apparent distress, appears stated age and cooperative.   HEENT: Pupils equal, round, and reactive to light. Conjunctivae/corneas clear. Neck: Supple, with full range of motion. No jugular venous distention. Trachea midline. Respiratory:  Normal respiratory effort. Clear to auscultation, bilaterally without Rales/Wheezes/Rhonchi. Cardiovascular: Regular rate and rhythm with normal S1/S2 without murmurs, rubs or gallops. Abdomen: Soft, non-tender, non-distended with normal bowel sounds. Musculoskelatal: No clubbing, cyanosis or edema bilaterally. Skin: Skin color, texture, turgor normal.  No rashes or lesions. Neurologic:  Cranial nerves: II-XII intact, grossly non-focal.  Psychiatric: Alert and oriented, thought content appropriate, normal insight    Labs:   Recent Labs     10/14/22  0545 10/14/22  0649 10/14/22  1839   WBC 3.5*  --   --    HGB 6.7* 6.8* 8.4*   HCT 20.4* 20.7* 25.6*   PLT 78*  --   --        Recent Labs     10/14/22  0545   *   K 4.9   CL 95*   CO2 29   BUN 28*   CREATININE 9.6*   CALCIUM 8.1*   PHOS 3.2       No results for input(s): AST, ALT, BILIDIR, BILITOT, ALKPHOS in the last 72 hours. No results for input(s): INR in the last 72 hours. No results for input(s): [place] in the last 72 hours.     Studies:  No orders to display       Assessment/Plan:    Active Hospital Problems    Diagnosis Date Noted    Hemorrhage of arteriovenous fistula, initial encounter [facility] Eliezer Sample 10/12/2022     Priority: Medium    History of venous thromboembolism [Z86.718] 06/17/2022     Priority: Medium    Essential hypertension [I10] 03/02/2022    Hyperlipidemia [E78.5] 03/02/2022    Chronic combined systolic and diastolic CH[facility] (congest[facility] [I50.42] 03/02/2022    COPD (chronic obstructive pulmonary disease) ([facility] [J44.9] 09/10/2020    [facility] [N18.6, Z99.2] 03/31/2020     PLAN:  - Vascular surgery consulted, no intervention required at this time  - Eliquis resumed by vascular as no further bleeding  - Stitch can be removed at future dialysis  - Monitor

## 2022-10-16 NOTE — PROGRESS NOTES
Report called to [facility] on [address] [facility], questions answered.

## 2022-10-16 NOTE — PLAN OF CARE
Problem: Safety - Adult  Goal: Free from fall injury  10/16/2022 1158 by Yvonne Lacy RN  Outcome: Progressing     Problem: Discharge Planning  Goal: Discharge to home or other facility with appropriate resources  10/16/2022 1158 by Yvonne Lacy RN  Outcome: Progressing     Problem: Chronic Conditions and Co-morbidities  Goal: Patient's chronic conditions and co-morbidity symptoms are monitored and maintained or improved  Outcome: Progressing

## 2022-10-16 NOTE — PROGRESS NOTES
Hospitalist Progress Note      PCP: Allan Ayers MD    Date of Admission: 10/12/2022    Chief Complaint: Recurrent bleed     History Of Present Illness:    61 y.o. male with a significant past medical history of COPD with chronic hypoxic and hypercapnic respiratory failure, combined systolic and diastolic heart failure, on chronic anticoagulation for history of DVT, pancytopenia, end-stage renal disease on hemodialysis, who presented with bleeding from his AV graft site when his band-aid accidentally pulled off. Subjective:      Feels fine, no bleeding. D/c delayed yesterday. Medications:  Reviewed    Infusion Medications    sodium chloride      sodium chloride       Scheduled Medications    atorvastatin  80 mg Oral Nightly    apixaban  2.5 mg Oral BID    lidocaine-EPINEPHrine  20 mL IntraDERmal Once    sodium chloride flush  5-40 mL IntraVENous 2 times per day    ferrous sulfate  325 mg Oral Daily with breakfast    carvedilol  12.5 mg Oral BID    vitamin D  50,000 Units Oral Q7 Days    calcitRIOL  0.5 mcg Oral Q MWF    allopurinol  100 mg Oral Q MWF    furosemide  20 mg Oral Daily    gabapentin  100 mg Oral Nightly    levothyroxine  100 mcg Oral QAM AC    pantoprazole  40 mg Oral QAM AC    tiotropium  2 puff Inhalation Daily    sevelamer  800 mg Oral TID WC     PRN Meds: sodium chloride, sodium chloride flush, sodium chloride, ondansetron **OR** ondansetron, polyethylene glycol, acetaminophen **OR** acetaminophen, albuterol, sodium chloride      Intake/Output Summary (Last 24 hours) at 10/16/2022 0931  Last data filed at 10/15/2022 2107  Gross per 24 hour   Intake 50 ml   Output --   Net 50 ml         Exam:    /65   Pulse 71   Temp 97.7 °F (36.5 °C) (Axillary)   Resp 19   Ht 5' 8\" (1.727 m)   Wt 125 lb 3.5 oz (56.8 kg)   SpO2 100%   BMI 19.04 kg/m²     General appearance: No apparent distress, appears stated age and cooperative. HEENT: Pupils equal, round, and reactive to light. Conjunctivae/corneas clear. Neck: Supple, with full range of motion. No jugular venous distention. Trachea midline. Respiratory:  Normal respiratory effort. Clear to auscultation, bilaterally without Rales/Wheezes/Rhonchi. Cardiovascular: Regular rate and rhythm with normal S1/S2 without murmurs, rubs or gallops. Abdomen: Soft, non-tender, non-distended with normal bowel sounds. Musculoskelatal: No clubbing, cyanosis or edema bilaterally. Skin: Skin color, texture, turgor normal.  No rashes or lesions. Neurologic:  Cranial nerves: II-XII intact, grossly non-focal.  Psychiatric: Alert and oriented, thought content appropriate, normal insight    Labs:   Recent Labs     10/14/22  0545 10/14/22  0649 10/14/22  1839   WBC 3.5*  --   --    HGB 6.7* 6.8* 8.4*   HCT 20.4* 20.7* 25.6*   PLT 78*  --   --        Recent Labs     10/14/22  0545   *   K 4.9   CL 95*   CO2 29   BUN 28*   CREATININE 9.6*   CALCIUM 8.1*   PHOS 3.2       No results for input(s): AST, ALT, BILIDIR, BILITOT, ALKPHOS in the last 72 hours. No results for input(s): INR in the last 72 hours. No results for input(s): [place] in the last 72 hours.     Studies:  No orders to display       Assessment/Plan:    Active Hospital Problems    Diagnosis Date Noted    Hemorrhage of arteriovenous fistula, initial encounter [facility] 10/12/2022     Priority: Medium    History of venous thromboembolism [Z86.718] 06/17/2022     Priority: Medium    Essential hypertension [I10] 03/02/2022    Hyperlipidemia [E78.5] 03/02/2022    Chronic combined systolic and diastolic CHF (congestive heart failure) ([facility] [I50.42] 03/02/2022    [facility] [J44.9] 09/10/2020    [facility] [N18.6, Z99.2] 03/31/2020     PLAN:  - Vascular surgery consulted, no intervention required at this time  - Eliquis resumed by vascular as no further bleeding  - Stitch can be removed at future dialysis  - Monitor for further bleeding    Recurrent bleeding episodes with pancytopenia  -Has not had follow-up with H/O, high risk for readmission with bleeding  -H/O consulted, appreciate recs    ESRD  - Consult Nephro for HD needs while inpatient  - Continue other home meds (Coreg, Lasix, Gabapentin, levothyroxine, allopurinol, sevelamer, atorvastatin, inhalers)     DVT Prophylaxis: Eliquis  Diet: ADULT DIET; Regular; Low Sodium (2 gm); Low Potassium (Less than 3000 mg/day); Low Phosphorus (Less than 1000 mg); 60 to 80 gm  Code Status: Full Code    PT/OT Eval Status:     Dispo - Patient discharged however accepting facility unable to take the patient until Monday. Check CBC again today.      Janice Hill DO

## 2022-10-16 NOTE — PROGRESS NOTES
Nephrology Progress Note  [phone]  [phone] [address] [facility]        Reason for Consult:  ESRD     HISTORY OF PRESENT ILLNESS:                          This is a patient with significant past medical history of ESRD on dialsis at [facility]  who presents with excessive bleeding from Left UE loop AVG . Last week a fistulogram showed no stenosis. He presents now with bleeding from AVG  once again after HD at [facility], Vascular surgery saw him and achieved hemostasis. They are evaluating his AVG again . Of note he is on Eliquis . This has been HELD . Doing well  Discharge delayed yesterday       PHYSICAL EXAM:    Vitals:    /65   Pulse 74   Temp 97.7 °F (36.5 °C) (Axillary)   Resp 17   Ht 5' 8\" (1.727 m)   Wt 125 lb 3.5 oz (56.8 kg)   SpO2 97%   BMI 19.04 kg/m²   I/O last 3 completed shifts: In: 170 [P.O.:170]  Out: -   No intake/output data recorded. Physical Exam:  Gen: Resting in bed, NAD. HEENT: MMM, OP clear. CV: RRR no m/r/g. No S3.  Lungs: Good respiratory effort, clear air entry   Abd: S/NT +BS  Ext: No edema, no cyanosis  Skin: Warm. No rashes appreciated. AVG left upper arm.  Soft pulse pressure     DATA:    CBC:   Lab Results   Component Value Date/Time    WBC 3.5 10/14/2022 05:45 AM    RBC 2.31 10/14/2022 05:45 AM    HGB 8.4 10/14/2022 06:39 PM    HCT 25.6 10/14/2022 06:39 PM    MCV 88.3 10/14/2022 05:45 AM    MCH 28.8 10/14/2022 05:45 AM    MCHC 32.6 10/14/2022 05:45 AM    RDW 16.5 10/14/2022 05:45 AM    PLT 78 10/14/2022 05:45 AM    MPV 8.5 10/14/2022 05:45 AM     BMP:    Lab Results   Component Value Date/Time     10/14/2022 05:45 AM    K 4.9 10/14/2022 05:45 AM    K 4.0 10/12/2022 05:16 PM    CL 95 10/14/2022 05:45 AM    CO2 29 10/14/2022 05:45 AM    BUN 28 10/14/2022 05:45 AM    LABALBU 3.0 10/14/2022 05:45 AM    CREATININE 9.6 10/14/2022 05:45 AM    CALCIUM 8.1 10/14/2022 05:45 AM    GFRAA 7 10/14/2022 05:45 AM    LABGLOM 6 10/14/2022 05:45 AM    GLUCOSE 82 10/14/2022 05:45 AM       IMPRESSION/RECOMMENDATIONS:      ESRD will arrange dialysis on schedule. HD on Monday if still here    Anemia will give TEODORA as needed, was transfused   Prolonged bleeding  from  AVG suspect largely related to Eliquis. No evidence of venous outflow obstruction of angiogram    Appreciate Vascular surgery help   On renvela for hyperphosphatemia     Ok for d/c from my standpoint     Thank you for allowing me to participate in the care of this patient. I will continue to follow along. Please call with questions.     Symone Mcneal MD

## 2022-10-16 NOTE — PROGRESS NOTES
Received patient to room 3306 from 6S. VS stable. Patient resting in bed. Call light abd belongings in reach. Bed alarm on. Patient educated on safety.

## 2022-10-16 NOTE — PLAN OF CARE
Problem: Discharge Planning  Goal: Discharge to home or other facility with appropriate resources  10/15/2022 2301 by Antoinette Huitron RN  Outcome: Progressing  Flowsheets (Taken 10/15/2022 2301)  Discharge to home or other facility with appropriate resources:   Identify barriers to discharge with patient and caregiver   Arrange for needed discharge resources and transportation as appropriate   Identify discharge learning needs (meds, wound care, etc)     Problem: Safety - Adult  Goal: Free from fall injury  10/15/2022 2301 by Antoinette Huitron RN  Outcome: Progressing  Note: Call lights within reach, fall precautions in place at all times, bed at the lowest level.

## 2022-10-17 ENCOUNTER — APPOINTMENT (OUTPATIENT)
Dept: VASCULAR LAB | Age: 67
DRG: 314 | End: 2022-10-17
Payer: MEDICARE

## 2022-10-17 LAB
BASOPHILS ABSOLUTE: 0 K/UL (ref 0–0.2)
BASOPHILS RELATIVE PERCENT: 1.3 %
EOSINOPHILS ABSOLUTE: 0.1 K/UL (ref 0–0.6)
EOSINOPHILS RELATIVE PERCENT: 2.2 %
HCT VFR BLD CALC: 27 % (ref 40.5–52.5)
HEMOGLOBIN: 8.9 G/DL (ref 13.5–17.5)
LYMPHOCYTES ABSOLUTE: 0.6 K/UL (ref 1–5.1)
LYMPHOCYTES RELATIVE PERCENT: 17.5 %
MCH RBC QN AUTO: 28.9 PG (ref 26–34)
MCHC RBC AUTO-ENTMCNC: 33 G/DL (ref 31–36)
MCV RBC AUTO: 87.6 FL (ref 80–100)
MONOCYTES ABSOLUTE: 0.3 K/UL (ref 0–1.3)
MONOCYTES RELATIVE PERCENT: 9.2 %
NEUTROPHILS ABSOLUTE: 2.4 K/UL (ref 1.7–7.7)
NEUTROPHILS RELATIVE PERCENT: 69.8 %
PDW BLD-RTO: 17.2 % (ref 12.4–15.4)
PLATELET # BLD: 74 K/UL (ref 135–450)
PMV BLD AUTO: 8 FL (ref 5–10.5)
RBC # BLD: 3.09 M/UL (ref 4.2–5.9)
WBC # BLD: 3.4 K/UL (ref 4–11)

## 2022-10-17 PROCEDURE — 85025 COMPLETE CBC W/AUTO DIFF WBC: CPT

## 2022-10-17 PROCEDURE — 6370000000 HC RX 637 (ALT 250 FOR IP): Performed by: STUDENT IN AN ORGANIZED HEALTH CARE EDUCATION/TRAINING PROGRAM

## 2022-10-17 PROCEDURE — 36415 COLL VENOUS BLD VENIPUNCTURE: CPT

## 2022-10-17 PROCEDURE — 90935 HEMODIALYSIS ONE EVALUATION: CPT

## 2022-10-17 PROCEDURE — 93990 DOPPLER FLOW TESTING: CPT

## 2022-10-17 PROCEDURE — 6370000000 HC RX 637 (ALT 250 FOR IP): Performed by: SURGERY

## 2022-10-17 PROCEDURE — 1200000000 HC SEMI PRIVATE

## 2022-10-17 RX ADMIN — ATORVASTATIN CALCIUM 80 MG: 80 TABLET, FILM COATED ORAL at 20:52

## 2022-10-17 RX ADMIN — SEVELAMER CARBONATE 800 MG: 800 TABLET, FILM COATED ORAL at 18:20

## 2022-10-17 RX ADMIN — LEVOTHYROXINE SODIUM 100 MCG: 100 TABLET ORAL at 05:39

## 2022-10-17 RX ADMIN — FERROUS SULFATE TAB 325 MG (65 MG ELEMENTAL FE) 325 MG: 325 (65 FE) TAB at 13:52

## 2022-10-17 RX ADMIN — CARVEDILOL 12.5 MG: 12.5 TABLET, FILM COATED ORAL at 13:51

## 2022-10-17 RX ADMIN — GABAPENTIN 100 MG: 100 CAPSULE ORAL at 20:52

## 2022-10-17 RX ADMIN — FUROSEMIDE 20 MG: 20 TABLET ORAL at 13:51

## 2022-10-17 RX ADMIN — SEVELAMER CARBONATE 800 MG: 800 TABLET, FILM COATED ORAL at 13:52

## 2022-10-17 RX ADMIN — APIXABAN 2.5 MG: 2.5 TABLET, FILM COATED ORAL at 20:52

## 2022-10-17 RX ADMIN — CALCITRIOL CAPSULES 0.25 MCG 0.5 MCG: 0.25 CAPSULE ORAL at 13:52

## 2022-10-17 RX ADMIN — CARVEDILOL 12.5 MG: 12.5 TABLET, FILM COATED ORAL at 20:52

## 2022-10-17 RX ADMIN — PANTOPRAZOLE SODIUM 40 MG: 40 TABLET, DELAYED RELEASE ORAL at 05:39

## 2022-10-17 RX ADMIN — APIXABAN 2.5 MG: 2.5 TABLET, FILM COATED ORAL at 13:52

## 2022-10-17 ASSESSMENT — PAIN DESCRIPTION - PAIN TYPE: TYPE: CHRONIC PAIN

## 2022-10-17 ASSESSMENT — PAIN DESCRIPTION - LOCATION: LOCATION: SHOULDER

## 2022-10-17 ASSESSMENT — PAIN SCALES - GENERAL: PAINLEVEL_OUTOF10: 7

## 2022-10-17 ASSESSMENT — PAIN DESCRIPTION - ORIENTATION: ORIENTATION: RIGHT

## 2022-10-17 ASSESSMENT — PAIN DESCRIPTION - DESCRIPTORS: DESCRIPTORS: ACHING

## 2022-10-17 NOTE — PROGRESS NOTES
Treatment time: 3 hours  Net UF: 1400 ml     Pre weight: 58.2 kg  Post weight:57.2 kg  EDW: tbd kg    Access used: LUAF    Access function: fair with  ml/min     Medications or blood products given: None     Regular outpatient schedule: MWF     Summary of response to treatment: Patient tolerated treatment well and without any complications. Post treatment bleeding 45 minutes reported to vascular and floor nurse Patient remained stable throughout entire treatment and upon the post transported via nurse . Report given to floor, RN and copy of dialysis treatment record placed in chart, to be scanned into EMR.

## 2022-10-17 NOTE — PROGRESS NOTES
Nephrology Progress Note  [phone] [address] [facility]        Reason for Consult:  ESRD     HISTORY OF PRESENT ILLNESS:                          This is a patient with significant past medical history of ESRD on dialsis at [facility]  who presents with excessive bleeding from Left UE loop AVG . Last week a fistulogram showed no stenosis. He presents now with bleeding from AVG  once again after HD at [facility], Vascular surgery saw him and achieved hemostasis. They are evaluating his AVG again . Of note he is on Eliquis . This has been HELD . but now resumed. Doing well  Discharge delayed yesterday  Pt is seen while on HD   Orders reviewed with RN          PHYSICAL EXAM:    Vitals:    /68   Pulse 67   Temp 97.8 °F (36.6 °C)   Resp 18   Ht 5' 8\" (1.727 m)   Wt 128 lb 4.9 oz (58.2 kg)   SpO2 100%   BMI 19.51 kg/m²   I/O last 3 completed shifts: In: 330 [P.O.:320; I.V.:10]  Out: -   No intake/output data recorded. Physical Exam:  Gen: Resting in bed, NAD. HEENT: MMM, OP clear. CV: RRR no m/r/g. No S3.  Lungs: Good respiratory effort, clear air entry   Abd: S/NT +BS  Ext: No edema, no cyanosis  Skin: Warm. No rashes appreciated. AVG left upper arm.  Soft pulse pressure     DATA:    CBC:   Lab Results   Component Value Date/Time    WBC 3.1 10/16/2022 10:16 AM    RBC 2.77 10/16/2022 10:16 AM    HGB 7.8 10/16/2022 10:16 AM    HCT 24.6 10/16/2022 10:16 AM    MCV 88.8 10/16/2022 10:16 AM    MCH 28.2 10/16/2022 10:16 AM    MCHC 31.8 10/16/2022 10:16 AM    RDW 16.9 10/16/2022 10:16 AM    PLT 82 10/16/2022 10:16 AM    MPV 8.0 10/16/2022 10:16 AM     BMP:    Lab Results   Component Value Date/Time     10/14/2022 05:45 AM    K 4.9 10/14/2022 05:45 AM    K 4.0 10/12/2022 05:16 PM    CL 95 10/14/2022 05:45 AM    CO2 29 10/14/2022 05:45 AM    BUN 28 10/14/2022 05:45 AM    LABALBU 3.0 10/14/2022 05:45 AM    CREATININE 9.6 10/14/2022 05:45 AM    CALCIUM 8.1 10/14/2022 05:45 AM GFRAA 7 10/14/2022 05:45 AM    LABGLOM 6 10/14/2022 05:45 AM    GLUCOSE 82 10/14/2022 05:45 AM       IMPRESSION/RECOMMENDATIONS:      ESRD will arrange dialysis on schedule. Anemia will give TEODORA as needed, was transfused   Prolonged bleeding  from  AVG suspect largely related to Eliquis. No evidence of venous outflow obstruction of angiogram  Perhaps should switch to warfarin and keep INR low ,he has had recurrent admissions for bleeding with no mechanical cause ,suspect related to Eliquis   Appreciate Vascular surgery help   On renvela for hyperphosphatemia     Ok for d/c from my standpoint     Thank you for allowing me to participate in the care of this patient. I will continue to follow along. Please call with questions.     Earl Gonzalez MD

## 2022-10-17 NOTE — DISCHARGE SUMMARY
Hospital Medicine Discharge Summary    Patient: Bertha Trevizo     Gender: male  : 1955   Age: 61 y.o. MRN: [id number]    Admitting Physician: Arthur Melo MD    Discharge Physician: Marika Sands MD      Code Status: Full Code     Admit Date: 10/12/2022     Discharge Date:  10/18/2022     Disposition:  LTC    Discharge Diagnoses: Active Hospital Problems    Diagnosis Date Noted    Hemorrhage of arteriovenous fistula, initial encounter [facility]) [facility] 10/12/2022     Priority: Medium    History of venous thromboembolism [Z86.718] 2022     Priority: Medium    Essential hypertension [I10] 2022    Hyperlipidemia [E78.5] 2022    Chronic combined systolic and diastolic CHF (congestive heart failure) ([facility] [I50.42] 2022    [facility] [J44.9] 09/10/2020    ESRD on dialysis ([facility] [N18.6, Z99.2] 2020   Chronic hypoxic Respiratory Failure: POA. On 3L O2 PTA    Outpatient to do list:     Follow-up appointments    Primary Care Provider  Hem/Onc  Vascular Surgery      Condition at Discharge: [address] Course:   Patient was admitted for evaluation of recurrent bleeding from the AV graft site after patient removed his sweater and a few hours after dialysis. Eliquis briefly held. Vascular was consulted. Patient has nylon suture that was removed. Eliquis home dose resumed by vascular (hx of recurrent DVT/PE). Hem/Onc was consulted as patient has recurrent DVT but also has a hx of pancytopenia of unknown cause which may be contributory. Platelets < 50 on a prior admission. On this admission platelets were stable. He did receive one unit of pRBCs. Patient was monitored over 4 nights during the above assessment and had another episode of  bleeding while on Eliquis which stopped on prolonged application of pressure. Recommended to follow-up with Vascular Surgery; Hem/Onc on discharge, also recommend outpatient CBC monitoring. He is on 3L of O2 which is his home dose, but can likely be weaned as his sats are 100% on this. Additional findings or notes to primary provider:    Patient offered intervention by Vascular surgery; however he is refusing at this time. Will need close follow up with vascular surgery, either here or at [facility]. In the interim, Vascular Surgery recommends:     AF GRAFT INSTRUCTIONS:  - Recommend no accessing the graft in areas where the skin is thin  - If the point of access bleeds please apply pressure for several minutes until hemostasis is achieved  - The patient is on anticoagulation and thus pressure should be held of longer than usual.         Discharge Medications:   Current Discharge Medication List        Current Discharge Medication List        CONTINUE these medications which have CHANGED    Details   sodium chloride (OCEAN) 0.65 % nasal spray 1-2 sprays both nostrils at least daily and as needed to prevent dry mucosa. Qty: 1 each, Refills: 3           Current Discharge Medication List        CONTINUE these medications which have NOT CHANGED    Details   levothyroxine (SYNTHROID) 100 MCG tablet Take 100 mcg by mouth Daily      gabapentin (NEURONTIN) 100 MG capsule Take 1 capsule by mouth at bedtime for 30 days.   Qty: 90 capsule, Refills: 0      diclofenac sodium (VOLTAREN) 1 % GEL Apply 2 g topically 2 times daily  Qty: 50 g, Refills: 0      calcitRIOL (ROCALTROL) 0.25 MCG capsule Take 0.5 mcg by mouth every other day MWF      sevelamer (RENVELA) 800 MG tablet Take 1 tablet by mouth 3 times daily (with meals)      apixaban (ELIQUIS) 2.5 MG TABS tablet Take 1 tablet by mouth 2 times daily  Qty: 60 tablet, Refills: 1      ferrous sulfate (FE TABS) 325 (65 Fe) MG EC tablet Take 1 tablet by mouth every other day  Qty: 15 tablet, Refills: 1      atorvastatin (LIPITOR) 80 MG tablet Take 80 mg by mouth daily      calcium carb-cholecalciferol 600-200 MG-UNIT TABS tablet Take 1 tablet by mouth daily furosemide (LASIX) 20 MG tablet Take 20 mg by mouth daily      Umeclidinium Bromide (INCRUSE ELLIPTA) 62.5 MCG/INH AEPB Inhale 1 puff into the lungs daily      vitamin D (ERGOCALCIFEROL) 1.25 MG (84628 UT) CAPS capsule Take 50,000 Units by mouth once a week Saturdays      omeprazole (PRILOSEC) 20 MG delayed release capsule Take 20 mg by mouth daily      carvedilol (COREG) 12.5 MG tablet Take 12.5 mg by mouth 2 times daily Unsure of dose       albuterol sulfate HFA (PROVENTIL;VENTOLIN;PROAIR) 108 (90 Base) MCG/ACT inhaler Inhale 2 puffs into the lungs every 6 hours as needed for Wheezing 90mcg/actuation      allopurinol (ZYLOPRIM) 100 MG tablet Take 100 mg by mouth Daily on MWF           Current Discharge Medication List        STOP taking these medications       ferrous sulfate (IRON 325) 325 (65 Fe) MG tablet Comments:   Reason for Stopping:         pseudoephedrine (SUDAFED) 30 MG tablet Comments:   Reason for Stopping:               Discharge ROS:  A complete review of systems was asked and negative. Discharge Exam:    /68   Pulse 67   Temp 97.8 °F (36.6 °C)   Resp 18   Ht 5' 8\" (1.727 m)   Wt 128 lb 4.9 oz (58.2 kg)   SpO2 100%   BMI 19.51 kg/m²   General appearance: No apparent distress, appears stated age and cooperative. HEENT: Pupils equal, round, and reactive to light. Respiratory:  Normal respiratory effort. Clear to auscultation, bilaterally without Rales/Wheezes/Rhonchi. Cardiovascular: Regular rate and rhythm with normal S1/S2 without murmurs, rubs or gallops. Abdomen: Soft, non-tender, non-distended with normal bowel sounds. Musculoskelatal: No clubbing, cyanosis or edema bilaterally. Skin: Skin color, texture, turgor normal.  No rashes or lesions. Neurologic:   grossly non-focal.  Psychiatric: Alert and oriented, thought content appropriate, normal insight      Labs:  For convenience and continuity at follow-up the following most recent labs are provided:      Lab Results physician including the presence of an independent trained observers that assisted in monitoring the patient's level of consciousness and physiological status. Following the administration of Midazolam and Fentanyl the physician spent continuous face-to-face time with the patient. Sedation start time: 1106 Sedation end time: 1121 Lidocaine was used for local anesthesia. Ultrasound visualization of the fistula demonstrates sonographic patency and compressibility. The left upper extremity AV fistula measures 6 mm. A permanent sonographic image was saved to the electronic medical record. Under ultrasound visualization, micropuncture access in the venous direction of the fistula. Access was then upsized to accept a micropuncture sheath. Digital subtraction venography was performed of the fistula demonstrating wide sonographic patency. There is no stenosis within the fistula. The venous anastomosis is widely patent. Central venogram demonstrates wide patency of the left subclavian vein, left brachiocephalic vein and superior vena cava. Repeat sonography of the fistula was performed at an oblique angle demonstrating patency of the venous anastomosis. Subsequently, reflux venography was performed with compression of the venous outflow demonstrating wide patency of the arterial anastomosis. The micropuncture sheath was removed after purse string suture with 3-0 Vicryl placed. A strong thrill is appreciated post procedure. The patient tolerated the procedure well and was returned to the recovery area in stable condition. Estimated blood loss: Less than 5 mL. Complications: None. Fluoroscopy time: 0.2 minutes minutes Total fluoroscopic images saved: 10     Impression: Technically successful left upper extremity arteriovenous fistulogram. There is no significant stenosis. Specifically, no stenosis of the venous outflow or central venous stenosis to account for the patient's prolonged bleeding post dialysis.             The patient was seen and examined on day of discharge and this discharge summary is in conjunction with any daily progress note from day of discharge. Time Spent on discharge is more than 30 minutes in the examination, evaluation, counseling and review of medications and discharge plan. Signed:    Isaac Modi MD    10/18/2022      Thank you Victor M Marrero MD for the opportunity to be involved in this patient's care. If you have any questions or concerns please feel free to contact me at perfectserve.

## 2022-10-17 NOTE — PROGRESS NOTES
Vascular Surgery   Daily Progress Note  Patient: Carolyn Gordon      CC: Bleeding LUE brachiobasilic AV graft    SUBJECTIVE:    Pt with some bleeding from LUE fistula after HD today. Pt states this is typical for his fistula. He states he has had occurences like this in the past. Denies paraesthesias/ pain to his LUE. Tolerating diet. ROS:   A 14 point review of systems was conducted, significant findings as noted above. All other systems negative. OBJECTIVE:    PHYSICAL EXAM:    Vitals:    10/17/22 0813 10/17/22 0842 10/17/22 1154 10/17/22 1349   BP: 120/70 110/68 108/70 130/69   Pulse: 69 67 64 71   Resp: 18 18 18 16   Temp: 97.8 °F (36.6 °C) 97.8 °F (36.6 °C) 97.8 °F (36.6 °C) 98.4 °F (36.9 °C)   TempSrc: Oral   Oral   SpO2: 100%   95%   Weight:  128 lb 4.9 oz (58.2 kg) 126 lb 1.7 oz (57.2 kg)    Height:           General appearance: Alert, no acute distress, grooming appropriate  Eyes: No scleral icterus, EOM grossly intact  Neck: Trachea midline, no JVD, no lymphadenopathy, neck supple  Chest/Lungs: Normal effort with no accessory muscle use on 3L NC  Cardiovascular: RRR, well-perfused  Abdomen: Soft, non-tender, non-distended, no rebound, guarding, or rigidity. Skin: Warm and dry, no rashes  Extremities: No edema, no cyanosis. LUE upper arm with palpable thrill, no hematoma; 2/4 L radial pulse palpable. Hemostatic. 2 prolene stitches present  Neuro: A&Ox3, no focal deficits, sensation intact    LABS:   Recent Labs     10/14/22  1839 10/16/22  1016   WBC  --  3.1*   HGB 8.4* 7.8*   HCT 25.6* 24.6*   MCV  --  88.8   PLT  --  82*          No results for input(s): NA, K, CL, CO2, PHOS, BUN, CREATININE, CA in the last 72 hours. No results for input(s): AST, ALT, ALB, BILIDIR, BILITOT, ALKPHOS in the last 72 hours. No results for input(s): LIPASE, AMYLASE in the last 72 hours. No results for input(s): PROT, INR, APTT in the last 72 hours.      No results for input(s): CKTOTAL, CKMB, CKMBINDEX, TROPONINI in the last 72 hours. ASSESSMENT & PLAN:   This is a 61 y.o. male with Hx of of pulmonary embolism and pulmonary HTN on Eliquis, CVA, CHF, ESRD on HD via LUE AV graft, HLD, and HTN who presents to the ED for bleeding from his AV graft site a few hours after receiving hemodialysis on 10/12.    - Some bleeding today after dialysis, hemostatic now.  Will remove sutures   - hemodialysis fistula duplex tonight  - Continue diet  - Continue Eliquis  - Continue DVT prophylaxis  - Rest of medical management per primary      John Paul Vazquez CNP  10/17/2022  2:33 PM  bandar

## 2022-10-17 NOTE — PROGRESS NOTES
Hospitalist Progress Note      PCP: Jill Osborne MD    Date of Admission: 10/12/2022    Chief Complaint: Recurrent bleed     History Of Present Illness:    61 y.o. male with a significant past medical history of COPD with chronic hypoxic and hypercapnic respiratory failure, combined systolic and diastolic heart failure, on chronic anticoagulation for history of DVT, pancytopenia, end-stage renal disease on hemodialysis, who presented with bleeding from his AV graft site when his band-aid accidentally pulled off. Subjective:      Feels fine, no bleeding now, but appears bled still after HD today appears just as prior. Of note appears Eliquis was resumed yesterday.      .       Medications:  Reviewed    Infusion Medications    sodium chloride      sodium chloride       Scheduled Medications    atorvastatin  80 mg Oral Nightly    apixaban  2.5 mg Oral BID    lidocaine-EPINEPHrine  20 mL IntraDERmal Once    sodium chloride flush  5-40 mL IntraVENous 2 times per day    ferrous sulfate  325 mg Oral Daily with breakfast    carvedilol  12.5 mg Oral BID    vitamin D  50,000 Units Oral Q7 Days    calcitRIOL  0.5 mcg Oral Q MWF    allopurinol  100 mg Oral Q MWF    furosemide  20 mg Oral Daily    gabapentin  100 mg Oral Nightly    levothyroxine  100 mcg Oral QAM AC    pantoprazole  40 mg Oral QAM AC    tiotropium  2 puff Inhalation Daily    sevelamer  800 mg Oral TID WC     PRN Meds: sodium chloride, sodium chloride flush, sodium chloride, ondansetron **OR** ondansetron, polyethylene glycol, acetaminophen **OR** acetaminophen, albuterol, sodium chloride      Intake/Output Summary (Last 24 hours) at 10/17/2022 1357  Last data filed at 10/17/2022 1154  Gross per 24 hour   Intake 530 ml   Output 1400 ml   Net -870 ml         Exam:    /69   Pulse 71   Temp 98.4 °F (36.9 °C) (Oral)   Resp 16   Ht 5' 8\" (1.727 m)   Wt 126 lb 1.7 oz (57.2 kg)   SpO2 95%   BMI 19.17 kg/m²     General appearance: No apparent distress, appears stated age   Neck: Supple, with full range of motion. No jugular venous distention. Respiratory:  Normal respiratory effort. Clear to auscultation, bilaterally without Rales/Wheezes/Rhonchi. Cardiovascular: Regular rate and rhythm with normal S1/S2 without murmurs, rubs or gallops. Abdomen: Soft, non-tender, non-distended with normal bowel sounds. Musculoskelatal: No clubbing, cyanosis or edema bilaterally. Skin: Skin color, texture, turgor normal.  No rashes or lesions. Neurologic:  Grossly non-focal.  Psychiatric: Alert and oriented, thought content appropriate, normal insight    Labs:   Recent Labs     10/14/22  1839 10/16/22  1016   WBC  --  3.1*   HGB 8.4* 7.8*   HCT 25.6* 24.6*   PLT  --  82*       No results for input(s): NA, K, CL, CO2, BUN, CREATININE, CALCIUM, PHOS in the last 72 hours. Invalid input(s): MAGNES    No results for input(s): AST, ALT, BILIDIR, BILITOT, ALKPHOS in the last 72 hours. No results for input(s): INR in the last 72 hours. No results for input(s): [place] in the last 72 hours.     Studies:  No orders to display       Assessment/Plan:    Active Hospital Problems    Diagnosis Date Noted    Hemorrhage of arteriovenous fistula, initial encounter [facility] 10/12/2022     Priority: Medium    History of venous thromboembolism [Z86.718] 06/17/2022     Priority: Medium    Essential hypertension [I10] 03/02/2022    Hyperlipidemia [E78.5] 03/02/2022    Chronic combined systolic and diastolic CHF (congestive heart failure) ([facility] [I50.42] 03/02/2022    COPD (chronic obstructive pulmonary disease) ([facility] [J44.9] 09/10/2020    ESRD on dialysis ([facility] [N18.6, Z99.2] 03/31/2020         Hemorrhage of arteriovenous fistula:POA  - Vascular surgery consulted, no intervention was felt required at that time:  Eliquis resumed by vascular as no further bleeding, but appears bled again after HD today    Recurrent bleeding episodes with pancytopenia  -Has not had follow-up with H/O  -H/O consulted, appreciate recs  - High risk for readmission with bleeding      Acute blood loss anemia on chronic anemia of CKD: POA  - Acute anemia sec to hemorrhage from fistula  - Received PRBC  - Monitor Hgb to guide further need      Pancytopenia  Seen by Hem/Onc:   - Appears was seen by hematology and had a bone marrow bx in the past, unable to provide details. Did not see any records in care everywhere  - H/O discussed bone marrow bx with patient, he will think about   - Transfuse to keep hg >7 and plts >50 with bleeding and being on [facility]  - Per Hem/Onc: ok to resume eliquis when bleeding resolved and stable       ESRD  - Consult Nephro for HD needs while inpatient  - Continue other home meds (Coreg, Lasix, Gabapentin, levothyroxine, allopurinol, sevelamer, atorvastatin, inhalers)    Hx of PE  Appears VTE ?in 6/2022  - On [address]: resumed by Vascular  - Verify tolerance to determine further mgt       DVT Prophylaxis: Eliquis  Diet: ADULT DIET; Regular; Low Sodium (2 gm); Low Potassium (Less than 3000 mg/day); Low Phosphorus (Less than 1000 mg); 60 to 80 gm  Code Status: Full Code    PT/OT Eval Status:     Dispo - Appears bleeding continued after HD today  Vascular to re-eval  Planned SNF return [facility] [address].      Ernestine Dickens MD

## 2022-10-17 NOTE — PROGRESS NOTES
Shift assessment complete, patient A/O x4, No C/O pain or discomfort noted so far this shift. VSS. Call light and over bed table within reach. Hourly rounding and visual checks in place. Will continue to monitor.

## 2022-10-17 NOTE — PLAN OF CARE
Problem: Safety - Adult  Goal: Free from fall injury  Outcome: Progressing       Problem: Discharge Planning  Goal: Discharge to home or other facility with appropriate resources  Outcome: Progressing       Problem: Chronic Conditions and Co-morbidities  Goal: Patient's chronic conditions and co-morbidity symptoms are monitored and maintained or improved  Outcome: Progressing

## 2022-10-17 NOTE — CARE COORDINATION
Pt is from [address], will return at DC. DC today canceled due to continued bleeding; Vascular will re-evaluate. CM updated Kyrie Bis in admissions [phone]. CM will fax HD flow sheets at DC.         Electronically signed by DINESH Klein, MARICRUZ on 10/17/2022 at 2:47 PM

## 2022-10-18 VITALS
HEART RATE: 73 BPM | TEMPERATURE: 98 F | BODY MASS INDEX: 19.61 KG/M2 | DIASTOLIC BLOOD PRESSURE: 72 MMHG | RESPIRATION RATE: 18 BRPM | OXYGEN SATURATION: 100 % | SYSTOLIC BLOOD PRESSURE: 120 MMHG | HEIGHT: 68 IN | WEIGHT: 129.41 LBS

## 2022-10-18 LAB
ALBUMIN SERPL-MCNC: 3.1 G/DL (ref 3.4–5)
ANION GAP SERPL CALCULATED.3IONS-SCNC: 9 MMOL/L (ref 3–16)
BASOPHILS ABSOLUTE: 0 K/UL (ref 0–0.2)
BASOPHILS RELATIVE PERCENT: 0.3 %
BUN BLDV-MCNC: 12 MG/DL (ref 7–20)
CALCIUM SERPL-MCNC: 8.1 MG/DL (ref 8.3–10.6)
CHLORIDE BLD-SCNC: 94 MMOL/L (ref 99–110)
CO2: 28 MMOL/L (ref 21–32)
CREAT SERPL-MCNC: 5.7 MG/DL (ref 0.8–1.3)
EOSINOPHILS ABSOLUTE: 0.1 K/UL (ref 0–0.6)
EOSINOPHILS RELATIVE PERCENT: 3.3 %
GFR SERPL CREATININE-BSD FRML MDRD: 10 ML/MIN/{1.73_M2}
GLUCOSE BLD-MCNC: 82 MG/DL (ref 70–99)
HCT VFR BLD CALC: 23.7 % (ref 40.5–52.5)
HEMOGLOBIN: 7.6 G/DL (ref 13.5–17.5)
LYMPHOCYTES ABSOLUTE: 0.7 K/UL (ref 1–5.1)
LYMPHOCYTES RELATIVE PERCENT: 21.3 %
MAGNESIUM: 2.1 MG/DL (ref 1.8–2.4)
MCH RBC QN AUTO: 28.3 PG (ref 26–34)
MCHC RBC AUTO-ENTMCNC: 32 G/DL (ref 31–36)
MCV RBC AUTO: 88.3 FL (ref 80–100)
MONOCYTES ABSOLUTE: 0.4 K/UL (ref 0–1.3)
MONOCYTES RELATIVE PERCENT: 11.7 %
NEUTROPHILS ABSOLUTE: 2 K/UL (ref 1.7–7.7)
NEUTROPHILS RELATIVE PERCENT: 63.4 %
PDW BLD-RTO: 16.9 % (ref 12.4–15.4)
PHOSPHORUS: 2.7 MG/DL (ref 2.5–4.9)
PLATELET # BLD: 75 K/UL (ref 135–450)
PMV BLD AUTO: 7.8 FL (ref 5–10.5)
POTASSIUM SERPL-SCNC: 3.9 MMOL/L (ref 3.5–5.1)
RBC # BLD: 2.69 M/UL (ref 4.2–5.9)
SODIUM BLD-SCNC: 131 MMOL/L (ref 136–145)
WBC # BLD: 3.2 K/UL (ref 4–11)

## 2022-10-18 PROCEDURE — 94761 N-INVAS EAR/PLS OXIMETRY MLT: CPT

## 2022-10-18 PROCEDURE — 6370000000 HC RX 637 (ALT 250 FOR IP): Performed by: STUDENT IN AN ORGANIZED HEALTH CARE EDUCATION/TRAINING PROGRAM

## 2022-10-18 PROCEDURE — 2700000000 HC OXYGEN THERAPY PER DAY

## 2022-10-18 PROCEDURE — 80069 RENAL FUNCTION PANEL: CPT

## 2022-10-18 PROCEDURE — 36415 COLL VENOUS BLD VENIPUNCTURE: CPT

## 2022-10-18 PROCEDURE — 83735 ASSAY OF MAGNESIUM: CPT

## 2022-10-18 PROCEDURE — 94640 AIRWAY INHALATION TREATMENT: CPT

## 2022-10-18 PROCEDURE — 6370000000 HC RX 637 (ALT 250 FOR IP): Performed by: SURGERY

## 2022-10-18 PROCEDURE — 85025 COMPLETE CBC W/AUTO DIFF WBC: CPT

## 2022-10-18 RX ADMIN — ALLOPURINOL 100 MG: 100 TABLET ORAL at 00:34

## 2022-10-18 RX ADMIN — FERROUS SULFATE TAB 325 MG (65 MG ELEMENTAL FE) 325 MG: 325 (65 FE) TAB at 08:01

## 2022-10-18 RX ADMIN — SEVELAMER CARBONATE 800 MG: 800 TABLET, FILM COATED ORAL at 11:53

## 2022-10-18 RX ADMIN — CARVEDILOL 12.5 MG: 12.5 TABLET, FILM COATED ORAL at 08:01

## 2022-10-18 RX ADMIN — FUROSEMIDE 20 MG: 20 TABLET ORAL at 08:01

## 2022-10-18 RX ADMIN — PANTOPRAZOLE SODIUM 40 MG: 40 TABLET, DELAYED RELEASE ORAL at 06:28

## 2022-10-18 RX ADMIN — LEVOTHYROXINE SODIUM 100 MCG: 100 TABLET ORAL at 06:28

## 2022-10-18 RX ADMIN — SEVELAMER CARBONATE 800 MG: 800 TABLET, FILM COATED ORAL at 08:02

## 2022-10-18 RX ADMIN — APIXABAN 2.5 MG: 2.5 TABLET, FILM COATED ORAL at 10:38

## 2022-10-18 RX ADMIN — CALCITRIOL CAPSULES 0.25 MCG 0.5 MCG: 0.25 CAPSULE ORAL at 00:34

## 2022-10-18 RX ADMIN — TIOTROPIUM BROMIDE INHALATION SPRAY 2 PUFF: 3.12 SPRAY, METERED RESPIRATORY (INHALATION) at 09:19

## 2022-10-18 ASSESSMENT — PAIN DESCRIPTION - ORIENTATION: ORIENTATION: RIGHT

## 2022-10-18 ASSESSMENT — PAIN SCALES - GENERAL: PAINLEVEL_OUTOF10: 3

## 2022-10-18 ASSESSMENT — PAIN DESCRIPTION - LOCATION: LOCATION: SHOULDER

## 2022-10-18 ASSESSMENT — PAIN DESCRIPTION - DESCRIPTORS: DESCRIPTORS: ACHING

## 2022-10-18 NOTE — PROGRESS NOTES
Pt d/c'd 10/18/2022. Reviewed d/c instructions, home meds, and f/u information utilizing teach-back method. Patient verbalized understanding. Patient discharged in ambulance and left with all documented belongings. Report called to nurse Chappell at [facility].

## 2022-10-18 NOTE — CARE COORDINATION
Case Management Assessment            Discharge Note                    Date / Time of Note: 10/18/2022 11:03 AM                  Discharge Note Completed by: Geri Redmond RN    Patient Name: Paula Black   YOB: 1955  Diagnosis: Hemorrhage of arteriovenous fistula, initial encounter [facility]) [M07.314X]   Date / Time: 10/12/2022  2:52 PM    Current PCP: Zhanna Pope MD  Clinic patient: No    Hospitalization in the last 30 days: No    Advance Directives:  Code Status: Full Code  [place] DNR form completed and on chart: No, Not Indicated    Financial:  Payor: Michelle Been I-SNP / Plan: Michelle Been I-SNP / Product Type: *No Product type* /      Pharmacy:    [address] [place] [address], [address] [place] [address] [phone] - F [phone]  [address] [place] [address]  Phone: [phone] Fax: [phone]    [place] [id number] [facility] [place], [facility] 60 [address] 1285 [facility] E [phone] - F [phone]  [facility] 67  [address] [address]  Phone: [phone] Fax: [phone]      Assistance purchasing medications?: Potential Assistance Purchasing Medications: No  Assistance provided by Case Management: None at this time    Does patient want to participate in local refill/ meds to beds program?:      Meds To Beds General Rules:  1. Can ONLY be done Monday- Friday between 8:30am-5pm  2. Prescription(s) must be in pharmacy by 3pm to be filled same day  3. Copy of patient's insurance/ prescription drug card and patient face sheet must be sent along with the prescription(s)  4. Cost of Rx cannot be added to hospital bill. If financial assistance is needed, please contact unit  or ;  or  CANNOT provide pharmacy voucher for patients co-pays  5.  Patients can then  the prescription on their way out of the hospital at discharge, or pharmacy can deliver to the bedside if staff is available. (payment due at time of pick-up or delivery - cash, check, or card accepted)     Able to afford home medications/ co-pay costs: Yes    ADLS:  Current PT AM-PAC Score:   /24  Current OT AM-PAC Score:   /24      DISCHARGE Disposition: Lauren (LTC): [facility]  Phone: [phone]  Fax: [phone]    LOC at discharge: [address] Completed: Yes    Notification completed in HENS/PAS?:  Not Applicable    IMM Completed:   Yes, Case management has presented and reviewed IMM letter #2 to the patient and/or family/ POA. Patient and/or family/POA verbalized understanding of their medicare rights and appeal process if needed. Patient and/or family/POA has signed, initialed and placed today's date (10.18.22) and time (.) on IMM letter #2 on the the appropriate lines. Patient and/or family/POA, copy of letter offered and they are aware that this original copy of IMM letter #2 is available prior to discharge from the paper chart on the unit. Electronic documentation has been entered into epic for IMM letter #2 and original paper copy has been added to the paper chart at the nurses station.      Transportation:  Transportation PLAN for discharge: EMS transportation   Mode of Transport: Ambulance stretcher - BLS  Reason for medical transport: Bed confined: Meets the following criteria 1) unable to get out of bed without assistance or ambulate, 2) unable to safely sit up in a wheelchair, 3) unable to maintain erect seating position in a chair for time needed for transport and Third party assistance/ attendant required to apply, administer or regulate oxygen during transport  Name of [address] [place] [address],[facility] 530: [facility]  Phone: [phone]  Time of Transport: [phone] form completed: Yes    Home Care:  [address] ordered at discharge: No, Not Indicated  Home Care Agency: Not Applicable  Orders faxed: No    Durable Medical Equipment:  DME Provider: deferred  Equipment obtained during hospitalization:     Home Oxygen and Respiratory Equipment:  Oxygen needed at discharge?: Yes  [address]: Not Applicable  Portable tank available for discharge?: Yes    Dialysis:  Dialysis patient: Yes    Dialysis Center: In house HD at [facility] [place] [facility]  Address: . Phone: . Hospice Services:  Location: Not Applicable  Agency: Not Applicable    Consents signed: No, Not Indicated    Referrals made at [facility] for outpatient continued care:  Not Applicable    Additional CM Notes: Patient to return to LTC today at [facility] [place] [facility], transport arranged for [phone] today via [address] The Plan for Transition of Care is related to the following treatment goals of Hemorrhage of arteriovenous fistula, initial encounter [facility]) [O54.808T]    The Patient and/or patient representative Skye Gibson and his family were provided with a choice of provider and agrees with the discharge plan Yes    Freedom of choice list was provided with basic dialogue that supports the patient's individualized plan of care/goals and shares the quality data associated with the providers.  Yes    Care Transitions patient: No    Jose Raul Poe RN  [facility]  Case Management Department  Ph: [phone]  Fax: [phone]

## 2022-10-18 NOTE — PROGRESS NOTES
Vascular Surgery   Daily Progress Note  Patient: Fabiana Mckeon      CC: Bleeding LUE brachiobasilic AV graft    SUBJECTIVE:    Pt with some bleeding from LUE fistula after HD yesterday. Pt states this is typical for his fistula. He states he has had occurences like this in the past. Denies paraesthesias/ pain to his LUE. Tolerating diet. Upset because he had to stay overnight. ROS:   A 14 point review of systems was conducted, significant findings as noted above. All other systems negative. OBJECTIVE:    PHYSICAL EXAM:    Vitals:    10/17/22 1154 10/17/22 1349 10/17/22 1937 10/18/22 0257   BP: 108/70 130/69 104/65 98/61   Pulse: 64 71 70 71   Resp: 18 16 18 18   Temp: 97.8 °F (36.6 °C) 98.4 °F (36.9 °C) 97.9 °F (36.6 °C) 97.9 °F (36.6 °C)   TempSrc:  Oral Oral Oral   SpO2:  95% 100% 100%   Weight: 126 lb 1.7 oz (57.2 kg)      Height:           General appearance: Alert, no acute distress, grooming appropriate  Eyes: No scleral icterus, EOM grossly intact  Neck: Trachea midline, no JVD, no lymphadenopathy, neck supple  Chest/Lungs: Normal effort with no accessory muscle use on 3L NC  Cardiovascular: RRR, well-perfused  Abdomen: Soft, non-tender, non-distended, no rebound, guarding, or rigidity. Skin: Warm and dry, no rashes  Extremities: No edema, no cyanosis. Area of thin skin around the arterial access of graft, avoid this areaLUE upper arm with palpable thrill, no hematoma; 2/4 L radial pulse palpable. Hemostatic. 2 prolene stitches present  Neuro: A&Ox3, no focal deficits, sensation intact    LABS:   Recent Labs     10/16/22  1016 10/17/22  1755   WBC 3.1* 3.4*   HGB 7.8* 8.9*   HCT 24.6* 27.0*   MCV 88.8 87.6   PLT 82* 74*          No results for input(s): NA, K, CL, CO2, PHOS, BUN, CREATININE, CA in the last 72 hours. No results for input(s): AST, ALT, ALB, BILIDIR, BILITOT, ALKPHOS in the last 72 hours. No results for input(s): LIPASE, AMYLASE in the last 72 hours.    No results for input(s): PROT, INR, APTT in the last 72 hours. No results for input(s): CKTOTAL, CKMB, CKMBINDEX, TROPONINI in the last 72 hours. ASSESSMENT & PLAN:   This is a 61 y.o. male with Hx of of pulmonary embolism and pulmonary HTN on Eliquis, CVA, CHF, ESRD on HD via LUE AV graft, HLD, and HTN who presents to the ED for bleeding from his AV graft site a few hours after receiving hemodialysis on 10/12.    - Some bleeding today after dialysis, hemostatic now. Will remove sutures   - hemodialysis fistula duplex   - Patient offered intervention however he is refusing at this time. Will need close follow up with vascular surgery, either here or at [address] [place] [address] of thin skin around the arterial access of graft, avoid this area when accessing graft.   - Continue diet  - Continue Eliquis  - Continue DVT prophylaxis  - Rest of medical management per primary    Ju Farfan DO, [address]  PGY2, General Surgery  10/18/22  6:37 AM  [phone]

## 2022-10-18 NOTE — PROGRESS NOTES
10/18/22 1428   Encounter Summary   Encounter Overview/Reason  Initial Encounter  (Reviewed notes and talked to nurse.)   Service Provided For: Patient   Referral/Consult From: [address] Family members   Complexity of Encounter Moderate   Begin Time 1020   End Time  1026   Total Time Calculated 6 min   Encounter    Type Initial Screen/Assessment   Spiritual/Emotional needs   Type Spiritual Support   Assessment/Intervention/Outcome   Assessment Coping   Intervention Active listening;Discussed belief system/Sabianist practices/luz marina;Nurtured Hope   Outcome Comfort;Coping;Engaged in conversation;Expressed feelings, needs, and concerns;Expressed Gratitude;Receptive

## 2022-10-18 NOTE — PLAN OF CARE
Problem: Safety - Adult  Goal: Free from fall injury  Outcome: Progressing     Problem: Discharge Planning  Goal: Discharge to home or other facility with appropriate resources  Outcome: Progressing     Problem: Chronic Conditions and Co-morbidities  Goal: Patient's chronic conditions and co-morbidity symptoms are monitored and maintained or improved  Outcome: Progressing     Problem: Pain  Goal: Verbalizes/displays adequate comfort level or baseline comfort level  Outcome: Progressing

## 2022-10-18 NOTE — PROGRESS NOTES
Nephrology Progress Note  [phone] [address] [address]. [facility]        Reason for Consult:  ESRD     HISTORY OF PRESENT ILLNESS:                          This is a patient with significant past medical history of ESRD on dialsis at [facility]  who presents with excessive bleeding from Left UE loop AVG . Last week a fistulogram showed no stenosis. He presents now with bleeding from AVG  once again after HD at [facility], Vascular surgery saw him and achieved hemostasis. They are evaluating his AVG again . Of note he is on Eliquis . This has been HELD . but now resumed. Doing well           PHYSICAL EXAM:    Vitals:    /72   Pulse 73   Temp 98 °F (36.7 °C) (Oral)   Resp 18   Ht 5' 8\" (1.727 m)   Wt 129 lb 6.6 oz (58.7 kg)   SpO2 100%   BMI 19.68 kg/m²   I/O last 3 completed shifts: In: 4875 [P.O.:1080; I.V.:10]  Out: 1400   No intake/output data recorded. Physical Exam:  Gen: Resting in bed, NAD. HEENT: MMM, OP clear. CV: RRR no m/r/g. No S3.  Lungs: Good respiratory effort, clear air entry   Abd: S/NT +BS  Ext: No edema, no cyanosis  Skin: Warm. No rashes appreciated. AVG left upper arm.  Soft pulse pressure     DATA:    CBC:   Lab Results   Component Value Date/Time    WBC 3.2 10/18/2022 06:23 AM    RBC 2.69 10/18/2022 06:23 AM    HGB 7.6 10/18/2022 06:23 AM    HCT 23.7 10/18/2022 06:23 AM    MCV 88.3 10/18/2022 06:23 AM    MCH 28.3 10/18/2022 06:23 AM    MCHC 32.0 10/18/2022 06:23 AM    RDW 16.9 10/18/2022 06:23 AM    PLT 75 10/18/2022 06:23 AM    MPV 7.8 10/18/2022 06:23 AM     BMP:    Lab Results   Component Value Date/Time     10/18/2022 06:23 AM    K 3.9 10/18/2022 06:23 AM    K 4.0 10/12/2022 05:16 PM    CL 94 10/18/2022 06:23 AM    CO2 28 10/18/2022 06:23 AM    BUN 12 10/18/2022 06:23 AM    LABALBU 3.1 10/18/2022 06:23 AM    CREATININE 5.7 10/18/2022 06:23 AM    CALCIUM 8.1 10/18/2022 06:23 AM    GFRAA 7 10/14/2022 05:45 AM    LABGLOM 10 10/18/2022 06:23 AM    GLUCOSE 82 10/18/2022 06:23 AM       IMPRESSION/RECOMMENDATIONS:      ESRD will arrange dialysis on schedule. Anemia will give TEODORA as needed, was transfused   Prolonged bleeding  from  AVG suspect largely related to Eliquis. No evidence of venous outflow obstruction of angiogram  Perhaps should switch to warfarin and keep INR low ,he has had recurrent admissions for bleeding with no mechanical cause ,suspect related to Eliquis   Appreciate Vascular surgery help   On renvela for hyperphosphatemia     Ok for d/c from my standpoint     Thank you for allowing me to participate in the care of this patient. I will continue to follow along. Please call with questions.     Ellen Howe MD

## 2022-11-12 ENCOUNTER — HOSPITAL ENCOUNTER (EMERGENCY)
Age: 67
Discharge: NURSING, REHAB OR LONG-TERM CARE | End: 2022-11-12
Attending: EMERGENCY MEDICINE
Payer: MEDICARE

## 2022-11-12 VITALS
HEART RATE: 65 BPM | HEIGHT: 68 IN | SYSTOLIC BLOOD PRESSURE: 102 MMHG | RESPIRATION RATE: 18 BRPM | WEIGHT: 142 LBS | BODY MASS INDEX: 21.52 KG/M2 | DIASTOLIC BLOOD PRESSURE: 67 MMHG | OXYGEN SATURATION: 100 % | TEMPERATURE: 98.1 F

## 2022-11-12 DIAGNOSIS — T82.838A HEMORRHAGE OF ARTERIOVENOUS FISTULA, INITIAL ENCOUNTER (HCC): Primary | ICD-10-CM

## 2022-11-12 LAB
ABO/RH: NORMAL
ANION GAP SERPL CALCULATED.3IONS-SCNC: 11 MMOL/L (ref 3–16)
ANTIBODY SCREEN: NORMAL
BASOPHILS ABSOLUTE: 0 K/UL (ref 0–0.2)
BASOPHILS ABSOLUTE: 0 K/UL (ref 0–0.2)
BASOPHILS RELATIVE PERCENT: 0.5 %
BASOPHILS RELATIVE PERCENT: 1 %
BUN BLDV-MCNC: 36 MG/DL (ref 7–20)
CALCIUM SERPL-MCNC: 9.2 MG/DL (ref 8.3–10.6)
CHLORIDE BLD-SCNC: 94 MMOL/L (ref 99–110)
CO2: 26 MMOL/L (ref 21–32)
CREAT SERPL-MCNC: 9 MG/DL (ref 0.8–1.3)
EKG ATRIAL RATE: 69 BPM
EKG DIAGNOSIS: NORMAL
EKG P AXIS: 69 DEGREES
EKG P-R INTERVAL: 254 MS
EKG Q-T INTERVAL: 410 MS
EKG QRS DURATION: 96 MS
EKG QTC CALCULATION (BAZETT): 439 MS
EKG R AXIS: -6 DEGREES
EKG T AXIS: 163 DEGREES
EKG VENTRICULAR RATE: 69 BPM
EOSINOPHILS ABSOLUTE: 0.2 K/UL (ref 0–0.6)
EOSINOPHILS ABSOLUTE: 0.2 K/UL (ref 0–0.6)
EOSINOPHILS RELATIVE PERCENT: 4.8 %
EOSINOPHILS RELATIVE PERCENT: 4.8 %
GFR SERPL CREATININE-BSD FRML MDRD: 6 ML/MIN/{1.73_M2}
GLUCOSE BLD-MCNC: 86 MG/DL (ref 70–99)
HCT VFR BLD CALC: 24.5 % (ref 40.5–52.5)
HCT VFR BLD CALC: 25.6 % (ref 40.5–52.5)
HEMOGLOBIN: 7.7 G/DL (ref 13.5–17.5)
HEMOGLOBIN: 8.7 G/DL (ref 13.5–17.5)
INR BLD: 1.39 (ref 0.87–1.14)
LYMPHOCYTES ABSOLUTE: 0.7 K/UL (ref 1–5.1)
LYMPHOCYTES ABSOLUTE: 1 K/UL (ref 1–5.1)
LYMPHOCYTES RELATIVE PERCENT: 20.3 %
LYMPHOCYTES RELATIVE PERCENT: 24.2 %
MCH RBC QN AUTO: 28.2 PG (ref 26–34)
MCH RBC QN AUTO: 30.5 PG (ref 26–34)
MCHC RBC AUTO-ENTMCNC: 31.5 G/DL (ref 31–36)
MCHC RBC AUTO-ENTMCNC: 34 G/DL (ref 31–36)
MCV RBC AUTO: 89.6 FL (ref 80–100)
MCV RBC AUTO: 89.8 FL (ref 80–100)
MONOCYTES ABSOLUTE: 0.4 K/UL (ref 0–1.3)
MONOCYTES ABSOLUTE: 0.5 K/UL (ref 0–1.3)
MONOCYTES RELATIVE PERCENT: 11.1 %
MONOCYTES RELATIVE PERCENT: 12.7 %
NEUTROPHILS ABSOLUTE: 2.1 K/UL (ref 1.7–7.7)
NEUTROPHILS ABSOLUTE: 2.3 K/UL (ref 1.7–7.7)
NEUTROPHILS RELATIVE PERCENT: 57.3 %
NEUTROPHILS RELATIVE PERCENT: 63.3 %
PDW BLD-RTO: 17 % (ref 12.4–15.4)
PDW BLD-RTO: 17.1 % (ref 12.4–15.4)
PLATELET # BLD: 102 K/UL (ref 135–450)
PLATELET # BLD: 111 K/UL (ref 135–450)
PMV BLD AUTO: 8.2 FL (ref 5–10.5)
PMV BLD AUTO: 8.3 FL (ref 5–10.5)
POTASSIUM REFLEX MAGNESIUM: 4.5 MMOL/L (ref 3.5–5.1)
PROTHROMBIN TIME: 17 SEC (ref 11.7–14.5)
RBC # BLD: 2.74 M/UL (ref 4.2–5.9)
RBC # BLD: 2.85 M/UL (ref 4.2–5.9)
SODIUM BLD-SCNC: 131 MMOL/L (ref 136–145)
WBC # BLD: 3.4 K/UL (ref 4–11)
WBC # BLD: 4.1 K/UL (ref 4–11)

## 2022-11-12 PROCEDURE — 80048 BASIC METABOLIC PNL TOTAL CA: CPT

## 2022-11-12 PROCEDURE — 36415 COLL VENOUS BLD VENIPUNCTURE: CPT

## 2022-11-12 PROCEDURE — 93005 ELECTROCARDIOGRAM TRACING: CPT | Performed by: EMERGENCY MEDICINE

## 2022-11-12 PROCEDURE — 86901 BLOOD TYPING SEROLOGIC RH(D): CPT

## 2022-11-12 PROCEDURE — 86900 BLOOD TYPING SEROLOGIC ABO: CPT

## 2022-11-12 PROCEDURE — 86850 RBC ANTIBODY SCREEN: CPT

## 2022-11-12 PROCEDURE — 85610 PROTHROMBIN TIME: CPT

## 2022-11-12 PROCEDURE — 85025 COMPLETE CBC W/AUTO DIFF WBC: CPT

## 2022-11-12 PROCEDURE — 99284 EMERGENCY DEPT VISIT MOD MDM: CPT

## 2022-11-12 ASSESSMENT — PAIN SCALES - GENERAL: PAINLEVEL_OUTOF10: 0

## 2022-11-12 NOTE — ED TRIAGE NOTES
Pt presents to the ED for LUE fistula bleeding. Pt is a dialysis pt Ross[place], +eliquis for hx of CVA. Pt reports he woke up from sleep at his SNF covered in blood from his LUE fistula. VSS per EMS, GCS 15. +dizziness and lightheadedness. Pt denies CP or SOB.

## 2022-11-12 NOTE — ED PROVIDER NOTES
[address] [facility] ENCOUNTER          ATTENDING PHYSICIAN NOTE       Date of evaluation: 11/12/2022    ADDENDUM:      Care of this patient was assumed from Dr Ellen Cohen. The patient was seen for Other (Fistula Bleeding)  . The patient's initial evaluation and plan have been discussed with the prior provider who initially evaluated the patient. Nursing Notes, Past Medical Hx, Past Surgical Hx, Social Hx, Allergies, and Family Hx were all reviewed.     Diagnostic Results         RADIOLOGY:  No orders to display       LABS:   Results for orders placed or performed during the hospital encounter of 92/19/39   Basic Metabolic Panel w/ Reflex to MG   Result Value Ref Range    Sodium 131 (L) 136 - 145 mmol/L    Potassium reflex Magnesium 4.5 3.5 - 5.1 mmol/L    Chloride 94 (L) 99 - 110 mmol/L    CO2 26 21 - 32 mmol/L    Anion Gap 11 3 - 16    Glucose 86 70 - 99 mg/dL    BUN 36 (H) 7 - 20 mg/dL    Creatinine 9.0 (HH) 0.8 - 1.3 mg/dL    Est, Glom Filt Rate 6 (A) >60    Calcium 9.2 8.3 - 10.6 mg/dL   CBC with Auto Differential   Result Value Ref Range    WBC 4.1 4.0 - 11.0 K/uL    RBC 2.85 (L) 4.20 - 5.90 M/uL    Hemoglobin 8.7 (L) 13.5 - 17.5 g/dL    Hematocrit 25.6 (L) 40.5 - 52.5 %    MCV 89.8 80.0 - 100.0 fL    MCH 30.5 26.0 - 34.0 pg    MCHC 34.0 31.0 - 36.0 g/dL    RDW 17.1 (H) 12.4 - 15.4 %    Platelets 110 (L) 131 - 450 K/uL    MPV 8.2 5.0 - 10.5 fL    Neutrophils % 57.3 %    Lymphocytes % 24.2 %    Monocytes % 12.7 %    Eosinophils % 4.8 %    Basophils % 1.0 %    Neutrophils Absolute 2.3 1.7 - 7.7 K/uL    Lymphocytes Absolute 1.0 1.0 - 5.1 K/uL    Monocytes Absolute 0.5 0.0 - 1.3 K/uL    Eosinophils Absolute 0.2 0.0 - 0.6 K/uL    Basophils Absolute 0.0 0.0 - 0.2 K/uL   Protime-INR   Result Value Ref Range    Protime 17.0 (H) 11.7 - 14.5 sec    INR 1.39 (H) 0.87 - 1.14   TYPE AND SCREEN   Result Value Ref Range    ABO/Rh B POS     Antibody Screen NEG        RECENT VITALS:  BP: 97/68, Temp: 98.1 °F (36.7 °C), Heart Rate: 65, Resp: 20, SpO2: 100 %     Procedures         ED Course     The patient was given the following medications:  No orders of the defined types were placed in this encounter. CONSULTS:  None    MEDICAL DECISION MAKING / ASSESSMENT / PLAN     Terry Odell is a 74 y.o. male end-stage renal disease on dialysis who presented to the emergency room overnight due to bleeding dialysis fistula. Patient is signed out to my care awaiting results of a repeat CBC and reevaluation. On my reevaluation patient is well-appearing he has no complaints at this time is feeling much better left upper extremity AV fistula without any signs of ongoing bleeding or other impending bleeding at this time. Repeat CBC demonstrates a 1 point drop in his hemoglobin, this seems about expected for the amount of blood loss he had at the scene. Has no ongoing bleeding no indication for emergent transfusion at this time. Patient and family informed about their diagnosis and plan. They were counseled about home care instructions, return precautions, need for follow-up and all medication instructions. They were given an opportunity to ask questions and all of the questions were answered prior to discharge. Clinical Impression     1. Hemorrhage of arteriovenous fistula, [facility]        Disposition     PATIENT REFERRED TO:  No follow-up provider specified.     DISCHARGE MEDICATIONS:  New Prescriptions    No medications on file       [address], MD  11/12/22 8809

## 2022-11-12 NOTE — ED PROVIDER NOTES
[address]          ATTENDING PHYSICIAN NOTE       Date of evaluation: 11/12/2022    Chief Complaint     Other (Fistula Bleeding)      History of Present Illness     Deb Mendez is a 74 y.o. male with a history of end-stage renal disease on hemodialysis, as well as numerous other medical comorbidities, who presents to the emergency department with bleeding from his dialysis fistula. The patient last had dialysis yesterday, which he states went well. He states that he awoke this early morning feeling wet, and noticed blood \"everywhere. \"  EMS was called, and he was brought to the emergency department for bleeding from his dialysis access site. The site was wrapped by EMS, and on arrival to the emergency department is not actively bleeding. On review of records, it does appear that the patient has had recurrent difficulties with bleeding from his dialysis access, which appears to be exacerbated by his anticoagulation on Eliquis for history of thromboembolic disease. He was most recently admitted for this in mid October. Patient states that he is feeling a little bit lightheaded this morning, which he believes may be related to blood loss. He otherwise denies any new or different symptoms from usual.  He states that he is always short of breath due to his COPD and heart failure, but not more so than usual.  He denies any chest pain. He has some numbness in his fingertips, which he states he has been told is due to neuropathy, but does not feel that this is different than usual.  He denies any nausea or vomiting, abdominal pain. He does not feel that his lower extremity edema is any worse than usual.  He denies any recent illnesses. Review of Systems     Review of Systems   All other systems reviewed and are negative.     Past Medical, Surgical, Family, and Social History     He has a past medical history of Acute HFrEF (heart failure with reduced ejection fraction) ([facility] Cerebral artery occlusion with cerebral infarction ([facility] Chronic kidney disease, COPD (chronic obstructive pulmonary disease) ([facility] Dialysis patient ([facility] Gout, Hemodialysis patient ([facility] Hx of blood clots, Hyperlipidemia, Hypertension, and Renal failure. He has a past surgical history that includes Dialysis fistula creation (Left) and Colonoscopy. His family history includes Cancer in his father and mother; Other in his sister. He reports that he has quit smoking. He has never used smokeless tobacco. He reports current alcohol use. He reports that he does not use drugs. Medications     Previous Medications    ALBUTEROL SULFATE HFA (PROVENTIL;VENTOLIN;PROAIR) 108 (90 BASE) MCG/ACT INHALER    Inhale 2 puffs into the lungs every 6 hours as needed for Wheezing 90mcg/actuation    ALLOPURINOL (ZYLOPRIM) 100 MG TABLET    Take 100 mg by mouth Daily on MWF    APIXABAN (ELIQUIS) 2.5 MG TABS TABLET    Take 1 tablet by mouth 2 times daily    ATORVASTATIN (LIPITOR) 80 MG TABLET    Take 80 mg by mouth daily    CALCITRIOL (ROCALTROL) 0.25 MCG CAPSULE    Take 0.5 mcg by mouth every other day [facility]    CALCIUM CARB-CHOLECALCIFEROL 600-200 MG-UNIT TABS TABLET    Take 1 tablet by mouth daily    CARVEDILOL (COREG) 12.5 MG TABLET    Take 12.5 mg by mouth 2 times daily Unsure of dose     DICLOFENAC SODIUM (VOLTAREN) 1 % GEL    Apply 2 g topically 2 times daily    FERROUS SULFATE (FE TABS) 325 (65 FE) MG EC TABLET    Take 1 tablet by mouth every other day    FUROSEMIDE (LASIX) 20 MG TABLET    Take 20 mg by mouth daily    GABAPENTIN (NEURONTIN) 100 MG CAPSULE    Take 1 capsule by mouth at bedtime for 30 days.     LEVOTHYROXINE (SYNTHROID) 100 MCG TABLET    Take 100 mcg by mouth Daily    OMEPRAZOLE (PRILOSEC) 20 MG DELAYED RELEASE CAPSULE    Take 20 mg by mouth daily    SEVELAMER (RENVELA) 800 MG TABLET    Take 1 tablet by mouth 3 times daily (with meals)    SODIUM CHLORIDE (OCEAN) 0.65 % NASAL SPRAY    1-2 sprays both nostrils at least daily and as needed to prevent dry mucosa. UMECLIDINIUM BROMIDE (INCRUSE ELLIPTA) 62.5 MCG/INH AEPB    Inhale 1 puff into the lungs daily    VITAMIN D (ERGOCALCIFEROL) 1.25 MG (85724 UT) CAPS CAPSULE    Take 50,000 Units by mouth once a week Saturdays       Allergies     He is allergic to nitroglycerin and oxymetazoline. Physical Exam     INITIAL VITALS: BP: 97/68, Temp: 98.1 °F (36.7 °C), Heart Rate: 65, Resp: 20, SpO2: 100 %     General: Slender, somewhat frail-appearing patient. He is very pleasantly conversational, and in no acute distress. HEENT: Pupils are equal, round, and reactive to light. Extraocular muscles are intact. Conjunctivae are clear and moist. No redness or drainage from the eyes. No drainage from the nose. The oropharynx appeared to be normal.    Neck: Supple, with full range of motion. Cardiovascular: Normal S1-S2 without murmur rub or gallop. 2+ radial pulses bilaterally. AV fistula in place in the left upper extremity, with a good thrill. There are several punctate scabs from prior dialysis access. No active bleeding. Respiratory: Unlabored breathing with equal chest rise and fall. Lungs are clear to auscultation bilaterally. No adventitious lung sounds heard. Abdomen: Flat, soft and nontender, without guarding or rebound tenderness. No masses or hepatosplenomegaly. Skin: Warm and dry, without rashes or ecchymoses, lacerations or abrasions. Neuro: Alert and oriented x3. No focal neurologic deficits are noted. Extremities: Warm and well-perfused. Trace bilateral lower extremity pretibial pitting edema. The patient moves all extremities equally. Psych: The patient's mood and affect are generally within normal limits for their presentation. Diagnostic Results     EKG   Normal sinus rhythm with a ventricular rate of 69 bpm.  First-degree AV block with MI interval 254 ms. QRS duration 96 ms. QTc 439 ms.   There is relatively diffuse T wave flattening, with inversions in leads V4 through V6. Poor R wave progression in the anterior precordial leads. This is almost entirely unchanged from prior EKG dated September 13, 2022. RADIOLOGY:  No orders to display       LABS:   Results for orders placed or performed during the hospital encounter of 98/88/83   Basic Metabolic Panel w/ Reflex to MG   Result Value Ref Range    Sodium 131 (L) 136 - 145 mmol/L    Potassium reflex Magnesium 4.5 3.5 - 5.1 mmol/L    Chloride 94 (L) 99 - 110 mmol/L    CO2 26 21 - 32 mmol/L    Anion Gap 11 3 - 16    Glucose 86 70 - 99 mg/dL    BUN 36 (H) 7 - 20 mg/dL    Creatinine 9.0 (HH) 0.8 - 1.3 mg/dL    Est, Glom Filt Rate 6 (A) >60    Calcium 9.2 8.3 - 10.6 mg/dL   CBC with Auto Differential   Result Value Ref Range    WBC 4.1 4.0 - 11.0 K/uL    RBC 2.85 (L) 4.20 - 5.90 M/uL    Hemoglobin 8.7 (L) 13.5 - 17.5 g/dL    Hematocrit 25.6 (L) 40.5 - 52.5 %    MCV 89.8 80.0 - 100.0 fL    MCH 30.5 26.0 - 34.0 pg    MCHC 34.0 31.0 - 36.0 g/dL    RDW 17.1 (H) 12.4 - 15.4 %    Platelets 103 (L) 645 - 450 K/uL    MPV 8.2 5.0 - 10.5 fL    Neutrophils % 57.3 %    Lymphocytes % 24.2 %    Monocytes % 12.7 %    Eosinophils % 4.8 %    Basophils % 1.0 %    Neutrophils Absolute 2.3 1.7 - 7.7 K/uL    Lymphocytes Absolute 1.0 1.0 - 5.1 K/uL    Monocytes Absolute 0.5 0.0 - 1.3 K/uL    Eosinophils Absolute 0.2 0.0 - 0.6 K/uL    Basophils Absolute 0.0 0.0 - 0.2 K/uL   Protime-INR   Result Value Ref Range    Protime 17.0 (H) 11.7 - 14.5 sec    INR 1.39 (H) 0.87 - 1.14   TYPE AND SCREEN   Result Value Ref Range    ABO/Rh B POS     Antibody Screen NEG          RECENT VITALS:  BP: 97/68, Temp: 98.1 °F (36.7 °C), Heart Rate: 65, Resp: 20, SpO2: 100 %     Procedures       ED Course     Nursing Notes, Past Medical Hx, Past Surgical Hx, Social Hx, Allergies, and Family Hx were reviewed.     The patient was given the following medications:  No orders of the defined types were placed in this encounter. CONSULTS:  None    MEDICAL DECISION MAKING / ASSESSMENT / PLAN     Nikita Sr is a 74 y.o. male with a history as described above, who presents with bleeding from his left upper extremity AV fistula, which has resolved with pressure upon arrival.  He is feeling lightheaded, therefore laboratory studies were obtained. These are generally reassuring, as well as his EKG. His hemoglobin is at baseline, at 8.7. On reevaluation, after the patient has been present to the emergency department for about an hour and a half, he is still feeling somewhat lightheaded. He has not had any return of bleeding. Given the patient's ongoing lightheadedness, and the fact that he has required transfusion for AV hemorrhages in the past, a repeat CBC is ordered for 4 hours after his initial labs, to ensure that he has not had a drop in hemoglobin necessitating transfusion. At this time, the patient's care is being given over the oncoming physician, who will follow up on this repeat CBC, and determine further care needs and disposition accordingly. Clinical Impression     1. Hemorrhage of arteriovenous fistula, [facility]        Disposition     PATIENT REFERRED TO:  No follow-up provider specified. DISCHARGE MEDICATIONS:  New Prescriptions    No medications on file         (Please note that portions of this note were completed with voice recognition software.   Efforts were made to edit the dictations but occasionally words are mis-transcribed.)     Margie Essex, MD  11/12/22 1138

## 2022-11-12 NOTE — DISCHARGE INSTRUCTIONS
Please keep your dialysis fistula gently covered avoid any trauma or other irritating movements or stimuli to it. Present to dialysis as scheduled on Monday return to the emergency department or call 911 for any new or acute concerns.

## 2022-11-15 ENCOUNTER — HOSPITAL ENCOUNTER (OUTPATIENT)
Dept: CT IMAGING | Age: 67
Discharge: HOME | End: 2022-11-15
Payer: MEDICARE

## 2022-11-15 VITALS
DIASTOLIC BLOOD PRESSURE: 61 MMHG | TEMPERATURE: 97.4 F | OXYGEN SATURATION: 99 % | WEIGHT: 143 LBS | SYSTOLIC BLOOD PRESSURE: 106 MMHG | RESPIRATION RATE: 18 BRPM | HEIGHT: 68 IN | HEART RATE: 70 BPM | BODY MASS INDEX: 21.67 KG/M2

## 2022-11-15 DIAGNOSIS — D61.818 ACQUIRED PANCYTOPENIA (HCC): ICD-10-CM

## 2022-11-15 LAB
APTT: 39 SEC (ref 23–34.3)
BASOPHILS ABSOLUTE: 0 K/UL (ref 0–0.2)
BASOPHILS RELATIVE PERCENT: 0.3 %
EOSINOPHILS ABSOLUTE: 0.2 K/UL (ref 0–0.6)
EOSINOPHILS RELATIVE PERCENT: 6.3 %
HCT VFR BLD CALC: 22.8 % (ref 40.5–52.5)
HEMOGLOBIN: 7.2 G/DL (ref 13.5–17.5)
INR BLD: 1.29 (ref 0.87–1.14)
LYMPHOCYTES ABSOLUTE: 0.8 K/UL (ref 1–5.1)
LYMPHOCYTES RELATIVE PERCENT: 23.5 %
MCH RBC QN AUTO: 28.3 PG (ref 26–34)
MCHC RBC AUTO-ENTMCNC: 31.6 G/DL (ref 31–36)
MCV RBC AUTO: 89.8 FL (ref 80–100)
MONOCYTES ABSOLUTE: 0.4 K/UL (ref 0–1.3)
MONOCYTES RELATIVE PERCENT: 10.8 %
NEUTROPHILS ABSOLUTE: 2 K/UL (ref 1.7–7.7)
NEUTROPHILS RELATIVE PERCENT: 59.1 %
PDW BLD-RTO: 17.6 % (ref 12.4–15.4)
PLATELET # BLD: 96 K/UL (ref 135–450)
PMV BLD AUTO: 7.6 FL (ref 5–10.5)
PROTHROMBIN TIME: 16 SEC (ref 11.7–14.5)
RBC # BLD: 2.54 M/UL (ref 4.2–5.9)
WBC # BLD: 3.4 K/UL (ref 4–11)

## 2022-11-15 PROCEDURE — 77012 CT SCAN FOR NEEDLE BIOPSY: CPT

## 2022-11-15 PROCEDURE — 88342 IMHCHEM/IMCYTCHM 1ST ANTB: CPT

## 2022-11-15 PROCEDURE — 36415 COLL VENOUS BLD VENIPUNCTURE: CPT

## 2022-11-15 PROCEDURE — 2580000003 HC RX 258: Performed by: RADIOLOGY

## 2022-11-15 PROCEDURE — 7100000011 HC PHASE II RECOVERY - ADDTL 15 MIN

## 2022-11-15 PROCEDURE — 2709999900 CT BIOPSY BONE MARROW

## 2022-11-15 PROCEDURE — 85610 PROTHROMBIN TIME: CPT

## 2022-11-15 PROCEDURE — 2500000003 HC RX 250 WO HCPCS: Performed by: RADIOLOGY

## 2022-11-15 PROCEDURE — 85730 THROMBOPLASTIN TIME PARTIAL: CPT

## 2022-11-15 PROCEDURE — 6360000002 HC RX W HCPCS: Performed by: RADIOLOGY

## 2022-11-15 PROCEDURE — 38221 DX BONE MARROW BIOPSIES: CPT

## 2022-11-15 PROCEDURE — 88311 DECALCIFY TISSUE: CPT

## 2022-11-15 PROCEDURE — 88341 IMHCHEM/IMCYTCHM EA ADD ANTB: CPT

## 2022-11-15 PROCEDURE — 88313 SPECIAL STAINS GROUP 2: CPT

## 2022-11-15 PROCEDURE — 85025 COMPLETE CBC W/AUTO DIFF WBC: CPT

## 2022-11-15 PROCEDURE — 7100000010 HC PHASE II RECOVERY - FIRST 15 MIN

## 2022-11-15 PROCEDURE — 88305 TISSUE EXAM BY PATHOLOGIST: CPT

## 2022-11-15 RX ORDER — LIDOCAINE HYDROCHLORIDE 10 MG/ML
INJECTION, SOLUTION EPIDURAL; INFILTRATION; INTRACAUDAL; PERINEURAL
Status: COMPLETED | OUTPATIENT
Start: 2022-11-15 | End: 2022-11-15

## 2022-11-15 RX ORDER — FENTANYL CITRATE 50 UG/ML
INJECTION, SOLUTION INTRAMUSCULAR; INTRAVENOUS
Status: COMPLETED | OUTPATIENT
Start: 2022-11-15 | End: 2022-11-15

## 2022-11-15 RX ORDER — SODIUM CHLORIDE 9 MG/ML
INJECTION, SOLUTION INTRAVENOUS CONTINUOUS
Status: DISCONTINUED | OUTPATIENT
Start: 2022-11-15 | End: 2022-11-16 | Stop reason: HOSPADM

## 2022-11-15 RX ORDER — MIDAZOLAM HYDROCHLORIDE 1 MG/ML
INJECTION INTRAMUSCULAR; INTRAVENOUS
Status: COMPLETED | OUTPATIENT
Start: 2022-11-15 | End: 2022-11-15

## 2022-11-15 RX ORDER — ACETAMINOPHEN 325 MG/1
650 TABLET ORAL EVERY 4 HOURS PRN
Status: DISCONTINUED | OUTPATIENT
Start: 2022-11-15 | End: 2022-11-16 | Stop reason: HOSPADM

## 2022-11-15 RX ORDER — NALOXONE HYDROCHLORIDE 0.4 MG/ML
0.4 INJECTION, SOLUTION INTRAMUSCULAR; INTRAVENOUS; SUBCUTANEOUS ONCE
Status: COMPLETED | OUTPATIENT
Start: 2022-11-15 | End: 2022-11-15

## 2022-11-15 RX ADMIN — NALOXONE HYDROCHLORIDE 0.4 MG: 0.4 INJECTION, SOLUTION INTRAMUSCULAR; INTRAVENOUS; SUBCUTANEOUS at 10:28

## 2022-11-15 RX ADMIN — Medication 10 ML: at 10:00

## 2022-11-15 RX ADMIN — MIDAZOLAM HYDROCHLORIDE 1 MG: 2 INJECTION, SOLUTION INTRAMUSCULAR; INTRAVENOUS at 09:55

## 2022-11-15 RX ADMIN — MIDAZOLAM HYDROCHLORIDE 1 MG: 2 INJECTION, SOLUTION INTRAMUSCULAR; INTRAVENOUS at 10:04

## 2022-11-15 RX ADMIN — FENTANYL CITRATE 25 MCG: 50 INJECTION, SOLUTION INTRAMUSCULAR; INTRAVENOUS at 09:54

## 2022-11-15 RX ADMIN — LIDOCAINE HYDROCHLORIDE 10 ML: 10 INJECTION, SOLUTION EPIDURAL; INFILTRATION; INTRACAUDAL; PERINEURAL at 09:58

## 2022-11-15 RX ADMIN — SODIUM CHLORIDE: 9 INJECTION, SOLUTION INTRAVENOUS at 10:28

## 2022-11-15 RX ADMIN — FENTANYL CITRATE 25 MCG: 50 INJECTION, SOLUTION INTRAMUSCULAR; INTRAVENOUS at 10:04

## 2022-11-15 ASSESSMENT — PAIN SCALES - GENERAL
PAINLEVEL_OUTOF10: 0
PAINLEVEL_OUTOF10: 0

## 2022-11-15 ASSESSMENT — PAIN - FUNCTIONAL ASSESSMENT: PAIN_FUNCTIONAL_ASSESSMENT: NONE - DENIES PAIN

## 2022-11-15 NOTE — PROCEDURES
Maylin Croft is a 74 y.o. male patient. 1. [facility]      Past Medical History:   Diagnosis Date    Acute HFrEF (heart failure with reduced ejection fraction) (HCC)     Cerebral artery occlusion with cerebral infarction (HCC)     Chronic kidney disease     COPD (chronic obstructive pulmonary disease) ([facility]     Dialysis patient ([facility]     Gout     Hemodialysis patient ([facility]     Hx of blood clots     Hyperlipidemia     Hypertension     Renal failure      Blood pressure 98/62, pulse 71, temperature 97.6 °F (36.4 °C), temperature source Oral, resp. rate 18, height 5' 8\" (1.727 m), weight 143 lb (64.9 kg), SpO2 100 %. Procedures    S/p left iliac bone marrow core and aspiration biopsy with CT guidance. No complications. 3 ml estimated blood loss.     Mary Martinez MD  11/15/2022

## 2022-11-15 NOTE — PROGRESS NOTES
[address] here . Verified with Transporter [place], who he was picking up. He wanted patient brought to the door and did not want to come into unit. Patient had been assisted to [facility] by Shakir Chapman and myself. Steady transfer to [facility]. Wheeled to [facility] and transporter took WC at door and had lowered back lift already. Aware he uses O2 continuously and said okay to put him on O2 in [facility]. Verified with patient and transportation the facility he was going to.

## 2022-11-15 NOTE — SEDATION DOCUMENTATION
IMAGING SERVICES NURSING PROGRESS NOTE    Procedure:  BM biopsy  November 15, 2022  Maricel Vazquez      Allergies: Allergies   Allergen Reactions    Nitroglycerin      Other reaction(s): Unknown    Oxymetazoline      Other reaction(s): Unknown       Vitals:    11/15/22 1041   BP: 102/65   Pulse: 65   Resp: 18   Temp: 97.9 °F (36.6 °C)   SpO2: 100%       Recent lab work reviewed with MD: yes   Procedure explained to patient by MD: yes   Informed consent obtained:yes  Family with patient: Yes, transport back to [facility]    Mental Status:  Normal  Readiness to learn:  Yes  Barriers to learning: No    Pain Assessment Pre-Procedure:  Pain Present:  no  Pain Score:  0  Pain Quality/Description:  none    Time out Procedure Verification with:  [x] RN  [x] Physician  [x] Patient  [x] Other: CT Technologist  Procedure site marked, if applicable:  Yes    Note: Patient arrived A & O x 4, denies pain, breathing easily on room air, Spoke to Dr. Miguel Childrses prior to procedure. Procedural sedation:  Fentanyl:  50 mcg  Versed:    1 mg  Naloxone: 0.4 mg  0.9% normal saline: 500 ml/hr  Post Procedureal Note:  Patient tolerated procedure well. Breathing easily on room air. Patient became drowsy and hard to arouse so gave per Dr. Miguel Childress normal saline bolus and naloxone 0.4 mg. Patient was able to arouse after that. Report given to [facility] RN. Patient transported in stable conditon to room 7.     Pain Assessment Post-Procedure:  Pain Present:  no  Pain Score:  0  Pain Quality/Description:  none    Plan of Care Goals:  Safety measures met:  Yes  Patient understands explanation of procedure:  Yes    Time in:  [address]  Time out:  [facility] [facility]  R.NFang 11/15/2022

## 2022-11-15 NOTE — PRE SEDATION
Sedation Pre-Procedure Note    Patient Name: Maria Teresa Lawrence   YOB: 1955  Room/Bed: Room/bed info not found  Medical Record Number: [id number]  Date: 11/15/2022   Time: 9:43 AM       Indicat ion:  bone marrow disorder    Consent: I have discussed with the patient and/or the patient representative the indication, alternatives, and the possible risks and/or complications of the planned procedure and the anesthesia methods. The patient and/or patient representative appear to understand and agree to proceed. Vital Signs:   Vitals:    11/15/22 0938   BP: 101/64   Pulse: 63   Resp: 18   Temp: 97.6 °F (36.4 °C)   SpO2: 100%       Past Medical History:   has a past medical history of Acute HFrEF (heart failure with reduced ejection fraction) ([facility] Cerebral artery occlusion with cerebral infarction ([facility] Chronic kidney disease, COPD (chronic obstructive pulmonary disease) ([facility] Dialysis patient ([facility] Gout, Hemodialysis patient ([facility] Hx of blood clots, Hyperlipidemia, Hypertension, and Renal failure. Past Surgical History:   has a past surgical history that includes Dialysis fistula creation (Left) and Colonoscopy. Medications:   Scheduled Meds:   Continuous Infusions:   PRN Meds:   Home Meds:   Prior to Admission medications    Medication Sig Start Date End Date Taking? Authorizing Provider   sodium chloride (OCEAN) 0.65 % nasal spray 1-2 sprays both nostrils at least daily and as needed to prevent dry mucosa. 10/15/22   Janice Hill DO   levothyroxine (SYNTHROID) 100 MCG tablet Take 100 mcg by mouth Daily    Historical Provider, MD   gabapentin (NEURONTIN) 100 MG capsule Take 1 capsule by mouth at bedtime for 30 days.  9/15/22 10/15/22  Red Gaona DO   diclofenac sodium (VOLTAREN) 1 % GEL Apply 2 g topically 2 times daily  Patient taking differently: Apply 2 g topically 2 times daily R shoulder 9/15/22   Red Gaona DO   calcitRIOL (ROCALTROL) 0.25 MCG capsule Take 0.5 mcg by mouth every other day F Historical Provider, MD   sevelamer (RENVELA) 800 MG tablet Take 1 tablet by mouth 3 times daily (with meals)    Historical Provider, MD   apixaban (ELIQUIS) 2.5 MG TABS tablet Take 1 tablet by mouth 2 times daily 6/18/22   Cadence Zavala MD   ferrous sulfate (FE TABS) 325 (65 Fe) MG EC tablet Take 1 tablet by mouth every other day  Patient taking differently: Take 325 mg by mouth daily (with breakfast) 6/18/22 8/17/22  Cadence Zavala MD   atorvastatin (LIPITOR) 80 MG tablet Take 80 mg by mouth daily    Historical Provider, MD   calcium carb-cholecalciferol 600-200 MG-UNIT TABS tablet Take 1 tablet by mouth daily    Historical Provider, MD   furosemide (LASIX) 20 MG tablet Take 20 mg by mouth daily    Historical Provider, MD   Umeclidinium Bromide (INCRUSE ELLIPTA) 62.5 MCG/INH AEPB Inhale 1 puff into the lungs daily    Historical Provider, MD   vitamin D (ERGOCALCIFEROL) 1.25 MG (25320 UT) CAPS capsule Take 50,000 Units by mouth once a week Saturdays    Historical Provider, MD   omeprazole (PRILOSEC) 20 MG delayed release capsule Take 20 mg by mouth daily    Historical Provider, MD   carvedilol (COREG) 12.5 MG tablet Take 12.5 mg by mouth 2 times daily Unsure of dose     Historical Provider, MD   albuterol sulfate HFA (PROVENTIL;VENTOLIN;PROAIR) 108 (90 Base) MCG/ACT inhaler Inhale 2 puffs into the lungs every 6 hours as needed for Wheezing 90mcg/actuation    Historical Provider, MD   allopurinol (ZYLOPRIM) 100 MG tablet Take 100 mg by mouth Daily on MWF    Historical Provider, MD     Coumadin Use Last 7 Days:  no  Antiplatelet drug therapy use last 7 days: no  Other anticoagulant use last 7 days: no  Additional Medication Information:        Pre-Sedation Documentation and Exam:   Vital signs have been reviewed (see flow sheet for vitals).     Mallampati Airway Assessment:  Mallampati Class III - (soft palate & base of uvula are visible)    Prior History of Anesthesia Complications: none    ASA Classification:  Class 3 - A patient with severe systemic disease that limits activity but is not incapacitating    Sedation/ Anesthesia Plan:   intravenous sedation    Medications Planned:   fentanyl intravenously    Patient is an appropriate candidate for plan of sedation: yes    Electronically signed by Rachele Toussaint MD on 11/15/2022 at 9:43 AM

## 2022-11-15 NOTE — DISCHARGE INSTRUCTIONS
Discharge Instructions for Biopsy of  Bone Marrow  Interventional Radiologist performing procedure Dr. [facility] [place] [phone] the bandage after a day or two. May apply band-aid if needed. Diet     Resume your normal diet. Physical Activity     Rest for the first 24-48 hours. No driving for 24 hours if you have received analgesia/sedation   May shower. Do not submerge biopsy site in water (ie:bath, hot tub, swimming pool) for one week to prevent infection    Avoid strenuous activities for one week (excercise, heavy lifting etc.)       Call Your Doctor If Any of the Following Occurs   Signs of infection, including fever and chills     Redness, swelling, increasing pain, excessive bleeding, or any discharge from the incision site     Pain that you can't control with over the counter medications   Cough, shortness of breath, or chest pain     Pain when taking a deep breath     You feel your heart rate is fast   In case of an emergency, call 911 immediately. [facility]  A responsible person should be with you for the next 24 hours. MEDICATION INSTRUCTIONS:  [x]Resume your prescribed medications  [x]You may take a non-prescription headache remedy, preferably one that does not contain aspirin. [x] Keep your medication list updated and carry it with you in case of emergencies. IF YOU TAKE ANTI-COAGULANTS:  You may typically re-start your blood thinning drugs the day after your procedure UNLESS directed otherwise by the doctor who prescribes the drug for you. Some common blood thinning drugs include  Anti-inflammatory drugs (motrin, aleve)     Aspirin  Anti-coagulants such as plavix (clopidogrel) or coumadin (warfarin)      FOLLOW-UP CARE:  Follow up with your doctor for results and appointment    Physician Fredrick Monaco        The above instructions were reviewed with patient/significant other.   The following additional patient specific information was reviewed with the patient/significant other:       I have read and understand the instructions given to me:         ____________________________________________   (Patient/S.O. Signature)           Nurse Blessing Choi RN ,R.N. Date/time 11/15/2022 11:03 AM         Radiology Department  [phone]           SAME DAY SERVICES:  [phone] M-F 7AM-6PM     If you smoke STOP. We care about your health!

## 2022-11-18 ENCOUNTER — HOSPITAL ENCOUNTER (OUTPATIENT)
Dept: ULTRASOUND IMAGING | Age: 67
Discharge: HOME | End: 2022-11-18
Payer: MEDICARE

## 2022-11-18 DIAGNOSIS — D69.6 THROMBOCYTOPENIA, UNSPECIFIED (HCC): ICD-10-CM

## 2022-11-18 DIAGNOSIS — D61.818 OTHER PANCYTOPENIA (HCC): ICD-10-CM

## 2022-11-18 PROCEDURE — 76700 US EXAM ABDOM COMPLETE: CPT

## 2022-12-27 ENCOUNTER — HOSPITAL ENCOUNTER (INPATIENT)
Age: 67
LOS: 2 days | Discharge: NURSING, REHAB OR LONG-TERM CARE | End: 2022-12-30
Attending: EMERGENCY MEDICINE | Admitting: INTERNAL MEDICINE
Payer: MEDICARE

## 2022-12-27 DIAGNOSIS — E87.20 LACTIC ACIDOSIS: ICD-10-CM

## 2022-12-27 DIAGNOSIS — I95.9 HYPOTENSION, UNSPECIFIED HYPOTENSION TYPE: Primary | ICD-10-CM

## 2022-12-27 DIAGNOSIS — R55 NEAR SYNCOPE: ICD-10-CM

## 2022-12-27 DIAGNOSIS — R53.1 GENERAL WEAKNESS: ICD-10-CM

## 2022-12-27 PROCEDURE — 99285 EMERGENCY DEPT VISIT HI MDM: CPT

## 2022-12-27 PROCEDURE — 96360 HYDRATION IV INFUSION INIT: CPT

## 2022-12-27 PROCEDURE — 96361 HYDRATE IV INFUSION ADD-ON: CPT

## 2022-12-27 ASSESSMENT — PAIN DESCRIPTION - LOCATION: LOCATION: KNEE

## 2022-12-27 ASSESSMENT — PAIN DESCRIPTION - PAIN TYPE: TYPE: ACUTE PAIN

## 2022-12-27 ASSESSMENT — PAIN DESCRIPTION - FREQUENCY: FREQUENCY: CONTINUOUS

## 2022-12-27 ASSESSMENT — PAIN DESCRIPTION - DESCRIPTORS: DESCRIPTORS: DISCOMFORT

## 2022-12-27 ASSESSMENT — PAIN DESCRIPTION - ORIENTATION: ORIENTATION: LEFT

## 2022-12-27 ASSESSMENT — PAIN DESCRIPTION - ONSET: ONSET: ON-GOING

## 2022-12-28 ENCOUNTER — APPOINTMENT (OUTPATIENT)
Dept: CT IMAGING | Age: 67
End: 2022-12-28
Payer: MEDICARE

## 2022-12-28 ENCOUNTER — APPOINTMENT (OUTPATIENT)
Dept: GENERAL RADIOLOGY | Age: 67
End: 2022-12-28
Payer: MEDICARE

## 2022-12-28 PROBLEM — I95.89 HYPOTENSION DUE TO HYPOVOLEMIA: Status: ACTIVE | Noted: 2022-12-28

## 2022-12-28 PROBLEM — R55 PRE-SYNCOPE: Status: ACTIVE | Noted: 2022-12-28

## 2022-12-28 PROBLEM — R53.1 WEAKNESS: Status: ACTIVE | Noted: 2022-12-28

## 2022-12-28 PROBLEM — E86.1 HYPOTENSION DUE TO HYPOVOLEMIA: Status: ACTIVE | Noted: 2022-12-28

## 2022-12-28 PROBLEM — E87.20 LACTIC ACIDEMIA: Status: ACTIVE | Noted: 2022-12-28

## 2022-12-28 PROBLEM — R79.89 ELEVATED BRAIN NATRIURETIC PEPTIDE (BNP) LEVEL: Status: ACTIVE | Noted: 2022-12-28

## 2022-12-28 LAB
ALBUMIN SERPL-MCNC: 3.9 G/DL (ref 3.4–5)
ALP BLD-CCNC: 198 U/L (ref 40–129)
ALT SERPL-CCNC: 7 U/L (ref 10–40)
ANION GAP SERPL CALCULATED.3IONS-SCNC: 14 MMOL/L (ref 3–16)
AST SERPL-CCNC: 15 U/L (ref 15–37)
BASOPHILS ABSOLUTE: 0 K/UL (ref 0–0.2)
BASOPHILS RELATIVE PERCENT: 0.4 %
BILIRUB SERPL-MCNC: 0.7 MG/DL (ref 0–1)
BILIRUBIN DIRECT: <0.2 MG/DL (ref 0–0.3)
BILIRUBIN, INDIRECT: ABNORMAL MG/DL (ref 0–1)
BUN BLDV-MCNC: 27 MG/DL (ref 7–20)
CALCIUM SERPL-MCNC: 9.1 MG/DL (ref 8.3–10.6)
CHLORIDE BLD-SCNC: 94 MMOL/L (ref 99–110)
CO2: 26 MMOL/L (ref 21–32)
CREAT SERPL-MCNC: 7.5 MG/DL (ref 0.8–1.3)
EKG ATRIAL RATE: 73 BPM
EKG DIAGNOSIS: NORMAL
EKG P AXIS: 83 DEGREES
EKG P-R INTERVAL: 256 MS
EKG Q-T INTERVAL: 426 MS
EKG QRS DURATION: 102 MS
EKG QTC CALCULATION (BAZETT): 469 MS
EKG R AXIS: -60 DEGREES
EKG T AXIS: 218 DEGREES
EKG VENTRICULAR RATE: 73 BPM
EOSINOPHILS ABSOLUTE: 0.2 K/UL (ref 0–0.6)
EOSINOPHILS RELATIVE PERCENT: 5 %
GFR SERPL CREATININE-BSD FRML MDRD: 7 ML/MIN/{1.73_M2}
GLUCOSE BLD-MCNC: 100 MG/DL (ref 70–99)
HCT VFR BLD CALC: 32.4 % (ref 40.5–52.5)
HEMOGLOBIN: 9.9 G/DL (ref 13.5–17.5)
LACTIC ACID: 0.8 MMOL/L (ref 0.4–2)
LACTIC ACID: 3 MMOL/L (ref 0.4–2)
LIPASE: 31 U/L (ref 13–60)
LYMPHOCYTES ABSOLUTE: 0.7 K/UL (ref 1–5.1)
LYMPHOCYTES RELATIVE PERCENT: 18.9 %
MCH RBC QN AUTO: 27.8 PG (ref 26–34)
MCHC RBC AUTO-ENTMCNC: 30.6 G/DL (ref 31–36)
MCV RBC AUTO: 90.7 FL (ref 80–100)
MONOCYTES ABSOLUTE: 0.3 K/UL (ref 0–1.3)
MONOCYTES RELATIVE PERCENT: 9 %
NEUTROPHILS ABSOLUTE: 2.5 K/UL (ref 1.7–7.7)
NEUTROPHILS RELATIVE PERCENT: 66.7 %
PDW BLD-RTO: 16.7 % (ref 12.4–15.4)
PLATELET # BLD: 102 K/UL (ref 135–450)
PMV BLD AUTO: 7.6 FL (ref 5–10.5)
POTASSIUM REFLEX MAGNESIUM: 4.5 MMOL/L (ref 3.5–5.1)
PRO-BNP: ABNORMAL PG/ML (ref 0–124)
RAPID INFLUENZA  B AGN: NEGATIVE
RAPID INFLUENZA A AGN: NEGATIVE
RBC # BLD: 3.57 M/UL (ref 4.2–5.9)
REASON FOR REJECTION: NORMAL
REJECTED TEST: NORMAL
SARS-COV-2, NAAT: NOT DETECTED
SODIUM BLD-SCNC: 134 MMOL/L (ref 136–145)
TOTAL PROTEIN: 6.7 G/DL (ref 6.4–8.2)
TROPONIN: 0.08 NG/ML
TROPONIN: 0.1 NG/ML
WBC # BLD: 3.7 K/UL (ref 4–11)

## 2022-12-28 PROCEDURE — 83880 ASSAY OF NATRIURETIC PEPTIDE: CPT

## 2022-12-28 PROCEDURE — 71046 X-RAY EXAM CHEST 2 VIEWS: CPT

## 2022-12-28 PROCEDURE — 36415 COLL VENOUS BLD VENIPUNCTURE: CPT

## 2022-12-28 PROCEDURE — 6370000000 HC RX 637 (ALT 250 FOR IP): Performed by: INTERNAL MEDICINE

## 2022-12-28 PROCEDURE — 80076 HEPATIC FUNCTION PANEL: CPT

## 2022-12-28 PROCEDURE — 84484 ASSAY OF TROPONIN QUANT: CPT

## 2022-12-28 PROCEDURE — 83605 ASSAY OF LACTIC ACID: CPT

## 2022-12-28 PROCEDURE — 87635 SARS-COV-2 COVID-19 AMP PRB: CPT

## 2022-12-28 PROCEDURE — 73562 X-RAY EXAM OF KNEE 3: CPT

## 2022-12-28 PROCEDURE — 6360000002 HC RX W HCPCS: Performed by: INTERNAL MEDICINE

## 2022-12-28 PROCEDURE — 2580000003 HC RX 258: Performed by: INTERNAL MEDICINE

## 2022-12-28 PROCEDURE — 83690 ASSAY OF LIPASE: CPT

## 2022-12-28 PROCEDURE — 1200000000 HC SEMI PRIVATE

## 2022-12-28 PROCEDURE — 94640 AIRWAY INHALATION TREATMENT: CPT

## 2022-12-28 PROCEDURE — 5A1D70Z PERFORMANCE OF URINARY FILTRATION, INTERMITTENT, LESS THAN 6 HOURS PER DAY: ICD-10-PCS | Performed by: INTERNAL MEDICINE

## 2022-12-28 PROCEDURE — 87040 BLOOD CULTURE FOR BACTERIA: CPT

## 2022-12-28 PROCEDURE — 85025 COMPLETE CBC W/AUTO DIFF WBC: CPT

## 2022-12-28 PROCEDURE — 80048 BASIC METABOLIC PNL TOTAL CA: CPT

## 2022-12-28 PROCEDURE — 94761 N-INVAS EAR/PLS OXIMETRY MLT: CPT

## 2022-12-28 PROCEDURE — 87804 INFLUENZA ASSAY W/OPTIC: CPT

## 2022-12-28 PROCEDURE — 2580000003 HC RX 258

## 2022-12-28 PROCEDURE — 70450 CT HEAD/BRAIN W/O DYE: CPT

## 2022-12-28 PROCEDURE — 2700000000 HC OXYGEN THERAPY PER DAY

## 2022-12-28 PROCEDURE — 2580000003 HC RX 258: Performed by: EMERGENCY MEDICINE

## 2022-12-28 PROCEDURE — 93005 ELECTROCARDIOGRAM TRACING: CPT | Performed by: INTERNAL MEDICINE

## 2022-12-28 RX ORDER — ERGOCALCIFEROL 1.25 MG/1
50000 CAPSULE ORAL WEEKLY
Status: DISCONTINUED | OUTPATIENT
Start: 2023-01-01 | End: 2022-12-30 | Stop reason: HOSPADM

## 2022-12-28 RX ORDER — HEPARIN SODIUM 5000 [USP'U]/ML
5000 INJECTION, SOLUTION INTRAVENOUS; SUBCUTANEOUS EVERY 8 HOURS SCHEDULED
Status: DISCONTINUED | OUTPATIENT
Start: 2022-12-28 | End: 2022-12-28

## 2022-12-28 RX ORDER — ATORVASTATIN CALCIUM 80 MG/1
80 TABLET, FILM COATED ORAL DAILY
Status: DISCONTINUED | OUTPATIENT
Start: 2022-12-28 | End: 2022-12-30 | Stop reason: HOSPADM

## 2022-12-28 RX ORDER — SODIUM CHLORIDE, SODIUM LACTATE, POTASSIUM CHLORIDE, AND CALCIUM CHLORIDE .6; .31; .03; .02 G/100ML; G/100ML; G/100ML; G/100ML
500 INJECTION, SOLUTION INTRAVENOUS ONCE
Status: COMPLETED | OUTPATIENT
Start: 2022-12-28 | End: 2022-12-28

## 2022-12-28 RX ORDER — POLYETHYLENE GLYCOL 3350 17 G/17G
17 POWDER, FOR SOLUTION ORAL DAILY PRN
Status: DISCONTINUED | OUTPATIENT
Start: 2022-12-28 | End: 2022-12-30 | Stop reason: HOSPADM

## 2022-12-28 RX ORDER — CARVEDILOL 12.5 MG/1
12.5 TABLET ORAL 2 TIMES DAILY
Status: DISCONTINUED | OUTPATIENT
Start: 2022-12-28 | End: 2022-12-29

## 2022-12-28 RX ORDER — ALBUMIN (HUMAN) 12.5 G/50ML
25 SOLUTION INTRAVENOUS
Status: ACTIVE | OUTPATIENT
Start: 2022-12-28 | End: 2022-12-29

## 2022-12-28 RX ORDER — ALBUTEROL SULFATE 2.5 MG/3ML
2.5 SOLUTION RESPIRATORY (INHALATION) EVERY 6 HOURS PRN
Status: DISCONTINUED | OUTPATIENT
Start: 2022-12-28 | End: 2022-12-30 | Stop reason: HOSPADM

## 2022-12-28 RX ORDER — SODIUM CHLORIDE 9 MG/ML
INJECTION, SOLUTION INTRAVENOUS PRN
Status: DISCONTINUED | OUTPATIENT
Start: 2022-12-28 | End: 2022-12-30 | Stop reason: HOSPADM

## 2022-12-28 RX ORDER — ONDANSETRON 2 MG/ML
4 INJECTION INTRAMUSCULAR; INTRAVENOUS EVERY 6 HOURS PRN
Status: DISCONTINUED | OUTPATIENT
Start: 2022-12-28 | End: 2022-12-30 | Stop reason: HOSPADM

## 2022-12-28 RX ORDER — GABAPENTIN 100 MG/1
100 CAPSULE ORAL NIGHTLY
Status: DISCONTINUED | OUTPATIENT
Start: 2022-12-28 | End: 2022-12-30 | Stop reason: HOSPADM

## 2022-12-28 RX ORDER — LEVOTHYROXINE SODIUM 0.1 MG/1
100 TABLET ORAL DAILY
Status: DISCONTINUED | OUTPATIENT
Start: 2022-12-28 | End: 2022-12-30 | Stop reason: HOSPADM

## 2022-12-28 RX ORDER — PANTOPRAZOLE SODIUM 40 MG/1
40 TABLET, DELAYED RELEASE ORAL
Status: DISCONTINUED | OUTPATIENT
Start: 2022-12-29 | End: 2022-12-30 | Stop reason: HOSPADM

## 2022-12-28 RX ORDER — 0.9 % SODIUM CHLORIDE 0.9 %
100 INTRAVENOUS SOLUTION INTRAVENOUS PRN
Status: DISCONTINUED | OUTPATIENT
Start: 2022-12-28 | End: 2022-12-30 | Stop reason: HOSPADM

## 2022-12-28 RX ORDER — SEVELAMER CARBONATE 800 MG/1
800 TABLET, FILM COATED ORAL
Status: DISCONTINUED | OUTPATIENT
Start: 2022-12-28 | End: 2022-12-30 | Stop reason: HOSPADM

## 2022-12-28 RX ORDER — ONDANSETRON 4 MG/1
4 TABLET, ORALLY DISINTEGRATING ORAL EVERY 8 HOURS PRN
Status: DISCONTINUED | OUTPATIENT
Start: 2022-12-28 | End: 2022-12-30 | Stop reason: HOSPADM

## 2022-12-28 RX ORDER — SODIUM CHLORIDE 0.9 % (FLUSH) 0.9 %
5-40 SYRINGE (ML) INJECTION PRN
Status: DISCONTINUED | OUTPATIENT
Start: 2022-12-28 | End: 2022-12-30 | Stop reason: HOSPADM

## 2022-12-28 RX ORDER — SODIUM CHLORIDE 0.9 % (FLUSH) 0.9 %
5-40 SYRINGE (ML) INJECTION EVERY 12 HOURS SCHEDULED
Status: DISCONTINUED | OUTPATIENT
Start: 2022-12-28 | End: 2022-12-30 | Stop reason: HOSPADM

## 2022-12-28 RX ORDER — ACETAMINOPHEN 650 MG/1
650 SUPPOSITORY RECTAL EVERY 6 HOURS PRN
Status: DISCONTINUED | OUTPATIENT
Start: 2022-12-28 | End: 2022-12-30 | Stop reason: HOSPADM

## 2022-12-28 RX ORDER — FUROSEMIDE 20 MG/1
20 TABLET ORAL DAILY
Status: DISCONTINUED | OUTPATIENT
Start: 2022-12-28 | End: 2022-12-28

## 2022-12-28 RX ORDER — ALLOPURINOL 100 MG/1
100 TABLET ORAL DAILY
Status: DISCONTINUED | OUTPATIENT
Start: 2022-12-28 | End: 2022-12-30 | Stop reason: HOSPADM

## 2022-12-28 RX ORDER — CALCIUM CARBONATE-CHOLECALCIFEROL TAB 250 MG-125 UNIT 250-125 MG-UNIT
2 TAB ORAL DAILY
Status: DISCONTINUED | OUTPATIENT
Start: 2022-12-28 | End: 2022-12-30 | Stop reason: HOSPADM

## 2022-12-28 RX ORDER — ACETAMINOPHEN 325 MG/1
650 TABLET ORAL EVERY 6 HOURS PRN
Status: DISCONTINUED | OUTPATIENT
Start: 2022-12-28 | End: 2022-12-30 | Stop reason: HOSPADM

## 2022-12-28 RX ORDER — CALCITRIOL 0.25 UG/1
0.5 CAPSULE, LIQUID FILLED ORAL EVERY OTHER DAY
Status: DISCONTINUED | OUTPATIENT
Start: 2022-12-28 | End: 2022-12-30 | Stop reason: HOSPADM

## 2022-12-28 RX ADMIN — SEVELAMER CARBONATE 800 MG: 800 TABLET, FILM COATED ORAL at 18:35

## 2022-12-28 RX ADMIN — GABAPENTIN 100 MG: 100 CAPSULE ORAL at 21:45

## 2022-12-28 RX ADMIN — ALLOPURINOL 100 MG: 100 TABLET ORAL at 12:15

## 2022-12-28 RX ADMIN — IPRATROPIUM BROMIDE 0.5 MG: 0.5 SOLUTION RESPIRATORY (INHALATION) at 15:27

## 2022-12-28 RX ADMIN — APIXABAN 2.5 MG: 2.5 TABLET, FILM COATED ORAL at 12:15

## 2022-12-28 RX ADMIN — SEVELAMER CARBONATE 800 MG: 800 TABLET, FILM COATED ORAL at 12:14

## 2022-12-28 RX ADMIN — SODIUM CHLORIDE, PRESERVATIVE FREE 10 ML: 5 INJECTION INTRAVENOUS at 21:47

## 2022-12-28 RX ADMIN — SODIUM CHLORIDE, SODIUM LACTATE, POTASSIUM CHLORIDE, AND CALCIUM CHLORIDE 500 ML: .6; .31; .03; .02 INJECTION, SOLUTION INTRAVENOUS at 05:10

## 2022-12-28 RX ADMIN — CARVEDILOL 12.5 MG: 12.5 TABLET, FILM COATED ORAL at 12:14

## 2022-12-28 RX ADMIN — IPRATROPIUM BROMIDE 0.5 MG: 0.5 SOLUTION RESPIRATORY (INHALATION) at 11:40

## 2022-12-28 RX ADMIN — ATORVASTATIN CALCIUM 80 MG: 80 TABLET, FILM COATED ORAL at 21:47

## 2022-12-28 RX ADMIN — CALCITRIOL CAPSULES 0.25 MCG 0.5 MCG: 0.25 CAPSULE ORAL at 12:17

## 2022-12-28 RX ADMIN — CARVEDILOL 12.5 MG: 12.5 TABLET, FILM COATED ORAL at 21:51

## 2022-12-28 RX ADMIN — Medication 2 TABLET: at 12:15

## 2022-12-28 RX ADMIN — ACETAMINOPHEN 650 MG: 325 TABLET ORAL at 22:48

## 2022-12-28 RX ADMIN — APIXABAN 2.5 MG: 2.5 TABLET, FILM COATED ORAL at 21:47

## 2022-12-28 RX ADMIN — SODIUM CHLORIDE, PRESERVATIVE FREE 10 ML: 5 INJECTION INTRAVENOUS at 12:16

## 2022-12-28 RX ADMIN — SODIUM CHLORIDE, POTASSIUM CHLORIDE, SODIUM LACTATE AND CALCIUM CHLORIDE 500 ML: 600; 310; 30; 20 INJECTION, SOLUTION INTRAVENOUS at 00:47

## 2022-12-28 RX ADMIN — FUROSEMIDE 20 MG: 20 TABLET ORAL at 12:15

## 2022-12-28 ASSESSMENT — ENCOUNTER SYMPTOMS
COUGH: 1
NAUSEA: 0
SHORTNESS OF BREATH: 1
WHEEZING: 1
DIARRHEA: 0
CONSTIPATION: 0
ABDOMINAL PAIN: 1
VOMITING: 0
EYES NEGATIVE: 1

## 2022-12-28 ASSESSMENT — PAIN SCALES - GENERAL
PAINLEVEL_OUTOF10: 3
PAINLEVEL_OUTOF10: 6
PAINLEVEL_OUTOF10: 4

## 2022-12-28 ASSESSMENT — PAIN DESCRIPTION - PAIN TYPE
TYPE: ACUTE PAIN
TYPE: ACUTE PAIN

## 2022-12-28 ASSESSMENT — PAIN DESCRIPTION - ORIENTATION
ORIENTATION: RIGHT
ORIENTATION: ANTERIOR;MID
ORIENTATION: RIGHT

## 2022-12-28 ASSESSMENT — PAIN DESCRIPTION - FREQUENCY
FREQUENCY: INTERMITTENT
FREQUENCY: CONTINUOUS

## 2022-12-28 ASSESSMENT — PAIN - FUNCTIONAL ASSESSMENT
PAIN_FUNCTIONAL_ASSESSMENT: ACTIVITIES ARE NOT PREVENTED

## 2022-12-28 ASSESSMENT — PAIN DESCRIPTION - ONSET
ONSET: GRADUAL
ONSET: ON-GOING

## 2022-12-28 ASSESSMENT — PAIN DESCRIPTION - DESCRIPTORS
DESCRIPTORS: THROBBING
DESCRIPTORS: ACHING
DESCRIPTORS: THROBBING

## 2022-12-28 ASSESSMENT — PAIN DESCRIPTION - LOCATION
LOCATION: HEAD
LOCATION: TOE (COMMENT WHICH ONE)
LOCATION: TOE (COMMENT WHICH ONE)

## 2022-12-28 NOTE — H&P
Hospital Medicine History & Physical      PCP: Patience Freeman MD    Date of Admission: 12/27/2022    Date of Service: Pt seen/examined on 12/28/22 and Admitted to Inpatient with expected LOS greater than two midnights due to medical therapy. Chief Complaint: Fall on the same day of presentation. History Of Present Illness:   Mr. Monserrat Hopkins is a 74 y.o. male with a past medical history as documented below, lives at nursing home, was brought to the emergency department on December 27, 2022 at night after an unwitnessed fall. This has been recurrent over the last few days, patient denied loss of consciousness, feeling dizzy, headaches, visual changes, nausea and vomiting. He attributes the fall as his knees gave out. Reported nasal congestion, attributed to oxygen supplementation by nasal cannula. Reported chronic shortness of breath, at his baseline. Upon initial presentation he was hypotensive to 78/59. Lactic acid was elevated at 3. There was a concern for possible septic process, as such he was admitted for further evaluation and management. Past Medical History:          Diagnosis Date    Acute HFrEF (heart failure with reduced ejection fraction) ([facility])     Cerebral artery occlusion with cerebral infarction [facility])     Chronic kidney disease     COPD (chronic obstructive pulmonary disease) ([facility]     Dialysis patient ([facility]     Gout     Hemodialysis patient ([facility]     Hx of blood clots     Hyperlipidemia     Hypertension     Renal failure        Past Surgical History:          Procedure Laterality Date    COLONOSCOPY      CT BONE MARROW BIOPSY  11/15/2022    CT BONE MARROW BIOPSY 11/15/2022 [facility] CT SCAN    DIALYSIS FISTULA CREATION Left        Medications Prior to Admission:      Prior to Admission medications    Medication Sig Start Date End Date Taking?  Authorizing Provider   sodium chloride (OCEAN) 0.65 % nasal spray 1-2 sprays both nostrils at least daily and as needed to prevent dry mucosa. 10/15/22   Janice Hill, DO   levothyroxine (SYNTHROID) 100 MCG tablet Take 100 mcg by mouth Daily    Historical Provider, MD   gabapentin (NEURONTIN) 100 MG capsule Take 1 capsule by mouth at bedtime for 30 days.  9/15/22 10/15/22  Gerard Lipps, DO   diclofenac sodium (VOLTAREN) 1 % GEL Apply 2 g topically 2 times daily 9/15/22   Gerard Lipps, DO   calcitRIOL (ROCALTROL) 0.25 MCG capsule Take 0.5 mcg by mouth every other day [facility]    Historical Provider, MD   sevelamer (RENVELA) 800 MG tablet Take 1 tablet by mouth 3 times daily (with meals)    Historical Provider, MD   apixaban (ELIQUIS) 2.5 MG TABS tablet Take 1 tablet by mouth 2 times daily 6/18/22   Shira Olivas MD   ferrous sulfate (FE TABS) 325 (65 Fe) MG EC tablet Take 1 tablet by mouth every other day  Patient taking differently: Take 325 mg by mouth daily (with breakfast) 6/18/22 8/17/22  Shira Olivas MD   atorvastatin (LIPITOR) 80 MG tablet Take 80 mg by mouth daily    Historical Provider, MD   calcium carb-cholecalciferol 600-200 MG-UNIT TABS tablet Take 1 tablet by mouth daily    Historical Provider, MD   furosemide (LASIX) 20 MG tablet Take 20 mg by mouth daily    Historical Provider, MD   Umeclidinium Bromide (INCRUSE ELLIPTA) 62.5 MCG/INH AEPB Inhale 1 puff into the lungs daily    Historical Provider, MD   vitamin D (ERGOCALCIFEROL) 1.25 MG (21812 UT) CAPS capsule Take 50,000 Units by mouth once a week Saturdays    Historical Provider, MD   omeprazole (PRILOSEC) 20 MG delayed release capsule Take 20 mg by mouth daily    Historical Provider, MD   carvedilol (COREG) 12.5 MG tablet Take 12.5 mg by mouth 2 times daily Unsure of dose     Historical Provider, MD   albuterol sulfate HFA (PROVENTIL;VENTOLIN;PROAIR) 108 (90 Base) MCG/ACT inhaler Inhale 2 puffs into the lungs every 6 hours as needed for Wheezing 90mcg/actuation    Historical Provider, MD   allopurinol (ZYLOPRIM) 100 MG tablet Take 100 mg by mouth Daily on [facility] Historical Provider, MD       Allergies:  Nitroglycerin and Oxymetazoline    Social History:      The patient currently lives at nursing home. TOBACCO:   reports that he has quit smoking. He has never used smokeless tobacco.  ETOH:   reports current alcohol use. E-cigarette/Vaping       Questions Responses    E-cigarette/Vaping Use Never User    Start Date     Passive Exposure     Quit Date     Counseling Given     Comments             Family History:      Reviewed and was noncontributory in regards to presenting illness/complaint. Problem Relation Age of Onset    Cancer Mother     Cancer Father     Other Sister        REVIEW OF SYSTEMS COMPLETED:   Pertinent positives as noted in the HPI. All other systems reviewed and negative. PHYSICAL EXAM PERFORMED:    /76   Pulse 75   Temp 98 °F (36.7 °C) (Oral)   Resp 20   Ht 5' 8\" (1.727 m)   Wt 127 lb 11.2 oz (57.9 kg)   SpO2 100%   BMI 19.42 kg/m²     General appearance:  No apparent distress, appears stated age and cooperative. HEENT:  Normal cephalic, atraumatic without obvious deformity. Pupils equal, round, and reactive to light. Extra ocular muscles intact. Conjunctivae/corneas clear. Neck: Supple, with full range of motion. No jugular venous distention. Trachea midline. Respiratory:  Normal respiratory effort. Clear to auscultation, bilaterally without Rales/Wheezes/Rhonchi. Cardiovascular:  Regular rate and rhythm with normal S1/S2 without murmurs, rubs or gallops. Abdomen: Soft, non-tender, non-distended with normal bowel sounds. Musculoskeletal:  No clubbing, cyanosis or edema bilaterally. Full range of motion without deformity. Skin: Skin color, texture, turgor normal.  No rashes or lesions. Left upper extremity arteriovenous fistula for dialysis access. Neurologic:  Neurovascularly intact without any focal sensory/motor deficits.  Cranial nerves: II-XII intact, grossly non-focal.  Psychiatric:  Alert and oriented, thought content appropriate, normal insight      Labs:     Recent Labs     12/28/22  0017   WBC 3.7*   HGB 9.9*   HCT 32.4*   *     Recent Labs     12/28/22  0017   *   K 4.5   CL 94*   CO2 26   BUN 27*   CREATININE 7.5*   CALCIUM 9.1     Recent Labs     12/28/22  0017   AST 15   ALT 7*   BILIDIR <0.2   BILITOT 0.7   ALKPHOS 198*     No results for input(s): INR in the last 72 hours. Recent Labs     12/28/22 0017 12/28/22  0346   TROPONINI 0.10* 0.08*       Urinalysis:    No results found for: Saralyn Poe, BACTERIA, RBCUA, BLOODU, [address], GLUCOSEU    Radiology:     CXR: I have personally reviewed the CXR with the following interpretation: Midline trachea, no consolidation, no pleural effusion, no pneumothorax. Negative for acute intrathoracic processes. CT HEAD WO CONTRAST   Final Result      1. No acute intracranial findings. 2. Multiple remote lacunar infarcts. 3. Mild chronic small vessel ischemic disease in the white matter. 4. Chronic sinusitis. XR KNEE RIGHT (3 VIEWS)   Final Result   1. No acute abnormality in the right knee. CHEST:      FINDINGS: AP and lateral views of the chest were obtained. Lungs are clear. Cardiomediastinal contours are normal. No pleural effusion or pneumothorax. IMPRESSION:   1. No acute findings in the chest.      XR CHEST (2 VW)   Final Result   1. No acute abnormality in the right knee. CHEST:      FINDINGS: AP and lateral views of the chest were obtained. Lungs are clear. Cardiomediastinal contours are normal. No pleural effusion or pneumothorax. IMPRESSION:   1.  No acute findings in the chest.          Consults:    IP CONSULT TO HOSPITALIST  IP CONSULT TO NEPHROLOGY  IP CONSULT TO HOSPITALIST    ASSESSMENT:    Active Hospital Problems    Diagnosis Date Noted    Weakness [R53.1] 12/28/2022     Priority: Medium    Pre-syncope [R55] 12/28/2022     Priority: Medium    Hypotension due to hypovolemia [I95.89, E86.1] 12/28/2022     Priority: Medium    Lactic acidemia [E87.20] 12/28/2022     Priority: Medium    Elevated brain natriuretic peptide (BNP) level [R79.89] 12/28/2022     Priority: Medium     - Presyncopal episode, likely due to hypotension, due to decreased p.o. intake. - Markedly elevated BNP in patient with end-stage renal disease on hemodialysis. -Blood pressure improved after a 500 cc fluid bolus. -Repeat lactic acid showed normal value.  -No recurrence of hypotension episodes. PLAN:    -Monitor volume status and vital signs.  -Rule out sepsis, follow-up cultures, have been negative to date.  -No indication to start antibiotics for now. - Consult nephrology for hemodialysis needs. He will need dialysis tomorrow. -CT scan of the head showed normal findings.  -Repeat labs in AM.    DVT Prophylaxis: On apixaban 2.5 mg twice daily  Diet: ADULT DIET; Regular; Low Fat/Low Chol/High Fiber/2 gm Na  Code Status: Full Code    PT/OT Eval Status: To be determined    Dispo -inpatient, anticipate discharge in 1 to 2 days back to his nursing home. Hernan Jack MD    Thank you Antwon Joseph MD for the opportunity to be involved in this patient's care. If you have any questions or concerns please feel free to contact me at [phone].

## 2022-12-28 NOTE — PROGRESS NOTES
Patient transferred to unit from ED. Alert x4, able to makes needs known and follow commands. Head to Toe completed with 4 eye skin check x2 RNs. Acclimated Pt to new surroundings and educated 1:1 use of call light, stated understanding. All safety precautions in place per POC, call light within reach.

## 2022-12-28 NOTE — ED PROVIDER NOTES
ED Attending Attestation Note     Date of evaluation: 12/27/2022    This patient was seen by the resident. I have seen and examined the patient, agree with the workup, evaluation, management and diagnosis. The care plan has been discussed. I have reviewed the ECG and concur with the resident's interpretation. Briefly, Ajith Cruz is a 74 y.o. male with a PMH inclusive of ESRD (MWF, last session yesterday) who presents for evaluation of fall. Fell because knees were weak and gave out on him. States he has not been eating or drinking well because of no appetite and not liking the food where he is staying. Also has abd pain, cough, SOB. Anticoagulates with Eliquis. Notable exam findings include no respiratory distress, satting well on baseline oxygen, no abdominal tenderness. Ambulates with assistance and gets winded quickly    Assessment/ Medical Decision Making:     ED Course as of 12/28/22 0801   Wed Dec 28, 2022   369 72-year-old male with past medical history inclusive of end-stage renal disease on dialysis who presents for evaluation of weakness and falls. Patient noted to be hypotensive on arrival here. On my review of his medical record he typically has low blood pressure however this is unusually low for him. Hemoglobin also higher than prior values possibly suggesting element of hemoconcentration. Given 500 mL bolus of IV fluids. [MM]   0130 Considered infection. Chest x-ray does not demonstrate pneumonia. Does not make much urine. [MM]   0131 EKG on my review with sinus rhythm, leftward axis, not significantly changed when compared to prior. [MM]   6471 Considered GIB with intermittent dark stools at baseline. No symptoms of brisk bleed and hemoglobin higher than baseline. [MM]   9586 COVID/ Flu negative. [MM]   0329 CO2: 26 [MM]   0329 Anion Gap: 14 [MM]   0514 Lactate 3.0. This would be consistent with hypovolemia. No infectious symptoms or source identified.   Low suspicion for bacteremia with absence of fever, leukocytosis or toxic appearance. Spoke with both hospitalist and nephrology who are all in agreement with discharge with outpatient dialysis this morning is appropriate, especially since he lives in a facility and is fluid responsive. Will recheck lactate following IV fluids [MM]   0800 Recheck of lactate was 7 and specimen rejected by nurse due to concern for erroneous value. Will redraw however with his symptoms and hypotension, do think he benefit from observation. Repeat pending. Can have dialysis as an inpatient.  [MM]      ED Course User Index  [MM] Betina Lopez MD    Clinical impression: hypotension, elevated lactate       Betina Lopez MD  12/28/22 6424

## 2022-12-28 NOTE — ED PROVIDER NOTES
[address] [facility] RESIDENT NOTE       Date of evaluation: 12/27/2022    Chief Complaint     Fall ([facility] the [facility]. States he usually gets around with a cane. States today legs just would not cooperate. C/o knee pain from fall )      History of Present Illness     Sam Healy is a 74 y.o. male who presents after several unwitnessed falls today. Patient relates that during one of the falls he did hit his head however denies loss of consciousness. Patient relates that he is having right knee pain as well as right upper quadrant pain. Patient denies headaches or visual changes. Patient denies chest pain but does relate a persistent cough. Patient denies nausea, vomiting, fever. Patient endorses chills. Patient relates that he is a dialysis patient presents for dialysis Monday, Wednesday, Friday. He was compliant with treatment this week. Patient relates that he has had general fatigue over the last several days and then has had poor p.o. intake due to decreased appetite however he relates that he has still been drinking water. Patient denies changes in his bowel movements. Patient denies urinary frequency, burning, urgency. Patient Aox4. Review of Systems     Review of Systems   Constitutional:  Positive for activity change, appetite change, chills and fatigue. Negative for fever. Eyes: Negative. Respiratory:  Positive for cough, shortness of breath and wheezing. Cardiovascular: Negative. Gastrointestinal:  Positive for abdominal pain. Negative for constipation, diarrhea, nausea and vomiting. Endocrine: Negative. Genitourinary: Negative. Musculoskeletal: Negative. Skin: Negative. Neurological:  Positive for weakness. Negative for facial asymmetry and headaches. Hematological: Negative. Psychiatric/Behavioral: Negative.        Past Medical, Surgical, Family, and Social History     He has a past medical history of Acute HFrEF (heart failure with reduced ejection fraction) ([facility] Cerebral artery occlusion with cerebral infarction ([facility] Chronic kidney disease, COPD (chronic obstructive pulmonary disease) ([facility] Dialysis patient ([facility] Gout, Hemodialysis patient ([facility] Hx of blood clots, Hyperlipidemia, Hypertension, and Renal failure. He has a past surgical history that includes Dialysis fistula creation (Left); Colonoscopy; and CT BIOPSY BONE MARROW (11/15/2022). His family history includes Cancer in his father and mother; Other in his sister. He reports that he has quit smoking. He has never used smokeless tobacco. He reports current alcohol use. He reports that he does not use drugs. Medications     Previous Medications    ALBUTEROL SULFATE HFA (PROVENTIL;VENTOLIN;PROAIR) 108 (90 BASE) MCG/ACT INHALER    Inhale 2 puffs into the lungs every 6 hours as needed for Wheezing 90mcg/actuation    ALLOPURINOL (ZYLOPRIM) 100 MG TABLET    Take 100 mg by mouth Daily on MWF    APIXABAN (ELIQUIS) 2.5 MG TABS TABLET    Take 1 tablet by mouth 2 times daily    ATORVASTATIN (LIPITOR) 80 MG TABLET    Take 80 mg by mouth daily    CALCITRIOL (ROCALTROL) 0.25 MCG CAPSULE    Take 0.5 mcg by mouth every other day [facility]    CALCIUM CARB-CHOLECALCIFEROL 600-200 MG-UNIT TABS TABLET    Take 1 tablet by mouth daily    CARVEDILOL (COREG) 12.5 MG TABLET    Take 12.5 mg by mouth 2 times daily Unsure of dose     DICLOFENAC SODIUM (VOLTAREN) 1 % GEL    Apply 2 g topically 2 times daily    FERROUS SULFATE (FE TABS) 325 (65 FE) MG EC TABLET    Take 1 tablet by mouth every other day    FUROSEMIDE (LASIX) 20 MG TABLET    Take 20 mg by mouth daily    GABAPENTIN (NEURONTIN) 100 MG CAPSULE    Take 1 capsule by mouth at bedtime for 30 days.     LEVOTHYROXINE (SYNTHROID) 100 MCG TABLET    Take 100 mcg by mouth Daily    OMEPRAZOLE (PRILOSEC) 20 MG DELAYED RELEASE CAPSULE    Take 20 mg by mouth daily    SEVELAMER (RENVELA) 800 MG TABLET    Take 1 tablet by mouth 3 times daily (with meals)    SODIUM CHLORIDE (OCEAN) 0.65 % NASAL SPRAY    1-2 sprays both nostrils at least daily and as needed to prevent dry mucosa. UMECLIDINIUM BROMIDE (INCRUSE ELLIPTA) 62.5 MCG/INH AEPB    Inhale 1 puff into the lungs daily    VITAMIN D (ERGOCALCIFEROL) 1.25 MG (39510 UT) CAPS CAPSULE    Take 50,000 Units by mouth once a week Saturdays       Allergies     He is allergic to nitroglycerin and oxymetazoline. Physical Exam     INITIAL VITALS: BP: (!) 78/59,  , Heart Rate: 77, Resp: 18, SpO2: 100 %   Physical Exam  Constitutional:       Appearance: Normal appearance. HENT:      Head: Normocephalic and atraumatic. Right Ear: Tympanic membrane normal.      Left Ear: Tympanic membrane normal.      Nose: Nose normal.      Mouth/Throat:      Mouth: Mucous membranes are dry. Eyes:      Pupils: Pupils are equal, round, and reactive to light. Cardiovascular:      Rate and Rhythm: Normal rate and regular rhythm. Pulses: Normal pulses. Heart sounds: Normal heart sounds. Pulmonary:      Effort: Pulmonary effort is normal.      Breath sounds: Normal breath sounds. Abdominal:      General: Abdomen is flat. Tenderness: There is abdominal tenderness. Musculoskeletal:         General: Tenderness and signs of injury present. Normal range of motion. Cervical back: Normal range of motion and neck supple. Comments: Right lateral knee pain on palpation   Skin:     General: Skin is warm and dry. Capillary Refill: Capillary refill takes less than 2 seconds. Neurological:      General: No focal deficit present. Mental Status: He is alert. Psychiatric:         Mood and Affect: Mood normal.         Behavior: Behavior normal.       DiagnosticResults     RADIOLOGY:  CT HEAD WO CONTRAST   Final Result      1. No acute intracranial findings. 2. Multiple remote lacunar infarcts. 3. Mild chronic small vessel ischemic disease in the white matter.    4. Chronic sinusitis. XR KNEE RIGHT (3 VIEWS)   Final Result   1. No acute abnormality in the right knee. CHEST:      FINDINGS: AP and lateral views of the chest were obtained. Lungs are clear. Cardiomediastinal contours are normal. No pleural effusion or pneumothorax. IMPRESSION:   1. No acute findings in the chest.      XR CHEST (2 VW)   Final Result   1. No acute abnormality in the right knee. CHEST:      FINDINGS: AP and lateral views of the chest were obtained. Lungs are clear. Cardiomediastinal contours are normal. No pleural effusion or pneumothorax. IMPRESSION:   1.  No acute findings in the chest.          LABS:   Results for orders placed or performed during the hospital encounter of 12/27/22   CBC with Auto Differential   Result Value Ref Range    WBC 3.7 (L) 4.0 - 11.0 K/uL    RBC 3.57 (L) 4.20 - 5.90 M/uL    Hemoglobin 9.9 (L) 13.5 - 17.5 g/dL    Hematocrit 32.4 (L) 40.5 - 52.5 %    MCV 90.7 80.0 - 100.0 fL    MCH 27.8 26.0 - 34.0 pg    MCHC 30.6 (L) 31.0 - 36.0 g/dL    RDW 16.7 (H) 12.4 - 15.4 %    Platelets 748 (L) 498 - 450 K/uL    MPV 7.6 5.0 - 10.5 fL    Neutrophils % 66.7 %    Lymphocytes % 18.9 %    Monocytes % 9.0 %    Eosinophils % 5.0 %    Basophils % 0.4 %    Neutrophils Absolute 2.5 1.7 - 7.7 K/uL    Lymphocytes Absolute 0.7 (L) 1.0 - 5.1 K/uL    Monocytes Absolute 0.3 0.0 - 1.3 K/uL    Eosinophils Absolute 0.2 0.0 - 0.6 K/uL    Basophils Absolute 0.0 0.0 - 0.2 K/uL   BMP w/ Reflex to MG   Result Value Ref Range    Sodium 134 (L) 136 - 145 mmol/L    Potassium reflex Magnesium 4.5 3.5 - 5.1 mmol/L    Chloride 94 (L) 99 - 110 mmol/L    CO2 26 21 - 32 mmol/L    Anion Gap 14 3 - 16    Glucose 100 (H) 70 - 99 mg/dL    BUN 27 (H) 7 - 20 mg/dL    Creatinine 7.5 (HH) 0.8 - 1.3 mg/dL    Est, Glom Filt Rate 7 (A) >60    Calcium 9.1 8.3 - 10.6 mg/dL   Lipase   Result Value Ref Range    Lipase 31.0 13.0 - 60.0 U/L   Hepatic Function Panel   Result Value Ref Range    Total Protein 6.7 6.4 - 8.2 g/dL    Albumin 3.9 3.4 - 5.0 g/dL    Alkaline Phosphatase 198 (H) 40 - 129 U/L    ALT 7 (L) 10 - 40 U/L    AST 15 15 - 37 U/L    Total Bilirubin 0.7 0.0 - 1.0 mg/dL    Bilirubin, Direct <0.2 0.0 - 0.3 mg/dL    Bilirubin, Indirect see below 0.0 - 1.0 mg/dL       ED BEDSIDE ULTRASOUND:  Not performed    RECENT VITALS:  BP: 90/61,  , Heart Rate: 75,Resp: 18, SpO2: 100 %     Procedures     none    ED Course     Nursing Notes, Past Medical Hx, Past Surgical Hx, Social Hx, Allergies, and Family Hx were reviewed. The patient was given the following medications:  Orders Placed This Encounter   Medications    lactated ringers bolus         CONSULTS:  None    MEDICAL DECISION MAKING / ASSESSMENT / Suyapa José Miguel is a 74 y.o. male who presented to the ED today for several unwitnessed falls without loss of consciousness but with head contact and on anticoagulation therapy. Patient relates that his legs felt weak today and that he was having right knee pain following several falls. Patient has end-stage renal disease with hemodialysis 3 times a week. He is compliant with hemodialysis appointments. Patient relates he has had poor p.o. intake and relates that he has been feeling well but that he has had a new persistent cough. Patient relates right upper quadrant pain but denies any changes in bowel movements or urination upon arrival patient was afebrile and hypotensive but all other vital signs stable. Upon physical examination lungs lungs are relatively clear however patient appeared slightly labored during inspiration. Positive for pain on palpation of the right upper quadrant. Pain to the lateral patellar border without evidence of wound or ecchymosis or edema. At this point I am going off service, care of this patient will be transferred to Dr. Augustina Hoyt for any changes in MDM or disposition. At this time the following orders are BNP, troponin, flu, covid.     This patient was also evaluated by the attending physician. All care plans were discussed and agreed upon. Clinical Impression     1. Near syncope        Disposition     PATIENT REFERRED TO:  No follow-up provider specified.     DISCHARGE MEDICATIONS:  New Prescriptions    No medications on file       Salma Sánchez 86., DPM  Resident  12/28/22 1093

## 2022-12-28 NOTE — CONSULTS
Nephrology Consult Note  [phone]   [email or web address] com        Reason for Consult:  ESKD    HISTORY OF PRESENT ILLNESS:                This is a patient with significant past medical history of ESKD on HD MT&Th,Fri, CHF, HTN, COPD,  who presented with multiple falls from NH. His last HD was yesterday. He denies any fever/chills/chest pain or SOB. He denies any n/V/D. He states of late his balance is \"off\" and has been falling more. Lytes are stable. BP is stable. Nephrology is consulted for dialysis management. Past Medical History:        Diagnosis Date    Acute HFrEF (heart failure with reduced ejection fraction) ([facility])     Cerebral artery occlusion with cerebral infarction [facility])     Chronic kidney disease     COPD (chronic obstructive pulmonary disease) ([facility] [address]     Dialysis patient ([facility] [address]     Gout     Hemodialysis patient ([facility] [address]     Hx of blood clots     Hyperlipidemia     Hypertension     Renal failure        Past Surgical History:        Procedure Laterality Date    COLONOSCOPY      CT BONE MARROW BIOPSY  11/15/2022    CT BONE MARROW BIOPSY 11/15/2022 [facility] CT SCAN    DIALYSIS FISTULA CREATION Left        Current Medications:    No current facility-administered medications on file prior to encounter. Current Outpatient Medications on File Prior to Encounter   Medication Sig Dispense Refill    sodium chloride (OCEAN) 0.65 % nasal spray 1-2 sprays both nostrils at least daily and as needed to prevent dry mucosa. 1 each 3    levothyroxine (SYNTHROID) 100 MCG tablet Take 100 mcg by mouth Daily      gabapentin (NEURONTIN) 100 MG capsule Take 1 capsule by mouth at bedtime for 30 days.  90 capsule 0    diclofenac sodium (VOLTAREN) 1 % GEL Apply 2 g topically 2 times daily 50 g 0    calcitRIOL (ROCALTROL) 0.25 MCG capsule Take 0.5 mcg by mouth every other day MWF      sevelamer (RENVELA) 800 MG tablet Take 1 tablet by mouth 3 times daily (with meals)      apixaban (ELIQUIS) 2.5 MG TABS tablet Take 1 tablet by mouth 2 times daily 60 tablet 1    ferrous sulfate (FE TABS) 325 (65 Fe) MG EC tablet Take 1 tablet by mouth every other day (Patient taking differently: Take 325 mg by mouth daily (with breakfast)) 15 tablet 1    atorvastatin (LIPITOR) 80 MG tablet Take 80 mg by mouth daily      calcium carb-cholecalciferol 600-200 MG-UNIT TABS tablet Take 1 tablet by mouth daily      furosemide (LASIX) 20 MG tablet Take 20 mg by mouth daily      Umeclidinium Bromide (INCRUSE ELLIPTA) 62.5 MCG/INH AEPB Inhale 1 puff into the lungs daily      vitamin D (ERGOCALCIFEROL) 1.25 MG (92295 UT) CAPS capsule Take 50,000 Units by mouth once a week Saturdays      omeprazole (PRILOSEC) 20 MG delayed release capsule Take 20 mg by mouth daily      carvedilol (COREG) 12.5 MG tablet Take 12.5 mg by mouth 2 times daily Unsure of dose       albuterol sulfate HFA (PROVENTIL;VENTOLIN;PROAIR) 108 (90 Base) MCG/ACT inhaler Inhale 2 puffs into the lungs every 6 hours as needed for Wheezing 90mcg/actuation      allopurinol (ZYLOPRIM) 100 MG tablet Take 100 mg by mouth Daily on MW         Allergies:  Nitroglycerin and Oxymetazoline    Social History:    Social History     Socioeconomic History    Marital status: Single     Spouse name: Not on file    Number of children: Not on file    Years of education: Not on file    Highest education level: Not on file   Occupational History    Not on file   Tobacco Use    Smoking status: Former    Smokeless tobacco: Never   Vaping Use    Vaping Use: Never used   Substance and Sexual Activity    Alcohol use: Yes     Comment: occasional    Drug use: Never    Sexual activity: Not Currently   Other Topics Concern    Not on file   Social History Narrative    Not on file     Social Determinants of Health     Financial Resource Strain: Not on file   Food Insecurity: Not on file   Transportation Needs: Not on file   Physical Activity: Not on file   Stress: Not on file   Social Connections: Not on file   Intimate Partner Violence: Not on file   Housing Stability: Not on file       Family History:       Problem Relation Age of Onset    Cancer Mother     Cancer Father     Other Sister          Review of Systems:  a comprehensive Review of systems was negative except as noted in HPI     PHYSICAL EXAM:    Vitals:    /76   Pulse 75   Temp 98 °F (36.7 °C) (Oral)   Resp 20   Ht 5' 8\" (1.727 m)   Wt 127 lb 11.2 oz (57.9 kg)   SpO2 100%   BMI 19.42 kg/m²   I/O last 3 completed shifts: In: 493.3 [IV Piggyback:493.3]  Out: -   No intake/output data recorded. Physical Exam:  Gen: Resting in bed, NAD. HEENT: anicteric, NC/AT  CV: +S1/S2, RRR  Lungs: Good respiratory effort, clear air entry   Abd: S/NT +BS  Ext: No edema, no cyanosis  Skin: Warm. No rashes appreciated. : No TTP over bladder, nondistended. Neuro: Alert and oriented x 3, nonfocal.  Joints: No erythema noted over joints.   Access: JAIME AVG + T/B    DATA:    CBC:   Lab Results   Component Value Date/Time    WBC 3.7 12/28/2022 12:17 AM    RBC 3.57 12/28/2022 12:17 AM    HGB 9.9 12/28/2022 12:17 AM    HCT 32.4 12/28/2022 12:17 AM    MCV 90.7 12/28/2022 12:17 AM    MCH 27.8 12/28/2022 12:17 AM    MCHC 30.6 12/28/2022 12:17 AM    RDW 16.7 12/28/2022 12:17 AM     12/28/2022 12:17 AM    MPV 7.6 12/28/2022 12:17 AM     BMP:    Lab Results   Component Value Date/Time     12/28/2022 12:17 AM    K 4.5 12/28/2022 12:17 AM    CL 94 12/28/2022 12:17 AM    CO2 26 12/28/2022 12:17 AM    BUN 27 12/28/2022 12:17 AM    LABALBU 3.9 12/28/2022 12:17 AM    CREATININE 7.5 12/28/2022 12:17 AM    CALCIUM 9.1 12/28/2022 12:17 AM    GFRAA 7 10/14/2022 05:45 AM    LABGLOM 7 12/28/2022 12:17 AM    GLUCOSE 100 12/28/2022 12:17 AM       IMPRESSION/RECOMMENDATIONS:      ESKD: HD at [facility] 4 x per week-followed by  Nephrology  -admitted for frequent falls  -lytes and volume status stable  -access is left UA AVG  -will keep TTS schedule here  -Plan for HD in am    Frequent falls:? Etiology, BP stable, evolemic on exam, hgb is stable  -avoid hypotension  -per primary, PT/OT    Anemia: continue TEODORA with HD    HTN: avoid hypotension  -on carvediolol    CHF: compensated, HD in am    Leukopenia: mild, monitor    TCP: chronic, mild  -no heparin with HD    H/o DVT: on lowered dose of eliquis        Thank you for allowing me to participate in the care of this patient. I will continue to follow along. Please call with questions.     Pravin Boogie MD, MD

## 2022-12-28 NOTE — ED NOTES
Pt going to [id number] report called to [facility] then pt to room via stretcher on oxygen per Alejandrina Thompson in stable condition     Bailey Soto RN  12/28/22 6432

## 2022-12-28 NOTE — PROGRESS NOTES
4 Eyes Admission Assessment     I agree as the admission nurse that 2 RN's have performed a thorough Head to Toe Skin Assessment on the patient. ALL assessment sites listed below have been assessed on admission. Areas assessed by both nurses: Eric Hoit  [x]   Head, Face, and Ears   [x]   Shoulders, Back, and Chest  [x]   Arms, Elbows, and Hands   [x]   Coccyx, Sacrum, and Ischium  [x]   Legs, Feet, and Heels        Does the Patient have Skin Breakdown?   No         Mauricio Prevention initiated:  No   Wound Care Orders initiated:  No      [facility] nurse consulted for Pressure Injury (Stage 3,4, Unstageable, DTI, NWPT, and Complex wounds) or Mauricio score 18 or lower:  No      Nurse 1 eSignature: Electronically signed by Hillary De La O RN on 12/28/22 at 1:58 PM EST    **SHARE this note so that the co-signing nurse is able to place an eSignature**    Nurse 2 eSignature: Electronically signed by Lucy Amado RN on 12/28/22 at 10:33 AM EST

## 2022-12-28 NOTE — DISCHARGE INSTRUCTIONS
Extra Heart Failure sites:   https://Art-Exchange.Fusion-io/ --- this is American Heart Association interactive Healthier Living with Heart Failure guidebook. Please copy and paste link into search bar. Use your mouse to scroll through the pages. Lots and lots of info / tips    HF Frazer lito  --- free smart phone lito available for [employer] and [employer]. Use your phone to track sodium / fluid intake,  symptoms, weight, etc.    [employer] - website-- [employer] is a dialysis Simple Emotion. All dialysis patients follow a renal diet which IS low sodium!! This website offers free seasonal cookbooks.   Each quarter, they will release 25-30 new recipes with a breakdown of calories, sodium, glucose, etc    www.Bontera.Fusion-io/recipes -- more free recipes

## 2022-12-28 NOTE — CARE COORDINATION
Case Management Assessment  Initial Evaluation    Date/Time of Evaluation: 12/28/2022 2:10 PM  Assessment Completed by: Ramonita Riedel, RN    If patient is discharged prior to next notation, then this note serves as note for discharge by case management. Patient Name: Brian Ontiveros                   YOB: 1955  Diagnosis: Lactic acidosis [E87.20]  Weakness [R53.1]  General weakness [R53.1]  Near syncope [R55]  Hypotension, unspecified hypotension type [I95.9]  Pre-syncope [R55]                   Date / Time: 12/27/2022 11:44 PM    Patient Admission Status: Inpatient   Readmission Risk (Low < 19, Mod (19-27), High > 27): Readmission Risk Score: 31.7    Current PCP: Carlos Anna MD  PCP verified by ? Yes    Chart Reviewed: Yes      History Provided by: Patient  Patient Orientation: Alert and Oriented, Person, Place, Situation    Patient Cognition: Short Term Memory Deficit    Hospitalization in the last 30 days (Readmission):  No    If yes, Readmission Assessment in  Navigator will be completed. Advance Directives:      Code Status: Full Code   Patient's Primary Decision Maker is:      Primary Decision Maker: Sierra Medina - [phone]    Discharge Planning:    Patient lives with:   Type of Home:    Primary Care Giver: Self  Patient Support Systems include:     Current Financial resources:    Current community resources:    Current services prior to admission:              Current DME:              Type of Home Care services:       ADLS  Prior functional level: Assistance with the following:, Cooking, Housework, Shopping  Current functional level: Assistance with the following:, Cooking, Housework, Shopping    PT AM-PAC:   /24  OT AM-PAC:   /24    Family can provide assistance at DC: No  Would you like Case Management to discuss the discharge plan with any other family members/significant others, and if so, who?  No  Plans to Return to Present Housing: Yes  Other Identified Issues/Barriers to RETURNING to current housing: sob  Potential Assistance needed at discharge:              Potential DME:    Patient expects to discharge to: Long-term care  Plan for transportation at discharge:      Financial    Payor: Joaquina Real / Plan: Sue Adame I-RAJENDRA / Product Type: *No Product type* /     Does insurance require precert for SNF: No    Potential assistance Purchasing Medications:    Meds-to-Beds request:        [address] [facility] [address], [address] [place] [phone] - F [phone]  [address] [facility] [address]  Phone: [phone] Fax: [phone]    [facility] [id number] - [facility], [address] [facility] E [phone] - F [phone]  [facility] [address]  [address] [address]  Phone: [phone] Fax: [phone]      Notes:    Factors facilitating achievement of predicted outcomes: Cooperative and Has needed Durable Medical Equipment at home    Barriers to discharge: Decreased endurance, Medical complications, and Medication managment    Additional Case Management Notes: CM met with patient at bedside. Pt is from [facility] LT with HD and O2 at baseline. Can return when medically ready. The Plan for Transition of Care is related to the following treatment goals of Lactic acidosis [E87.20]  Weakness [R53.1]  General weakness [R53.1]  Near syncope [R55]  Hypotension, unspecified hypotension type [I95.9]  Pre-syncope [Z47]    IF APPLICABLE: The Patient and/or patient representative Rell Almendarez and his family were provided with a choice of provider and agrees with the discharge plan. Freedom of choice list with basic dialogue that supports the patient's individualized plan of care/goals and shares the quality data associated with the providers was provided to:     Patient Representative Name:       The Patient and/or Patient Representative Agree with the Discharge Plan?       Paloma Emerson RN  Case Management Department  Ph: [phone] Fax: [phone]

## 2022-12-28 NOTE — PLAN OF CARE
Problem: Discharge Planning  Goal: Discharge to home or other facility with appropriate resources  Outcome: Progressing  Flowsheets  Taken 12/28/2022 1614  Discharge to home or other facility with appropriate resources:   Identify barriers to discharge with patient and caregiver   Arrange for needed discharge resources and transportation as appropriate  Taken 12/28/2022 1555  Discharge to home or other facility with appropriate resources: Arrange for needed discharge resources and transportation as appropriate     Problem: Pain  Goal: Verbalizes/displays adequate comfort level or baseline comfort level  Outcome: Progressing  Flowsheets (Taken 12/28/2022 1614)  Verbalizes/displays adequate comfort level or baseline comfort level: Encourage patient to monitor pain and request assistance     Problem: Safety - Adult  Goal: Free from fall injury  Outcome: Progressing  Flowsheets  Taken 12/28/2022 1614  Free From Fall Injury: Instruct family/caregiver on patient safety  Taken 12/28/2022 1603  Free From Fall Injury: Instruct family/caregiver on patient safety     Problem: Chronic Conditions and Co-morbidities  Goal: Patient's chronic conditions and co-morbidity symptoms are monitored and maintained or improved  Outcome: Progressing  Flowsheets (Taken 12/28/2022 1614)  Care Plan - Patient's Chronic Conditions and Co-Morbidity Symptoms are Monitored and Maintained or Improved: Monitor and assess patient's chronic conditions and comorbid symptoms for stability, deterioration, or improvement     Problem: Respiratory - Adult  Goal: Achieves optimal ventilation and oxygenation  Outcome: Progressing  Flowsheets (Taken 12/28/2022 1614)  Achieves optimal ventilation and oxygenation:   Assess for changes in respiratory status   Assess for changes in mentation and behavior   Assess and instruct to report shortness of breath or any respiratory difficulty     Problem: Cardiovascular - Adult  Goal: Maintains optimal cardiac output and hemodynamic stability  Outcome: Progressing  Flowsheets (Taken 12/28/2022 1614)  Maintains optimal cardiac output and hemodynamic stability:   Monitor blood pressure and heart rate   Assess for signs of decreased cardiac output   Administer fluid and/or volume expanders as ordered     Problem: Cardiovascular - Adult  Goal: Absence of cardiac dysrhythmias or at baseline  Outcome: Progressing     Problem: Metabolic/Fluid and Electrolytes - Adult  Goal: Electrolytes maintained within normal limits  Outcome: Progressing  Flowsheets (Taken 12/28/2022 1614)  Electrolytes maintained within normal limits:   Monitor labs and assess patient for signs and symptoms of electrolyte imbalances   Administer electrolyte replacement as ordered     Problem: Metabolic/Fluid and Electrolytes - Adult  Goal: Hemodynamic stability and optimal renal function maintained  Outcome: Progressing     Problem: ABCDS Injury Assessment  Goal: Absence of physical injury  Outcome: Progressing  Flowsheets (Taken 12/28/2022 1614)  Absence of Physical Injury: Implement safety measures based on patient assessment

## 2022-12-29 ENCOUNTER — APPOINTMENT (OUTPATIENT)
Dept: GENERAL RADIOLOGY | Age: 67
End: 2022-12-29
Payer: MEDICARE

## 2022-12-29 LAB
A/G RATIO: 1.3 (ref 1.1–2.2)
ALBUMIN SERPL-MCNC: 3 G/DL (ref 3.4–5)
ALP BLD-CCNC: 151 U/L (ref 40–129)
ALT SERPL-CCNC: <5 U/L (ref 10–40)
ANION GAP SERPL CALCULATED.3IONS-SCNC: 13 MMOL/L (ref 3–16)
AST SERPL-CCNC: 12 U/L (ref 15–37)
BASE EXCESS VENOUS: 6 (ref -3–3)
BASOPHILS ABSOLUTE: 0 K/UL (ref 0–0.2)
BASOPHILS RELATIVE PERCENT: 0.6 %
BILIRUB SERPL-MCNC: 0.4 MG/DL (ref 0–1)
BUN BLDV-MCNC: 37 MG/DL (ref 7–20)
CALCIUM IONIZED: 1.17 MMOL/L (ref 1.12–1.32)
CALCIUM SERPL-MCNC: 8.4 MG/DL (ref 8.3–10.6)
CHLORIDE BLD-SCNC: 93 MMOL/L (ref 99–110)
CO2: 28 MMOL/L (ref 21–32)
CREAT SERPL-MCNC: 8.9 MG/DL (ref 0.8–1.3)
EKG ATRIAL RATE: 77 BPM
EKG DIAGNOSIS: NORMAL
EKG P AXIS: 73 DEGREES
EKG P-R INTERVAL: 280 MS
EKG Q-T INTERVAL: 404 MS
EKG QRS DURATION: 98 MS
EKG QTC CALCULATION (BAZETT): 457 MS
EKG R AXIS: -66 DEGREES
EKG T AXIS: 91 DEGREES
EKG VENTRICULAR RATE: 77 BPM
EOSINOPHILS ABSOLUTE: 0.2 K/UL (ref 0–0.6)
EOSINOPHILS RELATIVE PERCENT: 6.6 %
GFR SERPL CREATININE-BSD FRML MDRD: 6 ML/MIN/{1.73_M2}
GLUCOSE BLD-MCNC: 104 MG/DL (ref 70–99)
GLUCOSE BLD-MCNC: 105 MG/DL (ref 70–99)
GLUCOSE BLD-MCNC: 77 MG/DL (ref 70–99)
HBV SURFACE AB TITR SER: <3.5 MIU/ML
HCO3 VENOUS: 33 MMOL/L (ref 23–29)
HCT VFR BLD CALC: 26.2 % (ref 40.5–52.5)
HEMOGLOBIN: 8.1 G/DL (ref 13.5–17.5)
HEPATITIS B SURFACE ANTIGEN INTERPRETATION: NORMAL
LACTATE: 0.7 MMOL/L (ref 0.4–2)
LYMPHOCYTES ABSOLUTE: 0.6 K/UL (ref 1–5.1)
LYMPHOCYTES RELATIVE PERCENT: 21.4 %
MAGNESIUM: 2.6 MG/DL (ref 1.8–2.4)
MCH RBC QN AUTO: 27.8 PG (ref 26–34)
MCHC RBC AUTO-ENTMCNC: 31 G/DL (ref 31–36)
MCV RBC AUTO: 89.7 FL (ref 80–100)
MONOCYTES ABSOLUTE: 0.3 K/UL (ref 0–1.3)
MONOCYTES RELATIVE PERCENT: 11.1 %
NEUTROPHILS ABSOLUTE: 1.6 K/UL (ref 1.7–7.7)
NEUTROPHILS RELATIVE PERCENT: 60.3 %
O2 SAT, VEN: 98 %
PCO2, VEN: 71.8 MM HG (ref 40–50)
PDW BLD-RTO: 16.8 % (ref 12.4–15.4)
PERFORMED ON: ABNORMAL
PERFORMED ON: ABNORMAL
PH VENOUS: 7.27 (ref 7.35–7.45)
PLATELET # BLD: 84 K/UL (ref 135–450)
PMV BLD AUTO: 7.4 FL (ref 5–10.5)
PO2, VEN: 117 MM HG
POC POTASSIUM: 3.5 MMOL/L (ref 3.5–5.1)
POC SAMPLE TYPE: ABNORMAL
POC SODIUM: 137 MMOL/L (ref 136–145)
POTASSIUM SERPL-SCNC: 4.4 MMOL/L (ref 3.5–5.1)
PRO-BNP: ABNORMAL PG/ML (ref 0–124)
RBC # BLD: 2.92 M/UL (ref 4.2–5.9)
SODIUM BLD-SCNC: 134 MMOL/L (ref 136–145)
TCO2 CALC VENOUS: 35 MMOL/L
TOTAL PROTEIN: 5.3 G/DL (ref 6.4–8.2)
TROPONIN: 0.08 NG/ML
WBC # BLD: 2.6 K/UL (ref 4–11)

## 2022-12-29 PROCEDURE — 6370000000 HC RX 637 (ALT 250 FOR IP): Performed by: INTERNAL MEDICINE

## 2022-12-29 PROCEDURE — 83735 ASSAY OF MAGNESIUM: CPT

## 2022-12-29 PROCEDURE — 80053 COMPREHEN METABOLIC PANEL: CPT

## 2022-12-29 PROCEDURE — 71045 X-RAY EXAM CHEST 1 VIEW: CPT

## 2022-12-29 PROCEDURE — 6360000002 HC RX W HCPCS: Performed by: INTERNAL MEDICINE

## 2022-12-29 PROCEDURE — 36415 COLL VENOUS BLD VENIPUNCTURE: CPT

## 2022-12-29 PROCEDURE — 93010 ELECTROCARDIOGRAM REPORT: CPT | Performed by: INTERNAL MEDICINE

## 2022-12-29 PROCEDURE — 82803 BLOOD GASES ANY COMBINATION: CPT

## 2022-12-29 PROCEDURE — 2580000003 HC RX 258: Performed by: INTERNAL MEDICINE

## 2022-12-29 PROCEDURE — 84132 ASSAY OF SERUM POTASSIUM: CPT

## 2022-12-29 PROCEDURE — 82947 ASSAY GLUCOSE BLOOD QUANT: CPT

## 2022-12-29 PROCEDURE — 83605 ASSAY OF LACTIC ACID: CPT

## 2022-12-29 PROCEDURE — 84295 ASSAY OF SERUM SODIUM: CPT

## 2022-12-29 PROCEDURE — 83880 ASSAY OF NATRIURETIC PEPTIDE: CPT

## 2022-12-29 PROCEDURE — 82330 ASSAY OF CALCIUM: CPT

## 2022-12-29 PROCEDURE — 85025 COMPLETE CBC W/AUTO DIFF WBC: CPT

## 2022-12-29 PROCEDURE — 86706 HEP B SURFACE ANTIBODY: CPT

## 2022-12-29 PROCEDURE — 87340 HEPATITIS B SURFACE AG IA: CPT

## 2022-12-29 PROCEDURE — 93005 ELECTROCARDIOGRAM TRACING: CPT

## 2022-12-29 PROCEDURE — 6360000002 HC RX W HCPCS

## 2022-12-29 PROCEDURE — 1200000000 HC SEMI PRIVATE

## 2022-12-29 PROCEDURE — 84484 ASSAY OF TROPONIN QUANT: CPT

## 2022-12-29 RX ORDER — CARVEDILOL 6.25 MG/1
6.25 TABLET ORAL 2 TIMES DAILY
Status: DISCONTINUED | OUTPATIENT
Start: 2022-12-29 | End: 2022-12-29

## 2022-12-29 RX ORDER — CARVEDILOL 3.12 MG/1
3.12 TABLET ORAL 2 TIMES DAILY
Status: DISCONTINUED | OUTPATIENT
Start: 2022-12-29 | End: 2022-12-30 | Stop reason: HOSPADM

## 2022-12-29 RX ADMIN — SEVELAMER CARBONATE 800 MG: 800 TABLET, FILM COATED ORAL at 10:15

## 2022-12-29 RX ADMIN — SEVELAMER CARBONATE 800 MG: 800 TABLET, FILM COATED ORAL at 13:01

## 2022-12-29 RX ADMIN — ATORVASTATIN CALCIUM 80 MG: 80 TABLET, FILM COATED ORAL at 10:15

## 2022-12-29 RX ADMIN — APIXABAN 2.5 MG: 2.5 TABLET, FILM COATED ORAL at 10:16

## 2022-12-29 RX ADMIN — HYDROMORPHONE HYDROCHLORIDE 0.25 MG: 1 INJECTION, SOLUTION INTRAMUSCULAR; INTRAVENOUS; SUBCUTANEOUS at 19:32

## 2022-12-29 RX ADMIN — LEVOTHYROXINE SODIUM 100 MCG: 0.1 TABLET ORAL at 07:00

## 2022-12-29 RX ADMIN — PANTOPRAZOLE SODIUM 40 MG: 40 TABLET, DELAYED RELEASE ORAL at 07:00

## 2022-12-29 RX ADMIN — EPOETIN ALFA-EPBX 8000 UNITS: 10000 INJECTION, SOLUTION INTRAVENOUS; SUBCUTANEOUS at 10:18

## 2022-12-29 RX ADMIN — APIXABAN 2.5 MG: 2.5 TABLET, FILM COATED ORAL at 20:37

## 2022-12-29 RX ADMIN — SODIUM CHLORIDE, PRESERVATIVE FREE 10 ML: 5 INJECTION INTRAVENOUS at 20:37

## 2022-12-29 RX ADMIN — SODIUM CHLORIDE, PRESERVATIVE FREE 10 ML: 5 INJECTION INTRAVENOUS at 10:16

## 2022-12-29 RX ADMIN — Medication 2 TABLET: at 10:15

## 2022-12-29 RX ADMIN — CARVEDILOL 3.12 MG: 3.12 TABLET, FILM COATED ORAL at 20:37

## 2022-12-29 RX ADMIN — ALLOPURINOL 100 MG: 100 TABLET ORAL at 10:15

## 2022-12-29 RX ADMIN — CARVEDILOL 3.12 MG: 3.12 TABLET, FILM COATED ORAL at 10:16

## 2022-12-29 RX ADMIN — GABAPENTIN 100 MG: 100 CAPSULE ORAL at 20:36

## 2022-12-29 ASSESSMENT — PAIN SCALES - GENERAL: PAINLEVEL_OUTOF10: 8

## 2022-12-29 ASSESSMENT — PAIN DESCRIPTION - LOCATION: LOCATION: CHEST;HEAD

## 2022-12-29 ASSESSMENT — PAIN DESCRIPTION - DESCRIPTORS: DESCRIPTORS: SHOOTING;SHARP;POUNDING

## 2022-12-29 NOTE — PROGRESS NOTES
Nephrology Progress Note  [phone]   [facility]        Reason for Consult:  ESKD    HISTORY OF PRESENT ILLNESS:                This is a patient with significant past medical history of ESKD on HD MT&Th,Fri, CHF, HTN, COPD,  who presented with multiple falls from NH. His last HD was yesterday. He denies any fever/chills/chest pain or SOB. He denies any n/V/D. He states of late his balance is \"off\" and has been falling more. Lytes are stable. BP is stable. Nephrology is consulted for dialysis management. subjective:  -pt seen and examined  -PMSHx and meds reviewed  -No family at bedside      No acute events ON  Due for HD today    ROS: neg CP/SOB    PHYSICAL EXAM:    Vitals:    BP (!) 96/59   Pulse 66   Temp 97.3 °F (36.3 °C) (Oral)   Resp 20   Ht 5' 8\" (1.727 m)   Wt 121 lb 11.1 oz (55.2 kg)   SpO2 98%   BMI 18.50 kg/m²   I/O last 3 completed shifts: In: 1933.3 [P.O.:1440; IV Piggyback:493.3]  Out: 0   No intake/output data recorded. Physical Exam:  Gen: Resting in bed, NAD. HEENT: anicteric, NC/AT  CV: +S1/S2, RRR  Lungs: Good respiratory effort, clear air entry   Abd: S/NT +BS  Ext: No edema, no cyanosis  Skin: Warm. No rashes appreciated. : No TTP over bladder, nondistended. Neuro: Alert and oriented x 3, nonfocal.  Joints: No erythema noted over joints.   Access: JAIME AVG + T/B    DATA:    CBC:   Lab Results   Component Value Date/Time    WBC 3.7 12/28/2022 12:17 AM    RBC 3.57 12/28/2022 12:17 AM    HGB 9.9 12/28/2022 12:17 AM    HCT 32.4 12/28/2022 12:17 AM    MCV 90.7 12/28/2022 12:17 AM    MCH 27.8 12/28/2022 12:17 AM    MCHC 30.6 12/28/2022 12:17 AM    RDW 16.7 12/28/2022 12:17 AM     12/28/2022 12:17 AM    MPV 7.6 12/28/2022 12:17 AM     BMP:    Lab Results   Component Value Date/Time     12/29/2022 05:00 AM    K 4.4 12/29/2022 05:00 AM    K 4.5 12/28/2022 12:17 AM    CL 93 12/29/2022 05:00 AM    CO2 28 12/29/2022 05:00 AM    BUN 37 12/29/2022 05:00 AM    LABALBU 3.0 12/29/2022 05:00 AM    CREATININE 8.9 12/29/2022 05:00 AM    CALCIUM 8.4 12/29/2022 05:00 AM    GFRAA 7 10/14/2022 05:45 AM    LABGLOM 6 12/29/2022 05:00 AM    GLUCOSE 77 12/29/2022 05:00 AM       IMPRESSION/RECOMMENDATIONS:      ESKD: HD at [facility] 4 x per week-followed by  Nephrology  -admitted for frequent falls  -lytes and volume status stable  -access is left UA AVG  -HD today    Frequent falls:? Etiology, BP stable, evolemic on exam, hgb is stable  -avoid hypotension-BP relatively low  -per primary, PT/OT    Lactic acidosis: due to hypoperfusion  -resolved    Anemia: continue TEODORA with HD    HTN: avoid hypotension-BP relatively low  -on carvedilol-lower dose and place hold parameters    CHF: compensated, HD in am    Leukopenia: mild, monitor    TCP: chronic, mild  -no heparin with HD    H/o DVT: on lowered dose of eliquis        Thank you for allowing me to participate in the care of this patient. I will continue to follow along. Please call with questions.     Janette Salazar MD, MD

## 2022-12-29 NOTE — CARE COORDINATION
CM continues to follow for DC planning and needs. Patient from long term care at [facility] and can return at [facility]. Patient had HD today per nephro and will resume HD at facility upon return. CM to fax updated run sheets prior to DC. Tentative DC for 12/30/22 with transport at 6-6:30pm via [address] ambulance already arranged. No pre-cert needed for return to facility.     Thank you,  Saba Pulido RN,   4th Floor Progressive Care Unit  [phone]

## 2022-12-29 NOTE — PROGRESS NOTES
Patient transferred off floor to dialysis. Pt alert and able to make needs known, stable on 2L O2 via NC, portable telemetry with patient at transport via bed.

## 2022-12-29 NOTE — PLAN OF CARE
Problem: Discharge Planning  Goal: Discharge to home or other facility with appropriate resources  12/29/2022 1317 by Cher Lyons RN  Outcome: Progressing  Flowsheets (Taken 12/29/2022 1317)  Discharge to home or other facility with appropriate resources:   Identify barriers to discharge with patient and caregiver   Arrange for needed discharge resources and transportation as appropriate   Refer to discharge planning if patient needs post-hospital services based on physician order or complex needs related to functional status, cognitive ability or social support system     Problem: Pain  Goal: Verbalizes/displays adequate comfort level or baseline comfort level  12/29/2022 1317 by Cher Lyons RN  Outcome: Progressing  Flowsheets (Taken 12/29/2022 1317)  Verbalizes/displays adequate comfort level or baseline comfort level:   Assess pain using appropriate pain scale   Administer analgesics based on type and severity of pain and evaluate response     Problem: Safety - Adult  Goal: Free from fall injury  12/29/2022 0536 by Nargis Macias RN  Outcome: Progressing     Problem: Chronic Conditions and Co-morbidities  Goal: Patient's chronic conditions and co-morbidity symptoms are monitored and maintained or improved  12/29/2022 1317 by Cher Lyons RN  Outcome: Progressing  Flowsheets (Taken 12/29/2022 1317)  Care Plan - Patient's Chronic Conditions and Co-Morbidity Symptoms are Monitored and Maintained or Improved:   Monitor and assess patient's chronic conditions and comorbid symptoms for stability, deterioration, or improvement   Collaborate with multidisciplinary team to address chronic and comorbid conditions and prevent exacerbation or deterioration   Update acute care plan with appropriate goals if chronic or comorbid symptoms are exacerbated and prevent overall improvement and discharge     Problem: Cardiovascular - Adult  Goal: Maintains optimal cardiac output and hemodynamic stability  12/29/2022 1317 by Cher Lyons RN  Outcome: Progressing  Flowsheets (Taken 12/29/2022 1317)  Maintains optimal cardiac output and hemodynamic stability:   Monitor blood pressure and heart rate   Assess for signs of decreased cardiac output   Administer fluid and/or volume expanders as ordered     Problem: Metabolic/Fluid and Electrolytes - Adult  Goal: Electrolytes maintained within normal limits  Outcome: Progressing  Flowsheets (Taken 12/29/2022 1317)  Electrolytes maintained within normal limits:   Monitor labs and assess patient for signs and symptoms of electrolyte imbalances   Monitor response to electrolyte replacements, including repeat lab results as appropriate   Instruct patient on fluid and nutrition restrictions as appropriate

## 2022-12-29 NOTE — PROGRESS NOTES
Hospital Medicine Progress note      PCP: Kwame Franks MD    Date of Admission: 12/27/2022    Initial History Of Present Illness:   Mr. Silvana Carrera is a 74 y.o. male with a past medical history as documented below, lives at nursing home, was brought to the emergency department on December 27, 2022 at night after an unwitnessed fall. This has been recurrent over the last few days, patient denied loss of consciousness, feeling dizzy, headaches, visual changes, nausea and vomiting. He attributes the fall as his knees gave out. Reported nasal congestion, attributed to oxygen supplementation by nasal cannula. Reported chronic shortness of breath, at his baseline. Upon initial presentation he was hypotensive to 78/59. Lactic acid was elevated at 3. There was a concern for possible septic process, as such he was admitted for further evaluation and management. Interval Hx:  Patient was seen and examined at bedside today, alert and oriented. Pleasant gentleman. No acute events reported or observed overnight. Except for a hypotensive episode around 3:30 AM, patient was asymptomatic. Is getting dialysis today we will assess for recurrence of his hypotensive episodes. Patient did not report any new complaints.       Past Medical History:          Diagnosis Date    Acute HFrEF (heart failure with reduced ejection fraction) ([facility])     Cerebral artery occlusion with cerebral infarction [facility])     Chronic kidney disease     COPD (chronic obstructive pulmonary disease) ([facility]     Dialysis patient ([facility]     Gout     Hemodialysis patient ([facility]     Hx of blood clots     Hyperlipidemia     Hypertension     Renal failure        Past Surgical History:          Procedure Laterality Date    COLONOSCOPY      CT BONE MARROW BIOPSY  11/15/2022    CT BONE MARROW BIOPSY 11/15/2022 [facility] CT SCAN    DIALYSIS FISTULA CREATION Left        Medications Prior to Admission:      Prior to Admission medications    Medication Sig Start Date End Date Taking? Authorizing Provider   sodium chloride (OCEAN) 0.65 % nasal spray 1-2 sprays both nostrils at least daily and as needed to prevent dry mucosa. 10/15/22   Janice Hill DO   levothyroxine (SYNTHROID) 100 MCG tablet Take 100 mcg by mouth Daily    Historical Provider, MD   gabapentin (NEURONTIN) 100 MG capsule Take 1 capsule by mouth at bedtime for 30 days.  9/15/22 10/15/22  Loni Munguia DO   diclofenac sodium (VOLTAREN) 1 % GEL Apply 2 g topically 2 times daily 9/15/22   Loni Munguia DO   calcitRIOL (ROCALTROL) 0.25 MCG capsule Take 0.5 mcg by mouth every other day [facility]    Historical Provider, MD   sevelamer (RENVELA) 800 MG tablet Take 1 tablet by mouth 3 times daily (with meals)    Historical Provider, MD   apixaban (ELIQUIS) 2.5 MG TABS tablet Take 1 tablet by mouth 2 times daily 6/18/22   Erickson Regan MD   ferrous sulfate (FE TABS) 325 (65 Fe) MG EC tablet Take 1 tablet by mouth every other day  Patient taking differently: Take 325 mg by mouth daily (with breakfast) 6/18/22 8/17/22  Erickson Regan MD   atorvastatin (LIPITOR) 80 MG tablet Take 80 mg by mouth daily    Historical Provider, MD   calcium carb-cholecalciferol 600-200 MG-UNIT TABS tablet Take 1 tablet by mouth daily    Historical Provider, MD   furosemide (LASIX) 20 MG tablet Take 20 mg by mouth daily    Historical Provider, MD   Umeclidinium Bromide (INCRUSE ELLIPTA) 62.5 MCG/INH AEPB Inhale 1 puff into the lungs daily    Historical Provider, MD   vitamin D (ERGOCALCIFEROL) 1.25 MG (20470 UT) CAPS capsule Take 50,000 Units by mouth once a week Saturdays    Historical Provider, MD   omeprazole (PRILOSEC) 20 MG delayed release capsule Take 20 mg by mouth daily    Historical Provider, MD   carvedilol (COREG) 12.5 MG tablet Take 12.5 mg by mouth 2 times daily Unsure of dose     Historical Provider, MD   albuterol sulfate HFA (PROVENTIL;VENTOLIN;PROAIR) 108 (90 Base) MCG/ACT inhaler Inhale 2 puffs into the lungs every 6 hours as needed for Wheezing 90mcg/actuation    Historical Provider, MD   allopurinol (ZYLOPRIM) 100 MG tablet Take 100 mg by mouth Daily on MWF    Historical Provider, MD       Allergies:  Nitroglycerin and Oxymetazoline    Social History:      The patient currently lives at nursing home. TOBACCO:   reports that he has quit smoking. He has never used smokeless tobacco.  ETOH:   reports current alcohol use. E-cigarette/Vaping       Questions Responses    E-cigarette/Vaping Use Never User    Start Date     Passive Exposure     Quit Date     Counseling Given     Comments             PHYSICAL EXAM PERFORMED:    BP (!) 96/59   Pulse 66   Temp 97.3 °F (36.3 °C) (Oral)   Resp 20   Ht 5' 8\" (1.727 m)   Wt 121 lb 11.1 oz (55.2 kg)   SpO2 98%   BMI 18.50 kg/m²     General appearance:  No apparent distress, appears stated age and cooperative. HEENT:  Normal cephalic, atraumatic without obvious deformity. Pupils equal, round, and reactive to light. Extra ocular muscles intact. Conjunctivae/corneas clear. Neck: Supple, with full range of motion. No jugular venous distention. Trachea midline. Respiratory:  Normal respiratory effort. Clear to auscultation, bilaterally without Rales/Wheezes/Rhonchi. Cardiovascular:  Regular rate and rhythm with normal S1/S2 without murmurs, rubs or gallops. Abdomen: Soft, non-tender, non-distended with normal bowel sounds. Musculoskeletal:  No clubbing, cyanosis or edema bilaterally. Full range of motion without deformity. Skin: Skin color, texture, turgor normal.  No rashes or lesions. Left upper extremity arteriovenous fistula for dialysis access. Neurologic:  Neurovascularly intact without any focal sensory/motor deficits.  Cranial nerves: II-XII intact, grossly non-focal.  Psychiatric:  Alert and oriented, thought content appropriate, normal insight      Labs:     Recent Labs     12/28/22  0017 12/29/22  0500   WBC 3.7* 2.6*   HGB 9.9* 8.1*   HCT 32.4* 26.2* * 84*     Recent Labs     12/28/22  0017 12/29/22  0500   * 134*   K 4.5 4.4   CL 94* 93*   CO2 26 28   BUN 27* 37*   CREATININE 7.5* 8.9*   CALCIUM 9.1 8.4     Recent Labs     12/28/22  0017 12/29/22  0500   AST 15 12*   ALT 7* <5*   BILIDIR <0.2  --    BILITOT 0.7 0.4   ALKPHOS 198* 151*     No results for input(s): INR in the last 72 hours. Recent Labs     12/28/22  0017 12/28/22  0346   TROPONINI 0.10* 0.08*       Urinalysis:    No results found for: [place], BACTERIA, RBCUA, BLOODU, SPECGRAV, GLUCOSEU    Radiology:       CT HEAD WO CONTRAST   Final Result      1. No acute intracranial findings. 2. Multiple remote lacunar infarcts. 3. Mild chronic small vessel ischemic disease in the white matter. 4. Chronic sinusitis. XR KNEE RIGHT (3 VIEWS)   Final Result   1. No acute abnormality in the right knee. CHEST:      FINDINGS: AP and lateral views of the chest were obtained. Lungs are clear. Cardiomediastinal contours are normal. No pleural effusion or pneumothorax. IMPRESSION:   1. No acute findings in the chest.      XR CHEST (2 VW)   Final Result   1. No acute abnormality in the right knee. CHEST:      FINDINGS: AP and lateral views of the chest were obtained. Lungs are clear. Cardiomediastinal contours are normal. No pleural effusion or pneumothorax. IMPRESSION:   1.  No acute findings in the chest.          Consults:    IP CONSULT TO HOSPITALIST  IP CONSULT TO NEPHROLOGY  IP CONSULT TO HOSPITALIST    ASSESSMENT:    Active Hospital Problems    Diagnosis Date Noted    Weakness [R53.1] 12/28/2022     Priority: Medium    Pre-syncope [R55] 12/28/2022     Priority: Medium    Hypotension due to hypovolemia [I95.89, E86.1] 12/28/2022     Priority: Medium    Lactic acidemia [E87.20] 12/28/2022     Priority: Medium    Elevated brain natriuretic peptide (BNP) level [R79.89] 12/28/2022     Priority: Medium     - Presyncopal episode, likely due to hypotension, due to decreased p.o. intake. - Markedly elevated BNP in patient with end-stage renal disease on hemodialysis. -Blood pressure improved after a 500 cc fluid bolus. -Repeat lactic acid showed normal value.  -No recurrence of hypotension episodes. PLAN:    -Monitor volume status and vital signs.  -Decrease his Coreg dose to 3.125 mg dosing given hypotensive episode.  -Rule out sepsis, follow-up cultures, have been negative to date.  -No indication to start antibiotics for now. - Consult nephrology for hemodialysis needs. He will need dialysis tomorrow. -CT scan of the head showed normal findings.  -Still with elevated BNP however no respiratory signs or symptoms. Continue dialysis to manage volume status. DVT Prophylaxis: On apixaban 2.5 mg twice daily  Diet: ADULT DIET; Regular; Low Fat/Low Chol/High Fiber/2 gm Na  Code Status: Full Code    PT/OT Eval Status: To be determined    Dispo -inpatient, anticipate discharge in 24 hours back to his nursing home. Elaina Chavez MD    Thank you Kwame Franks MD for the opportunity to be involved in this patient's care. If you have any questions or concerns please feel free to contact me at [phone].

## 2022-12-29 NOTE — PLAN OF CARE
Problem: Discharge Planning  Goal: Discharge to home or other facility with appropriate resources  12/29/2022 0536 by Kamla Choi RN  Outcome: Progressing     Problem: Pain  Goal: Verbalizes/displays adequate comfort level or baseline comfort level  12/29/2022 0536 by Kamla Choi RN  Outcome: Progressing- patient verbalized pain and when comfort measures were effective. Problem: Safety - Adult  Goal: Free from fall injury  12/29/2022 0536 by Kamla Choi RN  Outcome: Progressing-proper measures in place used to ensure patient was free of falls during shift.       Problem: Chronic Conditions and Co-morbidities  Goal: Patient's chronic conditions and co-morbidity symptoms are monitored and maintained or improved  12/29/2022 0536 by Kamla Choi RN  Outcome: Progressing     Problem: Respiratory - Adult  Goal: Achieves optimal ventilation and oxygenation  12/29/2022 0512 by Kamla Choi RN  Outcome: Progressing     Problem: ABCDS Injury Assessment  Goal: Absence of physical injury  12/29/2022 0536 by Kamla Choi RN  Outcome: Progressing-

## 2022-12-30 VITALS
WEIGHT: 120.81 LBS | OXYGEN SATURATION: 94 % | DIASTOLIC BLOOD PRESSURE: 73 MMHG | BODY MASS INDEX: 18.31 KG/M2 | SYSTOLIC BLOOD PRESSURE: 133 MMHG | TEMPERATURE: 98.4 F | HEIGHT: 68 IN | HEART RATE: 84 BPM | RESPIRATION RATE: 18 BRPM

## 2022-12-30 LAB
PRO-BNP: ABNORMAL PG/ML (ref 0–124)
TROPONIN: 0.08 NG/ML

## 2022-12-30 PROCEDURE — 36415 COLL VENOUS BLD VENIPUNCTURE: CPT

## 2022-12-30 PROCEDURE — 84484 ASSAY OF TROPONIN QUANT: CPT

## 2022-12-30 PROCEDURE — 6370000000 HC RX 637 (ALT 250 FOR IP): Performed by: INTERNAL MEDICINE

## 2022-12-30 PROCEDURE — 2580000003 HC RX 258: Performed by: INTERNAL MEDICINE

## 2022-12-30 RX ORDER — CARVEDILOL 3.12 MG/1
3.12 TABLET ORAL 2 TIMES DAILY
Qty: 60 TABLET | Refills: 3 | Status: SHIPPED | OUTPATIENT
Start: 2022-12-30

## 2022-12-30 RX ADMIN — SEVELAMER CARBONATE 800 MG: 800 TABLET, FILM COATED ORAL at 09:03

## 2022-12-30 RX ADMIN — Medication 2 TABLET: at 09:04

## 2022-12-30 RX ADMIN — APIXABAN 2.5 MG: 2.5 TABLET, FILM COATED ORAL at 09:04

## 2022-12-30 RX ADMIN — LEVOTHYROXINE SODIUM 100 MCG: 0.1 TABLET ORAL at 05:16

## 2022-12-30 RX ADMIN — PANTOPRAZOLE SODIUM 40 MG: 40 TABLET, DELAYED RELEASE ORAL at 05:16

## 2022-12-30 RX ADMIN — ATORVASTATIN CALCIUM 80 MG: 80 TABLET, FILM COATED ORAL at 09:04

## 2022-12-30 RX ADMIN — ALLOPURINOL 100 MG: 100 TABLET ORAL at 09:04

## 2022-12-30 RX ADMIN — CARVEDILOL 3.12 MG: 3.12 TABLET, FILM COATED ORAL at 09:04

## 2022-12-30 RX ADMIN — CALCITRIOL CAPSULES 0.25 MCG 0.5 MCG: 0.25 CAPSULE ORAL at 09:04

## 2022-12-30 RX ADMIN — SODIUM CHLORIDE, PRESERVATIVE FREE 10 ML: 5 INJECTION INTRAVENOUS at 09:04

## 2022-12-30 NOTE — PROGRESS NOTES
Attempted to call Daughter to provide update of status The daughter is on patients chart for point of contact. The person that answered the phone and when asked to speak with her, stated it was the wrong number. Called the other number on patient's chart with no answer or voicemail availability to leave message.

## 2022-12-30 NOTE — DISCHARGE INSTR - COC
Continuity of Care Form    Patient Name: Rodger Wade   :  1955  MRN:  [id number]    Admit date:  2022  Discharge date:  2022    Code Status Order: Full Code   Advance Directives:     Admitting Physician:  David Eli MD  PCP: Owen Holland MD    Discharging Nurse: Vaibhav, [facility] Unit/Room#: 0605/1888-87  Discharging Unit Phone Number: [phone]    Emergency Contact:   Extended Emergency Contact Information  Primary Emergency Contact: [address] Phone: [phone]  Relation: Child   needed?  No    Past Surgical History:  Past Surgical History:   Procedure Laterality Date    COLONOSCOPY      CT BONE MARROW BIOPSY  11/15/2022    CT BONE MARROW BIOPSY 11/15/2022 [facility] CT SCAN    DIALYSIS FISTULA CREATION Left        Immunization History:   Immunization History   Administered Date(s) Administered    COVID-19, PFIZER Bivalent BOOSTER, DO NOT Dilute, (age 12y+), IM, 30 mcg/0.3 mL 2022    COVID-19, PFIZER GRAY top, DO NOT Dilute, (age 15 y+), IM, 30 mcg/0.3 mL 2022       Active Problems:  Patient Active Problem List   Diagnosis Code    HCAP (healthcare-associated pneumonia) J18.9    COPD exacerbation ([facility] J44.1    ESRD on dialysis ([facility] N18.6, Z99.2    COPD (chronic obstructive pulmonary disease) ([facility] J44.9    Acute on chronic respiratory failure with hypoxia and hypercapnia (HCC) J96.21, J96.22    Chest pain R07.9    Hypokalemia E87.6    V-tach I47.20    Essential hypertension I10    Hyperlipidemia E78.5    Chronic combined systolic and diastolic CHF (congestive heart failure) ([facility]) I50.42    SOB (shortness of breath) R06.02    AV fistula thrombosis, initial encounter ([facility] C59.031Z    History of venous thromboembolism Z86.718    Epistaxis R04.0    Pancytopenia (HCC) D61.818    Problem with dialysis access [facility]) T82.898A    Acute cerebrovascular accident (CVA) ([facility] I63.9    Chronic respiratory failure with hypoxia and hypercapnia ([facility] J96.11, J96.12    Dialysis AV fistula malfunction, initial encounter [facility]) T82.590A    Hemorrhage of arteriovenous fistula, initial encounter ([facility] T82.838A    Weakness R53.1    Pre-syncope R55    Hypotension due to hypovolemia I95.89, E86.1    Lactic acidemia E87.20    Elevated brain natriuretic peptide (BNP) level R79.89       Isolation/Infection:   Isolation            No Isolation          Patient Infection Status       Infection Onset Added Last Indicated Last Indicated By Review Planned Expiration Resolved Resolved By    None active    Resolved    COVID-19 (Rule Out) 12/28/22 12/28/22 12/28/22 COVID-19, Rapid (Ordered)   12/28/22 Rule-Out Test Resulted    C-diff Rule Out 03/02/22 03/02/22 03/03/22 Clostridium Difficile Toxin/Antigen (Ordered)   03/03/22 Rule-Out Test Resulted    COVID-19 (Rule Out) 09/21/21 09/21/21 09/21/21 COVID-19, Rapid (Ordered)   09/21/21 Rule-Out Test Resulted    COVID-19 (Rule Out)  04/02/20 04/02/20 Candice Noel RN   04/06/20 Candice Noel RN    COVID-19 (Rule Out) 03/31/20 03/31/20 03/31/20 COVID-19 (Ordered)   03/31/20 Rule-Out Test Resulted            Nurse Assessment:  Last Vital Signs: /69   Pulse 74   Temp 97.8 °F (36.6 °C) (Oral)   Resp 15   Ht 5' 8\" (1.727 m)   Wt 120 lb 13 oz (54.8 kg)   SpO2 98%   BMI 18.37 kg/m²     Last documented pain score (0-10 scale): Pain Level: 8  Last Weight:   Wt Readings from Last 1 Encounters:   12/30/22 120 lb 13 oz (54.8 kg)     Mental Status:  oriented    IV Access:  - Left Fistula    Nursing Mobility/ADLs:  Walking   Assisted  Transfer  Assisted  Bathing  Assisted  Dressing  Assisted  Toileting  Assisted  Feeding  Independent  Med Admin  Assisted  Med Delivery   none    Wound Care Documentation and Therapy:        Elimination:  Continence:    Bowel: Yes  Bladder: Yes  Urinary Catheter: None   Colostomy/Ileostomy/Ileal Conduit: No       Date of Last BM: 12/30/2022    Intake/Output Summary (Last 24 hours) at 12/30/2022 1125  Last data filed at 12/30/2022 0355  Gross per 24 hour   Intake 300 ml   Output 0 ml   Net 300 ml     I/O last 3 completed shifts: In: 1020 [P.O.:1020]  Out: 0     Safety Concerns:     History of Falls (last 30 days)    Impairments/Disabilities:           Nutrition Therapy:  Current Nutrition Therapy:   - Oral Diet:  Cardiac and Renal    Routes of Feeding: Oral  Liquids: Thin Liquids  Daily Fluid Restriction: yes - amount 2L  Last Modified Barium Swallow with Video (Video Swallowing Test): not done    Treatments at the Time of Hospital Discharge:   Respiratory Treatments: ***  Oxygen Therapy:  is on oxygen at 2-3 L/min per nasal cannula. Ventilator:    - No ventilator support    Rehab Therapies: Physical Therapy, Occupational Therapy, and Speech/Language Therapy  Weight Bearing Status/Restrictions: No weight bearing restrictions  Other Medical Equipment (for information only, NOT a DME order):  cane  Other Treatments:     Patient's personal belongings (please select all that are sent with patient):  Dentures upper and lower    RN SIGNATURE:  Electronically signed by Brandt Cochran RN on 12/30/22 at 3:13 PM EST    CASE MANAGEMENT/SOCIAL WORK SECTION    Inpatient Status Date: ***    Readmission Risk Assessment Score:  Readmission Risk              Risk of Unplanned Readmission:  38           Discharging to Facility/ Agency   Name:   Address:  Phone:  Fax:    Dialysis Facility (if applicable)   Name:  Address:  Dialysis Schedule:  Phone:  Fax:    / signature: {Esignature:[id number]}    PHYSICIAN SECTION    Prognosis: Good    Condition at Discharge: Stable    Rehab Potential (if transferring to Rehab): Good    Recommended Labs or Other Treatments After Discharge:       Physician Certification: I certify the above information and transfer of Colleen Knapp  is necessary for the continuing treatment of the diagnosis listed and that he requires [facility] for greater 30 days. Update Admission H&P: No change in H&P    PHYSICIAN SIGNATURE:  Electronically signed by Catia Young MD on 12/30/22 at 11:25 AM EST

## 2022-12-30 NOTE — PLAN OF CARE
Problem: Safety - Adult  Goal: Free from fall injury  Outcome: Progressing   Patient meets [facility] fall risk guidelines. Non skid footwear with ambulation. Bed in lowest position. Call light in reach. Bed alarm activated. Reminded to call for assistance before exiting bed. Continue safety precautions. Problem: Pain  Goal: Verbalizes/displays adequate comfort level or baseline comfort level  12/29/2022 2221 by Sue Jacobson RN  Outcome: Progressing   Patient denies any pain at this time. Will re assess. Problem: Cardiovascular - Adult  Goal: Absence of cardiac dysrhythmias or at baseline  Outcome: Progressing   Patient is SR with 1st degree on telemetry. VSS.

## 2022-12-30 NOTE — PROGRESS NOTES
Discharge note: Patient has been seen by doctor. Discharge order obtained, and discharge instructions reviewed. Patient educated, using the teach back method, about follow up instructions and discharge instructions. A completed copy of the AVS instructions given to transport and all questions answered. IV catheter removed without complaints, catheter intact, site WNL. All valuables with patient at time of discharge. Report called to DENISHA Zavaleta at [facility]. Discharged to [facility] via EMS transportation.

## 2022-12-30 NOTE — SIGNIFICANT EVENT
Rapid was called for room Dialysis unit at 12/29/22    On arrival we were informed by the nurse that patient hav a chest pain and an episode of vomiting. He was getting dialysis and got 1 L out, got 600 replaced afterwards    Initial assessment   Subjective: SOB and Chest pain 5/10, non radiating, pressure like, associated with nausea and vomiting. Objective: Lying in the bed looks distressed  Vitals: BP:134/68, HR 85, SPO2 100%, Temp: wnl and RR: 18.  PE: WNL  Labs: WNL    Interventions:  Oxygen: Yes  IV Access: Peripheral  Fluids: 600 ml before rapid. Labs: BMP, Trops  Imaging: CXR  Medications: Morphine  Others: None    Reevaluation:  Subjective: Feels better  Objective: Improved  Vitals: BP:130/69, HR 68, SPO2 100%, Temp: 98 and RR: 18.  PE: WNL  Labs: Pending    Assessment:  Patient was getting hemodialysis when he started having chest pain. Seems like hemodialysis could cause hypotension and he might symptoms of chest discomfort and nausea. Physical exam was unremarkable and labs are also unremarkable including the chest x-ray. Patient condition improved after he was reassured, he got 600 mL of bolus, and morphine IV. We will trend the troponins and continue to monitor the clinical status of the patient.   Intensivist and hospitalist were at the bedside and they agree with the plan    Racheal Fajardo MD, PGY-1  12/29/22  7:10 PM

## 2022-12-30 NOTE — CARE COORDINATION
Case Management Assessment            Discharge Note                    Date / Time of Note: 12/30/2022 2:11 PM                  Discharge Note Completed by: Valerio Oneill RN    Patient Name: Darshan Carlson   YOB: 1955  Diagnosis: Lactic acidosis [E87.20]  Weakness [R53.1]  General weakness [R53.1]  Near syncope [R55]  Hypotension, unspecified hypotension type [I95.9]  Pre-syncope [R55]   Date / Time: 12/27/2022 11:44 PM    Current PCP: Brodie Horton MD  Clinic patient: No    Hospitalization in the last 30 days: No    Advance Directives:  Code Status: Full Code  [place] DNR form completed and on chart: No, Not Indicated    Financial:  Payor: Joaquina Real / Plan: Sue STAHL / Product Type: *No Product type* /      Pharmacy:    [address] [place] [address] Rd, [address] [facility] [address] [place] [phone] - F [phone]  [address] [place] [address]  Phone: [phone] Fax: [phone]    [facility] [id number] [facility] [address] [facility] E [phone] - F [phone]  [facility] 67  [address] [place] [address]  Phone: [phone] Fax: [phone]      Assistance purchasing medications?:    Assistance provided by Case Management: None at this time    Does patient want to participate in local refill/ meds to beds program?:      Meds To Beds General Rules:  1. Can ONLY be done Monday- Friday between 8:30am-5pm  2. Prescription(s) must be in pharmacy by 3pm to be filled same day  3. Copy of patient's insurance/ prescription drug card and patient face sheet must be sent along with the prescription(s)  4. Cost of Rx cannot be added to hospital bill. If financial assistance is needed, please contact unit  or ;  or  CANNOT provide pharmacy voucher for patients co-pays  5.  Patients can then  the prescription on their way out of the hospital at discharge, or pharmacy can deliver to the bedside if staff is available. (payment due at time of pick-up or delivery - cash, check, or card accepted)     Able to afford home medications/ co-pay costs: Yes    ADLS:  Current PT AM-PAC Score:   /24  Current OT AM-PAC Score:   /24      DISCHARGE Disposition: [facility] (LTC): [facility]  Phone: [phone]  Fax: [phone]    LOC at discharge: [address] Completed: Yes    Notification completed in HENS/PAS?:  Not Applicable    IMM Completed:   Not Indicated    Transportation:  Transportation PLAN for discharge: EMS transportation   Mode of Transport: Ambulance stretcher - BLS  Reason for medical transport: Bed confined: Meets the following criteria 1) unable to get out of bed without assistance or ambulate, 2) unable to safely sit up in a wheelchair, 3) unable to maintain erect seating position in a chair for time needed for transport and Confused due to dementia and requires ambulance transport due to safe transfers  Name of [address]: [facility]  Phone: [phone]  Time of Transport: 9790-8206    Transport form completed: Yes    Home Care:  [address] ordered at discharge: No, Not Indicated  Home Care Agency: Not Applicable  Orders faxed: No    Durable Medical Equipment:  DME Provider:   Equipment obtained during hospitalization:     Home Oxygen and Respiratory Equipment:  Oxygen needed at discharge?: No, Not [address]: Not Applicable  Portable tank available for discharge?: No, Not Indicated    Dialysis:  Dialysis patient: Yes has HD at inpatient unit at [facility] [address]:  Not Applicable    Hospice Services:  Location: Not Applicable  Agency: Not Applicable    Consents signed: No, Not Indicated    Referrals made at [facility] for outpatient continued care:  Not Applicable    Additional CM Notes: Plan is for return to LTC bed at [facility] today. HD sheets faxed to [facility] in admissions.  Transport arranged for 6pm for return to long term care. The Plan for Transition of Care is related to the following treatment goals of Lactic acidosis [E87.20]  Weakness [R53.1]  General weakness [R53.1]  Near syncope [R55]  Hypotension, unspecified hypotension type [I95.9]  Pre-syncope [R55]    The Patient and/or patient representative Meño Burciaga and his family were provided with a choice of provider and agrees with the discharge plan Yes    Freedom of choice list was provided with basic dialogue that supports the patient's individualized plan of care/goals and shares the quality data associated with the providers.  Yes    Care Transitions patient: No    Caren Florez RN  [facility]  Case Management Department  Ph: [phone]  Fax: [phone]

## 2022-12-30 NOTE — PROGRESS NOTES
Rapid Response called while patient was receiving dialysis treatment. Per Dialysis nurse patient started having c/o of chest pain, SOB and emesis. Dialysis RN stopped treatment and called rapid response. When this RN and another staff RN arrived to dialysis Pt noted with labored breathing and using axiliary muscles to breath, on 3L O2, PCU RN obtained Spo2 from bedside monitor, was at 98%. Pt noted with productive congested sputum, c/o of 8/10 mid chest pain that was sharp and pressure like, pain was radiating down left arm, c/o of sharp pain to both anterior and posterior of head. Was noted dialysis needles were still in patient to Left fistula at this time. Resident's at bedside to assess patient and new orders placed. Blood glucose was taken (see results), no s/s of hypo/hyperglycemia. Admins PRN pain medication with PCU charge RN. Patient stated pain medication was effective and at a 5/10 and does not feel any pressure as before but, continues with pain to head. Assessed patient's [address], score was 0. Night shift RN came up to dialysis unit to obtain bedside handoff. Resident's stated will continue to monitor patient and to make aware for any changes or complications.

## 2022-12-30 NOTE — PROGRESS NOTES
Nephrology Progress Note  [phone] [address] [facility]. [facility]        Reason for Consult:  ESKD    HISTORY OF PRESENT ILLNESS:                This is a patient with significant past medical history of ESKD on HD MT&Th,Fri, CHF, HTN, COPD,  who presented with multiple falls from NH. His last HD was yesterday. He denies any fever/chills/chest pain or SOB. He denies any n/V/D. He states of late his balance is \"off\" and has been falling more. Lytes are stable. BP is stable. Nephrology is consulted for dialysis management. subjective:  -pt seen and examined  -PMSHx and meds reviewed  -No family at bedside      Events during dialysis noted, had chest pian and SOB, rapid response was called - BS OK  -given NS bolus  -denies chest pain or SOB at this time, BP is stable    ROS: neg CP/SOB    PHYSICAL EXAM:    Vitals:    /73   Pulse 71   Temp 98.2 °F (36.8 °C) (Oral)   Resp 16   Ht 5' 8\" (1.727 m)   Wt 120 lb 13 oz (54.8 kg)   SpO2 97%   BMI 18.37 kg/m²   I/O last 3 completed shifts: In: 1020 [P.O.:1020]  Out: 0   No intake/output data recorded. Physical Exam:  Gen: Resting in bed, NAD. HEENT: anicteric, NC/AT  CV: +S1/S2, RRR  Lungs: Good respiratory effort, clear air entry   Abd: S/NT +BS  Ext: No edema, no cyanosis  Skin: Warm. No rashes appreciated. : No TTP over bladder, nondistended. Neuro: Alert and oriented x 3, nonfocal.  Joints: No erythema noted over joints.   Access: JAIME AVG + T/B    DATA:    CBC:   Lab Results   Component Value Date/Time    WBC 2.6 12/29/2022 05:00 AM    RBC 2.92 12/29/2022 05:00 AM    HGB 8.1 12/29/2022 05:00 AM    HCT 26.2 12/29/2022 05:00 AM    MCV 89.7 12/29/2022 05:00 AM    MCH 27.8 12/29/2022 05:00 AM    MCHC 31.0 12/29/2022 05:00 AM    RDW 16.8 12/29/2022 05:00 AM    PLT 84 12/29/2022 05:00 AM    MPV 7.4 12/29/2022 05:00 AM     BMP:    Lab Results   Component Value Date/Time     12/29/2022 05:00 AM    K 4.4 12/29/2022 05:00 AM    K 4.5 12/28/2022 12:17 AM    CL 93 12/29/2022 05:00 AM    CO2 28 12/29/2022 05:00 AM    BUN 37 12/29/2022 05:00 AM    LABALBU 3.0 12/29/2022 05:00 AM    CREATININE 8.9 12/29/2022 05:00 AM    CALCIUM 8.4 12/29/2022 05:00 AM    GFRAA 7 10/14/2022 05:45 AM    LABGLOM 6 12/29/2022 05:00 AM    GLUCOSE 77 12/29/2022 05:00 AM       IMPRESSION/RECOMMENDATIONS:      ESKD: HD at [facility] 4 x per week-followed by  Nephrology  -access is left UA AVG  -s/p HD yesterday, eventful due to chest pain-resolved with NS  -next HD in am if he is still here    Frequent falls:? Etiology, BP stable, evolemic on exam, hgb is stable  -avoid hypotension-BP relatively low  -per primary, PT/OT    Lactic acidosis: due to hypoperfusion  -resolved    Anemia: continue TEODORA with HD    HTN:   -on carvedilol-lowererd dose and place hold parameters    CHF: compensated, HD in am    Leukopenia: mild, monitor    TCP: chronic, mild  -no heparin with HD    H/o DVT: on lowered dose of eliquis        Thank you for allowing me to participate in the care of this patient. I will continue to follow along. Please call with questions.     Francisco Paulino MD, MD

## 2023-01-01 LAB
BLOOD CULTURE, ROUTINE: NORMAL
CULTURE, BLOOD 2: NORMAL

## 2023-01-03 ENCOUNTER — HOSPITAL ENCOUNTER (EMERGENCY)
Age: 68
Discharge: HOME | End: 2023-01-03
Attending: EMERGENCY MEDICINE
Payer: MEDICARE

## 2023-01-03 VITALS
TEMPERATURE: 98 F | OXYGEN SATURATION: 100 % | DIASTOLIC BLOOD PRESSURE: 84 MMHG | HEART RATE: 83 BPM | SYSTOLIC BLOOD PRESSURE: 131 MMHG | WEIGHT: 158 LBS | RESPIRATION RATE: 23 BRPM | BODY MASS INDEX: 23.95 KG/M2 | HEIGHT: 68 IN

## 2023-01-03 DIAGNOSIS — T82.838A HEMORRHAGE OF ARTERIOVENOUS FISTULA, INITIAL ENCOUNTER (HCC): Primary | ICD-10-CM

## 2023-01-03 LAB
BASOPHILS ABSOLUTE: 0 K/UL (ref 0–0.2)
BASOPHILS RELATIVE PERCENT: 0 %
EOSINOPHILS ABSOLUTE: 0.2 K/UL (ref 0–0.6)
EOSINOPHILS RELATIVE PERCENT: 6 %
HCT VFR BLD CALC: 31.3 % (ref 40.5–52.5)
HEMOGLOBIN: 9.4 G/DL (ref 13.5–17.5)
HYPOCHROMIA: ABNORMAL
LYMPHOCYTES ABSOLUTE: 0.8 K/UL (ref 1–5.1)
LYMPHOCYTES RELATIVE PERCENT: 26 %
MCH RBC QN AUTO: 27 PG (ref 26–34)
MCHC RBC AUTO-ENTMCNC: 30.1 G/DL (ref 31–36)
MCV RBC AUTO: 89.6 FL (ref 80–100)
MONOCYTES ABSOLUTE: 0.1 K/UL (ref 0–1.3)
MONOCYTES RELATIVE PERCENT: 3 %
NEUTROPHILS ABSOLUTE: 1.9 K/UL (ref 1.7–7.7)
NEUTROPHILS RELATIVE PERCENT: 65 %
NUCLEATED RED BLOOD CELLS: 1 /100 WBC
PDW BLD-RTO: 17.7 % (ref 12.4–15.4)
PLATELET # BLD: 83 K/UL (ref 135–450)
PLATELET SLIDE REVIEW: ABNORMAL
PMV BLD AUTO: 8.6 FL (ref 5–10.5)
RBC # BLD: 3.49 M/UL (ref 4.2–5.9)
SCHISTOCYTES: ABNORMAL
SLIDE REVIEW: ABNORMAL
WBC # BLD: 2.9 K/UL (ref 4–11)

## 2023-01-03 PROCEDURE — 99283 EMERGENCY DEPT VISIT LOW MDM: CPT

## 2023-01-03 PROCEDURE — 36415 COLL VENOUS BLD VENIPUNCTURE: CPT

## 2023-01-03 PROCEDURE — 85025 COMPLETE CBC W/AUTO DIFF WBC: CPT

## 2023-01-03 ASSESSMENT — PAIN - FUNCTIONAL ASSESSMENT: PAIN_FUNCTIONAL_ASSESSMENT: NONE - DENIES PAIN

## 2023-01-03 NOTE — ED PROVIDER NOTES
ED Attending Attestation Note     Date of evaluation: 1/3/2023    This patient was seen by the resident. I have seen and examined the patient, agree with the workup, evaluation, management and diagnosis. The care plan has been discussed. My assessment reveals patient with bleeding from dialysis fistula after de-accessing today. Hb ordered due to amount of reported blood loss and is stable. Will discharge.      Rohini Chirinos MD  01/03/23 [id number]

## 2023-01-03 NOTE — ED PROVIDER NOTES
[address] [facility] RESIDENT NOTE       Date of evaluation: 1/3/2023    Chief Complaint     Vascular Access Problem (Pt arrived via EMS c/o bleeding for his dialysis fistula (left arm) since leaving dialysis at 1630 hours.)      History of Present Illness     Hany Rubi is a 74 y.o. male with PMH significant for ESRD on hemodialysis who presents for evaluation of bleeding AV fistula. The patient completed his normal course of dialysis today, went home when he noted some bleeding to his left AV fistula, it was dressed. He notes that there was significant blood loss at the time. He denies any chest pain, shortness of breath, lightheadedness, syncope. Other than stated above, no additional aggravating or alleviating factors are noted. MEDICAL DECISION MAKING / ASSESSMENT / PLAN     INITIAL VITALS: BP: (!) 154/82, Temp: 98 °F (36.7 °C), Heart Rate: 79, Resp: 16, SpO2: 100 %    Hany Rubi is a 74 y.o. male with a history and presentation as described above in HPI. The patient was evaluated by myself and the ED Attending Physician, Dr. Rashard Mchugh. All management and disposition plans were discussed and agreed upon. Upon presentation, the patient was chronically ill-appearing, afebrile and hemodynamically stable saturating well on baseline 3 L nasal cannula. Medical Decision Making  Patient presents with history of ESRD with left AV fistula with concerns for bleeding and blood loss, no active bleeding currently after dressing was taken down. Bandage was applied. CBC with chronic leukopenia, anemia improved from previous, reassuring. Patient with no other acute complaints. Palpable thrill present at AV fistula. Amount and/or Complexity of Data Reviewed  Independent Historian: EMS     Details: Received report on bleeding AVF  Labs: ordered. Decision-making details documented in ED Course.         This patient was also evaluated by the attending physician. All care plans werediscussed and agreed upon. Clinical Impression     1. Hemorrhage of arteriovenous fistula, initial encounter ([facility] [address]        Disposition     PATIENT REFERRED TO:  No follow-up provider specified. DISCHARGE MEDICATIONS:  New Prescriptions    No medications on file       DISPOSITION Decision To Discharge 01/03/2023 05:55:47 PM        Diagnostic Results and Other Data     RADIOLOGY:  No orders to display       LABS:   Results for orders placed or performed during the hospital encounter of 01/03/23   CBC with Auto Differential   Result Value Ref Range    WBC 2.9 (L) 4.0 - 11.0 K/uL    RBC 3.49 (L) 4.20 - 5.90 M/uL    Hemoglobin 9.4 (L) 13.5 - 17.5 g/dL    Hematocrit 31.3 (L) 40.5 - 52.5 %    MCV 89.6 80.0 - 100.0 fL    MCH 27.0 26.0 - 34.0 pg    MCHC 30.1 (L) 31.0 - 36.0 g/dL    RDW 17.7 (H) 12.4 - 15.4 %    Platelets 83 (L) 951 - 450 K/uL    MPV 8.6 5.0 - 10.5 fL     EKG   No EKG performed. ED BEDSIDE ULTRASOUND:  No results found. RECENT VITALS:  BP: (!) 154/82, Temp: 98 °F (36.7 °C), Heart Rate: 79,Resp: 16, SpO2: 100 %     Procedures         ED Course     Nursing Notes, Past Medical Hx, Past Surgical Hx, Social Hx, Allergies, and Family Hx were reviewed. The patient was given the following medications:  No orders of the defined types were placed in this encounter. CONSULTS:  None    Review of Systems     ROS as stated above, all other systems reviewed and are negative. Past Medical, Surgical, Family, and Social History     He has a past medical history of Acute HFrEF (heart failure with reduced ejection fraction) ([facility] [address] Cerebral artery occlusion with cerebral infarction ([facility] [address] Chronic kidney disease, COPD (chronic obstructive pulmonary disease) ([facility] [address] Dialysis patient ([facility] [address] Gout, Hemodialysis patient ([facility] [address] Hx of blood clots, Hyperlipidemia, Hypertension, and Renal failure.   He has a past surgical history that includes Dialysis fistula creation (Left); Colonoscopy; and CT BIOPSY BONE MARROW (11/15/2022). His family history includes Cancer in his father and mother; Other in his sister. He reports that he has quit smoking. He has never used smokeless tobacco. He reports current alcohol use. He reports that he does not use drugs. Medications     Previous Medications    ALBUTEROL SULFATE HFA (PROVENTIL;VENTOLIN;PROAIR) 108 (90 BASE) MCG/ACT INHALER    Inhale 2 puffs into the lungs every 6 hours as needed for Wheezing 90mcg/actuation    ALLOPURINOL (ZYLOPRIM) 100 MG TABLET    Take 100 mg by mouth Daily on MWF    APIXABAN (ELIQUIS) 2.5 MG TABS TABLET    Take 1 tablet by mouth 2 times daily    ATORVASTATIN (LIPITOR) 80 MG TABLET    Take 80 mg by mouth daily    CALCITRIOL (ROCALTROL) 0.25 MCG CAPSULE    Take 0.5 mcg by mouth every other day [facility]    CALCIUM CARB-CHOLECALCIFEROL 600-200 MG-UNIT TABS TABLET    Take 1 tablet by mouth daily    CARVEDILOL (COREG) 3.125 MG TABLET    Take 1 tablet by mouth 2 times daily    DICLOFENAC SODIUM (VOLTAREN) 1 % GEL    Apply 2 g topically 2 times daily    FERROUS SULFATE (FE TABS) 325 (65 FE) MG EC TABLET    Take 1 tablet by mouth every other day    GABAPENTIN (NEURONTIN) 100 MG CAPSULE    Take 1 capsule by mouth at bedtime for 30 days. LEVOTHYROXINE (SYNTHROID) 100 MCG TABLET    Take 100 mcg by mouth Daily    OMEPRAZOLE (PRILOSEC) 20 MG DELAYED RELEASE CAPSULE    Take 20 mg by mouth daily    SEVELAMER (RENVELA) 800 MG TABLET    Take 1 tablet by mouth 3 times daily (with meals)    SODIUM CHLORIDE (OCEAN) 0.65 % NASAL SPRAY    1-2 sprays both nostrils at least daily and as needed to prevent dry mucosa. UMECLIDINIUM BROMIDE (INCRUSE ELLIPTA) 62.5 MCG/INH AEPB    Inhale 1 puff into the lungs daily    VITAMIN D (ERGOCALCIFEROL) 1.25 MG (65967 UT) CAPS CAPSULE    Take 50,000 Units by mouth once a week Saturdays       Allergies     He is allergic to nitroglycerin and oxymetazoline.     Physical Exam     INITIAL VITALS: BP: (!) 154/82, Temp: 98 °F (36.7 °C), Heart Rate: 79, Resp: 16, SpO2: 100 %   Physical Exam    General:  Chronically ill-appearing. No acute distress  Eyes:  Pupils reactive. No discharge from eyes   ENT:  No discharge from nose. OP clear  Neck:  Supple, trachea midline  Pulmonary:   Non-labored breathing. Breath sounds clear bilaterally  Cardiac:  Regular rate and rhythm. No murmurs  Abdomen:  Soft. Non-tender. Non-distended  Musculoskeletal:  No long bone deformity. No CVA tenderness  Vascular:  Extremities warm and perfused. Left AV fistula site hemostatic, palpable thrill. Skin:  Dry, no rashes  Neuro:  Alert and oriented x3. Moves all four extremities to command. No focal deficit  CN II-XII intact. Sensation grossly intact to light touch.  Speech and mentation normal.  Psych: Appropriate mood and affect           Sony Weaver MD  Resident  01/03/23 0099

## 2023-01-14 ENCOUNTER — HOSPITAL ENCOUNTER (INPATIENT)
Age: 68
LOS: 2 days | Discharge: NURSING, REHAB OR LONG-TERM CARE | DRG: 377 | End: 2023-01-17
Attending: EMERGENCY MEDICINE | Admitting: INTERNAL MEDICINE
Payer: MEDICARE

## 2023-01-14 ENCOUNTER — APPOINTMENT (OUTPATIENT)
Dept: GENERAL RADIOLOGY | Age: 68
DRG: 377 | End: 2023-01-14
Payer: MEDICARE

## 2023-01-14 ENCOUNTER — APPOINTMENT (OUTPATIENT)
Dept: CT IMAGING | Age: 68
DRG: 377 | End: 2023-01-14
Payer: MEDICARE

## 2023-01-14 DIAGNOSIS — D64.9 ANEMIA, UNSPECIFIED TYPE: ICD-10-CM

## 2023-01-14 DIAGNOSIS — R53.1 GENERAL WEAKNESS: ICD-10-CM

## 2023-01-14 DIAGNOSIS — E87.5 HYPERKALEMIA: Primary | ICD-10-CM

## 2023-01-14 DIAGNOSIS — R19.5 POSITIVE OCCULT STOOL BLOOD TEST: ICD-10-CM

## 2023-01-14 PROBLEM — R62.7 FAILURE TO THRIVE IN ADULT: Status: ACTIVE | Noted: 2023-01-14

## 2023-01-14 LAB
A/G RATIO: 1.3 (ref 1.1–2.2)
ALBUMIN SERPL-MCNC: 3.6 G/DL (ref 3.4–5)
ALP BLD-CCNC: 148 U/L (ref 40–129)
ALT SERPL-CCNC: 7 U/L (ref 10–40)
ANION GAP SERPL CALCULATED.3IONS-SCNC: 11 MMOL/L (ref 3–16)
AST SERPL-CCNC: 18 U/L (ref 15–37)
BASOPHILS ABSOLUTE: 0 K/UL (ref 0–0.2)
BASOPHILS RELATIVE PERCENT: 1 %
BILIRUB SERPL-MCNC: 0.4 MG/DL (ref 0–1)
BUN BLDV-MCNC: 51 MG/DL (ref 7–20)
CALCIUM SERPL-MCNC: 9.1 MG/DL (ref 8.3–10.6)
CHLORIDE BLD-SCNC: 92 MMOL/L (ref 99–110)
CO2: 29 MMOL/L (ref 21–32)
CREAT SERPL-MCNC: 9 MG/DL (ref 0.8–1.3)
EOSINOPHILS ABSOLUTE: 0.1 K/UL (ref 0–0.6)
EOSINOPHILS RELATIVE PERCENT: 4.5 %
GFR SERPL CREATININE-BSD FRML MDRD: 6 ML/MIN/{1.73_M2}
GLUCOSE BLD-MCNC: 81 MG/DL (ref 70–99)
GLUCOSE BLD-MCNC: 89 MG/DL (ref 70–99)
HCT VFR BLD CALC: 24.1 % (ref 40.5–52.5)
HEMOGLOBIN: 7.4 G/DL (ref 13.5–17.5)
LYMPHOCYTES ABSOLUTE: 0.4 K/UL (ref 1–5.1)
LYMPHOCYTES RELATIVE PERCENT: 12.5 %
MCH RBC QN AUTO: 27.7 PG (ref 26–34)
MCHC RBC AUTO-ENTMCNC: 30.5 G/DL (ref 31–36)
MCV RBC AUTO: 90.7 FL (ref 80–100)
MONOCYTES ABSOLUTE: 0.3 K/UL (ref 0–1.3)
MONOCYTES RELATIVE PERCENT: 9.6 %
NEUTROPHILS ABSOLUTE: 2.3 K/UL (ref 1.7–7.7)
NEUTROPHILS RELATIVE PERCENT: 72.4 %
PDW BLD-RTO: 16.3 % (ref 12.4–15.4)
PERFORMED ON: NORMAL
PLATELET # BLD: 107 K/UL (ref 135–450)
PMV BLD AUTO: 9.5 FL (ref 5–10.5)
POTASSIUM REFLEX MAGNESIUM: 5.6 MMOL/L (ref 3.5–5.1)
POTASSIUM SERPL-SCNC: 6 MMOL/L (ref 3.5–5.1)
RBC # BLD: 2.66 M/UL (ref 4.2–5.9)
SODIUM BLD-SCNC: 132 MMOL/L (ref 136–145)
TOTAL PROTEIN: 6.3 G/DL (ref 6.4–8.2)
TROPONIN: 0.08 NG/ML
TROPONIN: 0.08 NG/ML
WBC # BLD: 3.2 K/UL (ref 4–11)

## 2023-01-14 PROCEDURE — G0378 HOSPITAL OBSERVATION PER HR: HCPCS

## 2023-01-14 PROCEDURE — 96374 THER/PROPH/DIAG INJ IV PUSH: CPT

## 2023-01-14 PROCEDURE — 73560 X-RAY EXAM OF KNEE 1 OR 2: CPT

## 2023-01-14 PROCEDURE — 2580000003 HC RX 258: Performed by: INTERNAL MEDICINE

## 2023-01-14 PROCEDURE — 93005 ELECTROCARDIOGRAM TRACING: CPT | Performed by: PHYSICIAN ASSISTANT

## 2023-01-14 PROCEDURE — 73080 X-RAY EXAM OF ELBOW: CPT

## 2023-01-14 PROCEDURE — 36415 COLL VENOUS BLD VENIPUNCTURE: CPT

## 2023-01-14 PROCEDURE — 72125 CT NECK SPINE W/O DYE: CPT

## 2023-01-14 PROCEDURE — 71046 X-RAY EXAM CHEST 2 VIEWS: CPT

## 2023-01-14 PROCEDURE — 73502 X-RAY EXAM HIP UNI 2-3 VIEWS: CPT

## 2023-01-14 PROCEDURE — 6370000000 HC RX 637 (ALT 250 FOR IP): Performed by: INTERNAL MEDICINE

## 2023-01-14 PROCEDURE — 72170 X-RAY EXAM OF PELVIS: CPT

## 2023-01-14 PROCEDURE — 2580000003 HC RX 258: Performed by: EMERGENCY MEDICINE

## 2023-01-14 PROCEDURE — 99285 EMERGENCY DEPT VISIT HI MDM: CPT

## 2023-01-14 PROCEDURE — 84132 ASSAY OF SERUM POTASSIUM: CPT

## 2023-01-14 PROCEDURE — 96372 THER/PROPH/DIAG INJ SC/IM: CPT

## 2023-01-14 PROCEDURE — 6370000000 HC RX 637 (ALT 250 FOR IP): Performed by: EMERGENCY MEDICINE

## 2023-01-14 PROCEDURE — 85025 COMPLETE CBC W/AUTO DIFF WBC: CPT

## 2023-01-14 PROCEDURE — 84484 ASSAY OF TROPONIN QUANT: CPT

## 2023-01-14 PROCEDURE — 70450 CT HEAD/BRAIN W/O DYE: CPT

## 2023-01-14 PROCEDURE — 80053 COMPREHEN METABOLIC PANEL: CPT

## 2023-01-14 PROCEDURE — 6360000002 HC RX W HCPCS: Performed by: INTERNAL MEDICINE

## 2023-01-14 RX ORDER — FERROUS SULFATE 325(65) MG
325 TABLET ORAL
Status: DISCONTINUED | OUTPATIENT
Start: 2023-01-15 | End: 2023-01-17 | Stop reason: HOSPADM

## 2023-01-14 RX ORDER — ATORVASTATIN CALCIUM 80 MG/1
80 TABLET, FILM COATED ORAL NIGHTLY
Status: DISCONTINUED | OUTPATIENT
Start: 2023-01-14 | End: 2023-01-17 | Stop reason: HOSPADM

## 2023-01-14 RX ORDER — GABAPENTIN 100 MG/1
100 CAPSULE ORAL NIGHTLY
Status: DISCONTINUED | OUTPATIENT
Start: 2023-01-14 | End: 2023-01-17 | Stop reason: HOSPADM

## 2023-01-14 RX ORDER — LEVOTHYROXINE SODIUM 0.1 MG/1
100 TABLET ORAL DAILY
Status: DISCONTINUED | OUTPATIENT
Start: 2023-01-15 | End: 2023-01-16

## 2023-01-14 RX ORDER — SODIUM CHLORIDE 0.9 % (FLUSH) 0.9 %
5-40 SYRINGE (ML) INJECTION PRN
Status: DISCONTINUED | OUTPATIENT
Start: 2023-01-14 | End: 2023-01-17 | Stop reason: HOSPADM

## 2023-01-14 RX ORDER — ALBUTEROL SULFATE 2.5 MG/3ML
2.5 SOLUTION RESPIRATORY (INHALATION) EVERY 4 HOURS PRN
Status: DISCONTINUED | OUTPATIENT
Start: 2023-01-14 | End: 2023-01-17 | Stop reason: HOSPADM

## 2023-01-14 RX ORDER — SODIUM CHLORIDE 0.9 % (FLUSH) 0.9 %
5-40 SYRINGE (ML) INJECTION EVERY 12 HOURS SCHEDULED
Status: DISCONTINUED | OUTPATIENT
Start: 2023-01-14 | End: 2023-01-17 | Stop reason: HOSPADM

## 2023-01-14 RX ORDER — POLYETHYLENE GLYCOL 3350 17 G/17G
17 POWDER, FOR SOLUTION ORAL DAILY PRN
Status: DISCONTINUED | OUTPATIENT
Start: 2023-01-14 | End: 2023-01-17 | Stop reason: HOSPADM

## 2023-01-14 RX ORDER — CALCITRIOL 0.25 UG/1
0.5 CAPSULE, LIQUID FILLED ORAL EVERY OTHER DAY
Status: DISCONTINUED | OUTPATIENT
Start: 2023-01-15 | End: 2023-01-17 | Stop reason: HOSPADM

## 2023-01-14 RX ORDER — SEVELAMER CARBONATE 800 MG/1
800 TABLET, FILM COATED ORAL
Status: DISCONTINUED | OUTPATIENT
Start: 2023-01-15 | End: 2023-01-17

## 2023-01-14 RX ORDER — ALLOPURINOL 100 MG/1
100 TABLET ORAL DAILY
Status: DISCONTINUED | OUTPATIENT
Start: 2023-01-15 | End: 2023-01-17 | Stop reason: HOSPADM

## 2023-01-14 RX ORDER — ONDANSETRON 4 MG/1
4 TABLET, ORALLY DISINTEGRATING ORAL EVERY 8 HOURS PRN
Status: DISCONTINUED | OUTPATIENT
Start: 2023-01-14 | End: 2023-01-17 | Stop reason: HOSPADM

## 2023-01-14 RX ORDER — ACETAMINOPHEN 325 MG/1
650 TABLET ORAL EVERY 6 HOURS PRN
Status: DISCONTINUED | OUTPATIENT
Start: 2023-01-14 | End: 2023-01-17 | Stop reason: HOSPADM

## 2023-01-14 RX ORDER — GLUCAGON 1 MG/ML
1 KIT INJECTION PRN
Status: DISCONTINUED | OUTPATIENT
Start: 2023-01-14 | End: 2023-01-17 | Stop reason: HOSPADM

## 2023-01-14 RX ORDER — CARVEDILOL 3.12 MG/1
3.12 TABLET ORAL 2 TIMES DAILY
Status: DISCONTINUED | OUTPATIENT
Start: 2023-01-14 | End: 2023-01-17 | Stop reason: HOSPADM

## 2023-01-14 RX ORDER — CALCIUM CARBONATE 200(500)MG
1 TABLET,CHEWABLE ORAL EVERY 8 HOURS PRN
Status: DISCONTINUED | OUTPATIENT
Start: 2023-01-14 | End: 2023-01-17 | Stop reason: HOSPADM

## 2023-01-14 RX ORDER — ONDANSETRON 2 MG/ML
4 INJECTION INTRAMUSCULAR; INTRAVENOUS EVERY 6 HOURS PRN
Status: DISCONTINUED | OUTPATIENT
Start: 2023-01-14 | End: 2023-01-17 | Stop reason: HOSPADM

## 2023-01-14 RX ORDER — CALCIUM CARBONATE 200(500)MG
1 TABLET,CHEWABLE ORAL EVERY 8 HOURS PRN
COMMUNITY

## 2023-01-14 RX ORDER — ACETAMINOPHEN 650 MG/1
650 SUPPOSITORY RECTAL EVERY 6 HOURS PRN
Status: DISCONTINUED | OUTPATIENT
Start: 2023-01-14 | End: 2023-01-17 | Stop reason: HOSPADM

## 2023-01-14 RX ORDER — ALBUTEROL SULFATE 2.5 MG/3ML
5 SOLUTION RESPIRATORY (INHALATION) ONCE
Status: DISCONTINUED | OUTPATIENT
Start: 2023-01-14 | End: 2023-01-15

## 2023-01-14 RX ORDER — SODIUM CHLORIDE 9 MG/ML
INJECTION, SOLUTION INTRAVENOUS PRN
Status: DISCONTINUED | OUTPATIENT
Start: 2023-01-14 | End: 2023-01-17 | Stop reason: HOSPADM

## 2023-01-14 RX ORDER — HEPARIN SODIUM 5000 [USP'U]/ML
5000 INJECTION, SOLUTION INTRAVENOUS; SUBCUTANEOUS EVERY 8 HOURS SCHEDULED
Status: DISCONTINUED | OUTPATIENT
Start: 2023-01-14 | End: 2023-01-14

## 2023-01-14 RX ORDER — DEXTROSE MONOHYDRATE 100 MG/ML
INJECTION, SOLUTION INTRAVENOUS CONTINUOUS PRN
Status: DISCONTINUED | OUTPATIENT
Start: 2023-01-14 | End: 2023-01-17 | Stop reason: HOSPADM

## 2023-01-14 RX ADMIN — CARVEDILOL 3.12 MG: 6.25 TABLET, FILM COATED ORAL at 23:58

## 2023-01-14 RX ADMIN — SODIUM ZIRCONIUM CYCLOSILICATE 10 G: 10 POWDER, FOR SUSPENSION ORAL at 22:48

## 2023-01-14 RX ADMIN — SODIUM CHLORIDE, PRESERVATIVE FREE 5 ML: 5 INJECTION INTRAVENOUS at 22:12

## 2023-01-14 RX ADMIN — DEXTROSE MONOHYDRATE 250 ML: 10 INJECTION, SOLUTION INTRAVENOUS at 22:47

## 2023-01-14 RX ADMIN — INSULIN HUMAN 10 UNITS: 100 INJECTION, SOLUTION PARENTERAL at 22:47

## 2023-01-14 RX ADMIN — APIXABAN 2.5 MG: 5 TABLET, FILM COATED ORAL at 23:58

## 2023-01-14 RX ADMIN — HEPARIN SODIUM 5000 UNITS: 5000 INJECTION INTRAVENOUS; SUBCUTANEOUS at 22:51

## 2023-01-14 RX ADMIN — GABAPENTIN 100 MG: 100 CAPSULE ORAL at 23:58

## 2023-01-14 RX ADMIN — ATORVASTATIN CALCIUM 80 MG: 80 TABLET, FILM COATED ORAL at 23:58

## 2023-01-14 ASSESSMENT — PAIN - FUNCTIONAL ASSESSMENT: PAIN_FUNCTIONAL_ASSESSMENT: 0-10

## 2023-01-14 ASSESSMENT — ENCOUNTER SYMPTOMS
WHEEZING: 0
DIARRHEA: 0
BACK PAIN: 0
NAUSEA: 0
EYE REDNESS: 0
CONSTIPATION: 0
SHORTNESS OF BREATH: 0
VOMITING: 0
CHEST TIGHTNESS: 0
COLOR CHANGE: 0
PHOTOPHOBIA: 0
ABDOMINAL PAIN: 0

## 2023-01-14 ASSESSMENT — PAIN SCALES - GENERAL: PAINLEVEL_OUTOF10: 6

## 2023-01-14 ASSESSMENT — PAIN DESCRIPTION - LOCATION: LOCATION: HIP

## 2023-01-14 ASSESSMENT — PAIN DESCRIPTION - ORIENTATION: ORIENTATION: RIGHT

## 2023-01-14 NOTE — ED PROVIDER NOTES
[address] [facility] [address] ENCOUNTER          PHYSICIAN ASSISTANT NOTE       Date of evaluation: 1/14/2023    Chief Complaint     Fatigue (Worsening weakness for a couple of weeks, [facility] HD patient, thinks he's been losing weight, fell x3 today, c/o left hip pain)    History of Present Illness     Gabi Tenorio is a 74 y.o. male presenting to the emergency department for concern of multiple falls today. He said that while he was walking his knees gave out on him and he fell multiple times, 1 of these falls he hit his head with no loss of consciousness. He is anticoagulated on Eliquis. He is on dialysis with most recent session yesterday, normally Monday Tuesday and Friday. From the fall, he is complaining of pain in his left elbow, left hip and left knee. He normally ambulates with a walker and a cane, and has been able to stand after these falls occurred. He has no headache, but does have bilateral blurry vision, no double vision, no nausea or vomiting. No recent fevers or chills. He does think that he has been losing weight, and has been feeling generally unwell with worsening weakness over the last couple weeks. Make small amount of urine. No recent cough, shortness of breath or chest pain. No neck or back pain. ASSESSMENT / PLAN  (MEDICAL DECISION MAKING)     INITIAL VITALS: BP: 118/88, Temp: 98.1 °F (36.7 °C), Heart Rate: 87, Resp: 23, SpO2: 100 %    Gabi Tenorio is a 74 y.o. male presenting to the emergency department for multiple falls today. Medical Decision Making  Patient presented to the emergency department because his legs have been giving out on him today causing him to fall. He does report hitting his head during 1 of these episodes, with no loss of consciousness. He is anticoagulated on Eliquis. Reports some blurry vision, with no headache, nausea, vomiting, and has no focal neurologic deficits on exam.  He had stable vitals and was afebrile.     CT head and cervical spine completed which showed no traumatic injuries. Also he reported pain in his left elbow, knee and hip. X-rays completed showing no underlying fracture. Low suspicion for occult fracture based off of physical exam findings. Medical work-up completed since patient does report for feeling generally unwell, he did begin for hyperkalemia with potassium of 6.0. Clinicially he does not appear fluid overloaded and is on his baseline 4 L of oxygen via nasal cannula for COPD. He does have a history of anemia, suspected to be of chronic disease and has required blood transfusions in the past, hemoglobin stable today at 7.4. He does not require an emergent transfusion. He denies any black or tarry stool or symptoms of GI blood loss. No EKG changes correlating with hyperkalemia. Medications ordered to treat hyperkalemia including albuterol, insulin with dextrose and Lokelma. Plan to discuss need for admission with patient's nephrologist since he is not due for dialysis until Monday. Patient will then be admitted to the medicine service for further evaluation and management. Amount and/or Complexity of Data Reviewed  Labs: ordered. Radiology: ordered. ECG/medicine tests: ordered. Risk  OTC drugs. Prescription drug management. Decision regarding hospitalization. This patient was also evaluated by the attending physician. All care plans were discussed and agreed upon. Clinical Impression     1. Hyperkalemia    2. General weakness      Disposition     PATIENT REFERRED TO:  No follow-up provider specified. DISCHARGE MEDICATIONS:  New Prescriptions    No medications on file     DISPOSITION Decision To Admit 01/14/2023 09:10:01 PM    Diagnostic Results and Other Data     RADIOLOGY:  CT CERVICAL SPINE WO CONTRAST   Final Result      No acute hemorrhage, edema or hydrocephalus. Mild cerebral atrophic changes are noted without ventricular dilation.  Remote small low density lacunar infarct noted in the left thalamus and right basal ganglia. Mucosal thickening involving the maxillary sinuses. CT scan of the CERVICAL SPINE on 1/14/2023   HISTORY: Neck pain, trauma, fell, blood thinners evaluate for fracture, disc herniation or spinal canal stenosis. PROCEDURE: Dose modulation, radiation reducing techniques and iterative reconstruction techniques utilized to reduce radiation exposure with up-to-date CT equipment. CT scan of the cervical spine was performed utilizing contiguous 2.5 cm axial sections with multiple sagittal and coronal reconstructed sequences. These were reviewed under separate computerized work station. COMPARISON: None      FINDINGS:       There is normal alignment of the cervical vertebral bodies with no sign of spondylolisthesis or fracture. The skull base appears intact. Examination of the individual disk spaces reveals no sign of any acute bony defect or cord compression. The C2-C3, C3-C4, C4-C5, C5-6, C6-C7 and C7-T1 disc space levels are normally aligned without definitive acute abnormalities. Facet hypertrophy is    noted C2-3 greater on the left at C3-4 greater on the left at C4-5 greater on the left. Asymmetric uncinate foraminal spurring and some disc space loss are noted at C5-6 and C6-7 levels      Incidental note made of vascular calcifications involving the carotid bifurcations as well as the vertebral arteries and basilar artery. Incidental note made of some right apical lung consolidation and chronic emphysematous changes throughout the lungs. No pneumothorax. IMPRESSION:       1. No acute fracture with disc degeneration spondylosis and asymmetric facet hypertrophy as described above   2. Normal alignment.    3. Incidental note made of chronic vascular calcifications involving the carotid arteries and vertebral arteries and basilar artery and apical consolidation in the right lung and emphysematous change      CT HEAD WO CONTRAST   Final Result      No acute hemorrhage, edema or hydrocephalus.       Mild cerebral atrophic changes are noted without ventricular dilation. Remote small low density lacunar infarct noted in the left thalamus and right basal ganglia. Mucosal thickening involving the maxillary sinuses.         CT scan of the CERVICAL SPINE on 1/14/2023   HISTORY: Neck pain, trauma, fell, blood thinners evaluate for fracture, disc herniation or spinal canal stenosis.      PROCEDURE: Dose modulation, radiation reducing techniques and iterative reconstruction techniques utilized to reduce radiation exposure with up-to-date CT equipment.       CT scan of the cervical spine was performed utilizing contiguous 2.5 cm axial sections with multiple sagittal and coronal reconstructed sequences. These were reviewed under separate computerized work station.      COMPARISON: None      FINDINGS:       There is normal alignment of the cervical vertebral bodies with no sign of spondylolisthesis or fracture. The skull base appears intact.      Examination of the individual disk spaces reveals no sign of any acute bony defect or cord compression. The C2-C3, C3-C4, C4-C5, C5-6, C6-C7 and C7-T1 disc space levels are normally aligned without definitive acute abnormalities. Facet hypertrophy is    noted C2-3 greater on the left at C3-4 greater on the left at C4-5 greater on the left. Asymmetric uncinate foraminal spurring and some disc space loss are noted at C5-6 and C6-7 levels      Incidental note made of vascular calcifications involving the carotid bifurcations as well as the vertebral arteries and basilar artery.      Incidental note made of some right apical lung consolidation and chronic emphysematous changes throughout the lungs. No pneumothorax.      IMPRESSION:       1. No acute fracture with disc degeneration spondylosis and asymmetric facet hypertrophy as described above   2. Normal alignment.   3. Incidental note made of chronic  vascular calcifications involving the carotid arteries and vertebral arteries and basilar artery and apical consolidation in the right lung and emphysematous change      XR CHEST (2 VW)   Final Result      Hyperinflated lungs and borderline cardiac enlargement. No acute chest wall deformity appreciated. XR ELBOW LEFT (MIN 3 VIEWS)   Final Result      No sign of any acute left elbow fracture or radiopaque foreign body. Mild joint space loss         XR HIP 2-3 VW W PELVIS LEFT   Final Result      No discrete fracture or acute deformity involving the left hip      No acute bony defect in the pelvis. Slightly irregular superior pubic ramus and inferior pubic ramus on the right which could be due to positioning or prior fracture, correlate clinically      THREE VIEWS OF THE LEFT KNEE on 1/14/2023      HISTORY:  Knee pain      PROCEDURE: AP, lateral and sunrise patellofemoral views were obtained. FINDINGS:       There is no sign of a discrete fracture or dislocation. Demineralization is noted. Some lateral condylar spurring is appreciated. Mild patellofemoral joint space loss noted      There is no acute bony defect identified or loose body. There is no radiopaque foreign body      IMPRESSION:       No sign of any fracture or acute deformity. Demineralization with some lateral compartment narrowing and patellofemoral compartment narrowing. XR KNEE LEFT (1-2 VIEWS)   Final Result      No discrete fracture or acute deformity involving the left hip      No acute bony defect in the pelvis. Slightly irregular superior pubic ramus and inferior pubic ramus on the right which could be due to positioning or prior fracture, correlate clinically      THREE VIEWS OF THE LEFT KNEE on 1/14/2023      HISTORY:  Knee pain      PROCEDURE: AP, lateral and sunrise patellofemoral views were obtained. FINDINGS:       There is no sign of a discrete fracture or dislocation. Demineralization is noted.  Some lateral condylar spurring is appreciated. Mild patellofemoral joint space loss noted      There is no acute bony defect identified or loose body. There is no radiopaque foreign body      IMPRESSION:       No sign of any fracture or acute deformity. Demineralization with some lateral compartment narrowing and patellofemoral compartment narrowing.         LABS:   Results for orders placed or performed during the hospital encounter of 01/14/23   CBC with Auto Differential   Result Value Ref Range    WBC 3.2 (L) 4.0 - 11.0 K/uL    RBC 2.66 (L) 4.20 - 5.90 M/uL    Hemoglobin 7.4 (L) 13.5 - 17.5 g/dL    Hematocrit 24.1 (L) 40.5 - 52.5 %    MCV 90.7 80.0 - 100.0 fL    MCH 27.7 26.0 - 34.0 pg    MCHC 30.5 (L) 31.0 - 36.0 g/dL    RDW 16.3 (H) 12.4 - 15.4 %    Platelets 341 (L) 756 - 450 K/uL    MPV 9.5 5.0 - 10.5 fL    Neutrophils % 72.4 %    Lymphocytes % 12.5 %    Monocytes % 9.6 %    Eosinophils % 4.5 %    Basophils % 1.0 %    Neutrophils Absolute 2.3 1.7 - 7.7 K/uL    Lymphocytes Absolute 0.4 (L) 1.0 - 5.1 K/uL    Monocytes Absolute 0.3 0.0 - 1.3 K/uL    Eosinophils Absolute 0.1 0.0 - 0.6 K/uL    Basophils Absolute 0.0 0.0 - 0.2 K/uL   CMP w/ Reflex to MG   Result Value Ref Range    Sodium 132 (L) 136 - 145 mmol/L    Potassium reflex Magnesium 5.6 (H) 3.5 - 5.1 mmol/L    Chloride 92 (L) 99 - 110 mmol/L    CO2 29 21 - 32 mmol/L    Anion Gap 11 3 - 16    Glucose 89 70 - 99 mg/dL    BUN 51 (H) 7 - 20 mg/dL    Creatinine 9.0 (HH) 0.8 - 1.3 mg/dL    Est, Glom Filt Rate 6 (A) >60    Calcium 9.1 8.3 - 10.6 mg/dL    Total Protein 6.3 (L) 6.4 - 8.2 g/dL    Albumin 3.6 3.4 - 5.0 g/dL    Albumin/Globulin Ratio 1.3 1.1 - 2.2    Total Bilirubin 0.4 0.0 - 1.0 mg/dL    Alkaline Phosphatase 148 (H) 40 - 129 U/L    ALT 7 (L) 10 - 40 U/L    AST 18 15 - 37 U/L   Troponin   Result Value Ref Range    Troponin 0.08 (H) <0.01 ng/mL   Potassium   Result Value Ref Range    Potassium 6.0 (HH) 3.5 - 5.1 mmol/L   Troponin   Result Value Ref Range Troponin 0.08 (H) <0.01 ng/mL     EKG   Interpreted in conjunction with emergency department physician Raulito Larry MD  Rhythm: normal sinus   Rate: normal  Axis: normal  Ectopy: none  Conduction: normal  ST Segments: normal  T Waves: normal  Q Waves:none  Clinical Impression: no acute changes  Comparison:  12/27/22    ED BEDSIDE ULTRASOUND:  No results found. RECENT VITALS:  BP: 123/75, Temp: 98.1 °F (36.7 °C), Heart Rate: 80, Resp: 23, SpO2: 100 %     Procedures     None    ED Course     Nursing Notes, Past Medical Hx,Past Surgical Hx, Social Hx, Allergies, and Family Hx were reviewed. The patient was given the following medications:  Orders Placed This Encounter   Medications    AND Linked Order Group     insulin regular (HUMULIN R;NOVOLIN R) injection 10 Units     dextrose bolus 10% 250 mL    glucose chewable tablet 16 g    OR Linked Order Group     dextrose bolus 10% 125 mL     dextrose bolus 10% 250 mL    glucagon injection 1 mg    dextrose 10 % infusion    sodium zirconium cyclosilicate (LOKELMA) oral suspension 10 g    albuterol (PROVENTIL) nebulizer solution 5 mg     Order Specific Question:   Initiate RT Bronchodilator Protocol     Answer:   No       CONSULTS:  IP CONSULT TO NEPHROLOGY  IP CONSULT TO HOSPITALIST    Review of Systems     Review of Systems   Constitutional:  Negative for chills, diaphoresis, fatigue and fever. Eyes:  Positive for visual disturbance. Negative for photophobia and redness. Respiratory:  Negative for chest tightness, shortness of breath and wheezing. Cardiovascular:  Negative for chest pain, palpitations and leg swelling. Gastrointestinal:  Negative for abdominal pain, constipation, diarrhea, nausea and vomiting. Musculoskeletal:  Positive for arthralgias (left elbow, hip and knee pain). Negative for back pain and neck pain. Skin:  Negative for color change, pallor, rash and wound.    Neurological:  Negative for dizziness, weakness, light-headedness, numbness and headaches. Psychiatric/Behavioral:  Negative for confusion. Past Medical, Surgical, Family, and Social History     He has a past medical history of Acute HFrEF (heart failure with reduced ejection fraction) ([facility] Cerebral artery occlusion with cerebral infarction ([facility] Chronic kidney disease, COPD (chronic obstructive pulmonary disease) ([facility] Dialysis patient ([facility] Gout, [facility] Hx of blood clots, Hyperlipidemia, Hypertension, and Renal failure. He has a past surgical history that includes Dialysis fistula creation (Left); Colonoscopy; and CT BIOPSY BONE MARROW (11/15/2022). His family history includes Cancer in his father and mother; Other in his sister. He reports that he has quit smoking. He has never used smokeless tobacco. He reports current alcohol use. He reports that he does not use drugs. Medications     Previous Medications    ALBUTEROL SULFATE HFA (PROVENTIL;VENTOLIN;PROAIR) 108 (90 BASE) MCG/ACT INHALER    Inhale 2 puffs into the lungs every 6 hours as needed for Wheezing 90mcg/actuation    ALLOPURINOL (ZYLOPRIM) 100 MG TABLET    Take 100 mg by mouth Daily on MWF    APIXABAN (ELIQUIS) 2.5 MG TABS TABLET    Take 1 tablet by mouth 2 times daily    ATORVASTATIN (LIPITOR) 80 MG TABLET    Take 80 mg by mouth daily    CALCITRIOL (ROCALTROL) 0.25 MCG CAPSULE    Take 0.5 mcg by mouth every other day MW    CALCIUM CARB-CHOLECALCIFEROL 600-200 MG-UNIT TABS TABLET    Take 1 tablet by mouth daily    CARVEDILOL (COREG) 3.125 MG TABLET    Take 1 tablet by mouth 2 times daily    DICLOFENAC SODIUM (VOLTAREN) 1 % GEL    Apply 2 g topically 2 times daily    FERROUS SULFATE (FE TABS) 325 (65 FE) MG EC TABLET    Take 1 tablet by mouth every other day    GABAPENTIN (NEURONTIN) 100 MG CAPSULE    Take 1 capsule by mouth at bedtime for 30 days.     LEVOTHYROXINE (SYNTHROID) 100 MCG TABLET    Take 100 mcg by mouth Daily    OMEPRAZOLE (PRILOSEC) 20 MG DELAYED RELEASE CAPSULE Take 20 mg by mouth daily    SEVELAMER (RENVELA) 800 MG TABLET    Take 1 tablet by mouth 3 times daily (with meals)    SODIUM CHLORIDE (OCEAN) 0.65 % NASAL SPRAY    1-2 sprays both nostrils at least daily and as needed to prevent dry mucosa. UMECLIDINIUM BROMIDE (INCRUSE ELLIPTA) 62.5 MCG/INH AEPB    Inhale 1 puff into the lungs daily    VITAMIN D (ERGOCALCIFEROL) 1.25 MG (59575 UT) CAPS CAPSULE    Take 50,000 Units by mouth once a week Saturdays       Allergies     He is allergic to nitroglycerin and oxymetazoline. Physical Exam     INITIAL VITALS: BP: 118/88, Temp: 98.1 °F (36.7 °C), Heart Rate: 87, Resp: 23, SpO2: 100 %  Physical Exam  Vitals and nursing note reviewed. Constitutional:       General: He is not in acute distress. Appearance: Normal appearance. He is normal weight. He is not ill-appearing, toxic-appearing or diaphoretic. HENT:      Head: Normocephalic and atraumatic. Eyes:      Extraocular Movements: Extraocular movements intact. Pupils: Pupils are equal, round, and reactive to light. Cardiovascular:      Rate and Rhythm: Normal rate and regular rhythm. Pulses: Normal pulses. Heart sounds: Normal heart sounds. No murmur heard. No friction rub. No gallop. Pulmonary:      Effort: Pulmonary effort is normal. No respiratory distress. Breath sounds: Normal breath sounds. No stridor. No wheezing, rhonchi or rales. Chest:      Chest wall: No tenderness. Abdominal:      General: Abdomen is flat. There is no distension. Tenderness: There is no abdominal tenderness. There is no guarding or rebound. Musculoskeletal:         General: Normal range of motion. Cervical back: Normal range of motion. Right lower leg: No edema. Left lower leg: No edema.       Comments: Reports tenderness along the left olecranon with normal movement of the elbow, no reproducible tenderness along the left hip with negative logroll bilaterally and stable pelvis, able to hold the left leg off of the bed in flexion with full knee extension without difficulty before falling to gravity after about 2 seconds, normal range of motion of the knee with flexion and full extension with mild tenderness along the lateral aspect of the patella, no palpable joint effusion  No midline cervical, thoracic or lumbosacral spinal tenderness   Neurological:      General: No focal deficit present. Mental Status: He is alert and oriented to person, place, and time. Cranial Nerves: No cranial nerve deficit. Sensory: No sensory deficit. Motor: No weakness.    Psychiatric:         Mood and Affect: Mood normal.         Behavior: Behavior normal.           Rosales Serra PA-C  01/14/23 5673

## 2023-01-15 LAB
A/G RATIO: 1.7 (ref 1.1–2.2)
ABO/RH: NORMAL
ALBUMIN SERPL-MCNC: 3.2 G/DL (ref 3.4–5)
ALP BLD-CCNC: 131 U/L (ref 40–129)
ALT SERPL-CCNC: 6 U/L (ref 10–40)
ANION GAP SERPL CALCULATED.3IONS-SCNC: 11 MMOL/L (ref 3–16)
ANTIBODY SCREEN: NORMAL
AST SERPL-CCNC: 14 U/L (ref 15–37)
BASOPHILS ABSOLUTE: 0 K/UL (ref 0–0.2)
BASOPHILS RELATIVE PERCENT: 0.6 %
BILIRUB SERPL-MCNC: 0.3 MG/DL (ref 0–1)
BLOOD BANK DISPENSE STATUS: NORMAL
BLOOD BANK PRODUCT CODE: NORMAL
BLOOD SMEAR REVIEW: NORMAL
BPU ID: NORMAL
BUN BLDV-MCNC: 54 MG/DL (ref 7–20)
CALCIUM SERPL-MCNC: 8.6 MG/DL (ref 8.3–10.6)
CHLORIDE BLD-SCNC: 94 MMOL/L (ref 99–110)
CO2: 27 MMOL/L (ref 21–32)
CREAT SERPL-MCNC: 9.7 MG/DL (ref 0.8–1.3)
DESCRIPTION BLOOD BANK: NORMAL
EKG ATRIAL RATE: 81 BPM
EKG DIAGNOSIS: NORMAL
EKG P AXIS: 76 DEGREES
EKG P-R INTERVAL: 268 MS
EKG Q-T INTERVAL: 384 MS
EKG QRS DURATION: 90 MS
EKG QTC CALCULATION (BAZETT): 446 MS
EKG R AXIS: 77 DEGREES
EKG T AXIS: 83 DEGREES
EKG VENTRICULAR RATE: 81 BPM
EOSINOPHILS ABSOLUTE: 0.2 K/UL (ref 0–0.6)
EOSINOPHILS RELATIVE PERCENT: 4.6 %
FERRITIN: 873.2 NG/ML (ref 30–400)
GFR SERPL CREATININE-BSD FRML MDRD: 5 ML/MIN/{1.73_M2}
GLUCOSE BLD-MCNC: 103 MG/DL (ref 70–99)
GLUCOSE BLD-MCNC: 107 MG/DL (ref 70–99)
GLUCOSE BLD-MCNC: 126 MG/DL (ref 70–99)
GLUCOSE BLD-MCNC: 62 MG/DL (ref 70–99)
HAPTOGLOBIN: 122 MG/DL (ref 30–200)
HCT VFR BLD CALC: 20.3 % (ref 40.5–52.5)
HCT VFR BLD CALC: 21.9 % (ref 40.5–52.5)
HCT VFR BLD CALC: 25.3 % (ref 40.5–52.5)
HEMOGLOBIN: 6.2 G/DL (ref 13.5–17.5)
HEMOGLOBIN: 6.7 G/DL (ref 13.5–17.5)
HEMOGLOBIN: 7.8 G/DL (ref 13.5–17.5)
IRON SATURATION: 24 % (ref 20–50)
IRON: 52 UG/DL (ref 59–158)
LACTATE DEHYDROGENASE: 236 U/L (ref 100–190)
LYMPHOCYTES ABSOLUTE: 0.7 K/UL (ref 1–5.1)
LYMPHOCYTES RELATIVE PERCENT: 15.2 %
MCH RBC QN AUTO: 27.6 PG (ref 26–34)
MCHC RBC AUTO-ENTMCNC: 30.7 G/DL (ref 31–36)
MCV RBC AUTO: 90 FL (ref 80–100)
MONOCYTES ABSOLUTE: 0.5 K/UL (ref 0–1.3)
MONOCYTES RELATIVE PERCENT: 11.1 %
NEUTROPHILS ABSOLUTE: 3.1 K/UL (ref 1.7–7.7)
NEUTROPHILS RELATIVE PERCENT: 68.5 %
PDW BLD-RTO: 16.1 % (ref 12.4–15.4)
PERFORMED ON: ABNORMAL
PLATELET # BLD: 90 K/UL (ref 135–450)
PMV BLD AUTO: 8.7 FL (ref 5–10.5)
POTASSIUM REFLEX MAGNESIUM: 5.8 MMOL/L (ref 3.5–5.1)
POTASSIUM SERPL-SCNC: 4.9 MMOL/L (ref 3.5–5.1)
PREALBUMIN: 14.5 MG/DL (ref 20–40)
RBC # BLD: 2.25 M/UL (ref 4.2–5.9)
SODIUM BLD-SCNC: 132 MMOL/L (ref 136–145)
TOTAL IRON BINDING CAPACITY: 215 UG/DL (ref 260–445)
TOTAL PROTEIN: 5.1 G/DL (ref 6.4–8.2)
WBC # BLD: 4.5 K/UL (ref 4–11)

## 2023-01-15 PROCEDURE — 90935 HEMODIALYSIS ONE EVALUATION: CPT

## 2023-01-15 PROCEDURE — 82746 ASSAY OF FOLIC ACID SERUM: CPT

## 2023-01-15 PROCEDURE — 82607 VITAMIN B-12: CPT

## 2023-01-15 PROCEDURE — 2580000003 HC RX 258: Performed by: INTERNAL MEDICINE

## 2023-01-15 PROCEDURE — 83615 LACTATE (LD) (LDH) ENZYME: CPT

## 2023-01-15 PROCEDURE — 94640 AIRWAY INHALATION TREATMENT: CPT

## 2023-01-15 PROCEDURE — 6370000000 HC RX 637 (ALT 250 FOR IP): Performed by: INTERNAL MEDICINE

## 2023-01-15 PROCEDURE — 86900 BLOOD TYPING SEROLOGIC ABO: CPT

## 2023-01-15 PROCEDURE — 80053 COMPREHEN METABOLIC PANEL: CPT

## 2023-01-15 PROCEDURE — 84439 ASSAY OF FREE THYROXINE: CPT

## 2023-01-15 PROCEDURE — 83010 ASSAY OF HAPTOGLOBIN QUANT: CPT

## 2023-01-15 PROCEDURE — 83550 IRON BINDING TEST: CPT

## 2023-01-15 PROCEDURE — 85014 HEMATOCRIT: CPT

## 2023-01-15 PROCEDURE — 36430 TRANSFUSION BLD/BLD COMPNT: CPT

## 2023-01-15 PROCEDURE — 85025 COMPLETE CBC W/AUTO DIFF WBC: CPT

## 2023-01-15 PROCEDURE — 84132 ASSAY OF SERUM POTASSIUM: CPT

## 2023-01-15 PROCEDURE — 84443 ASSAY THYROID STIM HORMONE: CPT

## 2023-01-15 PROCEDURE — 1200000000 HC SEMI PRIVATE

## 2023-01-15 PROCEDURE — 84134 ASSAY OF PREALBUMIN: CPT

## 2023-01-15 PROCEDURE — 85018 HEMOGLOBIN: CPT

## 2023-01-15 PROCEDURE — 86923 COMPATIBILITY TEST ELECTRIC: CPT

## 2023-01-15 PROCEDURE — 83540 ASSAY OF IRON: CPT

## 2023-01-15 PROCEDURE — 94761 N-INVAS EAR/PLS OXIMETRY MLT: CPT

## 2023-01-15 PROCEDURE — 94664 DEMO&/EVAL PT USE INHALER: CPT

## 2023-01-15 PROCEDURE — 2700000000 HC OXYGEN THERAPY PER DAY

## 2023-01-15 PROCEDURE — 86850 RBC ANTIBODY SCREEN: CPT

## 2023-01-15 PROCEDURE — 5A1D70Z PERFORMANCE OF URINARY FILTRATION, INTERMITTENT, LESS THAN 6 HOURS PER DAY: ICD-10-PCS | Performed by: INTERNAL MEDICINE

## 2023-01-15 PROCEDURE — 82728 ASSAY OF FERRITIN: CPT

## 2023-01-15 PROCEDURE — 84480 ASSAY TRIIODOTHYRONINE (T3): CPT

## 2023-01-15 PROCEDURE — 36415 COLL VENOUS BLD VENIPUNCTURE: CPT

## 2023-01-15 PROCEDURE — P9016 RBC LEUKOCYTES REDUCED: HCPCS

## 2023-01-15 PROCEDURE — 86901 BLOOD TYPING SEROLOGIC RH(D): CPT

## 2023-01-15 RX ORDER — SODIUM CHLORIDE 9 MG/ML
INJECTION, SOLUTION INTRAVENOUS PRN
Status: DISCONTINUED | OUTPATIENT
Start: 2023-01-15 | End: 2023-01-17 | Stop reason: HOSPADM

## 2023-01-15 RX ADMIN — ACETAMINOPHEN 650 MG: 325 TABLET ORAL at 03:45

## 2023-01-15 RX ADMIN — CALCITRIOL CAPSULES 0.25 MCG 0.5 MCG: 0.25 CAPSULE ORAL at 08:27

## 2023-01-15 RX ADMIN — SEVELAMER CARBONATE 800 MG: 800 TABLET, FILM COATED ORAL at 16:34

## 2023-01-15 RX ADMIN — ALLOPURINOL 100 MG: 100 TABLET ORAL at 08:27

## 2023-01-15 RX ADMIN — LEVOTHYROXINE SODIUM 100 MCG: 0.1 TABLET ORAL at 06:49

## 2023-01-15 RX ADMIN — FERROUS SULFATE TAB 325 MG (65 MG ELEMENTAL FE) 325 MG: 325 (65 FE) TAB at 08:27

## 2023-01-15 RX ADMIN — SODIUM CHLORIDE, PRESERVATIVE FREE 10 ML: 5 INJECTION INTRAVENOUS at 09:45

## 2023-01-15 RX ADMIN — CARVEDILOL 3.12 MG: 6.25 TABLET, FILM COATED ORAL at 08:27

## 2023-01-15 RX ADMIN — SEVELAMER CARBONATE 800 MG: 800 TABLET, FILM COATED ORAL at 08:27

## 2023-01-15 RX ADMIN — GABAPENTIN 100 MG: 100 CAPSULE ORAL at 20:19

## 2023-01-15 RX ADMIN — TIOTROPIUM BROMIDE INHALATION SPRAY 2 PUFF: 3.12 SPRAY, METERED RESPIRATORY (INHALATION) at 09:18

## 2023-01-15 RX ADMIN — SODIUM CHLORIDE, PRESERVATIVE FREE 10 ML: 5 INJECTION INTRAVENOUS at 20:21

## 2023-01-15 RX ADMIN — ATORVASTATIN CALCIUM 80 MG: 80 TABLET, FILM COATED ORAL at 20:19

## 2023-01-15 RX ADMIN — CARVEDILOL 3.12 MG: 6.25 TABLET, FILM COATED ORAL at 20:19

## 2023-01-15 RX ADMIN — CALCIUM CARBONATE-VITAMIN D TAB 500 MG-200 UNIT 1 TABLET: 500-200 TAB at 08:27

## 2023-01-15 ASSESSMENT — PAIN DESCRIPTION - LOCATION
LOCATION: BUTTOCKS

## 2023-01-15 ASSESSMENT — PAIN DESCRIPTION - ORIENTATION: ORIENTATION: LEFT

## 2023-01-15 ASSESSMENT — PAIN SCALES - GENERAL
PAINLEVEL_OUTOF10: 8
PAINLEVEL_OUTOF10: 8
PAINLEVEL_OUTOF10: 5
PAINLEVEL_OUTOF10: 7

## 2023-01-15 ASSESSMENT — PAIN DESCRIPTION - DESCRIPTORS: DESCRIPTORS: ACHING

## 2023-01-15 NOTE — ED NOTES
Pt found to be on ground. Pt reports he stood up and his \"knee gave out\". Pt reports he fell \"flat on his ass\". Pt reports + pain to the L hip, L knee. Pt denies hitting his head, denies head, neck, back chest or abd pain. ED Attending notified. Additional x-rays ordered per pt complaint. Pt assisted back to bed requiring minimal assistance. Pt oriented to utilize call light, pt verbalizes understanding.       Criselda Lobo 59, [facility]  01/14/23 6150

## 2023-01-15 NOTE — PROGRESS NOTES
Pt to dialysis via bed. VSS. 1st unit of blood about custodial completed. Receiving RN in dialysis updated.

## 2023-01-15 NOTE — PROGRESS NOTES
Scheduled morning medications administered, see MAR. Will hold off on eliquis for now due to low H&H and check with MD if wants administered. Pt denies other needs at this time. Awake and alert, watching television, drinking coffee. Call light is in reach. Bed alarm is engaged. Repositions self in bed.

## 2023-01-15 NOTE — PROGRESS NOTES
Nephrology Progress Note  [facility]  [phone]   [facility]. com    Patient:  Carola Tong   : 1955    Update regarding the delay in hemodialysis today.    8:02AM - I contacted [address] [facility] requesting hemodialysis today for Mr. Catalina Morejon. 8:38AM - [address] acute dialysis charge nurse replied that the patient did not meet criteria for dialysis today. Per the nurse, Dr. Elora Eisenmenger, medical director of acute dialysis at [facility] had reviewed the chart and came to that conclusion as well.    9:01AM - I reached out to Dr. Nicky Leach and explained my concern of his rising K and the need for a blood transfusion today with falling hgb. K was 5.6->6.0->4.9->5.8. He again indicated that after reviewing the case with me and reviewing the chart that dialysis today was not indicated. Per his report, only patients with a K of 6.5 or higher, or on 15L o2 or more would be considered for dialysis. In my medical opinion, based on chart review and face to face examination with the patient, I felt the clinical situation did warrant hemodialysis today. The patient was at risk of decompensation because he required a blood transfusion for faIling hgb values, in addition to hyperkalemia refractory to medical treatment given on admission. The patient's respiratory status was stable, but he had an EF of 35-40% by echo last year, as well as grade 3 diastolic dysfunction, and furthermore is chronically on supplemental oxygen for chronic hypoxic and hypercapnic respiratory failure secondary to COPD. My concerns were that the blood transfusion would worsen his hyperkalemia and potentially impact his respiratory status. While the patient required just 1 unit of blood, I was also concerned that he may require additional units beyond that this evening as his hgb had dropped from presentation from 7.4 to 6.2, with a recheck of 6.7.     I was unable to provide adequate care given the refusal of [facility] to dialyze . Blayne Chapman, under the direct instruction of their medical director. At this point I contacted Dr. Herbert Mcwilliams ( of [address] [place] [address] at [facility] and relayed my concerns. He was able to help arrange hemodialysis for today. Brianda Vallejo MD  [facility]  Office Number: [phone]  [facility]. [facility]

## 2023-01-15 NOTE — PROGRESS NOTES
Pt admitted to room 5323. Pt settled into room with all fall precautions in place. VSS. Belongings close. Pt complaining of left buttock pain from fall. Will try tylenol and repositioning prior to stronger medication. Pt given food and drink. Pt placed on tele, NSR.     Electronically signed by Janelle Cross RN on 1/15/23 at 12:57 AM EST

## 2023-01-15 NOTE — CARE COORDINATION
I have discussed with the patient the rationale for blood component transfusion; its benefits in treating or preventing fatigue, organ damage, or death; and its risk which includes mild transfusion reactions, rare risk of blood borne infection, or more serious but rare reactions. I have discussed the alternatives to transfusion, including the risk and consequences of not receiving transfusion. The patient had an opportunity to ask questions and had agreed to proceed with transfusion of blood components.      Reji Rivera MD

## 2023-01-15 NOTE — ED NOTES
Assisted pt to bedside commode. Minimal assistance needed. Call light placed on bed within reach of patient. Pt instructed to hit call light when he was done to assist back into bed.  Pt verbalized understanding      Kishan Mcgill RN  01/14/23 0305

## 2023-01-15 NOTE — RT PROTOCOL NOTE
RT Inhaler-Nebulizer Bronchodilator Protocol Note    There is a bronchodilator order in the chart from a provider indicating to follow the RT Bronchodilator Protocol and there is an Initiate RT Inhaler-Nebulizer Bronchodilator Protocol order as well (see protocol at bottom of note). CXR Findings:  No results found. The findings from the last RT Protocol Assessment were as follows:   History Pulmonary Disease: Chronic pulmonary disease  Respiratory Pattern: Regular pattern and RR 12-20 bpm  Breath Sounds: Slightly diminished and/or crackles  Cough: Strong, spontaneous, non-productive  Indication for Bronchodilator Therapy:    Bronchodilator Assessment Score: 4    Aerosolized bronchodilator medication orders have been revised according to the RT Inhaler-Nebulizer Bronchodilator Protocol below. Respiratory Therapist to perform RT Therapy Protocol Assessment initially then follow the protocol. Repeat RT Therapy Protocol Assessment PRN for score 0-3 or on second treatment, BID, and PRN for scores above 3. No Indications - adjust the frequency to every 6 hours PRN wheezing or bronchospasm, if no treatments needed after 48 hours then discontinue using Per Protocol order mode. If indication present, adjust the RT bronchodilator orders based on the Bronchodilator Assessment Score as indicated below. Use Inhaler orders unless patient has one or more of the following: on home nebulizer, not able to hold breath for 10 seconds, is not alert and oriented, cannot activate and use MDI correctly, or respiratory rate 25 breaths per minute or more, then use the equivalent nebulizer order(s) with same Frequency and PRN reasons based on the score. If a patient is on this medication at home then do not decrease Frequency below that used at home.     0-3 - enter or revise RT bronchodilator order(s) to equivalent RT Bronchodilator order with Frequency of every 4 hours PRN for wheezing or increased work of breathing using Per Protocol order mode. 4-6 - enter or revise RT Bronchodilator order(s) to two equivalent RT bronchodilator orders with one order with BID Frequency and one order with Frequency of every 4 hours PRN wheezing or increased work of breathing using Per Protocol order mode. 7-10 - enter or revise RT Bronchodilator order(s) to two equivalent RT bronchodilator orders with one order with TID Frequency and one order with Frequency of every 4 hours PRN wheezing or increased work of breathing using Per Protocol order mode. 11-13 - enter or revise RT Bronchodilator order(s) to one equivalent RT bronchodilator order with QID Frequency and an Albuterol order with Frequency of every 4 hours PRN wheezing or increased work of breathing using Per Protocol order mode. Greater than 13 - enter or revise RT Bronchodilator order(s) to one equivalent RT bronchodilator order with every 4 hours Frequency and an Albuterol order with Frequency of every 2 hours PRN wheezing or increased work of breathing using Per Protocol order mode. RT to enter RT Home Evaluation for COPD & MDI Assessment order using Per Protocol order mode.     Electronically signed by Vee Shaffer RCP on 1/15/2023 at 5:13 AM

## 2023-01-15 NOTE — PROGRESS NOTES
Secure message sent to Dr. Teresa Hager to report hemoglobin redraw of 6.7 and potassium of 5.8 from AM labs. Awaiting response/orders.

## 2023-01-15 NOTE — H&P
Hospital Medicine History & Physical      PCP: Iris Pearson MD    Date of Admission: 1/14/2023    Date of Service: Pt seen/examined on 1/14/2023 and placed in Observation. Chief Complaint: Fatigue      History Of Present Illness:    74 y.o. male nursing home resident with significant past medical history of end-stage renal disease on hemodialysis on chronic anticoagulation for DVTs, combined systolic and diastolic heart failure, chronic hypoxic and hypercapnic respiratory failure secondary to COPD (2 L continuous) who presented to [facility] ED with fatigue. Patient reports fatigue with decreased p.o. intake for the past couple of days and has had a couple falls at the nursing home today and 1 in the ED. He denies any fevers, chills, headaches, nausea, vomiting, diarrhea, shortness of breath or chest pain. He does report some lightheadedness, generalized joint pain and blurred vision. At baseline he uses a cane and for long distance wheelchair. In emergency room his temperature was 98.1, blood pressure 118/88, heart rate 87, respirations 23, sats 100% on 2 L. Past Medical History:          Diagnosis Date    Cerebral artery occlusion with cerebral infarction ([facility] [address]     Combined systolic and diastolic heart failure (HCC)     COPD (chronic obstructive pulmonary disease) ([facility] [address]     Gout     Hemodialysis patient ([facility] [address]     Hx of blood clots     Hyperlipidemia     Hypertension        Past Surgical History:          Procedure Laterality Date    COLONOSCOPY      CT BONE MARROW BIOPSY  11/15/2022    CT BONE MARROW BIOPSY 11/15/2022 [facility] CT SCAN    DIALYSIS FISTULA CREATION Left        Medications Prior to Admission:      Prior to Admission medications    Medication Sig Start Date End Date Taking?  Authorizing Provider   calcium carbonate (TUMS) 500 MG chewable tablet Take 1 tablet by mouth every 8 hours as needed for Heartburn   Yes Historical Provider, MD   carvedilol (COREG) 3.125 MG tablet Take 1 tablet by mouth 2 times daily 12/30/22   Adam Spear MD   sodium chloride (OCEAN) 0.65 % nasal spray 1-2 sprays both nostrils at least daily and as needed to prevent dry mucosa. 10/15/22   Janice Hill DO   levothyroxine (SYNTHROID) 100 MCG tablet Take 100 mcg by mouth Daily    Historical Provider, MD   gabapentin (NEURONTIN) 100 MG capsule Take 1 capsule by mouth at bedtime for 30 days.  9/15/22 1/14/23  Annette Mesa DO   diclofenac sodium (VOLTAREN) 1 % GEL Apply 2 g topically 2 times daily 9/15/22   Annette Mesa DO   calcitRIOL (ROCALTROL) 0.25 MCG capsule Take 0.5 mcg by mouth every other day [facility]    Historical Provider, MD   sevelamer (RENVELA) 800 MG tablet Take 1 tablet by mouth 3 times daily (with meals)    Historical Provider, MD   apixaban (ELIQUIS) 2.5 MG TABS tablet Take 1 tablet by mouth 2 times daily 6/18/22   Nina Hernandez MD   ferrous sulfate (FE TABS) 325 (65 Fe) MG EC tablet Take 1 tablet by mouth every other day  Patient taking differently: Take 325 mg by mouth daily (with breakfast) 6/18/22 1/14/23  Nina Hernandez MD   atorvastatin (LIPITOR) 80 MG tablet Take 80 mg by mouth nightly    Historical Provider, MD   calcium carb-cholecalciferol 600-200 MG-UNIT TABS tablet Take 1 tablet by mouth daily    Historical Provider, MD   Umeclidinium Bromide (INCRUSE ELLIPTA) 62.5 MCG/INH AEPB Inhale 1 puff into the lungs daily    Historical Provider, MD   vitamin D (ERGOCALCIFEROL) 1.25 MG (35571 UT) CAPS capsule Take 50,000 Units by mouth once a week Saturdays  Patient not taking: Reported on 1/14/2023    Historical Provider, MD   omeprazole (PRILOSEC) 20 MG delayed release capsule Take 20 mg by mouth daily  Patient not taking: Reported on 1/14/2023    Historical Provider, MD   albuterol sulfate HFA (PROVENTIL;VENTOLIN;PROAIR) 108 (90 Base) MCG/ACT inhaler Inhale 2 puffs into the lungs every 6 hours as needed for Wheezing 90mcg/actuation    Historical Provider, MD   allopurinol (ZYLOPRIM) 100 MG tablet Take 100 mg by mouth Daily on MWF    Historical Provider, MD       Allergies:  Nitroglycerin and Oxymetazoline    Social History:      The patient currently lives long-term care. TOBACCO:   reports that he has quit smoking. He has never used smokeless tobacco.  ETOH:   reports current alcohol use. E-cigarette/Vaping       Questions Responses    E-cigarette/Vaping Use Never User    Start Date     Passive Exposure     Quit Date     Counseling Given     Comments               Family History:       Reviewed and negative in regards to presenting illness/complaint. Problem Relation Age of Onset    Cancer Mother     Cancer Father     Other Sister        REVIEW OF SYSTEMS COMPLETED:   Pertinent positives as noted in the HPI. All other systems reviewed and negative. PHYSICAL EXAM PERFORMED:    /76   Pulse 85   Temp 98.1 °F (36.7 °C) (Oral)   Resp 21   Ht 5' 8\" (1.727 m)   Wt 130 lb 6.4 oz (59.1 kg)   SpO2 98%   BMI 19.83 kg/m²     General appearance: Frail, thin and chronically ill-appearing, nontoxic in no apparent distress, appears stated age and cooperative. HEENT:  Normal cephalic, atraumatic without obvious deformity. Pupils equal, round, and reactive to light. Extra ocular muscles intact. Conjunctivae/corneas clear. Dry oral mucosa. Neck: Supple, with full range of motion. No jugular venous distention. No bruits, thyromegaly or nodules. Trachea midline. Respiratory:  Normal respiratory effort. Decreased breath sounds diffusely without any rales, rhonchi or wheezes. Cardiovascular:  Regular rate and rhythm with normal S1/S2 without murmurs, rubs or gallops. Abdomen: Soft, non-distended with normal bowel sounds. Tender to palpation diffusely however no peritoneal signs. Musculoskeletal:  No clubbing, cyanosis bilaterally. 1+ pitting edema bilateral lower extremities to mid shins. No gross joint deformities. Skin: Dry, normal color.   No petechiae or rashes or ecchymoses. Scabbed excoriations over anterior shins. Neurologic:  Neurovascularly intact without any focal sensory/motor deficits (symmetric proximal muscle weakness). Cranial nerves: II-XII intact, grossly non-focal.  Psychiatric:  Alert and oriented, thought content appropriate, normal insight  Capillary Refill: Brisk,3 seconds, normal  Peripheral Pulses: +2 palpable, equal bilaterally       Labs:     Recent Labs     01/14/23 1859   WBC 3.2*   HGB 7.4*   HCT 24.1*   *     Recent Labs     01/14/23 1859 01/14/23 2013   *  --    K 5.6* 6.0*   CL 92*  --    CO2 29  --    BUN 51*  --    CREATININE 9.0*  --    CALCIUM 9.1  --      Recent Labs     01/14/23 1859   AST 18   ALT 7*   BILITOT 0.4   ALKPHOS 148*     No results for input(s): INR in the last 72 hours. Recent Labs     01/14/23 1859 01/14/23 2013   TROPONINI 0.08* 0.08*       Urinalysis:    No results found for: Camilo Goldmann, BACTERIA, RBCUA, BLOODU, [address], GLUCOSEU    Radiology:     CXR: I have reviewed the CXR with the following interpretation: No acute cardiopulmonary process. Lung hyperinflation. EKG:  I have reviewed the EKG with the following interpretation: None done. XR PELVIS (1-2 VIEWS)   Final Result         1. Right hip: No discrete fracture or acute deformity involving the right hip with superior joint space loss   2. Left hip: No acute fracture. Mild superior joint space loss. 3. AP view of pelvis: No acute bony defect in the pelvis. LEFT KNEE:      AP and lateral views obtained. FINDINGS there is lateral compartment narrowing. There is mild patellofemoral compartment narrowing. No soft tissue swelling or discrete fracture. IMPRESSION:      Mild joint space loss but no acute fracture. XR KNEE LEFT (1-2 VIEWS)   Final Result         1. Right hip: No discrete fracture or acute deformity involving the right hip with superior joint space loss   2. Left hip: No acute fracture.  Mild superior joint space loss. 3. AP view of pelvis: No acute bony defect in the pelvis. LEFT KNEE:      AP and lateral views obtained. FINDINGS there is lateral compartment narrowing. There is mild patellofemoral compartment narrowing. No soft tissue swelling or discrete fracture. IMPRESSION:      Mild joint space loss but no acute fracture. CT CERVICAL SPINE WO CONTRAST   Final Result      No acute hemorrhage, edema or hydrocephalus. Mild cerebral atrophic changes are noted without ventricular dilation. Remote small low density lacunar infarct noted in the left thalamus and right basal ganglia. Mucosal thickening involving the maxillary sinuses. CT scan of the CERVICAL SPINE on 1/14/2023   HISTORY: Neck pain, trauma, fell, blood thinners evaluate for fracture, disc herniation or spinal canal stenosis. PROCEDURE: Dose modulation, radiation reducing techniques and iterative reconstruction techniques utilized to reduce radiation exposure with up-to-date CT equipment. CT scan of the cervical spine was performed utilizing contiguous 2.5 cm axial sections with multiple sagittal and coronal reconstructed sequences. These were reviewed under separate computerized work station. COMPARISON: None      FINDINGS:       There is normal alignment of the cervical vertebral bodies with no sign of spondylolisthesis or fracture. The skull base appears intact. Examination of the individual disk spaces reveals no sign of any acute bony defect or cord compression. The C2-C3, C3-C4, C4-C5, C5-6, C6-C7 and C7-T1 disc space levels are normally aligned without definitive acute abnormalities. Facet hypertrophy is    noted C2-3 greater on the left at C3-4 greater on the left at C4-5 greater on the left.  Asymmetric uncinate foraminal spurring and some disc space loss are noted at C5-6 and C6-7 levels      Incidental note made of vascular calcifications involving the carotid bifurcations as well as the vertebral arteries and basilar artery. Incidental note made of some right apical lung consolidation and chronic emphysematous changes throughout the lungs. No pneumothorax. IMPRESSION:       1. No acute fracture with disc degeneration spondylosis and asymmetric facet hypertrophy as described above   2. Normal alignment. 3. Incidental note made of chronic vascular calcifications involving the carotid arteries and vertebral arteries and basilar artery and apical consolidation in the right lung and emphysematous change      CT HEAD WO CONTRAST   Final Result      No acute hemorrhage, edema or hydrocephalus. Mild cerebral atrophic changes are noted without ventricular dilation. Remote small low density lacunar infarct noted in the left thalamus and right basal ganglia. Mucosal thickening involving the maxillary sinuses. CT scan of the CERVICAL SPINE on 1/14/2023   HISTORY: Neck pain, trauma, fell, blood thinners evaluate for fracture, disc herniation or spinal canal stenosis. PROCEDURE: Dose modulation, radiation reducing techniques and iterative reconstruction techniques utilized to reduce radiation exposure with up-to-date CT equipment. CT scan of the cervical spine was performed utilizing contiguous 2.5 cm axial sections with multiple sagittal and coronal reconstructed sequences. These were reviewed under separate computerized work station. COMPARISON: None      FINDINGS:       There is normal alignment of the cervical vertebral bodies with no sign of spondylolisthesis or fracture. The skull base appears intact. Examination of the individual disk spaces reveals no sign of any acute bony defect or cord compression. The C2-C3, C3-C4, C4-C5, C5-6, C6-C7 and C7-T1 disc space levels are normally aligned without definitive acute abnormalities. Facet hypertrophy is    noted C2-3 greater on the left at C3-4 greater on the left at C4-5 greater on the left.  Asymmetric uncinate foraminal spurring and some disc space loss are noted at C5-6 and C6-7 levels      Incidental note made of vascular calcifications involving the carotid bifurcations as well as the vertebral arteries and basilar artery. Incidental note made of some right apical lung consolidation and chronic emphysematous changes throughout the lungs. No pneumothorax. IMPRESSION:       1. No acute fracture with disc degeneration spondylosis and asymmetric facet hypertrophy as described above   2. Normal alignment. 3. Incidental note made of chronic vascular calcifications involving the carotid arteries and vertebral arteries and basilar artery and apical consolidation in the right lung and emphysematous change      XR CHEST (2 VW)   Final Result      Hyperinflated lungs and borderline cardiac enlargement. No acute chest wall deformity appreciated. XR ELBOW LEFT (MIN 3 VIEWS)   Final Result      No sign of any acute left elbow fracture or radiopaque foreign body. Mild joint space loss         XR HIP 2-3 VW W PELVIS LEFT   Final Result      No discrete fracture or acute deformity involving the left hip      No acute bony defect in the pelvis. Slightly irregular superior pubic ramus and inferior pubic ramus on the right which could be due to positioning or prior fracture, correlate clinically      THREE VIEWS OF THE LEFT KNEE on 1/14/2023      HISTORY:  Knee pain      PROCEDURE: AP, lateral and sunrise patellofemoral views were obtained. FINDINGS:       There is no sign of a discrete fracture or dislocation. Demineralization is noted. Some lateral condylar spurring is appreciated. Mild patellofemoral joint space loss noted      There is no acute bony defect identified or loose body. There is no radiopaque foreign body      IMPRESSION:       No sign of any fracture or acute deformity. Demineralization with some lateral compartment narrowing and patellofemoral compartment narrowing.       XR KNEE LEFT (1-2 VIEWS)   Final Result      No discrete fracture or acute deformity involving the left hip      No acute bony defect in the pelvis. Slightly irregular superior pubic ramus and inferior pubic ramus on the right which could be due to positioning or prior fracture, correlate clinically      THREE VIEWS OF THE LEFT KNEE on 1/14/2023      HISTORY:  Knee pain      PROCEDURE: AP, lateral and sunrise patellofemoral views were obtained. FINDINGS:       There is no sign of a discrete fracture or dislocation. Demineralization is noted. Some lateral condylar spurring is appreciated. Mild patellofemoral joint space loss noted      There is no acute bony defect identified or loose body. There is no radiopaque foreign body      IMPRESSION:       No sign of any fracture or acute deformity. Demineralization with some lateral compartment narrowing and patellofemoral compartment narrowing.           Consults:    IP CONSULT TO NEPHROLOGY  IP CONSULT TO HOSPITALIST  IP CONSULT TO NEPHROLOGY  IP CONSULT TO DIETITIAN    ASSESSMENT:    Active Hospital Problems    Diagnosis Date Noted    Hyperkalemia [E87.5] 01/14/2023     Priority: High    Failure to thrive in adult [R62.7] 01/14/2023     Priority: Medium    Pancytopenia ([facility] [address] [D61.818] 06/17/2022     Priority: Medium    Chronic respiratory failure with hypoxia and hypercapnia (HCC) [J96.11, J96.12] 10/06/2022     Priority: Low    Chronic combined systolic and diastolic CHF (congestive heart failure) ([facility] [address] [I50.42] 03/02/2022     Priority: Low    Essential hypertension [I10] 03/02/2022     Priority: Low    Hyperlipidemia [E78.5] 03/02/2022     Priority: Low    COPD (chronic obstructive pulmonary disease) ([facility] [address] [J44.9] 09/10/2020     Priority: Low    ESRD on dialysis ([facility] [address] [N18.6, Z99.2] 03/31/2020     Priority: Low         PLAN:  -Nephrology consult for hyperkalemia  -Monitor on telemetry  -Check prealbumin and a dietary consult  -Continue home regimen for chronic stable conditions    DVT Prophylaxis: Eliquis  Diet: ADULT DIET; Regular; Low Fat/Low Chol/High Fiber/2 gm Na; Low Potassium (Less than 3000 mg/day)  Code Status: Full Code    PT/OT Eval Status: Pending    Dispo -SNF versus long-term care       Yusef Conroy MD    Thank you Roz Boone MD for the opportunity to be involved in this patient's care. If you have any questions or concerns please feel free to contact me at [phone].

## 2023-01-15 NOTE — PROGRESS NOTES
Hospitalist Progress Note      PCP: Mitch Mead MD    Date of Admission: 1/14/2023    Chief Complaint:     Hospital Course:     Subjective:   Feels weak generally. No chest pain or shortness of breath. .  Blood transfusion has been initiated and is going to have dialysis today. No blood in stool currently      Medications:  Reviewed    Infusion Medications    sodium chloride      dextrose      sodium chloride       Scheduled Medications    tiotropium  2 puff Inhalation Daily    tiotropium        [START ON 1/17/2023] epoetin zachary-epbx  7,000 Units IntraVENous Once per day on Mon Wed Fri    sodium chloride flush  5-40 mL IntraVENous 2 times per day    allopurinol  100 mg Oral Daily    apixaban  2.5 mg Oral BID    atorvastatin  80 mg Oral Nightly    calcitRIOL  0.5 mcg Oral Every Other Day    oyster shell calcium w/D  1 tablet Oral Daily    carvedilol  3.125 mg Oral BID    ferrous sulfate  325 mg Oral Daily with breakfast    gabapentin  100 mg Oral Nightly    levothyroxine  100 mcg Oral Daily    sevelamer  800 mg Oral TID WC     PRN Meds: sodium chloride, glucose, dextrose bolus **OR** dextrose bolus, glucagon (rDNA), dextrose, sodium chloride flush, sodium chloride, ondansetron **OR** ondansetron, polyethylene glycol, acetaminophen **OR** acetaminophen, albuterol, calcium carbonate, sodium chloride    No intake or output data in the 24 hours ending 01/15/23 0930    Physical Exam Performed:    /67   Pulse 71   Temp 97.1 °F (36.2 °C) (Oral)   Resp 18   Ht 5' 8\" (1.727 m)   Wt 130 lb 6.4 oz (59.1 kg)   SpO2 100%   BMI 19.83 kg/m²     General appearance: No apparent distress, appears stated age and cooperative. HEENT: Pupils equal, round, and reactive to light. Conjunctivae/corneas clear. Neck: Supple, with full range of motion. No jugular venous distention. Trachea midline. Respiratory:  Normal respiratory effort.  Clear to auscultation, bilaterally without Rales/Wheezes/Rhonchi. Cardiovascular: Regular rate and rhythm with normal S1/S2 without murmurs, rubs or gallops. Abdomen: Soft, non-tender, non-distended with normal bowel sounds. Musculoskeletal: No clubbing, cyanosis or edema bilaterally. Full range of motion without deformity. Skin: Skin color, texture, turgor normal.  No rashes or lesions. Neurologic:  Neurovascularly intact without any focal sensory/motor deficits. Cranial nerves: II-XII intact, grossly non-focal.  Psychiatric: Alert and oriented, thought content appropriate, normal insight  Capillary Refill: Brisk, 3 seconds, normal   Peripheral Pulses: +2 palpable, equal bilaterally       Labs:   Recent Labs     01/14/23  1859 01/15/23  0539 01/15/23  0718   WBC 3.2* 4.5  --    HGB 7.4* 6.2* 6.7*   HCT 24.1* 20.3* 21.9*   * 90*  --      Recent Labs     01/14/23  1859 01/14/23  2013 01/15/23  0102 01/15/23  0539   *  --   --  132*   K 5.6* 6.0* 4.9 5.8*   CL 92*  --   --  94*   CO2 29  --   --  27   BUN 51*  --   --  54*   CREATININE 9.0*  --   --  9.7*   CALCIUM 9.1  --   --  8.6     Recent Labs     01/14/23  1859 01/15/23  0539   AST 18 14*   ALT 7* 6*   BILITOT 0.4 0.3   ALKPHOS 148* 131*     No results for input(s): INR in the last 72 hours. Recent Labs     01/14/23 1859 01/14/23 2013   TROPONINI 0.08* 0.08*       Urinalysis:    No results found for: Lenord Laws, BACTERIA, RBCUA, BLOODU, SPECGRAV, GLUCOSEU    Radiology:  XR PELVIS (1-2 VIEWS)   Final Result         1. Right hip: No discrete fracture or acute deformity involving the right hip with superior joint space loss   2. Left hip: No acute fracture. Mild superior joint space loss. 3. AP view of pelvis: No acute bony defect in the pelvis. LEFT KNEE:      AP and lateral views obtained. FINDINGS there is lateral compartment narrowing. There is mild patellofemoral compartment narrowing. No soft tissue swelling or discrete fracture.       IMPRESSION:      Mild joint space loss but no acute fracture. XR KNEE LEFT (1-2 VIEWS)   Final Result         1. Right hip: No discrete fracture or acute deformity involving the right hip with superior joint space loss   2. Left hip: No acute fracture. Mild superior joint space loss. 3. AP view of pelvis: No acute bony defect in the pelvis. LEFT KNEE:      AP and lateral views obtained. FINDINGS there is lateral compartment narrowing. There is mild patellofemoral compartment narrowing. No soft tissue swelling or discrete fracture. IMPRESSION:      Mild joint space loss but no acute fracture. CT CERVICAL SPINE WO CONTRAST   Final Result      No acute hemorrhage, edema or hydrocephalus. Mild cerebral atrophic changes are noted without ventricular dilation. Remote small low density lacunar infarct noted in the left thalamus and right basal ganglia. Mucosal thickening involving the maxillary sinuses. CT scan of the CERVICAL SPINE on 1/14/2023   HISTORY: Neck pain, trauma, fell, blood thinners evaluate for fracture, disc herniation or spinal canal stenosis. PROCEDURE: Dose modulation, radiation reducing techniques and iterative reconstruction techniques utilized to reduce radiation exposure with up-to-date CT equipment. CT scan of the cervical spine was performed utilizing contiguous 2.5 cm axial sections with multiple sagittal and coronal reconstructed sequences. These were reviewed under separate computerized work station. COMPARISON: None      FINDINGS:       There is normal alignment of the cervical vertebral bodies with no sign of spondylolisthesis or fracture. The skull base appears intact. Examination of the individual disk spaces reveals no sign of any acute bony defect or cord compression. The C2-C3, C3-C4, C4-C5, C5-6, C6-C7 and C7-T1 disc space levels are normally aligned without definitive acute abnormalities.  Facet hypertrophy is    noted C2-3 greater on the left at C3-4 greater on the left at C4-5 greater on the left. Asymmetric uncinate foraminal spurring and some disc space loss are noted at C5-6 and C6-7 levels      Incidental note made of vascular calcifications involving the carotid bifurcations as well as the vertebral arteries and basilar artery. Incidental note made of some right apical lung consolidation and chronic emphysematous changes throughout the lungs. No pneumothorax. IMPRESSION:       1. No acute fracture with disc degeneration spondylosis and asymmetric facet hypertrophy as described above   2. Normal alignment. 3. Incidental note made of chronic vascular calcifications involving the carotid arteries and vertebral arteries and basilar artery and apical consolidation in the right lung and emphysematous change      CT HEAD WO CONTRAST   Final Result      No acute hemorrhage, edema or hydrocephalus. Mild cerebral atrophic changes are noted without ventricular dilation. Remote small low density lacunar infarct noted in the left thalamus and right basal ganglia. Mucosal thickening involving the maxillary sinuses. CT scan of the CERVICAL SPINE on 1/14/2023   HISTORY: Neck pain, trauma, fell, blood thinners evaluate for fracture, disc herniation or spinal canal stenosis. PROCEDURE: Dose modulation, radiation reducing techniques and iterative reconstruction techniques utilized to reduce radiation exposure with up-to-date CT equipment. CT scan of the cervical spine was performed utilizing contiguous 2.5 cm axial sections with multiple sagittal and coronal reconstructed sequences. These were reviewed under separate computerized work station. COMPARISON: None      FINDINGS:       There is normal alignment of the cervical vertebral bodies with no sign of spondylolisthesis or fracture. The skull base appears intact.       Examination of the individual disk spaces reveals no sign of any acute bony defect or cord compression. The C2-C3, C3-C4, C4-C5, C5-6, C6-C7 and C7-T1 disc space levels are normally aligned without definitive acute abnormalities. Facet hypertrophy is    noted C2-3 greater on the left at C3-4 greater on the left at C4-5 greater on the left. Asymmetric uncinate foraminal spurring and some disc space loss are noted at C5-6 and C6-7 levels      Incidental note made of vascular calcifications involving the carotid bifurcations as well as the vertebral arteries and basilar artery. Incidental note made of some right apical lung consolidation and chronic emphysematous changes throughout the lungs. No pneumothorax. IMPRESSION:       1. No acute fracture with disc degeneration spondylosis and asymmetric facet hypertrophy as described above   2. Normal alignment. 3. Incidental note made of chronic vascular calcifications involving the carotid arteries and vertebral arteries and basilar artery and apical consolidation in the right lung and emphysematous change      XR CHEST (2 VW)   Final Result      Hyperinflated lungs and borderline cardiac enlargement. No acute chest wall deformity appreciated. XR ELBOW LEFT (MIN 3 VIEWS)   Final Result      No sign of any acute left elbow fracture or radiopaque foreign body. Mild joint space loss         XR HIP 2-3 VW W PELVIS LEFT   Final Result      No discrete fracture or acute deformity involving the left hip      No acute bony defect in the pelvis. Slightly irregular superior pubic ramus and inferior pubic ramus on the right which could be due to positioning or prior fracture, correlate clinically      THREE VIEWS OF THE LEFT KNEE on 1/14/2023      HISTORY:  Knee pain      PROCEDURE: AP, lateral and sunrise patellofemoral views were obtained. FINDINGS:       There is no sign of a discrete fracture or dislocation. Demineralization is noted. Some lateral condylar spurring is appreciated.  Mild patellofemoral joint space loss noted      There is no acute bony defect identified or loose body. There is no radiopaque foreign body      IMPRESSION:       No sign of any fracture or acute deformity. Demineralization with some lateral compartment narrowing and patellofemoral compartment narrowing. XR KNEE LEFT (1-2 VIEWS)   Final Result      No discrete fracture or acute deformity involving the left hip      No acute bony defect in the pelvis. Slightly irregular superior pubic ramus and inferior pubic ramus on the right which could be due to positioning or prior fracture, correlate clinically      THREE VIEWS OF THE LEFT KNEE on 1/14/2023      HISTORY:  Knee pain      PROCEDURE: AP, lateral and sunrise patellofemoral views were obtained. FINDINGS:       There is no sign of a discrete fracture or dislocation. Demineralization is noted. Some lateral condylar spurring is appreciated. Mild patellofemoral joint space loss noted      There is no acute bony defect identified or loose body. There is no radiopaque foreign body      IMPRESSION:       No sign of any fracture or acute deformity. Demineralization with some lateral compartment narrowing and patellofemoral compartment narrowing. IP CONSULT TO NEPHROLOGY  IP CONSULT TO HOSPITALIST  IP CONSULT TO NEPHROLOGY  IP CONSULT TO DIETITIAN    Assessment/Plan:      -Symptomatic anemia likely due to CKD. ..  Hemoglobin 6.2-6.7 on admission, baseline 8-9--- no overt bleeding--anemia work-up ordered including iron studies and FOBT, transfuse units of packed red blood cells-hold Eliquis for now-TEODORA per nephrology    -Chronic pancytopenia-bone marrow biopsy 11/15/22 was normal--transfuse for hemoglobin less than 7, platelets less than 50 given anticoagulation--reviewed prior oncology consults, no etiology was established as work-up was negative-continue to monitor    -ESRD on hemodialysis--Mon/Tues/Thurs/Fri -follow-up with nephrology    -Hyperkalemia--HD scheduled today    -Chronic combined systolic/diastolic heart failure/NICM, EF 35 to 50%, grade 3 diastolic dysfunction-compensated continue Coreg-not on ACE/ARB given renal failure/hyperkalemia--follow-up with cardiology outpatient    -Hypothyroidism-continue Synthroid    -Hyperlipidemia-continue statin    -DPBJ-vhrhig-knyceflg bronchodilators    -History of pulmonary embolism--resume Eliquis if H&H stable posttransfusion with no evidence of bleeding    -Essential hypertension-stable-continue Coreg    DVT Prophylaxis: Eliquis  Diet: ADULT DIET; Regular; Low Fat/Low Chol/High Fiber/2 gm Na;  Low Potassium (Less than 3000 mg/day)  ADULT ORAL NUTRITION SUPPLEMENT; Breakfast; Renal Oral Supplement  Code Status: Full Code  PT/OT Eval Status:     Dispo -.back to SNF in 1 to 2 days          Brien Combs MD

## 2023-01-15 NOTE — CONSULTS
Nephrology Consult Note  [facility]  [phone]   [facility]. [facility]        Reason for Consult:  ESRD    HISTORY OF PRESENT ILLNESS:                This is a patient with significant past medical history of ERSD who presented with increasing fatigue from [facility]. He was just discharged two weeks ago from there after suffering multiple falls. He fell in the ER per notes. He gets hemodialysis 4x/week at [facility] but was hyperkalemic on presentation to the ED. His hgb is falling and he may need PRBC while here. He denies chest pain, N/V, hematuria, leg swelling, fevers, bloody stool, and melena. He has had some loose BMs. No issues with his graft of late. Past Medical History:        Diagnosis Date    Cerebral artery occlusion with cerebral infarction ([facility]     Combined systolic and diastolic heart failure (H[facility])     COPD (chronic obstructive pulmonary disease) ([facility]     Gout     Hemodialysis patient ([facility]     Hx of blood clots     Hyperlipidemia     Hypertension        Past Surgical History:        Procedure Laterality Date    COLONOSCOPY      CT BONE MARROW BIOPSY  11/15/2022    CT BONE MARROW BIOPSY 11/15/2022 [facility] CT SCAN    DIALYSIS FISTULA CREATION Left        Current Medications:    No current facility-administered medications on file prior to encounter. Current Outpatient Medications on File Prior to Encounter   Medication Sig Dispense Refill    calcium carbonate (TUMS) 500 MG chewable tablet Take 1 tablet by mouth every 8 hours as needed for Heartburn      carvedilol (COREG) 3.125 MG tablet Take 1 tablet by mouth 2 times daily 60 tablet 3    sodium chloride (OCEAN) 0.65 % nasal spray 1-2 sprays both nostrils at least daily and as needed to prevent dry mucosa. 1 each 3    levothyroxine (SYNTHROID) 100 MCG tablet Take 100 mcg by mouth Daily      gabapentin (NEURONTIN) 100 MG capsule Take 1 capsule by mouth at bedtime for 30 days.  90 capsule 0    diclofenac sodium (VOLTAREN) 1 % GEL Apply 2 g topically 2 times daily 50 g 0    calcitRIOL (ROCALTROL) 0.25 MCG capsule Take 0.5 mcg by mouth every other day [facility]      sevelamer (RENVELA) 800 MG tablet Take 1 tablet by mouth 3 times daily (with meals)      apixaban (ELIQUIS) 2.5 MG TABS tablet Take 1 tablet by mouth 2 times daily 60 tablet 1    ferrous sulfate (FE TABS) 325 (65 Fe) MG EC tablet Take 1 tablet by mouth every other day (Patient taking differently: Take 325 mg by mouth daily (with breakfast)) 15 tablet 1    atorvastatin (LIPITOR) 80 MG tablet Take 80 mg by mouth nightly      calcium carb-cholecalciferol 600-200 MG-UNIT TABS tablet Take 1 tablet by mouth daily      Umeclidinium Bromide (INCRUSE ELLIPTA) 62.5 MCG/INH AEPB Inhale 1 puff into the lungs daily      vitamin D (ERGOCALCIFEROL) 1.25 MG (06709 UT) CAPS capsule Take 50,000 Units by mouth once a week Saturdays (Patient not taking: Reported on 1/14/2023)      omeprazole (PRILOSEC) 20 MG delayed release capsule Take 20 mg by mouth daily (Patient not taking: Reported on 1/14/2023)      albuterol sulfate HFA (PROVENTIL;VENTOLIN;PROAIR) 108 (90 Base) MCG/ACT inhaler Inhale 2 puffs into the lungs every 6 hours as needed for Wheezing 90mcg/actuation      allopurinol (ZYLOPRIM) 100 MG tablet Take 100 mg by mouth Daily on [facility]         Allergies:  Nitroglycerin and Oxymetazoline    Social History:    Social History     Socioeconomic History    Marital status: Single     Spouse name: Not on file    Number of children: Not on file    Years of education: Not on file    Highest education level: Not on file   Occupational History    Not on file   Tobacco Use    Smoking status: Former    Smokeless tobacco: Never   Vaping Use    Vaping Use: Never used   Substance and Sexual Activity    Alcohol use: Yes     Comment: occasional    Drug use: Never    Sexual activity: Not Currently   Other Topics Concern    Not on file   Social History Narrative    Not on file     Social Determinants of Health     Financial Resource Strain: Not on file   Food Insecurity: Not on file   Transportation Needs: Not on file   Physical Activity: Not on file   Stress: Not on file   Social Connections: Not on file   Intimate Partner Violence: Not on file   Housing Stability: Not on file       Family History:       Problem Relation Age of Onset    Cancer Mother     Cancer Father     Other Sister          REVIEW OF SYSTEMS:    ROS is negative except per HPI for all systems. PHYSICAL EXAM:    Vitals:    /72   Pulse 77   Temp 98 °F (36.7 °C) (Oral)   Resp 18   Ht 5' 8\" (1.727 m)   Wt 130 lb 6.4 oz (59.1 kg)   SpO2 99%   BMI 19.83 kg/m²   No intake/output data recorded. No intake/output data recorded. Physical Exam:  Gen: Resting in bed, NAD. HEENT: MMM, OP clear. CV: RRR no m/r. No S3.  Lungs: Clear bilaterally. No rhonchi. Abd: S/NT +BS  Ext: No edema, no cyanosis  Skin: Warm. No rashes appreciated. : No TTP over bladder, nondistended. Neuro: Alert and oriented x 3, nonfocal.  Joints: No erythema noted over joints. Access: Left upper arm AV loop graft +bruit. DATA:    CBC:   Lab Results   Component Value Date/Time    WBC 4.5 01/15/2023 05:39 AM    RBC 2.25 01/15/2023 05:39 AM    HGB 6.7 01/15/2023 07:18 AM    HCT 21.9 01/15/2023 07:18 AM    MCV 90.0 01/15/2023 05:39 AM    MCH 27.6 01/15/2023 05:39 AM    MCHC 30.7 01/15/2023 05:39 AM    RDW 16.1 01/15/2023 05:39 AM    PLT 90 01/15/2023 05:39 AM    MPV 8.7 01/15/2023 05:39 AM     BMP:    Lab Results   Component Value Date/Time     01/15/2023 05:39 AM    K 5.8 01/15/2023 05:39 AM    K 4.9 01/15/2023 01:02 AM    CL 94 01/15/2023 05:39 AM    CO2 27 01/15/2023 05:39 AM    BUN 54 01/15/2023 05:39 AM    LABALBU 3.2 01/15/2023 05:39 AM    CREATININE 9.7 01/15/2023 05:39 AM    CALCIUM 8.6 01/15/2023 05:39 AM    GFRAA 7 10/14/2022 05:45 AM    LABGLOM 5 01/15/2023 05:39 AM    GLUCOSE 62 01/15/2023 05:39 AM       IMPRESSION/RECOMMENDATIONS:      1. ESRD: He follows with UC at [facility]. He gets HD Mon/Tues/Thurs/Fri there on the HHD setup. Plan for HD today for clearance and K, then continue Tues/Thurs/Sat while here. 2. Hyperkalemia: Despite HD 4x/week. Plan for HD today since his K is still high and he may need blood. Low potassium diet. 3. Anemia: D/t CKD and possibly a GI bleed. Hg was 8-9's over the last month, 7.4 on admit and now 6.7. Transfuse as needed, planning HD today to facilitate a blood transfusion. Retacrit next week. 4. Access: Left upper arm AV loop graft. 5. Volume/HTN: BP controlled, pt appears euvolemic. 6. Falls: Pt reports he is not getting PT at [facility]. Thank you for allowing me to participate in the care of this patient. I will continue to follow along. Please call with questions. Janelle Jenkins MD  [facility]  [phone]  [facility]

## 2023-01-15 NOTE — PROGRESS NOTES
Nephrology Progress Note  [facility]  [phone]   [facility]. com    Patient:  Michelle Rajan   : 1955    Pt seen on HD this afternoon. Orders reviewed. He received 1u PRBC with diaylsis and is tolerating his treatment well. Care discussed with acute hemodialysis nurse Shira Manzo - I appreciate her assistance with hemodialysis today. Sanna Nunn MD  [facility]  Office Number: [phone]  [facility]. [facility]

## 2023-01-15 NOTE — ED NOTES
Report called to 5S, receiving RN updated on POC and pt history. Pt is agreeable and stable at this time. No questions or concerns.      Sadie Varela, [place]  01/14/23 9469

## 2023-01-15 NOTE — ED PROVIDER NOTES
Right a  ED Attending Attestation Note     Date of evaluation: 1/14/2023    This patient was seen by the advance practice provider. I have seen and examined the patient, agree with the workup, evaluation, management and diagnosis. The care plan has been discussed. Gino Curran is a 61-year-old male with a past medical history of ESRD on hemodialysis Monday/Tuesday/Wednesday/Friday, heart failure, CVA, COPD on oxygen, hypertension, hyperlipidemia who presents to the emergency department today with fatigue. He states when he was standing, his knees gave out and he fell down. He did not hit his head. No loss of consciousness. He is complaining of discomfort in his left elbow. He denies any other complaints other than the fatigue. On exam today, he is well-appearing. Lungs are clear and equal.  Abdomen is soft and nontender. He has discomfort to his left elbow but full range of motion.   No other deformities or discomfort noted on musculoskeletal exam.       Chu Miller MD  01/14/23 2031

## 2023-01-15 NOTE — FLOWSHEET NOTE
01/15/23 1153 01/15/23 1446   Vital Signs   /65 124/77   Temp 97.8 °F (36.6 °C) 97.7 °F (36.5 °C)   Heart Rate 71 81   Resp 20 20     Treatment time: 2.5 hours    Net UF: No UF    Pre weight: 56.6 kg (standing scale)  Post weight: 56.6 kg (standing scale)  EDW: 59 kg    Access used: JAIME AVG  Access function: No problems    Medications or blood products given: 1 unit PRBC    Regular outpatient schedule: [address] [facility]    Summary of response to treatment: Tolerated 2.5 hour emergent HD tx for K 5.8, without difficulty. No UF. SBP soft upon arrival, 90's, improved to >100's after PRBC transfusion completed. Arrived to [address] [place] [address] with  PRBC transfusion started, completed transfusion during HD without difficulty. Copy of dialysis treatment record placed in chart, to be scanned into EMR.      Report called to Rocio Barr RN

## 2023-01-15 NOTE — PLAN OF CARE
Problem: Discharge Planning  Goal: Discharge to home or other facility with appropriate resources  Outcome: Progressing  Plan of care reviewed with pt. All questions answered at this time. Problem: Skin/Tissue Integrity  Goal: Absence of new skin breakdown  Outcome: Progressing  Turn and reposition q 2 hrs.

## 2023-01-15 NOTE — PROGRESS NOTES
Pts hgb came back critical at 6.2 down from 7.4. MD on call made aware, new orders for redraw along with type and screen placed. Consent obtained for blood if needed.     Electronically signed by Carmella Collado RN on 1/15/23 at 7:00 AM EST

## 2023-01-15 NOTE — PROGRESS NOTES
Pt reporting L buttocks pain. Requesting pain medication. No pain medication due at this time. Secure message sent to MD to report pt's request/report of pain. Awaiting response/orders.

## 2023-01-15 NOTE — PROGRESS NOTES
Patient returned from dialysis. Alert and oriented, vitals stable. Repeat H and H scheduled for 1630. Tolerating general diet. IV saline locked right arm. Fistula left arm, dressing covering cdi. Attempted to walk to bathroom, legs buckled, assisted back to bed. Call light in reach, bed alarm on. Will monitor.

## 2023-01-16 LAB
ANION GAP SERPL CALCULATED.3IONS-SCNC: 6 MMOL/L (ref 3–16)
BASOPHILS ABSOLUTE: 0 K/UL (ref 0–0.2)
BASOPHILS RELATIVE PERCENT: 0.9 %
BUN BLDV-MCNC: 26 MG/DL (ref 7–20)
CALCIUM SERPL-MCNC: 8.5 MG/DL (ref 8.3–10.6)
CHLORIDE BLD-SCNC: 98 MMOL/L (ref 99–110)
CO2: 31 MMOL/L (ref 21–32)
CREAT SERPL-MCNC: 6.2 MG/DL (ref 0.8–1.3)
EOSINOPHILS ABSOLUTE: 0.2 K/UL (ref 0–0.6)
EOSINOPHILS RELATIVE PERCENT: 3.8 %
FOLATE: 5.06 NG/ML (ref 4.78–24.2)
FOLATE: 5.66 NG/ML (ref 4.78–24.2)
GFR SERPL CREATININE-BSD FRML MDRD: 9 ML/MIN/{1.73_M2}
GLUCOSE BLD-MCNC: 189 MG/DL (ref 70–99)
GLUCOSE BLD-MCNC: 79 MG/DL (ref 70–99)
GLUCOSE BLD-MCNC: 91 MG/DL (ref 70–99)
GLUCOSE BLD-MCNC: 92 MG/DL (ref 70–99)
HCT VFR BLD CALC: 22.5 % (ref 40.5–52.5)
HCT VFR BLD CALC: 22.6 % (ref 40.5–52.5)
HCT VFR BLD CALC: 23.3 % (ref 40.5–52.5)
HEMOGLOBIN: 7 G/DL (ref 13.5–17.5)
HEMOGLOBIN: 7.1 G/DL (ref 13.5–17.5)
HEMOGLOBIN: 7.3 G/DL (ref 13.5–17.5)
INR BLD: 1.29 (ref 0.87–1.14)
LYMPHOCYTES ABSOLUTE: 0.8 K/UL (ref 1–5.1)
LYMPHOCYTES RELATIVE PERCENT: 15.6 %
MCH RBC QN AUTO: 27.7 PG (ref 26–34)
MCH RBC QN AUTO: 27.9 PG (ref 26–34)
MCHC RBC AUTO-ENTMCNC: 31.2 G/DL (ref 31–36)
MCHC RBC AUTO-ENTMCNC: 31.2 G/DL (ref 31–36)
MCV RBC AUTO: 88.5 FL (ref 80–100)
MCV RBC AUTO: 89.5 FL (ref 80–100)
MONOCYTES ABSOLUTE: 0.6 K/UL (ref 0–1.3)
MONOCYTES RELATIVE PERCENT: 11.8 %
NEUTROPHILS ABSOLUTE: 3.5 K/UL (ref 1.7–7.7)
NEUTROPHILS RELATIVE PERCENT: 67.9 %
OCCULT BLOOD DIAGNOSTIC: ABNORMAL
PDW BLD-RTO: 16.2 % (ref 12.4–15.4)
PDW BLD-RTO: 16.6 % (ref 12.4–15.4)
PERFORMED ON: ABNORMAL
PERFORMED ON: NORMAL
PERFORMED ON: NORMAL
PLATELET # BLD: 102 K/UL (ref 135–450)
PLATELET # BLD: 107 K/UL (ref 135–450)
PMV BLD AUTO: 8.6 FL (ref 5–10.5)
PMV BLD AUTO: 8.8 FL (ref 5–10.5)
POTASSIUM SERPL-SCNC: 4.5 MMOL/L (ref 3.5–5.1)
PROTHROMBIN TIME: 16.1 SEC (ref 11.7–14.5)
RBC # BLD: 2.52 M/UL (ref 4.2–5.9)
RBC # BLD: 2.63 M/UL (ref 4.2–5.9)
SODIUM BLD-SCNC: 135 MMOL/L (ref 136–145)
T3 TOTAL: 0.53 NG/ML (ref 0.8–2)
T4 FREE: 1.2 NG/DL (ref 0.9–1.8)
TSH REFLEX: 0.25 UIU/ML (ref 0.27–4.2)
VITAMIN B-12: 715 PG/ML (ref 211–911)
VITAMIN B-12: 724 PG/ML (ref 211–911)
WBC # BLD: 4.9 K/UL (ref 4–11)
WBC # BLD: 5.2 K/UL (ref 4–11)

## 2023-01-16 PROCEDURE — 85027 COMPLETE CBC AUTOMATED: CPT

## 2023-01-16 PROCEDURE — 6370000000 HC RX 637 (ALT 250 FOR IP): Performed by: INTERNAL MEDICINE

## 2023-01-16 PROCEDURE — 36415 COLL VENOUS BLD VENIPUNCTURE: CPT

## 2023-01-16 PROCEDURE — 82270 OCCULT BLOOD FECES: CPT

## 2023-01-16 PROCEDURE — 82607 VITAMIN B-12: CPT

## 2023-01-16 PROCEDURE — 2700000000 HC OXYGEN THERAPY PER DAY

## 2023-01-16 PROCEDURE — 85025 COMPLETE CBC W/AUTO DIFF WBC: CPT

## 2023-01-16 PROCEDURE — 97162 PT EVAL MOD COMPLEX 30 MIN: CPT

## 2023-01-16 PROCEDURE — 80048 BASIC METABOLIC PNL TOTAL CA: CPT

## 2023-01-16 PROCEDURE — 97535 SELF CARE MNGMENT TRAINING: CPT

## 2023-01-16 PROCEDURE — 94761 N-INVAS EAR/PLS OXIMETRY MLT: CPT

## 2023-01-16 PROCEDURE — 85018 HEMOGLOBIN: CPT

## 2023-01-16 PROCEDURE — 94640 AIRWAY INHALATION TREATMENT: CPT

## 2023-01-16 PROCEDURE — 97530 THERAPEUTIC ACTIVITIES: CPT

## 2023-01-16 PROCEDURE — 97116 GAIT TRAINING THERAPY: CPT

## 2023-01-16 PROCEDURE — 2580000003 HC RX 258: Performed by: INTERNAL MEDICINE

## 2023-01-16 PROCEDURE — 85014 HEMATOCRIT: CPT

## 2023-01-16 PROCEDURE — 1200000000 HC SEMI PRIVATE

## 2023-01-16 PROCEDURE — 85610 PROTHROMBIN TIME: CPT

## 2023-01-16 PROCEDURE — 82746 ASSAY OF FOLIC ACID SERUM: CPT

## 2023-01-16 PROCEDURE — 97165 OT EVAL LOW COMPLEX 30 MIN: CPT

## 2023-01-16 RX ORDER — LIDOCAINE 4 G/G
1 PATCH TOPICAL DAILY
Status: DISCONTINUED | OUTPATIENT
Start: 2023-01-16 | End: 2023-01-17 | Stop reason: HOSPADM

## 2023-01-16 RX ORDER — LEVOTHYROXINE SODIUM 0.07 MG/1
75 TABLET ORAL DAILY
Status: DISCONTINUED | OUTPATIENT
Start: 2023-01-17 | End: 2023-01-17 | Stop reason: HOSPADM

## 2023-01-16 RX ADMIN — GABAPENTIN 100 MG: 100 CAPSULE ORAL at 22:13

## 2023-01-16 RX ADMIN — POLYETHYLENE GLYCOL 3350, SODIUM SULFATE ANHYDROUS, SODIUM BICARBONATE, SODIUM CHLORIDE, POTASSIUM CHLORIDE 2000 ML: 236; 22.74; 6.74; 5.86; 2.97 POWDER, FOR SOLUTION ORAL at 16:29

## 2023-01-16 RX ADMIN — SODIUM CHLORIDE, PRESERVATIVE FREE 10 ML: 5 INJECTION INTRAVENOUS at 22:13

## 2023-01-16 RX ADMIN — TIOTROPIUM BROMIDE INHALATION SPRAY 2 PUFF: 3.12 SPRAY, METERED RESPIRATORY (INHALATION) at 12:00

## 2023-01-16 RX ADMIN — ATORVASTATIN CALCIUM 80 MG: 80 TABLET, FILM COATED ORAL at 22:13

## 2023-01-16 RX ADMIN — SEVELAMER CARBONATE 800 MG: 800 TABLET, FILM COATED ORAL at 14:19

## 2023-01-16 RX ADMIN — CARVEDILOL 3.12 MG: 6.25 TABLET, FILM COATED ORAL at 09:49

## 2023-01-16 RX ADMIN — FERROUS SULFATE TAB 325 MG (65 MG ELEMENTAL FE) 325 MG: 325 (65 FE) TAB at 09:49

## 2023-01-16 RX ADMIN — CARVEDILOL 3.12 MG: 6.25 TABLET, FILM COATED ORAL at 22:16

## 2023-01-16 RX ADMIN — LEVOTHYROXINE SODIUM 100 MCG: 0.1 TABLET ORAL at 05:00

## 2023-01-16 RX ADMIN — CALCIUM CARBONATE-VITAMIN D TAB 500 MG-200 UNIT 1 TABLET: 500-200 TAB at 09:48

## 2023-01-16 RX ADMIN — SEVELAMER CARBONATE 800 MG: 800 TABLET, FILM COATED ORAL at 18:03

## 2023-01-16 RX ADMIN — SEVELAMER CARBONATE 800 MG: 800 TABLET, FILM COATED ORAL at 09:48

## 2023-01-16 RX ADMIN — ALLOPURINOL 100 MG: 100 TABLET ORAL at 09:49

## 2023-01-16 ASSESSMENT — PAIN SCALES - GENERAL
PAINLEVEL_OUTOF10: 5
PAINLEVEL_OUTOF10: 4

## 2023-01-16 ASSESSMENT — PAIN DESCRIPTION - LOCATION
LOCATION: BACK
LOCATION: BUTTOCKS

## 2023-01-16 NOTE — PROGRESS NOTES
Nephrology Progress Note  [facility]  [phone]   [facility]. [facility]        Reason for Consult:  ESRD    HISTORY OF PRESENT ILLNESS:                This is a patient with significant past medical history of ERSD who presented with increasing fatigue from [facility]. He was just discharged two weeks ago from there after suffering multiple falls. He fell in the ER per notes. He gets hemodialysis 4x/week at [facility] but was hyperkalemic on presentation to the ED. His hgb is falling and he may need PRBC while here. He denies chest pain, N/V, hematuria, leg swelling, fevers, bloody stool, and melena. He has had some loose BMs. No issues with his graft of late. S/P PRBC's yesterday  Had HD yesterday for hyperkalemia    REVIEW OF SYSTEMS:    Denies SOB nausea   Weakness better    PHYSICAL EXAM:    Vitals:    /76   Pulse 81   Temp 98.3 °F (36.8 °C) (Oral)   Resp 19   Ht 5' 8\" (1.727 m)   Wt 134 lb 7.7 oz (61 kg)   SpO2 98%   BMI 20.45 kg/m²   I/O last 3 completed shifts: In: 780.3 [P.O.:480; Blood:300.3]  Out: -   No intake/output data recorded. Physical Exam:  Gen: Resting in bed, NAD. HEENT: MMM, OP clear. CV: RRR no m/r. No S3.  Lungs: Clear bilaterally. No rhonchi. Abd: S/NT +BS  Ext: No edema, no cyanosis  Skin: Warm. No rashes appreciated. : No TTP over bladder, nondistended. Neuro: Alert and oriented x 3, nonfocal.  Joints: No erythema noted over joints. Access: Left upper arm AV loop graft +bruit.     DATA:    CBC:   Lab Results   Component Value Date/Time    WBC 5.2 01/16/2023 07:14 AM    RBC 2.63 01/16/2023 07:14 AM    HGB 7.3 01/16/2023 07:14 AM    HCT 23.3 01/16/2023 07:14 AM    MCV 88.5 01/16/2023 07:14 AM    MCH 27.7 01/16/2023 07:14 AM    MCHC 31.2 01/16/2023 07:14 AM    RDW 16.2 01/16/2023 07:14 AM     01/16/2023 07:14 AM    MPV 8.8 01/16/2023 07:14 AM     BMP:    Lab Results   Component Value Date/Time     01/16/2023 07:14 AM K 4.5 01/16/2023 07:14 AM    K 5.8 01/15/2023 05:39 AM    CL 98 01/16/2023 07:14 AM    CO2 31 01/16/2023 07:14 AM    BUN 26 01/16/2023 07:14 AM    LABALBU 3.2 01/15/2023 05:39 AM    CREATININE 6.2 01/16/2023 07:14 AM    CALCIUM 8.5 01/16/2023 07:14 AM    GFRAA 7 10/14/2022 05:45 AM    LABGLOM 9 01/16/2023 07:14 AM    GLUCOSE 79 01/16/2023 07:14 AM       IMPRESSION/RECOMMENDATIONS:      1. ESRD: He follows with UC at [facility]. He gets HD Mon/Tues/Thurs/Fri there on the HHD setup. HD yesterday for Hyperkalemia K+ 4.5 meq today continue Tues/Thurs/Sat while here. 2. Hyperkalemia: Despite HD 4x/week. Improved S/P HD yesterday Low potassium diet. 3. Anemia: D/t CKD and possibly a GI bleed. Hg was 8-9's over the last month, 7.4 on admit and now 6.7. Transfused yesterday HGB 7.3 gm Monitor  Retacrit with HD    4. Access: Left upper arm AV loop graft. 5. Volume/HTN: BP controlled, pt appears euvolemic. 6. Falls: Pt reports he is not getting PT at [facility]. Thank you for allowing me to participate in the care of this patient. I will continue to follow along. Please call with questions. Nathalia Cota MD  [facility]  [phone]  [facility]

## 2023-01-16 NOTE — CONSULTS
[address] [place] [address]  GI Consultation      Patient: Aniyah Arce  : 1955       Date:  2023    Subjective:       History of Present Illness  Patient is a 74 y.o.  male admitted with Hyperkalemia [E87.5]  General weakness [R53.1] who is seen in consult for anemia and positive occult stool. Mr. Liane Eubanks is a 72M PMH CVA COPD ESRD on HD HTN HLD who presented to the ED yesterday with complaints of multiple falls. Patient has been HDS. Labs in the ED were remarkable for  a hemoglobin of 7.4. His baseline appears to be 8-9 range. Repeat hemoglobin 6.2 requiring transfusion. This AM 7.3  Iron studies obtained ferritin 873, iron 52, TIBC 215. Past Medical History:   Diagnosis Date    Cerebral artery occlusion with cerebral infarction ([facility] [address]     Combined systolic and diastolic heart failure (HCC)     COPD (chronic obstructive pulmonary disease) ([facility] [address] [facility]     Gout     Hemodialysis patient ([facility]     Hx of blood clots     Hyperlipidemia     Hypertension       Past Surgical History:   Procedure Laterality Date    COLONOSCOPY      CT BONE MARROW BIOPSY  11/15/2022    CT BONE MARROW BIOPSY 11/15/2022 [facility] CT SCAN    DIALYSIS FISTULA CREATION Left       Past Endoscopic History Colonoscopy - unable to obtain report     Admission Meds  No current facility-administered medications on file prior to encounter. Current Outpatient Medications on File Prior to Encounter   Medication Sig Dispense Refill    calcium carbonate (TUMS) 500 MG chewable tablet Take 1 tablet by mouth every 8 hours as needed for Heartburn      carvedilol (COREG) 3.125 MG tablet Take 1 tablet by mouth 2 times daily 60 tablet 3    sodium chloride (OCEAN) 0.65 % nasal spray 1-2 sprays both nostrils at least daily and as needed to prevent dry mucosa. 1 each 3    levothyroxine (SYNTHROID) 100 MCG tablet Take 100 mcg by mouth Daily      gabapentin (NEURONTIN) 100 MG capsule Take 1 capsule by mouth at bedtime for 30 days.  90 capsule 0 diclofenac sodium (VOLTAREN) 1 % GEL Apply 2 g topically 2 times daily 50 g 0    calcitRIOL (ROCALTROL) 0.25 MCG capsule Take 0.5 mcg by mouth every other day [facility]      sevelamer (RENVELA) 800 MG tablet Take 1 tablet by mouth 3 times daily (with meals)      apixaban (ELIQUIS) 2.5 MG TABS tablet Take 1 tablet by mouth 2 times daily 60 tablet 1    ferrous sulfate (FE TABS) 325 (65 Fe) MG EC tablet Take 1 tablet by mouth every other day (Patient taking differently: Take 325 mg by mouth daily (with breakfast)) 15 tablet 1    atorvastatin (LIPITOR) 80 MG tablet Take 80 mg by mouth nightly      calcium carb-cholecalciferol 600-200 MG-UNIT TABS tablet Take 1 tablet by mouth daily      Umeclidinium Bromide (INCRUSE ELLIPTA) 62.5 MCG/INH AEPB Inhale 1 puff into the lungs daily      vitamin D (ERGOCALCIFEROL) 1.25 MG (17249 UT) CAPS capsule Take 50,000 Units by mouth once a week Saturdays (Patient not taking: Reported on 1/14/2023)      omeprazole (PRILOSEC) 20 MG delayed release capsule Take 20 mg by mouth daily (Patient not taking: Reported on 1/14/2023)      albuterol sulfate HFA (PROVENTIL;VENTOLIN;PROAIR) 108 (90 Base) MCG/ACT inhaler Inhale 2 puffs into the lungs every 6 hours as needed for Wheezing 90mcg/actuation      allopurinol (ZYLOPRIM) 100 MG tablet Take 100 mg by mouth Daily on [facility]         Patient denies ASA, NSAID use. Allergies  Allergies   Allergen Reactions    Nitroglycerin      Other reaction(s): Unknown    Oxymetazoline      Other reaction(s): Unknown      Social   Social History     Tobacco Use    Smoking status: Former    Smokeless tobacco: Never   Substance Use Topics    Alcohol use: Yes     Comment: occasional        Family History   Problem Relation Age of Onset    Cancer Mother     Cancer Father     Other Sister       No family history of colon cancer, Crohn's disease, or ulcerative colitis. Review of Systems  Pertinent items are noted in HPI.        Physical Exam    /72   Pulse 92   Temp 98 °F (36.7 °C) (Oral)   Resp 18   Ht 5' 8\" (1.727 m)   Wt 134 lb 7.7 oz (61 kg)   SpO2 99%   BMI 20.45 kg/m²   General appearance: alert, cooperative, no distress, appears stated age  Anicteric, No Jaundice  Head: Normocephalic, without obvious abnormality  Lungs: clear to auscultation bilaterally, Normal Effort  Heart: regular rate and rhythm, normal S1 and S2, no murmurs or rubs  Abdomen: soft, non-tender; bowel sounds normal; no masses,  no organomegaly  Extremities: atraumatic, no cyanosis or edema  Skin: warm and dry  Neuro: intact  AAOX3      Data Review:    Recent Labs     01/14/23  1859 01/15/23  0539 01/15/23  0718 01/15/23  1733 01/16/23  0714   WBC 3.2* 4.5  --   --  5.2   HGB 7.4* 6.2* 6.7* 7.8* 7.3*   HCT 24.1* 20.3* 21.9* 25.3* 23.3*   MCV 90.7 90.0  --   --  88.5   * 90*  --   --  107*     Recent Labs     01/14/23  1859 01/14/23  2013 01/15/23  0102 01/15/23  0539 01/16/23  0714   *  --   --  132* 135*   K 5.6*   < > 4.9 5.8* 4.5   CL 92*  --   --  94* 98*   CO2 29  --   --  27 31   BUN 51*  --   --  54* 26*   CREATININE 9.0*  --   --  9.7* 6.2*    < > = values in this interval not displayed. Recent Labs     01/14/23  1859 01/15/23  0539   AST 18 14*   ALT 7* 6*   BILITOT 0.4 0.3   ALKPHOS 148* 131*     No results for input(s): LIPASE, AMYLASE in the last 72 hours. No results for input(s): PROTIME, INR in the last 72 hours. No results for input(s): PTT in the last 72 hours. No results for input(s): OCCULTBLD in the last 72 hours. Imaging Studies:                            Ultrasound:    Findings raising concern for early acute cholecystitis. Trace perihepatic ascites. Mild hepatomegaly.                  CT-scan of abdomen and pelvis:                  Assessment:     Principal Problem:    Hyperkalemia  Active Problems:    Pancytopenia (HCC)    Chronic respiratory failure with hypoxia and hypercapnia (HCC)    Failure to thrive in adult    ESRD on dialysis ([facility] [address] COPD (chronic obstructive pulmonary disease) (HCC)    Essential hypertension    Hyperlipidemia    Chronic combined systolic and diastolic CHF (congestive heart failure) ([facility] [address] [facility]  Resolved Problems:    * No resolved hospital problems. *      Acute on chronic anemia  In the setting of chronic disease    Recommendations:     - Goal Hb >7, transfuse as needed              Sp 1 unit  - Agree with protonix daily  - Consider colonoscopy    Thank you for the opportunity to participate in [address] [place] [address].   Will discuss with attending Dr. Lacy Aranda

## 2023-01-16 NOTE — PROGRESS NOTES
Patient alert and oriented x4. VSS. Patient has had multiple watery Bms throughout shift, occult stool sent per orders. Patient has c/o being weak and cold spoke with MD and put an additional order in for H&H and  hgb came back @ 7.1. consult for GI placed and plan is to be scoped tomorrow. Denies  pain and nausea at this time. Patient denies any other needs at this time. Bed is in the lowest position, call light and bedside table within reach. Patient bed alarm is on. Will continue to monitor for changes in patient status.      Electronically signed by Mely Booth RN on 1/16/2023 at 3:43 PM

## 2023-01-16 NOTE — CARE COORDINATION
Case Management Assessment  Initial Evaluation    Date/Time of Evaluation: 1/16/2023 3:33 PM  Assessment Completed by: Delmy Lawrence RN    If patient is discharged prior to next notation, then this note serves as note for discharge by case management. Patient Name: Lindsey Armas                   YOB: 1955  Diagnosis: Hyperkalemia [E87.5]  General weakness [R53.1]                   Date / Time: 1/14/2023  6:08 PM    Patient Admission Status: Inpatient   Readmission Risk (Low < 19, Mod (19-27), High > 27): Readmission Risk Score: 31.5    Current PCP: Ema Hand MD  PCP verified by CM? Yes    Chart Reviewed: Yes      History Provided by: Patient, Medical Record  Patient Orientation: Alert and Oriented    Patient Cognition: Alert    Hospitalization in the last 30 days (Readmission):  Yes    If yes, Readmission Assessment in CM Navigator will be completed.   {Readmission Assessment  Number of Days since last admission?: 8-30 days  Previous Disposition: [address] [place] [address]  Who is being Interviewed: Patient  What was the patient's/caregiver's perception as to why they think they needed to return back to the hospital?: Other (Comment) (emergent admission)  Did you visit your Primary Care Physician after you left the hospital, before you returned this time?: No  Why weren't you able to visit your PCP?: Did not have an appointment  Did you see a specialist, such as Cardiac, Pulmonary, Orthopedic Physician, etc. after you left the hospital?: No  Who advised the patient to return to the hospital?: Self-referral  Does the patient report anything that got in the way of taking their medications?: No  In our efforts to provide the best possible care to you and others like you, can you think of anything that we could have done to help you after you left the hospital the first time, so that you might not have needed to return so soon?: Other (Comment) (resolution of principle problem) }      Advance Directives:      Code Status: Full Code   Patient's Primary Decision Maker is: Legal Next of Kin    Primary Decision MakerSubha Finder - Child - [phone]    Discharge Planning:    Patient lives with: Alone Type of Home: Long-Term Care  Primary Care Giver: Self  Patient Support Systems include: Family Members   Current Financial resources:    Current community resources:    Current services prior to admission: Extended Care Facility            Current DME: Derral Dannie, Oxygen Therapy (Comment), Wheelchair            Type of Home Care services:  None    ADLS  Prior functional level: Assistance with the following:, Cooking, Housework, Shopping  Current functional level: Assistance with the following:, Cooking, Housework, Shopping    PT AM-PAC: 17 /24  OT AM-PAC: 16 /24    Family can provide assistance at DC: No  Would you like Case Management to discuss the discharge plan with any other family members/significant others, and if so, who?  No  Plans to Return to Present Housing: Yes  Other Identified Issues/Barriers to RETURNING to current housing:   Potential Assistance needed at discharge: Shanice Devries [address]            Potential DME:    Patient expects to discharge to: Long-term care  Plan for transportation at discharge: Family    Financial    Payor: [address] [place] [address] Drive / Plan: Shirley Lara I-SNP / Product Type: *No Product type* /     Does insurance require precert for SNF: Yes    Potential assistance Purchasing Medications: No  Meds-to-Beds request:        [address] [facility] [address], [address] [facility] [phone] - F [phone]  [address] [place] [address]  Phone: [phone] Fax: [phone]    [facility] [id number] - [address] [place], [address] [facility] [address] [phone] - F [phone]  [address] [place] [address]  Phone: [phone] Fax: [phone]      Notes:    Factors facilitating achievement of predicted outcomes: Good insight into deficits    Barriers to discharge: Medical complications    Additional Case Management Notes: Patient from [facility] where he gets HD. Patient to return to [facility] when dc ready. Facility aware he is here. The Plan for Transition of Care is related to the following treatment goals of Hyperkalemia [E87.5]  General weakness [C96.4]    IF APPLICABLE: The Patient and/or patient representative Cori Sagastume and his family were provided with a choice of provider and agrees with the discharge plan. Freedom of choice list with basic dialogue that supports the patient's individualized plan of care/goals and shares the quality data associated with the providers was provided to: Patient   Patient Representative Name:       The Patient and/or Patient Representative Agree with the Discharge Plan?  Yes    Channing Cowden, RN  Case Management Department  Ph: [phone] Fax: [phone]

## 2023-01-16 NOTE — PROGRESS NOTES
Occupational Therapy  Facility/Department: [address] Therapy Initial Assessment    Name: Frank Evans  : 1955  MRN: [id number]  Date of Service: 2023    Discharge Recommendations: Frank Evans scored a 16/24 on the AM-PAC ADL Inpatient form. Current research shows that an AM-PAC score of 17 or less is typically not associated with a discharge to the patient's home setting. Based on the patient's AM-PAC score and their current ADL deficits, it is recommended that the patient have 3-5 sessions per week of Occupational Therapy at d/c to increase the patient's independence. Please see assessment section for further patient specific details. If patient discharges prior to next session this note will serve as a discharge summary. Please see below for the latest assessment towards goals. OT Equipment Recommendations  Equipment Needed: No  Other: defer       Patient Diagnosis(es): The primary encounter diagnosis was Hyperkalemia. A diagnosis of General weakness was also pertinent to this visit. Past Medical History:  has a past medical history of Cerebral artery occlusion with cerebral infarction ([facility] [address] Combined systolic and diastolic heart failure ([facility] [address] COPD (chronic obstructive pulmonary disease) ([facility] [address] Gout, Hemodialysis patient ([facility] [address] Hx of blood clots, Hyperlipidemia, and Hypertension. Past Surgical History:  has a past surgical history that includes Dialysis fistula creation (Left); Colonoscopy; and CT BIOPSY BONE MARROW (11/15/2022). Treatment Diagnosis: decreased independence and strength with ADLs and fx mobility      Assessment   Performance deficits / Impairments: Decreased functional mobility ; Decreased strength;Decreased endurance;Decreased ADL status; Decreased posture  Assessment: Pt is 73 yo male from [facility] that admitted to [facility] after mechanical fall. Pt is typically independent with ADLs and fx mobility using 2WW or cane.  Pt is now below his functional level of independence and displaying increased weakness this date. Pt needed min A for sit<>stand tx from bed, mod A for sit<>stand transfer from commode, and min A for fx mobility. Pt required A for ADLs and has decreased endurance with inability to perform grooming tasks at sink this date. Pt having buttocks pain from fall and was offloaded at end of session to relieve pain. Pt would benefit from ongoing inpatient therapy services at discharge to improve strength and return to prior level of independence. Will continue to follow on acute OT POC. Treatment Diagnosis: decreased independence and strength with ADLs and fx mobility  Prognosis: Good  Decision Making: Medium Complexity  REQUIRES OT FOLLOW-UP: Yes  Activity Tolerance  Activity Tolerance: Patient Tolerated treatment well  Activity Tolerance Comments: Pt denying wanting to be up in chair 2/2 pain he is having on his buttocks 2/2 fall        Plan   Occupational Therapy Plan  Times Per Week: 2-5  Current Treatment Recommendations: Strengthening, Balance training, Self-Care / ADL, Functional mobility training, Endurance training, Gait training, Pain management, Positioning, Modalities     Restrictions  Position Activity Restriction  Other position/activity restrictions: On left arm precaution due to av fistula ; up with assistance    Subjective   General  Chart Reviewed: Yes  Patient assessed for rehabilitation services?: Yes  Additional Pertinent Hx: 74 y.o. male nursing home resident with significant past medical history of end-stage renal disease on hemodialysis on chronic anticoagulation for DVTs, combined systolic and diastolic heart failure, chronic hypoxic and hypercapnic respiratory failure secondary to COPD (2 L continuous) who presented to [facility] ED with fatigue. Patient reports fatigue with decreased p.o. intake for the past couple of days and has had a couple falls at the nursing home.  Per RN report in chart today, 1/16: \"Occult stool sample sent to lab. Lab called with positive result. MD notified and MD told this RN to place GI consult. GI consult order placed and consult has been called\". Referring Practitioner: Brown Candelaria MD  Diagnosis: Hyperkalemia  Subjective  Subjective: Pt lying supine in bed upon OT arrival and agreeable to OT evaluation. Pt stating, \"my butt hurts and my legs are weak\". Social/Functional History  Social/Functional History  Type of Home: Facility [facility]  ADL Assistance: Independent  Ambulation Assistance: Independent (use of 2WW)  Transfer Assistance: Independent  Additional Comments: Pt is from [facility] and reports he was independent with all ADLs prior to admission. Pt uses wheelchair for longer distances. Pt reports he was not recieving therapy services and had falls leading to hospital admission. Pt reports Phylliss Feathers was bringing meals to his room. Pt plans to return to [facility] at discharge. Pt has lived there for 7-8 months. Pt wears 3L of O2 at baseline. Objective   Heart Rate: 92  Heart Rate Source: Monitor  BP: 117/72  BP Location: Right upper arm  BP Method: Automatic  Patient Position: Semi fowlers  MAP (Calculated): 87  Resp: 18  SpO2: 99 %  O2 Device: Nasal cannula             Safety Devices  Type of Devices: Bed alarm in place;Call light within reach;Gait belt;Left in bed;Nurse notified  Restraints  Restraints Initially in Place: No  Balance  Sitting: Intact  Standing: With support (needed support of 2WW)  Gait  Overall Level of Assistance: Minimum assistance;Assist X1  Distance (ft):  (Pt ambulated with min A to/from bathroom with increased time and effort; BLE weakness noted and pt with forward flexed posture)  Assistive Device: Walker, rolling  Toilet Transfers  Toilet - Technique: Ambulating  Equipment Used: Standard toilet  Toilet Transfer:  Moderate assistance  Toilet Transfers Comments: increased time and effortful  AROM: Generally decreased, functional (decreased shoulder flexion ~90 degrees)  Strength: Generally decreased, functional (4-/5)  Coordination: Within functional limits  Tone: Normal  Sensation: Intact  ADL  Feeding: Beverage management;Setup  Grooming: Stand by assistance;Setup  LE Dressing Skilled Clinical Factors: Pt able to slide on slippers seated on edge of bed with setup; expect pt would require max A  Toileting: Maximum assistance  Toileting Skilled Clinical Factors: to wipe backside 2/2 pt unable to perform task; however, pt did not have any output     Activity Tolerance  Activity Tolerance: Patient limited by fatigue;Patient limited by pain     Transfers  Sit to stand: Minimal assistance  Stand to sit: Minimal assistance  Transfer Comments: increased time; effortful; pt pulling up on 2WW despite VCs  Vision  Vision: Impaired  Vision Exceptions: Wears glasses at all times  Hearing  Hearing: Within functional limits  Cognition  Overall Cognitive Status: Exceptions  Arousal/Alertness: Appropriate responses to stimuli  Following Commands:  Follows one step commands with increased time  Attention Span: Appears intact  Memory: Appears intact  Problem Solving: Decreased awareness of errors  Insights: Decreased awareness of deficits  Initiation: Requires cues for some  Sequencing: Requires cues for some  Orientation  Orientation Level: Disoriented to time;Oriented to person;Oriented to situation (pt unable to recall month; pt could not recall which hospital)                  Education Given To: Patient  Education Provided: Role of Therapy;Plan of Care;ADL Adaptive Strategies;Transfer Training  Education Method: Verbal  Barriers to Learning: Cognition  Education Outcome: Verbalized understanding;Continued education needed                        G-Code     OutComes Score                                                  AM-PAC Score        AM-[facility] Inpatient Daily Activity Raw Score: 16 (01/16/23 1413)  AM-PAC Inpatient ADL T-Scale Score : 35.96 (01/16/23 1413)  ADL Inpatient CMS 0-100% Score: 53.32 (01/16/23 1413)  ADL Inpatient CMS G-Code Modifier : CK (01/16/23 1413)    Tinneti Score       Goals  Short Term Goals  Time Frame for Short Term Goals: by discharge  Short Term Goal 1: Pt will perform fx mobility to/from bathroom with SBA and AE as needed  Short Term Goal 2: Pt will perform standing tolerance for 5 min with SBA  Short Term Goal 3: Pt will perform toileting with min A  Patient Goals   Patient goals : to get stronger       Therapy Time   Individual Concurrent Group Co-treatment   Time In 1310         Time Out 1350         Minutes 40         Timed Code Treatment Minutes: 25 Minutes (+ 15 min OT eval)       Tran Jay OT

## 2023-01-16 NOTE — PROGRESS NOTES
Physical Therapy  Facility/Department: [address] [place] [address]  Physical Therapy Initial Assessment    Name: Aniyah Arce  : 1955  MRN: [id number]  Date of Service: 2023    Discharge Recommendations:Mike Eubanks scored a 17/24 on the AM-PAC short mobility form. Current research shows that an AM-PAC score of 17 or less is typically not associated with a discharge to the patient's home setting. Based on the patient's AM-PAC score and their current functional mobility deficits, it is recommended that the patient have 3-5 sessions per week of Physical Therapy at d/c to increase the patient's independence. Please see assessment section for further patient specific details. If patient discharges prior to next session this note will serve as a discharge summary. Please see below for the latest assessment towards goals. PT Equipment Recommendations  Equipment Needed: No      Patient Diagnosis(es): The primary encounter diagnosis was Hyperkalemia. A diagnosis of General weakness was also pertinent to this visit. Past Medical History:  has a past medical history of Cerebral artery occlusion with cerebral infarction ([address] Combined systolic and diastolic heart failure ([address] COPD (chronic obstructive pulmonary disease) ([facility] [address] Gout, Hemodialysis patient ([facility] [address] Hx of blood clots, Hyperlipidemia, and Hypertension. Past Surgical History:  has a past surgical history that includes Dialysis fistula creation (Left); Colonoscopy; and CT BIOPSY BONE MARROW (11/15/2022). Assessment   Assessment: 75 yo admitted 25 for hyperkalemia. Pt demo  mobility below his reported baseline of independent ambulator with cane at LTC. Pt required min assist with use of RW and min/mod assist with transfers this pm. Pt plans to return to LTC at discharge and would like to begin PT. Pt would benefit from continued skilled IP PT at discharge to maximize his functional independence.   Treatment Diagnosis: mobility impairment due to hyperkalemia  Decision Making: Medium Complexity  Requires PT Follow-Up: Yes  Activity Tolerance  Activity Tolerance: Patient limited by fatigue;Patient limited by pain     Plan   Physcial Therapy Plan  General Plan:  (2-5)  Current Treatment Recommendations: Functional mobility training, Transfer training, Gait training, Endurance training  Safety Devices  Type of Devices: Bed alarm in place, Call light within reach, Gait belt, Left in bed, Nurse notified  Restraints  Restraints Initially in Place: No     Restrictions  Position Activity Restriction  Other position/activity restrictions: On left arm precaution due to av fistula ; up with assistance     Subjective   General  Chart Reviewed: Yes  Patient assessed for rehabilitation services?: Yes  Additional Pertinent Hx: 74 y.o. male nursing home resident with significant past medical history of end-stage renal disease on hemodialysis on chronic anticoagulation for DVTs, combined systolic and diastolic heart failure, chronic hypoxic and hypercapnic respiratory failure secondary to COPD (2 L continuous) who presented to [facility] ED 1/14/23 with fatigue. CXR neg; L elbow/L knee XR neg; positive occult stool; GI consult. Family / Caregiver Present: No  Diagnosis: hyperkalemia  Follows Commands: Within Functional Limits  Subjective  Subjective: Pt found supine in bed and agreeable to PT. Pt rates buttock pain 6/10, RN aware. Social/Functional History  Social/Functional History  [facility] [address]  ADL Assistance: Independent  Ambulation Assistance: Independent (use of 2WW)  Transfer Assistance: Independent  Additional Comments: Pt is from [facility] and reports he was independent with all ADLs prior to admission. Pt uses wheelchair for longer distances. Pt reports he was not recieving therapy services and had falls leading to hospital admission. Pt reports Skippy Bevel was bringing meals to his room.  Pt plans to return to [facility] at discharge. Pt has lived there for 7-8 months. Pt wears 3L of O2 at baseline. Vision/Hearing       Cognition   Orientation  Orientation Level: Disoriented to time;Oriented to person;Oriented to situation (pt unable to recall month; pt could not recall which hospital)     Objective                 AROM RLE (degrees)  RLE AROM: WFL  AROM LLE (degrees)  LLE AROM : WFL    Strength RLE  Strength RLE:  (3+/5 throughout)  Strength LLE  Strength LLE:  (3+/5 throughout)        Balance  Sitting: Intact  Standing: With support (needed support of 2WW)      Bed mobility  Supine to Sit: Stand by assistance (HOB up and heavy use of rail; slow and effortful)  Sit to Supine: Stand by assistance  Scooting: Stand by assistance    Transfers  Sit to Stand: Moderate Assistance (mod assist from toilet with vc for hand placement; min assist from bed with max vc for hand placement (pt pulling up on walker))  Stand to Sit: Minimal Assistance (x2 trials with vc for hand placement; uncontrolled descension)    Ambulation  Device: Rolling Walker  Assistance: Minimal assistance  Quality of Gait: forward posture with B knees in flexion; decreased step length/height; slow ramses; pt ROUSE  Distance: 20 ft x2  Comments: Pt declined further ambulation or sitting in chair.          AM-PAC Score  -PAC Inpatient Mobility Raw Score : 17 (01/16/23 1414)  -PAC Inpatient T-Scale Score : 42.13 (01/16/23 1414)  Mobility Inpatient CMS 0-100% Score: 50.57 (01/16/23 1414)  Mobility Inpatient CMS G-Code Modifier : CK (01/16/23 1414)         Goals  Short Term Goals  Time Frame for Short Term Goals: discharge  Short Term Goal 1: sit to/from supine supervision  Short Term Goal 2: sit to/from stand supervision  Short Term Goal 3: ambulate 50 ft with LRAD supervision  Patient Goals   Patient Goals : return to LTC       Education  Patient Education  Education Given To: Patient  Education Provided: Role of Therapy (need to call for assist to get up)  Education Method: Verbal  Barriers to Learning: None  Education Outcome: Verbalized understanding;Continued education needed      Therapy Time   Individual Concurrent Group Co-treatment   Time In 1310         Time Out 1350         Minutes 40          Timed Code Treatment Minutes:30       Total Treatment Minutes:   [facility],

## 2023-01-16 NOTE — PROGRESS NOTES
Hospitalist Progress Note      PCP: Victor M Shelton MD    Date of Admission: 1/14/2023    Chief Complaint:     Hospital Course:     Subjective:     Feels better today. Generalized weakness and fatigue improved following transfusion yesterday    Medications:  Reviewed    Infusion Medications    sodium chloride      dextrose      sodium chloride       Scheduled Medications    lidocaine  1 patch TransDERmal Daily    tiotropium  2 puff Inhalation Daily    [START ON 1/17/2023] epoetin zachary-epbx  7,000 Units IntraVENous Once per day on Mon Wed Fri    sodium chloride flush  5-40 mL IntraVENous 2 times per day    allopurinol  100 mg Oral Daily    [Held by provider] apixaban  2.5 mg Oral BID    atorvastatin  80 mg Oral Nightly    calcitRIOL  0.5 mcg Oral Every Other Day    oyster shell calcium w/D  1 tablet Oral Daily    carvedilol  3.125 mg Oral BID    ferrous sulfate  325 mg Oral Daily with breakfast    gabapentin  100 mg Oral Nightly    levothyroxine  100 mcg Oral Daily    sevelamer  800 mg Oral TID WC     PRN Meds: sodium chloride, glucose, dextrose bolus **OR** dextrose bolus, glucagon (rDNA), dextrose, sodium chloride flush, sodium chloride, ondansetron **OR** ondansetron, polyethylene glycol, acetaminophen **OR** acetaminophen, albuterol, calcium carbonate, sodium chloride      Intake/Output Summary (Last 24 hours) at 1/16/2023 0858  Last data filed at 1/15/2023 1220  Gross per 24 hour   Intake 780.33 ml   Output --   Net 780.33 ml       Physical Exam Performed:    /76   Pulse 81   Temp 98.3 °F (36.8 °C) (Oral)   Resp 19   Ht 5' 8\" (1.727 m)   Wt 134 lb 7.7 oz (61 kg)   SpO2 98%   BMI 20.45 kg/m²     General appearance: No apparent distress, appears stated age and cooperative. HEENT: Pupils equal, round, and reactive to light. Conjunctivae/corneas clear. Neck: Supple, with full range of motion. No jugular venous distention. Trachea midline. Respiratory:  Normal respiratory effort.  Clear to auscultation, bilaterally without Rales/Wheezes/Rhonchi. Cardiovascular: Regular rate and rhythm with normal S1/S2 without murmurs, rubs or gallops. Abdomen: Soft, non-tender, non-distended with normal bowel sounds. Musculoskeletal: No clubbing, cyanosis or edema bilaterally. Full range of motion without deformity. Skin: Skin color, texture, turgor normal.  No rashes or lesions. Neurologic:  Neurovascularly intact without any focal sensory/motor deficits. Cranial nerves: II-XII intact, grossly non-focal.  Psychiatric: Alert and oriented, thought content appropriate, normal insight  Capillary Refill: Brisk, 3 seconds, normal   Peripheral Pulses: +2 palpable, equal bilaterally       Labs:   Recent Labs     01/14/23  1859 01/15/23  0539 01/15/23  0718 01/15/23  1733 01/16/23  0714   WBC 3.2* 4.5  --   --  5.2   HGB 7.4* 6.2* 6.7* 7.8* 7.3*   HCT 24.1* 20.3* 21.9* 25.3* 23.3*   * 90*  --   --  107*       Recent Labs     01/14/23  1859 01/14/23  2013 01/15/23  0102 01/15/23  0539 01/16/23  0714   *  --   --  132* 135*   K 5.6*   < > 4.9 5.8* 4.5   CL 92*  --   --  94* 98*   CO2 29  --   --  27 31   BUN 51*  --   --  54* 26*   CREATININE 9.0*  --   --  9.7* 6.2*   CALCIUM 9.1  --   --  8.6 8.5    < > = values in this interval not displayed. Recent Labs     01/14/23  1859 01/15/23  0539   AST 18 14*   ALT 7* 6*   BILITOT 0.4 0.3   ALKPHOS 148* 131*       No results for input(s): INR in the last 72 hours. Recent Labs     01/14/23 1859 01/14/23 2013   TROPONINI 0.08* 0.08*         Urinalysis:    No results found for: Floyde Hong, BACTERIA, RBCUA, BLOODU, SPECGRAV, GLUCOSEU    Radiology:  XR PELVIS (1-2 VIEWS)   Final Result         1. Right hip: No discrete fracture or acute deformity involving the right hip with superior joint space loss   2. Left hip: No acute fracture. Mild superior joint space loss. 3. AP view of pelvis: No acute bony defect in the pelvis.          LEFT KNEE:      AP and lateral views obtained. FINDINGS there is lateral compartment narrowing. There is mild patellofemoral compartment narrowing. No soft tissue swelling or discrete fracture. IMPRESSION:      Mild joint space loss but no acute fracture. XR KNEE LEFT (1-2 VIEWS)   Final Result         1. Right hip: No discrete fracture or acute deformity involving the right hip with superior joint space loss   2. Left hip: No acute fracture. Mild superior joint space loss. 3. AP view of pelvis: No acute bony defect in the pelvis. LEFT KNEE:      AP and lateral views obtained. FINDINGS there is lateral compartment narrowing. There is mild patellofemoral compartment narrowing. No soft tissue swelling or discrete fracture. IMPRESSION:      Mild joint space loss but no acute fracture. CT CERVICAL SPINE WO CONTRAST   Final Result      No acute hemorrhage, edema or hydrocephalus. Mild cerebral atrophic changes are noted without ventricular dilation. Remote small low density lacunar infarct noted in the left thalamus and right basal ganglia. Mucosal thickening involving the maxillary sinuses. CT scan of the CERVICAL SPINE on 1/14/2023   HISTORY: Neck pain, trauma, fell, blood thinners evaluate for fracture, disc herniation or spinal canal stenosis. PROCEDURE: Dose modulation, radiation reducing techniques and iterative reconstruction techniques utilized to reduce radiation exposure with up-to-date CT equipment. CT scan of the cervical spine was performed utilizing contiguous 2.5 cm axial sections with multiple sagittal and coronal reconstructed sequences. These were reviewed under separate computerized work station. COMPARISON: None      FINDINGS:       There is normal alignment of the cervical vertebral bodies with no sign of spondylolisthesis or fracture. The skull base appears intact.       Examination of the individual disk spaces reveals no sign of any acute bony defect or cord compression. The C2-C3, C3-C4, C4-C5, C5-6, C6-C7 and C7-T1 disc space levels are normally aligned without definitive acute abnormalities. Facet hypertrophy is    noted C2-3 greater on the left at C3-4 greater on the left at C4-5 greater on the left. Asymmetric uncinate foraminal spurring and some disc space loss are noted at C5-6 and C6-7 levels      Incidental note made of vascular calcifications involving the carotid bifurcations as well as the vertebral arteries and basilar artery. Incidental note made of some right apical lung consolidation and chronic emphysematous changes throughout the lungs. No pneumothorax. IMPRESSION:       1. No acute fracture with disc degeneration spondylosis and asymmetric facet hypertrophy as described above   2. Normal alignment. 3. Incidental note made of chronic vascular calcifications involving the carotid arteries and vertebral arteries and basilar artery and apical consolidation in the right lung and emphysematous change      CT HEAD WO CONTRAST   Final Result      No acute hemorrhage, edema or hydrocephalus. Mild cerebral atrophic changes are noted without ventricular dilation. Remote small low density lacunar infarct noted in the left thalamus and right basal ganglia. Mucosal thickening involving the maxillary sinuses. CT scan of the CERVICAL SPINE on 1/14/2023   HISTORY: Neck pain, trauma, fell, blood thinners evaluate for fracture, disc herniation or spinal canal stenosis. PROCEDURE: Dose modulation, radiation reducing techniques and iterative reconstruction techniques utilized to reduce radiation exposure with up-to-date CT equipment. CT scan of the cervical spine was performed utilizing contiguous 2.5 cm axial sections with multiple sagittal and coronal reconstructed sequences. These were reviewed under separate computerized work station.       COMPARISON: None      FINDINGS:       There is normal alignment of the cervical vertebral bodies with no sign of spondylolisthesis or fracture. The skull base appears intact. Examination of the individual disk spaces reveals no sign of any acute bony defect or cord compression. The C2-C3, C3-C4, C4-C5, C5-6, C6-C7 and C7-T1 disc space levels are normally aligned without definitive acute abnormalities. Facet hypertrophy is    noted C2-3 greater on the left at C3-4 greater on the left at C4-5 greater on the left. Asymmetric uncinate foraminal spurring and some disc space loss are noted at C5-6 and C6-7 levels      Incidental note made of vascular calcifications involving the carotid bifurcations as well as the vertebral arteries and basilar artery. Incidental note made of some right apical lung consolidation and chronic emphysematous changes throughout the lungs. No pneumothorax. IMPRESSION:       1. No acute fracture with disc degeneration spondylosis and asymmetric facet hypertrophy as described above   2. Normal alignment. 3. Incidental note made of chronic vascular calcifications involving the carotid arteries and vertebral arteries and basilar artery and apical consolidation in the right lung and emphysematous change      XR CHEST (2 VW)   Final Result      Hyperinflated lungs and borderline cardiac enlargement. No acute chest wall deformity appreciated. XR ELBOW LEFT (MIN 3 VIEWS)   Final Result      No sign of any acute left elbow fracture or radiopaque foreign body. Mild joint space loss         XR HIP 2-3 VW W PELVIS LEFT   Final Result      No discrete fracture or acute deformity involving the left hip      No acute bony defect in the pelvis. Slightly irregular superior pubic ramus and inferior pubic ramus on the right which could be due to positioning or prior fracture, correlate clinically      THREE VIEWS OF THE LEFT KNEE on 1/14/2023      HISTORY:  Knee pain      PROCEDURE: AP, lateral and sunrise patellofemoral views were obtained.       FINDINGS: There is no sign of a discrete fracture or dislocation. Demineralization is noted. Some lateral condylar spurring is appreciated. Mild patellofemoral joint space loss noted      There is no acute bony defect identified or loose body. There is no radiopaque foreign body      IMPRESSION:       No sign of any fracture or acute deformity. Demineralization with some lateral compartment narrowing and patellofemoral compartment narrowing. XR KNEE LEFT (1-2 VIEWS)   Final Result      No discrete fracture or acute deformity involving the left hip      No acute bony defect in the pelvis. Slightly irregular superior pubic ramus and inferior pubic ramus on the right which could be due to positioning or prior fracture, correlate clinically      THREE VIEWS OF THE LEFT KNEE on 1/14/2023      HISTORY:  Knee pain      PROCEDURE: AP, lateral and sunrise patellofemoral views were obtained. FINDINGS:       There is no sign of a discrete fracture or dislocation. Demineralization is noted. Some lateral condylar spurring is appreciated. Mild patellofemoral joint space loss noted      There is no acute bony defect identified or loose body. There is no radiopaque foreign body      IMPRESSION:       No sign of any fracture or acute deformity. Demineralization with some lateral compartment narrowing and patellofemoral compartment narrowing. IP CONSULT TO NEPHROLOGY  IP CONSULT TO HOSPITALIST  IP CONSULT TO NEPHROLOGY  IP CONSULT TO DIETITIAN    Assessment/Plan:      -Symptomatic anemia likely due to CKD, possible GI bleed. .. Hemoglobin 6.2-6.7 on admission, baseline 8-9--- no overt bleeding but FOBT is +ve. .-   S/p 1 unit PRBC tranfusion 1/15--improved to 7.2  holding Eliquis ,  TEODORA per nephrology  Consulted GI    -Chronic pancytopenia-bone marrow biopsy 11/15/22 was normal--transfuse for hemoglobin less than 7, platelets less than 50 given anticoagulation--reviewed prior oncology consults, no etiology was established as work-up was negative-continue to monitor    -ESRD on hemodialysis--Mon/Tues/Thurs/Fri -follow-up with nephrology    -Hyperkalemia--HD scheduled today    -Chronic combined systolic/diastolic heart failure/NICM, EF 35 to 65%, grade 3 diastolic dysfunction-compensated continue Coreg-not on ACE/ARB given renal failure/hyperkalemia--follow-up with cardiology outpatient    -Hypothyroidism-TSH is suppressed below therapeutic levels-decreased dose of Synthroid from 100-75mcg--repeat TSH in 6 weeks    -Hyperlipidemia-continue statin    -[id number] bronchodilators    -History of pulmonary embolism--resume Eliquis if no further signs of bleeding cleared by GI    -Essential hypertension-stable-continue Coreg    DVT Prophylaxis: Eliquis  Diet: ADULT DIET; Regular; Low Fat/Low Chol/High Fiber/2 gm Na; Low Potassium (Less than 3000 mg/day)  ADULT ORAL NUTRITION SUPPLEMENT; Breakfast; Renal Oral Supplement  Code Status: Full Code  PT/OT Eval Status:     Dispo -.   Discharge to SNF after GI eval--likely within 24 hours if clinically stable          Waleska Valera MD

## 2023-01-16 NOTE — PROGRESS NOTES
Patient alert and oriented. With stable VS on 02 at 1L per nc per patient comfort. Able to get up on bed and to the bathroom on SBA with use of front wheel walker. With PIV on R AC on SL. On left arm precaution due to av fistula. On reg diet. Noted to be oliguric. Complaints of gluteal pain, ask hospitalist. Order received for lidocaine patch. Needs attended. Handed off to incoming nurse.

## 2023-01-16 NOTE — PLAN OF CARE
Problem: ABCDS Injury Assessment  Goal: Absence of physical injury  Outcome: Progressing     Problem: Safety - Adult  Goal: Free from fall injury  Outcome: Progressing     Problem: Discharge Planning  Goal: Discharge to home or other facility with appropriate resources  Outcome: Progressing  Flowsheets (Taken 1/15/2023 2015)  Discharge to home or other facility with appropriate resources: Identify barriers to discharge with patient and caregiver     Problem: Metabolic/Fluid and Electrolytes - Adult  Goal: Electrolytes maintained within normal limits  Outcome: Progressing  Flowsheets (Taken 1/15/2023 2015)  Electrolytes maintained within normal limits: Monitor labs and assess patient for signs and symptoms of electrolyte imbalances  Goal: Hemodynamic stability and optimal renal function maintained  Outcome: Progressing  Flowsheets (Taken 1/15/2023 2015)  Hemodynamic stability and optimal renal function maintained:   Monitor labs and assess for signs and symptoms of volume excess or deficit   Monitor intake, output and patient weight  Goal: Glucose maintained within prescribed range  Outcome: Progressing  Flowsheets (Taken 1/15/2023 2015)  Glucose maintained within prescribed range: Assess for signs and symptoms of hyperglycemia and hypoglycemia     Problem: Skin/Tissue Integrity  Goal: Absence of new skin breakdown  Description: 1. Monitor for areas of redness and/or skin breakdown  2. Assess vascular access sites hourly  3. Every 4-6 hours minimum:  Change oxygen saturation probe site  4. Every 4-6 hours:  If on nasal continuous positive airway pressure, respiratory therapy assess nares and determine need for appliance change or resting period.   Outcome: Progressing     Problem: Pain  Goal: Verbalizes/displays adequate comfort level or baseline comfort level  Outcome: Progressing

## 2023-01-16 NOTE — PROGRESS NOTES
Occult stool sample sent to lab. Lab called with positive result. MD notified and MD told this RN to place GI consult. GI consult order placed and consult has been called.     Electronically signed by Zuly Montez RN on 1/16/2023 at 11:22 AM

## 2023-01-17 ENCOUNTER — ANESTHESIA (OUTPATIENT)
Dept: ENDOSCOPY | Age: 68
DRG: 377 | End: 2023-01-17
Payer: MEDICARE

## 2023-01-17 ENCOUNTER — ANESTHESIA EVENT (OUTPATIENT)
Dept: ENDOSCOPY | Age: 68
DRG: 377 | End: 2023-01-17
Payer: MEDICARE

## 2023-01-17 VITALS
RESPIRATION RATE: 18 BRPM | BODY MASS INDEX: 18.34 KG/M2 | DIASTOLIC BLOOD PRESSURE: 70 MMHG | SYSTOLIC BLOOD PRESSURE: 161 MMHG | HEART RATE: 85 BPM | WEIGHT: 121.03 LBS | OXYGEN SATURATION: 100 % | HEIGHT: 68 IN | TEMPERATURE: 97.9 F

## 2023-01-17 LAB
ALBUMIN SERPL-MCNC: 3.1 G/DL (ref 3.4–5)
ANION GAP SERPL CALCULATED.3IONS-SCNC: 13 MMOL/L (ref 3–16)
BASOPHILS ABSOLUTE: 0 K/UL (ref 0–0.2)
BASOPHILS RELATIVE PERCENT: 0.8 %
BUN BLDV-MCNC: 33 MG/DL (ref 7–20)
CALCIUM SERPL-MCNC: 8.9 MG/DL (ref 8.3–10.6)
CHLORIDE BLD-SCNC: 98 MMOL/L (ref 99–110)
CO2: 26 MMOL/L (ref 21–32)
CREAT SERPL-MCNC: 7.5 MG/DL (ref 0.8–1.3)
EOSINOPHILS ABSOLUTE: 0.2 K/UL (ref 0–0.6)
EOSINOPHILS RELATIVE PERCENT: 4.9 %
GFR SERPL CREATININE-BSD FRML MDRD: 7 ML/MIN/{1.73_M2}
GLUCOSE BLD-MCNC: 64 MG/DL (ref 70–99)
HCT VFR BLD CALC: 23.5 % (ref 40.5–52.5)
HEMOGLOBIN: 7.1 G/DL (ref 13.5–17.5)
LYMPHOCYTES ABSOLUTE: 0.8 K/UL (ref 1–5.1)
LYMPHOCYTES RELATIVE PERCENT: 16.6 %
MCH RBC QN AUTO: 27.5 PG (ref 26–34)
MCHC RBC AUTO-ENTMCNC: 30.4 G/DL (ref 31–36)
MCV RBC AUTO: 90.5 FL (ref 80–100)
MONOCYTES ABSOLUTE: 0.4 K/UL (ref 0–1.3)
MONOCYTES RELATIVE PERCENT: 9.1 %
NEUTROPHILS ABSOLUTE: 3.4 K/UL (ref 1.7–7.7)
NEUTROPHILS RELATIVE PERCENT: 68.6 %
PDW BLD-RTO: 16.5 % (ref 12.4–15.4)
PHOSPHORUS: 1.5 MG/DL (ref 2.5–4.9)
PLATELET # BLD: 119 K/UL (ref 135–450)
PMV BLD AUTO: 8.4 FL (ref 5–10.5)
POTASSIUM SERPL-SCNC: 4.6 MMOL/L (ref 3.5–5.1)
RBC # BLD: 2.6 M/UL (ref 4.2–5.9)
SODIUM BLD-SCNC: 137 MMOL/L (ref 136–145)
WBC # BLD: 4.9 K/UL (ref 4–11)

## 2023-01-17 PROCEDURE — 80069 RENAL FUNCTION PANEL: CPT

## 2023-01-17 PROCEDURE — 0DB98ZX EXCISION OF DUODENUM, VIA NATURAL OR ARTIFICIAL OPENING ENDOSCOPIC, DIAGNOSTIC: ICD-10-PCS | Performed by: INTERNAL MEDICINE

## 2023-01-17 PROCEDURE — 3609027000 HC COLONOSCOPY: Performed by: INTERNAL MEDICINE

## 2023-01-17 PROCEDURE — 3700000000 HC ANESTHESIA ATTENDED CARE: Performed by: INTERNAL MEDICINE

## 2023-01-17 PROCEDURE — 0DB48ZX EXCISION OF ESOPHAGOGASTRIC JUNCTION, VIA NATURAL OR ARTIFICIAL OPENING ENDOSCOPIC, DIAGNOSTIC: ICD-10-PCS | Performed by: INTERNAL MEDICINE

## 2023-01-17 PROCEDURE — 2709999900 HC NON-CHARGEABLE SUPPLY: Performed by: INTERNAL MEDICINE

## 2023-01-17 PROCEDURE — 36415 COLL VENOUS BLD VENIPUNCTURE: CPT

## 2023-01-17 PROCEDURE — 6360000002 HC RX W HCPCS: Performed by: NURSE ANESTHETIST, CERTIFIED REGISTERED

## 2023-01-17 PROCEDURE — 85025 COMPLETE CBC W/AUTO DIFF WBC: CPT

## 2023-01-17 PROCEDURE — 7100000010 HC PHASE II RECOVERY - FIRST 15 MIN: Performed by: INTERNAL MEDICINE

## 2023-01-17 PROCEDURE — 3609012400 HC EGD TRANSORAL BIOPSY SINGLE/MULTIPLE: Performed by: INTERNAL MEDICINE

## 2023-01-17 PROCEDURE — 3700000001 HC ADD 15 MINUTES (ANESTHESIA): Performed by: INTERNAL MEDICINE

## 2023-01-17 PROCEDURE — 2580000003 HC RX 258: Performed by: INTERNAL MEDICINE

## 2023-01-17 PROCEDURE — 0DB68ZX EXCISION OF STOMACH, VIA NATURAL OR ARTIFICIAL OPENING ENDOSCOPIC, DIAGNOSTIC: ICD-10-PCS | Performed by: INTERNAL MEDICINE

## 2023-01-17 PROCEDURE — 0DBM8ZZ EXCISION OF DESCENDING COLON, VIA NATURAL OR ARTIFICIAL OPENING ENDOSCOPIC: ICD-10-PCS | Performed by: INTERNAL MEDICINE

## 2023-01-17 PROCEDURE — 88305 TISSUE EXAM BY PATHOLOGIST: CPT

## 2023-01-17 PROCEDURE — 7100000011 HC PHASE II RECOVERY - ADDTL 15 MIN: Performed by: INTERNAL MEDICINE

## 2023-01-17 PROCEDURE — 90935 HEMODIALYSIS ONE EVALUATION: CPT

## 2023-01-17 RX ORDER — SODIUM CHLORIDE 9 MG/ML
INJECTION, SOLUTION INTRAVENOUS ONCE
Status: COMPLETED | OUTPATIENT
Start: 2023-01-17 | End: 2023-01-17

## 2023-01-17 RX ORDER — LEVOTHYROXINE SODIUM 0.07 MG/1
75 TABLET ORAL DAILY
Qty: 30 TABLET | Refills: 3 | DISCHARGE
Start: 2023-01-18

## 2023-01-17 RX ORDER — PROPOFOL 10 MG/ML
INJECTION, EMULSION INTRAVENOUS PRN
Status: DISCONTINUED | OUTPATIENT
Start: 2023-01-17 | End: 2023-01-17 | Stop reason: SDUPTHER

## 2023-01-17 RX ORDER — PANTOPRAZOLE SODIUM 40 MG/1
40 TABLET, DELAYED RELEASE ORAL
Qty: 30 TABLET | Refills: 3 | DISCHARGE
Start: 2023-01-18

## 2023-01-17 RX ORDER — PANTOPRAZOLE SODIUM 40 MG/1
40 TABLET, DELAYED RELEASE ORAL
Status: DISCONTINUED | OUTPATIENT
Start: 2023-01-18 | End: 2023-01-17 | Stop reason: HOSPADM

## 2023-01-17 RX ADMIN — PROPOFOL 100 MG: 10 INJECTION, EMULSION INTRAVENOUS at 14:07

## 2023-01-17 RX ADMIN — SODIUM CHLORIDE: 9 INJECTION, SOLUTION INTRAVENOUS at 13:04

## 2023-01-17 ASSESSMENT — PAIN - FUNCTIONAL ASSESSMENT
PAIN_FUNCTIONAL_ASSESSMENT: NONE - DENIES PAIN

## 2023-01-17 ASSESSMENT — ENCOUNTER SYMPTOMS: SHORTNESS OF BREATH: 1

## 2023-01-17 NOTE — ADT AUTH CERT
H&P by Adriel Freedman MD at 1/14/2023  9:40 PM    Author: Adriel Freedman MD Specialty: Internal Medicine, Hospitalist Author Type: Physician   Filed: 1/14/2023 11:52 PM Date of Service: 1/14/2023  9:40 PM Status: Signed   : Adriel Freedman MD (Physician)   [facility]       PCP: Boogie Faust MD     Date of Admission: 1/14/2023     Date of Service: Pt seen/examined on 1/14/2023 and placed in Observation. Chief Complaint: Fatigue        History Of Present Illness:    74 y.o. male nursing home resident with significant past medical history of end-stage renal disease on hemodialysis on chronic anticoagulation for DVTs, combined systolic and diastolic heart failure, chronic hypoxic and hypercapnic respiratory failure secondary to COPD (2 L continuous) who presented to [facility] ED with fatigue. Patient reports fatigue with decreased p.o. intake for the past couple of days and has had a couple falls at the nursing home today and 1 in the ED. He denies any fevers, chills, headaches, nausea, vomiting, diarrhea, shortness of breath or chest pain. He does report some lightheadedness, generalized joint pain and blurred vision. At baseline he uses a cane and for long distance wheelchair. In emergency room his temperature was 98.1, blood pressure 118/88, heart rate 87, respirations 23, sats 100% on 2 L.      Past Medical History:       Past Medical History             Diagnosis Date    Cerebral artery occlusion with cerebral infarction ([facility]      Combined systolic and diastolic heart failure (HCC)      COPD (chronic obstructive pulmonary disease) ([facility]      Gout      Hemodialysis patient ([facility]      Hx of blood clots      Hyperlipidemia      Hypertension              Past Surgical History:       Past Surgical History             Procedure Laterality Date    COLONOSCOPY CT BONE MARROW BIOPSY   11/15/2022     CT BONE MARROW BIOPSY 11/15/2022 [facility] CT SCAN    DIALYSIS FISTULA CREATION Left              Medications Prior to Admission:      Home Medications           Prior to Admission medications    Medication Sig Start Date End Date Taking? Authorizing Provider   calcium carbonate (TUMS) 500 MG chewable tablet Take 1 tablet by mouth every 8 hours as needed for Heartburn     Yes Historical Provider, MD   carvedilol (COREG) 3.125 MG tablet Take 1 tablet by mouth 2 times daily 12/30/22     Shi Sauer MD   sodium chloride (OCEAN) 0.65 % nasal spray 1-2 sprays both nostrils at least daily and as needed to prevent dry mucosa. 10/15/22     Janice Hill,    levothyroxine (SYNTHROID) 100 MCG tablet Take 100 mcg by mouth Daily       Historical Provider, MD   gabapentin (NEURONTIN) 100 MG capsule Take 1 capsule by mouth at bedtime for 30 days.  9/15/22 1/14/23   Ann Fillers, DO   diclofenac sodium (VOLTAREN) 1 % GEL Apply 2 g topically 2 times daily 9/15/22     Ann Fillers, DO   calcitRIOL (ROCALTROL) 0.25 MCG capsule Take 0.5 mcg by mouth every other day MWF       Historical Provider, MD   sevelamer (RENVELA) 800 MG tablet Take 1 tablet by mouth 3 times daily (with meals)       Historical Provider, MD   apixaban (ELIQUIS) 2.5 MG TABS tablet Take 1 tablet by mouth 2 times daily 6/18/22     Jackelyn Diaz MD   ferrous sulfate (FE TABS) 325 (65 Fe) MG EC tablet Take 1 tablet by mouth every other day  Patient taking differently: Take 325 mg by mouth daily (with breakfast) 6/18/22 1/14/23   Jackelyn Diaz MD   atorvastatin (LIPITOR) 80 MG tablet Take 80 mg by mouth nightly       Historical Provider, MD   calcium carb-cholecalciferol 600-200 MG-UNIT TABS tablet Take 1 tablet by mouth daily       Historical Provider, MD   Umeclidinium Bromide (INCRUSE ELLIPTA) 62.5 MCG/INH AEPB Inhale 1 puff into the lungs daily       Historical Provider, MD   vitamin D (ERGOCALCIFEROL) 1.25 MG (40800 UT) CAPS capsule Take 50,000 Units by mouth once a week Saturdays  Patient not taking: Reported on 1/14/2023       Historical Provider, MD   omeprazole (PRILOSEC) 20 MG delayed release capsule Take 20 mg by mouth daily  Patient not taking: Reported on 1/14/2023       Historical Provider, MD   albuterol sulfate HFA (PROVENTIL;VENTOLIN;PROAIR) 108 (90 Base) MCG/ACT inhaler Inhale 2 puffs into the lungs every 6 hours as needed for Wheezing 90mcg/actuation       Historical Provider, MD   allopurinol (ZYLOPRIM) 100 MG tablet Take 100 mg by mouth Daily on MWF       Historical Provider, MD            Allergies:  Nitroglycerin and Oxymetazoline     Social History:       The patient currently lives long-term care. TOBACCO:   reports that he has quit smoking. He has never used smokeless tobacco.  ETOH:   reports current alcohol use. E-cigarette/Vaping         Questions Responses     E-cigarette/Vaping Use Never User     Start Date       Passive Exposure       Quit Date       Counseling Given       Comments                     Family History:        Reviewed and negative in regards to presenting illness/complaint. Family History             Problem Relation Age of Onset    Cancer Mother      Cancer Father      Other Sister              REVIEW OF SYSTEMS COMPLETED:   Pertinent positives as noted in the HPI. All other systems reviewed and negative. PHYSICAL EXAM PERFORMED:     /76   Pulse 85   Temp 98.1 °F (36.7 °C) (Oral)   Resp 21   Ht 5' 8\" (1.727 m)   Wt 130 lb 6.4 oz (59.1 kg)   SpO2 98%   BMI 19.83 kg/m²      General appearance: Frail, thin and chronically ill-appearing, nontoxic in no apparent distress, appears stated age and cooperative. HEENT:  Normal cephalic, atraumatic without obvious deformity. Pupils equal, round, and reactive to light. Extra ocular muscles intact. Conjunctivae/corneas clear. Dry oral mucosa. Neck: Supple, with full range of motion. No jugular venous distention. No bruits, thyromegaly or nodules. Trachea midline. Respiratory:  Normal respiratory effort. Decreased breath sounds diffusely without any rales, rhonchi or wheezes. Cardiovascular:  Regular rate and rhythm with normal S1/S2 without murmurs, rubs or gallops. Abdomen: Soft, non-distended with normal bowel sounds. Tender to palpation diffusely however no peritoneal signs. Musculoskeletal:  No clubbing, cyanosis bilaterally. 1+ pitting edema bilateral lower extremities to mid shins. No gross joint deformities. Skin: Dry, normal color. No petechiae or rashes or ecchymoses. Scabbed excoriations over anterior shins. Neurologic:  Neurovascularly intact without any focal sensory/motor deficits (symmetric proximal muscle weakness). Cranial nerves: II-XII intact, grossly non-focal.  Psychiatric:  Alert and oriented, thought content appropriate, normal insight  Capillary Refill: Brisk,3 seconds, normal  Peripheral Pulses: +2 palpable, equal bilaterally         Labs:          Recent Labs     01/14/23 1859   WBC 3.2*   HGB 7.4*   HCT 24.1*   *           Recent Labs     01/14/23 1859 01/14/23 2013   *  --    K 5.6* 6.0*   CL 92*  --    CO2 29  --    BUN 51*  --    CREATININE 9.0*  --    CALCIUM 9.1  --           Recent Labs     01/14/23 1859   AST 18   ALT 7*   BILITOT 0.4   ALKPHOS 148*      No results for input(s): INR in the last 72 hours. Recent Labs     01/14/23 1859 01/14/23 2013   TROPONINI 0.08* 0.08*         Urinalysis:    No results found for: Floyde Hong, BACTERIA, RBCUA, BLOODU, Daphane Coventry, GLUCOSEU     Radiology:      CXR: I have reviewed the CXR with the following interpretation: No acute cardiopulmonary process. Lung hyperinflation. EKG:  I have reviewed the EKG with the following interpretation: None done. XR PELVIS (1-2 VIEWS)   Final Result           1. Right hip: No discrete fracture or acute deformity involving the right hip with superior joint space loss   2. Left hip: No acute fracture. Mild superior joint space loss. 3. AP view of pelvis: No acute bony defect in the pelvis. LEFT KNEE:       AP and lateral views obtained. FINDINGS there is lateral compartment narrowing. There is mild patellofemoral compartment narrowing. No soft tissue swelling or discrete fracture. IMPRESSION:       Mild joint space loss but no acute fracture. XR KNEE LEFT (1-2 VIEWS)   Final Result           1. Right hip: No discrete fracture or acute deformity involving the right hip with superior joint space loss   2. Left hip: No acute fracture. Mild superior joint space loss. 3. AP view of pelvis: No acute bony defect in the pelvis. LEFT KNEE:       AP and lateral views obtained. FINDINGS there is lateral compartment narrowing. There is mild patellofemoral compartment narrowing. No soft tissue swelling or discrete fracture. IMPRESSION:       Mild joint space loss but no acute fracture. CT CERVICAL SPINE WO CONTRAST   Final Result       No acute hemorrhage, edema or hydrocephalus. Mild cerebral atrophic changes are noted without ventricular dilation. Remote small low density lacunar infarct noted in the left thalamus and right basal ganglia. Mucosal thickening involving the maxillary sinuses. CT scan of the CERVICAL SPINE on 1/14/2023   HISTORY: Neck pain, trauma, fell, blood thinners evaluate for fracture, disc herniation or spinal canal stenosis. PROCEDURE: Dose modulation, radiation reducing techniques and iterative reconstruction techniques utilized to reduce radiation exposure with up-to-date CT equipment. CT scan of the cervical spine was performed utilizing contiguous 2.5 cm axial sections with multiple sagittal and coronal reconstructed sequences. These were reviewed under separate computerized work station.        COMPARISON: None       FINDINGS:        There is normal alignment of the cervical vertebral bodies with no sign of spondylolisthesis or fracture. The skull base appears intact. Examination of the individual disk spaces reveals no sign of any acute bony defect or cord compression. The C2-C3, C3-C4, C4-C5, C5-6, C6-C7 and C7-T1 disc space levels are normally aligned without definitive acute abnormalities. Facet hypertrophy is    noted C2-3 greater on the left at C3-4 greater on the left at C4-5 greater on the left. Asymmetric uncinate foraminal spurring and some disc space loss are noted at C5-6 and C6-7 levels       Incidental note made of vascular calcifications involving the carotid bifurcations as well as the vertebral arteries and basilar artery. Incidental note made of some right apical lung consolidation and chronic emphysematous changes throughout the lungs. No pneumothorax. IMPRESSION:        1. No acute fracture with disc degeneration spondylosis and asymmetric facet hypertrophy as described above   2. Normal alignment. 3. Incidental note made of chronic vascular calcifications involving the carotid arteries and vertebral arteries and basilar artery and apical consolidation in the right lung and emphysematous change       CT HEAD WO CONTRAST   Final Result       No acute hemorrhage, edema or hydrocephalus. Mild cerebral atrophic changes are noted without ventricular dilation. Remote small low density lacunar infarct noted in the left thalamus and right basal ganglia. Mucosal thickening involving the maxillary sinuses. CT scan of the CERVICAL SPINE on 1/14/2023   HISTORY: Neck pain, trauma, fell, blood thinners evaluate for fracture, disc herniation or spinal canal stenosis. PROCEDURE: Dose modulation, radiation reducing techniques and iterative reconstruction techniques utilized to reduce radiation exposure with up-to-date CT equipment.         CT scan of the cervical spine was performed utilizing contiguous 2.5 cm axial sections with multiple sagittal and coronal reconstructed sequences. These were reviewed under separate computerized work station. COMPARISON: None       FINDINGS:        There is normal alignment of the cervical vertebral bodies with no sign of spondylolisthesis or fracture. The skull base appears intact. Examination of the individual disk spaces reveals no sign of any acute bony defect or cord compression. The C2-C3, C3-C4, C4-C5, C5-6, C6-C7 and C7-T1 disc space levels are normally aligned without definitive acute abnormalities. Facet hypertrophy is    noted C2-3 greater on the left at C3-4 greater on the left at C4-5 greater on the left. Asymmetric uncinate foraminal spurring and some disc space loss are noted at C5-6 and C6-7 levels       Incidental note made of vascular calcifications involving the carotid bifurcations as well as the vertebral arteries and basilar artery. Incidental note made of some right apical lung consolidation and chronic emphysematous changes throughout the lungs. No pneumothorax. IMPRESSION:        1. No acute fracture with disc degeneration spondylosis and asymmetric facet hypertrophy as described above   2. Normal alignment. 3. Incidental note made of chronic vascular calcifications involving the carotid arteries and vertebral arteries and basilar artery and apical consolidation in the right lung and emphysematous change       XR CHEST (2 VW)   Final Result       Hyperinflated lungs and borderline cardiac enlargement. No acute chest wall deformity appreciated. XR ELBOW LEFT (MIN 3 VIEWS)   Final Result       No sign of any acute left elbow fracture or radiopaque foreign body. Mild joint space loss           XR HIP 2-3 VW W PELVIS LEFT   Final Result       No discrete fracture or acute deformity involving the left hip       No acute bony defect in the pelvis.  Slightly irregular superior pubic ramus and inferior pubic ramus on the right which could be due to positioning or prior fracture, correlate clinically       THREE VIEWS OF THE LEFT KNEE on 1/14/2023       HISTORY:  Knee pain       PROCEDURE: AP, lateral and sunrise patellofemoral views were obtained. FINDINGS:        There is no sign of a discrete fracture or dislocation. Demineralization is noted. Some lateral condylar spurring is appreciated. Mild patellofemoral joint space loss noted       There is no acute bony defect identified or loose body. There is no radiopaque foreign body       IMPRESSION:        No sign of any fracture or acute deformity. Demineralization with some lateral compartment narrowing and patellofemoral compartment narrowing. XR KNEE LEFT (1-2 VIEWS)   Final Result       No discrete fracture or acute deformity involving the left hip       No acute bony defect in the pelvis. Slightly irregular superior pubic ramus and inferior pubic ramus on the right which could be due to positioning or prior fracture, correlate clinically       THREE VIEWS OF THE LEFT KNEE on 1/14/2023       HISTORY:  Knee pain       PROCEDURE: AP, lateral and sunrise patellofemoral views were obtained. FINDINGS:        There is no sign of a discrete fracture or dislocation. Demineralization is noted. Some lateral condylar spurring is appreciated. Mild patellofemoral joint space loss noted       There is no acute bony defect identified or loose body. There is no radiopaque foreign body       IMPRESSION:        No sign of any fracture or acute deformity. Demineralization with some lateral compartment narrowing and patellofemoral compartment narrowing.              Consults:     IP CONSULT TO NEPHROLOGY  IP CONSULT TO HOSPITALIST  IP CONSULT TO NEPHROLOGY  IP CONSULT TO DIETITIAN     ASSESSMENT:           Active Hospital Problems     Diagnosis Date Noted    Hyperkalemia [E87.5] 01/14/2023       Priority: High    Failure to thrive in adult [R62.7] 01/14/2023       Priority: Medium    Pancytopenia [facility]) [D61.818] 06/17/2022       Priority: Medium    Chronic respiratory failure with hypoxia and hypercapnia (HCC) [J96.11, J96.12] 10/06/2022       Priority: Low    Chronic combined systolic and diastolic CHF (congestive heart failure) ([facility] [I50.42] 03/02/2022       Priority: Low    Essential hypertension [I10] 03/02/2022       Priority: Low    Hyperlipidemia [E78.5] 03/02/2022       Priority: Low    COPD (chronic obstructive pulmonary disease) ([facility] [J44.9] 09/10/2020       Priority: Low    ESRD on dialysis [facility]) [N18.6, Z99.2] 03/31/2020       Priority: Low            PLAN:  -Nephrology consult for hyperkalemia  -Monitor on telemetry  -Check prealbumin and a dietary consult  -Continue home regimen for chronic stable conditions     DVT Prophylaxis: Eliquis  Diet: ADULT DIET; Regular; Low Fat/Low Chol/High Fiber/2 gm Na; Low Potassium (Less than 3000 mg/day)  Code Status: Full Code     PT/OT Eval Status: Pending     Dispo -SNF versus long-term care         Janet Brewer MD     Thank you Mitch Mead MD for the opportunity to be involved in this patient's care. If you have any questions or concerns please feel free to contact me at [phone].            Utilization Reviews       Renal Failure, Chronic - Care Day 3 (1/16/2023) by Clement Boogie RN       Review Entered Review Status   1/17/2023 0906 Completed      Criteria Review      Care Day: 3 Care Date: 1/16/2023 Level of Care: Telemetry    Guideline Day 2    Clinical Status    (X) * Hemodynamic stability    1/17/2023 9:06 AM EST by Blanche Ayon      01/16/23 1721 99.2 (37.3) 18  146/89Abnormal -- Semi fowlers -- 3 100 Nasal cannula --    (X) * Pulmonary edema absent or improved    (X) * Nausea and vomiting absent or improved    (X) * Electrolytes at baseline or improved    (X) * Acid-base status at baseline or improved    Interventions    (X) Monitor electrolytes, glucose, acid-base, and volume status    1/17/2023 9:06 AM EST by Heaven Maldonado Hemoglobin7.3g/dL   Vdgngneezb34.3%> Hemoglobin7. 0g/dL   Wkzayemqyy64.6%   INR 1.29     Sodium 135   UTS39ov/dL   Creatinine6.2mg/dL  Occult Blood Diagnostic--   Result: POSITIVE    * Milestone   Additional Notes   DATE: 1/16      RELEVANT BASELINES: (lab values, vitals, o2 amount/delivery, etc.)   Hgb baseline 8-9      PERTINENT UPDATES:   Patient has had multiple watery Bms throughout shift. Patient has c/o being weak. Hgb remains below baseline   OB +   For scopes tomorrow      VITALS:   01/16/23 1721 99.2 (37.3) 18 85 146/89Abnormal - Semi fowlers - 3 100 Nasal cannula -       ABNL/PERTINENT LABS/RADIOLOGY/DIAGNOSTIC STUDIES:     Hemoglobin7.3g/dL    Vwxfobrlyz91.3%> Hemoglobin7. 0g/dL    Cytnvpjmgw55.6%    INR 1.29    Sodium 135    DFI35xd/dL    Creatinine6.2mg/dL   Occult Blood Diagnostic--    Result: POSITIVE       PHYSICAL EXAM:   ROUSE   Lungs diminished   LE weakness      MD CONSULTS/ASSESSMENT AND PLAN:   -Symptomatic anemia likely due to CKD, possible GI bleed. .. Hemoglobin 6.2-6.7 on admission, baseline 8-9--- no overt bleeding but FOBT is +ve. .-    S/p 1 unit PRBC tranfusion 1/15--improved to 7.2   holding Eliquis ,   TEODORA per nephrology   Consulted GI       -Chronic pancytopenia-bone marrow biopsy 11/15/22 was normal--transfuse for hemoglobin less than 7, platelets less than 50 given anticoagulation--reviewed prior oncology consults, no etiology was established as work-up was negative-continue to monitor       -ESRD on hemodialysis--Mon/Tues/Thurs/Fri -follow-up with nephrology       -Hyperkalemia--HD scheduled today       -Chronic combined systolic/diastolic heart failure/NICM, EF 35 to 59%, grade 3 diastolic dysfunction-compensated continue Coreg-not on ACE/ARB given renal failure/hyperkalemia--follow-up with cardiology outpatient       -Hypothyroidism-TSH is suppressed below therapeutic levels-decreased dose of Synthroid from 100-75mcg--repeat TSH in 6 weeks       -Hyperlipidemia-continue statin -WSWJ-ennsfz-ryuhpbfs bronchodilators       -History of pulmonary embolism--resume Eliquis if no further signs of bleeding cleared by GI       -Essential hypertension-stable-continue Coreg      NEPHRO:   1. ESRD: He follows with UC at [facility]. He gets HD Mon/Tues/Thurs/Fri there on the HHD setup. HD yesterday for Hyperkalemia K+ 4.5 meq today continue Tues/Thurs/Sat while here. 2. Hyperkalemia: Despite HD 4x/week. Improved S/P HD yesterday Low potassium diet. 3. Anemia: D/t CKD and possibly a GI bleed. Hg was 8-9's over the last month, 7.4 on admit and now 6.7. Transfused yesterday HGB 7.3 gm Monitor  Retacrit with HD       4. Access: Left upper arm AV loop graft. 5. Volume/HTN: BP controlled, pt appears euvolemic. 6. Falls: Pt reports he is not getting PT at [facility].       GI:   Assessment:       Principal Problem:     Hyperkalemia   Active Problems:     Pancytopenia (HCC)     Chronic respiratory failure with hypoxia and hypercapnia (HCC)     Failure to thrive in adult     ESRD on dialysis ([facility]     COPD (chronic obstructive pulmonary disease) (HCC)     Essential hypertension     Hyperlipidemia     Chronic combined systolic and diastolic CHF (congestive heart failure) (HCC)       Acute on chronic anemia   In the setting of chronic disease       Recommendations:       - Goal Hb >7, transfuse as needed               Sp 1 unit   - Agree with protonix daily   - Consider colonoscopy      MEDICATIONS:   Holding eliquis        Renal Failure, Chronic - Care Day 2 (1/15/2023) by Rubi Farley RN       Review Entered Review Status   1/17/2023 0855 Completed      Criteria Review      Care Day: 2 Care Date: 1/15/2023 Level of Care: Telemetry    Guideline Day 2    Clinical Status    ( ) * Hemodynamic stability    1/17/2023 8:55 AM EST by Blanche Allan      01/15/23 0730 97.1 (36.2) 18 80 108/67 -- -- -- 3 98 Nasal cannula  01/15/23 2015 98.5 (36.9) 17 90 130/73 -- Lying right side -- 1 94 Nasal cannula 8    ( ) * Pulmonary edema absent or improved    ( ) * Nausea and vomiting absent or improved    (X) * Electrolytes at baseline or improved    (X) * Acid-base status at baseline or improved    Interventions    (X) Monitor electrolytes, glucose, acid-base, and volume status    1/17/2023 8:55 AM EST by Catalina Hamman, Tania      Hemoglobin6.2g/dL   Vcwmrezckp53.3%  Rnurke935fiqm/L   Potassium reflex Magnesium5.8mmol/L  PLB07td/dL   Creatinine9.7mg/dL  Alkaline Phosphatase 131    * Milestone   Additional Notes   DATE: 1/15      RELEVANT BASELINES: (lab values, vitals, o2 amount/delivery, etc.)   Hgb baseline 8-9      PERTINENT UPDATES:   Continued hyperkalemia   HD today, 1u PRBC with diaylsis    Admit to IP      VITALS:     01/15/23 0730 97.1 (36.2) 18 80 108/67 - - - 3 98 Nasal cannula   01/15/23 2015 98.5 (36.9) 17 90 130/73 - Lying right side - 1 94 Nasal cannula 8       ABNL/PERTINENT LABS/RADIOLOGY/DIAGNOSTIC STUDIES:     Hemoglobin6.2g/dL    Ppuneggqet25.3%   Fakmto811xtmy/L    Potassium reflex Magnesium5.8mmol/L   THV82mo/dL    Creatinine9.7mg/dL   Alkaline Phosphatase 131       PHYSICAL EXAM:   Feels weak generally. MD CONSULTS/ASSESSMENT AND PLAN:   -Symptomatic anemia likely due to CKD. ..  Hemoglobin 6.2-6.7 on admission, baseline 8-9--- no overt bleeding--anemia work-up ordered including iron studies and FOBT, transfuse units of packed red blood cells-hold Eliquis for now-TEODORA per nephrology       -Chronic pancytopenia-bone marrow biopsy 11/15/22 was normal--transfuse for hemoglobin less than 7, platelets less than 50 given anticoagulation--reviewed prior oncology consults, no etiology was established as work-up was negative-continue to monitor       -ESRD on hemodialysis--Mon/Tues/Thurs/Fri -follow-up with nephrology       -Hyperkalemia--HD scheduled today       -Chronic combined systolic/diastolic heart failure/NICM, EF 35 to 53%, grade 3 diastolic dysfunction-compensated continue Coreg-not on ACE/ARB given renal failure/hyperkalemia--follow-up with cardiology outpatient       -Hypothyroidism-continue Synthroid       -Hyperlipidemia-continue statin       -UHWA-ylwdjo-jyypmmew bronchodilators       -History of pulmonary embolism--resume Eliquis if H&H stable posttransfusion with no evidence of bleeding       -Essential hypertension-stable-continue Coreg      NEPHRO:   1. ESRD: He follows with UC at [facility]. He gets HD Mon/Tues/Thurs/Fri there on the HHD setup. Plan for HD today for clearance and K, then continue Tues/Thurs/Sat while here. 2. Hyperkalemia: Despite HD 4x/week. Plan for HD today since his K is still high and he may need blood. Low potassium diet. 3. Anemia: D/t CKD and possibly a GI bleed. Hg was 8-9's over the last month, 7.4 on admit and now 6.7. Transfuse as needed, planning HD today to facilitate a blood transfusion. Retacrit next week. 4. Access: Left upper arm AV loop graft. 5. Volume/HTN: BP controlled, pt appears euvolemic. 6. Falls: Pt reports he is not getting PT at [facility]. Renal Failure, Chronic - Care Day 1 (1/14/2023) by Kris Gatica RN       Review Entered Review Status   1/15/2023 0932 Completed      Criteria Review      Care Day: 1 Care Date: 1/14/2023 Level of Care: Telemetry    Guideline Day 1    Level Of Care    (X) ICU or floor    1/15/2023 9:14 AM EST by Antwon Veloz      tele    Clinical Status    ( ) * Clinical Indications met    (X) Possible metabolic abnormalities    1/15/2023 9:14 AM EST by Antwon Veloz      K 6.0, not due for dialysis til Mon.     Routes    (X) Oral diet as tolerated    1/15/2023 9:32 AM EST by Antwon Veloz      reg diet    Interventions    (X) Urinalysis, chemistries, CBC, other laboratory tests    1/15/2023 9:32 AM EST by Antwon Veloz      labs on review    (X) Possible CXR, ECG, renal ultrasound    1/15/2023 9:32 AM EST by Antwon Veloz      see review    (X) Consultations 1/15/2023 9:14 AM EST by Alex Norman      Nephrologist    (X) Monitor electrolytes, glucose, acid-base, and volume status    Medications    (X) Possible erythropoiesis-stimulating agent, calcium supplement, phosphate binder, bicarbonate, vitamin D, antihypertensive medication    1/15/2023 9:32 AM EST by Vanita Motta, insulin    * Milestone   Additional Notes   DATE: 1/14/23      CHIEF COMPLAINT/ED PRESENTATION:   Fatigue (Worsening weakness for a couple of weeks, MTTF HD patient, thinks he's been losing weight, fell x3 today, c/o left hip pain)       History of Present Illness    Roger Pena is a 74 y.o. male presenting to the emergency department for concern of multiple falls today. He said that while he was walking his knees gave out on him and he fell multiple times, 1 of these falls he hit his head with no loss of consciousness. He is anticoagulated on Eliquis. He is on dialysis with most recent session yesterday, normally Monday Tuesday and Friday. From the fall, he is complaining of pain in his left elbow, left hip and left knee. He normally ambulates with a walker and a cane, and has been able to stand after these falls occurred. He has no headache, but does have bilateral blurry vision, no double vision, no nausea or vomiting. No recent fevers or chills. He does think that he has been losing weight, and has been feeling generally unwell with worsening weakness over the last couple weeks. Make small amount of urine. No recent cough, shortness of breath or chest pain. No neck or back pain.        RELEVANT BASELINES: (lab values, vitals, o2 amount/delivery, etc.)   4 L oxygen at baseline (COPD)      VITALS:   98.1, 23, 87, 118/88, 100% on 4L nc      ABNL/PERTINENT LABS/RADIOLOGY/DIAGNOSTIC STUDIES:   WBC 3.2, h/h 7.4/24.1, , troponin 0.08X2, Na 132, K reflex mag 5.6, Cl 92, BUN 51, creat 9.0, Alk Phos 148, ALT 7, repeat K 6.0,    CT Head wo cont: No acute hemorrhage, edema or hydrocephalus. Mild cerebral atrophic changes are noted without ventricular dilation. Remote small low density lacunar infarct noted in the left thalamus and right basal ganglia. Mucosal thickening involving the maxillary sinuses. CT C-spine:  1. No acute fracture with disc degeneration spondylosis and asymmetric facet hypertrophy as described above   2. Normal alignment. 3. Incidental note made of chronic vascular calcifications involving the carotid arteries and vertebral arteries and basilar artery and apical consolidation in the right lung and emphysematous change      EKG: Rhythm: normal sinus    Rate: normal   Axis: normal   Ectopy: none   Conduction: normal   ST Segments: normal   T Waves: normal   Q Waves:none   Clinical Impression: no acute changes   Comparison:  12/27/22         ED TREATMENT:   Humulin R 10 units IV, D10% 250mL IV bolus, Lokelma 10g po,       ADMISSION DIAGNOSIS/OP NOTE:   1. Hyperkalemia    2. General weakness       PERTINENT MEDICAL HISTORY:    has a past medical history of Cerebral artery occlusion with cerebral infarction ([facility] Combined systolic and diastolic heart failure ([facility] COPD (chronic obstructive pulmonary disease) ([facility] Gout, Hemodialysis patient ([facility] Hx of blood clots, Hyperlipidemia, and Hypertension. PHYSICAL EXAM:   General: He is not in acute distress. Appearance: Normal appearance. He is normal weight. He is not ill-appearing, toxic-appearing or diaphoretic. HENT:       Head: Normocephalic and atraumatic. Eyes:       Extraocular Movements: Extraocular movements intact. Pupils: Pupils are equal, round, and reactive to light. Cardiovascular:       Rate and Rhythm: Normal rate and regular rhythm. Pulses: Normal pulses. Heart sounds: Normal heart sounds. No murmur heard. No friction rub. No gallop. Pulmonary:       Effort: Pulmonary effort is normal. No respiratory distress.        Breath sounds: Normal breath sounds. No stridor. No wheezing, rhonchi or rales. Chest:       Chest wall: No tenderness. Abdominal:       General: Abdomen is flat. There is no distension. Tenderness: There is no abdominal tenderness. There is no guarding or rebound. Musculoskeletal:          General: Normal range of motion. Cervical back: Normal range of motion. Right lower leg: No edema. Left lower leg: No edema. Comments: Reports tenderness along the left olecranon with normal movement of the elbow, no reproducible tenderness along the left hip with negative logroll bilaterally and stable pelvis, able to hold the left leg off of the bed in flexion with full knee extension without difficulty before falling to gravity after about 2 seconds, normal range of motion of the knee with flexion and full extension with mild tenderness along the lateral aspect of the patella, no palpable joint effusion   No midline cervical, thoracic or lumbosacral spinal tenderness    Neurological:       General: No focal deficit present. Mental Status: He is alert and oriented to person, place, and time. Cranial Nerves: No cranial nerve deficit. Sensory: No sensory deficit. Motor: No weakness.     Psychiatric:          Mood and Affect: Mood normal.          Behavior: Behavior normal.       MD CONSULTS/ASSESSMENTS & PLANS:   · Hyperkalemia [E87.5] 01/14/2023       Priority: High   · Failure to thrive in adult [R62.7] 01/14/2023       Priority: Medium   · Pancytopenia [facility]) [D61.818] 06/17/2022       Priority: Medium   · Chronic respiratory failure with hypoxia and hypercapnia (HCC) [J96.11, J96.12] 10/06/2022       Priority: Low   · Chronic combined systolic and diastolic CHF (congestive heart failure) ([facility] [I50.42] 03/02/2022       Priority: Low   · Essential hypertension [I10] 03/02/2022       Priority: Low   · Hyperlipidemia [E78.5] 03/02/2022       Priority: Low   · COPD (chronic obstructive pulmonary disease) ([facility] [J44.9] 09/10/2020       Priority: Low   · ESRD on dialysis [facility]) [N18.6, Z99.2] 03/31/2020       Priority: Low    PLAN:   -Nephrology consult for hyperkalemia   -Monitor on telemetry   -Check prealbumin and a dietary consult   -Continue home regimen for chronic stable conditions       DVT Prophylaxis: Eliquis   Diet: ADULT DIET; Regular; Low Fat/Low Chol/High Fiber/2 gm Na; Low Potassium (Less than 3000 mg/day)   Code Status: Full Code   (Above from Int.  Med H&P note)         MEDICATIONS:   Heparin 5000 units sc TID X1, Neurontin 100mg po QD, Coreg 3.125mg po BID, Lipitor 80mg po QD, Eliquis 2.5mg po BID,       ORDERS:   Up with assist, PT/OT, Neprology consult, adult diet, Dietician consult, POCT glucose Q30 min, then Q hr, hypoglycemia protocol                 Renal Failure, Chronic - Clinical Indications for Admission to Inpatient Care by Ashok Mcwilliams RN       Review Entered Review Status   1/15/2023 0912 Completed      Criteria Review      Clinical Indications for Admission to Inpatient Care    Most Recent : Timothy Gregg Most Recent Date: 1/15/2023 9:12 AM EST     (X) Other Indication: hyperkalemia and weakness/falls      Entered 1/15/2023 9:12 AM EST by Timothy Gregg     Worsening weakness for a couple of weeks, MTTF HD patient, thinks he's been losing weight, fell x3 today, c/o left hip pain)

## 2023-01-17 NOTE — PROGRESS NOTES
Treatment time: 3 hours  Net UF: 1500 ml     Pre weight: 65.4 kg  Post weight:54.9 kg    Access used: LUAVG    Access function: well with  ml/min     Medications or blood products given: n/a     Regular outpatient schedule: MWF     Summary of response to treatment: Patient tolerated treatment well and without any complications.  Patient remained stable throughout entire treatment        01/17/23 0820 01/17/23 1140   Treatment   Time On 0826  --    Time Off  --  1130   Vital Signs   BP (!) 150/82 (!) 151/82   Temp 98.1 °F (36.7 °C) 97.9 °F (36.6 °C)   Heart Rate 89 78   Resp 18 18   Weight 124 lb 5.4 oz (56.4 kg) 121 lb 0.5 oz (54.9 kg)

## 2023-01-17 NOTE — PROGRESS NOTES
Patient A&Ox4. VSS on  1 L NC . Pt drank 60-70% of GoLytely ordered with multiple watery BMs during the shift. Pt is SBA to bedside commode. All care needs addressed with no further needs at this time. Bed is locked and in lowest position with alarm on. Call light and bedside table within reach. Will continue to monitor per protocol.      Electronically signed by Tyra Sharma RN on 1/17/2023 at 5:24 AM

## 2023-01-17 NOTE — DISCHARGE INSTR - COC
Continuity of Care Form    Patient Name: Kia Wilkerson   :  1955  MRN:  [id number]    Admit date:  2023  Discharge date:  ***    Code Status Order: Full Code   Advance Directives:   Advance Care Flowsheet Documentation       Date/Time Healthcare Directive Type of Healthcare Directive Copy in [address] Agent's Name Healthcare Agent's Phone Number    23 1230 Unknown, patient unable to respond due to medical condition -- -- -- -- --            Admitting Physician:  Darlene Cazares MD  PCP: Deejay Silver MD    Discharging Nurse: [facility] Unit/Room#: Endo Pool/NONE  Discharging Unit Phone Number: ***    Emergency Contact:   Extended Emergency Contact Information  Primary Emergency Contact: [address] Phone: [phone]  Relation: Child   needed?  No    Past Surgical History:  Past Surgical History:   Procedure Laterality Date    COLONOSCOPY      CT BONE MARROW BIOPSY  11/15/2022    CT BONE MARROW BIOPSY 11/15/2022 [facility] CT SCAN    DIALYSIS FISTULA CREATION Left        Immunization History:   Immunization History   Administered Date(s) Administered    COVID-19, PFIZER Bivalent BOOSTER, DO NOT Dilute, (age 12y+), IM, 30 mcg/0.3 mL 2022    COVID-19, PFIZER GRAY top, DO NOT Dilute, (age 15 y+), IM, 30 mcg/0.3 mL 2022       Active Problems:  Patient Active Problem List   Diagnosis Code    HCAP (healthcare-associated pneumonia) J18.9    COPD exacerbation ([facility] J44.1    ESRD on dialysis ([facility] N18.6, Z99.2    COPD (chronic obstructive pulmonary disease) ([facility] J44.9    Acute on chronic respiratory failure with hypoxia and hypercapnia (HCC) J96.21, J96.22    Chest pain R07.9    Hypokalemia E87.6    V-tach I47.20    Essential hypertension I10    Hyperlipidemia E78.5    Chronic combined systolic and diastolic CHF (congestive heart failure) ([facility]) I50.42    SOB (shortness of breath) R06.02    AV fistula thrombosis, initial encounter ([facility]) T82.868A    History of venous thromboembolism Z86.718    Epistaxis R04.0    Pancytopenia ([facility]) D61.818    Problem with dialysis access ([facility]) T82.898A    Acute cerebrovascular accident (CVA) ([facility]) I63.9    Chronic respiratory failure with hypoxia and hypercapnia ([facility]) J96.11, J96.12    Dialysis AV fistula malfunction, initial encounter ([facility]) T82.590A    Hemorrhage of arteriovenous fistula, initial encounter ([facility]) T82.838A    Weakness R53.1    Pre-syncope R55    Hypotension due to hypovolemia I95.89, E86.1    Lactic acidemia E87.20    Elevated brain natriuretic peptide (BNP) level R79.89    Hyperkalemia E87.5    Failure to thrive in adult R62.7       Isolation/Infection:   Isolation            No Isolation          Patient Infection Status       Infection Onset Added Last Indicated Last Indicated By Review Planned Expiration Resolved Resolved By    None active    Resolved    COVID-19 (Rule Out) 12/28/22 12/28/22 12/28/22 COVID-19, Rapid (Ordered)   12/28/22 Rule-Out Test Resulted    C-diff Rule Out 03/02/22 03/02/22 03/03/22 Clostridium Difficile Toxin/Antigen (Ordered)   03/03/22 Rule-Out Test Resulted    COVID-19 (Rule Out) 09/21/21 09/21/21 09/21/21 COVID-19, Rapid (Ordered)   09/21/21 Rule-Out Test Resulted    COVID-19 (Rule Out)  04/02/20 04/02/20 Leni López RN   04/06/20 Leni López RN    COVID-19 (Rule Out) 03/31/20 03/31/20 03/31/20 COVID-19 (Ordered)   03/31/20 Rule-Out Test Resulted            Nurse Assessment:  Last Vital Signs: BP (!) 165/68   Pulse 86   Temp 97.9 °F (36.6 °C) (Infrared)   Resp 18   Ht 5' 8\" (1.727 m)   Wt 121 lb 0.5 oz (54.9 kg)   SpO2 100%   BMI 18.40 kg/m²     Last documented pain score (0-10 scale): Pain Level: 4  Last Weight:   Wt Readings from Last 1 Encounters:   01/17/23 121 lb 0.5 oz (54.9 kg)     Mental Status:  oriented, alert, coherent, and logical    IV Access:  - None    Nursing Mobility/ADLs:  Walking   Assisted  Transfer  Assisted  Bathing   Assisted  Dressing  Assisted  Toileting  Assisted  Feeding  Independent  Med Admin  Independent  Med Delivery   whole    Wound Care Documentation and Therapy:        Elimination:  Continence: Bowel: Yes  Bladder: Yes  Urinary Catheter: None   Colostomy/Ileostomy/Ileal Conduit: No       Date of Last BM: 01/17/2023    Intake/Output Summary (Last 24 hours) at 1/17/2023 1512  Last data filed at 1/17/2023 1140  Gross per 24 hour   Intake 400 ml   Output 1900 ml   Net -1500 ml     No intake/output data recorded. Safety Concerns: At Risk for Falls    Impairments/Disabilities:      None    Nutrition Therapy:  Current Nutrition Therapy:   - Oral Diet:  General    Routes of Feeding: Oral  Liquids: No Restrictions  Daily Fluid Restriction: no  Last Modified Barium Swallow with Video (Video Swallowing Test): not done    Treatments at the Time of Hospital Discharge:   Respiratory Treatments: ***  Oxygen Therapy:  is on oxygen at 3 L/min per nasal cannula. Ventilator:    - No ventilator support    Rehab Therapies: Physical Therapy and Occupational Therapy  Weight Bearing Status/Restrictions: No weight bearing restrictions  Other Medical Equipment (for information only, NOT a DME order):  walker  Other Treatments: ***    Patient's personal belongings (please select all that are sent with patient):  Dentures upper and lower    RN SIGNATURE:  Electronically signed by Vanita Hammer RN on 1/17/23 at 4:06 PM EST    CASE MANAGEMENT/SOCIAL WORK SECTION    Inpatient Status Date: 1-15-23    Readmission Risk Assessment Score:  Readmission Risk              Risk of Unplanned Readmission:  50           Discharging to Facility/ Agency   Name: [facility]  Address: [address]   XTLRU:[phone]  [facility]:[id number]    Dialysis Facility (if applicable)   Name:HD- at [facility]  Dialysis Schedule: MWF    / signature: Electronically signed by Cindie Eisenmenger, RN on 1/17/23 at 3:39 PM EST    PHYSICIAN SECTION    Prognosis: Fair    Condition at Discharge: Stable    Rehab Potential (if transferring to Rehab): Fair    Recommended Labs or Other Treatments After Discharge: cbc,bmp in 1 week,TSH in 6 weeks                                                                          Resume Eliquis on 1/20/23--    Physician Certification: I certify the above information and transfer of Toney Navarro  is necessary for the continuing treatment of the diagnosis listed and that he requires [place] for greater 30 days.      Update Admission H&P: No change in H&P    PHYSICIAN SIGNATURE:  Electronically signed by Serene Lyn MD on 1/17/23 at 3:13 PM EST

## 2023-01-17 NOTE — PROGRESS NOTES
Report called to [facility] care, pts LTC facility. Transport should arrive @ 1630. IV has been removed. Not questions at this time.      Electronically signed by Vanita Hammer RN on 1/17/2023 at 4:27 PM

## 2023-01-17 NOTE — ANESTHESIA PRE PROCEDURE
Department of Anesthesiology  Preprocedure Note       Name:  Jasmin Townsend   Age:  74 y.o.  :  1955                                          MRN:  [id number]         Date:  2023      Surgeon: Jillian Tejeda):  Kylah Christian MD    Procedure: Procedure(s):  EGD DIAGNOSTIC ONLY  COLONOSCOPY DIAGNOSTIC    Medications prior to admission:   Prior to Admission medications    Medication Sig Start Date End Date Taking? Authorizing Provider   calcium carbonate (TUMS) 500 MG chewable tablet Take 1 tablet by mouth every 8 hours as needed for Heartburn   Yes Historical Provider, MD   carvedilol (COREG) 3.125 MG tablet Take 1 tablet by mouth 2 times daily 22   Stanislav Lawrence MD   sodium chloride (OCEAN) 0.65 % nasal spray 1-2 sprays both nostrils at least daily and as needed to prevent dry mucosa. 10/15/22   Janice Hill DO   levothyroxine (SYNTHROID) 100 MCG tablet Take 100 mcg by mouth Daily    Historical Provider, MD   gabapentin (NEURONTIN) 100 MG capsule Take 1 capsule by mouth at bedtime for 30 days.  9/15/22 1/14/23  Liza Frank DO   diclofenac sodium (VOLTAREN) 1 % GEL Apply 2 g topically 2 times daily 9/15/22   Liza Frank DO   calcitRIOL (ROCALTROL) 0.25 MCG capsule Take 0.5 mcg by mouth every other day [facility]    Historical Provider, MD   sevelamer (RENVELA) 800 MG tablet Take 1 tablet by mouth 3 times daily (with meals)    Historical Provider, MD   apixaban (ELIQUIS) 2.5 MG TABS tablet Take 1 tablet by mouth 2 times daily 22   Yovani Mahajan MD   ferrous sulfate (FE TABS) 325 (65 Fe) MG EC tablet Take 1 tablet by mouth every other day  Patient taking differently: Take 325 mg by mouth daily (with breakfast) 22  Yovani Mahajan MD   atorvastatin (LIPITOR) 80 MG tablet Take 80 mg by mouth nightly    Historical Provider, MD   calcium carb-cholecalciferol 600-200 MG-UNIT TABS tablet Take 1 tablet by mouth daily    Historical Provider, MD   Umeclidinium Bromide ([employer] ELLIPTA) 62.5 MCG/INH AEPB Inhale 1 puff into the lungs daily    Historical Provider, MD   vitamin D (ERGOCALCIFEROL) 1.25 MG (48937 UT) CAPS capsule Take 50,000 Units by mouth once a week Saturdays  Patient not taking: Reported on 1/14/2023    Historical Provider, MD   omeprazole (PRILOSEC) 20 MG delayed release capsule Take 20 mg by mouth daily  Patient not taking: Reported on 1/14/2023    Historical Provider, MD   albuterol sulfate HFA (PROVENTIL;VENTOLIN;PROAIR) 108 (90 Base) MCG/ACT inhaler Inhale 2 puffs into the lungs every 6 hours as needed for Wheezing 90mcg/actuation    Historical Provider, MD   allopurinol (ZYLOPRIM) 100 MG tablet Take 100 mg by mouth Daily on MWF    Historical Provider, MD       Current medications:    Current Facility-Administered Medications   Medication Dose Route Frequency Provider Last Rate Last Admin   • sodium phosphate 15 mmol in sodium chloride 0.9 % 250 mL IVPB  15 mmol IntraVENous Once Cooper De La Torre MD       • 0.9 % sodium chloride infusion   IntraVENous Once Abhay Ortiz MD       • lidocaine 4 % external patch 1 patch  1 patch TransDERmal Daily Bertha Kline MD   1 patch at 01/16/23 0500   • levothyroxine (SYNTHROID) tablet 75 mcg  75 mcg Oral Daily Rigo Panchal MD       • polyethylene glycol (GoLYTELY) solution 2,000 mL  2,000 mL Oral Once Abhay Ortiz MD       • tiotropium (SPIRIVA RESPIMAT) 2.5 MCG/ACT inhaler 2 puff  2 puff Inhalation Daily Bertha Kline MD   2 puff at 01/16/23 1200   • epoetin zachary-epbx (RETACRIT) injection 7,000 Units  7,000 Units IntraVENous Once per day on Mon Wed Fri Abdias Esquivel MD       • 0.9 % sodium chloride infusion   IntraVENous PRN Rigo Panchal MD       • glucose chewable tablet 16 g  4 tablet Oral PRN Robi Zamarripa MD       • dextrose bolus 10% 125 mL  125 mL IntraVENous PRN Robi Zamarripa MD        Or   • dextrose bolus 10% 250 mL  250 mL IntraVENous PRN Robi Zamarripa MD       • glucagon injection 1 mg  1  mg SubCUTAneous PRN Tc Rodriguez MD        dextrose 10 % infusion   IntraVENous Continuous PRN Tc Rodriguez MD        sodium chloride flush 0.9 % injection 5-40 mL  5-40 mL IntraVENous 2 times per day Jake Wilkerson MD   10 mL at 01/16/23 2213    sodium chloride flush 0.9 % injection 5-40 mL  5-40 mL IntraVENous PRN Jake Wilkerson MD        0.9 % sodium chloride infusion   IntraVENous PRN Melinda Kline MD        ondansetron (ZOFRAN-ODT) disintegrating tablet 4 mg  4 mg Oral Q8H PRN Melinda Kline MD        Or    ondansetron [facility]) injection 4 mg  4 mg IntraVENous Q6H PRN Jake Wilkerson MD        polyethylene glycol (GLYCOLAX) packet 17 g  17 g Oral Daily PRN Jake Wilkerson MD        acetaminophen (TYLENOL) tablet 650 mg  650 mg Oral Q6H PRN Melinda Kline MD   650 mg at 01/15/23 0345    Or    acetaminophen (TYLENOL) suppository 650 mg  650 mg Rectal Q6H PRN Jake Wilkerson MD        albuterol (PROVENTIL) nebulizer solution 2.5 mg  2.5 mg Nebulization Q4H PRN Melinda Kline MD        allopurinol (ZYLOPRIM) tablet 100 mg  100 mg Oral Daily Melinda Kline MD   100 mg at 01/16/23 0949    [Held by provider] apixaban (ELIQUIS) tablet 2.5 mg  2.5 mg Oral BID Melinda Kline MD   2.5 mg at 01/14/23 2358    atorvastatin (LIPITOR) tablet 80 mg  80 mg Oral Nightly Melinda Kline MD   80 mg at 01/16/23 2213    calcitRIOL (ROCALTROL) capsule 0.5 mcg  0.5 mcg Oral Every Other Day Melinda Kline MD   0.5 mcg at 01/15/23 0827    oyster shell calcium w/D 500-5 MG-MCG tablet 1 tablet  1 tablet Oral Daily Jake Wilkerson MD   1 tablet at 01/16/23 0948    calcium carbonate (TUMS) chewable tablet 500 mg  1 tablet Oral Q8H PRN Melinda Kline MD        carvedilol (COREG) tablet 3.125 mg  3.125 mg Oral BID Melinda Kline MD   3.125 mg at 01/16/23 2216    ferrous sulfate (IRON 325) tablet 325 mg  325 mg Oral Daily with breakfast Melinda Benito MD Raisa   325 mg at 01/16/23 0949   • gabapentin (NEURONTIN) capsule 100 mg  100 mg Oral Nightly Bertha Kline MD   100 mg at 01/16/23 2213   • sodium chloride (OCEAN, BABY AYR) 0.65 % nasal spray 2 spray  2 spray Each Nostril PRN Bertha Kline MD           Allergies:    Allergies   Allergen Reactions   • Nitroglycerin      Other reaction(s): Unknown   • Oxymetazoline      Other reaction(s): Unknown       Problem List:    Patient Active Problem List   Diagnosis Code   • HCAP (healthcare-associated pneumonia) J18.9   • COPD exacerbation (Former[facility]) J44.1   • ESRD on dialysis (Formerly [facility]) N18.6, Z99.2   • COPD (chronic obstructive pulmonary disease) (Formerly [facility]) J44.9   • Acute on chronic respiratory failure with hypoxia and hypercapnia ([facility]) J96.21, J96.22   • Chest pain R07.9   • Hypokalemia E87.6   • V-tach I47.20   • Essential hypertension I10   • Hyperlipidemia E78.5   • Chronic combined systolic and diastolic CHF (congestive heart failure) (Former[facility]) I50.42   • SOB (shortness of breath) R06.02   • AV fistula thrombosis, initial encounter ([facility]) T82.868A   • History of venous thromboembolism Z86.718   • Epistaxis R04.0   • Pancytopenia ([facility]) D61.818   • Problem with dialysis access (Formerly [facility]) T82.898A   • Acute cerebrovascular accident (CVA) ([facility]) I63.9   • Chronic respiratory failure with hypoxia and hypercapnia ([facility]) J96.11, J96.12   • Dialysis AV fistula malfunction, initial encounter ([facility]) T82.590A   • Hemorrhage of arteriovenous fistula, initial encounter ([facility]) T82.838A   • Weakness R53.1   • Pre-syncope R55   • Hypotension due to hypovolemia I95.89, E86.1   • Lactic acidemia E87.20   • Elevated brain natriuretic peptide (BNP) level R79.89   • Hyperkalemia E87.5   • Failure to thrive in adult R62.7       Past Medical History:        Diagnosis Date   • Cerebral artery occlusion with cerebral infarction ([facility])    • Combined systolic and diastolic heart failure ([facility])    • COPD (chronic obstructive pulmonary disease) (Formerly [facility])    •  Gout     Hemod[address] patient ([address]     Hx of blood clots     Hyperlipidemia     Hypertension        Past Surgical History:        Procedure Laterality Date    COLONOSCOPY      CT BONE MARROW BIOPSY  11/15/2022    CT BONE MARROW BIOPSY 11/15/2022 [address] SCAN    DIALYSIS FISTULA CREATION Left        Social History:    Social History     Tobacco Use    Smoking status: Former    Smokeless tobacco: Never   Substance Use Topics    Alcohol use: Yes     Comment: occasional                                Counseling given: Not Answered      Vital Signs (Current):   Vitals:    01/17/23 0600 01/17/23 0820 01/17/23 1140 01/17/23 1213   BP:  (!) 150/82 (!) 151/82 (!) 141/85   Pulse:  89 78 82   Resp:  18 18 18   Temp:  98.1 °F (36.7 °C) 97.9 °F (36.6 °C) 98.1 °F (36.7 °C)   TempSrc:    Temporal   SpO2:    100%   Weight: 124 lb 5.4 oz (56.4 kg) 124 lb 5.4 oz (56.4 kg) 121 lb 0.5 oz (54.9 kg)    Height:                                                  BP Readings from Last 3 Encounters:   01/17/23 (!) 141/85   01/03/23 131/84   12/30/22 133/73       NPO Status: Time of last liquid consumption: 0000                        Time of last solid consumption: 0800                        Date of last liquid consumption: 01/17/23                        Date of last solid food consumption: 01/16/23    BMI:   Wt Readings from Last 3 Encounters:   01/17/23 121 lb 0.5 oz (54.9 kg)   01/03/23 158 lb (71.7 kg)   12/30/22 120 lb 13 oz (54.8 kg)     Body mass index is 18.4 kg/m².     CBC:   Lab Results   Component Value Date/Time    WBC 4.9 01/17/2023 07:24 AM    RBC 2.60 01/17/2023 07:24 AM    HGB 7.1 01/17/2023 07:24 AM    HCT 23.5 01/17/2023 07:24 AM    MCV 90.5 01/17/2023 07:24 AM    RDW 16.5 01/17/2023 07:24 AM     01/17/2023 07:24 AM       CMP:   Lab Results   Component Value Date/Time     01/17/2023 07:24 AM    K 4.6 01/17/2023 07:24 AM    K 5.8 01/15/2023 05:39 AM    CL 98 01/17/2023 07:24 AM    CO2 26 01/17/2023 07:24 AM    BUN 33 01/17/2023 07:24 AM    CREATININE 7.5 01/17/2023 07:24 AM    GFRAA 7 10/14/2022 05:45 AM    AGRATIO 1.7 01/15/2023 05:39 AM    LABGLOM 7 01/17/2023 07:24 AM    GLUCOSE 64 01/17/2023 07:24 AM    PROT 5.1 01/15/2023 05:39 AM    CALCIUM 8.9 01/17/2023 07:24 AM    BILITOT 0.3 01/15/2023 05:39 AM    ALKPHOS 131 01/15/2023 05:39 AM    AST 14 01/15/2023 05:39 AM    ALT 6 01/15/2023 05:39 AM       POC Tests:   Recent Labs     01/16/23  1720   POCGLU 91       Coags:   Lab Results   Component Value Date/Time    PROTIME 16.1 01/16/2023 04:03 PM    INR 1.29 01/16/2023 04:03 PM    APTT 39.0 11/15/2022 09:30 AM       HCG (If Applicable): No results found for: PREGTESTUR, PREGSERUM, HCG, HCGQUANT     ABGs: No results found for: PHART, PO2ART, NSB8NGB, TRK8EXX, BEART, O6HTWUIK     Type & Screen (If Applicable):  No results found for: LABABO, LABRH    Drug/Infectious Status (If Applicable):  No results found for: HIV, HEPCAB    COVID-19 Screening (If Applicable):   Lab Results   Component Value Date/Time    COVID19 Not Detected 12/28/2022 12:50 AM    COVID19 Not Detected 03/31/2020 10:15 PM           Anesthesia Evaluation  Patient summary reviewed and Nursing notes reviewed no history of anesthetic complications:   Airway: Mallampati: II  TM distance: >3 FB   Neck ROM: full  Mouth opening: > = 3 FB   Dental:    (+) upper dentures and lower dentures      Pulmonary: breath sounds clear to auscultation  (+) COPD:  shortness of breath: chronic,                             Cardiovascular:  Exercise tolerance: poor (<4 METS),   (+) hypertension:, CHF:,         Rhythm: regular  Rate: normal                 ROS comment: Summary   Left ventricular cavity size is normal.   Normal left ventricular wall thickness. Ejection fraction is moderately reduced visually estimated to be 35-40%. global hypokinesis   Grade III diastolic dysfunction with elevated LV filling pressures.      Neuro/Psych:               GI/Hepatic/Renal: (+) renal disease: ESRD and dialysis,           Endo/Other:                     Abdominal:             Vascular: Other Findings:           Anesthesia Plan      MAC     ASA 4       Induction: intravenous. MIPS: Prophylactic antiemetics administered. Anesthetic plan and risks discussed with patient. Plan discussed with CRNA.                     Walt DO Robert   1/17/2023

## 2023-01-17 NOTE — CARE COORDINATION
Transport back to patient's facility at 4:30 pm today by [facility]. Patient is stable for discharge per Dr. Cherelle Rivera. Faxing dialysis flow sheets and charting to [place] along with AVS/discharge packet. Return by stretcher ambulance and O2 at 4:30 pm today [employer].    Nurse Report: [phone]  F [phone]  Electronically signed by Alejandro Kessler RN on 1/17/2023 at 3:25 PM

## 2023-01-17 NOTE — CARE COORDINATION
Case Management Assessment            Discharge Note                    Date / Time of Note: 1/17/2023 3:57 PM                  Discharge Note Completed by: Desirae Alexandre RN    Patient Name: Jasmin Townsend   YOB: 1955  Diagnosis: Hyperkalemia [E87.5]  General weakness [R53.1]   Date / Time: 1/14/2023  6:08 PM    Current PCP: Carolann Espinosa MD  Clinic patient: No    Hospitalization in the last 30 days: No    Advance Directives:  Code Status: Full Code  [place] DNR form completed and on chart: Not Indicated    Financial:  Payor: Juan Carlos Denise I-SNP / Plan: Juan Carlos Denise I-SNP / Product Type: *No Product type* /      Pharmacy:    [address] [place] [address] [phone] - F [phone]  [address] [facility] [address]  Phone: [phone] Fax: [phone]    [facility] [id number] [place] [address] E [phone] - F [phone]  [place] [address]  Phone: [phone] Fax: [phone]      Assistance purchasing medications?: Potential Assistance Purchasing Medications: No  Assistance provided by Case Management: None at this time    Does patient want to participate in local refill/ meds to beds program?: No    Meds To Beds General Rules:  1. Can ONLY be done Monday- Friday between 8:30am-5pm  2. Prescription(s) must be in pharmacy by 3pm to be filled same day  3. Copy of patient's insurance/ prescription drug card and patient face sheet must be sent along with the prescription(s)  4. Cost of Rx cannot be added to hospital bill. If financial assistance is needed, please contact unit  or ;  or  CANNOT provide pharmacy voucher for patients co-pays  5.  Patients can then  the prescription on their way out of the hospital at discharge, or pharmacy can deliver to the bedside if staff is available. (payment due at time of pick-up or delivery - cash, check, or card accepted)     Able to afford home medications/ co-pay costs: LTC    ADLS:  Current PT AM-PAC Score: 17 /24  Current OT AM-PAC Score: 16 /24      DISCHARGE Disposition: Lauren (LTC): [facility]  Phone: [phone]  Fax: [phone]    LOC at discharge: [address]    Transportation:  Transportation PLAN for discharge: EMS transportation   Mode of Transport: Ambulance stretcher - BLS  Reason for medical transport: Severe muscular weakness and de-conditioned state due to [employer] and O2 requirement and requires ambulance transport due to O2 control  Name of Transport Company:  [employer]   Phone: [phone]  Time of Transport: 4:30 p    Transport form completed: Yes      Dialysis:  Dialysis patient: Yes    Dialysis Center:  [facility] in house  Address: [address]  Phone: [phone]    Additional CM Notes: back to [address] today- in house HD there. 4:30 pm transport [employer] for Transition of Care is related to the following treatment goals of Hyperkalemia [E87.5]  General weakness [R53.1]    The Patient and/or patient representative Cong dNiaye and his family were provided with a choice of provider and agrees with the discharge plan Yes    Freedom of choice list was provided with basic dialogue that supports the patient's individualized plan of care/goals and shares the quality data associated with the providers.  Yes    Care Transitions patient: No    Alejandro Kessler RN  [facility] [employer]  Case Management Department  Ph: [phone]

## 2023-01-17 NOTE — OP NOTE
[address] [facility]  Colonoscopy Note            Patient: Hanna Rios  : 1955     Procedure: Colonoscopy with cold snare polypectomy    Date:  2023    Surgeon:  Yaw Guillory MD, MD    Anesthesia:  MAC    Indications: Anemia    Procedure: An informed consent was obtained from the patient after explanation of indications, benefits, possible risks and complications of the procedure. The patient was then taken to the endoscopy suite, placed in the left lateral decubitus position, and the above IV anesthesia was administered. A digital rectal examination was performed and revealed negative without mass, lesions or tenderness, solid stool noted. The Olympus CFQ-180-AL video colonoscope was placed in the patient's rectum under digital direction and advanced to the ileocolonic anastomosis. The prep was fair. The scope was then withdrawn back through the anastomosis, ascending, transverse, descending and sigmoid colons. The scope was then withdrawn into the rectum and retroflexed. The scope was straightened, the colon was decompressed and the scope was withdrawn from the patient. The patient tolerated the procedure well and was taken to the PACU in good condition. Findings:    Colonic anastomosis: Normal  Ascending Colon: Normal  Transverse Colon: Normal  Descending Colon: 4 mm sessile polyp removed using a cold snare  Sigmoid Colon: Normal  Rectum: Normal    Estimated Blood Loss: None      Impression:    Descending colon polyp  Normal ileocolonic anastomosis    Recommendations: Follow-up on biopsy results  Resume diet  PPIs  Repeat Colonoscopy in 5 years.     Yaw Guillory MD, MD   [address]  2023

## 2023-01-17 NOTE — ANESTHESIA POSTPROCEDURE EVALUATION
Department of Anesthesiology  Postprocedure Note    Patient: Oswald Sanchez  MRN: [id number]  YOB: 1955  Date of evaluation: 1/17/2023      Procedure Summary     Date: 01/17/23 Room / Location: [facility] 03 / [address]    Anesthesia Start: 1401 Anesthesia Stop: 6842    Procedures:       EGD BIOPSY      COLONOSCOPY DIAGNOSTIC Diagnosis:       Anemia, unspecified type      Positive occult stool blood test      (anemia, + occult stool)    Surgeons: Kyaw Taylor MD Responsible Provider: Don Cantor DO    Anesthesia Type: MAC ASA Status: 4          Anesthesia Type: No value filed.     Evan Phase I: Evan Score: 10    Evan Phase II: Evan Score: 9      Anesthesia Post Evaluation    Patient location during evaluation: PACU  Level of consciousness: awake  Complications: no  Multimodal analgesia pain management approach

## 2023-01-17 NOTE — DISCHARGE INSTRUCTIONS
Extra Heart Failure sites:   https://[email or web address]/ --- this is American Heart Association interactive Healthier Living with Heart Failure guidebook. Please copy and paste link into search bar. Use your mouse to scroll through the pages. Lots and lots of info / tips    HF [place] lito  --- free smart phone lito available for [employer] and [employer]. Use your phone to track sodium / fluid intake,  symptoms, weight, etc.    [employer] - website-- [employer] is a dialysis company. All dialysis patients follow a renal diet which IS low sodium!! This website offers free seasonal cookbooks.   Each quarter, they will release 25-30 new recipes with a breakdown of calories, sodium, glucose, etc    [email or web address]/recipes -- more free recipes

## 2023-01-17 NOTE — CONSULTS
Comprehensive Nutrition Assessment    RECOMMENDATIONS:  PO Diet: ADAT per MD  ONS: Add ONS BID once diet advances  Nutrition Education: Education not indicated   Monitor nutrition adequacy, pertinent labs, bowel habits, wt changes, and clinical progress    NUTRITION ASSESSMENT:   Nutritional summary & status: Consult for unintentional wt loss: Pt currently NPO for scheduled EGD and colonoscopy today. Pt has had an 18% wt loss over the past 6 months, although wt has fluctuated greatly for the past year. Pt reports that he has had poor appetite/intake for the past few days prior to admission. Currently no c/o N/V or abdominal pain. Currently in OR, unable to visit pt. Once diet is advanced will add ONS BID and assess tolerance. Will continue to monitor. Admission/PMH: Hyperkalemia. ESRD on HD, COPD, HTN    MALNUTRITION ASSESSMENT  Context of Malnutrition: Chronic Illness   Malnutrition Status: At risk for malnutrition (Comment)  Findings of the 6 clinical characteristics of malnutrition (Minimum of 2 out of 6 clinical characteristics is required to make the diagnosis of moderate or severe Protein Calorie Malnutrition based on AND/ASPEN Guidelines):  Energy Intake:  Mild decrease in energy intake (Comment) (2-3 days prior to admission)  Weight Loss:  Greater than 10% over 6 months (18% in 6 month period)     Body Fat Loss:  Unable to assess (Pt in OR)     Muscle Mass Loss:  Unable to assess (Pt in OR)    Fluid Accumulation:  No significant fluid accumulation      NUTRITION DIAGNOSIS   Inadequate oral intake related to lack or limited access to food as evidenced by NPO or clear liquid status due to medical condition    Nutrition Monitoring and Evaluation:   Food/Nutrient Intake Outcomes:  Food and Nutrient Intake, Supplement Intake  Physical Signs/Symptoms Outcomes:  Biochemical Data, Weight, Nutrition Focused Physical Findings     OBJECTIVE DATA: Significant to nutrition assessment  Nutrition Related Findings: BG 64. Phos 1.5. Active bowel sounds. +BM 1/17. Wounds: None  Nutrition Goals: prior to discharge, Meet at least 75% of estimated needs     [facility] DIET; Regular  PO Intake: NPO   PO Supplement Intake:NPO  Additional Sources of Calories/IVF:N/A     ANTHROPOMETRICS  Current Height: 5' 8\" (172.7 cm)  Current Weight: 121 lb 0.5 oz (54.9 kg)    Admission weight: 130 lb 6.4 oz (59.1 kg)  Ideal Body Weight (IBW): 154 lbs  (70 kg)    BMI: 18.4    COMPARATIVE STANDARDS  Energy (kcal):  2047-9055 (30-35 kcal/kg CBW)     Protein (g):  66-71 (1.2-1.3 g/kg CBW)       Fluid (mL/day):  1 mL/kcal or per MD    The patient will be monitored per nutrition standards of care. Consult dietitian if additional nutrition interventions are needed prior to RD reassessment.      Carrol Rehman, [address]:  [phone]  Office:  [phone]

## 2023-01-17 NOTE — PROGRESS NOTES
Nephrology Progress Note  [facility]  [phone]   [employer]        Reason for Consult:  ESRD    HISTORY OF PRESENT ILLNESS:                This is a patient with significant past medical history of ERSD who presented with increasing fatigue from [facility].  He was just discharged two weeks ago from there after suffering multiple falls.  He fell in the ER per notes.  He gets hemodialysis 4x/week at [facility] but was hyperkalemic on presentation to the ED.  His hgb is falling and he may need PRBC while here.  He denies chest pain, N/V, hematuria, leg swelling, fevers, bloody stool, and melena.  He has had some loose BMs.  No issues with his graft of late.      REVIEW OF SYSTEMS:  undergoing HD at this time  Feels tired   Denies SOB nausea   Plan for colonoscopy noted    PHYSICAL EXAM:    Vitals:    BP (!) 151/82   Pulse 78   Temp 97.9 °F (36.6 °C)   Resp 18   Ht 5' 8\" (1.727 m)   Wt 121 lb 0.5 oz (54.9 kg)   SpO2 91%   BMI 18.40 kg/m²   No intake/output data recorded.  I/O this shift:  In: 400   Out: 1900     Physical Exam:  Gen: Resting in bed, NAD.    HEENT: MMM, OP clear.  CV: RRR no m/r.  No S3.  Lungs: Clear bilaterally.  No rhonchi.  Abd: S/NT +BS  Ext: No edema, no cyanosis  Skin: Warm.  No rashes appreciated.  : No TTP over bladder, nondistended.  Neuro: Alert and oriented x 3, nonfocal.  Joints: No erythema noted over joints.  Access: Left upper arm AV loop graft +bruit.    DATA:    CBC:   Lab Results   Component Value Date/Time    WBC 4.9 01/17/2023 07:24 AM    RBC 2.60 01/17/2023 07:24 AM    HGB 7.1 01/17/2023 07:24 AM    HCT 23.5 01/17/2023 07:24 AM    MCV 90.5 01/17/2023 07:24 AM    MCH 27.5 01/17/2023 07:24 AM    MCHC 30.4 01/17/2023 07:24 AM    RDW 16.5 01/17/2023 07:24 AM     01/17/2023 07:24 AM    MPV 8.4 01/17/2023 07:24 AM     BMP:    Lab Results   Component Value Date/Time     01/17/2023 07:24 AM    K 4.6 01/17/2023 07:24 AM    K 5.8  01/15/2023 05:39 AM    CL 98 01/17/2023 07:24 AM    CO2 26 01/17/2023 07:24 AM    BUN 33 01/17/2023 07:24 AM    LABALBU 3.1 01/17/2023 07:24 AM    CREATININE 7.5 01/17/2023 07:24 AM    CALCIUM 8.9 01/17/2023 07:24 AM    GFRAA 7 10/14/2022 05:45 AM    LABGLOM 7 01/17/2023 07:24 AM    GLUCOSE 64 01/17/2023 07:24 AM       IMPRESSION/RECOMMENDATIONS:      1. ESRD: He follows with UC at [facility]. He gets HD Mon/Tues/Thurs/Fri there on the HHD setup. HD in progress on 3 K bath Below his TW about 3 Kg Challenging TW UF goal 1.5 L   continue Tues/Thurs/Sat while here. 2. Hyperkalemia: Improved S/P HD Low potassium diet. 3. Anemia: D/t CKD and possibly a GI bleed. Hg was 8-9's over the last month, 7.4 on admit  Transfused   HGB 7.1 gm Monitor   Plan for colonoscopy noted  Retacrit with HD    4. Access: Left upper arm AV loop graft. 5. Volume/HTN: BP elevated challenging Tw     6. Falls: Pt reports he is not getting PT at [facility].    7 Hyperphosphatemia PO4 1.5 mg replace     Thank you for allowing me to participate in the care of this patient. I will continue to follow along. Please call with questions. Corrine Sebastian MD  [facility]  [phone]  [facility]

## 2023-01-17 NOTE — PROGRESS NOTES
Occupational/Physical Therapy  AM Attempt    Attempted to see pt this AM for OT/PT treatment session. RN reporting pt currently at dialysis. Will re-attempt therapy session later this date as schedule permits.        [place], [place], [address] [facility] , PT, DPT #[id number]

## 2023-01-17 NOTE — PROGRESS NOTES
Hospitalist Progress Note      PCP: Iris Pearson MD    Date of Admission: 1/14/2023    Chief Complaint:     Hospital Course:     Subjective:     Having hemodialysis. No acute events overnight. Medications:  Reviewed    Infusion Medications    sodium chloride      dextrose      sodium chloride       Scheduled Medications    lidocaine  1 patch TransDERmal Daily    levothyroxine  75 mcg Oral Daily    polyethylene glycol  2,000 mL Oral Once    tiotropium  2 puff Inhalation Daily    epoetin zachary-epbx  7,000 Units IntraVENous Once per day on Mon Wed Fri    sodium chloride flush  5-40 mL IntraVENous 2 times per day    allopurinol  100 mg Oral Daily    [Held by provider] apixaban  2.5 mg Oral BID    atorvastatin  80 mg Oral Nightly    calcitRIOL  0.5 mcg Oral Every Other Day    oyster shell calcium w/D  1 tablet Oral Daily    carvedilol  3.125 mg Oral BID    ferrous sulfate  325 mg Oral Daily with breakfast    gabapentin  100 mg Oral Nightly    sevelamer  800 mg Oral TID WC     PRN Meds: sodium chloride, glucose, dextrose bolus **OR** dextrose bolus, glucagon (rDNA), dextrose, sodium chloride flush, sodium chloride, ondansetron **OR** ondansetron, polyethylene glycol, acetaminophen **OR** acetaminophen, albuterol, calcium carbonate, sodium chloride    No intake or output data in the 24 hours ending 01/17/23 0940      Physical Exam Performed:    BP (!) 150/82   Pulse 89   Temp 98.1 °F (36.7 °C)   Resp 18   Ht 5' 8\" (1.727 m)   Wt 124 lb 5.4 oz (56.4 kg)   SpO2 91%   BMI 18.91 kg/m²     General appearance: No apparent distress, appears stated age and cooperative. HEENT: Pupils equal, round, and reactive to light. Conjunctivae/corneas clear. Neck: Supple, with full range of motion. No jugular venous distention. Trachea midline. Respiratory:  Normal respiratory effort. Clear to auscultation, bilaterally without Rales/Wheezes/Rhonchi.   Cardiovascular: Regular rate and rhythm with normal S1/S2 without murmurs, rubs or gallops. Abdomen: Soft, non-tender, non-distended with normal bowel sounds. Musculoskeletal: No clubbing, cyanosis or edema bilaterally. Full range of motion without deformity. Skin: Skin color, texture, turgor normal.  No rashes or lesions. Neurologic:  Neurovascularly intact without any focal sensory/motor deficits. Cranial nerves: II-XII intact, grossly non-focal.  Psychiatric: Alert and oriented, thought content appropriate, normal insight  Capillary Refill: Brisk, 3 seconds, normal   Peripheral Pulses: +2 palpable, equal bilaterally       Labs:   Recent Labs     01/16/23  0714 01/16/23  1349 01/16/23  1603 01/17/23  0724   WBC 5.2  --  4.9 4.9   HGB 7.3* 7.1* 7.0* 7.1*   HCT 23.3* 22.5* 22.6* 23.5*   *  --  102* 119*       Recent Labs     01/15/23  0539 01/16/23  0714 01/17/23  0724   * 135* 137   K 5.8* 4.5 4.6   CL 94* 98* 98*   CO2 27 31 26   BUN 54* 26* 33*   CREATININE 9.7* 6.2* 7.5*   CALCIUM 8.6 8.5 8.9   PHOS  --   --  1.5*       Recent Labs     01/14/23  1859 01/15/23  0539   AST 18 14*   ALT 7* 6*   BILITOT 0.4 0.3   ALKPHOS 148* 131*       Recent Labs     01/16/23  1603   INR 1.29*     Recent Labs     01/14/23  1859 01/14/23 2013   TROPONINI 0.08* 0.08*         Urinalysis:    No results found for: Warren Rocker, BACTERIA, RBCUA, BLOODU, SPECGRAV, GLUCOSEU    Radiology:  XR PELVIS (1-2 VIEWS)   Final Result         1. Right hip: No discrete fracture or acute deformity involving the right hip with superior joint space loss   2. Left hip: No acute fracture. Mild superior joint space loss. 3. AP view of pelvis: No acute bony defect in the pelvis. LEFT KNEE:      AP and lateral views obtained. FINDINGS there is lateral compartment narrowing. There is mild patellofemoral compartment narrowing. No soft tissue swelling or discrete fracture. IMPRESSION:      Mild joint space loss but no acute fracture.          XR KNEE LEFT (1-2 VIEWS)   Final Result 1. Right hip: No discrete fracture or acute deformity involving the right hip with superior joint space loss   2. Left hip: No acute fracture. Mild superior joint space loss. 3. AP view of pelvis: No acute bony defect in the pelvis. LEFT KNEE:      AP and lateral views obtained. FINDINGS there is lateral compartment narrowing. There is mild patellofemoral compartment narrowing. No soft tissue swelling or discrete fracture. IMPRESSION:      Mild joint space loss but no acute fracture. CT CERVICAL SPINE WO CONTRAST   Final Result      No acute hemorrhage, edema or hydrocephalus. Mild cerebral atrophic changes are noted without ventricular dilation. Remote small low density lacunar infarct noted in the left thalamus and right basal ganglia. Mucosal thickening involving the maxillary sinuses. CT scan of the CERVICAL SPINE on 1/14/2023   HISTORY: Neck pain, trauma, fell, blood thinners evaluate for fracture, disc herniation or spinal canal stenosis. PROCEDURE: Dose modulation, radiation reducing techniques and iterative reconstruction techniques utilized to reduce radiation exposure with up-to-date CT equipment. CT scan of the cervical spine was performed utilizing contiguous 2.5 cm axial sections with multiple sagittal and coronal reconstructed sequences. These were reviewed under separate computerized work station. COMPARISON: None      FINDINGS:       There is normal alignment of the cervical vertebral bodies with no sign of spondylolisthesis or fracture. The skull base appears intact. Examination of the individual disk spaces reveals no sign of any acute bony defect or cord compression. The C2-C3, C3-C4, C4-C5, C5-6, C6-C7 and C7-T1 disc space levels are normally aligned without definitive acute abnormalities. Facet hypertrophy is    noted C2-3 greater on the left at C3-4 greater on the left at C4-5 greater on the left.  Asymmetric uncinate foraminal spurring and some disc space loss are noted at C5-6 and C6-7 levels      Incidental note made of vascular calcifications involving the carotid bifurcations as well as the vertebral arteries and basilar artery. Incidental note made of some right apical lung consolidation and chronic emphysematous changes throughout the lungs. No pneumothorax. IMPRESSION:       1. No acute fracture with disc degeneration spondylosis and asymmetric facet hypertrophy as described above   2. Normal alignment. 3. Incidental note made of chronic vascular calcifications involving the carotid arteries and vertebral arteries and basilar artery and apical consolidation in the right lung and emphysematous change      CT HEAD WO CONTRAST   Final Result      No acute hemorrhage, edema or hydrocephalus. Mild cerebral atrophic changes are noted without ventricular dilation. Remote small low density lacunar infarct noted in the left thalamus and right basal ganglia. Mucosal thickening involving the maxillary sinuses. CT scan of the CERVICAL SPINE on 1/14/2023   HISTORY: Neck pain, trauma, fell, blood thinners evaluate for fracture, disc herniation or spinal canal stenosis. PROCEDURE: Dose modulation, radiation reducing techniques and iterative reconstruction techniques utilized to reduce radiation exposure with up-to-date CT equipment. CT scan of the cervical spine was performed utilizing contiguous 2.5 cm axial sections with multiple sagittal and coronal reconstructed sequences. These were reviewed under separate computerized work station. COMPARISON: None      FINDINGS:       There is normal alignment of the cervical vertebral bodies with no sign of spondylolisthesis or fracture. The skull base appears intact. Examination of the individual disk spaces reveals no sign of any acute bony defect or cord compression.  The C2-C3, C3-C4, C4-C5, C5-6, C6-C7 and C7-T1 disc space levels are normally aligned without definitive acute abnormalities. Facet hypertrophy is    noted C2-3 greater on the left at C3-4 greater on the left at C4-5 greater on the left. Asymmetric uncinate foraminal spurring and some disc space loss are noted at C5-6 and C6-7 levels      Incidental note made of vascular calcifications involving the carotid bifurcations as well as the vertebral arteries and basilar artery. Incidental note made of some right apical lung consolidation and chronic emphysematous changes throughout the lungs. No pneumothorax. IMPRESSION:       1. No acute fracture with disc degeneration spondylosis and asymmetric facet hypertrophy as described above   2. Normal alignment. 3. Incidental note made of chronic vascular calcifications involving the carotid arteries and vertebral arteries and basilar artery and apical consolidation in the right lung and emphysematous change      XR CHEST (2 VW)   Final Result      Hyperinflated lungs and borderline cardiac enlargement. No acute chest wall deformity appreciated. XR ELBOW LEFT (MIN 3 VIEWS)   Final Result      No sign of any acute left elbow fracture or radiopaque foreign body. Mild joint space loss         XR HIP 2-3 VW W PELVIS LEFT   Final Result      No discrete fracture or acute deformity involving the left hip      No acute bony defect in the pelvis. Slightly irregular superior pubic ramus and inferior pubic ramus on the right which could be due to positioning or prior fracture, correlate clinically      THREE VIEWS OF THE LEFT KNEE on 1/14/2023      HISTORY:  Knee pain      PROCEDURE: AP, lateral and sunrise patellofemoral views were obtained. FINDINGS:       There is no sign of a discrete fracture or dislocation. Demineralization is noted. Some lateral condylar spurring is appreciated. Mild patellofemoral joint space loss noted      There is no acute bony defect identified or loose body.  There is no radiopaque foreign body IMPRESSION:       No sign of any fracture or acute deformity. Demineralization with some lateral compartment narrowing and patellofemoral compartment narrowing. XR KNEE LEFT (1-2 VIEWS)   Final Result      No discrete fracture or acute deformity involving the left hip      No acute bony defect in the pelvis. Slightly irregular superior pubic ramus and inferior pubic ramus on the right which could be due to positioning or prior fracture, correlate clinically      THREE VIEWS OF THE LEFT KNEE on 1/14/2023      HISTORY:  Knee pain      PROCEDURE: AP, lateral and sunrise patellofemoral views were obtained. FINDINGS:       There is no sign of a discrete fracture or dislocation. Demineralization is noted. Some lateral condylar spurring is appreciated. Mild patellofemoral joint space loss noted      There is no acute bony defect identified or loose body. There is no radiopaque foreign body      IMPRESSION:       No sign of any fracture or acute deformity. Demineralization with some lateral compartment narrowing and patellofemoral compartment narrowing. IP CONSULT TO NEPHROLOGY  IP CONSULT TO HOSPITALIST  IP CONSULT TO NEPHROLOGY  IP CONSULT TO DIETITIAN  IP CONSULT TO GI    Assessment/Plan:      -Symptomatic anemia likely due to CKD, possible GI bleed. .. Hemoglobin 6.2-6.7 on admission, baseline 8-9--- no overt bleeding but FOBT is +ve. .-   S/p 1 unit PRBC tranfusion 1/15--improved to 7.2-continue to monitor H&H and transfuse for hemoglobin less than 7  holding Eliquis ,  TEODORA per nephrology  GI consult appreciated--EGD and colonoscopy today    -Chronic pancytopenia-bone marrow biopsy 11/15/22 was normal-leukopenia has recovered-transfuse for hemoglobin less than 7, platelets less than 50 given anticoagulation--reviewed prior oncology consults, no etiology was established as work-up was negative-continue to monitor    -ESRD on hemodialysis-4x/week-Mon/Tues/Thurs/Fri   follow-up with nephrology    -Hyperkalemia--HD scheduled today    -Chronic combined systolic/diastolic heart failure/NICM, EF 35 to 00%, grade 3 diastolic dysfunction-compensated continue Coreg-not on ACE/ARB given renal failure/hyperkalemia--follow-up with cardiology outpatient    -Hypothyroidism-TSH is suppressed below therapeutic levels-decreased dose of Synthroid from 100-75mcg--repeat TSH in 6 weeks    -Hyperlipidemia-continue statin    -LBYM-uunkpe-rclgkgnk bronchodilators    -History of pulmonary embolism--resume Eliquis if no further signs of bleeding cleared by GI    -Essential hypertension-stable-continue Coreg    DVT Prophylaxis: Eliquis on hold  Diet: Diet NPO Exceptions are: Sips of Water with Meds  Code Status: Full Code  PT/OT Eval Status:     Dispo -.   Discharge to SNF after GI eval--likely within 24 hours if clinically stable          Michelet Vieyra MD

## 2023-01-17 NOTE — DISCHARGE SUMMARY
Hospital Discharge Summary    Patient's PCP: Uziel Zamarripa MD  Admit Date: 1/14/2023   Discharge Date: 1/17/2023    Admitting Physician: Dr. Serene Lyn MD  Discharge Physician: Dr. Serene Lyn MD   Consults: GI and nephrology    Brief HPI:     74 y.o. male nursing home resident with significant past medical history of end-stage renal disease on hemodialysis on chronic anticoagulation for DVTs, combined systolic and diastolic heart failure, chronic hypoxic and hypercapnic respiratory failure secondary to COPD (2 L continuous) who presented to [facility] ED with fatigue. Patient reports fatigue with decreased p.o. intake for the past couple of days and has had a couple falls at the nursing home today and 1 in the ED. He denies any fevers, chills, headaches, nausea, vomiting, diarrhea, shortness of breath or chest pain. He does report some lightheadedness, generalized joint pain and blurred vision. At baseline he uses a cane and for long distance wheelchair. In emergency room his temperature was 98.1, blood pressure 118/88, heart rate 87, respirations 23, sats 100% on 2 L. Brief hospital course:     Admitted and treated for the following:    -Symptomatic anemia likely due to CKD, possible GI bleed. .. Hemoglobin 6.2-6.7 on admission, baseline 8-9--- no overt bleeding but FOBT is +ve. .-   S/p 1 unit PRBC tranfusion 1/15--improved to 7.2-continue to monitor H&H and transfuse for hemoglobin less than 7  helding Eliquis resume 3 days post d/c per GI  TEODORA per nephrology  S/p EGD and colonoscopy 1/17/23  Small duodenal ulcers, erosive gastritis, hiatal hernia, 4 mm colonic polyp. .  No evidence of bleeding  Continue PPI     -Chronic pancytopenia-bone marrow biopsy 11/15/22 was normal-leukopenia has recovered-transfuse for hemoglobin less than 7, platelets less than 50 given anticoagulation--reviewed prior oncology consults, no etiology was established as work-up was negative-continue to monitor     -ESRD on hemodialysis-4x/week-Mon/Tues/Thurs/Fri   follow-up with nephrology     -Hyperkalemia--HD scheduled today     -Chronic combined systolic/diastolic heart failure/NICM, EF 35 to 22%, grade 3 diastolic dysfunction-compensated continue Coreg-not on ACE/ARB given renal failure/hyperkalemia--follow-up with cardiology outpatient     -Hypothyroidism-TSH is suppressed below therapeutic levels-decreased dose of Synthroid from 100-75mcg--repeat TSH in 6 weeks     -Hyperlipidemia-continue statin     -ECKH-hdyunz-ibnhipeq bronchodilators     -History of pulmonary embolism--resume Eliquis in 3 days post d/c     -Essential hypertension-stable-continue Coreg        Invasive procedures:  See above    Discharge Diagnoses:   Principal Problem:    Hyperkalemia  Active Problems:    Pancytopenia (HCC)    Chronic respiratory failure with hypoxia and hypercapnia (HCC)    Failure to thrive in adult    ESRD on dialysis ([facility] [address]    COPD (chronic obstructive pulmonary disease) ([facility] [address]    Essential hypertension    Hyperlipidemia    Chronic combined systolic and diastolic CHF (congestive heart failure) ([facility] [address]  Resolved Problems:    * No resolved hospital problems. *      Physical Exam: BP (!) 165/68   Pulse 86   Temp 97.9 °F (36.6 °C) (Infrared)   Resp 18   Ht 5' 8\" (1.727 m)   Wt 121 lb 0.5 oz (54.9 kg)   SpO2 100%   BMI 18.40 kg/m²   Gen/overall appearance: Not in acute distress. Alert. Head: Normocephalic, atraumatic  Eyes: EOMI, good acuity  ENT:- Oral mucosa moist  Neck: No JVD, thyromegaly  CVS: Nml S1S2, no MRG, RRR  Pulm: Clear bilaterally. No crackles/wheezes  Gastrointestinal: Soft, NT/ND, +BS  Musculoskeletal: No edema. Warm  Neuro: No focal deficit. Moves extremity spontaneously. Psychiatry: Appropriate affect. Not agitated. Skin: Warm, dry with normal turgor. No rash        Significant Diagnostic Studies:    See above      Treatments: As above.       Discharge Medications:     Medication List        START taking these medications      pantoprazole 40 MG tablet  Commonly known as: PROTONIX  Take 1 tablet by mouth every morning (before breakfast)  Start taking on: January 18, 2023            CHANGE how you take these medications      apixaban 2.5 MG Tabs tablet  Commonly known as: ELIQUIS  Take 1 tablet by mouth 2 times daily Resume 1/20/23  Start taking on: January 20, 2023  What changed:   additional instructions  These instructions start on January 20, 2023. If you are unsure what to do until then, ask your doctor or other care provider. ferrous sulfate 325 (65 Fe) MG EC tablet  Commonly known as: Fe Tabs  Take 1 tablet by mouth every other day  What changed: when to take this            CONTINUE taking these medications      albuterol sulfate  (90 Base) MCG/ACT inhaler  Commonly known as: PROVENTIL;VENTOLIN;PROAIR     allopurinol 100 MG tablet  Commonly known as: ZYLOPRIM     atorvastatin 80 MG tablet  Commonly known as: LIPITOR     calcitRIOL 0.25 MCG capsule  Commonly known as: ROCALTROL     calcium carb-cholecalciferol 600-200 MG-UNIT Tabs tablet     calcium carbonate 500 MG chewable tablet  Commonly known as: TUMS     carvedilol 3.125 MG tablet  Commonly known as: COREG  Take 1 tablet by mouth 2 times daily     diclofenac sodium 1 % Gel  Commonly known as: VOLTAREN  Apply 2 g topically 2 times daily     gabapentin 100 MG capsule  Commonly known as: NEURONTIN  Take 1 capsule by mouth at bedtime for 30 days. Incruse Ellipta 62.5 MCG/ACT Aepb  Generic drug: Umeclidinium Bromide     levothyroxine 100 MCG tablet  Commonly known as: SYNTHROID     sevelamer 800 MG tablet  Commonly known as: RENVELA     sodium chloride 0.65 % nasal spray  Commonly known as: OCEAN  1-2 sprays both nostrils at least daily and as needed to prevent dry mucosa.             STOP taking these medications      omeprazole 20 MG delayed release capsule  Commonly known as: PRILOSEC            ASK your doctor about these medications vitamin D 1.25 MG (84708 UT) Caps capsule  Commonly known as: ERGOCALCIFEROL               Where to Get Your Medications        Information about where to get these medications is not yet available    Ask your nurse or doctor about these medications  apixaban 2.5 MG Tabs tablet  pantoprazole 40 MG tablet         Activity: activity as tolerated  Diet: ADULT DIET; Regular  ADULT ORAL NUTRITION SUPPLEMENT; Dinner, Breakfast; Renal Oral Supplement      Disposition: SNF  Discharged Condition: Stable  Follow Up:   No follow-up provider specified. Code status:  Full Code         Total time spent on discharge, finalizing medications, referrals and arranging outpatient follow up was more than 45 minutes      Thank you Dr. Oswaldo Adair MD for the opportunity to be involved in this patients care.

## 2023-01-17 NOTE — PROGRESS NOTES
Okay to PA home from GI standpoint. See colonoscopy and EGD reports in the chart.   Restart Eliquis after 3 days    Nikki Mckeon MD

## 2023-01-17 NOTE — OP NOTE
[address]  and [facility]  Esophagogastroduodenoscopy Note        Patient: Kelsey Ferguson  : 1955     Procedure: Esophagogastroduodenoscopy with biopsy    Date:  2023     Anesthesia: MAC    Indications: Anemia. Description of Procedure:  Informed consent was obtained from the patient after explanation of indications, benefits and possible risks and complications of the procedure. The procedure was done at bedside. Patient was placed in the left lateral decubitus position and the above IV sedation was administrered. The Olympus videoendoscope was placed in the patient's mouth and under direct visualization passed into the esophagus. The scope was then advanced into the stomach and then into the second portion of the duodenum. Esophagus showed nodularity at GE junction with some inflammation. Biopsies were taken  Sliding hiatal hernia noted  The stomach showed erosive gastritis in the antrum biopsies were taken  The duodenum showed 2 small clean-based duodenal ulcers and duodenitis. Biopsies were taken from distal duodenum for celiac disease. Retroflexed views of the cardia and fundus showed no other abnormalities. The scope was anteflexed and the stomach was decompressed, and the scope was completely withdrawn. The patient tolerated the procedure well.     EBL: None    Impression:    Duodenal ulcers as described above  Erosive gastritis  GE junction nodularity  Hiatal hernia    Recommendations:     PPIs  Follow-up on biopsy results  Proceed with colonoscopy    Mary Caldera MD, MD  [address] [facility] Alert and oriented, no focal deficits, no motor or sensory deficits.

## 2023-01-23 ENCOUNTER — HOSPITAL ENCOUNTER (INPATIENT)
Age: 68
LOS: 5 days | Discharge: NURSING, REHAB OR LONG-TERM CARE | DRG: 604 | End: 2023-01-29
Attending: EMERGENCY MEDICINE | Admitting: INTERNAL MEDICINE
Payer: MEDICARE

## 2023-01-23 ENCOUNTER — APPOINTMENT (OUTPATIENT)
Dept: GENERAL RADIOLOGY | Age: 68
DRG: 604 | End: 2023-01-23
Payer: MEDICARE

## 2023-01-23 DIAGNOSIS — T14.8XXA INTRAMUSCULAR HEMATOMA: Primary | ICD-10-CM

## 2023-01-23 DIAGNOSIS — Z79.01 ANTICOAGULATED: ICD-10-CM

## 2023-01-23 DIAGNOSIS — D64.9 CHRONIC ANEMIA: ICD-10-CM

## 2023-01-23 PROCEDURE — 71045 X-RAY EXAM CHEST 1 VIEW: CPT

## 2023-01-23 PROCEDURE — 84484 ASSAY OF TROPONIN QUANT: CPT

## 2023-01-23 PROCEDURE — 6370000000 HC RX 637 (ALT 250 FOR IP): Performed by: EMERGENCY MEDICINE

## 2023-01-23 PROCEDURE — 80048 BASIC METABOLIC PNL TOTAL CA: CPT

## 2023-01-23 PROCEDURE — 99285 EMERGENCY DEPT VISIT HI MDM: CPT

## 2023-01-23 PROCEDURE — 85025 COMPLETE CBC W/AUTO DIFF WBC: CPT

## 2023-01-23 PROCEDURE — 85520 HEPARIN ASSAY: CPT

## 2023-01-23 PROCEDURE — 36415 COLL VENOUS BLD VENIPUNCTURE: CPT

## 2023-01-23 PROCEDURE — 93005 ELECTROCARDIOGRAM TRACING: CPT | Performed by: EMERGENCY MEDICINE

## 2023-01-23 PROCEDURE — 83880 ASSAY OF NATRIURETIC PEPTIDE: CPT

## 2023-01-23 RX ORDER — OXYCODONE HYDROCHLORIDE 5 MG/1
5 TABLET ORAL ONCE
Status: COMPLETED | OUTPATIENT
Start: 2023-01-23 | End: 2023-01-23

## 2023-01-23 RX ADMIN — OXYCODONE HYDROCHLORIDE 5 MG: 5 TABLET ORAL at 23:44

## 2023-01-23 ASSESSMENT — PAIN SCALES - GENERAL: PAINLEVEL_OUTOF10: 6

## 2023-01-24 ENCOUNTER — APPOINTMENT (OUTPATIENT)
Dept: CT IMAGING | Age: 68
DRG: 604 | End: 2023-01-24
Payer: MEDICARE

## 2023-01-24 PROBLEM — D62 ACUTE BLOOD LOSS ANEMIA (ABLA): Status: ACTIVE | Noted: 2023-01-24

## 2023-01-24 LAB
ABO/RH: NORMAL
ANION GAP SERPL CALCULATED.3IONS-SCNC: 13 MMOL/L (ref 3–16)
ANTI-XA UNFRAC HEPARIN: >1.1 IU/ML (ref 0.3–0.7)
ANTIBODY SCREEN: NORMAL
BASOPHILS ABSOLUTE: 0 K/UL (ref 0–0.2)
BASOPHILS ABSOLUTE: 0 K/UL (ref 0–0.2)
BASOPHILS RELATIVE PERCENT: 0.3 %
BASOPHILS RELATIVE PERCENT: 0.4 %
BUN BLDV-MCNC: 35 MG/DL (ref 7–20)
CALCIUM SERPL-MCNC: 9 MG/DL (ref 8.3–10.6)
CHLORIDE BLD-SCNC: 95 MMOL/L (ref 99–110)
CO2: 28 MMOL/L (ref 21–32)
CREAT SERPL-MCNC: 8.7 MG/DL (ref 0.8–1.3)
EKG ATRIAL RATE: 75 BPM
EKG ATRIAL RATE: 87 BPM
EKG DIAGNOSIS: NORMAL
EKG DIAGNOSIS: NORMAL
EKG P AXIS: 64 DEGREES
EKG P AXIS: 72 DEGREES
EKG P-R INTERVAL: 252 MS
EKG P-R INTERVAL: 270 MS
EKG Q-T INTERVAL: 386 MS
EKG Q-T INTERVAL: 404 MS
EKG QRS DURATION: 94 MS
EKG QRS DURATION: 98 MS
EKG QTC CALCULATION (BAZETT): 431 MS
EKG QTC CALCULATION (BAZETT): 486 MS
EKG R AXIS: -38 DEGREES
EKG R AXIS: 74 DEGREES
EKG T AXIS: 100 DEGREES
EKG T AXIS: 102 DEGREES
EKG VENTRICULAR RATE: 75 BPM
EKG VENTRICULAR RATE: 87 BPM
EOSINOPHILS ABSOLUTE: 0.3 K/UL (ref 0–0.6)
EOSINOPHILS ABSOLUTE: 0.3 K/UL (ref 0–0.6)
EOSINOPHILS RELATIVE PERCENT: 4.9 %
EOSINOPHILS RELATIVE PERCENT: 5.2 %
FERRITIN: 1050 NG/ML (ref 30–400)
GFR SERPL CREATININE-BSD FRML MDRD: 6 ML/MIN/{1.73_M2}
GLUCOSE BLD-MCNC: 86 MG/DL (ref 70–99)
HCT VFR BLD CALC: 22.2 % (ref 40.5–52.5)
HCT VFR BLD CALC: 23.7 % (ref 40.5–52.5)
HCT VFR BLD CALC: 26.5 % (ref 40.5–52.5)
HCT VFR BLD CALC: 27.1 % (ref 40.5–52.5)
HEMOGLOBIN: 7 G/DL (ref 13.5–17.5)
HEMOGLOBIN: 7.3 G/DL (ref 13.5–17.5)
HEMOGLOBIN: 8.2 G/DL (ref 13.5–17.5)
HEMOGLOBIN: 8.3 G/DL (ref 13.5–17.5)
IMMATURE RETIC FRACT: 0.54 (ref 0.21–0.37)
IRON SATURATION: 32 % (ref 20–50)
IRON: 76 UG/DL (ref 59–158)
LYMPHOCYTES ABSOLUTE: 0.5 K/UL (ref 1–5.1)
LYMPHOCYTES ABSOLUTE: 0.7 K/UL (ref 1–5.1)
LYMPHOCYTES RELATIVE PERCENT: 11.4 %
LYMPHOCYTES RELATIVE PERCENT: 7.5 %
MCH RBC QN AUTO: 28.5 PG (ref 26–34)
MCH RBC QN AUTO: 28.7 PG (ref 26–34)
MCHC RBC AUTO-ENTMCNC: 31.1 G/DL (ref 31–36)
MCHC RBC AUTO-ENTMCNC: 31.3 G/DL (ref 31–36)
MCV RBC AUTO: 91.6 FL (ref 80–100)
MCV RBC AUTO: 91.7 FL (ref 80–100)
MONOCYTES ABSOLUTE: 0.5 K/UL (ref 0–1.3)
MONOCYTES ABSOLUTE: 0.6 K/UL (ref 0–1.3)
MONOCYTES RELATIVE PERCENT: 10.3 %
MONOCYTES RELATIVE PERCENT: 7.5 %
NEUTROPHILS ABSOLUTE: 4.3 K/UL (ref 1.7–7.7)
NEUTROPHILS ABSOLUTE: 5 K/UL (ref 1.7–7.7)
NEUTROPHILS RELATIVE PERCENT: 73 %
NEUTROPHILS RELATIVE PERCENT: 79.5 %
PDW BLD-RTO: 17.3 % (ref 12.4–15.4)
PDW BLD-RTO: 17.7 % (ref 12.4–15.4)
PLATELET # BLD: 57 K/UL (ref 135–450)
PLATELET # BLD: 65 K/UL (ref 135–450)
PMV BLD AUTO: 7.6 FL (ref 5–10.5)
PMV BLD AUTO: 8.1 FL (ref 5–10.5)
POTASSIUM REFLEX MAGNESIUM: 4.5 MMOL/L (ref 3.5–5.1)
PRO-BNP: ABNORMAL PG/ML (ref 0–124)
RBC # BLD: 2.43 M/UL (ref 4.2–5.9)
RBC # BLD: 2.89 M/UL (ref 4.2–5.9)
RETICULOCYTE ABSOLUTE COUNT: 0.1 M/UL
RETICULOCYTE COUNT PCT: 3.75 % (ref 0.5–2.18)
SODIUM BLD-SCNC: 136 MMOL/L (ref 136–145)
TOTAL IRON BINDING CAPACITY: 240 UG/DL (ref 260–445)
TROPONIN: 0.09 NG/ML
TROPONIN: 0.11 NG/ML
WBC # BLD: 5.9 K/UL (ref 4–11)
WBC # BLD: 6.2 K/UL (ref 4–11)

## 2023-01-24 PROCEDURE — 36415 COLL VENOUS BLD VENIPUNCTURE: CPT

## 2023-01-24 PROCEDURE — 5A1D70Z PERFORMANCE OF URINARY FILTRATION, INTERMITTENT, LESS THAN 6 HOURS PER DAY: ICD-10-PCS | Performed by: INTERNAL MEDICINE

## 2023-01-24 PROCEDURE — 85045 AUTOMATED RETICULOCYTE COUNT: CPT

## 2023-01-24 PROCEDURE — 84484 ASSAY OF TROPONIN QUANT: CPT

## 2023-01-24 PROCEDURE — 93005 ELECTROCARDIOGRAM TRACING: CPT | Performed by: HOSPITALIST

## 2023-01-24 PROCEDURE — 86900 BLOOD TYPING SEROLOGIC ABO: CPT

## 2023-01-24 PROCEDURE — 2700000000 HC OXYGEN THERAPY PER DAY

## 2023-01-24 PROCEDURE — 2060000000 HC ICU INTERMEDIATE R&B

## 2023-01-24 PROCEDURE — 6360000004 HC RX CONTRAST MEDICATION: Performed by: EMERGENCY MEDICINE

## 2023-01-24 PROCEDURE — 82746 ASSAY OF FOLIC ACID SERUM: CPT

## 2023-01-24 PROCEDURE — 2580000003 HC RX 258: Performed by: INTERNAL MEDICINE

## 2023-01-24 PROCEDURE — 82607 VITAMIN B-12: CPT

## 2023-01-24 PROCEDURE — C9113 INJ PANTOPRAZOLE SODIUM, VIA: HCPCS | Performed by: INTERNAL MEDICINE

## 2023-01-24 PROCEDURE — 83550 IRON BINDING TEST: CPT

## 2023-01-24 PROCEDURE — 6370000000 HC RX 637 (ALT 250 FOR IP): Performed by: HOSPITALIST

## 2023-01-24 PROCEDURE — P9016 RBC LEUKOCYTES REDUCED: HCPCS

## 2023-01-24 PROCEDURE — 83540 ASSAY OF IRON: CPT

## 2023-01-24 PROCEDURE — 74177 CT ABD & PELVIS W/CONTRAST: CPT

## 2023-01-24 PROCEDURE — 94761 N-INVAS EAR/PLS OXIMETRY MLT: CPT

## 2023-01-24 PROCEDURE — 6360000002 HC RX W HCPCS: Performed by: INTERNAL MEDICINE

## 2023-01-24 PROCEDURE — 85018 HEMOGLOBIN: CPT

## 2023-01-24 PROCEDURE — 86923 COMPATIBILITY TEST ELECTRIC: CPT

## 2023-01-24 PROCEDURE — 86850 RBC ANTIBODY SCREEN: CPT

## 2023-01-24 PROCEDURE — 70450 CT HEAD/BRAIN W/O DYE: CPT

## 2023-01-24 PROCEDURE — 85014 HEMATOCRIT: CPT

## 2023-01-24 PROCEDURE — 85025 COMPLETE CBC W/AUTO DIFF WBC: CPT

## 2023-01-24 PROCEDURE — 6370000000 HC RX 637 (ALT 250 FOR IP): Performed by: INTERNAL MEDICINE

## 2023-01-24 PROCEDURE — 82728 ASSAY OF FERRITIN: CPT

## 2023-01-24 PROCEDURE — 93010 ELECTROCARDIOGRAM REPORT: CPT | Performed by: INTERNAL MEDICINE

## 2023-01-24 PROCEDURE — 86901 BLOOD TYPING SEROLOGIC RH(D): CPT

## 2023-01-24 RX ORDER — SODIUM CHLORIDE 9 MG/ML
1000 INJECTION, SOLUTION INTRAVENOUS CONTINUOUS
Status: DISCONTINUED | OUTPATIENT
Start: 2023-01-24 | End: 2023-01-24

## 2023-01-24 RX ORDER — SODIUM CHLORIDE 9 MG/ML
INJECTION, SOLUTION INTRAVENOUS PRN
Status: DISCONTINUED | OUTPATIENT
Start: 2023-01-24 | End: 2023-01-29 | Stop reason: HOSPADM

## 2023-01-24 RX ORDER — ACETAMINOPHEN 650 MG/1
650 SUPPOSITORY RECTAL EVERY 6 HOURS PRN
Status: DISCONTINUED | OUTPATIENT
Start: 2023-01-24 | End: 2023-01-29 | Stop reason: HOSPADM

## 2023-01-24 RX ORDER — FERROUS SULFATE 325(65) MG
325 TABLET ORAL EVERY OTHER DAY
COMMUNITY

## 2023-01-24 RX ORDER — ALLOPURINOL 100 MG/1
100 TABLET ORAL
Status: DISCONTINUED | OUTPATIENT
Start: 2023-01-25 | End: 2023-01-29 | Stop reason: HOSPADM

## 2023-01-24 RX ORDER — SODIUM CHLORIDE 0.9 % (FLUSH) 0.9 %
10 SYRINGE (ML) INJECTION EVERY 12 HOURS SCHEDULED
Status: DISCONTINUED | OUTPATIENT
Start: 2023-01-24 | End: 2023-01-29 | Stop reason: HOSPADM

## 2023-01-24 RX ORDER — OXYCODONE HYDROCHLORIDE 5 MG/1
2.5 TABLET ORAL
Status: COMPLETED | OUTPATIENT
Start: 2023-01-24 | End: 2023-01-24

## 2023-01-24 RX ORDER — GABAPENTIN 100 MG/1
100 CAPSULE ORAL
COMMUNITY

## 2023-01-24 RX ORDER — CARVEDILOL 3.12 MG/1
3.12 TABLET ORAL 2 TIMES DAILY
Status: DISCONTINUED | OUTPATIENT
Start: 2023-01-24 | End: 2023-01-29 | Stop reason: HOSPADM

## 2023-01-24 RX ORDER — IPRATROPIUM BROMIDE AND ALBUTEROL SULFATE 2.5; .5 MG/3ML; MG/3ML
1 SOLUTION RESPIRATORY (INHALATION) EVERY 4 HOURS PRN
Status: DISCONTINUED | OUTPATIENT
Start: 2023-01-24 | End: 2023-01-29 | Stop reason: HOSPADM

## 2023-01-24 RX ORDER — SODIUM CHLORIDE 0.9 % (FLUSH) 0.9 %
10 SYRINGE (ML) INJECTION PRN
Status: DISCONTINUED | OUTPATIENT
Start: 2023-01-24 | End: 2023-01-29 | Stop reason: HOSPADM

## 2023-01-24 RX ORDER — LEVOTHYROXINE SODIUM 0.07 MG/1
75 TABLET ORAL
Status: DISCONTINUED | OUTPATIENT
Start: 2023-01-25 | End: 2023-01-29 | Stop reason: HOSPADM

## 2023-01-24 RX ORDER — PANTOPRAZOLE SODIUM 40 MG/10ML
40 INJECTION, POWDER, LYOPHILIZED, FOR SOLUTION INTRAVENOUS 2 TIMES DAILY
Status: DISCONTINUED | OUTPATIENT
Start: 2023-01-24 | End: 2023-01-28 | Stop reason: SDUPTHER

## 2023-01-24 RX ORDER — ACETAMINOPHEN 325 MG/1
650 TABLET ORAL EVERY 6 HOURS PRN
Status: DISCONTINUED | OUTPATIENT
Start: 2023-01-24 | End: 2023-01-29 | Stop reason: HOSPADM

## 2023-01-24 RX ORDER — ATORVASTATIN CALCIUM 80 MG/1
80 TABLET, FILM COATED ORAL NIGHTLY
Status: DISCONTINUED | OUTPATIENT
Start: 2023-01-24 | End: 2023-01-29 | Stop reason: HOSPADM

## 2023-01-24 RX ORDER — POTASSIUM CHLORIDE 7.45 MG/ML
10 INJECTION INTRAVENOUS PRN
Status: DISCONTINUED | OUTPATIENT
Start: 2023-01-24 | End: 2023-01-24 | Stop reason: CLARIF

## 2023-01-24 RX ORDER — PROMETHAZINE HYDROCHLORIDE 25 MG/1
12.5 TABLET ORAL EVERY 6 HOURS PRN
Status: DISCONTINUED | OUTPATIENT
Start: 2023-01-24 | End: 2023-01-29 | Stop reason: HOSPADM

## 2023-01-24 RX ORDER — ONDANSETRON 2 MG/ML
4 INJECTION INTRAMUSCULAR; INTRAVENOUS EVERY 6 HOURS PRN
Status: DISCONTINUED | OUTPATIENT
Start: 2023-01-24 | End: 2023-01-29 | Stop reason: HOSPADM

## 2023-01-24 RX ORDER — SEVELAMER CARBONATE 800 MG/1
800 TABLET, FILM COATED ORAL
Status: DISCONTINUED | OUTPATIENT
Start: 2023-01-24 | End: 2023-01-29 | Stop reason: HOSPADM

## 2023-01-24 RX ORDER — CALCITRIOL 0.25 UG/1
0.5 CAPSULE, LIQUID FILLED ORAL
Status: DISCONTINUED | OUTPATIENT
Start: 2023-01-25 | End: 2023-01-29 | Stop reason: HOSPADM

## 2023-01-24 RX ORDER — MAGNESIUM SULFATE IN WATER 40 MG/ML
2000 INJECTION, SOLUTION INTRAVENOUS PRN
Status: DISCONTINUED | OUTPATIENT
Start: 2023-01-24 | End: 2023-01-24 | Stop reason: CLARIF

## 2023-01-24 RX ADMIN — CARVEDILOL 3.12 MG: 3.12 TABLET, FILM COATED ORAL at 19:56

## 2023-01-24 RX ADMIN — PANTOPRAZOLE SODIUM 40 MG: 40 INJECTION, POWDER, LYOPHILIZED, FOR SOLUTION INTRAVENOUS at 06:42

## 2023-01-24 RX ADMIN — SODIUM CHLORIDE, PRESERVATIVE FREE 10 ML: 5 INJECTION INTRAVENOUS at 08:14

## 2023-01-24 RX ADMIN — SEVELAMER CARBONATE 800 MG: 800 TABLET, FILM COATED ORAL at 18:31

## 2023-01-24 RX ADMIN — PANTOPRAZOLE SODIUM 40 MG: 40 INJECTION, POWDER, LYOPHILIZED, FOR SOLUTION INTRAVENOUS at 19:56

## 2023-01-24 RX ADMIN — IOPAMIDOL 80 ML: 755 INJECTION, SOLUTION INTRAVENOUS at 01:42

## 2023-01-24 RX ADMIN — SODIUM CHLORIDE 1000 ML: 9 INJECTION, SOLUTION INTRAVENOUS at 08:14

## 2023-01-24 RX ADMIN — ATORVASTATIN CALCIUM 80 MG: 80 TABLET, FILM COATED ORAL at 19:56

## 2023-01-24 RX ADMIN — OXYCODONE HYDROCHLORIDE 2.5 MG: 5 TABLET ORAL at 19:56

## 2023-01-24 RX ADMIN — SODIUM CHLORIDE, PRESERVATIVE FREE 10 ML: 5 INJECTION INTRAVENOUS at 20:18

## 2023-01-24 ASSESSMENT — PAIN DESCRIPTION - DESCRIPTORS: DESCRIPTORS: DISCOMFORT

## 2023-01-24 ASSESSMENT — PAIN DESCRIPTION - PAIN TYPE: TYPE: ACUTE PAIN

## 2023-01-24 ASSESSMENT — PAIN SCALES - GENERAL
PAINLEVEL_OUTOF10: 7
PAINLEVEL_OUTOF10: 7
PAINLEVEL_OUTOF10: 8
PAINLEVEL_OUTOF10: 3

## 2023-01-24 ASSESSMENT — PAIN DESCRIPTION - ONSET: ONSET: PROGRESSIVE

## 2023-01-24 ASSESSMENT — PAIN DESCRIPTION - FREQUENCY: FREQUENCY: CONTINUOUS

## 2023-01-24 ASSESSMENT — PAIN - FUNCTIONAL ASSESSMENT
PAIN_FUNCTIONAL_ASSESSMENT: 0-10
PAIN_FUNCTIONAL_ASSESSMENT: ACTIVITIES ARE NOT PREVENTED

## 2023-01-24 ASSESSMENT — PAIN DESCRIPTION - ORIENTATION
ORIENTATION: LEFT
ORIENTATION: LEFT

## 2023-01-24 ASSESSMENT — PAIN DESCRIPTION - LOCATION
LOCATION: BUTTOCKS

## 2023-01-24 NOTE — ED NOTES
Pt's hematoma in his left buttock is getting larger.  MD notified     Ilir Torres, RN  01/24/23 4711

## 2023-01-24 NOTE — ED PROVIDER NOTES
[facility]  EMERGENCY DEPARTMENT ENCOUNTER          ATTENDING PHYSICIAN NOTE       Date of evaluation: 1/23/2023    Chief Complaint     Fall and Shortness of Breath      History of Present Illness     Mike Rivera is a 67 y.o. 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  Patient states he has fallen 4 or 5 times in the past 2 days.  Today he fell onto his bottom and noted severe pain in his left buttock.  He feels as though there is a lump there.  Denies preceding lightheadedness or dizziness but simply states that his knees give out.  He does feel generally weak.  No fevers or chest pain.    Physical Exam     INITIAL VITALS: BP: (!) 147/78, Temp: 98.8 °F (37.1 °C), Heart Rate: 88, Resp: 23, SpO2: 100 %   Physical Exam      Nursing note and vitals reviewed.    General:  Adult male, alert and appropriately interactive. In no distress.  HENT: Normocephalic and atraumatic. External ears normal. Nose appears normal externally.  Eyes: Conjunctivae normal. No scleral icterus. PERRL  Neck: Neck supple. No tracheal deviation present.   CV: Normal rate. Regular rhythm. S1/S2 auscultated. No murmurs, gallops or rubs.   Pulm: Effort normal. Breath sounds clear to auscultation bilaterally. No wheezes. No rales or rhonchi.   GI: Soft. No distension. No tenderness. No rebound or guarding. No masses. No peritoneal signs.    Musculoskeletal: Left gluteal hematoma palpable, tender to palpation.  No edema. No gross deformities or bony tenderness of the extremities.  Pelvis is stable.  No tenderness of hips with logroll of legs.  Neurological: Alert and appropriately interactive. Face symmetric, speech without dysarthria or obvious aphasia. Moving all extremities spontaneously.   Skin: Warm, dry. No rash. No diaphoresis or erythema.  Psychiatric: Calm and cooperative with appropriate mood and affect.       ASSESSMENT / PLAN   (MEDICAL DECISION MAKING)   Parish Guzman is a 74 y.o. male who presents to the emergency department today after multiple falls in the setting of being on anticoagulation for VTE. On arrival, he is in no distress and vital signs are reassuring. He does have a palpable gluteal hematoma. Labs demonstrated initial hemoglobin of 8.2 from baseline between 7 and 8. CT demonstrates a large gluteal hematoma without findings of active contrast extravasation, though it is large enough to extend into his leg and is not fully imaged. CT head is without acute findings. Hemodynamically stable but did have a downtrend in his hemoglobin to 7.0. He has chronic thrombocytopenia as well. No indication for emergent reversal, though I do feel he will need very careful monitoring of his CBC and possible transfusion in the near future. No surgical intervention indicated. Troponins are stably elevated, BNP is severely elevated. With his general weakness, he may need additional dialysis for volume overload that may be contributing to his symptoms. Discussed with hospitalist who has accepted for further care and management. Medical Decision Making  Problems Addressed:  Anticoagulated: chronic illness or injury with exacerbation, progression, or side effects of treatment  Chronic anemia: chronic illness or injury with exacerbation, progression, or side effects of treatment  Intramuscular hematoma: complicated acute illness or injury that poses a threat to life or bodily functions    Amount and/or Complexity of Data Reviewed  External Data Reviewed: labs. Labs: ordered. Radiology: ordered. Risk  Prescription drug management. Decision regarding hospitalization. Clinical Impression     1. Intramuscular hematoma    2. Anticoagulated    3. Chronic anemia        Disposition     PATIENT REFERRED TO:  No follow-up provider specified.     DISCHARGE MEDICATIONS:  New Prescriptions    No medications on file DISPOSITION Admitted 01/24/2023 04:39:57 AM        Jazlyn Moore MD                 Diagnostic Results and Other Data         RADIOLOGY:  CT ABDOMEN PELVIS W IV CONTRAST Additional Contrast? None   Final Result      1. Trace amount of ascites along the liver margin and diffuse fatty infiltration liver and borderline size of the spleen appreciated. 2. Cardiac enlargement noted in the chest with small right-sided pleural effusion and airspace disease noted posterior medial left lung base, correlate for pneumonia   3. Scattered diffuse diverticulosis with chronic renal failure   4. Intermediate density lesions in the kidneys and scattered cysts in the kidneys. Intermediate density in the right mid kidney could be a solid mass measuring 1.9 x 1.7 cm   5. Previous surgery noted in the right lower quadrant, likely ileocolectomy or partial bowel surgery with some mucosal thickening. There is also scattered diverticulosis   6. Left posterior, inferior medial gluteal enlargement extending inferiorly likely representing gluteus medius hematoma, not included 100% on the imaging, extends beyond the imaging margin measuring up to 9.5 x 4.6 cm on axial image 121 and overall    height of the hematoma cannot be measured, appears to extend for greater than 6 or 8 cm         CT Head W/O Contrast   Final Result      Normal noncontrast CT the brain for the patient's age without acute hemorrhage, edema or hydrocephalus. Symmetric cerebral atrophy and some low-attenuation changes in the periventricular white matter and left thalamus and right posterior caudate nucleus      XR CHEST PORTABLE   Final Result      1. No acute process or consolidation   2.  Stable cardiac enlargement and tortuous aorta                   LABS:   Results for orders placed or performed during the hospital encounter of 01/23/23   CBC with Auto Differential   Result Value Ref Range    WBC 6.2 4.0 - 11.0 K/uL    RBC 2.89 (L) 4.20 - 5.90 M/uL Hemoglobin 8.2 (L) 13.5 - 17.5 g/dL    Hematocrit 26.5 (L) 40.5 - 52.5 %    MCV 91.6 80.0 - 100.0 fL    MCH 28.5 26.0 - 34.0 pg    MCHC 31.1 31.0 - 36.0 g/dL    RDW 17.7 (H) 12.4 - 15.4 %    Platelets 65 (L) 361 - 450 K/uL    MPV 8.1 5.0 - 10.5 fL    Neutrophils % 79.5 %    Lymphocytes % 7.5 %    Monocytes % 7.5 %    Eosinophils % 5.2 %    Basophils % 0.3 %    Neutrophils Absolute 5.0 1.7 - 7.7 K/uL    Lymphocytes Absolute 0.5 (L) 1.0 - 5.1 K/uL    Monocytes Absolute 0.5 0.0 - 1.3 K/uL    Eosinophils Absolute 0.3 0.0 - 0.6 K/uL    Basophils Absolute 0.0 0.0 - 0.2 K/uL   BMP w/ Reflex to MG   Result Value Ref Range    Sodium 136 136 - 145 mmol/L    Potassium reflex Magnesium 4.5 3.5 - 5.1 mmol/L    Chloride 95 (L) 99 - 110 mmol/L    CO2 28 21 - 32 mmol/L    Anion Gap 13 3 - 16    Glucose 86 70 - 99 mg/dL    BUN 35 (H) 7 - 20 mg/dL    Creatinine 8.7 (HH) 0.8 - 1.3 mg/dL    Est, Glom Filt Rate 6 (A) >60    Calcium 9.0 8.3 - 10.6 mg/dL   Troponin   Result Value Ref Range    Troponin 0.11 (H) <0.01 ng/mL   Brain Natriuretic Peptide   Result Value Ref Range    Pro-BNP >70,000 (H) 0 - 124 pg/mL   Anti-Xa, Unfractionated Heparin   Result Value Ref Range    Anti-XA Unfrac Heparin >1.10 (HH) 0.30 - 0.70 IU/mL   Troponin   Result Value Ref Range    Troponin 0.09 (H) <0.01 ng/mL   CBC with Auto Differential   Result Value Ref Range    WBC 5.9 4.0 - 11.0 K/uL    RBC 2.43 (L) 4.20 - 5.90 M/uL    Hemoglobin 7.0 (L) 13.5 - 17.5 g/dL    Hematocrit 22.2 (L) 40.5 - 52.5 %    MCV 91.7 80.0 - 100.0 fL    MCH 28.7 26.0 - 34.0 pg    MCHC 31.3 31.0 - 36.0 g/dL    RDW 17.3 (H) 12.4 - 15.4 %    Platelets 57 (L) 718 - 450 K/uL    MPV 7.6 5.0 - 10.5 fL    Neutrophils % 73.0 %    Lymphocytes % 11.4 %    Monocytes % 10.3 %    Eosinophils % 4.9 %    Basophils % 0.4 %    Neutrophils Absolute 4.3 1.7 - 7.7 K/uL    Lymphocytes Absolute 0.7 (L) 1.0 - 5.1 K/uL    Monocytes Absolute 0.6 0.0 - 1.3 K/uL    Eosinophils Absolute 0.3 0.0 - 0.6 K/uL Basophils Absolute 0.0 0.0 - 0.2 K/uL   Hemoglobin and Hematocrit   Result Value Ref Range    Hemoglobin 7.3 (L) 13.5 - 17.5 g/dL    Hematocrit 23.7 (L) 40.5 - 52.5 %   Ferritin   Result Value Ref Range    Ferritin 1,050.0 (H) 30.0 - 400.0 ng/mL   Reticulocytes   Result Value Ref Range    Retic Ct Pct 3.75 (H) 0.50 - 2.18 %    Retic Ct Abs 0.096 M/uL    Immature Retic Fract 0.54 (H) 0.21 - 0.37   TYPE AND SCREEN   Result Value Ref Range    ABO/Rh B POS     Antibody Screen NEG    PREPARE RBC (CROSSMATCH), 1 Units   Result Value Ref Range    Product Code Blood Bank [id number]     Description Blood Bank Red Blood Cells, Apheresis, Leuko-reduced     Unit Number [id number]     Dispense Status Blood Bank selected        MOST RECENT VITALS:  BP: 111/66,Temp: 98.8 °F (37.1 °C), Heart Rate: 71, Resp: 11, SpO2: 100 %     Procedures     Procedures    ED Course     Nursing Notes, Past Medical Hx, Past Surgical Hx, Social Hx,Allergies, and Family Hx were reviewed.          The patient was given the following medications:  Orders Placed This Encounter   Medications    oxyCODONE (ROXICODONE) immediate release tablet 5 mg    iopamidol (ISOVUE-370) 76 % injection 80 mL    sodium chloride flush 0.9 % injection 10 mL    sodium chloride flush 0.9 % injection 10 mL    0.9 % sodium chloride infusion    potassium chloride 10 mEq/100 mL IVPB (Peripheral Line)    magnesium sulfate 2000 mg in 50 mL IVPB premix    OR Linked Order Group     promethazine (PHENERGAN) tablet 12.5 mg     ondansetron (ZOFRAN) injection 4 mg    OR Linked Order Group     acetaminophen (TYLENOL) tablet 650 mg     acetaminophen (TYLENOL) suppository 650 mg    0.9 % sodium chloride infusion    0.9 % sodium chloride infusion    pantoprazole (PROTONIX) injection 40 mg    oxyCODONE (ROXICODONE) immediate release tablet 2.5 mg       CONSULTS:  IP CONSULT TO HOSPITALIST  IP CONSULT TO NEPHROLOGY    Review of Systems     Pertinent positive and negative findings as documented in the HPI. Past Medical, Surgical, Family, and Social History     He has a past medical history of Cerebral artery occlusion with cerebral infarction [facility]), Combined systolic and diastolic heart failure ([facility] COPD (chronic obstructive pulmonary disease) ([facility] Gout, [facility] Hx of blood clots, Hyperlipidemia, and Hypertension. He has a past surgical history that includes Dialysis fistula creation (Left); Colonoscopy; CT BIOPSY BONE MARROW (11/15/2022); Upper gastrointestinal endoscopy (N/A, 1/17/2023); and Colonoscopy (N/A, 1/17/2023). His family history includes Cancer in his father and mother; Other in his sister. He reports that he has quit smoking. He has never used smokeless tobacco. He reports that he does not currently use alcohol. He reports that he does not use drugs. Medications     Previous Medications    ALBUTEROL SULFATE HFA (PROVENTIL;VENTOLIN;PROAIR) 108 (90 BASE) MCG/ACT INHALER    Inhale 2 puffs into the lungs every 6 hours as needed for Wheezing 90mcg/actuation    ALLOPURINOL (ZYLOPRIM) 100 MG TABLET    Take 100 mg by mouth Daily on MWF    APIXABAN (ELIQUIS) 2.5 MG TABS TABLET    Take 1 tablet by mouth 2 times daily Resume 1/20/23    ATORVASTATIN (LIPITOR) 80 MG TABLET    Take 80 mg by mouth nightly    CALCITRIOL (ROCALTROL) 0.25 MCG CAPSULE    Take 0.5 mcg by mouth every other day [facility]    CALCIUM CARB-CHOLECALCIFEROL 600-200 MG-UNIT TABS TABLET    Take 1 tablet by mouth daily    CALCIUM CARBONATE (TUMS) 500 MG CHEWABLE TABLET    Take 1 tablet by mouth every 8 hours as needed for Heartburn    CARVEDILOL (COREG) 3.125 MG TABLET    Take 1 tablet by mouth 2 times daily    DICLOFENAC SODIUM (VOLTAREN) 1 % GEL    Apply 2 g topically 2 times daily    FERROUS SULFATE (FE TABS) 325 (65 FE) MG EC TABLET    Take 1 tablet by mouth every other day    GABAPENTIN (NEURONTIN) 100 MG CAPSULE    Take 1 capsule by mouth at bedtime for 30 days.     LEVOTHYROXINE (SYNTHROID) 75 MCG TABLET Take 1 tablet by mouth Daily    PANTOPRAZOLE (PROTONIX) 40 MG TABLET    Take 1 tablet by mouth every morning (before breakfast)    SEVELAMER (RENVELA) 800 MG TABLET    Take 1 tablet by mouth 3 times daily (with meals)    SODIUM CHLORIDE (OCEAN) 0.65 % NASAL SPRAY    1-2 sprays both nostrils at least daily and as needed to prevent dry mucosa. UMECLIDINIUM BROMIDE (INCRUSE ELLIPTA) 62.5 MCG/INH AEPB    Inhale 1 puff into the lungs daily    VITAMIN D (ERGOCALCIFEROL) 1.25 MG (30571 UT) CAPS CAPSULE    Take 50,000 Units by mouth once a week Saturdays       Allergies     He is allergic to nitroglycerin and oxymetazoline.                    Arely Chiang MD  01/24/23 1012

## 2023-01-24 NOTE — CARE COORDINATION
Case Management Assessment  Initial Evaluation    Date/Time of Evaluation: 1/24/2023 10:20 AM  Assessment Completed by: Celestine Littlejohn RN    If patient is discharged prior to next notation, then this note serves as note for discharge by case management. Patient Name: Cain Lund                   YOB: 1955  Diagnosis: Acute blood loss anemia (ABLA) [D62]                   Date / Time: 1/23/2023 10:17 PM    Patient Admission Status: Inpatient   Readmission Risk (Low < 19, Mod (19-27), High > 27): Readmission Risk Score: 32.1    Current PCP: Shawna Gama MD  PCP verified by CM?  Yes (Apoorva Palacios MD)    Chart Reviewed: Yes      History Provided by: Patient, Medical Record  Patient Orientation: Alert and Oriented    Patient Cognition: Alert    Hospitalization in the last 30 days (Readmission):  Yes    Readmission Assessment  Number of Days since last admission?: 1-7 days  Previous Disposition: [address] [place] [address]  Who is being Interviewed: Patient  What was the patient's/caregiver's perception as to why they think they needed to return back to the hospital?:  (falls)  Did you visit your Primary Care Physician after you left the hospital, before you returned this time?: No (Apoorva Palacios MD)  Why weren't you able to visit your PCP?: Did not have an appointment  Did you see a specialist, such as Cardiac, Pulmonary, Orthopedic Physician, etc. after you left the hospital?: Yes (NEPHROLOGY)  Who advised the patient to return to the hospital?: Self-referral, Physician, Skilled Unit  Does the patient report anything that got in the way of taking their medications?: No  In our efforts to provide the best possible care to you and others like you, can you think of anything that we could have done to help you after you left the hospital the first time, so that you might not have needed to return so soon?: Identify patient's health literacy needs, Improved written discharge instructions, Teaching during hospitalization regarding your illness, Teach back instructions regarding management of illness      Advance Directives:      Code Status: Full Code   Patient's Primary Decision Maker is: Named in [facility]    Primary Decision MakeAparna Du Child - [phone]    Discharge Planning:    Patient lives with: Other (Comment) (CARE FACILITY) Type of Home: [address] ([facility]  Phone: [phone]  Fax: [phone]) On site HD there  Primary Care Giver: Self  Patient Support Systems include: Family Members, /, Other (Comment) (long term care facility [facility] ([place] staff)   Current Financial resources: Medicaid, Medicare  Current community resources: ECF/Home Care ([facility])  Current services prior to admission: Durable Medical Equipment            Current DME: Dewaine Gino, Wheelchair, Cane, Oxygen Therapy (Comment)            Type of Home Care services:  None    ADLS  Prior functional level: Assistance with the following:, Bathing, Dressing, Toileting, Cooking, Housework, Mobility, Shopping  Current functional level: Assistance with the following:, Bathing, Dressing, Toileting, Cooking, Housework, Mobility, Shopping    Family can provide assistance at DC: No  Would you like Case Management to discuss the discharge plan with any other family members/significant others, and if so, who?  Yes (FAMILY IF NEEDED)  Plans to Return to Present Housing: Yes  Potential Assistance needed at discharge: N/A            Potential DME:  deferred  Patient expects to discharge to: Long-term care  Plan for transportation at discharge:  [facility]    Financial    Payor: [address] [place] [address] Drive / Plan: Hitesh STAHL / Product Type: *No Product type* /     Does insurance require precert for SNF: No    Potential assistance Purchasing Medications: No  Meds-to-Beds request:  no      [facility] [address], [place] [address] [place] [address] [phone] - F [phone]  [facility]  Phone: [phone] Fax: [phone]    Beena Bustillos [id number] [facility] [address] [facility] E [phone] - F [phone]  [facility]  [address] [address]  Phone: [phone] Fax: [phone]      The Patient and/or Patient Representative Agree with the Discharge Plan?   Yovani Matt RN  Case Management Department  [phone]

## 2023-01-24 NOTE — ED NOTES
Clinical Pharmacy Progress Note  Medication History     Admit Date: 1/23/2023    List of of current medications patient is taking is complete. Home Medication list in EPIC updated to reflect changes noted below. Source of information: Transfer documents provided by patient's [facility])    Changes made to medication list:   Medications removed: (include reason, ex: therapy completed, patient no longer taking, etc.)  Diclofenac 1% gel - no longer taking  Incruse Ellipta inhaler - no longer taking  Medications added:   None at this time  Medication doses adjusted:   None at this time  Other notes:   Medication history obtained via transfer document provided by patient's [facility]). A copy of the home medication list will be placed at ER bedside with patient's belongings.     Current Outpatient Medications   Medication Instructions    albuterol sulfate HFA (PROVENTIL;VENTOLIN;PROAIR) 108 (90 Base) MCG/ACT inhaler 2 puffs, Inhalation, EVERY 6 HOURS PRN, 90mcg/actuation     allopurinol (ZYLOPRIM) 100 mg, Oral, THREE TIMES WEEKLY (MONDAY, WEDNESDAY, FRIDAY), On M/W/F    apixaban (ELIQUIS) 2.5 mg, Oral, 2 TIMES DAILY, Resume 1/20/23    atorvastatin (LIPITOR) 80 mg, Oral, NIGHTLY    calcitRIOL (ROCALTROL) 0.5 mcg, Oral, THREE TIMES WEEKLY (MONDAY, WEDNESDAY, FRIDAY), On M/W/F    calcium carb-cholecalciferol 600-200 MG-UNIT TABS tablet 1 tablet, Oral, DAILY    calcium carbonate (TUMS) 500 MG chewable tablet 1 tablet, Oral, EVERY 8 HOURS PRN    carvedilol (COREG) 3.125 mg, Oral, 2 TIMES DAILY    ferrous sulfate (IRON 325) 325 mg, Oral, EVERY OTHER DAY    gabapentin (NEURONTIN) 100 mg, Oral, EVERY BEDTIME    levothyroxine (SYNTHROID) 75 mcg, Oral, DAILY    pantoprazole (PROTONIX) 40 mg, Oral, DAILY BEFORE BREAKFAST    sevelamer (RENVELA) 800 MG tablet 1 tablet, Oral, 3 TIMES DAILY WITH MEALS    sodium chloride (OCEAN) 0.65 % nasal spray 1-2 sprays both nostrils at least daily and as needed to prevent dry mucosa. Please call with any questions.     Rosaland Dandy, PharmD, [facility]  Clinical Pharmacist - Emergency Dept  Wireless: [phone]  1/24/2023 5:48 PM

## 2023-01-24 NOTE — SIGNIFICANT EVENT
I was paged to talk to RN. RN was questioning Protonix, explained that the patient does have a hematoma that explains the drop in H&H but also the patient also has a positive Hemoccult thus will treat empirically Protonix until patient is evaluated. patient will not be getting a room, thus I released the admission orders since unclear why not already released by RN.

## 2023-01-24 NOTE — ED TRIAGE NOTES
Pt comes to the ED reporting worsening SOB that started today. Pt does have a hx of COPD and is currently satting at 100% on his home oxygen level of 3L via [place]. Pt also reports that he fell on his buttocks today while he was trying to reach his walker. Pt denies hitting his head or having LOC. Pt reports that he has fell 5 times between today and yesterday.

## 2023-01-24 NOTE — H&P
Hospital Medicine History & Physical      PCP: Delmy Travis MD    Date of Admission: 1/23/2023    Date of Service: Pt seen/examined on 1/23/23 and Admitted to Inpatient with expected LOS greater than two midnights due to medical therapy. Chief Complaint:  falls      History Of Present Illness:      74 y.o. male nursing home resident with significant past medical history of end-stage renal disease on hemodialysis on chronic anticoagulation for DVTs, combined systolic and diastolic heart failure, chronic hypoxic and hypercapnic respiratory failure secondary to COPD (2 L continuous) who presented to [facility] ED with falls. Carlos Miller He was recently admitted to [facility]. with symptomatic anemia, hemoglobin 6.2..  Was transfused 1 unit of packed red blood cells. Carlos Miller FOBT was positive but no overt bleeding, he underwent EGD and colonoscopy 1/17/23  At the time of discharge, his hemoglobin was 7.1  Patient reportedly had multiple falls-4-5 times over the last 2 days. Ines Sharper he fell on his left buttock and developed pain. .  Imaging in the ED revealed a left gluteal hematoma. .  H&H was close to his baseline    Past Medical History:          Diagnosis Date    Cerebral artery occlusion with cerebral infarction ([facility] [address]     Combined systolic and diastolic heart failure (HCC)     COPD (chronic obstructive pulmonary disease) (HCC)     Gout     Hemodialysis patient ([facility] [address]     Hx of blood clots     Hyperlipidemia     Hypertension        Past Surgical History:          Procedure Laterality Date    COLONOSCOPY      COLONOSCOPY N/A 1/17/2023    COLONOSCOPY DIAGNOSTIC performed by Sabrina Schmidt MD at [address] [facility]  11/15/2022    CT BONE MARROW BIOPSY 11/15/2022 [facility] CT SCAN    DIALYSIS FISTULA CREATION Left     UPPER GASTROINTESTINAL ENDOSCOPY N/A 1/17/2023    EGD BIOPSY performed by Sabrina Schmidt MD at [address] ENDOSCOPY       Medications Prior to Admission:      Prior to Admission medications    Medication Sig Start Date End Date Taking? Authorizing Provider   apixaban (ELIQUIS) 2.5 MG TABS tablet Take 1 tablet by mouth 2 times daily Resume 1/20/23 1/20/23   Mohinder Donnelly MD   pantoprazole (PROTONIX) 40 MG tablet Take 1 tablet by mouth every morning (before breakfast) 1/18/23   Mohinder Donnelly MD   levothyroxine (SYNTHROID) 75 MCG tablet Take 1 tablet by mouth Daily 1/18/23   Mohinder Donnelly MD   calcium carbonate (TUMS) 500 MG chewable tablet Take 1 tablet by mouth every 8 hours as needed for Heartburn    Historical Provider, MD   carvedilol (COREG) 3.125 MG tablet Take 1 tablet by mouth 2 times daily 12/30/22   Lefty Cruz MD   sodium chloride (OCEAN) 0.65 % nasal spray 1-2 sprays both nostrils at least daily and as needed to prevent dry mucosa. 10/15/22   Janice Hill DO   gabapentin (NEURONTIN) 100 MG capsule Take 1 capsule by mouth at bedtime for 30 days.  9/15/22 1/14/23  Dian Archer DO   diclofenac sodium (VOLTAREN) 1 % GEL Apply 2 g topically 2 times daily 9/15/22   [facility]   calcitRIOL (ROCALTROL) 0.25 MCG capsule Take 0.5 mcg by mouth every other day [facility]    Historical Provider, MD   sevelamer (RENVELA) 800 MG tablet Take 1 tablet by mouth 3 times daily (with meals)    Historical Provider, MD   ferrous sulfate (FE TABS) 325 (65 Fe) MG EC tablet Take 1 tablet by mouth every other day  Patient taking differently: Take 325 mg by mouth daily (with breakfast) 6/18/22 1/14/23  Jesenia Ward MD   atorvastatin (LIPITOR) 80 MG tablet Take 80 mg by mouth nightly    Historical Provider, MD   calcium carb-cholecalciferol 600-200 MG-UNIT TABS tablet Take 1 tablet by mouth daily    Historical Provider, MD   Umeclidinium Bromide (INCRUSE ELLIPTA) 62.5 MCG/INH AEPB Inhale 1 puff into the lungs daily    Historical Provider, MD   vitamin D (ERGOCALCIFEROL) 1.25 MG (13129 UT) CAPS capsule Take 50,000 Units by mouth once a week Saturdays  Patient not taking: Reported on 1/14/2023 Historical Provider, MD   albuterol sulfate HFA (PROVENTIL;VENTOLIN;PROAIR) 108 (90 Base) MCG/ACT inhaler Inhale 2 puffs into the lungs every 6 hours as needed for Wheezing 90mcg/actuation    Historical Provider, MD   allopurinol (ZYLOPRIM) 100 MG tablet Take 100 mg by mouth Daily on MWF    Historical Provider, MD       Allergies:  Nitroglycerin and Oxymetazoline    Social History:      The patient currently lives    TOBACCO:   reports that he has quit smoking. He has never used smokeless tobacco.  ETOH:   reports that he does not currently use alcohol. Family History:      Reviewed in detail and negative for DM, CAD, Cancer, CVA. Positive as follows:        Problem Relation Age of Onset    Cancer Mother     Cancer Father     Other Sister        REVIEW OF SYSTEMS:   Pertinent positives as noted in the HPI. All other systems reviewed and negative. PHYSICAL EXAM:    /67   Pulse 76   Temp 98 °F (36.7 °C) (Oral)   Resp 27   SpO2 100%     General appearance:  No apparent distress, appears stated age and cooperative. HEENT:  Normal cephalic, atraumatic without obvious deformity. Pupils equal, round, and reactive to light. Extra ocular muscles intact. Conjunctivae/corneas clear. Neck: Supple, with full range of motion. No jugular venous distention. Trachea midline. Respiratory:  Normal respiratory effort. Clear to auscultation, bilaterally without Rales/Wheezes/Rhonchi. Cardiovascular:  Regular rate and rhythm with normal S1/S2 without murmurs, rubs or gallops. Abdomen: Soft, non-tender, non-distended with normal bowel sounds. Musculoskeletal:  No clubbing, cyanosis or edema bilaterally. Full range of motion without deformity. Skin: Skin color, texture, turgor normal.  No rashes or lesions. Neurologic:  Neurovascularly intact without any focal sensory/motor deficits.  Cranial nerves: II-XII intact, grossly non-focal.  Psychiatric:  Alert and oriented, thought content appropriate, normal insight  Capillary Refill: Brisk,< 3 seconds   Peripheral Pulses: +2 palpable, equal bilaterally       CXR:  I have reviewed the CXR with the following interpretation:   EKG:  I have reviewed the EKG with the following interpretation:     Labs:     Recent Labs     01/23/23 2349 01/24/23 0209 01/24/23  0504   WBC 6.2 5.9  --    HGB 8.2* 7.0* 7.3*   HCT 26.5* 22.2* 23.7*   PLT 65* 57*  --      Recent Labs     01/23/23 2349      K 4.5   CL 95*   CO2 28   BUN 35*   CREATININE 8.7*   CALCIUM 9.0     No results for input(s): AST, ALT, BILIDIR, BILITOT, ALKPHOS in the last 72 hours. No results for input(s): INR in the last 72 hours. Recent Labs     01/23/23 2349 01/24/23 0209   TROPONINI 0.11* 0.09*       Urinalysis:    No results found for: NITRU, WBCUA, BACTERIA, RBCUA, BLOODU, SPECGRAV, GLUCOSEU      ASSESSMENT:    - left thigh hematoma dt fall--Eliquis on hold-consult general surgery. .  Monitor H&H and transfuse as needed for hemoglobin less than 7    -Frequent falls--maintain fall precautions, consult PT and OT    -Chronic anemia due to CKD, possible GI bleed. .. Hemoglobin 6.2-6.7 on recent admission, baseline 8-9--- no overt bleeding but FOBT is +ve. .-   S/p 1 unit PRBC tranfusion 1/15--Eliquis resumed 3 days post d/c per GI  Continue TEODORA per nephrology  S/p EGD and colonoscopy 1/17/23  Small duodenal ulcers, erosive gastritis, hiatal hernia, 4 mm colonic polyp. .  No evidence of bleeding  Continue PPI     -Chronic anemia and thrombocytopenia-bone marrow biopsy 11/15/22 was normal-leukopenia recovered-continue to monitor and transfuse for hemoglobin less than 7, platelets less than 50 given anticoagulation--reviewed prior oncology consults, no etiology was established as work-up was negative-continue to monitor     -ESRD on hemodialysis-4x/week-Mon/Tues/Thurs/Fri   follow-up with nephrology       -Chronic combined systolic/diastolic heart failure/NICM, EF 35 to 21%, grade 3 diastolic dysfunction-compensated continue Coreg-not on ACE/ARB given renal failure/hyperkalemia--follow-up with cardiology outpatient     -Hypothyroidism-recent TSH was suppressed below therapeutic level and decreased dose of Synthroid from 100-75mcg--repeat TSH in 6 weeks     -Hyperlipidemia-continue statin     -COPD with chronic respiratory failure with hypoxia-on 3 L nasal cannula-continue supplemental oxygen and bronchodilators and bronchodilators     -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 If you have any questions or concerns please feel free to contact me at [phone].

## 2023-01-24 NOTE — ED NOTES
MD messaged in regards to getting pt consented for blood transfusion due to pt's decreasing hemoglobin.  This RN awaiting MD's call     Ramana Myles RN  01/24/23 0093

## 2023-01-24 NOTE — ED NOTES
Litzy Meredith MD told this RN via phone to not give pt's blood transfusion and to not release the admission orders for pt.      Alex Velez RN  01/24/23 0700

## 2023-01-24 NOTE — ED NOTES
Bloodwork collected and sent to lab     Babita Jeffery, [address] [facility]  01/23/23 4023

## 2023-01-25 PROBLEM — T14.8XXA INTRAMUSCULAR HEMATOMA: Status: ACTIVE | Noted: 2023-01-25

## 2023-01-25 LAB
A/G RATIO: 1.1 (ref 1.1–2.2)
ALBUMIN SERPL-MCNC: 2.8 G/DL (ref 3.4–5)
ALP BLD-CCNC: 117 U/L (ref 40–129)
ALT SERPL-CCNC: <5 U/L (ref 10–40)
ANION GAP SERPL CALCULATED.3IONS-SCNC: 10 MMOL/L (ref 3–16)
AST SERPL-CCNC: 11 U/L (ref 15–37)
BILIRUB SERPL-MCNC: 0.6 MG/DL (ref 0–1)
BUN BLDV-MCNC: 45 MG/DL (ref 7–20)
CALCIUM SERPL-MCNC: 8.3 MG/DL (ref 8.3–10.6)
CHLORIDE BLD-SCNC: 96 MMOL/L (ref 99–110)
CO2: 24 MMOL/L (ref 21–32)
CREAT SERPL-MCNC: 10.2 MG/DL (ref 0.8–1.3)
FOLATE: 14.39 NG/ML (ref 4.78–24.2)
GFR SERPL CREATININE-BSD FRML MDRD: 5 ML/MIN/{1.73_M2}
GLUCOSE BLD-MCNC: 63 MG/DL (ref 70–99)
HCT VFR BLD CALC: 23.8 % (ref 40.5–52.5)
HCT VFR BLD CALC: 24.4 % (ref 40.5–52.5)
HCT VFR BLD CALC: 25.9 % (ref 40.5–52.5)
HEMOGLOBIN: 7.2 G/DL (ref 13.5–17.5)
HEMOGLOBIN: 7.6 G/DL (ref 13.5–17.5)
HEMOGLOBIN: 7.9 G/DL (ref 13.5–17.5)
OCCULT BLOOD DIAGNOSTIC: ABNORMAL
PHOSPHORUS: 3.8 MG/DL (ref 2.5–4.9)
POTASSIUM REFLEX MAGNESIUM: 4.7 MMOL/L (ref 3.5–5.1)
SODIUM BLD-SCNC: 130 MMOL/L (ref 136–145)
TOTAL PROTEIN: 5.4 G/DL (ref 6.4–8.2)
VITAMIN B-12: 1094 PG/ML (ref 211–911)

## 2023-01-25 PROCEDURE — 51798 US URINE CAPACITY MEASURE: CPT

## 2023-01-25 PROCEDURE — 82270 OCCULT BLOOD FECES: CPT

## 2023-01-25 PROCEDURE — 94664 DEMO&/EVAL PT USE INHALER: CPT

## 2023-01-25 PROCEDURE — 36415 COLL VENOUS BLD VENIPUNCTURE: CPT

## 2023-01-25 PROCEDURE — 80053 COMPREHEN METABOLIC PANEL: CPT

## 2023-01-25 PROCEDURE — 99222 1ST HOSP IP/OBS MODERATE 55: CPT | Performed by: SURGERY

## 2023-01-25 PROCEDURE — C9113 INJ PANTOPRAZOLE SODIUM, VIA: HCPCS | Performed by: INTERNAL MEDICINE

## 2023-01-25 PROCEDURE — 94761 N-INVAS EAR/PLS OXIMETRY MLT: CPT

## 2023-01-25 PROCEDURE — 6360000002 HC RX W HCPCS: Performed by: INTERNAL MEDICINE

## 2023-01-25 PROCEDURE — 85014 HEMATOCRIT: CPT

## 2023-01-25 PROCEDURE — 6370000000 HC RX 637 (ALT 250 FOR IP): Performed by: HOSPITALIST

## 2023-01-25 PROCEDURE — 2700000000 HC OXYGEN THERAPY PER DAY

## 2023-01-25 PROCEDURE — 2580000003 HC RX 258: Performed by: INTERNAL MEDICINE

## 2023-01-25 PROCEDURE — 85018 HEMOGLOBIN: CPT

## 2023-01-25 PROCEDURE — 2060000000 HC ICU INTERMEDIATE R&B

## 2023-01-25 PROCEDURE — 84100 ASSAY OF PHOSPHORUS: CPT

## 2023-01-25 RX ORDER — 0.9 % SODIUM CHLORIDE 0.9 %
100 INTRAVENOUS SOLUTION INTRAVENOUS PRN
Status: DISCONTINUED | OUTPATIENT
Start: 2023-01-25 | End: 2023-01-29 | Stop reason: HOSPADM

## 2023-01-25 RX ORDER — ALBUMIN (HUMAN) 12.5 G/50ML
25 SOLUTION INTRAVENOUS
Status: ACTIVE | OUTPATIENT
Start: 2023-01-25 | End: 2023-01-26

## 2023-01-25 RX ADMIN — SEVELAMER CARBONATE 800 MG: 800 TABLET, FILM COATED ORAL at 12:16

## 2023-01-25 RX ADMIN — LEVOTHYROXINE SODIUM 75 MCG: 0.07 TABLET ORAL at 06:54

## 2023-01-25 RX ADMIN — SODIUM CHLORIDE, PRESERVATIVE FREE 10 ML: 5 INJECTION INTRAVENOUS at 08:25

## 2023-01-25 RX ADMIN — PANTOPRAZOLE SODIUM 40 MG: 40 INJECTION, POWDER, LYOPHILIZED, FOR SOLUTION INTRAVENOUS at 21:27

## 2023-01-25 RX ADMIN — CARVEDILOL 3.12 MG: 3.12 TABLET, FILM COATED ORAL at 21:27

## 2023-01-25 RX ADMIN — SODIUM CHLORIDE, PRESERVATIVE FREE 10 ML: 5 INJECTION INTRAVENOUS at 21:27

## 2023-01-25 RX ADMIN — CALCITRIOL CAPSULES 0.25 MCG 0.5 MCG: 0.25 CAPSULE ORAL at 08:24

## 2023-01-25 RX ADMIN — PANTOPRAZOLE SODIUM 40 MG: 40 INJECTION, POWDER, LYOPHILIZED, FOR SOLUTION INTRAVENOUS at 08:24

## 2023-01-25 RX ADMIN — CARVEDILOL 3.12 MG: 3.12 TABLET, FILM COATED ORAL at 08:24

## 2023-01-25 RX ADMIN — ATORVASTATIN CALCIUM 80 MG: 80 TABLET, FILM COATED ORAL at 21:27

## 2023-01-25 RX ADMIN — ALLOPURINOL 100 MG: 100 TABLET ORAL at 08:24

## 2023-01-25 RX ADMIN — SEVELAMER CARBONATE 800 MG: 800 TABLET, FILM COATED ORAL at 17:30

## 2023-01-25 RX ADMIN — SEVELAMER CARBONATE 800 MG: 800 TABLET, FILM COATED ORAL at 08:24

## 2023-01-25 ASSESSMENT — PAIN SCALES - GENERAL
PAINLEVEL_OUTOF10: 0
PAINLEVEL_OUTOF10: 0
PAINLEVEL_OUTOF10: 7
PAINLEVEL_OUTOF10: 0
PAINLEVEL_OUTOF10: 0

## 2023-01-25 ASSESSMENT — PAIN DESCRIPTION - LOCATION: LOCATION: BUTTOCKS

## 2023-01-25 ASSESSMENT — PAIN DESCRIPTION - DESCRIPTORS: DESCRIPTORS: ACHING

## 2023-01-25 ASSESSMENT — PAIN DESCRIPTION - FREQUENCY: FREQUENCY: INTERMITTENT

## 2023-01-25 ASSESSMENT — PAIN DESCRIPTION - PAIN TYPE: TYPE: ACUTE PAIN

## 2023-01-25 ASSESSMENT — PAIN DESCRIPTION - ORIENTATION: ORIENTATION: LEFT

## 2023-01-25 ASSESSMENT — PAIN - FUNCTIONAL ASSESSMENT: PAIN_FUNCTIONAL_ASSESSMENT: PREVENTS OR INTERFERES SOME ACTIVE ACTIVITIES AND ADLS

## 2023-01-25 ASSESSMENT — PAIN DESCRIPTION - ONSET: ONSET: GRADUAL

## 2023-01-25 NOTE — PROGRESS NOTES
Hospitalist Progress Note      PCP: Janice Newby MD    Date of Admission: 1/23/2023    Chief Complaint:     Hospital Course:     Subjective:     Patient reports some abdominal discomfort. .  No nausea, vomiting or diarrhea      Medications:  Reviewed    Infusion Medications    sodium chloride      sodium chloride       Scheduled Medications    sodium chloride flush  10 mL IntraVENous 2 times per day    pantoprazole  40 mg IntraVENous BID    atorvastatin  80 mg Oral Nightly    carvedilol  3.125 mg Oral BID    allopurinol  100 mg Oral Once per day on Mon Wed Fri    calcitRIOL  0.5 mcg Oral Once per day on Mon Wed Fri    levothyroxine  75 mcg Oral QAM AC    sevelamer  800 mg Oral TID WC     PRN Meds: sodium chloride flush, sodium chloride, promethazine **OR** ondansetron, acetaminophen **OR** acetaminophen, sodium chloride, ipratropium-albuterol      Intake/Output Summary (Last 24 hours) at 1/25/2023 0999  Last data filed at 1/25/2023 0825  Gross per 24 hour   Intake 130 ml   Output --   Net 130 ml       Physical Exam Performed:    /60   Pulse 74   Temp 97.7 °F (36.5 °C) (Oral)   Resp 18   Wt 122 lb 5.7 oz (55.5 kg)   SpO2 100%   BMI 18.60 kg/m²     General appearance: No apparent distress, appears stated age and cooperative. HEENT: Pupils equal, round, and reactive to light. Conjunctivae/corneas clear. Neck: Supple, with full range of motion. No jugular venous distention. Trachea midline. Respiratory:  Normal respiratory effort. Clear to auscultation, bilaterally without Rales/Wheezes/Rhonchi. Cardiovascular: Regular rate and rhythm with normal S1/S2 without murmurs, rubs or gallops. Abdomen: Soft, non-tender, non-distended with normal bowel sounds. Musculoskeletal: No clubbing, cyanosis or edema bilaterally. Full range of motion without deformity. Skin: Skin color, texture, turgor normal.  No rashes or lesions.   Neurologic:  Neurovascularly intact without any focal sensory/motor deficits. Cranial nerves: II-XII intact, grossly non-focal.  Psychiatric: Alert and oriented, thought content appropriate, normal insight  Capillary Refill: Brisk, 3 seconds, normal   Peripheral Pulses: +2 palpable, equal bilaterally       Labs:   Recent Labs     01/23/23 2349 01/24/23  0209 01/24/23  0504 01/24/23 2024 01/25/23  0149   WBC 6.2 5.9  --   --   --    HGB 8.2* 7.0* 7.3* 8.3* 7.9*   HCT 26.5* 22.2* 23.7* 27.1* 25.9*   PLT 65* 57*  --   --   --      Recent Labs     01/23/23  2349 01/25/23  0550    130*   K 4.5 4.7   CL 95* 96*   CO2 28 24   BUN 35* 45*   CREATININE 8.7* 10.2*   CALCIUM 9.0 8.3     Recent Labs     01/25/23  0550   AST 11*   ALT <5*   BILITOT 0.6   ALKPHOS 117     No results for input(s): INR in the last 72 hours. Recent Labs     01/23/23 2349 01/24/23  0209   TROPONINI 0.11* 0.09*       Urinalysis:    No results found for: Qian Furnish, BACTERIA, RBCUA, BLOODU, SPECGRAV, GLUCOSEU    Radiology:  CT ABDOMEN PELVIS W IV CONTRAST Additional Contrast? None   Final Result      1. Trace amount of ascites along the liver margin and diffuse fatty infiltration liver and borderline size of the spleen appreciated. 2. Cardiac enlargement noted in the chest with small right-sided pleural effusion and airspace disease noted posterior medial left lung base, correlate for pneumonia   3. Scattered diffuse diverticulosis with chronic renal failure   4. Intermediate density lesions in the kidneys and scattered cysts in the kidneys. Intermediate density in the right mid kidney could be a solid mass measuring 1.9 x 1.7 cm   5. Previous surgery noted in the right lower quadrant, likely ileocolectomy or partial bowel surgery with some mucosal thickening. There is also scattered diverticulosis   6.  Left posterior, inferior medial gluteal enlargement extending inferiorly likely representing gluteus medius hematoma, not included 100% on the imaging, extends beyond the imaging margin measuring up to 9.5 x 4.6 cm on axial image 121 and overall    height of the hematoma cannot be measured, appears to extend for greater than 6 or 8 cm         CT Head W/O Contrast   Final Result      Normal noncontrast CT the brain for the patient's age without acute hemorrhage, edema or hydrocephalus. Symmetric cerebral atrophy and some low-attenuation changes in the periventricular white matter and left thalamus and right posterior caudate nucleus      XR CHEST PORTABLE   Final Result      1. No acute process or consolidation   2. Stable cardiac enlargement and tortuous aorta                   IP CONSULT TO HOSPITALIST  IP CONSULT TO NEPHROLOGY  IP CONSULT TO GENERAL SURGERY    Assessment/Plan:    - left thigh hematoma dt fall--Eliquis on hold-general surgery consult appreciated. .  Monitor H&H and transfuse as needed for hemoglobin less than 7     -Frequent falls--maintain fall precautions, PT and OT     -acute on Chronic anemia due to CKD, possible GI bleed. .. Hemoglobin 6.2-6.7 on recent admission, baseline 8-9--- no overt bleeding but FOBT is +ve. .-   S/p 1 unit PRBC tranfusion 1/15 improved to 7.1 at dc--Eliquis resumed 3 days post d/c per GI  Continue TEODORA,IV iron per nephrology  S/p EGD and colonoscopy 1/17/23  Small duodenal ulcers, erosive gastritis, hiatal hernia, 4 mm colonic polyp. .  No evidence of bleeding  Continue PPI     -Chronic anemia and thrombocytopenia-bone marrow biopsy 11/15/22 was normal-leukopenia recovered-continue to monitor and transfuse for hemoglobin less than 7, platelets less than 50 given anticoagulation--reviewed prior oncology consults, no etiology was established as work-up was negative-continue to monitor     -ESRD on hemodialysis-4x/week-Mon/Tues/Thurs/Fri   follow-up with nephrology        -Chronic combined systolic/diastolic heart failure/NICM, EF 35 to 50%, grade 3 diastolic dysfunction-compensated continue Coreg-not on ACE/ARB given renal failure/hyperkalemia--follow-up with cardiology outpatient     -Hypothyroidism-recent TSH was suppressed below therapeutic level and decreased dose of Synthroid from 100-75mcg--repeat TSH in 6 weeks     -Hyperlipidemia-continue statin     -COPD with chronic respiratory failure with hypoxia-on 3 L nasal cannula-continue supplemental oxygen and bronchodilators and bronchodilators     -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

## 2023-01-25 NOTE — PROGRESS NOTES
4 Eyes Admission Assessment     I agree as the admission nurse that 2 RN's have performed a thorough Head to Toe Skin Assessment on the patient. ALL assessment sites listed below have been assessed on admission. Areas assessed by both nurses:   [x]   Head, Face, and Ears   [x]   Shoulders, Back, and Chest  [x]   Arms, Elbows, and Hands   [x]   Coccyx, Sacrum, and Ischium  [x]   Legs, Feet, and Heels        Does the Patient have Skin Breakdown?   Yes a wound was noted on the Admission Assessment and an LDA was Initiated documentation include the Kaci-wound, Wound Assessment, Measurements, Dressing Treatment, Drainage, and Color\",   Scabs on lower extremities         Mauricio Prevention initiated:  No   Wound Care Orders initiated:  No      WOC nurse consulted for Pressure Injury (Stage 3,4, Unstageable, DTI, NWPT, and Complex wounds) or Mauricio score 18 or lower:  NA      Nurse 1 eSignature: Electronically signed by Arnol Pardo RN on 1/25/23 at 12:45 AM EST    **SHARE this note so that the co-signing nurse is able to place an eSignature**    Nurse 2 eSignature: Electronically signed by Teri Fragoso RN on 1/25/23 at 5:25 AM EST

## 2023-01-25 NOTE — PROGRESS NOTES
Physician Progress Note      PATIENT:               Francine Lugo  CSN #:                  [id number]  :                       1955  ADMIT DATE:       2023 10:17 PM  [address] [place] DATE:  Milly Teresa  PROVIDER #:        Jing Tello MD          QUERY TEXT:    Patient admitted with left thigh hematoma. Noted documentation of fall and   chronic anticoagulation on Eliquis. If possible, please document in progress   notes and discharge summary if you are treating/evaluating any of the   following: The medical record reflects the following:?  ?? Risk Factors: 74year old male on chronic anticoagulation? ?? Clinical Indicators:  ED provider note- Patient states he has fallen 4   or 5 times in the past 2 days. Today he fell onto his bottom and noted   severe pain in his left buttock. .. Anticoagulated: chronic illness or injury   with exacerbation, progression, or side effects of treatment. Per  H&P-   left thigh hematoma dt fall--Eliquis on hold-consult general surgery. .    Monitor H&H and transfuse as needed for hemoglobin less than 7. Frequent   falls--maintain fall precautions, consult PT and OT. .. 6.2-6.7 on recent   admission, baseline 8-9--- no overt bleeding but FOBT is +  ?? Treatment: Labs, imaging, PRBC x 1, hold Eliquis  Options provided:  -- Traumatic left thigh hematoma  -- Non-traumatic left thigh hematoma associated with Eliquis  -- Other - I will add my own diagnosis  -- Disagree - Not applicable / Not valid  -- Disagree - Clinically unable to determine / Unknown  -- Refer to Clinical Documentation Reviewer    PROVIDER RESPONSE TEXT:    This patient has a traumatic left thigh hematoma. Query created by: Monica Strickland on 2023 6:35 AM      Electronically signed by:   Jing Tello MD 2023 3:54 PM

## 2023-01-25 NOTE — CONSULTS
General Surgery   Resident Consult Note    Reason for Consult: Left gluteal hematoma status post fall on Eliquis    History of Present Illness:   Levon Guerrero is a 74 y.o. male with history as delineated below who [facility] after fall at nursing home. The patient was found to have a left-sided gluteal hematoma for which general surgery was consulted for management. The patient states he fell multiple times yesterday and has multiple issues with falling at his nursing home. Patient denies hitting his head. Patient denies loss of consciousness. He denies any pain down his leg, numbness, tingling. He admits to pain over his left buttock. Past Medical History:        Diagnosis Date    Cerebral artery occlusion with cerebral infarction ([facility] [address]     Combined systolic and diastolic heart failure (HCC)     COPD (chronic obstructive pulmonary disease) (HCC)     Gout     Hemodialysis patient ([facility] [address]     Hx of blood clots     Hyperlipidemia     Hypertension        Past Surgical History:        Procedure Laterality Date    COLONOSCOPY      COLONOSCOPY N/A 1/17/2023    COLONOSCOPY DIAGNOSTIC performed by Nelda Gibson MD at [address] [place] [address]  11/15/2022    CT BONE MARROW BIOPSY 11/15/2022 [facility] CT SCAN    DIALYSIS FISTULA CREATION Left     UPPER GASTROINTESTINAL ENDOSCOPY N/A 1/17/2023    EGD BIOPSY performed by Nelda Gibson MD at [facility] ENDOSCOPY       Allergies:  Nitroglycerin and Oxymetazoline    Medications:   Home Meds  No current facility-administered medications on file prior to encounter. Current Outpatient Medications on File Prior to Encounter   Medication Sig Dispense Refill    gabapentin (NEURONTIN) 100 MG capsule Take 100 mg by mouth nightly.       ferrous sulfate (IRON 325) 325 (65 Fe) MG tablet Take 325 mg by mouth every other day      apixaban (ELIQUIS) 2.5 MG TABS tablet Take 1 tablet by mouth 2 times daily Resume 1/20/23 60 tablet 1 pantoprazole (PROTONIX) 40 MG tablet Take 1 tablet by mouth every morning (before breakfast) 30 tablet 3    levothyroxine (SYNTHROID) 75 MCG tablet Take 1 tablet by mouth Daily 30 tablet 3    calcium carbonate (TUMS) 500 MG chewable tablet Take 1 tablet by mouth every 8 hours as needed for Heartburn      carvedilol (COREG) 3.125 MG tablet Take 1 tablet by mouth 2 times daily 60 tablet 3    sodium chloride (OCEAN) 0.65 % nasal spray 1-2 sprays both nostrils at least daily and as needed to prevent dry mucosa.  1 each 3    calcitRIOL (ROCALTROL) 0.5 MCG capsule Take 0.5 mcg by mouth three times a week On M/W/F      sevelamer (RENVELA) 800 MG tablet Take 1 tablet by mouth 3 times daily (with meals)      atorvastatin (LIPITOR) 80 MG tablet Take 80 mg by mouth nightly      calcium carb-cholecalciferol 600-200 MG-UNIT TABS tablet Take 1 tablet by mouth daily      albuterol sulfate HFA (PROVENTIL;VENTOLIN;PROAIR) 108 (90 Base) MCG/ACT inhaler Inhale 2 puffs into the lungs every 6 hours as needed for Wheezing 90mcg/actuation      allopurinol (ZYLOPRIM) 100 MG tablet Take 100 mg by mouth three times a week On M/W/F         Current Meds  sodium chloride flush 0.9 % injection 10 mL, 2 times per day  sodium chloride flush 0.9 % injection 10 mL, PRN  0.9 % sodium chloride infusion, PRN  promethazine (PHENERGAN) tablet 12.5 mg, Q6H PRN   Or  ondansetron (ZOFRAN) injection 4 mg, Q6H PRN  acetaminophen (TYLENOL) tablet 650 mg, Q6H PRN   Or  acetaminophen (TYLENOL) suppository 650 mg, Q6H PRN  0.9 % sodium chloride infusion, PRN  pantoprazole (PROTONIX) injection 40 mg, BID  atorvastatin (LIPITOR) tablet 80 mg, Nightly  carvedilol (COREG) tablet 3.125 mg, BID  allopurinol (ZYLOPRIM) tablet 100 mg, Once per day on Mon Wed Fri  calcitRIOL (ROCALTROL) capsule 0.5 mcg, Once per day on Mon Wed Fri  levothyroxine (SYNTHROID) tablet 75 mcg, QAM AC  sevelamer (RENVELA) tablet 800 mg, TID WC  ipratropium-albuterol (DUONEB) nebulizer solution 1 juan pablo, Q4H PRN        Family History:   Family History   Problem Relation Age of Onset    Cancer Mother     Cancer Father     Other Sister        Social History:   TOBACCO:   reports that he has quit smoking. He has never used smokeless tobacco.  ETOH:   reports that he does not currently use alcohol. DRUGS:   reports no history of drug use. Review of Systems:   14 point review of systems was conducted and all pertinent positives and negatives were included in the HPI. Physical exam:    Vitals:    01/25/23 0730 01/25/23 0735 01/25/23 0805 01/25/23 1046   BP:   109/60    Pulse:   74    Resp:   18    Temp:   97.7 °F (36.5 °C)    TempSrc:   Oral    SpO2: (!) 76% 100% 100%    Weight:    120 lb 5.9 oz (54.6 kg)       General appearance: alert, no acute distress, grooming appropriate  Eyes: PERRL, no scleral icterus  Neck: trachea midline, no JVD  Chest/Lungs: symmetrical chest rise, normal effort  Cardiovascular: RRR  Abdomen: soft, non-tender, non-distended, no guarding/rigidity  Skin: warm and dry, no rashes  Extremities: no edema, no cyanosis, left gluteal tenderness, intact strength and sensation to bilateral lower extremities, palpable DP, PT, and fem pulses to left lower extremity. Compartments of left lower extremity are all soft along with left buttock. Neuro: A&Ox3, no focal deficits, sensation intact    Labs:    CBC:   Recent Labs     01/23/23  2349 01/24/23  0209 01/24/23  0504 01/24/23  2024 01/25/23  0149 01/25/23  1031   WBC 6.2 5.9  --   --   --   --    HGB 8.2* 7.0*   < > 8.3* 7.9* 7.2*   HCT 26.5* 22.2*   < > 27.1* 25.9* 23.8*   MCV 91.6 91.7  --   --   --   --    PLT 65* 57*  --   --   --   --     < > = values in this interval not displayed. BMP:   Recent Labs     01/23/23  2349 01/25/23  0550    130*   K 4.5 4.7   CL 95* 96*   CO2 28 24   BUN 35* 45*   CREATININE 8.7* 10.2*     PT/INR: No results for input(s): PROTIME, INR in the last 72 hours. APTT: No results for input(s):  APTT in the last 72 hours. Liver Profile:   Lab Results   Component Value Date/Time    AST 11 01/25/2023 05:50 AM    ALT <5 01/25/2023 05:50 AM    BILIDIR <0.2 12/28/2022 12:17 AM    BILITOT 0.6 01/25/2023 05:50 AM    ALKPHOS 117 01/25/2023 05:50 AM     Lab Results   Component Value Date/Time    CHOL 77 09/14/2022 07:00 AM    HDL 38 09/14/2022 07:00 AM    TRIG 45 09/14/2022 07:00 AM     UA: No results found for: NITRITE, COLORU, PHUR, LABCAST, WBCUA, RBCUA, MUCUS, TRICHOMONAS, YEAST, BACTERIA, CLARITYU, SPECGRAV, LEUKOCYTESUR, UROBILINOGEN, BILIRUBINUR, BLOODU, GLUCOSEU, AMORPHOUS    Imaging:   CT ABDOMEN PELVIS W IV CONTRAST Additional Contrast? None   Final Result      1. Trace amount of ascites along the liver margin and diffuse fatty infiltration liver and borderline size of the spleen appreciated. 2. Cardiac enlargement noted in the chest with small right-sided pleural effusion and airspace disease noted posterior medial left lung base, correlate for pneumonia   3. Scattered diffuse diverticulosis with chronic renal failure   4. Intermediate density lesions in the kidneys and scattered cysts in the kidneys. Intermediate density in the right mid kidney could be a solid mass measuring 1.9 x 1.7 cm   5. Previous surgery noted in the right lower quadrant, likely ileocolectomy or partial bowel surgery with some mucosal thickening. There is also scattered diverticulosis   6. Left posterior, inferior medial gluteal enlargement extending inferiorly likely representing gluteus medius hematoma, not included 100% on the imaging, extends beyond the imaging margin measuring up to 9.5 x 4.6 cm on axial image 121 and overall    height of the hematoma cannot be measured, appears to extend for greater than 6 or 8 cm         CT Head W/O Contrast   Final Result      Normal noncontrast CT the brain for the patient's age without acute hemorrhage, edema or hydrocephalus.        Symmetric cerebral atrophy and some low-attenuation changes in the periventricular white matter and left thalamus and right posterior caudate nucleus      XR CHEST PORTABLE   Final Result      1. No acute process or consolidation   2. Stable cardiac enlargement and tortuous aorta                     Assessment/Plan: This is a 74 y.o. male with Hx as delineated above who presents after ground-level fall onto left buttock at which time a CT scan found a 9.5 x 4.6 cm not fully imaged left gluteal hematoma. The patient only had an abdominal CT scan which did not fully measure the left gluteal hematoma. Patient's hemoglobin was 7.3 in the emergency department at which time he received 1 unit of blood with response to 8.3. The patient has low hemoglobin at baseline with baseline being around 7. There is no acute need for surgical intervention at this time.     - Continue to monitor hemoglobins every 6  - Place binder around pelvis for compression of hematoma  - Need to hold Eliquis until hemoglobins are stable, minimum 48 hours  - If patient continues to drop hemoglobin will need repeat imaging with CTA to evaluate hematoma fully for possible extravasation  - If active extravasation found patient will likely need interventional radiology for further intervention  - Further care per primary team  - Patient discussed with Dr. Libia Patel DO  01/25/23  11:36 AM

## 2023-01-25 NOTE — ADT AUTH CERT
H&P by Mary Soria MD at 1/24/2023  9:46 AM    Author: Mary Soria MD Specialty: Hospitalist Author Type: Physician   Filed: 1/24/2023  2:51 PM Date of Service: 1/24/2023  9:46 AM Status: Signed   : Mary Soria MD (Physician)   Expand All Collapse All           Hospital Medicine History & Physical       PCP: Kilo uGtierrez MD     Date of Admission: 1/23/2023     Date of Service: Pt seen/examined on 1/23/23 and Admitted to Inpatient with expected LOS greater than two midnights due to medical therapy. Chief Complaint:  falls        History Of Present Illness:       74 y.o. male nursing home resident with significant past medical history of end-stage renal disease on hemodialysis on chronic anticoagulation for DVTs, combined systolic and diastolic heart failure, chronic hypoxic and hypercapnic respiratory failure secondary to COPD (2 L continuous) who presented to [facility] ED with falls. Desirae Cardenas He was recently admitted to [facility]. with symptomatic anemia, hemoglobin 6.2..  Was transfused 1 unit of packed red blood cells. Desirae Cardenas FOBT was positive but no overt bleeding, he underwent EGD and colonoscopy 1/17/23  At the time of discharge, his hemoglobin was 7.1  Patient reportedly had multiple falls-4-5 times over the last 2 days. Perrin Kanner he fell on his left buttock and developed pain. .  Imaging in the ED revealed a left gluteal hematoma. .  H&H was close to his baseline     Past Medical History:       Past Medical History             Diagnosis Date    Cerebral artery occlusion with cerebral infarction ([facility] [address]      Combined systolic and diastolic heart failure (HCC)      COPD (chronic obstructive pulmonary disease) ([facility] [address]      Gout      Hemodialysis patient ([facility] [address]      Hx of blood clots      Hyperlipidemia      Hypertension              Past Surgical History:       Past Surgical History             Procedure Laterality Date    COLONOSCOPY        COLONOSCOPY N/A 1/17/2023     COLONOSCOPY DIAGNOSTIC performed by Matias Diaz MD at [facility] [facility]   11/15/2022     CT BONE MARROW BIOPSY 11/15/2022 [facility] CT SCAN    DIALYSIS FISTULA CREATION Left      UPPER GASTROINTESTINAL ENDOSCOPY N/A 1/17/2023     EGD BIOPSY performed by Matias Diaz MD at [facility] ENDOSCOPY            Medications Prior to Admission:      Home Medications           Prior to Admission medications    Medication Sig Start Date End Date Taking? Authorizing Provider   apixaban (ELIQUIS) 2.5 MG TABS tablet Take 1 tablet by mouth 2 times daily Resume 1/20/23 1/20/23     Ike Roe MD   pantoprazole (PROTONIX) 40 MG tablet Take 1 tablet by mouth every morning (before breakfast) 1/18/23     Ike Roe MD   levothyroxine (SYNTHROID) 75 MCG tablet Take 1 tablet by mouth Daily 1/18/23     Ike Roe MD   calcium carbonate (TUMS) 500 MG chewable tablet Take 1 tablet by mouth every 8 hours as needed for Heartburn       Historical Provider, MD   carvedilol (COREG) 3.125 MG tablet Take 1 tablet by mouth 2 times daily 12/30/22     Radha Donis MD   sodium chloride (OCEAN) 0.65 % nasal spray 1-2 sprays both nostrils at least daily and as needed to prevent dry mucosa. 10/15/22     Janice Hill DO   gabapentin (NEURONTIN) 100 MG capsule Take 1 capsule by mouth at bedtime for 30 days.  9/15/22 1/14/23   Candi Darling DO   diclofenac sodium (VOLTAREN) 1 % GEL Apply 2 g topically 2 times daily 9/15/22     Candi Darling DO   calcitRIOL (ROCALTROL) 0.25 MCG capsule Take 0.5 mcg by mouth every other day MWF       Historical Provider, MD   sevelamer (RENVELA) 800 MG tablet Take 1 tablet by mouth 3 times daily (with meals)       Historical Provider, MD   ferrous sulfate (FE TABS) 325 (65 Fe) MG EC tablet Take 1 tablet by mouth every other day  Patient taking differently: Take 325 mg by mouth daily (with breakfast) 6/18/22 1/14/23   Felice Hernandez MD   atorvastatin (LIPITOR) 80 MG tablet Take 80 mg by mouth nightly       Historical Provider, MD   calcium carb-cholecalciferol 600-200 MG-UNIT TABS tablet Take 1 tablet by mouth daily       Historical Provider, MD   Umeclidinium Bromide (INCRUSE ELLIPTA) 62.5 MCG/INH AEPB Inhale 1 puff into the lungs daily       Historical Provider, MD   vitamin D (ERGOCALCIFEROL) 1.25 MG (85071 UT) CAPS capsule Take 50,000 Units by mouth once a week Saturdays  Patient not taking: Reported on 1/14/2023       Historical Provider, MD   albuterol sulfate HFA (PROVENTIL;VENTOLIN;PROAIR) 108 (90 Base) MCG/ACT inhaler Inhale 2 puffs into the lungs every 6 hours as needed for Wheezing 90mcg/actuation       Historical Provider, MD   allopurinol (ZYLOPRIM) 100 MG tablet Take 100 mg by mouth Daily on MWF       Historical Provider, MD            Allergies:  Nitroglycerin and Oxymetazoline     Social History:       The patient currently lives     TOBACCO:   reports that he has quit smoking. He has never used smokeless tobacco.  ETOH:   reports that he does not currently use alcohol. Family History:       Reviewed in detail and negative for DM, CAD, Cancer, CVA. Positive as follows:     Family History             Problem Relation Age of Onset    Cancer Mother      Cancer Father      Other Sister              REVIEW OF SYSTEMS:   Pertinent positives as noted in the HPI. All other systems reviewed and negative. PHYSICAL EXAM:     /67   Pulse 76   Temp 98 °F (36.7 °C) (Oral)   Resp 27   SpO2 100%      General appearance:  No apparent distress, appears stated age and cooperative. HEENT:  Normal cephalic, atraumatic without obvious deformity. Pupils equal, round, and reactive to light. Extra ocular muscles intact. Conjunctivae/corneas clear. Neck: Supple, with full range of motion. No jugular venous distention. Trachea midline. Respiratory:  Normal respiratory effort. Clear to auscultation, bilaterally without Rales/Wheezes/Rhonchi.   Cardiovascular: Regular rate and rhythm with normal S1/S2 without murmurs, rubs or gallops. Abdomen: Soft, non-tender, non-distended with normal bowel sounds. Musculoskeletal:  No clubbing, cyanosis or edema bilaterally. Full range of motion without deformity. Skin: Skin color, texture, turgor normal.  No rashes or lesions. Neurologic:  Neurovascularly intact without any focal sensory/motor deficits. Cranial nerves: II-XII intact, grossly non-focal.  Psychiatric:  Alert and oriented, thought content appropriate, normal insight  Capillary Refill: Brisk,< 3 seconds   Peripheral Pulses: +2 palpable, equal bilaterally         CXR:  I have reviewed the CXR with the following interpretation:   EKG:  I have reviewed the EKG with the following interpretation:      Labs:            Recent Labs     01/23/23 2349 01/24/23 0209 01/24/23  0504   WBC 6.2 5.9  --    HGB 8.2* 7.0* 7.3*   HCT 26.5* 22.2* 23.7*   PLT 65* 57*  --           Recent Labs     01/23/23 2349      K 4.5   CL 95*   CO2 28   BUN 35*   CREATININE 8.7*   CALCIUM 9.0      No results for input(s): AST, ALT, BILIDIR, BILITOT, ALKPHOS in the last 72 hours. No results for input(s): INR in the last 72 hours. Recent Labs     01/23/23 2349 01/24/23 0209   TROPONINI 0.11* 0.09*         Urinalysis:    No results found for: NITRU, WBCUA, BACTERIA, RBCUA, BLOODU, SPECGRAV, GLUCOSEU        ASSESSMENT:     - left thigh hematoma dt fall--Eliquis on hold-consult general surgery. .  Monitor H&H and transfuse as needed for hemoglobin less than 7     -Frequent falls--maintain fall precautions, consult PT and OT     -Chronic anemia due to CKD, possible GI bleed. .. Hemoglobin 6.2-6.7 on recent admission, baseline 8-9--- no overt bleeding but FOBT is +ve. .-   S/p 1 unit PRBC tranfusion 1/15--Eliquis resumed 3 days post d/c per GI  Continue TEODORA per nephrology  S/p EGD and colonoscopy 1/17/23  Small duodenal ulcers, erosive gastritis, hiatal hernia, 4 mm colonic polyp. Author Showman   No evidence of bleeding  Continue PPI     -Chronic anemia and thrombocytopenia-bone marrow biopsy 11/15/22 was normal-leukopenia recovered-continue to monitor and transfuse for hemoglobin less than 7, platelets less than 50 given anticoagulation--reviewed prior oncology consults, no etiology was established as work-up was negative-continue to monitor     -ESRD on hemodialysis-4x/week-Mon/Tues/Thurs/Fri   follow-up with nephrology        -Chronic combined systolic/diastolic heart failure/NICM, EF 35 to 80%, grade 3 diastolic dysfunction-compensated continue Coreg-not on ACE/ARB given renal failure/hyperkalemia--follow-up with cardiology outpatient     -Hypothyroidism-recent TSH was suppressed below therapeutic level and decreased dose of Synthroid from 100-75mcg--repeat TSH in 6 weeks     -Hyperlipidemia-continue statin     -COPD with chronic respiratory failure with hypoxia-on 3 L nasal cannula-continue supplemental oxygen and bronchodilators and bronchodilators     -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 If you have any questions or concerns please feel free to contact me at [phone].              Utilization Reviews       Anemia, Iron Deficiency or Unspecified - Care Day 1 (1/24/2023) by Daisy Phillip RN       Review Entered Review Status   1/25/2023 0802 Completed      Criteria Review      Care Day: 1 Care Date: 1/24/2023 Level of Care: Intermediate Care    Guideline Day 1    Clinical Status    (X) * Clinical Indications met    1/25/2023 8:02 AM EST by Blanche Allan      01/24/23 1253 98 (36.7) 12 -- 115/72 -- Right side -- -- 100 Nasal cannula    Routes    (X) Oral hydration    1/25/2023 8:02 AM EST by Vincent Mendez      ivf, then stopped    (X) Oral medications    1/25/2023 8:02 AM EST by Harrison Mendez      protonix iv    Interventions    (X) Laboratory tests    1/25/2023 8:02 AM EST by Harrison Mendez      Hemoglobin8.2g/dL   Elycqkdxxi87.5% >>Hemoglobin7. 0g/dL   Cajntfjeqh03.2%      Anti-XA Unfrac Heparin >1. 10High Panic   ELI41aa/dL   Creatinine8.7mg/dL  Troponin 0.11 0.09  Pro-BNP >70,000High    * Milestone   Additional Notes   DATE: 1/23-1/24      CHIEF COMPLAINT/ED PRESENTATION:   74 y.o. male presenting for a fall and shortness of breath. Patient states he has fallen 4 or 5 times in the past 2 days. Today he fell onto his bottom and noted severe pain in his left buttock. He feels as though there is a lump there. He does feel generally weak. VITALS:     01/24/23 1253 98 (36.7) 12 - 115/72 - Right side - - 100 Nasal cannula       ABNL/PERTINENT LABS/RADIOLOGY/DIAGNOSTIC STUDIES:     Hemoglobin8.2g/dL    Rkwiqmdwde91.5% >>Hemoglobin7. 0g/dL    Nflognhkhd80.2%   Anti-XA Unfrac Heparin >1. 10High Panic    BTH34az/dL    Creatinine8.7mg/dL   Troponin 0.11 0.09   Pro-BNP >70,000High       ED TREATMENT:   01/24/2023 0814 EST 0.9 % sodium chloride infusion 1,000 mL IntraVENous New Bag    01/23/2023 2344 EST oxyCODONE (ROXICODONE) immediate release tablet 5 mg 5 mg Oral Given    01/24/2023 3528 EST pantoprazole (PROTONIX) injection 40 mg 40 mg IntraVENous Given    01/24/2023 1831 EST sevelamer (RENVELA) tablet 800 mg 800 mg Oral Given       ADMISSION DIAGNOSIS/OP NOTE:   1. Intramuscular hematoma    2. Anticoagulated    3. Chronic anemia       PERTINENT MEDICAL HISTORY:   history of DVT on apixaban, ESRD, stroke, COPD      PHYSICAL EXAM:   General:  Adult male, alert and appropriately interactive. In no distress. HENT: Normocephalic and atraumatic. External ears normal. Nose appears normal externally. Eyes: Conjunctivae normal. No scleral icterus. PERRL   Neck: Neck supple. No tracheal deviation present. CV: Normal rate. Regular rhythm. S1/S2 auscultated. No murmurs, gallops or rubs. Pulm: Effort normal. Breath sounds clear to auscultation bilaterally. No wheezes. No rales or rhonchi. GI: Soft. No distension. No tenderness. No rebound or guarding. No masses. No peritoneal signs. Musculoskeletal: Left gluteal hematoma palpable, tender to palpation. No edema. No gross deformities or bony tenderness of the extremities. Pelvis is stable. No tenderness of hips with logroll of legs. Neurological: Alert and appropriately interactive. Face symmetric, speech without dysarthria or obvious aphasia. Moving all extremities spontaneously. Skin: Warm, dry. No rash. No diaphoresis or erythema. Psychiatric: Calm and cooperative with appropriate mood and affect. MD CONSULTS/ASSESSMENTS & PLANS:   - left thigh hematoma dt fall--Eliquis on hold-consult general surgery. .  Monitor H&H and transfuse as needed for hemoglobin less than 7       -Frequent falls--maintain fall precautions, consult PT and OT       -Chronic anemia due to CKD, possible GI bleed. .. Hemoglobin 6.2-6.7 on recent admission, baseline 8-9--- no overt bleeding but FOBT is +ve. .-    S/p 1 unit PRBC tranfusion 1/15--Eliquis resumed 3 days post d/c per GI   Continue TEODORA per nephrology   S/p EGD and colonoscopy 1/17/23   Small duodenal ulcers, erosive gastritis, hiatal hernia, 4 mm colonic polyp. .  No evidence of bleeding   Continue PPI       -Chronic anemia and thrombocytopenia-bone marrow biopsy 11/15/22 was normal-leukopenia recovered-continue to monitor and transfuse for hemoglobin less than 7, platelets less than 50 given anticoagulation--reviewed prior oncology consults, no etiology was established as work-up was negative-continue to monitor       -ESRD on hemodialysis-4x/week-Mon/Tues/Thurs/Fri    follow-up with nephrology       -Chronic combined systolic/diastolic heart failure/NICM, EF 35 to 40%, grade 3 diastolic dysfunction-compensated continue Coreg-not on ACE/ARB given renal failure/hyperkalemia--follow-up with cardiology outpatient       -Hypothyroidism-recent TSH was suppressed below therapeutic level and decreased dose of Synthroid from 100-75mcg--repeat TSH in 6 weeks       -Hyperlipidemia-continue statin       -COPD with chronic respiratory failure with hypoxia-on 3 L nasal cannula-continue supplemental oxygen and bronchodilators and bronchodilators       -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--hold given thigh hematoma      MEDICATIONS:      pantoprazole (PROTONIX) injection 40 mg   Dose: 40 mg   Freq: 2 TIMES DAILY Route: IV   Start: 01/24/23 0445      0.9 % sodium chloride infusion   Rate: 100 mL/hr Dose: 1000 mL   Freq: CONTINUOUS Route: IV   Last Dose: 1,000 mL (01/24/23 0814)   Start: 01/24/23 0715 End: 01/24/23 1452      ORDERS:       ADULT DIET; Regular; 3 carb choices (45 gm/meal); Low Fat/Low Chol/High Fiber/2 gm Na;  Low Sodium (2 gm)        Telemetry monitoring - 24 hour duration       Daily weights       Intake and output       Neurovascular checks        Inpatient consult to Nephrology       Inpatient consult to General Surgery        PREPARE RBC (CROSSMATCH), 1 Units       ADMIT TO INPATIENT        Anemia, Iron Deficiency or Unspecified - Clinical Indications for Admission to Inpatient Care by Livan Spaulding RN       Review Entered Review Status   1/25/2023 0800 Completed      Criteria Review      Clinical Indications for Admission to Inpatient Care    Most Recent : Raymond Griffin Most Recent Date: 1/25/2023 8:00 AM EST    (X) Admission is indicated for  1 or more  of the following  (1) (2) (3) (4) (5) (6):       (X) Active bleeding that cannot be controlled in observation care       1/25/2023 8:00 AM EST by Raymond Griffin         left thigh hematoma dt fall     (X) Other Indication: anemia      Entered 1/25/2023 8:00 AM EST by Blanche Allan     Hemoglobin 6.2-6.7 on recent admission, baseline 8-9   S/p 1 unit PRBC tranfusion 1/15   Additional Notes

## 2023-01-25 NOTE — PLAN OF CARE
Problem: Safety - Adult  Goal: Free from fall injury  Outcome: Progressing  Flowsheets  Taken 1/25/2023 1101  Free From Fall Injury: Instruct family/caregiver on patient safety  Taken 1/25/2023 1058  Free From Fall Injury: Instruct family/caregiver on patient safety  Note: Discussed falls precautions and interventions used to promote patient safety      Problem: Skin/Tissue Integrity  Goal: Absence of new skin breakdown  Description: 1. Monitor for areas of redness and/or skin breakdown  2. Assess vascular access sites hourly  3. Every 4-6 hours minimum:  Change oxygen saturation probe site  4. Every 4-6 hours:  If on nasal continuous positive airway pressure, respiratory therapy assess nares and determine need for appliance change or resting period. Outcome: Progressing  Note: Assisting with turning every 2 hours and utilizing pillow support.  Elevate heels and elbows as needed     Problem: Pain  Goal: Verbalizes/displays adequate comfort level or baseline comfort level  Outcome: Progressing  Flowsheets (Taken 1/25/2023 0805)  Verbalizes/displays adequate comfort level or baseline comfort level:   Assess pain using appropriate pain scale   Encourage patient to monitor pain and request assistance   Administer analgesics based on type and severity of pain and evaluate response   Implement non-pharmacological measures as appropriate and evaluate response   Consider cultural and social influences on pain and pain management   Notify Licensed Independent Practitioner if interventions unsuccessful or patient reports new pain  Note: Discussed pharmacological and non-pharmacological interventions to assist with pain

## 2023-01-25 NOTE — CONSULTS
Nephrology Consult Note  [phone]   [email or web address]        Reason for Consult:  ESKD    HISTORY OF PRESENT ILLNESS:                This is a patient with significant past medical history of ESKD on HD 4 x per week at [facility], HTN, h/o multiple falls, CVA, CHF, COPD, h/o DVT on [facility]   who presented ON multiple falls-4-5/day-fell on left buttock one day prior, CT showed left gluteal hematoma, seen by surgery. C/o abdominal pain, denies N/V/. Had HD yesterday. Lytes are stable. Adm hgb 8.2, dropped to 7.0    Past Medical History:        Diagnosis Date    Cerebral artery occlusion with cerebral infarction ([facility] [address]     Combined systolic and diastolic heart failure (HCC)     COPD (chronic obstructive pulmonary disease) ([facility] [address]     Gout     Hemodialysis patient ([facility] [address]     Hx of blood clots     Hyperlipidemia     Hypertension        Past Surgical History:        Procedure Laterality Date    COLONOSCOPY      COLONOSCOPY N/A 1/17/2023    COLONOSCOPY DIAGNOSTIC performed by Garo Dickens MD at [facility]  11/15/2022    CT BONE MARROW BIOPSY 11/15/2022 [facility] CT SCAN    DIALYSIS FISTULA CREATION Left     UPPER GASTROINTESTINAL ENDOSCOPY N/A 1/17/2023    EGD BIOPSY performed by Garo Dickens MD at \[facility]\"" ENDOSCOPY       Current Medications:    No current facility-administered medications on file prior to encounter. Current Outpatient Medications on File Prior to Encounter   Medication Sig Dispense Refill    gabapentin (NEURONTIN) 100 MG capsule Take 100 mg by mouth nightly.       ferrous sulfate (IRON 325) 325 (65 Fe) MG tablet Take 325 mg by mouth every other day      apixaban (ELIQUIS) 2.5 MG TABS tablet Take 1 tablet by mouth 2 times daily Resume 1/20/23 60 tablet 1    pantoprazole (PROTONIX) 40 MG tablet Take 1 tablet by mouth every morning (before breakfast) 30 tablet 3    levothyroxine (SYNTHROID) 75 MCG tablet Take 1 tablet by mouth Daily 30 tablet 3    calcium carbonate (TUMS) 500 MG chewable tablet Take 1 tablet by mouth every 8 hours as needed for Heartburn      carvedilol (COREG) 3.125 MG tablet Take 1 tablet by mouth 2 times daily 60 tablet 3    sodium chloride (OCEAN) 0.65 % nasal spray 1-2 sprays both nostrils at least daily and as needed to prevent dry mucosa.  1 each 3    calcitRIOL (ROCALTROL) 0.5 MCG capsule Take 0.5 mcg by mouth three times a week On M/W/F      sevelamer (RENVELA) 800 MG tablet Take 1 tablet by mouth 3 times daily (with meals)      atorvastatin (LIPITOR) 80 MG tablet Take 80 mg by mouth nightly      calcium carb-cholecalciferol 600-200 MG-UNIT TABS tablet Take 1 tablet by mouth daily      albuterol sulfate HFA (PROVENTIL;VENTOLIN;PROAIR) 108 (90 Base) MCG/ACT inhaler Inhale 2 puffs into the lungs every 6 hours as needed for Wheezing 90mcg/actuation      allopurinol (ZYLOPRIM) 100 MG tablet Take 100 mg by mouth three times a week On M/W/F         Allergies:  Nitroglycerin and Oxymetazoline    Social History:    Social History     Socioeconomic History    Marital status: Single     Spouse name: Not on file    Number of children: Not on file    Years of education: Not on file    Highest education level: Not on file   Occupational History    Not on file   Tobacco Use    Smoking status: Former    Smokeless tobacco: Never   Vaping Use    Vaping Use: Never used   Substance and Sexual Activity    Alcohol use: Not Currently     Comment: occasional    Drug use: Never    Sexual activity: Not Currently   Other Topics Concern    Not on file   Social History Narrative    Not on file     Social Determinants of Health     Financial Resource Strain: Not on file   Food Insecurity: Not on file   Transportation Needs: Not on file   Physical Activity: Not on file   Stress: Not on file   Social Connections: Not on file   Intimate Partner Violence: Not on file   Housing Stability: Not on file       Family History:       Problem Relation Age of Onset    Cancer Mother Cancer Father     Other Sister          Review of Systems:  a comprehensive Review of systems was negative except as noted in HPI     PHYSICAL EXAM:    Vitals:    /60   Pulse 74   Temp 97.7 °F (36.5 °C) (Oral)   Resp 18   Wt 120 lb 5.9 oz (54.6 kg) Comment: SCD pump removed. 0 pt unable to stand this morning  SpO2 100%   BMI 18.30 kg/m²   I/O last 3 completed shifts: In: 120 [P.O.:120]  Out: -   I/O this shift:  In: 10 [I.V.:10]  Out: -     Physical Exam:  Gen: Resting in bed, NAD. Ill appearing  HEENT: anicteric, NC/AT  CV: RRR, +S1/S2  Lungs: Good respiratory effort, clear air entry   Abd: soft, ND, NT  Ext: trace edema, no cyanosis  Skin: Warm. No rashes appreciated. Neuro:no focal weakness  Joints: No erythema noted over joints. Access: left UA AVG + B    DATA:    CBC:   Lab Results   Component Value Date/Time    WBC 5.9 01/24/2023 02:09 AM    RBC 2.43 01/24/2023 02:09 AM    HGB 7.2 01/25/2023 10:31 AM    HCT 23.8 01/25/2023 10:31 AM    MCV 91.7 01/24/2023 02:09 AM    MCH 28.7 01/24/2023 02:09 AM    MCHC 31.3 01/24/2023 02:09 AM    RDW 17.3 01/24/2023 02:09 AM    PLT 57 01/24/2023 02:09 AM    MPV 7.6 01/24/2023 02:09 AM     BMP:    Lab Results   Component Value Date/Time     01/25/2023 05:50 AM    K 4.7 01/25/2023 05:50 AM    CL 96 01/25/2023 05:50 AM    CO2 24 01/25/2023 05:50 AM    BUN 45 01/25/2023 05:50 AM    LABALBU 2.8 01/25/2023 05:50 AM    CREATININE 10.2 01/25/2023 05:50 AM    CALCIUM 8.3 01/25/2023 05:50 AM    GFRAA 7 10/14/2022 05:45 AM    LABGLOM 5 01/25/2023 05:50 AM    GLUCOSE 63 01/25/2023 05:50 AM       IMPRESSION/RECOMMENDATIONS:      ESKD: followed by  Nephrology, HD 4 x per week, last HD yesterday  -will tentatively plan for TTS while hospitalized   -HD in am    Recent multiple falls: complicated by left gluteal hematoma  -Eliquis on hold-?  If poor candidate for long term AC  -per primary    Anemia: acute on chronic due to bleeding-left gluteal hematoma, recent admission for GIB s/p EGD, colonoscopy  -hgb trended lower from admission  -continue TEODORA  -transfusion threshold per primary    H/o DVT: on AC-with recent multiple falls, ? If candidate for ongoing [facility]    CHF: clinically compensated-continue to challenge TW as tolerated  -on carvediolol    CKD-MBD: check phos  -continue calcitriol-calcium is 8.3    Thank you for allowing me to participate in the care of this patient. I will continue to follow along. Please call with questions.     Nadia Penny MD

## 2023-01-25 NOTE — DISCHARGE INSTRUCTIONS
Extra Heart Failure sites:   https://Klout.[employer]oudNation/ --- this is American Heart Association interactive Healthier Living with Heart Failure guidebook. Please copy and paste link into search bar. Use your mouse to scroll through the pages. Lots and lots of info / tips    HF Lakin lito  --- free smart phone lito available for [employer] and [employer]. Use your phone to track sodium / fluid intake,  symptoms, weight, etc.    Mahad Hauserão Errol "[employer]. Almond[employer] - website-- Mahad Norton "[employer] is a [employer]. All dialysis patients follow a renal diet which IS low sodium!! This website offers free seasonal cookbooks.   Each quarter, they will release 25-30 new recipes with a breakdown of calories, sodium, glucose, etc    www.[email or web address]ation/recipes -- more free recipes

## 2023-01-25 NOTE — PROGRESS NOTES
Pt arrived to room 4[facility]  ED via bed. VSS stable. Daughter at bedside.     Electronically signed by Melanie Haider RN on 1/24/2023 at 8:15 PM

## 2023-01-25 NOTE — RT PROTOCOL NOTE
RT Inhaler-Nebulizer Bronchodilator Protocol Note    There is a bronchodilator order in the chart from a provider indicating to follow the RT Bronchodilator Protocol and there is an Initiate RT Inhaler-Nebulizer Bronchodilator Protocol order as well (see protocol at bottom of note). CXR Findings:  XR CHEST PORTABLE    Result Date: 1/23/2023  1. No acute process or consolidation 2. Stable cardiac enlargement and tortuous aorta       The findings from the last RT Protocol Assessment were as follows:   History Pulmonary Disease: Chronic pulmonary disease  Respiratory Pattern: Regular pattern and RR 12-20 bpm  Breath Sounds: Clear breath sounds  Cough: Strong, spontaneous, non-productive  Indication for Bronchodilator Therapy:    Bronchodilator Assessment Score: 2    Aerosolized bronchodilator medication orders have been revised according to the RT Inhaler-Nebulizer Bronchodilator Protocol below. Respiratory Therapist to perform RT Therapy Protocol Assessment initially then follow the protocol. Repeat RT Therapy Protocol Assessment PRN for score 0-3 or on second treatment, BID, and PRN for scores above 3. No Indications - adjust the frequency to every 6 hours PRN wheezing or bronchospasm, if no treatments needed after 48 hours then discontinue using Per Protocol order mode. If indication present, adjust the RT bronchodilator orders based on the Bronchodilator Assessment Score as indicated below. Use Inhaler orders unless patient has one or more of the following: on home nebulizer, not able to hold breath for 10 seconds, is not alert and oriented, cannot activate and use MDI correctly, or respiratory rate 25 breaths per minute or more, then use the equivalent nebulizer order(s) with same Frequency and PRN reasons based on the score. If a patient is on this medication at home then do not decrease Frequency below that used at home.     0-3 - enter or revise RT bronchodilator order(s) to equivalent RT Bronchodilator order with Frequency of every 4 hours PRN for wheezing or increased work of breathing using Per Protocol order mode. 4-6 - enter or revise RT Bronchodilator order(s) to two equivalent RT bronchodilator orders with one order with BID Frequency and one order with Frequency of every 4 hours PRN wheezing or increased work of breathing using Per Protocol order mode. 7-10 - enter or revise RT Bronchodilator order(s) to two equivalent RT bronchodilator orders with one order with TID Frequency and one order with Frequency of every 4 hours PRN wheezing or increased work of breathing using Per Protocol order mode. 11-13 - enter or revise RT Bronchodilator order(s) to one equivalent RT bronchodilator order with QID Frequency and an Albuterol order with Frequency of every 4 hours PRN wheezing or increased work of breathing using Per Protocol order mode. Greater than 13 - enter or revise RT Bronchodilator order(s) to one equivalent RT bronchodilator order with every 4 hours Frequency and an Albuterol order with Frequency of every 2 hours PRN wheezing or increased work of breathing using Per Protocol order mode. RT to enter RT Home Evaluation for COPD & MDI Assessment order using Per Protocol order mode.     Electronically signed by Meggan Rehman RCP on 1/25/2023 at 12:49 AM

## 2023-01-26 ENCOUNTER — APPOINTMENT (OUTPATIENT)
Dept: CT IMAGING | Age: 68
DRG: 604 | End: 2023-01-26
Payer: MEDICARE

## 2023-01-26 LAB
A/G RATIO: 1.3 (ref 1.1–2.2)
ALBUMIN SERPL-MCNC: 3 G/DL (ref 3.4–5)
ALP BLD-CCNC: 117 U/L (ref 40–129)
ALT SERPL-CCNC: <5 U/L (ref 10–40)
ANION GAP SERPL CALCULATED.3IONS-SCNC: 12 MMOL/L (ref 3–16)
AST SERPL-CCNC: 10 U/L (ref 15–37)
BASE EXCESS ARTERIAL: -1.7 MMOL/L (ref -3–3)
BASOPHILS ABSOLUTE: 0 K/UL (ref 0–0.2)
BASOPHILS RELATIVE PERCENT: 0.7 %
BILIRUB SERPL-MCNC: 0.5 MG/DL (ref 0–1)
BLOOD BANK DISPENSE STATUS: NORMAL
BLOOD BANK PRODUCT CODE: NORMAL
BPU ID: NORMAL
BUN BLDV-MCNC: 53 MG/DL (ref 7–20)
CALCIUM SERPL-MCNC: 8.5 MG/DL (ref 8.3–10.6)
CARBOXYHEMOGLOBIN ARTERIAL: 2 % (ref 0–1.5)
CHLORIDE BLD-SCNC: 98 MMOL/L (ref 99–110)
CO2: 26 MMOL/L (ref 21–32)
CREAT SERPL-MCNC: 11.4 MG/DL (ref 0.8–1.3)
DESCRIPTION BLOOD BANK: NORMAL
EOSINOPHILS ABSOLUTE: 0.3 K/UL (ref 0–0.6)
EOSINOPHILS RELATIVE PERCENT: 5.4 %
GFR SERPL CREATININE-BSD FRML MDRD: 4 ML/MIN/{1.73_M2}
GLUCOSE BLD-MCNC: 79 MG/DL (ref 70–99)
HAV IGM SER IA-ACNC: NORMAL
HCO3 ARTERIAL: 26 MMOL/L (ref 21–29)
HCT VFR BLD CALC: 20.8 % (ref 40.5–52.5)
HCT VFR BLD CALC: 22.1 % (ref 40.5–52.5)
HCT VFR BLD CALC: 23 % (ref 40.5–52.5)
HCT VFR BLD CALC: 29.1 % (ref 40.5–52.5)
HCT VFR BLD CALC: 30.7 % (ref 40.5–52.5)
HEMOGLOBIN, ART, EXTENDED: 6.7 G/DL
HEMOGLOBIN: 6.6 G/DL (ref 13.5–17.5)
HEMOGLOBIN: 6.9 G/DL (ref 13.5–17.5)
HEMOGLOBIN: 7.1 G/DL (ref 13.5–17.5)
HEMOGLOBIN: 9.3 G/DL (ref 13.5–17.5)
HEMOGLOBIN: 9.7 G/DL (ref 13.5–17.5)
HEPATITIS B CORE IGM ANTIBODY: NORMAL
HEPATITIS B SURFACE ANTIGEN INTERPRETATION: NORMAL
HEPATITIS C ANTIBODY INTERPRETATION: NORMAL
LYMPHOCYTES ABSOLUTE: 0.6 K/UL (ref 1–5.1)
LYMPHOCYTES RELATIVE PERCENT: 11.3 %
MCH RBC QN AUTO: 28.1 PG (ref 26–34)
MCHC RBC AUTO-ENTMCNC: 30.7 G/DL (ref 31–36)
MCV RBC AUTO: 91.5 FL (ref 80–100)
METHEMOGLOBIN ARTERIAL: 0.5 % (ref 0–1.4)
MONOCYTES ABSOLUTE: 0.6 K/UL (ref 0–1.3)
MONOCYTES RELATIVE PERCENT: 11 %
NEUTROPHILS ABSOLUTE: 3.8 K/UL (ref 1.7–7.7)
NEUTROPHILS RELATIVE PERCENT: 71.6 %
O2 SAT, ARTERIAL: 97 % (ref 93–100)
PCO2 ARTERIAL: 63.6 MMHG (ref 35–45)
PDW BLD-RTO: 18 % (ref 12.4–15.4)
PH ARTERIAL: 7.22 (ref 7.35–7.45)
PLATELET # BLD: 95 K/UL (ref 135–450)
PMV BLD AUTO: 8.2 FL (ref 5–10.5)
PO2 ARTERIAL: 92.6 MMHG (ref 75–108)
POTASSIUM REFLEX MAGNESIUM: 5.2 MMOL/L (ref 3.5–5.1)
RBC # BLD: 2.51 M/UL (ref 4.2–5.9)
SODIUM BLD-SCNC: 136 MMOL/L (ref 136–145)
TCO2 ARTERIAL: 28 MMOL/L
TOTAL PROTEIN: 5.3 G/DL (ref 6.4–8.2)
WBC # BLD: 5.4 K/UL (ref 4–11)

## 2023-01-26 PROCEDURE — 70450 CT HEAD/BRAIN W/O DYE: CPT

## 2023-01-26 PROCEDURE — 36415 COLL VENOUS BLD VENIPUNCTURE: CPT

## 2023-01-26 PROCEDURE — 97530 THERAPEUTIC ACTIVITIES: CPT

## 2023-01-26 PROCEDURE — 6360000002 HC RX W HCPCS: Performed by: INTERNAL MEDICINE

## 2023-01-26 PROCEDURE — 97535 SELF CARE MNGMENT TRAINING: CPT

## 2023-01-26 PROCEDURE — 6370000000 HC RX 637 (ALT 250 FOR IP): Performed by: HOSPITALIST

## 2023-01-26 PROCEDURE — C9113 INJ PANTOPRAZOLE SODIUM, VIA: HCPCS | Performed by: INTERNAL MEDICINE

## 2023-01-26 PROCEDURE — 97167 OT EVAL HIGH COMPLEX 60 MIN: CPT

## 2023-01-26 PROCEDURE — 90935 HEMODIALYSIS ONE EVALUATION: CPT

## 2023-01-26 PROCEDURE — 85025 COMPLETE CBC W/AUTO DIFF WBC: CPT

## 2023-01-26 PROCEDURE — 80074 ACUTE HEPATITIS PANEL: CPT

## 2023-01-26 PROCEDURE — 2580000003 HC RX 258: Performed by: INTERNAL MEDICINE

## 2023-01-26 PROCEDURE — 85018 HEMOGLOBIN: CPT

## 2023-01-26 PROCEDURE — 36430 TRANSFUSION BLD/BLD COMPNT: CPT

## 2023-01-26 PROCEDURE — 99232 SBSQ HOSP IP/OBS MODERATE 35: CPT | Performed by: SURGERY

## 2023-01-26 PROCEDURE — 82803 BLOOD GASES ANY COMBINATION: CPT

## 2023-01-26 PROCEDURE — 85014 HEMATOCRIT: CPT

## 2023-01-26 PROCEDURE — 2060000000 HC ICU INTERMEDIATE R&B

## 2023-01-26 PROCEDURE — 97162 PT EVAL MOD COMPLEX 30 MIN: CPT

## 2023-01-26 PROCEDURE — 80053 COMPREHEN METABOLIC PANEL: CPT

## 2023-01-26 RX ORDER — SODIUM CHLORIDE 9 MG/ML
INJECTION, SOLUTION INTRAVENOUS PRN
Status: DISCONTINUED | OUTPATIENT
Start: 2023-01-26 | End: 2023-01-29 | Stop reason: HOSPADM

## 2023-01-26 RX ADMIN — CARVEDILOL 3.12 MG: 3.12 TABLET, FILM COATED ORAL at 20:54

## 2023-01-26 RX ADMIN — SEVELAMER CARBONATE 800 MG: 800 TABLET, FILM COATED ORAL at 11:59

## 2023-01-26 RX ADMIN — LEVOTHYROXINE SODIUM 75 MCG: 0.07 TABLET ORAL at 05:45

## 2023-01-26 RX ADMIN — IRON SUCROSE 100 MG: 20 INJECTION, SOLUTION INTRAVENOUS at 11:59

## 2023-01-26 RX ADMIN — SEVELAMER CARBONATE 800 MG: 800 TABLET, FILM COATED ORAL at 17:08

## 2023-01-26 RX ADMIN — PANTOPRAZOLE SODIUM 40 MG: 40 INJECTION, POWDER, LYOPHILIZED, FOR SOLUTION INTRAVENOUS at 11:59

## 2023-01-26 RX ADMIN — PANTOPRAZOLE SODIUM 40 MG: 40 INJECTION, POWDER, LYOPHILIZED, FOR SOLUTION INTRAVENOUS at 20:54

## 2023-01-26 RX ADMIN — SODIUM CHLORIDE, PRESERVATIVE FREE 10 ML: 5 INJECTION INTRAVENOUS at 12:00

## 2023-01-26 RX ADMIN — ATORVASTATIN CALCIUM 80 MG: 80 TABLET, FILM COATED ORAL at 20:54

## 2023-01-26 RX ADMIN — EPOETIN ALFA-EPBX 10000 UNITS: 10000 INJECTION, SOLUTION INTRAVENOUS; SUBCUTANEOUS at 10:08

## 2023-01-26 RX ADMIN — CARVEDILOL 3.12 MG: 3.12 TABLET, FILM COATED ORAL at 11:59

## 2023-01-26 RX ADMIN — SODIUM CHLORIDE, PRESERVATIVE FREE 10 ML: 5 INJECTION INTRAVENOUS at 20:54

## 2023-01-26 ASSESSMENT — PAIN SCALES - GENERAL
PAINLEVEL_OUTOF10: 0

## 2023-01-26 NOTE — FLOWSHEET NOTE
01/26/23 0605   Treatment Team Notification   Reason for Communication Critical results   Type of Critical Result Laboratory   Critical Lab Result Type Hemoglobin and hematocrit  (hgb 6.6)   Team Member Name Dr Coleman   Treatment Team Role Attending Provider   Method of Communication Secure Message   Response Waiting for response   Notification Time 0606       Orders placed to repeat H&H

## 2023-01-26 NOTE — CONSULTS
GI Consult Note      Admission Date: 1/23/2023  Hospital Day: Hospital Day: 4  Attending: Aruna Mason MD  Date of service: 1/26/23    Subjective:     Chief complaint/ Reason for consult:   Anemia     HPI: Parish Guzman is a 74 y.o.  male patient, who was seen at the request of Dr. Aruna Mason MD.    History was obtained from chart review and the patient. 68-year-old -American male with a history of end-stage renal disease on hemodialysis, peptic ulcer disease who presented after a fall. Patient sustained a left gluteal hematoma after a fall. He is on Eliquis. We are asked to see the patient due to anemia. Baseline hemoglobin is around 7 as documented on January 16. Hemoglobin on admission was 8.2 down to 6.9. His platelets are 57. He denies any melena, hematochezia. In fact has had no bowel movements. No nausea or vomiting. Denies any abdominal pain. The only pain he has is in the left gluteal region where he fell and he has a hematoma. He underwent an upper endoscopy and colonoscopy with my partner Dr. Sridhar Hargrove on January 14 which was notable for 2 small duodenal ulcers, hiatal hernia, erosive gastritis and a small colon polyp.              Past Endoscopic History:  As above                    Past Medical History:     Past Medical History:   Diagnosis Date    Cerebral artery occlusion with cerebral infarction ([address]     Combined systolic and diastolic heart failure (HCC)     COPD (chronic obstructive pulmonary disease) (HCC)     Gout     Hemodialysis patient ([address]     Hx of blood clots     Hyperlipidemia     Hypertension        Past Surgical History:    Past Surgical History:   Procedure Laterality Date    COLONOSCOPY      COLONOSCOPY N/A 1/17/2023    COLONOSCOPY DIAGNOSTIC performed by Lakshmi Johnson MD at [address] [facility]  11/15/2022    CT BONE MARROW BIOPSY 11/15/2022 [facility] CT SCAN    DIALYSIS FISTULA CREATION Left     UPPER GASTROINTESTINAL ENDOSCOPY N/A 1/17/2023    EGD BIOPSY performed by Nelda Gibson MD at [address] [place] [address] History:     Tobacco use:   reports that he has quit smoking. He has never used smokeless tobacco.  Alcohol use:   reports that he does not currently use alcohol. Currently lives in: [address]   reports no history of drug use. Family History:       Problem Relation Age of Onset    Cancer Mother     Cancer Father     Other Sister          Medications:    epoetin zachary-epbx  10,000 Units IntraVENous Once per day on Mon Wed Fri    sodium chloride flush  10 mL IntraVENous 2 times per day    pantoprazole  40 mg IntraVENous BID    atorvastatin  80 mg Oral Nightly    carvedilol  3.125 mg Oral BID    allopurinol  100 mg Oral Once per day on Mon Wed Fri    calcitRIOL  0.5 mcg Oral Once per day on Mon Wed Fri    levothyroxine  75 mcg Oral QAM AC    sevelamer  800 mg Oral TID WC            REVIEW OF SYSTEMS:       Pertinent items are noted in HPI. Objective:   PHYSICAL EXAM:      Vitals:   Vitals:    01/26/23 0944 01/26/23 0959 01/26/23 1101 01/26/23 1138   BP: 129/75 (!) 145/75 138/61 (!) 149/87   Pulse: 77 76 77 88   Resp: 18 18 18 18   Temp: 98 °F (36.7 °C) 97.7 °F (36.5 °C) 98.2 °F (36.8 °C) 97.8 °F (36.6 °C)   TempSrc: Oral Oral  Oral   SpO2:    100%   Weight:   117 lb 15.1 oz (53.5 kg)    Height:           General appearance: alert, cooperative, no distress, appears stated age  Eyes: Anicteric  Head: Normocephalic, without obvious abnormality  Lungs: clear to auscultation bilaterally, Normal Effort  Heart: regular rate and rhythm, normal S1 and S2, no murmurs or rubs  Abdomen: soft, non-tender. Bowel sounds normal. No masses,  no organomegaly. Extremities: atraumatic, no cyanosis or edema  Skin: warm and dry, no jaundice  Neuro: Grossly intact, A&OX3    Intake and output:   I/O last 3 completed shifts:   In: 12 [P.O.:660; I.V.:10]  Out: 0     Lab Data:      CBC:   Recent Labs 01/23/23  2349 01/24/23  0209 01/24/23  0504 01/26/23  0202 01/26/23  0452 01/26/23  0633   WBC 6.2 5.9  --  5.4  --   --    RBC 2.89* 2.43*  --  2.51*  --   --    HGB 8.2* 7.0*   < > 7.1* 6.6* 6.9*   HCT 26.5* 22.2*   < > 23.0* 20.8* 22.1*   PLT 65* 57*  --  95*  --   --    MCV 91.6 91.7  --  91.5  --   --    MCH 28.5 28.7  --  28.1  --   --    MCHC 31.1 31.3  --  30.7*  --   --    RDW 17.7* 17.3*  --  18.0*  --   --     < > = values in this interval not displayed. BMP:  Recent Labs     01/23/23 2349 01/25/23  0550 01/26/23  0452    130* 136   K 4.5 4.7 5.2*   CL 95* 96* 98*   CO2 28 24 26   BUN 35* 45* 53*   CREATININE 8.7* 10.2* 11.4*   CALCIUM 9.0 8.3 8.5   GLUCOSE 86 63* 79        Hepatic Function Panel:   Recent Labs     01/25/23  0550 01/26/23  0452   AST 11* 10*   ALT <5* <5*   BILITOT 0.6 0.5   ALKPHOS 117 117       No results for input(s): LIPASE, AMYLASE in the last 72 hours. No results for input(s): PROTIME, INR in the last 72 hours. No results for input(s): PTT in the last 72 hours. No results for input(s): OCCULTBLD in the last 72 hours. Imaging:    CT HEAD WO CONTRAST   Final Result      Stable subcentimeter focus of hyperattenuation in the right temporal lobe is nonspecific and may represent a small vascular malformation. This can be further evaluated with MRI. No acute intracranial abnormality. CT ABDOMEN PELVIS W IV CONTRAST Additional Contrast? None   Final Result      1. Trace amount of ascites along the liver margin and diffuse fatty infiltration liver and borderline size of the spleen appreciated. 2. Cardiac enlargement noted in the chest with small right-sided pleural effusion and airspace disease noted posterior medial left lung base, correlate for pneumonia   3. Scattered diffuse diverticulosis with chronic renal failure   4. Intermediate density lesions in the kidneys and scattered cysts in the kidneys.  Intermediate density in the right mid kidney could be a solid mass measuring 1.9 x 1.7 cm   5. Previous surgery noted in the right lower quadrant, likely ileocolectomy or partial bowel surgery with some mucosal thickening. There is also scattered diverticulosis   6. Left posterior, inferior medial gluteal enlargement extending inferiorly likely representing gluteus medius hematoma, not included 100% on the imaging, extends beyond the imaging margin measuring up to 9.5 x 4.6 cm on axial image 121 and overall    height of the hematoma cannot be measured, appears to extend for greater than 6 or 8 cm         CT Head W/O Contrast   Final Result      Normal noncontrast CT the brain for the patient's age without acute hemorrhage, edema or hydrocephalus. Symmetric cerebral atrophy and some low-attenuation changes in the periventricular white matter and left thalamus and right posterior caudate nucleus      XR CHEST PORTABLE   Final Result      1. No acute process or consolidation   2. Stable cardiac enlargement and tortuous aorta                     Known drug Allergies: Allergies   Allergen Reactions    Nitroglycerin Other (See Comments)     Unknown reaction    Oxymetazoline Other (See Comments)     Unknown reaction           Assessment:   The patient is a 74 y.o. old male  with following problems:    1. Acute on chronic anemia-patient has no evidence of GI bleeding. Mild drop in hemoglobin could be explained by his left gluteal hematoma after his fall while anticoagulated with Eliquis. 2.  Recent EGD and colonoscopy done on January 14 with findings as discussed above   Recommendations:   1.   Continue PPI        [address], MD  [address] and [facility] [place]

## 2023-01-26 NOTE — PLAN OF CARE
Problem: Safety - Adult  Goal: Free from fall injury  Outcome: Not Progressing     Problem: Skin/Tissue Integrity  Goal: Absence of new skin breakdown  Description: 1. Monitor for areas of redness and/or skin breakdown  2. Assess vascular access sites hourly  3. Every 4-6 hours minimum:  Change oxygen saturation probe site  4. Every 4-6 hours:  If on nasal continuous positive airway pressure, respiratory therapy assess nares and determine need for appliance change or resting period. Outcome: Not Progressing     Problem: ABCDS Injury Assessment  Goal: Absence of physical injury  Outcome: Not Progressing     Problem: Hematologic - Adult  Goal: Maintains hematologic stability  Outcome: Not Progressing     Problem: Discharge Planning  Goal: Discharge to home or other facility with appropriate resources  Outcome: Not Progressing     Problem: Pain  Goal: Verbalizes/displays adequate comfort level or baseline comfort level  Outcome: Not Progressing     Problem: Chronic Conditions and Co-morbidities  Goal: Patient's chronic conditions and co-morbidity symptoms are monitored and maintained or improved  Outcome: Not Progressing     Problem: Cardiovascular - Adult  Goal: Maintains optimal cardiac output and hemodynamic stability  Outcome: Not Progressing  Goal: Absence of cardiac dysrhythmias or at baseline  Outcome: Not Progressing     Problem: Respiratory - Adult  Goal: Achieves optimal ventilation and oxygenation  Outcome: Not Progressing     Problem: Metabolic/Fluid and Electrolytes - Adult  Goal: Electrolytes maintained within normal limits  Outcome: Not Progressing  Goal: Hemodynamic stability and optimal renal function maintained  Outcome: Not Progressing     Problem: Safety - Adult  Goal: Free from fall injury  Outcome: Not Progressing     Problem: Skin/Tissue Integrity  Goal: Absence of new skin breakdown  Description: 1. Monitor for areas of redness and/or skin breakdown  2.   Assess vascular access sites hourly  3. Every 4-6 hours minimum:  Change oxygen saturation probe site  4. Every 4-6 hours:  If on nasal continuous positive airway pressure, respiratory therapy assess nares and determine need for appliance change or resting period.   Outcome: Not Progressing     Problem: ABCDS Injury Assessment  Goal: Absence of physical injury  Outcome: Not Progressing     Problem: Hematologic - Adult  Goal: Maintains hematologic stability  Outcome: Not Progressing     Problem: Discharge Planning  Goal: Discharge to home or other facility with appropriate resources  Outcome: Not Progressing     Problem: Pain  Goal: Verbalizes/displays adequate comfort level or baseline comfort level  Outcome: Not Progressing     Problem: Chronic Conditions and Co-morbidities  Goal: Patient's chronic conditions and co-morbidity symptoms are monitored and maintained or improved  Outcome: Not Progressing     Problem: Cardiovascular - Adult  Goal: Maintains optimal cardiac output and hemodynamic stability  Outcome: Not Progressing  Goal: Absence of cardiac dysrhythmias or at baseline  Outcome: Not Progressing     Problem: Respiratory - Adult  Goal: Achieves optimal ventilation and oxygenation  Outcome: Not Progressing     Problem: Metabolic/Fluid and Electrolytes - Adult  Goal: Electrolytes maintained within normal limits  Outcome: Not Progressing  Goal: Hemodynamic stability and optimal renal function maintained  Outcome: Not Progressing

## 2023-01-26 NOTE — PROGRESS NOTES
Treatment time: 3 hrs 15 min    Net UF: 3000 ml    Pre weight: 56.5 kg  Post weight: 53.5 kg  EDW: TBD    Access used: Lt upper arm AVG  Access function: Well tolerated, 350 BFR    Medications or blood products given: Retacrit 10,000 units, 1 unit of PRBCs    Regular outpatient schedule: TTS    Summary of response to treatment: Pt tolerated well. Pt remained stable throughout entire treatment. Copy of dialysis treatment record placed in chart, to be scanned into EMR.

## 2023-01-26 NOTE — PROGRESS NOTES
Physical Therapy  Facility/Department: [place] [address] [facility]  Physical Therapy Initial Assessment and Treatment    Name: Darrell Mejia  : 1955  MRN: [id number]  Date of Service: 2023    Discharge Recommendations:    Darrell Mejia scored a 9/24 on the AM-PAC short mobility form. Current research shows that an AM-PAC score of 17 or less is typically not associated with a discharge to the patient's home setting. Based on the patient's AM-PAC score and their current functional mobility deficits, it is recommended that the patient have 3-5 sessions per week of Physical Therapy at d/c to increase the patient's independence. Please see assessment section for further patient specific details. If patient discharges prior to next session this note will serve as a discharge summary. Please see below for the latest assessment towards goals. PT Equipment Recommendations  Equipment Needed: No      Patient Diagnosis(es): The primary encounter diagnosis was Intramuscular hematoma. Diagnoses of Anticoagulated and Chronic anemia were also pertinent to this visit. Past Medical History:  has a past medical history of Cerebral artery occlusion with cerebral infarction ([address] Combined systolic and diastolic heart failure ([address] COPD (chronic obstructive pulmonary disease) ([address] Gout, Hemodialysis patient ([address] Hx of blood clots, Hyperlipidemia, and Hypertension. Past Surgical History:  has a past surgical history that includes Dialysis fistula creation (Left); Colonoscopy; CT BIOPSY BONE MARROW (11/15/2022); Upper gastrointestinal endoscopy (N/A, 2023); and Colonoscopy (N/A, 2023). Assessment   Assessment: Pt is a resident of LTC who has normally been able to ambulate in his room and perform self care. Currently dependent to transfer 2/2 buckling of eh LE's  Self care is also difficult as he drops objects when his arms jerk.   Recommend continued therapies to maximize mobilty and safety  Treatment Diagnosis: Decreased functional mobility 2/2 weakness/ LE's buckling  Decision Making: Medium Complexity  Barriers to Learning: cognition  Activity Tolerance  Activity Tolerance: Patient limited by pain  Activity Tolerance Comments: limited by back pain and buckling of LE's     Plan   Physcial Therapy Plan  General Plan:  (2-5)  Current Treatment Recommendations: Strengthening, ROM, Balance training, Functional mobility training, Gait training, Safety education & training  Safety Devices  Type of Devices: Call light within reach, Chair alarm in place, Gait belt, Nurse notified, Left in chair     Restrictions  Position Activity Restriction  Other position/activity restrictions: up with assist     Subjective   General  Additional Pertinent Hx: Adm 1/23 post fall with SOB. CT demonstrates a large gluteal hematoma without findings of active contrast extravasation, though it is large enough to extend into his leg and is not fully imaged.  CT head is without acute findings. Hgb  6.9  Referring Practitioner: Abdulkadir  Diagnosis: acute blood loss anemia  Subjective  Subjective: Pt in bed upon PT entry, agreeable to working with PT.Notes back pain and abdominal discomfort.  RN aware         Social/Functional History  Social/Functional History  Type of Home: Facility ([facility] x 6 months (not working with therapy))  Bathroom Shower/Tub: Walk-in shower  Bathroom Toilet: Standard  Bathroom Equipment: Hand-held shower, Shower chair, Grab bars in shower  Bathroom Accessibility: Accessible  Home Equipment: Walker, rolling, Cane (3L O2)  Has the patient had two or more falls in the past year or any fall with injury in the past year?: Yes  ADL Assistance: Independent  Ambulation Assistance: Independent (using either a RW or cane)  Additional Comments: takes meals in his room; denies h/o falls other until the falls just prior to this admission; states that the \"jumpiness\" of arms and legs have been going on for awhile. \"I drop every  damn thing. \"   Pt is a questionable historian - previous evaluation in 9/2022 - pt takes meals in dining room (pushed by staff in w/c); pt did have 2 previous falls (one in which he hit his head). Vision/Hearing  Vision  Vision: Impaired  Vision Exceptions: Wears glasses at all times  Hearing  Hearing: Within functional limits    Cognition   Orientation  Overall Orientation Status: Within Functional Limits  Cognition  Overall Cognitive Status: Exceptions  Arousal/Alertness: Delayed responses to stimuli  Following Commands: Follows one step commands consistently  Attention Span: Attends with cues to redirect  Memory: Decreased recall of recent events;Decreased short term memory  Safety Judgement: Decreased awareness of need for assistance  Problem Solving: Assistance required to generate solutions  Initiation: Requires cues for some  Sequencing: Requires cues for some     Objective   Heart Rate: 84  Heart Rate Source: Monitor  BP: (!) 143/74  BP Location: Right upper arm  BP Method: Automatic  Patient Position: Lying right side;Semi fowlers  MAP (Calculated): 97  Resp: 17  SpO2: 100 %  O2 Device: Nasal cannula     Observation/Palpation  Observation: 3L O2        AROM RLE (degrees)  RLE AROM: WFL  AROM LLE (degrees)  LLE AROM : WFL      Strength -  LE's functional for sit to stand - however, pt has sudden buckling during any attempt to ambulate or side step. Bed mobility  Supine to Sit: Moderate assistance (HOB elevated, use of side rail)  Transfers  Sit to Stand: Moderate Assistance (cues for hand placement)  Stand to Sit: Moderate Assistance (cues for hand placement)  Bed to Chair: Maximum assistance; Moderate assistance;2 Person Assistance (bed to chair with RW 2/2 knees buckling.)  Comment: [place] used for transfer from [facility] to chair. While in [facility], pt needed mod to max assist as his knees buckled 3-4 times and he tended to slide forward.         Balance  Comments: Pt required mod to max x 2 for standing safety as his knee buckled frequently during bed to chair transfer with RW. He also needed same with standing in [place] as his knees continued to buckle.      AM-PAC Score  AM-PAC Inpatient Mobility Raw Score : 9 (01/26/23 1547)  AM-PAC Inpatient T-Scale Score : 30.55 (01/26/23 1547)  Mobility Inpatient CMS 0-100% Score: 81.38 (01/26/23 1547)  Mobility Inpatient CMS G-Code Modifier : CM (01/26/23 1547)     Goals  Short Term Goals  Time Frame for Short Term Goals: Discharge  Short Term Goal 1: Bed mobility with CG  Short Term Goal 2: Sit <> stand with min x 1  Short Term Goal 3: Bed to chair with mod x 1  Patient Goals   Patient Goals : not specifically stated       Education role of PT, safety, bed mobility, transfers, safety with gait         Therapy Time   Individual Concurrent Group Co-treatment   Time In 1337         Time Out 1440         Minutes 63             Timed Code Treatment Minutes:   45    Total Treatment Minutes:  [address], [id number]

## 2023-01-26 NOTE — PROGRESS NOTES
General Surgery   Daily Progress Note  Patient: Adilson Miller    CC: Left gluteal hematoma status post fall on Eliquis    SUBJECTIVE:  Hgb this am 6.6 and 6.9 on repeat check. Pt currently receiving HD.     ROS: A 14 point review of systems was conducted, significant findings as noted above. All other systems negative. OBJECTIVE:   Infusions:   sodium chloride      sodium chloride        I/O:I/O last 3 completed shifts: In: 670 [P.O.:660; I.V.:10]  Out: 0            Wt Readings from Last 1 Encounters:   01/26/23 123 lb 7.3 oz (56 kg)       Exam:  BP (!) 142/77   Pulse 86   Temp 97.9 °F (36.6 °C) (Oral)   Resp 18   Ht 5' 8\" (1.727 m)   Wt 123 lb 7.3 oz (56 kg)   SpO2 91%   BMI 18.77 kg/m²     General appearance: no acute distress  Eyes: PERRL, no scleral icterus  Neck: trachea midline, no JVD  Chest/Lungs: symmetrical chest rise, normal effort  Cardiovascular: RRR  Abdomen: soft, non-tender, non-distended, no guarding/rigidity  Skin: warm and dry, no rashes  Extremities: no edema, no cyanosis, left gluteal tenderness, intact strength and sensation to bilateral lower extremities, palpable DP, PT, and fem pulses to left lower extremity. Compartments of left lower extremity are all soft along with left buttock. Neuro: A&Ox3, no focal deficits, sensation intact            LABS:   Recent Labs     01/24/23  0209 01/24/23  0504 01/26/23  0202 01/26/23 0452   WBC 5.9  --  5.4  --    HGB 7.0*   < > 7.1* 6.6*   HCT 22.2*   < > 23.0* 20.8*   MCV 91.7  --  91.5  --    PLT 57*  --  95*  --     < > = values in this interval not displayed. Recent Labs     01/25/23  0550 01/26/23 0452   * 136   K 4.7 5.2*   CL 96* 98*   CO2 24 26   PHOS 3.8  --    BUN 45* 53*   CREATININE 10.2* 11.4*        Recent Labs     01/25/23  0550 01/26/23 0452   AST 11* 10*   ALT <5* <5*   BILITOT 0.6 0.5   ALKPHOS 117 117      No results for input(s): LIPASE, AMYLASE in the last 72 hours.      Recent Labs     01/25/23  0542 01/26/23  0452   PROT 5.4* 5.3*        Recent Labs     01/23/23  2349 01/24/23  0209   TROPONINI 0.11* 0.09*       ASSESSMENT/PLAN:   This is a 67 y.o. male with Hx as delineated above who presents after ground-level fall onto left buttock at which time a CT scan found a 9.5 x 4.6 cm not fully imaged left gluteal hematoma.The patient only had an abdominal CT scan which did not fully measure the left gluteal hematoma.  Patient's hemoglobin was 7.3 in the emergency department at which time he received 1 unit of blood with response to 8.3.  The patient has low hemoglobin at baseline with baseline being around 7.  There is no acute need for surgical intervention at this time.    - Continue to monitor hemoglobins every Q6  - Place binder around pelvis for compression of hematoma  - Need to hold Eliquis until hemoglobin is stable for at least a minimum of 48 hours  - Transfusions per primary team  - If patient continues to drop hemoglobin, will need repeat imaging with CTA to evaluate hematoma fully for possible extravasation  - If active extravasation found patient will likely need interventional radiology for further intervention  - Further care per primary team    Michele Petty DO   PGY1, General Surgery  01/26/23  7:16 AM  Contact via [employer]      I performed a history and physical examination of the patient and discussed management with the resident. I reviewed the resident's note and agree with the documented findings and plan of care. Denies any worsening hip pain. On HD this morning. Hgb down slightly. Contnue to hold Eliquis.     Sukhwinder Solis M.D.  1/26/23   10:51 AM

## 2023-01-26 NOTE — CARE COORDINATION
Case Management Assessment           Daily Note                 Date/ Time of Note: 1/26/2023 3:27 PM         Note completed by: Olivia Basilio RN    Patient Name: Maricel Vazquez  YOB: 1955    Diagnosis:Anticoagulated [Z79.01]  Chronic anemia [D64.9]  Intramuscular hematoma [T14. 8XXA]  Acute blood loss anemia (ABLA) [D62]  Patient Admission Status: Inpatient    Date of Admission:1/23/2023 10:17 PM Length of Stay: 2 GLOS: GMLOS: 3.8    Current Plan of Care: monitoring H/H, GI following, Nephro following for HD  ________________________________________________________________________________________  PT AM-PAC:   / 24 per last evaluation on: [facility] on 01/26/23     OT AM-PAC:   / 24 per last evaluation on: [facility] on 01/26/23     DME Needs for discharge:   ________________________________________________________________________________________  Discharge Plan: [address] [place] [address] ([facility]): [facility] with in house HD    Tentative discharge date: 2-3 days per MD rounds    Current barriers to discharge: stable H/H, medical stability    Referrals completed: Not Applicable    Resources/ information provided: Not indicated at this time  ________________________________________________________________________________________  Case Management Notes: CM continues to follow for DC planning and needs. Patient having H/H monitored for stability, GI re-consulted for patient, no noted intervention noted at this time. Nephro continues to follow for HD. CM has faxed recent run sheets to [facility] for review for return potentially over the weekend. CM will continue to follow, per nephro note, patient will have HD on Saturday. Jenna Hernandez and his family were provided with choice of provider; he and his family are in agreement with the discharge plan.     Care Transition Patient: Pamela Basilio RN  [facility]  Case Management Department  Ph: [phone]  Fax: [phone]

## 2023-01-26 NOTE — PROGRESS NOTES
Shift Summary    Admitting Dx:  Multiple Falls, L Buttock Hematoma    Shift Events:  Patient nearly fell off the [facility] with two staff helping him due to his tremors - explained to patient that he will be on bedrest until PT sees him due to his history of falls. Patient had increased confusion and tremors - ABG, Repeat H&H, CT Head and UA ordered. Patient impulsive - taking off oxygen, trying to get out of bed without assistance, yelling out. Vitals:  Vitals:    01/25/23 1912 01/25/23 2005 01/25/23 2345 01/26/23 0320   BP:  123/73 123/74 (!) 142/77   Pulse:  77 82 86   Resp:  18 18 18   Temp:  97.4 °F (36.3 °C) 97.9 °F (36.6 °C) 97.9 °F (36.6 °C)   TempSrc:  Oral Oral Oral   SpO2:  95% 90% 91%   Weight:    123 lb 7.3 oz (56 kg)   Height: 5' 8\" (1.727 m)            Tele:  SR    IV/Line:  R AC PIV   L Fistula     Drains/Chen:  N/A    Neuro:   A&Ox2-3    I/O:   I/O last 3 completed shifts:   In: 550 [P.O.:540; I.V.:10]  Out: -   I/O this shift:  In: 120 [P.O.:120]  Out: 0

## 2023-01-26 NOTE — FLOWSHEET NOTE
01/26/23 0721   Treatment Team Notification   Reason for Communication Critical results   Type of Critical Result Laboratory   Critical Lab Result Type Hemoglobin and hematocrit  (Hgb 6.9)   Team Member Name Dr Samy Arias Role Attending Provider   Method of Communication Secure Message   Response Waiting for response   Notification Time [id number]

## 2023-01-26 NOTE — PLAN OF CARE
Problem: Skin/Tissue Integrity  Goal: Absence of new skin breakdown  Description: 1. Monitor for areas of redness and/or skin breakdown  2. Assess vascular access sites hourly  3. Every 4-6 hours minimum:  Change oxygen saturation probe site  4. Every 4-6 hours:  If on nasal continuous positive airway pressure, respiratory therapy assess nares and determine need for appliance change or resting period. 1/26/2023 1035 by Chano Carr RN  Outcome: Progressing     Problem: Safety - Adult  Goal: Free from fall injury  1/26/2023 1035 by Chano Carr RN  Outcome: Progressing  Flowsheets (Taken 1/26/2023 1030)  Free From Fall Injury:   Instruct family/caregiver on patient safety   Based on caregiver fall risk screen, instruct family/caregiver to ask for assistance with transferring infant if caregiver noted to have fall risk factors     Problem: Discharge Planning  Goal: Discharge to home or other facility with appropriate resources  1/26/2023 1035 by Chano Carr RN  Outcome: Progressing     Problem: Safety - Adult  Goal: Free from fall injury  1/26/2023 1035 by Chano Carr RN  Outcome: Progressing  Flowsheets (Taken 1/26/2023 1030)  Free From Fall Injury:   Instruct family/caregiver on patient safety   Based on caregiver fall risk screen, instruct family/caregiver to ask for assistance with transferring infant if caregiver noted to have fall risk factors  1/26/2023 0309 by Pamela Velasco RN  Outcome: Not Progressing     Problem: Skin/Tissue Integrity  Goal: Absence of new skin breakdown  Description: 1. Monitor for areas of redness and/or skin breakdown  2. Assess vascular access sites hourly  3. Every 4-6 hours minimum:  Change oxygen saturation probe site  4. Every 4-6 hours:  If on nasal continuous positive airway pressure, respiratory therapy assess nares and determine need for appliance change or resting period.   1/26/2023 1035 by Chano Carr RN  Outcome: Progressing  1/26/2023 0309 by Lawyer Montaño Meir Rivera RN  Outcome: Not Progressing     Problem: ABCDS Injury Assessment  Goal: Absence of physical injury  1/26/2023 0309 by Jay Mendez RN  Outcome: Not Progressing     Problem: Hematologic - Adult  Goal: Maintains hematologic stability  1/26/2023 0309 by Jay Mendez RN  Outcome: Not Progressing     Problem: Discharge Planning  Goal: Discharge to home or other facility with appropriate resources  1/26/2023 1035 by Rosalinda Maurer RN  Outcome: Progressing  1/26/2023 0309 by Jya Mendez RN  Outcome: Not Progressing     Problem: Pain  Goal: Verbalizes/displays adequate comfort level or baseline comfort level  1/26/2023 0309 by Jay Mendez RN  Outcome: Not Progressing     Problem: Chronic Conditions and Co-morbidities  Goal: Patient's chronic conditions and co-morbidity symptoms are monitored and maintained or improved  1/26/2023 0309 by Jay Mendze RN  Outcome: Not Progressing     Problem: Cardiovascular - Adult  Goal: Maintains optimal cardiac output and hemodynamic stability  1/26/2023 0309 by Jay Mendez RN  Outcome: Not Progressing  Goal: Absence of cardiac dysrhythmias or at baseline  1/26/2023 0309 by Jay Mendez RN  Outcome: Not Progressing     Problem: Respiratory - Adult  Goal: Achieves optimal ventilation and oxygenation  1/26/2023 0309 by Jay Mendez RN  Outcome: Not Progressing     Problem: Metabolic/Fluid and Electrolytes - Adult  Goal: Electrolytes maintained within normal limits  1/26/2023 0309 by Jay Mendez RN  Outcome: Not Progressing  Goal: Hemodynamic stability and optimal renal function maintained  1/26/2023 0309 by Jay Mendez RN  Outcome: Not Progressing

## 2023-01-26 NOTE — PROGRESS NOTES
Nephrology Progress Note  [phone]   [facility] [employer]        Reason for Consult:  ESKD    HISTORY OF PRESENT ILLNESS:                This is a patient with significant past medical history of ESKD on HD 4 x per week at [facility], HTN, h/o multiple falls, CVA, CHF, COPD, h/o DVT on [facility] [place], operated by [facility]   who presented ON multiple falls-4-5/day-fell on left buttock one day prior, CT showed left gluteal hematoma, seen by surgery. C/o abdominal pain, denies N/V/. Had HD yesterday. Lytes are stable. Adm hgb 8.2, dropped to 7.0    Subjective:  -pt seen and examined  -PMSHx and meds reviewed  -No family at bedside    No acute events ON  S/p HD today    ROS: feels weak    PHYSICAL EXAM:    Vitals:    BP (!) 149/87   Pulse 88   Temp 97.8 °F (36.6 °C) (Oral)   Resp 18   Ht 5' 8\" (1.727 m)   Wt 117 lb 15.1 oz (53.5 kg)   SpO2 100%   BMI 17.93 kg/m²   I/O last 3 completed shifts: In: 670 [P.O.:660; I.V.:10]  Out: 0   I/O this shift: In: 575 [Blood:175]  Out: 3400     Physical Exam:  Gen: Resting in bed, NAD. Ill appearing  HEENT: anicteric, NC/AT  CV: RRR, +S1/S2  Lungs: Good respiratory effort, clear air entry   Abd: soft, ND, NT  Ext: trace edema, no cyanosis  Skin: Warm. No rashes appreciated. Neuro:no focal weakness  Joints: No erythema noted over joints.   Access: left UA AVG + B    DATA:    CBC:   Lab Results   Component Value Date/Time    WBC 5.4 01/26/2023 02:02 AM    RBC 2.51 01/26/2023 02:02 AM    HGB 6.9 01/26/2023 06:33 AM    HCT 22.1 01/26/2023 06:33 AM    MCV 91.5 01/26/2023 02:02 AM    MCH 28.1 01/26/2023 02:02 AM    MCHC 30.7 01/26/2023 02:02 AM    RDW 18.0 01/26/2023 02:02 AM    PLT 95 01/26/2023 02:02 AM    MPV 8.2 01/26/2023 02:02 AM     BMP:    Lab Results   Component Value Date/Time     01/26/2023 04:52 AM    K 5.2 01/26/2023 04:52 AM    CL 98 01/26/2023 04:52 AM    CO2 26 01/26/2023 04:52 AM    BUN 53 01/26/2023 04:52 AM    LABALBU 3.0 01/26/2023 04:52 AM    CREATININE 11.4 01/26/2023 04:52 AM    CALCIUM 8.5 01/26/2023 04:52 AM    GFRAA 7 10/14/2022 05:45 AM    LABGLOM 4 01/26/2023 04:52 AM    GLUCOSE 79 01/26/2023 04:52 AM       IMPRESSION/RECOMMENDATIONS:      ESKD: followed by  Nephrology, HD 4 x per week, last HD yesterday  -s/p HD today-post weight 53.5kg  -will need TW adjusted given weight loss  -next HD on Saturday    Hyperkalemia: modulate with HD    Recent multiple falls: complicated by left gluteal hematoma  -Eliquis on hold-? If   -poor candidate for long term AC  -per primary    Anemia: acute on chronic due to bleeding-left gluteal hematoma, recent admission for GIB s/p EGD, colonoscopy  -s/p PRBC  -hgb trended lower from admission  -continue epogen  -transfusion threshold per primary    H/o DVT: on AC-with recent multiple falls, ? 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 I will continue to follow along. Please call with questions.     Gianfranco Haro MD

## 2023-01-26 NOTE — PROGRESS NOTES
Physical Therapy/Occupational Therapy      Pt curently at HD. Will initiate evaluations today as pt schedule permits.       Austin Hawkins PT  [address] [facility] OTR/L #[id number]

## 2023-01-26 NOTE — PROGRESS NOTES
Hospitalist Progress Note      PCP: Marv Petersen MD    Date of Admission: 1/23/2023    Chief Complaint:     Hospital Course:     Subjective:           Medications:  Reviewed    Infusion Medications    sodium chloride      sodium chloride      sodium chloride       Scheduled Medications    epoetin zachary-epbx  10,000 Units IntraVENous Once per day on Mon Wed Fri    iron sucrose  100 mg IntraVENous Once in dialysis    sodium chloride flush  10 mL IntraVENous 2 times per day    pantoprazole  40 mg IntraVENous BID    atorvastatin  80 mg Oral Nightly    carvedilol  3.125 mg Oral BID    allopurinol  100 mg Oral Once per day on Mon Wed Fri    calcitRIOL  0.5 mcg Oral Once per day on Mon Wed Fri    levothyroxine  75 mcg Oral QAM AC    sevelamer  800 mg Oral TID WC     PRN Meds: sodium chloride, sodium chloride, albumin human, sodium chloride flush, sodium chloride, promethazine **OR** ondansetron, acetaminophen **OR** acetaminophen, sodium chloride, ipratropium-albuterol      Intake/Output Summary (Last 24 hours) at 1/26/2023 0916  Last data filed at 1/25/2023 2345  Gross per 24 hour   Intake 440 ml   Output 0 ml   Net 440 ml         Physical Exam Performed:    /69   Pulse 77   Temp 97.9 °F (36.6 °C)   Resp 18   Ht 5' 8\" (1.727 m)   Wt 124 lb 9 oz (56.5 kg)   SpO2 91%   BMI 18.94 kg/m²     General appearance: No apparent distress, appears stated age and cooperative. HEENT: Pupils equal, round, and reactive to light. Conjunctivae/corneas clear. Neck: Supple, with full range of motion. No jugular venous distention. Trachea midline. Respiratory:  Normal respiratory effort. Clear to auscultation, bilaterally without Rales/Wheezes/Rhonchi. Cardiovascular: Regular rate and rhythm with normal S1/S2 without murmurs, rubs or gallops. Abdomen: Soft, non-tender, non-distended with normal bowel sounds. Musculoskeletal: No clubbing, cyanosis or edema bilaterally.   Full range of motion without deformity. Skin: Skin color, texture, turgor normal.  No rashes or lesions. Neurologic:  Neurovascularly intact without any focal sensory/motor deficits. Cranial nerves: II-XII intact, grossly non-focal.  Psychiatric: Alert and oriented, thought content appropriate, normal insight  Capillary Refill: Brisk, 3 seconds, normal   Peripheral Pulses: +2 palpable, equal bilaterally       Labs:   Recent Labs     01/23/23  2349 01/24/23  0209 01/24/23  0504 01/26/23  0202 01/26/23  0452 01/26/23  0633   WBC 6.2 5.9  --  5.4  --   --    HGB 8.2* 7.0*   < > 7.1* 6.6* 6.9*   HCT 26.5* 22.2*   < > 23.0* 20.8* 22.1*   PLT 65* 57*  --  95*  --   --     < > = values in this interval not displayed. Recent Labs     01/23/23 2349 01/25/23  0550 01/26/23  0452    130* 136   K 4.5 4.7 5.2*   CL 95* 96* 98*   CO2 28 24 26   BUN 35* 45* 53*   CREATININE 8.7* 10.2* 11.4*   CALCIUM 9.0 8.3 8.5   PHOS  --  3.8  --        Recent Labs     01/25/23  0550 01/26/23  0452   AST 11* 10*   ALT <5* <5*   BILITOT 0.6 0.5   ALKPHOS 117 117       No results for input(s): INR in the last 72 hours. Recent Labs     01/23/23 2349 01/24/23  0209   TROPONINI 0.11* 0.09*         Urinalysis:    No results found for: Michaelene Rodney, BACTERIA, RBCUA, BLOODU, SPECGRAV, GLUCOSEU    Radiology:  CT HEAD WO CONTRAST   Final Result      Stable subcentimeter focus of hyperattenuation in the right temporal lobe is nonspecific and may represent a small vascular malformation. This can be further evaluated with MRI. No acute intracranial abnormality. CT ABDOMEN PELVIS W IV CONTRAST Additional Contrast? None   Final Result      1. Trace amount of ascites along the liver margin and diffuse fatty infiltration liver and borderline size of the spleen appreciated. 2. Cardiac enlargement noted in the chest with small right-sided pleural effusion and airspace disease noted posterior medial left lung base, correlate for pneumonia   3.  Scattered diffuse diverticulosis with chronic renal failure   4. Intermediate density lesions in the kidneys and scattered cysts in the kidneys. Intermediate density in the right mid kidney could be a solid mass measuring 1.9 x 1.7 cm   5. Previous surgery noted in the right lower quadrant, likely ileocolectomy or partial bowel surgery with some mucosal thickening. There is also scattered diverticulosis   6. Left posterior, inferior medial gluteal enlargement extending inferiorly likely representing gluteus medius hematoma, not included 100% on the imaging, extends beyond the imaging margin measuring up to 9.5 x 4.6 cm on axial image 121 and overall    height of the hematoma cannot be measured, appears to extend for greater than 6 or 8 cm         CT Head W/O Contrast   Final Result      Normal noncontrast CT the brain for the patient's age without acute hemorrhage, edema or hydrocephalus. Symmetric cerebral atrophy and some low-attenuation changes in the periventricular white matter and left thalamus and right posterior caudate nucleus      XR CHEST PORTABLE   Final Result      1. No acute process or consolidation   2. Stable cardiac enlargement and tortuous aorta                   IP CONSULT TO HOSPITALIST  IP CONSULT TO NEPHROLOGY  IP CONSULT TO GENERAL SURGERY  IP CONSULT TO GI    Assessment/Plan:    - left thigh hematoma dt fall and anticoagulation--Eliquis dc'd on admission-general surgery consult appreciated. .  Monitor H&H and transfuse as needed for hemoglobin less than 7     -Frequent falls--maintain fall precautions, PT and OT     -acute on Chronic anemia due to CKD, possible GI bleed, left thigh hematoma. .. Hemoglobin 6.2-6.7 on recent admission, baseline 8-9--- no overt bleeding but FOBT is +ve. .-   S/p 1 unit PRBC tranfusion 1/15 improved to 7.1 at dc--Eliquis was resumed 3 days post d/c per GI  Continue TEODORA,IV iron per nephrology  S/p EGD and colonoscopy 1/17/23  Small duodenal ulcers, erosive gastritis, hiatal hernia, 4 mm colonic polyp. .  No evidence of bleeding  Continue PPI    Hb down to 6.6-6.9 1/26--transfused 1 unit of PRBC at dialysis    FOBT 1/25 was positive without gross bleeding--unclear if he has ongoing GI bleed--GI was consulted 1/26  Discussed risks versus benefits of ongoing anticoagulation with patient--given need for recurrent transfusions, we will not resume Eliquis for now     -Chronic anemia and thrombocytopenia-bone marrow biopsy 11/15/22 was normal-leukopenia recovered-continue to monitor and transfuse for hemoglobin less than 7, platelets less than 50 given anticoagulation--reviewed prior oncology consults, no etiology was established as work-up was negative-continue to monitor     -ESRD on hemodialysis-4x/week-Mon/Tues/Thurs/Fri   follow-up with nephrology        -Chronic combined systolic/diastolic heart failure/NICM, EF 35 to 63%, grade 3 diastolic dysfunction-compensated continue Coreg-not on ACE/ARB given renal failure/hyperkalemia--follow-up with cardiology outpatient     -Hypothyroidism-recent TSH was suppressed below therapeutic level and decreased dose of Synthroid from 100-75mcg--repeat TSH in 6 weeks     -Hyperlipidemia-continue statin     -COPD with chronic respiratory failure with hypoxia-on 3 L nasal cannula-continue supplemental oxygen and bronchodilators and bronchodilators     -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

## 2023-01-26 NOTE — PROGRESS NOTES
Occupational Therapy  Facility/Department: [place] [address] [facility]  Occupational Therapy Initial Assessment and Treatment      Name: Terry Odell  : 1955  MRN: [id number]  Date of Service: 2023    Discharge Recommendations:  Terry Odell scored a 14/24 on the AM-PAC ADL Inpatient form. Current research shows that an AM-PAC score of 17 or less is typically not associated with a discharge to the patient's home setting. Based on the patient's AM-PAC score and their current ADL deficits, it is recommended that the patient have 3-5 sessions per week of Occupational Therapy at d/c to increase the patient's independence. Please see assessment section for further patient specific details. If patient discharges prior to next session this note will serve as a discharge summary. Please see below for the latest assessment towards goals. OT Equipment Recommendations  Equipment Needed: No (defer recommendations to discharge faciltiy)       Patient Diagnosis(es): The primary encounter diagnosis was Intramuscular hematoma. Diagnoses of Anticoagulated and Chronic anemia were also pertinent to this visit. Past Medical History:  has a past medical history of Cerebral artery occlusion with cerebral infarction ([address] Combined systolic and diastolic heart failure ([address] COPD (chronic obstructive pulmonary disease) ([facility] [address] Gout, Hemodialysis patient ([address] Hx of blood clots, Hyperlipidemia, and Hypertension. Past Surgical History:  has a past surgical history that includes Dialysis fistula creation (Left); Colonoscopy; CT BIOPSY BONE MARROW (11/15/2022); Upper gastrointestinal endoscopy (N/A, 2023); and Colonoscopy (N/A, 2023). Treatment Diagnosis: impaired ADLs/transfers      Assessment   Performance deficits / Impairments: Decreased functional mobility ; Decreased ADL status; Decreased endurance;Decreased balance  Assessment: Presenting from NH w/ c/o Fall and SOB.  Pt reports recent falls, but reports baseline is independent w/ ADLs and ambulation using RW vs. cane depending on the day. Pt indicates he is LTC and not recently working with therapy. Presently, pt w/ involuntary jumpiness of BUE and BLEs - requiring assist of 2nd person for safety. Pt also requiring assist w/ feeding, grooming, and ADLs 2* frequently dropping items and ballistic movements. Pt will benefit from ongoing OT upon discharge to maximize functional status/safety. Continue per POC.  Treatment Diagnosis: impaired ADLs/transfers  Prognosis: Fair  Decision Making: High Complexity  REQUIRES OT FOLLOW-UP: Yes  Activity Tolerance  Activity Tolerance: Patient Tolerated treatment well        Plan   Occupational Therapy Plan  Times Per Week: 2-5  Times Per Day: Once a day  Current Treatment Recommendations: Balance training, Functional mobility training, Endurance training, Safety education & training, Self-Care / ADL, Cognitive reorientation     Restrictions  Position Activity Restriction  Other position/activity restrictions: up with assist    Subjective   General  Chart Reviewed: Yes  Additional Pertinent Hx: 67 y.o. M to ED w/ c/o Fall and SOB.      Hospital Course: CT Head: (-); CT Abd/Pelvis: gluteus medius hematoma.     PMH: end-stage renal disease on hemodialysis on chronic anticoagulation for DVTs, combined systolic and diastolic heart failure, chronic hypoxic and hypercapnic respiratory failure secondary to COPD (2 L continuous)  Family / Caregiver Present: No  Referring Practitioner: Dr. Panchal  Diagnosis: Fall w/ SOB    Subjective  Subjective: In bed on arrival. Frequently asking to go to the bathroom.     Social/Functional History  Social/Functional History  Type of Home: Facility ([facility] x 6 months (not working with therapy))  Bathroom Shower/Tub: Walk-in shower  Bathroom Toilet: Standard  Bathroom Equipment: Hand-held shower, Shower chair, Grab bars in shower  Bathroom Accessibility: Accessible  Home Equipment: Walker, rolling, Cane (3L  O2)  Has the patient had two or more falls in the past year or any fall with injury in the past year?: Yes  ADL Assistance: Independent  Ambulation Assistance: Independent (using either a RW or cane)  Additional Comments: takes meals in his room; denies h/o falls other until the falls just prior to this admission; states that the \"jumpiness\" of arms and legs have been going on for awhile. \"I drop every damn thing. \"   Pt is a questionable historian - previous evaluation in 9/2022 - pt takes meals in dining room (pushed by staff in w/c); pt did have 2 previous falls (one in which he hit his head). Objective             Observation/Palpation  Observation: 3L O2    Safety Devices  Type of Devices: Call light within reach; Chair alarm in place;Gait belt;Nurse notified; Left in chair;Telesitter in use (setup w/ lunch; chair in reclined position)    Balance  Sitting:  (CGA - EOB)  Standing: With support (assist of 1/2 - begins requiring Mod A of 1 but then legs buckle requiring assist of 2)     AROM:  (decreased shoulder flexion RUE)    ADL  Feeding: Setup;Minimal assistance  Feeding Skilled Clinical Factors: dropping items frequently; applied clothing protector  UE Bathing: Minimal assistance;Setup;Verbal cueing; Increased time to complete  UE Bathing Skilled Clinical Factors: dropping washcloth, requires setup, seated  LE Bathing: Maximum assistance  LE Bathing Skilled Clinical Factors: assist for lower legs and buttocks; knees buckle in standing  UE Dressing: Minimal assistance  LE Dressing: Maximum assistance  LE Dressing Skilled Clinical Factors: assist to thread L foot, assist pullup/down  Toileting:  (frequenlty asking to go to the bathroom; nurse reporting pt on/off bedpan numerous times this morning - only smears; pt assisted onto [facility] - small smear.  Requiring full assist for hygiene and clothign management 2* poor balance and jumpiness of BUE/BLE)       Activity Tolerance  Activity Tolerance: Patient limited by pain  Activity Tolerance Comments: limited by back pain and buckling of LE's  Bed mobility  Supine to Sit: Moderate assistance (HOB elevated, use of side rail)    Transfers  Stand Step Transfers: 2 Person assistance;Maximum assistance (using RW; BLE buckle)  Sit to stand: Moderate assistance (of 1 (to Leidy Brownleejax))  Stand to sit: Moderate assistance  Transfer Comments: RW bed -> chair; Leidy Rodriguez chair -> BSC -> chair. Discussed w/ nurse regarding recommendation for 2 person assist for Garrie Dose (one person for transfer assist, one person for trunk support)    Vision  Vision: Impaired  Vision Exceptions: Wears glasses at all times  Hearing  Hearing: Within functional limits    Cognition  Overall Cognitive Status: Exceptions  Arousal/Alertness: Delayed responses to stimuli  Following Commands: Follows one step commands consistently  Attention Span: Attends with cues to redirect  Memory: Decreased recall of recent events;Decreased short term memory  Safety Judgement: Decreased awareness of need for assistance  Problem Solving: Assistance required to generate solutions  Insights: Decreased awareness of deficits  Initiation: Requires cues for some  Sequencing: Requires cues for some  Orientation  Overall Orientation Status: Within Functional Limits                    Education Given To: Patient  Education Provided: Role of Therapy;Plan of Care;ADL Adaptive Strategies;Transfer Training; Fall Prevention Strategies  Education Method: Verbal  Barriers to Learning: Cognition  Education Outcome: Verbalized understanding;Continued education needed             Hand Dominance  Hand Dominance: Right              Pt seen by OT for eval and treat.  Treatment included: bed mobility, functional transfers, ADL, pt education                                                    AM-PAC Score        AM-[facility] Inpatient Daily Activity Raw Score: 14 (01/26/23 1607)  -PAC Inpatient ADL T-Scale Score : 33.39 (01/26/23 1607)  ADL Inpatient CMS 0-100% Score: 59.67 (01/26/23 1607)  ADL Inpatient CMS G-Code Modifier : CK (01/26/23 1607)           Goals  Short Term Goals  Time Frame for Short Term Goals: [place]  Short Term Goal 1: BSC transfer using RW, Min A of 2  Short Term Goal 2: LB dressing using AE prn, Mod A  Short Term Goal 3: static standing at walker x 1 minute, Min A for balance  Patient Goals   Patient goals : no goal stated       Therapy Time   Individual Concurrent Group Co-treatment   Time In 1337         Time Out 1445         Minutes 68          Timed Code Treatment Minutes:   53    Total Treatment Minutes:  [address] OTR/L #[id number]

## 2023-01-27 LAB
ALBUMIN SERPL-MCNC: 3.2 G/DL (ref 3.4–5)
ANION GAP SERPL CALCULATED.3IONS-SCNC: 11 MMOL/L (ref 3–16)
ANISOCYTOSIS: ABNORMAL
BASOPHILS ABSOLUTE: 0 K/UL (ref 0–0.2)
BASOPHILS RELATIVE PERCENT: 0 %
BUN BLDV-MCNC: 25 MG/DL (ref 7–20)
CALCIUM SERPL-MCNC: 8.5 MG/DL (ref 8.3–10.6)
CHLORIDE BLD-SCNC: 97 MMOL/L (ref 99–110)
CO2: 27 MMOL/L (ref 21–32)
CREAT SERPL-MCNC: 7.6 MG/DL (ref 0.8–1.3)
EOSINOPHILS ABSOLUTE: 0.2 K/UL (ref 0–0.6)
EOSINOPHILS RELATIVE PERCENT: 4 %
GFR SERPL CREATININE-BSD FRML MDRD: 7 ML/MIN/{1.73_M2}
GLUCOSE BLD-MCNC: 49 MG/DL (ref 70–99)
HCT VFR BLD CALC: 28.5 % (ref 40.5–52.5)
HCT VFR BLD CALC: 28.6 % (ref 40.5–52.5)
HEMOGLOBIN: 9 G/DL (ref 13.5–17.5)
HEMOGLOBIN: 9.2 G/DL (ref 13.5–17.5)
LYMPHOCYTES ABSOLUTE: 0.7 K/UL (ref 1–5.1)
LYMPHOCYTES RELATIVE PERCENT: 14 %
MACROCYTES: ABNORMAL
MAGNESIUM: 2.5 MG/DL (ref 1.8–2.4)
MCH RBC QN AUTO: 28.4 PG (ref 26–34)
MCHC RBC AUTO-ENTMCNC: 31.7 G/DL (ref 31–36)
MCV RBC AUTO: 89.4 FL (ref 80–100)
MICROCYTES: ABNORMAL
MONOCYTES ABSOLUTE: 0.4 K/UL (ref 0–1.3)
MONOCYTES RELATIVE PERCENT: 8 %
NEUTROPHILS ABSOLUTE: 3.6 K/UL (ref 1.7–7.7)
NEUTROPHILS RELATIVE PERCENT: 74 %
NUCLEATED RED BLOOD CELLS: 2 /100 WBC
OVALOCYTES: ABNORMAL
PDW BLD-RTO: 17.6 % (ref 12.4–15.4)
PHOSPHORUS: 2.8 MG/DL (ref 2.5–4.9)
PLATELET # BLD: 92 K/UL (ref 135–450)
PLATELET SLIDE REVIEW: ABNORMAL
PMV BLD AUTO: 8.5 FL (ref 5–10.5)
POTASSIUM SERPL-SCNC: 4.2 MMOL/L (ref 3.5–5.1)
RBC # BLD: 3.19 M/UL (ref 4.2–5.9)
SCHISTOCYTES: ABNORMAL
SODIUM BLD-SCNC: 135 MMOL/L (ref 136–145)
SPHEROCYTES: ABNORMAL
TEAR DROP CELLS: ABNORMAL
WBC # BLD: 4.9 K/UL (ref 4–11)

## 2023-01-27 PROCEDURE — 6360000002 HC RX W HCPCS: Performed by: INTERNAL MEDICINE

## 2023-01-27 PROCEDURE — 99232 SBSQ HOSP IP/OBS MODERATE 35: CPT | Performed by: SURGERY

## 2023-01-27 PROCEDURE — 94761 N-INVAS EAR/PLS OXIMETRY MLT: CPT

## 2023-01-27 PROCEDURE — 36415 COLL VENOUS BLD VENIPUNCTURE: CPT

## 2023-01-27 PROCEDURE — 6370000000 HC RX 637 (ALT 250 FOR IP): Performed by: HOSPITALIST

## 2023-01-27 PROCEDURE — 2580000003 HC RX 258: Performed by: INTERNAL MEDICINE

## 2023-01-27 PROCEDURE — 2700000000 HC OXYGEN THERAPY PER DAY

## 2023-01-27 PROCEDURE — 80069 RENAL FUNCTION PANEL: CPT

## 2023-01-27 PROCEDURE — C9113 INJ PANTOPRAZOLE SODIUM, VIA: HCPCS | Performed by: INTERNAL MEDICINE

## 2023-01-27 PROCEDURE — 85014 HEMATOCRIT: CPT

## 2023-01-27 PROCEDURE — 83735 ASSAY OF MAGNESIUM: CPT

## 2023-01-27 PROCEDURE — 85025 COMPLETE CBC W/AUTO DIFF WBC: CPT

## 2023-01-27 PROCEDURE — 85018 HEMOGLOBIN: CPT

## 2023-01-27 PROCEDURE — 97530 THERAPEUTIC ACTIVITIES: CPT

## 2023-01-27 PROCEDURE — 97535 SELF CARE MNGMENT TRAINING: CPT

## 2023-01-27 PROCEDURE — 2060000000 HC ICU INTERMEDIATE R&B

## 2023-01-27 RX ADMIN — PANTOPRAZOLE SODIUM 40 MG: 40 INJECTION, POWDER, LYOPHILIZED, FOR SOLUTION INTRAVENOUS at 10:00

## 2023-01-27 RX ADMIN — SEVELAMER CARBONATE 800 MG: 800 TABLET, FILM COATED ORAL at 17:54

## 2023-01-27 RX ADMIN — SODIUM CHLORIDE, PRESERVATIVE FREE 10 ML: 5 INJECTION INTRAVENOUS at 10:00

## 2023-01-27 RX ADMIN — CARVEDILOL 3.12 MG: 3.12 TABLET, FILM COATED ORAL at 10:00

## 2023-01-27 RX ADMIN — PANTOPRAZOLE SODIUM 40 MG: 40 INJECTION, POWDER, LYOPHILIZED, FOR SOLUTION INTRAVENOUS at 20:26

## 2023-01-27 RX ADMIN — ALLOPURINOL 100 MG: 100 TABLET ORAL at 10:00

## 2023-01-27 RX ADMIN — CARVEDILOL 3.12 MG: 3.12 TABLET, FILM COATED ORAL at 20:26

## 2023-01-27 RX ADMIN — LEVOTHYROXINE SODIUM 75 MCG: 0.07 TABLET ORAL at 06:26

## 2023-01-27 RX ADMIN — ATORVASTATIN CALCIUM 80 MG: 80 TABLET, FILM COATED ORAL at 20:26

## 2023-01-27 RX ADMIN — CALCITRIOL CAPSULES 0.25 MCG 0.5 MCG: 0.25 CAPSULE ORAL at 10:00

## 2023-01-27 RX ADMIN — SODIUM CHLORIDE, PRESERVATIVE FREE 10 ML: 5 INJECTION INTRAVENOUS at 20:26

## 2023-01-27 RX ADMIN — SEVELAMER CARBONATE 800 MG: 800 TABLET, FILM COATED ORAL at 09:59

## 2023-01-27 NOTE — PROGRESS NOTES
Nephrology Progress Note  [phone]   [facility]        Reason for Consult:  ESKD    HISTORY OF PRESENT ILLNESS:                This is a patient with significant past medical history of ESKD on HD 4 x per week at [facility], HTN, h/o multiple falls, CVA, CHF, COPD, h/o DVT on [facility]   who presented ON multiple falls-4-5/day-fell on left buttock one day prior, CT showed left gluteal hematoma, seen by surgery. C/o abdominal pain, denies N/V/. Had HD yesterday. Lytes are stable. Adm hgb 8.2, dropped to 7.0    Subjective:  -pt seen and examined  -PMSHx and meds reviewed  -No family at bedside    No acute events ON  seen by GI for anemia        ROS: feels weak    PHYSICAL EXAM:    Vitals:    BP (!) 152/79   Pulse 83   Temp 98.3 °F (36.8 °C) (Oral)   Resp 18   Ht 5' 8\" (1.727 m)   Wt 122 lb 9.2 oz (55.6 kg)   SpO2 98%   BMI 18.64 kg/m²   I/O last 3 completed shifts: In: 695 [P.O.:120; Blood:175]  Out: 3400   I/O this shift: In: 79 [P.O.:60; I.V.:10]  Out: -     Physical Exam:  Gen: Resting in bed, NAD. Ill appearing  HEENT: anicteric, NC/AT  CV: RRR, +S1/S2  Lungs: Good respiratory effort, clear air entry   Abd: soft, ND, NT  Ext: trace edema, no cyanosis  Skin: Warm. No rashes appreciated. Neuro:no focal weakness  Joints: No erythema noted over joints.   Access: left UA AVG + B    DATA:    CBC:   Lab Results   Component Value Date/Time    WBC 4.9 01/27/2023 04:52 AM    RBC 3.19 01/27/2023 04:52 AM    HGB 9.0 01/27/2023 04:52 AM    HCT 28.5 01/27/2023 04:52 AM    MCV 89.4 01/27/2023 04:52 AM    MCH 28.4 01/27/2023 04:52 AM    MCHC 31.7 01/27/2023 04:52 AM    RDW 17.6 01/27/2023 04:52 AM    PLT 92 01/27/2023 04:52 AM    MPV 8.5 01/27/2023 04:52 AM     BMP:    Lab Results   Component Value Date/Time     01/27/2023 04:52 AM    K 4.2 01/27/2023 04:52 AM    K 5.2 01/26/2023 04:52 AM    CL 97 01/27/2023 04:52 AM    CO2 27 01/27/2023 04:52 AM    BUN 25 01/27/2023 04:52 AM    LABALBU 3.2 01/27/2023 04:52 AM    CREATININE 7.6 01/27/2023 04:52 AM    CALCIUM 8.5 01/27/2023 04:52 AM    GFRAA 7 10/14/2022 05:45 AM    LABGLOM 7 01/27/2023 04:52 AM    GLUCOSE 49 01/27/2023 04:52 AM       IMPRESSION/RECOMMENDATIONS:      ESKD: followed by  Nephrology, HD 4 x per week, last HD yesterday  -last post weight 53.5kg  -will need TW adjusted given weight loss  -next HD in am    Hyperkalemia: modulate with HD    Recent multiple falls: complicated by left gluteal hematoma  -Eliquis on hold-  -poor candidate for long term AC  -per primary    Anemia: acute on chronic due to bleeding-left gluteal hematoma, recent admission for GIB s/p EGD, colonoscopy  -seen by GI-continue PPI  -s/p PRBC  -hgb stable today  -continue epogen  -transfusion threshold per primary    H/o DVT: on AC-with recent multiple falls, ? 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 I will continue to follow along. Please call with questions.     Kahlil Hillman MD

## 2023-01-27 NOTE — PROGRESS NOTES
General Surgery   Daily Progress Note  Patient: Elizabeth Stallings    CC: Left gluteal hematoma status post fall on Eliquis    SUBJECTIVE:  Pt responded appropriately to transfusion. No acute events overnight. Nonexpanding hematoma. ROS: A 14 point review of systems was conducted, significant findings as noted above. All other systems negative. OBJECTIVE:   Infusions:   sodium chloride      sodium chloride      sodium chloride        I/O:I/O last 3 completed shifts: In: 1125 [P.O.:540; I.V.:10; Blood:175]  Out: 3400            Wt Readings from Last 1 Encounters:   01/27/23 122 lb 9.2 oz (55.6 kg)       Exam:  BP (!) 155/82   Pulse 84   Temp 98.3 °F (36.8 °C) (Oral)   Resp 18   Ht 5' 8\" (1.727 m)   Wt 122 lb 9.2 oz (55.6 kg)   SpO2 99%   BMI 18.64 kg/m²     General appearance: no acute distress  Eyes: PERRL, no scleral icterus  Neck: trachea midline, no JVD  Chest/Lungs: symmetrical chest rise, normal effort  Cardiovascular: RRR  Abdomen: soft, non-tender, non-distended, no guarding/rigidity  Skin: warm and dry, no rashes  Extremities: no edema, no cyanosis, left gluteal tenderness, intact strength and sensation to bilateral lower extremities, palpable DP, PT, and fem pulses to left lower extremity. Compartments of left lower extremity are all soft along with left buttock. Neuro: A&Ox3, no focal deficits, sensation intact            LABS:   Recent Labs     01/26/23  0202 01/26/23  0452 01/27/23  0029 01/27/23 0452   WBC 5.4  --   --  4.9   HGB 7.1*   < > 9.2* 9.0*   HCT 23.0*   < > 28.6* 28.5*   MCV 91.5  --   --  89.4   PLT 95*  --   --  92*    < > = values in this interval not displayed.           Recent Labs     01/25/23  0550 01/26/23 0452   * 136   K 4.7 5.2*   CL 96* 98*   CO2 24 26   PHOS 3.8  --    BUN 45* 53*   CREATININE 10.2* 11.4*          Recent Labs     01/25/23  0550 01/26/23 0452   AST 11* 10*   ALT <5* <5*   BILITOT 0.6 0.5   ALKPHOS 117 117        No results for input(s): LIPASE, AMYLASE in the last 72 hours. Recent Labs     01/25/23  0550 01/26/23  0452   PROT 5.4* 5.3*          No results for input(s): CKTOTAL, CKMB, CKMBINDEX, TROPONINI in the last 72 hours. ASSESSMENT/PLAN:   This is a 74 y.o. male with Hx as delineated above who presents after ground-level fall onto left buttock at which time a CT scan found a 9.5 x 4.6 cm not fully imaged left gluteal hematoma. The patient only had an abdominal CT scan which did not fully measure the left gluteal hematoma. Patient's hemoglobin was 7.3 in the emergency department at which time he received 1 unit of blood with response to 8.3. The patient has low hemoglobin at baseline with baseline being around 7. There is no acute need for surgical intervention at this time. - Hematoma stable. Can transition from H&H Q6 to Q12.    - Place binder around pelvis for compression of hematoma  - Need to hold Eliquis until hemoglobin is stable for at least a minimum of 48 hours  - Transfusions per primary team  - If patient continues to drop hemoglobin, will need repeat imaging with CTA to evaluate hematoma fully for possible extravasation  - If active extravasation found patient will likely need interventional radiology for further intervention  - Further care per primary team    Dave Miller DO   PGY1, General Surgery  01/27/23  6:57 AM  Contact via [employer]    I performed a history and physical examination of the patient and discussed management with the resident. I reviewed the resident's note and agree with the documented findings and plan of care. Good response to transfusion yesterday with slight drift since then. Continue to monitor.  Esther Paez M.D.  1/27/23   9:56 AM

## 2023-01-27 NOTE — CARE COORDINATION
CM continues to follow for DC planning and needs. Patient to return to LTC at [facility] [address] 01/28/23 after HD. CM has been able to secure medical transport for patient through [address] for 01/28/23 at 3:30pm. CM called and attempted to reach patients daughter for update on plans for DC tomorrow after HD, unable to reach patients daughter but left HIPPA compliant message with request for return call. CM will continue to follow for DC planning. Gerber Reyes in admissions aware of plan for DC 01/28/23 and reports that run reports for HD were received and she reports patient has been approved for return to the HD unit upon DC. CM also asked about getting skilled therapy for patient upon return and Gerber Reyes reports that she will have pre-cert started for skilled care upon return to facility in order to no hold up DC from hospital, but will have patient get skilled therapy upon return.     Thank you,  Chas Lopez RN,   4th Floor Progressive Care Unit  [phone]

## 2023-01-27 NOTE — PROGRESS NOTES
Occupational Therapy  Facility/Department: [place] [address] [facility]  Occupational Therapy Progress Note    Name: Cammie Lebron  : 1955  MRN: [id number]  Date of Service: 2023    Discharge Recommendations:  Cammie Lebron scored a 18/24 on the AM-PAC ADL Inpatient form. Current research shows that an AM-PAC score of 17 or less is typically not associated with a discharge to the patient's home setting. Based on the patient's AM-PAC score and their current ADL deficits, it is recommended that the patient have 3-5 sessions per week of Occupational Therapy at d/c to increase the patient's independence. Please see assessment section for further patient specific details. If patient discharges prior to next session this note will serve as a discharge summary. Please see below for the latest assessment towards goals. Patient Diagnosis(es): The primary encounter diagnosis was Intramuscular hematoma. Diagnoses of Anticoagulated and Chronic anemia were also pertinent to this visit. Past Medical History:  has a past medical history of Cerebral artery occlusion with cerebral infarction ([address] Combined systolic and diastolic heart failure ([address] COPD (chronic obstructive pulmonary disease) ([facility] [address] Gout, Hemodialysis patient ([facility] [address] Hx of blood clots, Hyperlipidemia, and Hypertension. Past Surgical History:  has a past surgical history that includes Dialysis fistula creation (Left); Colonoscopy; CT BIOPSY BONE MARROW (11/15/2022); Upper gastrointestinal endoscopy (N/A, 2023); and Colonoscopy (N/A, 2023). Treatment Diagnosis: impaired ADLs/transfers      Assessment   Performance deficits / Impairments: Decreased functional mobility ; Decreased ADL status; Decreased endurance;Decreased balance  Assessment: Improved mobility and participation today. Increased alertness and no ataxic movements noted. CGA for transfers using Navi Gibson. (+) BM on toilet.  Min A to don pants (steadying assist while tying drawstring pants). Pt admitted from NH w/ c/o Fall and SOB. Pt reports recent falls, but reports baseline is independent w/ ADLs and ambulation using RW vs. cane depending on the day. Pt indicates he is LTC and not recently working with therapy. Pt is making functional progress, but continues to function below baseline. Pt will benefit from ongoing OT upon discharge to maximize functional status/safety. Continue per POC. Treatment Diagnosis: impaired ADLs/transfers  Prognosis: Fair  REQUIRES OT FOLLOW-UP: Yes  Activity Tolerance  Activity Tolerance: Patient Tolerated treatment well        Plan   Occupational Therapy Plan  Times Per Week: 2-5  Times Per Day: Once a day  Current Treatment Recommendations: Balance training, Functional mobility training, Endurance training, Safety education & training, Self-Care / ADL, Cognitive reorientation     Restrictions  Position Activity Restriction  Other position/activity restrictions: up with assist    Subjective   General  Chart Reviewed: Yes  Additional Pertinent Hx: 74 y.o. M to ED w/ c/o Fall and SOB. Hospital Course: CT Head: (-); CT Abd/Pelvis: gluteus medius hematoma. PMH: end-stage renal disease on hemodialysis on chronic anticoagulation for DVTs, combined systolic and diastolic heart failure, chronic hypoxic and hypercapnic respiratory failure secondary to COPD (2 L continuous)  Family / Caregiver Present: No  Referring Practitioner: Dr. Julio César Jackson  Diagnosis: Fall w/ SOB    Subjective  Subjective: In bed on arrival. \"I have got to doo doo now. \"       Social/Functional History  Social/Functional History  Type of Home: Facility ([facility] x 6 months (not working with therapy))  Bathroom Shower/Tub: Walk-in shower  Bathroom Toilet: Standard  Bathroom Equipment: Hand-held shower, Shower chair, Grab bars in shower  Bathroom Accessibility: Accessible  Home Equipment: Walker, rolling, Cane (3L O2)  Has the patient had two or more falls in the past year or any fall with injury in the past year?: Yes  ADL Assistance: Independent  Ambulation Assistance: Independent (using either a RW or cane)  Additional Comments: takes meals in his room; denies h/o falls other until the falls just prior to this admission; states that the \"jumpiness\" of arms and legs have been going on for awhile. \"I drop every damn thing. \"   Pt is a questionable historian - previous evaluation in 9/2022 - pt takes meals in dining room (pushed by staff in w/c); pt did have 2 previous falls (one in which he hit his head). Objective                  Safety Devices  Type of Devices: Call light within reach; Chair alarm in place;Nurse notified; Left in chair;Sitter present;Gait belt (setup w/ meal tray)             ADL  Feeding: Setup  Grooming: Stand by assistance  Grooming Skilled Clinical Factors: washing hands at sink level, leaning forward while seated on [address]  UE Dressing: Minimal assistance  UE Dressing Skilled Clinical Factors: assist 2* cardiac monitor  LE Dressing: Minimal assistance  LE Dressing Skilled Clinical Factors: socks - SBA at EOB; pants - able to thread both feet, steadying assist in standing to tie drawstring  Toileting: Minimal assistance  Toileting Skilled Clinical Factors: pt requesting bedpan intially (before OOB) - smearing of BM; (+) BM while seated on toilet - able to wipe self seated w/ SBA, steadying assist in standing          Bed mobility  Rolling to Right: Stand by assistance  Supine to Sit: Contact guard assistance  Scooting: Contact guard assistance      Transfers  Sit to stand: Contact guard assistance (from bed, chair, toilet (to Viveca Pennant))  Stand to sit: Contact guard assistance  Transfer Comments: bed -> toilet -> chair via V[address]ant       Orientation  Overall Orientation Status: Impaired (oriented to self, not place, needs cues for correct month)                    Education Given To: Patient  Education Provided: Role of Therapy;Plan of Care;ADL Adaptive Strategies;Transfer Training; Fall Prevention Strategies  Education Method: Verbal  Barriers to Learning: None  Education Outcome: Verbalized understanding;Continued education needed                                                                          AM-PAC Score        AM-PAC Inpatient Daily Activity Raw Score: 18 (01/27/23 1452)  AM-PAC Inpatient ADL T-Scale Score : 38.66 (01/27/23 1452)  ADL Inpatient CMS 0-100% Score: 46.65 (01/27/23 1452)  ADL Inpatient CMS G-Code Modifier : CK (01/27/23 1452)           Goals  Short Term Goals  Time Frame for Short Term Goals: Discharge  Short Term Goal 1: BSC transfer using RW, Min A of 2 (d/c goal); revised goal 1/27: toilet transfer w/ SBA (not met)  Short Term Goal 2: LB dressing using AE prn, Mod A (MET 1/27); revised goal: LB dressing w/ SBA (not met)  Short Term Goal 3: static standing at walker x 1 minute, Min A for balance (d/c goal 1/27 - pt standing at [facility])  Patient Goals   Patient goals : no goal stated       Therapy Time   Individual Concurrent Group Co-treatment   Time In 1350         Time Out 1445         Minutes 55                 Katelynn Montague OTR/L #[id number]

## 2023-01-27 NOTE — PROGRESS NOTES
Hospitalist Progress Note      PCP: Norberto Maldonado MD    Date of Admission: 1/23/2023    Chief Complaint:     Hospital Course:     Subjective:     Patient says he feels okay today. Wants to leave the hospital      Medications:  Reviewed    Infusion Medications    sodium chloride      sodium chloride      sodium chloride       Scheduled Medications    epoetin zachary-epbx  10,000 Units IntraVENous Once per day on Mon Wed Fri    sodium chloride flush  10 mL IntraVENous 2 times per day    pantoprazole  40 mg IntraVENous BID    atorvastatin  80 mg Oral Nightly    carvedilol  3.125 mg Oral BID    allopurinol  100 mg Oral Once per day on Mon Wed Fri    calcitRIOL  0.5 mcg Oral Once per day on Mon Wed Fri    levothyroxine  75 mcg Oral QAM AC    sevelamer  800 mg Oral TID WC     PRN Meds: sodium chloride, sodium chloride, sodium chloride flush, sodium chloride, promethazine **OR** ondansetron, acetaminophen **OR** acetaminophen, sodium chloride, ipratropium-albuterol      Intake/Output Summary (Last 24 hours) at 1/27/2023 9523  Last data filed at 1/27/2023 0919  Gross per 24 hour   Intake 635 ml   Output 3400 ml   Net -2765 ml         Physical Exam Performed:    BP (!) 155/82   Pulse 84   Temp 98.3 °F (36.8 °C) (Oral)   Resp 18   Ht 5' 8\" (1.727 m)   Wt 122 lb 9.2 oz (55.6 kg)   SpO2 99%   BMI 18.64 kg/m²     General appearance: No apparent distress, appears stated age and cooperative. HEENT: Pupils equal, round, and reactive to light. Conjunctivae/corneas clear. Neck: Supple, with full range of motion. No jugular venous distention. Trachea midline. Respiratory:  Normal respiratory effort. Clear to auscultation, bilaterally without Rales/Wheezes/Rhonchi. Cardiovascular: Regular rate and rhythm with normal S1/S2 without murmurs, rubs or gallops. Abdomen: Soft, non-tender, non-distended with normal bowel sounds. Musculoskeletal: No clubbing, cyanosis or edema bilaterally.   Full range of motion without deformity. Skin: Skin color, texture, turgor normal.  No rashes or lesions. Neurologic:  Neurovascularly intact without any focal sensory/motor deficits. Cranial nerves: II-XII intact, grossly non-focal.  Psychiatric: Alert and oriented, thought content appropriate, normal insight  Capillary Refill: Brisk, 3 seconds, normal   Peripheral Pulses: +2 palpable, equal bilaterally       Labs:   Recent Labs     01/26/23  0202 01/26/23  0452 01/26/23  1802 01/27/23  0029 01/27/23 0452   WBC 5.4  --   --   --  4.9   HGB 7.1*   < > 9.3* 9.2* 9.0*   HCT 23.0*   < > 29.1* 28.6* 28.5*   PLT 95*  --   --   --  92*    < > = values in this interval not displayed. Recent Labs     01/25/23  0550 01/26/23  0452 01/27/23 0452   * 136 135*   K 4.7 5.2* 4.2   CL 96* 98* 97*   CO2 24 26 27   BUN 45* 53* 25*   CREATININE 10.2* 11.4* 7.6*   CALCIUM 8.3 8.5 8.5   PHOS 3.8  --  2.8       Recent Labs     01/25/23  0550 01/26/23 0452   AST 11* 10*   ALT <5* <5*   BILITOT 0.6 0.5   ALKPHOS 117 117       No results for input(s): INR in the last 72 hours. No results for input(s): Estle Carrow in the last 72 hours. Urinalysis:    No results found for: Gisele Kil, BACTERIA, RBCUA, BLOODU, [address], [address]    Radiology:  CT HEAD WO CONTRAST   Final Result      Stable subcentimeter focus of hyperattenuation in the right temporal lobe is nonspecific and may represent a small vascular malformation. This can be further evaluated with MRI. No acute intracranial abnormality. CT ABDOMEN PELVIS W IV CONTRAST Additional Contrast? None   Final Result      1. Trace amount of ascites along the liver margin and diffuse fatty infiltration liver and borderline size of the spleen appreciated. 2. Cardiac enlargement noted in the chest with small right-sided pleural effusion and airspace disease noted posterior medial left lung base, correlate for pneumonia   3.  Scattered diffuse diverticulosis with chronic renal failure   4. Intermediate density lesions in the kidneys and scattered cysts in the kidneys. Intermediate density in the right mid kidney could be a solid mass measuring 1.9 x 1.7 cm   5. Previous surgery noted in the right lower quadrant, likely ileocolectomy or partial bowel surgery with some mucosal thickening. There is also scattered diverticulosis   6. Left posterior, inferior medial gluteal enlargement extending inferiorly likely representing gluteus medius hematoma, not included 100% on the imaging, extends beyond the imaging margin measuring up to 9.5 x 4.6 cm on axial image 121 and overall    height of the hematoma cannot be measured, appears to extend for greater than 6 or 8 cm         CT Head W/O Contrast   Final Result      Normal noncontrast CT the brain for the patient's age without acute hemorrhage, edema or hydrocephalus. Symmetric cerebral atrophy and some low-attenuation changes in the periventricular white matter and left thalamus and right posterior caudate nucleus      XR CHEST PORTABLE   Final Result      1. No acute process or consolidation   2. Stable cardiac enlargement and tortuous aorta                   IP CONSULT TO HOSPITALIST  IP CONSULT TO NEPHROLOGY  IP CONSULT TO GENERAL SURGERY  IP CONSULT TO GI    Assessment/Plan:    - left thigh hematoma dt fall and anticoagulation--Eliquis dc'd on admission-general surgery consult appreciated. .  Monitor H&H and transfuse as needed for hemoglobin less than 7     -Frequent falls--maintain fall precautions, PT and OT     -acute on Chronic anemia due to CKD, possible GI bleed, left thigh hematoma. .. Hemoglobin 6.2-6.7 on recent admission, baseline 8-9--- no overt bleeding but FOBT is +ve. .-   S/p 1 unit PRBC tranfusion 1/15 improved to 7.1 at dc--Eliquis was resumed 3 days post d/c per GI  Continue TEODORA,IV iron per nephrology  S/p EGD and colonoscopy 1/17/23  Small duodenal ulcers, erosive gastritis, hiatal hernia, 4 mm colonic polyp. Ilene Conway   No evidence of bleeding  Continue PPI    Hb down to 6.6-6.9 1/26--transfused 1 unit of PRBC at dialysis--improved to greater than 8    FOBT 1/25 was positive without gross bleeding---GI was consulted 1/26--no further intervention  Discussed risks versus benefits of ongoing anticoagulation with patient--given need for recurrent transfusions, we will not resume Eliquis for now     -Chronic anemia and thrombocytopenia-bone marrow biopsy 11/15/22 was normal-leukopenia recovered-continue to monitor and transfuse for hemoglobin less than 7, platelets less than 50 given anticoagulation--reviewed prior oncology consults, no etiology was established as work-up was negative-continue to monitor     -ESRD on hemodialysis-4x/week-Mon/Tues/Thurs/Fri   follow-up with nephrology        -Chronic combined systolic/diastolic heart failure/NICM, EF 35 to 12%, grade 3 diastolic dysfunction-compensated continue Coreg-not on ACE/ARB given renal failure/hyperkalemia--follow-up with cardiology outpatient     -Hypothyroidism-recent TSH was suppressed below therapeutic level and decreased dose of Synthroid from 100-75mcg--repeat TSH in 6 weeks     -Hyperlipidemia-continue statin     -COPD with chronic respiratory failure with hypoxia-on 3 L nasal cannula-continue supplemental oxygen and bronchodilators and bronchodilators     -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 .  Back to LTC after hemodialysis in 24 hours if H&H remained stable        Aruna Mason MD

## 2023-01-27 NOTE — PROGRESS NOTES
Physical Therapy  Facility/Department: [place] [facility] [address] [facility]  Physical Therapy Treatment    Name: Cammie Lebron  : 1955  MRN: [id number]  Date of Service: 2023    Discharge Recommendations: Cammie Lebron scored a 14/24 on the AM-PAC short mobility form. Current research shows that an AM-PAC score of 17 or less is typically not associated with a discharge to the patient's home setting. Based on the patient's AM-PAC score and their current functional mobility deficits, it is recommended that the patient have 3-5 sessions per week of Physical Therapy at d/c to increase the patient's independence. Please see assessment section for further patient specific details. If patient discharges prior to next session this note will serve as a discharge summary. Please see below for the latest assessment towards goals. PT Equipment Recommendations  Equipment Needed: No  Other: defer to next level of care      Patient Diagnosis(es): The primary encounter diagnosis was Intramuscular hematoma. Diagnoses of Anticoagulated and Chronic anemia were also pertinent to this visit. Past Medical History:  has a past medical history of Cerebral artery occlusion with cerebral infarction ([address] Combined systolic and diastolic heart failure ([address] COPD (chronic obstructive pulmonary disease) ([facility] [address] Gout, Hemodialysis patient ([address] Hx of blood clots, Hyperlipidemia, and Hypertension. Past Surgical History:  has a past surgical history that includes Dialysis fistula creation (Left); Colonoscopy; CT BIOPSY BONE MARROW (11/15/2022); Upper gastrointestinal endoscopy (N/A, 2023); and Colonoscopy (N/A, 2023). Assessment   Assessment: Pt demos significant improvement in mobility today. Demos better control of UEs and LEs. Min buckling of L knee noted. Jasmine Kidd stepamela utilized for safety - will attempt transfers to walker at future session as appropriate. Rec continued IP PT to maximize safety and independence.   Treatment Diagnosis: Decreased functional mobility 2/2 weakness/ LE's buckling  Requires PT Follow-Up: Yes     Plan   Physcial Therapy Plan  General Plan:  (2-5)  Current Treatment Recommendations: Strengthening, Balance training, Functional mobility training, Gait training, Safety education & training, Patient/Caregiver education & training, Therapeutic activities, Transfer training  Safety Devices  Type of Devices: Call light within reach, Chair alarm in place, Nurse notified, Left in chair, Sitter present, Gait belt (setup w/ meal tray)     Restrictions  Position Activity Restriction  Other position/activity restrictions: up with assist     Subjective   General  Chart Reviewed: Yes  Additional Pertinent Hx: Adm 1/23 post fall with SOB. CT demonstrates a large gluteal hematoma without findings of active contrast extravasation, though it is large enough to extend into his leg and is not fully imaged. CT head is without acute findings. Hgb  6.9  Referring Practitioner: Abdulkadir  Diagnosis: acute blood loss anemia  Subjective  Subjective: Pt in bed upon PT entry, agreeable to working with PT. No c/o pain. \"I need to go to the bathroom. Quickly. \"         Social/Functional History  Social/Functional History  Type of Home: Facility ([facility] x 6 months (not working with therapy))  Bathroom Shower/Tub: Walk-in shower  Bathroom Toilet: Standard  Bathroom Equipment: Hand-held shower, Shower chair, Grab bars in shower  Bathroom Accessibility: Accessible  Home Equipment: Walker, rolling, Cane (3L O2)  Has the patient had two or more falls in the past year or any fall with injury in the past year?: Yes  ADL Assistance: Independent  Ambulation Assistance: Independent (using either a RW or cane)  Additional Comments: takes meals in his room; denies h/o falls other until the falls just prior to this admission; states that the \"jumpiness\" of arms and legs have been going on for awhile. \"I drop every damn thing. \"   Pt is a questionable historian - previous evaluation in 9/2022 - pt takes meals in dining room (pushed by staff in w/c); pt did have 2 previous falls (one in which he hit his head). Vision/Hearing       Cognition   Orientation  Overall Orientation Status: Impaired (oriented to self, not place, needs cues for correct month)     Objective                              Bed mobility  Rolling to Right: Stand by assistance  Supine to Sit: Contact guard assistance  Scooting: Contact guard assistance  Transfers  Sit to Stand: Contact guard assistance (from EOB, toilet, chair to funmilayo stedy)  Stand to Sit: Contact guard assistance  Bed to Chair: Dependent/Total (funmilayo stedy utilized for bed --> toilet and toilet --> chair transfer)        Balance  Sitting - Static: Good  Sitting - Dynamic: Good  Standing - Static: Fair  Standing - Dynamic: Fair  Comments: stood in funmilayo steady x 2 min with B UE support, CGA. Pt able to weight shift and take a few small steps in place. Min buckling of L knee noted.            OutComes Score                                                  AM-PAC Score  AM-PAC Inpatient Mobility Raw Score : 14 (01/27/23 1449)  AM-PAC Inpatient T-Scale Score : 38.1 (01/27/23 1449)  Mobility Inpatient CMS 0-100% Score: 61.29 (01/27/23 1449)  Mobility Inpatient CMS G-Code Modifier : CL (01/27/23 1449)          Tinneti Score       Goals  Short Term Goals  Time Frame for Short Term Goals: Discharge  Short Term Goal 1: Bed mobility with CG  MET 1/27   New goal: Pt will transfer supine <--> sit with supervision  Short Term Goal 2: Sit <> stand with min x 1  MET 1/27;  New goal: Pt will transfer sit <--> stand with supervision  Short Term Goal 3: Bed to chair with mod x 1  ongoing  Patient Goals   Patient Goals : not specifically stated       Education  Patient Education  Education Given To: Patient  Education Provided: Role of Therapy  Education Method: Demonstration  Barriers to Learning: Cognition  Education Outcome: Verbalized understanding;Continued education needed      Therapy Time   Individual Concurrent Group Co-treatment   Time In 1350         Time Out 1445         Minutes 55                 Timed Code Treatment Minutes:  55    Total Treatment Minutes:  55    If patient is discharged prior to next treatment, this note will serve as the discharge summary.   Aditya Pérez, PT, DPT  [id number]

## 2023-01-28 ENCOUNTER — APPOINTMENT (OUTPATIENT)
Dept: GENERAL RADIOLOGY | Age: 68
DRG: 604 | End: 2023-01-28
Payer: MEDICARE

## 2023-01-28 PROBLEM — Z79.01 ANTICOAGULATED: Status: ACTIVE | Noted: 2023-01-28

## 2023-01-28 LAB
ALBUMIN SERPL-MCNC: 3.1 G/DL (ref 3.4–5)
ANION GAP SERPL CALCULATED.3IONS-SCNC: 13 MMOL/L (ref 3–16)
BASOPHILS ABSOLUTE: 0 K/UL (ref 0–0.2)
BASOPHILS RELATIVE PERCENT: 0.6 %
BLOOD BANK DISPENSE STATUS: NORMAL
BLOOD BANK PRODUCT CODE: NORMAL
BPU ID: NORMAL
BUN BLDV-MCNC: 31 MG/DL (ref 7–20)
CALCIUM SERPL-MCNC: 8.6 MG/DL (ref 8.3–10.6)
CHLORIDE BLD-SCNC: 98 MMOL/L (ref 99–110)
CO2: 27 MMOL/L (ref 21–32)
CREAT SERPL-MCNC: 9 MG/DL (ref 0.8–1.3)
DESCRIPTION BLOOD BANK: NORMAL
EKG ATRIAL RATE: 78 BPM
EKG DIAGNOSIS: NORMAL
EKG P AXIS: 77 DEGREES
EKG P-R INTERVAL: 246 MS
EKG Q-T INTERVAL: 422 MS
EKG QRS DURATION: 84 MS
EKG QTC CALCULATION (BAZETT): 481 MS
EKG R AXIS: 266 DEGREES
EKG T AXIS: 74 DEGREES
EKG VENTRICULAR RATE: 78 BPM
EOSINOPHILS ABSOLUTE: 0.3 K/UL (ref 0–0.6)
EOSINOPHILS RELATIVE PERCENT: 5.1 %
GFR SERPL CREATININE-BSD FRML MDRD: 6 ML/MIN/{1.73_M2}
GLUCOSE BLD-MCNC: 86 MG/DL (ref 70–99)
HCT VFR BLD CALC: 29.2 % (ref 40.5–52.5)
HEMOGLOBIN: 9.2 G/DL (ref 13.5–17.5)
LYMPHOCYTES ABSOLUTE: 0.8 K/UL (ref 1–5.1)
LYMPHOCYTES RELATIVE PERCENT: 15.5 %
MAGNESIUM: 2.6 MG/DL (ref 1.8–2.4)
MCH RBC QN AUTO: 28.8 PG (ref 26–34)
MCHC RBC AUTO-ENTMCNC: 31.3 G/DL (ref 31–36)
MCV RBC AUTO: 91.9 FL (ref 80–100)
MONOCYTES ABSOLUTE: 0.6 K/UL (ref 0–1.3)
MONOCYTES RELATIVE PERCENT: 11.2 %
NEUTROPHILS ABSOLUTE: 3.6 K/UL (ref 1.7–7.7)
NEUTROPHILS RELATIVE PERCENT: 67.6 %
PDW BLD-RTO: 17.8 % (ref 12.4–15.4)
PHOSPHORUS: 3 MG/DL (ref 2.5–4.9)
PLATELET # BLD: 102 K/UL (ref 135–450)
PMV BLD AUTO: 7.8 FL (ref 5–10.5)
POTASSIUM SERPL-SCNC: 4.4 MMOL/L (ref 3.5–5.1)
RBC # BLD: 3.18 M/UL (ref 4.2–5.9)
SODIUM BLD-SCNC: 138 MMOL/L (ref 136–145)
TROPONIN: 0.1 NG/ML
WBC # BLD: 5.3 K/UL (ref 4–11)

## 2023-01-28 PROCEDURE — 80069 RENAL FUNCTION PANEL: CPT

## 2023-01-28 PROCEDURE — 6370000000 HC RX 637 (ALT 250 FOR IP): Performed by: HOSPITALIST

## 2023-01-28 PROCEDURE — 83735 ASSAY OF MAGNESIUM: CPT

## 2023-01-28 PROCEDURE — 71045 X-RAY EXAM CHEST 1 VIEW: CPT

## 2023-01-28 PROCEDURE — 99223 1ST HOSP IP/OBS HIGH 75: CPT | Performed by: INTERNAL MEDICINE

## 2023-01-28 PROCEDURE — 36415 COLL VENOUS BLD VENIPUNCTURE: CPT

## 2023-01-28 PROCEDURE — 2060000000 HC ICU INTERMEDIATE R&B

## 2023-01-28 PROCEDURE — 85025 COMPLETE CBC W/AUTO DIFF WBC: CPT

## 2023-01-28 PROCEDURE — 99231 SBSQ HOSP IP/OBS SF/LOW 25: CPT | Performed by: SURGERY

## 2023-01-28 PROCEDURE — 84484 ASSAY OF TROPONIN QUANT: CPT

## 2023-01-28 PROCEDURE — 93005 ELECTROCARDIOGRAM TRACING: CPT | Performed by: HOSPITALIST

## 2023-01-28 PROCEDURE — 90935 HEMODIALYSIS ONE EVALUATION: CPT

## 2023-01-28 PROCEDURE — 6360000002 HC RX W HCPCS: Performed by: INTERNAL MEDICINE

## 2023-01-28 PROCEDURE — 2580000003 HC RX 258: Performed by: INTERNAL MEDICINE

## 2023-01-28 PROCEDURE — 93010 ELECTROCARDIOGRAM REPORT: CPT | Performed by: INTERNAL MEDICINE

## 2023-01-28 RX ORDER — PANTOPRAZOLE SODIUM 40 MG/1
40 TABLET, DELAYED RELEASE ORAL 2 TIMES DAILY
Status: DISCONTINUED | OUTPATIENT
Start: 2023-01-28 | End: 2023-01-29 | Stop reason: HOSPADM

## 2023-01-28 RX ADMIN — PANTOPRAZOLE SODIUM 40 MG: 40 TABLET, DELAYED RELEASE ORAL at 22:11

## 2023-01-28 RX ADMIN — LEVOTHYROXINE SODIUM 75 MCG: 0.07 TABLET ORAL at 06:32

## 2023-01-28 RX ADMIN — SODIUM CHLORIDE, PRESERVATIVE FREE 10 ML: 5 INJECTION INTRAVENOUS at 22:11

## 2023-01-28 RX ADMIN — SEVELAMER CARBONATE 800 MG: 800 TABLET, FILM COATED ORAL at 17:02

## 2023-01-28 RX ADMIN — SEVELAMER CARBONATE 800 MG: 800 TABLET, FILM COATED ORAL at 12:10

## 2023-01-28 RX ADMIN — PANTOPRAZOLE SODIUM 40 MG: 40 TABLET, DELAYED RELEASE ORAL at 14:01

## 2023-01-28 RX ADMIN — ATORVASTATIN CALCIUM 80 MG: 80 TABLET, FILM COATED ORAL at 22:11

## 2023-01-28 RX ADMIN — EPOETIN ALFA-EPBX 10000 UNITS: 10000 INJECTION, SOLUTION INTRAVENOUS; SUBCUTANEOUS at 09:14

## 2023-01-28 RX ADMIN — CARVEDILOL 3.12 MG: 3.12 TABLET, FILM COATED ORAL at 22:11

## 2023-01-28 ASSESSMENT — PAIN SCALES - GENERAL
PAINLEVEL_OUTOF10: 0

## 2023-01-28 NOTE — PLAN OF CARE
Problem: Safety - Adult  Goal: Free from fall injury  Outcome: Progressing  Flowsheets (Taken 1/27/2023 0725 by Leticia Benitez RN)  Free From Fall Injury: Instruct family/caregiver on patient safety     Problem: Skin/Tissue Integrity  Goal: Absence of new skin breakdown  Description: 1. Monitor for areas of redness and/or skin breakdown  2. Assess vascular access sites hourly  3. Every 4-6 hours minimum:  Change oxygen saturation probe site  4. Every 4-6 hours:  If on nasal continuous positive airway pressure, respiratory therapy assess nares and determine need for appliance change or resting period.   Outcome: Progressing     Problem: ABCDS Injury Assessment  Goal: Absence of physical injury  Recent Flowsheet Documentation  Taken 1/27/2023 0725 by Leticia Benitez RN  Absence of Physical Injury: Implement safety measures based on patient assessment     Problem: Hematologic - Adult  Goal: Maintains hematologic stability  Recent Flowsheet Documentation  Taken 1/27/2023 0830 by Leticia Benitez RN  Maintains hematologic stability: Assess for signs and symptoms of bleeding or hemorrhage

## 2023-01-28 NOTE — PROGRESS NOTES
Treatment time: 2 hours  Net UF: 1770 ml     Pre weight: 55.8 kg  Post weight:54.1 kg    Access used: LAVF    Access function: well with  ml/min     Medications or blood products given: retacrit 81808 units     Regular outpatient schedule: TTS     Summary of response to treatment: Patient tolerated treatment poorly. Pt c/o chest heaviness, O2 sats 100%, VSS, heart monitor shows NSR, )2 @ 3L/nc. Hospitalist at bedside. Tx discontinued per pt request. AMA signed.

## 2023-01-28 NOTE — PLAN OF CARE
Noted minimal bleeding coming from pts nose with possible irritation from 02 nasal cannula ice chips provided and told nurse that it helps stop bleeding. Will continue to monitor.

## 2023-01-28 NOTE — CONSULTS
[facility]   Cardiology Consultation   Date: 1/28/2023  Admit Date:  1/23/2023  Reason for Consultation: Chest pain  Consult Requesting Physician: José Luis Urias MD     Chief Complaint   Patient presents with    Fall    Shortness of Breath     HPI: Jose Vaughan is a 74 y.o. gentleman with a past medical history significant for hypertension, ESRD on dialysis, hyperlipidemia, CVA, previous history of systolic congestive heart failure, LV ejection fraction 45% was in dialysis today. During dialysis, he complained about retrosternal chest discomfort of 5 to 6 x 10 in intensity lasted for about 20 minutes and resolve spontaneously. Hence, cardiology was consulted for further evaluation. Of note, in March 2022, patient had similar episode and admitted at [facility] [place] [facility] [place].  At the time, he have a Chas Resides which was reported as a low risk scan, with no reversible perfusion defect. His EKG from today shows normal sinus rhythm, nonspecific ST-T changes. His troponin is fluctuating and flat, in the setting of ESRD.     Impression:  Acute chest pain  ESRD on hemodialysis  Hypertension  Hyperlipidemia  Chronic systolic congestive heart failure, LV ejection fraction 40%    Recommendations:  Chest pain lasted for 20 minutes without significant changes in EKG  Trend troponin  Nuclear perfusion scan was within normal limits, approximately 9 months ago  Observe for 1 more day, and telemetry    Past Medical History:   Diagnosis Date    Cerebral artery occlusion with cerebral infarction ([facility]     Combined systolic and diastolic heart failure (HCC)     COPD (chronic obstructive pulmonary disease) ([facility]     Gout     Hemodialysis patient ([facility]     Hx of blood clots     Hyperlipidemia     Hypertension         Past Surgical History:   Procedure Laterality Date    COLONOSCOPY      COLONOSCOPY N/A 1/17/2023    COLONOSCOPY DIAGNOSTIC performed by Francie Kline MD at [address] [place] [address] 11/15/2022    CT BONE MARROW BIOPSY 11/15/2022 [facility] CT SCAN    DIALYSIS FISTULA CREATION Left     UPPER GASTROINTESTINAL ENDOSCOPY N/A 2023    EGD BIOPSY performed by Kenyatta Gonzalez MD at [address] [place]   Allergen Reactions    Nitroglycerin Other (See Comments)     Unknown reaction    Oxymetazoline Other (See Comments)     Unknown reaction       Social History:  Reviewed. reports that he has quit smoking. He has never used smokeless tobacco. He reports that he does not currently use alcohol. He reports that he does not use drugs. Family History:  Reviewed. family history includes Cancer in his father and mother; Other in his sister. No premature CAD. Review of System:  All other systems reviewed except for that noted above. Pertinent negatives and positives are:     General: negative for fever, chills   Ophthalmic ROS: negative for - eye pain or loss of vision  ENT ROS: negative for - headaches, sore throat   Respiratory: negative for - cough, sputum  Cardiovascular: Reviewed in HPI  Gastrointestinal: negative for - abdominal pain, diarrhea, N/V  Hematology: negative for - bleeding, blood clots, bruising or jaundice  Genito-Urinary:  negative for - Dysuria or incontinence  Musculoskeletal: negative for - Joint swelling, muscle pain  Neurological: negative for - confusion, dizziness, headaches   Psychiatric: No anxiety, no depression.   Dermatological: negative for - rash    Physical Examination:  Vitals:    23 1200   BP: (!) 155/71   Pulse: 82   Resp: 18   Temp: 97.7 °F (36.5 °C)   SpO2: 100%        Intake/Output Summary (Last 24 hours) at 2023 1236  Last data filed at 2023 1931  Gross per 24 hour   Intake 100 ml   Output --   Net 100 ml     In: 270 [P.O.:260; I.V.:10]  Out: -    Wt Readings from Last 3 Encounters:   23 119 lb 4.3 oz (54.1 kg)   23 121 lb 0.5 oz (54.9 kg)   23 158 lb (71.7 kg)     Temp  Av.8 °F (36.6 °C)  Min: 97 °F (36.1 °C) Max: 98.3 °F (36.8 °C)  Pulse  Av.8  Min: 72  Max: 82  BP  Min: 118/65  Max: 155/71  SpO2  Av.6 %  Min: 98 %  Max: 100 %    Telemetry: Sinus rhythm   Constitutional: Alert. Oriented to person, place, and time. No distress. Head: Normocephalic and atraumatic. Mouth/Throat: Lips appear moist. Oropharynx is clear and moist.  Eyes: Conjunctivae normal. EOM are normal.   Neck: Neck supple. No lymphadenopathy. No rigidity. No JVD present. Cardiovascular: Normal rate, regular rhythm. Normal S1&S2. Carotid pulse 2+ bilaterally. Pulmonary/Chest: Bilateral respiratory sounds present. No respiratory accessory muscle use. No wheezes, No rhonchi. Abdominal: Soft. Normal bowel sounds present. No distension, No tenderness. No splenomegaly. No hernia. Musculoskeletal: No tenderness. No edema    Lymphadenopathy: Has no cervical adenopathy. Neurological: Alert and oriented. Cranial nerve II-XII grossly intact, No gross deficit to touch. Skin: Skin is warm and dry. No rash, lesions, ulcerations noted. Psychiatric: No anxiety nor agitation. Labs:  Reviewed. Recent Labs     23  0452 23  0452 23  0504    135* 138   K 5.2* 4.2 4.4   CL 98* 97* 98*   CO2    PHOS  --  2.8 3.0   BUN 53* 25* 31*   CREATININE 11.4* 7.6* 9.0*     Recent Labs     23  0202 23  0452 23  0029 23  0452 23  0504   WBC 5.4  --   --  4.9 5.3   HGB 7.1*   < > 9.2* 9.0* 9.2*   HCT 23.0*   < > 28.6* 28.5* 29.2*   MCV 91.5  --   --  89.4 91.9   PLT 95*  --   --  92* 102*    < > = values in this interval not displayed.      Lab Results   Component Value Date/Time    TROPONINI 0.10 2023 10:22 AM     No results found for: BNP  Lab Results   Component Value Date/Time    PROTIME 16.1 2023 04:03 PM    PROTIME 16.0 11/15/2022 09:30 AM    PROTIME 17.0 2022 05:45 AM    INR 1.29 2023 04:03 PM    INR 1.29 11/15/2022 09:30 AM    INR 1.39 2022 05:45 AM     Lab Results   Component Value Date/Time    CHOL 77 09/14/2022 07:00 AM    HDL 38 09/14/2022 07:00 AM    TRIG 45 09/14/2022 07:00 AM       Diagnostic and imaging results reviewed.     I independently reviewed the ECG and telemetry.    Scheduled Meds:   epoetin zachary-epbx  10,000 Units IntraVENous Once per day on Mon Wed Fri    sodium chloride flush  10 mL IntraVENous 2 times per day    pantoprazole  40 mg IntraVENous BID    atorvastatin  80 mg Oral Nightly    carvedilol  3.125 mg Oral BID    allopurinol  100 mg Oral Once per day on Mon Wed Fri    calcitRIOL  0.5 mcg Oral Once per day on Mon Wed Fri    levothyroxine  75 mcg Oral QAM AC    sevelamer  800 mg Oral TID WC     Continuous Infusions:   sodium chloride      sodium chloride      sodium chloride       PRN Meds:.sodium chloride, sodium chloride, sodium chloride flush, sodium chloride, promethazine **OR** ondansetron, acetaminophen **OR** acetaminophen, sodium chloride, ipratropium-albuterol     Assessment:   Patient Active Problem List    Diagnosis Date Noted    Intramuscular hematoma 01/25/2023    Acute blood loss anemia (ABLA) 01/24/2023    Hyperkalemia 01/14/2023    Failure to thrive in adult 01/14/2023    Weakness 12/28/2022    Pre-syncope 12/28/2022    Hypotension due to hypovolemia 12/28/2022    Lactic acidemia 12/28/2022    Elevated brain natriuretic peptide (BNP) level 12/28/2022    Hemorrhage of arteriovenous fistula, initial encounter ([facility]) 10/12/2022    Dialysis AV fistula malfunction, initial encounter ([facility]) 10/07/2022    Chronic respiratory failure with hypoxia and hypercapnia ([facility]) 10/06/2022    Acute cerebrovascular accident (CVA) ([facility]) 09/13/2022    Problem with dialysis access ([facility]) 07/07/2022    History of venous thromboembolism 06/17/2022    Epistaxis 06/17/2022    Pancytopenia ([facility]) 06/17/2022    AV fistula thrombosis, initial encounter ([facility]) 06/15/2022    Hypokalemia 03/02/2022    V-tach 03/02/2022    Essential hypertension 03/02/2022     Hyperlipidemia 03/02/2022    Chronic combined systolic and diastolic CHF (congestive heart failure) ([facility] 03/02/2022    SOB (shortness of breath)     Chest pain 03/01/2022    Acute on chronic respiratory failure with hypoxia and hypercapnia (HCC) 04/06/2021    COPD (chronic obstructive pulmonary disease) ([facility] 09/10/2020    HCAP (healthcare-associated pneumonia) 03/31/2020    COPD exacerbation ([facility] 03/31/2020    ESRD on dialysis [facility]) 03/31/2020      Active Hospital Problems    Diagnosis Date Noted    Intramuscular hematoma [T14. 8XXA] 01/25/2023     Priority: Medium    Acute blood loss anemia (ABLA) [D62] 01/24/2023     Priority: Medium         Recommendation(s): Thank you for allowing me to participate in the care of Kalpana Monahan . If you have any questions/comments, please do not hesitate to contact us. Alirio Yousif MD   Cardiac Electrophysiology  [address] [place] [address]  Office [phone]    For any EP related issues after 5 PM, contact [facility] on call cardiology through .

## 2023-01-28 NOTE — PROGRESS NOTES
Nephrology Progress Note  [phone]   [facility]        Reason for Consult:  ESKD    HISTORY OF PRESENT ILLNESS:                This is a patient with significant past medical history of ESKD on HD 4 x per week at [facility], HTN, h/o multiple falls, CVA, CHF, COPD, h/o DVT on [facility]   who presented ON multiple falls-4-5/day-fell on left buttock one day prior, CT showed left gluteal hematoma, seen by surgery. C/o abdominal pain, denies N/V/. Had HD yesterday. Lytes are stable. Adm hgb 8.2, dropped to 7.0    Subjective:  -pt seen and examined  -PMSHx and meds reviewed  -family at bedside    No acute events ON  -had HD today, tolerated poorly-had chest pain during HD - cardiology consulted  -tx terminated after 2 hours        ROS: feels weak/denies chest pain at this time    PHYSICAL EXAM:    Vitals:    BP (!) 155/71   Pulse 82   Temp 97.7 °F (36.5 °C) (Oral)   Resp 18   Ht 5' 8\" (1.727 m)   Wt 119 lb 4.3 oz (54.1 kg)   SpO2 100%   BMI 18.13 kg/m²   I/O last 3 completed shifts: In: 270 [P.O.:260; I.V.:10]  Out: -   No intake/output data recorded. Physical Exam:  Gen: Resting in bed, NAD. Ill appearing  HEENT: anicteric, NC/AT  CV: RRR, +S1/S2  Lungs: Good respiratory effort, clear air entry   Abd: soft, ND, NT  Ext: trace edema, no cyanosis  Skin: Warm. No rashes appreciated. Neuro:no focal weakness  Joints: No erythema noted over joints.   Access: left UA AVG + B    DATA:    CBC:   Lab Results   Component Value Date/Time    WBC 5.3 01/28/2023 05:04 AM    RBC 3.18 01/28/2023 05:04 AM    HGB 9.2 01/28/2023 05:04 AM    HCT 29.2 01/28/2023 05:04 AM    MCV 91.9 01/28/2023 05:04 AM    MCH 28.8 01/28/2023 05:04 AM    MCHC 31.3 01/28/2023 05:04 AM    RDW 17.8 01/28/2023 05:04 AM     01/28/2023 05:04 AM    MPV 7.8 01/28/2023 05:04 AM     BMP:    Lab Results   Component Value Date/Time     01/28/2023 05:04 AM    K 4.4 01/28/2023 05:04 AM    K 5.2 01/26/2023 04:52 AM    CL 98 01/28/2023 05:04 AM    CO2 27 01/28/2023 05:04 AM    BUN 31 01/28/2023 05:04 AM    LABALBU 3.1 01/28/2023 05:04 AM    CREATININE 9.0 01/28/2023 05:04 AM    CALCIUM 8.6 01/28/2023 05:04 AM    GFRAA 7 10/14/2022 05:45 AM    LABGLOM 6 01/28/2023 05:04 AM    GLUCOSE 86 01/28/2023 05:04 AM       IMPRESSION/RECOMMENDATIONS:      ESKD: followed by  Nephrology, HD 4 x per week, last HD yesterday  -had HD today- terminated after 2 hours due to chest pain  -post weight 54.1kg, TW TBD-UF 1.8L  -will need TW adjusted given weight loss  -next HD on Tuesday    Chest pain:  -while on HD  -plan per cardiology    Hyperkalemia:   -modulate with HD    Recent multiple falls: complicated by left gluteal hematoma  -Eliquis on hold-  -poor candidate for long term AC  -per primary    Anemia: acute on chronic due to bleeding-left gluteal hematoma, recent admission for GIB s/p EGD, colonoscopy  -seen by GI-continue PPI  -s/p PRBC  -hgb stable today  -continue epogen  -transfusion threshold per primary    H/o DVT: on AC-with recent multiple falls, ? 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 I will continue to follow along. Please call with questions.     Danica Ferguson MD

## 2023-01-28 NOTE — PROGRESS NOTES
General Surgery   Daily Progress Note  Patient: Maylin Croft    CC: Left gluteal hematoma status post fall on Eliquis    SUBJECTIVE:  NO acute events overnight. Afebrile. HDS. States he does not feel well this morning, but feels the swelling is decreasing. Denies pain. States he is cold. Pt seen in dialysis. ROS: A 14 point review of systems was conducted, significant findings as noted above. All other systems negative. OBJECTIVE:   Infusions:   sodium chloride      sodium chloride      sodium chloride        I/O:I/O last 3 completed shifts: In: 270 [P.O.:260; I.V.:10]  Out: -            Wt Readings from Last 1 Encounters:   01/28/23 123 lb 0.3 oz (55.8 kg)       Exam:  /62   Pulse 76   Temp 98.2 °F (36.8 °C)   Resp 16   Ht 5' 8\" (1.727 m)   Wt 123 lb 0.3 oz (55.8 kg)   SpO2 98%   BMI 18.70 kg/m²     General appearance: no acute distress  Eyes: PERRL, no scleral icterus  Neck: trachea midline, no JVD  Chest/Lungs: symmetrical chest rise, normal effort  Cardiovascular: RRR  Abdomen: soft, non-tender, non-distended, no guarding/rigidity  Skin: warm and dry, no rashes  Extremities: no edema, no cyanosis, left gluteal tenderness, intact strength and sensation to bilateral lower extremities, palpable DP, PT, and fem pulses to left lower extremity. Compartments of left lower extremity are all soft along with left buttock. Neuro: A&Ox3, no focal deficits, sensation intact            LABS:   Recent Labs     01/27/23  0452 01/28/23  0504   WBC 4.9 5.3   HGB 9.0* 9.2*   HCT 28.5* 29.2*   MCV 89.4 91.9   PLT 92* 102*          Recent Labs     01/27/23  0452 01/28/23  0504   * 138   K 4.2 4.4   CL 97* 98*   CO2 27 27   PHOS 2.8 3.0   BUN 25* 31*   CREATININE 7.6* 9.0*          Recent Labs     01/26/23  0452   AST 10*   ALT <5*   BILITOT 0.5   ALKPHOS 117        No results for input(s): LIPASE, AMYLASE in the last 72 hours.      Recent Labs     01/26/23  0452   PROT 5.3*          No results for input(s): CKTOTAL, CKMB, CKMBINDEX, TROPONINI in the last 72 hours.      ASSESSMENT/PLAN:   This is a 67 y.o. male with Hx as delineated above who presents after ground-level fall onto left buttock at which time a CT scan found a 9.5 x 4.6 cm not fully imaged left gluteal hematoma.The patient only had an abdominal CT scan which did not fully measure the left gluteal hematoma.  Patient's hemoglobin was 7.3 in the emergency department at which time he received 1 unit of blood with response to 8.3.  The patient has low hemoglobin at baseline with baseline being around 7.  There is no acute need for surgical intervention at this time.     -- Hgb stable at 9.2; ok to restart eliquis today from surgery standpoint. Will continue to follow along  - Place binder around pelvis for compression of hematoma  - Transfusions per primary team  - If patient has drop in hgb, will need repeat imaging with CTA to evaluate hematoma fully for possible extravasation  - If active extravasation found patient will likely need interventional radiology for further intervention  - Further care per primary team    Presley Douglas CNP  1/28/2023  9:27 AM  [employer]    ATTENDING ATTESTATION    Patient independently examined. Management discussed and I agree with resident/midlevel provider documentation.    CC: Left Gluteal hematoma    No acute issues overnight.  Denies pain.    Vitals:    01/28/23 1200   BP: (!) 155/71   Pulse: 82   Resp: 18   Temp: 97.7 °F (36.5 °C)   SpO2: 100%       Abd: Soft, nontender, nondistended  Left gluteal tenderness but with soft compartments of the left lower extremity  Labs reviewed, incl CBC, BMP    Left gluteal hematoma due to fall  Okay to restart anticoagulation  No plans for surgical intervention  Continue to monitor hemoglobins    We will continue to follow    Jordan Arriaga MD

## 2023-01-28 NOTE — PLAN OF CARE
Problem: Safety - Adult  Goal: Free from fall injury  Outcome: Progressing  Flowsheets (Taken 1/28/2023 1353)  Free From Fall Injury:   Instruct family/caregiver on patient safety   Based on caregiver fall risk screen, instruct family/caregiver to ask for assistance with transferring infant if caregiver noted to have fall risk factors     Problem: Skin/Tissue Integrity  Goal: Absence of new skin breakdown  Description: 1. Monitor for areas of redness and/or skin breakdown  2. Assess vascular access sites hourly  3. Every 4-6 hours minimum:  Change oxygen saturation probe site  4. Every 4-6 hours:  If on nasal continuous positive airway pressure, respiratory therapy assess nares and determine need for appliance change or resting period.   Outcome: Progressing     Problem: Hematologic - Adult  Goal: Maintains hematologic stability  Outcome: Progressing  Flowsheets (Taken 1/28/2023 1353)  Maintains hematologic stability:   Assess for signs and symptoms of bleeding or hemorrhage   Monitor labs for bleeding or clotting disorders     Problem: Discharge Planning  Goal: Discharge to home or other facility with appropriate resources  Outcome: Progressing  Flowsheets (Taken 1/28/2023 1353)  Discharge to home or other facility with appropriate resources:   Identify barriers to discharge with patient and caregiver   Arrange for needed discharge resources and transportation as appropriate   Identify discharge learning needs (meds, wound care, etc)     Problem: Chronic Conditions and Co-morbidities  Goal: Patient's chronic conditions and co-morbidity symptoms are monitored and maintained or improved  Outcome: Progressing  Flowsheets (Taken 1/28/2023 1353)  Care Plan - Patient's Chronic Conditions and Co-Morbidity Symptoms are Monitored and Maintained or Improved:   Monitor and assess patient's chronic conditions and comorbid symptoms for stability, deterioration, or improvement   Collaborate with multidisciplinary team to address chronic and comorbid conditions and prevent exacerbation or deterioration     Problem: Metabolic/Fluid and Electrolytes - Adult  Goal: Electrolytes maintained within normal limits  Outcome: Progressing  Flowsheets (Taken 1/28/2023 1353)  Electrolytes maintained within normal limits:   Monitor labs and assess patient for signs and symptoms of electrolyte imbalances   Administer electrolyte replacement as ordered

## 2023-01-28 NOTE — PLAN OF CARE
[email or web address] and impulsive for most of the waking hrs, denies pain and SOB,call light within reach, bed on lowest position, for HD this AM prior to discharge,will continue to monitor. Fall risk in place.

## 2023-01-28 NOTE — PROGRESS NOTES
Hospitalist Progress Note      PCP: Tai Walsh MD    Date of Admission: 1/23/2023    Chief Complaint:     Hospital Course:     Subjective:     Patient reported substernal chest pressure at dialysis this morning, dialysis was stopped after 2 hours      Medications:  Reviewed    Infusion Medications    sodium chloride      sodium chloride      sodium chloride       Scheduled Medications    epoetin zachary-epbx  10,000 Units IntraVENous Once per day on Mon Wed Fri    sodium chloride flush  10 mL IntraVENous 2 times per day    pantoprazole  40 mg IntraVENous BID    atorvastatin  80 mg Oral Nightly    carvedilol  3.125 mg Oral BID    allopurinol  100 mg Oral Once per day on Mon Wed Fri    calcitRIOL  0.5 mcg Oral Once per day on Mon Wed Fri    levothyroxine  75 mcg Oral QAM AC    sevelamer  800 mg Oral TID WC     PRN Meds: sodium chloride, sodium chloride, sodium chloride flush, sodium chloride, promethazine **OR** ondansetron, acetaminophen **OR** acetaminophen, sodium chloride, ipratropium-albuterol      Intake/Output Summary (Last 24 hours) at 1/28/2023 1029  Last data filed at 1/27/2023 1931  Gross per 24 hour   Intake 200 ml   Output --   Net 200 ml         Physical Exam Performed:    /62   Pulse 75   Temp 98.2 °F (36.8 °C)   Resp 16   Ht 5' 8\" (1.727 m)   Wt 123 lb 0.3 oz (55.8 kg)   SpO2 98%   BMI 18.70 kg/m²     General appearance: No apparent distress, appears stated age and cooperative. HEENT: Pupils equal, round, and reactive to light. Conjunctivae/corneas clear. Neck: Supple, with full range of motion. No jugular venous distention. Trachea midline. Respiratory:  Normal respiratory effort. Clear to auscultation, bilaterally without Rales/Wheezes/Rhonchi. Cardiovascular: Regular rate and rhythm with normal S1/S2 without murmurs, rubs or gallops. Abdomen: Soft, non-tender, non-distended with normal bowel sounds. Musculoskeletal: No clubbing, cyanosis or edema bilaterally.   Full range of motion without deformity. Skin: Skin color, texture, turgor normal.  No rashes or lesions. Neurologic:  Neurovascularly intact without any focal sensory/motor deficits. Cranial nerves: II-XII intact, grossly non-focal.  Psychiatric: Alert and oriented, thought content appropriate, normal insight  Capillary Refill: Brisk, 3 seconds, normal   Peripheral Pulses: +2 palpable, equal bilaterally       Labs:   Recent Labs     01/26/23  0202 01/26/23  0452 01/27/23  0029 01/27/23 0452 01/28/23  0504   WBC 5.4  --   --  4.9 5.3   HGB 7.1*   < > 9.2* 9.0* 9.2*   HCT 23.0*   < > 28.6* 28.5* 29.2*   PLT 95*  --   --  92* 102*    < > = values in this interval not displayed. Recent Labs     01/26/23  0452 01/27/23 0452 01/28/23  0504    135* 138   K 5.2* 4.2 4.4   CL 98* 97* 98*   CO2 26 27 27   BUN 53* 25* 31*   CREATININE 11.4* 7.6* 9.0*   CALCIUM 8.5 8.5 8.6   PHOS  --  2.8 3.0       Recent Labs     01/26/23 0452   AST 10*   ALT <5*   BILITOT 0.5   ALKPHOS 117       No results for input(s): INR in the last 72 hours. No results for input(s): Aleene Sauger in the last 72 hours. Urinalysis:    No results found for: [place] Feeling, BACTERIA, RBCUA, BLOODU, [address], [address]    Radiology:  CT HEAD WO CONTRAST   Final Result      Stable subcentimeter focus of hyperattenuation in the right temporal lobe is nonspecific and may represent a small vascular malformation. This can be further evaluated with MRI. No acute intracranial abnormality. CT ABDOMEN PELVIS W IV CONTRAST Additional Contrast? None   Final Result      1. Trace amount of ascites along the liver margin and diffuse fatty infiltration liver and borderline size of the spleen appreciated. 2. Cardiac enlargement noted in the chest with small right-sided pleural effusion and airspace disease noted posterior medial left lung base, correlate for pneumonia   3. Scattered diffuse diverticulosis with chronic renal failure   4. Intermediate density lesions in the kidneys and scattered cysts in the kidneys. Intermediate density in the right mid kidney could be a solid mass measuring 1.9 x 1.7 cm   5. Previous surgery noted in the right lower quadrant, likely ileocolectomy or partial bowel surgery with some mucosal thickening. There is also scattered diverticulosis   6. Left posterior, inferior medial gluteal enlargement extending inferiorly likely representing gluteus medius hematoma, not included 100% on the imaging, extends beyond the imaging margin measuring up to 9.5 x 4.6 cm on axial image 121 and overall    height of the hematoma cannot be measured, appears to extend for greater than 6 or 8 cm         CT Head W/O Contrast   Final Result      Normal noncontrast CT the brain for the patient's age without acute hemorrhage, edema or hydrocephalus. Symmetric cerebral atrophy and some low-attenuation changes in the periventricular white matter and left thalamus and right posterior caudate nucleus      XR CHEST PORTABLE   Final Result      1. No acute process or consolidation   2. Stable cardiac enlargement and tortuous aorta               XR CHEST PORTABLE    (Results Pending)       IP CONSULT TO HOSPITALIST  IP CONSULT TO NEPHROLOGY  IP CONSULT TO GENERAL SURGERY  IP CONSULT TO GI  IP CONSULT TO CARDIOLOGY    Assessment/Plan:    - left thigh hematoma dt fall and anticoagulation--Eliquis dc'd on admission-general surgery consult appreciated. .  Monitor H&H and transfuse as needed for hemoglobin less than 7     -Frequent falls--maintain fall precautions, PT and OT     -acute on Chronic anemia due to CKD, possible GI bleed, left thigh hematoma. .. Hemoglobin 6.2-6.7 on recent admission, baseline 8-9--- no overt bleeding but FOBT is +ve. .-   S/p 1 unit PRBC tranfusion 1/15 improved to 7.1 at dc--Eliquis was resumed 3 days post d/c per GI  Continue TEODORA,IV iron per nephrology  S/p EGD and colonoscopy 1/17/23  Small duodenal ulcers, erosive gastritis, hiatal hernia, 4 mm colonic polyp. .  No evidence of bleeding  Continue PPI    Hb down to 6.6-6.9 1/26--transfused 1 unit of PRBC at dialysis--improved to greater than 8    FOBT 1/25 was positive without gross bleeding---GI was consulted 1/26--no further intervention  Discussed risks versus benefits of ongoing anticoagulation with patient--given need for recurrent transfusions, we will not resume Eliquis for now     -Chronic anemia and thrombocytopenia-bone marrow biopsy 11/15/22 was normal-leukopenia recovered-continue to monitor and transfuse for hemoglobin less than 7, platelets less than 50 given anticoagulation--reviewed prior oncology consults, no etiology was established as work-up was negative-continue to monitor     -ESRD on hemodialysis-4x/week-Mon/Tues/Thurs/Fri   follow-up with nephrology        -Chronic combined systolic/diastolic heart failure/NICM, EF 35 to 21%, grade 3 diastolic dysfunction-compensated continue Coreg-not on ACE/ARB given renal failure/hyperkalemia--follow-up with cardiology outpatient     -Hypothyroidism-recent TSH was suppressed below therapeutic level and decreased dose of Synthroid from 100-75mcg--repeat TSH in 6 weeks     -Hyperlipidemia-continue statin     -COPD with chronic respiratory failure with hypoxia-on 3 L nasal cannula-continue supplemental oxygen and bronchodilators and bronchodilators     -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 .  EKG and troponins ordered. .  Cardiology consulted    DVT Prophylaxis: scd  Diet: ADULT DIET; Regular; 3 carb choices (45 gm/meal); Low Fat/Low Chol/High Fiber/2 gm Na; Low Sodium (2 gm)  Code Status: Full Code    Disposition. .  Plan to discharge in 24 hours if stable--discharge canceled today due to chest pain      Joselo Young MD

## 2023-01-28 NOTE — PROGRESS NOTES
Notification of IV to PO Conversion     IV To Po Conversion:   Will convert pantoprazole 40 mg IV BID to pantoprazole 40 mg PO BID based on [facility] IV to PO policy (see below). Please call with questions.   Lidia OlivarezD, [facility]  Main Pharmacy: [phone]      Criteria for conversion from IV to PO therapy per [facility] IV to PO Protocol:   Patients should meet all of the following inclusion criteria and none of the exclusion criteria    Criteria to initiate medication route switch (Inclusion Criteria)  IV therapy for > 24 hours (antibiotics only)  Tolerating diet more advanced than clear liquids  Tolerating oral (PO) medications  Does not require vasopressor therapy for blood pressure support  No seizures for 72hrs (antiepileptic medications only)      Criteria indicating that the patient is NOT a candidate for IV to PO conversion (Exclusion Criteria)  Infections requiring IV therapy (ie: meningitis, endocarditis, osteomyelitis, pancreatitis)  Nausea and/or vomiting or severe diarrhea within past 24 hours  Has gastrectomy, ileus, gastric outlet or bowel obstruction, or malabsorption syndromes  Has significant, painful oral ulceration  TPN with an NPO order  Active GI bleed  Unable to swallow  Nothing by Mouth (NPO) status  Febrile in the last 24 hours- (antibiotics only)  Clinical deteriorating or unstable - (antibiotics only)  Pediatric patients and patients who are not euthyroid (not on oral levothyroxine/not stabilized on oral levothyroxine) - (Levothyroxine only)  Patients being treated for myxedema coma or during the organ donation process - (Levothyroxine only)    Other notes-   Patients may be given suppositories when available for the product being ordered if no contraindications exist (ie: rectal surgery or infection)   Oral solutions/suspensions may be considered for patients with a functioning enteral tube not on continuous suction (if medication is available in this formulation)

## 2023-01-28 NOTE — PROGRESS NOTES
1:1 observer arrived to shift. PT calm and in bed asleep. PCA seated outside room reports PT has slept through the day. Per RN pt is at risk for falls. Lines are a PIV in E, Dialysis [facility] in [facility]. Observer introduced self to PT. Pt was able to state name, Birth date, and states he came to the hospital due to, \"falling on his ass\" PT is calm at this time and is being assisted with eating dinner by PCA. PCA will remain at bedside for safety and support.

## 2023-01-29 VITALS
TEMPERATURE: 98.1 F | RESPIRATION RATE: 18 BRPM | HEART RATE: 74 BPM | SYSTOLIC BLOOD PRESSURE: 127 MMHG | HEIGHT: 68 IN | BODY MASS INDEX: 17.07 KG/M2 | WEIGHT: 112.66 LBS | DIASTOLIC BLOOD PRESSURE: 70 MMHG | OXYGEN SATURATION: 100 %

## 2023-01-29 LAB
ANION GAP SERPL CALCULATED.3IONS-SCNC: 11 MMOL/L (ref 3–16)
BASOPHILS ABSOLUTE: 0 K/UL (ref 0–0.2)
BASOPHILS RELATIVE PERCENT: 0.3 %
BUN BLDV-MCNC: 18 MG/DL (ref 7–20)
CALCIUM SERPL-MCNC: 8.6 MG/DL (ref 8.3–10.6)
CHLORIDE BLD-SCNC: 95 MMOL/L (ref 99–110)
CO2: 28 MMOL/L (ref 21–32)
CREAT SERPL-MCNC: 6.9 MG/DL (ref 0.8–1.3)
EOSINOPHILS ABSOLUTE: 0.3 K/UL (ref 0–0.6)
EOSINOPHILS RELATIVE PERCENT: 4.5 %
GFR SERPL CREATININE-BSD FRML MDRD: 8 ML/MIN/{1.73_M2}
GLUCOSE BLD-MCNC: 106 MG/DL (ref 70–99)
HCT VFR BLD CALC: 29.2 % (ref 40.5–52.5)
HEMOGLOBIN: 9.4 G/DL (ref 13.5–17.5)
LYMPHOCYTES ABSOLUTE: 0.8 K/UL (ref 1–5.1)
LYMPHOCYTES RELATIVE PERCENT: 14.1 %
MAGNESIUM: 2.3 MG/DL (ref 1.8–2.4)
MCH RBC QN AUTO: 29 PG (ref 26–34)
MCHC RBC AUTO-ENTMCNC: 32.1 G/DL (ref 31–36)
MCV RBC AUTO: 90.3 FL (ref 80–100)
MONOCYTES ABSOLUTE: 0.6 K/UL (ref 0–1.3)
MONOCYTES RELATIVE PERCENT: 10 %
NEUTROPHILS ABSOLUTE: 4.1 K/UL (ref 1.7–7.7)
NEUTROPHILS RELATIVE PERCENT: 71.1 %
PDW BLD-RTO: 17.2 % (ref 12.4–15.4)
PLATELET # BLD: 100 K/UL (ref 135–450)
PMV BLD AUTO: 7.5 FL (ref 5–10.5)
POTASSIUM SERPL-SCNC: 4 MMOL/L (ref 3.5–5.1)
RBC # BLD: 3.23 M/UL (ref 4.2–5.9)
SODIUM BLD-SCNC: 134 MMOL/L (ref 136–145)
TROPONIN: 0.1 NG/ML
WBC # BLD: 5.8 K/UL (ref 4–11)

## 2023-01-29 PROCEDURE — 99231 SBSQ HOSP IP/OBS SF/LOW 25: CPT | Performed by: SURGERY

## 2023-01-29 PROCEDURE — 80048 BASIC METABOLIC PNL TOTAL CA: CPT

## 2023-01-29 PROCEDURE — 84484 ASSAY OF TROPONIN QUANT: CPT

## 2023-01-29 PROCEDURE — 94640 AIRWAY INHALATION TREATMENT: CPT

## 2023-01-29 PROCEDURE — 2580000003 HC RX 258: Performed by: INTERNAL MEDICINE

## 2023-01-29 PROCEDURE — 6370000000 HC RX 637 (ALT 250 FOR IP): Performed by: HOSPITALIST

## 2023-01-29 PROCEDURE — 36415 COLL VENOUS BLD VENIPUNCTURE: CPT

## 2023-01-29 PROCEDURE — 83735 ASSAY OF MAGNESIUM: CPT

## 2023-01-29 PROCEDURE — 99232 SBSQ HOSP IP/OBS MODERATE 35: CPT | Performed by: INTERNAL MEDICINE

## 2023-01-29 PROCEDURE — 85025 COMPLETE CBC W/AUTO DIFF WBC: CPT

## 2023-01-29 RX ADMIN — SEVELAMER CARBONATE 800 MG: 800 TABLET, FILM COATED ORAL at 08:04

## 2023-01-29 RX ADMIN — PANTOPRAZOLE SODIUM 40 MG: 40 TABLET, DELAYED RELEASE ORAL at 08:04

## 2023-01-29 RX ADMIN — IPRATROPIUM BROMIDE AND ALBUTEROL SULFATE 1 AMPULE: 2.5; .5 SOLUTION RESPIRATORY (INHALATION) at 05:07

## 2023-01-29 RX ADMIN — SODIUM CHLORIDE, PRESERVATIVE FREE 10 ML: 5 INJECTION INTRAVENOUS at 08:06

## 2023-01-29 RX ADMIN — SEVELAMER CARBONATE 800 MG: 800 TABLET, FILM COATED ORAL at 13:44

## 2023-01-29 RX ADMIN — CARVEDILOL 3.12 MG: 3.12 TABLET, FILM COATED ORAL at 08:06

## 2023-01-29 RX ADMIN — LEVOTHYROXINE SODIUM 75 MCG: 0.07 TABLET ORAL at 05:21

## 2023-01-29 RX ADMIN — SODIUM CHLORIDE, PRESERVATIVE FREE 10 ML: 5 INJECTION INTRAVENOUS at 08:05

## 2023-01-29 ASSESSMENT — PAIN SCALES - GENERAL
PAINLEVEL_OUTOF10: 0

## 2023-01-29 NOTE — PLAN OF CARE
Problem: Safety - Adult  Goal: Free from fall injury  1/29/2023 0226 by Rocky Ogden RN  Outcome: Progressing  Flowsheets (Taken 1/28/2023 1353 by Martha Lambert RN)  Free From Fall Injury:   Instruct family/caregiver on patient safety   Based on caregiver fall risk screen, instruct family/caregiver to ask for assistance with transferring infant if caregiver noted to have fall risk factors     Problem: Skin/Tissue Integrity  Goal: Absence of new skin breakdown  Description: 1. Monitor for areas of redness and/or skin breakdown  2. Assess vascular access sites hourly  3. Every 4-6 hours minimum:  Change oxygen saturation probe site  4. Every 4-6 hours:  If on nasal continuous positive airway pressure, respiratory therapy assess nares and determine need for appliance change or resting period.   1/29/2023 0226 by Rocky Ogden RN  Outcome: Progressing     Problem: ABCDS Injury Assessment  Goal: Absence of physical injury  Outcome: Progressing  Flowsheets (Taken 1/27/2023 0725 by Miguelito Porter RN)  Absence of Physical Injury: Implement safety measures based on patient assessment     Problem: Hematologic - Adult  Goal: Maintains hematologic stability  1/29/2023 0226 by Rocky Ogden RN  Outcome: Progressing  Flowsheets (Taken 1/28/2023 1353 by Martha Lambert RN)  Maintains hematologic stability:   Assess for signs and symptoms of bleeding or hemorrhage   Monitor labs for bleeding or clotting disorders     Problem: Discharge Planning  Goal: Discharge to home or other facility with appropriate resources  1/29/2023 0226 by Rocky Ogden RN  Outcome: [place] Kitchen (Taken 1/28/2023 1353 by Martha Lambert RN)  Discharge to home or other facility with appropriate resources:   Identify barriers to discharge with patient and caregiver   Arrange for needed discharge resources and transportation as appropriate   Identify discharge learning needs (meds, wound care, etc)     Problem: Pain  Goal: Verbalizes/displays adequate comfort level or baseline comfort level  Outcome: Progressing  Flowsheets (Taken 1/27/2023 0830 by Ilsa Solis RN)  Verbalizes/displays adequate comfort level or baseline comfort level: Encourage patient to monitor pain and request assistance     Problem: Chronic Conditions and Co-morbidities  Goal: Patient's chronic conditions and co-morbidity symptoms are monitored and maintained or improved  1/29/2023 0226 by Ashia Molina RN  Outcome: Nehal Abbasiblair (Taken 1/28/2023 1353 by Maria Elena Gonsales RN)  Care Plan - Patient's Chronic Conditions and Co-Morbidity Symptoms are Monitored and Maintained or Improved:   Monitor and assess patient's chronic conditions and comorbid symptoms for stability, deterioration, or improvement   Collaborate with multidisciplinary team to address chronic and comorbid conditions and prevent exacerbation or deterioration     Problem: Cardiovascular - Adult  Goal: Absence of cardiac dysrhythmias or at baseline  Outcome: Progressing  Flowsheets (Taken 1/27/2023 0830 by Ilsa Solis RN)  Absence of cardiac dysrhythmias or at baseline: Monitor cardiac rate and rhythm     Problem: Respiratory - Adult  Goal: Achieves optimal ventilation and oxygenation  1/29/2023 0226 by Ashia Molina RN  Outcome: Progressing  Flowsheets (Taken 1/28/2023 1353 by Maria Elena Gonsales RN)  Achieves optimal ventilation and oxygenation:   Assess for changes in respiratory status   Assess for changes in mentation and behavior   Oxygen supplementation based on oxygen saturation or arterial blood gases     Problem: Metabolic/Fluid and Electrolytes - Adult  Goal: Electrolytes maintained within normal limits  1/29/2023 0226 by Ashia Molina RN  Outcome: Progressing  Flowsheets (Taken 1/28/2023 1353 by Maria Elena Gonsales RN)  Electrolytes maintained within normal limits:   Monitor labs and assess patient for signs and symptoms of electrolyte imbalances   Administer electrolyte replacement as ordered     Problem: Metabolic/Fluid and Electrolytes - Adult  Goal: Hemodynamic stability and optimal renal function maintained  Outcome: Progressing  Flowsheets (Taken 1/27/2023 0830 by Brit Burk RN)  Hemodynamic stability and optimal renal function maintained: Monitor labs and assess for signs and symptoms of volume excess or deficit

## 2023-01-29 NOTE — DISCHARGE INSTR - COC
Continuity of Care Form    Patient Name: Jose Vaughan   :  1955  MRN:  [id number]    Admit date:  2023  Discharge date:  23    Code Status Order: Full Code   Advance Directives:     Admitting Physician:  Latasha Ambriz DO  PCP: Carlitos Taylor MD    Discharging Nurse: [facility], [facility] Unit/Room#: 8838/5124-34  Discharging Unit Phone Number: [phone]      Emergency Contact:   Extended Emergency Contact Information  Primary Emergency Contact: [address] Phone: [phone]  Relation: Child   needed?  No    Past Surgical History:  Past Surgical History:   Procedure Laterality Date    COLONOSCOPY      COLONOSCOPY N/A 2023    COLONOSCOPY DIAGNOSTIC performed by Francie Kline MD at [address] [place] [address]  11/15/2022    CT BONE MARROW BIOPSY 11/15/2022 [facility] CT SCAN    DIALYSIS FISTULA CREATION Left     UPPER GASTROINTESTINAL ENDOSCOPY N/A 2023    EGD BIOPSY performed by Francie Kline MD at [facility] ENDOSCOPY       Immunization History:   Immunization History   Administered Date(s) Administered    COVID-19, PFIZER Bivalent BOOSTER, DO NOT Dilute, (age 12y+), IM, 30 mcg/0.3 mL 2022    COVID-19, PFIZER GRAY top, DO NOT Dilute, (age 15 y+), IM, 30 mcg/0.3 mL 2022       Active Problems:  Patient Active Problem List   Diagnosis Code    HCAP (healthcare-associated pneumonia) J18.9    COPD exacerbation ([facility] J44.1    ESRD on dialysis ([facility] N18.6, Z99.2    COPD (chronic obstructive pulmonary disease) ([facility] J44.9    Acute on chronic respiratory failure with hypoxia and hypercapnia (HCC) J96.21, J96.22    Chest pain R07.9    Hypokalemia E87.6    V-tach I47.20    Essential hypertension I10    Hyperlipidemia E78.5    Chronic combined systolic and diastolic CHF (congestive heart failure) (HCC) I50.42    SOB (shortness of breath) R06.02    AV fistula thrombosis, initial encounter ([facility] G71.324G    History of venous thromboembolism Z86.718    Epistaxis R04.0    Pancytopenia ([facility]) D61.818    Problem with dialysis access [facility]) T82.898A    Acute cerebrovascular accident (CVA) ([address] I63.9    Chronic respiratory failure with hypoxia and hypercapnia ([facility]) J96.11, J96.12    Dialysis AV fistula malfunction, initial encounter [facility]) T82.590A    Hemorrhage of arteriovenous fistula, initial encounter ([facility]) T82.838A    Weakness R53.1    Pre-syncope R55    Hypotension due to hypovolemia I95.89, E86.1    Lactic acidemia E87.20    Elevated brain natriuretic peptide (BNP) level R79.89    Hyperkalemia E87.5    Failure to thrive in adult R62.7    Acute blood loss anemia (ABLA) D62    Intramuscular hematoma T14. 8XXA    Anticoagulated Z79.01       Isolation/Infection:   Isolation            No Isolation          Patient Infection Status       Infection Onset Added Last Indicated Last Indicated By Review Planned Expiration Resolved Resolved By    None active    Resolved    COVID-19 (Rule Out) 12/28/22 12/28/22 12/28/22 COVID-19, Rapid (Ordered)   12/28/22 Rule-Out Test Resulted    C-diff Rule Out 03/02/22 03/02/22 03/03/22 Clostridium Difficile Toxin/Antigen (Ordered)   03/03/22 Rule-Out Test Resulted    COVID-19 (Rule Out) 09/21/21 09/21/21 09/21/21 COVID-19, Rapid (Ordered)   09/21/21 Rule-Out Test Resulted    COVID-19 (Rule Out)  04/02/20 04/02/20 Chantel Stuart RN   04/06/20 Chantel Stuart RN    COVID-19 (Rule Out) 03/31/20 03/31/20 03/31/20 COVID-19 (Ordered)   03/31/20 Rule-Out Test Resulted            Nurse Assessment:  Last Vital Signs: /79   Pulse 79   Temp 98 °F (36.7 °C) (Oral)   Resp 18   Ht 5' 8\" (1.727 m)   Wt 112 lb 10.5 oz (51.1 kg)   SpO2 100%   BMI 17.13 kg/m²     Last documented pain score (0-10 scale): Pain Level: 0  Last Weight:   Wt Readings from Last 1 Encounters:   01/29/23 112 lb 10.5 oz (51.1 kg)     Mental Status:  oriented    IV Access:  - None    Nursing Mobility/ADLs:  Walking   Assisted  Transfer Assisted  Bathing  Assisted  Dressing  Assisted  Toileting  Assisted  Feeding  Assisted  Med Admin  Assisted  Med Delivery   whole    Wound Care Documentation and Therapy:        Elimination:  Continence: Bowel: Yes  Bladder: anuric  Urinary Catheter: None   Colostomy/Ileostomy/Ileal Conduit: No       Date of Last BM: 1/29/23    Intake/Output Summary (Last 24 hours) at 1/29/2023 0845  Last data filed at 1/29/2023 0447  Gross per 24 hour   Intake 480 ml   Output 0 ml   Net 480 ml     I/O last 3 completed shifts: In: 480 [P.O.:480]  Out: 0     Safety Concerns:     History of Falls (last 30 days) and At Risk for Falls    Impairments/Disabilities:      None    Nutrition Therapy:  Current Nutrition Therapy:   - Oral Diet:  General    Routes of Feeding: Oral  Liquids: No Restrictions  Daily Fluid Restriction: no  Last Modified Barium Swallow with Video (Video Swallowing Test): not done    Treatments at the Time of Hospital Discharge:   Respiratory Treatments:   Oxygen Therapy:  is on oxygen at 3 L/min per nasal cannula. Ventilator:    - No ventilator support    Rehab Therapies: Physical Therapy and Occupational Therapy  Weight Bearing Status/Restrictions: No weight bearing restrictions  Other Medical Equipment (for information only, NOT a DME order):     Other Treatments:     Patient's personal belongings (please select all that are sent with patient):  clothing    RN SIGNATURE:  Electronically signed by Antonio Hunter on 1/29/23 at 11:13 AM EST    CASE MANAGEMENT/SOCIAL WORK SECTION    Inpatient Status Date: 1/24/23    Readmission Risk Assessment Score:  Readmission Risk              Risk of Unplanned Readmission:  47           Discharging to Facility/ Agency   Name: [place] [address].  Phone: [phone]  Fax: [phone]    Dialysis Facility (if applicable)   Name:[place] [id number].     / signature: Electronically signed by Diana Gold RN on 1/29/23 at 9:33 AM EST    PHYSICIAN SECTION    Prognosis: Fair    Condition at Discharge: Stable    Rehab Potential (if transferring to Rehab): Fair    Recommended Labs or Other Treatments After Discharge: cbc in week    Physician Certification: I certify the above information and transfer of Elfego Adames  is necessary for the continuing treatment of the diagnosis listed and that he requires [facility] for greater 30 days.      Update Admission H&P: No change in H&P    PHYSICIAN SIGNATURE:  Electronically signed by Joselo Young MD on 1/29/23 at 8:45 AM EST

## 2023-01-29 NOTE — PROGRESS NOTES
General Surgery   Daily Progress Note  Patient: Kalpana Monahan    CC: Left gluteal hematoma status post fall on Eliquis    SUBJECTIVE:  Afebrile, HDS. No acute events overnight. Denies pain. ROS: A 14 point review of systems was conducted, significant findings as noted above. All other systems negative. OBJECTIVE:   Infusions:   sodium chloride      sodium chloride      sodium chloride        I/O:I/O last 3 completed shifts: In: 480 [P.O.:480]  Out: 0            Wt Readings from Last 1 Encounters:   01/29/23 112 lb 10.5 oz (51.1 kg)       Exam:  /79   Pulse 79   Temp 98 °F (36.7 °C) (Oral)   Resp 18   Ht 5' 8\" (1.727 m)   Wt 112 lb 10.5 oz (51.1 kg)   SpO2 100%   BMI 17.13 kg/m²     General appearance: no acute distress  Eyes: PERRL, no scleral icterus  Neck: trachea midline, no JVD  Chest/Lungs: symmetrical chest rise, normal effort  Cardiovascular: RRR  Abdomen: soft, non-tender, non-distended, no guarding/rigidity  Skin: warm and dry, no rashes  Extremities: no edema, no cyanosis, left gluteal tenderness, intact strength and sensation to bilateral lower extremities, palpable DP, PT, and fem pulses to left lower extremity. Compartments of left lower extremity are all soft along with left buttock. Neuro: A&Ox3, no focal deficits, sensation intact            LABS:   Recent Labs     01/28/23  0504 01/29/23  0402   WBC 5.3 5.8   HGB 9.2* 9.4*   HCT 29.2* 29.2*   MCV 91.9 90.3   * 100*          Recent Labs     01/27/23  0452 01/28/23  0504 01/29/23  0402   * 138 134*   K 4.2 4.4 4.0   CL 97* 98* 95*   CO2 27 27 28   PHOS 2.8 3.0  --    BUN 25* 31* 18   CREATININE 7.6* 9.0* 6.9*          No results for input(s): AST, ALT, ALB, BILIDIR, BILITOT, ALKPHOS in the last 72 hours. No results for input(s): LIPASE, AMYLASE in the last 72 hours. No results for input(s): PROT, INR, APTT in the last 72 hours.        Recent Labs     01/28/23  2112 01/29/23  0402   TROPONINI 0.10* 0.10* ASSESSMENT/PLAN:   This is a 74 y.o. male with Hx as delineated above who presents after ground-level fall onto left buttock at which time a CT scan found a 9.5 x 4.6 cm not fully imaged left gluteal hematoma. The patient only had an abdominal CT scan which did not fully measure the left gluteal hematoma. Patient's hemoglobin was 7.3 in the emergency department at which time he received 1 unit of blood with response to 8.3. The patient has low hemoglobin at baseline with baseline being around 7. There is no acute need for surgical intervention at this time. - Hgb stable. 9.4 this morning from 9.2  - Okay to resume Eliquis   - Place binder around pelvis for compression of hematoma  - Transfusions per primary team  - If patient has drop in hgb, will need repeat imaging with CTA to evaluate hematoma fully for possible extravasation  - If active extravasation found patient will likely need interventional radiology for further intervention  - Further care per primary team    Aline Wilkes MD  PGY1, General Surgery  01/29/23  8:51 AM  [facility]#[phone]      ATTENDING ATTESTATION    Patient independently examined. Management discussed and I agree with resident/midlevel provider documentation. CC: Left gluteal hematoma on anticoagulation    No pain. No acute issues overnight. Vitals:    01/29/23 0755   BP: 135/79   Pulse: 79   Resp: 18   Temp: 98 °F (36.7 °C)   SpO2: 100%     Wound stable, see resident description  Labs reviewed, incl CBC, BMP    Left gluteal hematoma  Continue with supportive care. Okay to resume anticoagulation from our perspective.     We will continue to follow    Tanya Bangura MD

## 2023-01-29 NOTE — PROGRESS NOTES
Nephrology Progress Note  [phone]   [facility] [place]        Reason for Consult:  ESKD    HISTORY OF PRESENT ILLNESS:                This is a patient with significant past medical history of ESKD on HD 4 x per week at [facility], HTN, h/o multiple falls, CVA, CHF, COPD, h/o DVT on [facility]   who presented ON multiple falls-4-5/day-fell on left buttock one day prior, CT showed left gluteal hematoma, seen by surgery. C/o abdominal pain, denies N/V/. Had HD yesterday. Lytes are stable. Adm hgb 8.2, dropped to 7.0    Subjective:  -pt seen and examined  -PMSHx and meds reviewed  -family at bedside    No acute events ON  -feels tired    ROS: feels weak/denies chest pain at this time    PHYSICAL EXAM:    Vitals:    /70   Pulse 74   Temp 98.1 °F (36.7 °C) (Oral)   Resp 18   Ht 5' 8\" (1.727 m)   Wt 112 lb 10.5 oz (51.1 kg)   SpO2 100%   BMI 17.13 kg/m²   I/O last 3 completed shifts: In: 480 [P.O.:480]  Out: 0   I/O this shift:  In: 150 [P.O.:150]  Out: -     Physical Exam:  Gen: Resting in bed, NAD. Ill appearing  HEENT: anicteric, NC/AT  CV: RRR, +S1/S2  Lungs: Good respiratory effort, clear air entry   Abd: soft, ND, NT  Ext: trace edema, no cyanosis  Skin: Warm. No rashes appreciated. Neuro:no focal weakness  Joints: No erythema noted over joints.   Access: left UA AVG + B    DATA:    CBC:   Lab Results   Component Value Date/Time    WBC 5.8 01/29/2023 04:02 AM    RBC 3.23 01/29/2023 04:02 AM    HGB 9.4 01/29/2023 04:02 AM    HCT 29.2 01/29/2023 04:02 AM    MCV 90.3 01/29/2023 04:02 AM    MCH 29.0 01/29/2023 04:02 AM    MCHC 32.1 01/29/2023 04:02 AM    RDW 17.2 01/29/2023 04:02 AM     01/29/2023 04:02 AM    MPV 7.5 01/29/2023 04:02 AM     BMP:    Lab Results   Component Value Date/Time     01/29/2023 04:02 AM    K 4.0 01/29/2023 04:02 AM    K 5.2 01/26/2023 04:52 AM    CL 95 01/29/2023 04:02 AM    CO2 28 01/29/2023 04:02 AM    BUN 18 01/29/2023 04:02 AM    LABALBU 3.1 01/28/2023 05:04 AM    CREATININE 6.9 01/29/2023 04:02 AM    CALCIUM 8.6 01/29/2023 04:02 AM    GFRAA 7 10/14/2022 05:45 AM    LABGLOM 8 01/29/2023 04:02 AM    GLUCOSE 106 01/29/2023 04:02 AM       IMPRESSION/RECOMMENDATIONS:      ESKD: followed by  Nephrology, HD 4 x per week, last HD yesterday  -had HD today- terminated after 2 hours due to chest pain  -post weight 54.1kg, TW TBD-UF 1.8L  Plan:  -continue TTS  -will need TW adjusted given weight loss  -next HD on Tuesday    Chest pain:  -while on HD 1/28/23  -Per cardiology    Hyperkalemia:   -modulate with HD  -K is 4.0 today    Recent multiple falls: complicated by left gluteal hematoma  -Eliquis on hold-  -poor candidate for long term AC  -per primary    Anemia: acute on chronic due to bleeding-left gluteal hematoma, recent admission for GIB s/p EGD, colonoscopy  -seen by GI-continue PPI  -s/p PRBC  -hgb stable today  -continue epogen  -transfusion threshold per primary    H/o DVT: on AC-with recent multiple falls, ? 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 I will continue to follow along. Please call with questions.     Momo Garduno MD

## 2023-01-29 NOTE — PROGRESS NOTES
Attempted to call report to [address] [place] [address].  answered and transferred me to nurse, but no answer. Left call back number for nurse.

## 2023-01-29 NOTE — PLAN OF CARE
Problem: Safety - Adult  Goal: Free from fall injury  1/29/2023 1556 by Earl Sanchez RN  Outcome: Progressing  Flowsheets (Taken 1/29/2023 1556)  Free From Fall Injury:   Johnathon Vaughan family/caregiver on patient safety   Based on caregiver fall risk screen, instruct family/caregiver to ask for assistance with transferring infant if caregiver noted to have fall risk factors     Problem: ABCDS Injury Assessment  Goal: Absence of physical injury  1/29/2023 1556 by Earl Sanchez RN  Outcome: Progressing  Flowsheets (Taken 1/29/2023 1556)  Absence of Physical Injury: Implement safety measures based on patient assessment     Problem: Hematologic - Adult  Goal: Maintains hematologic stability  1/29/2023 1556 by Earl Sanchez RN  Flowsheets (Taken 1/29/2023 1556)  Maintains hematologic stability:   Administer blood products/factors as ordered   Assess for signs and symptoms of bleeding or hemorrhage     Problem: Discharge Planning  Goal: Discharge to home or other facility with appropriate resources  1/29/2023 1556 by Earl Sanchez RN  Outcome: Progressing  Flowsheets (Taken 1/29/2023 1556)  Discharge to home or other facility with appropriate resources:   Identify barriers to discharge with patient and caregiver   Arrange for needed discharge resources and transportation as appropriate   Identify discharge learning needs (meds, wound care, etc)

## 2023-01-29 NOTE — PROGRESS NOTES
[facility]   Cardiology Progress Note     Admit Date: 2023     Reason for follow up: Chest pain    HPI and Interval History:   Patient seen and examined. Clinical notes reviewed. Telemetry reviewed. No new complaint today. No major events overnight. Denies having chest pain, shortness of breath, dyspnea on exertion, Orthopnea, PND at the time of this visit. Review of System:  All other systems reviewed except for that noted above. Pertinent negatives and positives are:     General: negative for fever, chills   Ophthalmic ROS: negative for - eye pain or loss of vision  ENT ROS: negative for - headaches, sore throat   Respiratory: negative for - cough, sputum  Cardiovascular: Reviewed in HPI  Gastrointestinal: negative for - abdominal pain, diarrhea, N/V  Hematology: negative for - bleeding, blood clots, bruising or jaundice  Genito-Urinary:  negative for - Dysuria or incontinence  Musculoskeletal: negative for - Joint swelling, muscle pain  Neurological: negative for - confusion, dizziness, headaches   Psychiatric: No anxiety, no depression. Dermatological: negative for - rash      Physical Examination:  Vitals:    23 1209   BP: 127/70   Pulse: 74   Resp: 18   Temp: 98.1 °F (36.7 °C)   SpO2: 100%        Intake/Output Summary (Last 24 hours) at 2023 1348  Last data filed at 2023 1030  Gross per 24 hour   Intake 630 ml   Output 0 ml   Net 630 ml     In: 630 [P.O.:630]  Out: 0    Wt Readings from Last 3 Encounters:   23 112 lb 10.5 oz (51.1 kg)   23 121 lb 0.5 oz (54.9 kg)   23 158 lb (71.7 kg)     Temp  Av.2 °F (36.8 °C)  Min: 98 °F (36.7 °C)  Max: 98.5 °F (36.9 °C)  Pulse  Av.7  Min: 71  Max: 86  BP  Min: 127/70  Max: 154/81  SpO2  Av %  Min: 100 %  Max: 100 %    Telemetry: Sinus rhythm   Constitutional: Alert. Oriented to person, place, and time. No distress. Head: Normocephalic and atraumatic.    Mouth/Throat: Lips appear moist. Oropharynx is clear and moist.  Eyes: Conjunctivae normal. EOM are normal.   Neck: Neck supple. No lymphadenopathy. No rigidity. No JVD present. Cardiovascular: Normal rate, regular rhythm. Normal S1&S2. Carotid pulse 2+ bilaterally. Pulmonary/Chest: Bilateral respiratory sounds present. No respiratory accessory muscle use. No wheezes, No rhonchi. Abdominal: Soft. Normal bowel sounds present. No distension, No tenderness. No splenomegaly. No hernia. Musculoskeletal: No tenderness. No edema    Lymphadenopathy: Has no cervical adenopathy. Neurological: Alert and oriented. Cranial nerve II-XII grossly intact, No gross deficit to touch. Skin: Skin is warm and dry. No rash, lesions, ulcerations noted. Psychiatric: No anxiety nor agitation. Labs, diagnostic and imaging results reviewed. Reviewed. Recent Labs     01/27/23 0452 01/28/23  0504 01/29/23  0402   * 138 134*   K 4.2 4.4 4.0   CL 97* 98* 95*   CO2 27 27 28   PHOS 2.8 3.0  --    BUN 25* 31* 18   CREATININE 7.6* 9.0* 6.9*     Recent Labs     01/27/23 0452 01/28/23  0504 01/29/23  0402   WBC 4.9 5.3 5.8   HGB 9.0* 9.2* 9.4*   HCT 28.5* 29.2* 29.2*   MCV 89.4 91.9 90.3   PLT 92* 102* 100*     Lab Results   Component Value Date/Time    TROPONINI 0.10 01/29/2023 04:02 AM     Estimated Creatinine Clearance: 8 mL/min (A) (based on SCr of 6.9 mg/dL [facility])).    No results found for: BNP  Lab Results   Component Value Date/Time    PROTIME 16.1 01/16/2023 04:03 PM    PROTIME 16.0 11/15/2022 09:30 AM    PROTIME 17.0 11/12/2022 05:45 AM    INR 1.29 01/16/2023 04:03 PM    INR 1.29 11/15/2022 09:30 AM    INR 1.39 11/12/2022 05:45 AM     Lab Results   Component Value Date/Time    CHOL 77 09/14/2022 07:00 AM    HDL 38 09/14/2022 07:00 AM    TRIG 45 09/14/2022 07:00 AM       Scheduled Meds:   pantoprazole  40 mg Oral BID    epoetin zachary-epbx  10,000 Units IntraVENous Once per day on Mon Wed Fri    sodium chloride flush  10 mL IntraVENous 2 times per day atorvastatin  80 mg Oral Nightly    carvedilol  3.125 mg Oral BID    allopurinol  100 mg Oral Once per day on Mon Wed Fri    calcitRIOL  0.5 mcg Oral Once per day on Mon Wed Fri    levothyroxine  75 mcg Oral QAM AC    sevelamer  800 mg Oral TID WC     Continuous Infusions:   sodium chloride      sodium chloride      sodium chloride       PRN Meds:sodium chloride, sodium chloride, sodium chloride flush, sodium chloride, promethazine **OR** ondansetron, acetaminophen **OR** acetaminophen, sodium chloride, ipratropium-albuterol     Patient Active Problem List    Diagnosis Date Noted    Anticoagulated 01/28/2023    Intramuscular hematoma 01/25/2023    Acute blood loss anemia (ABLA) 01/24/2023    Hyperkalemia 01/14/2023    Failure to thrive in adult 01/14/2023    Weakness 12/28/2022    Pre-syncope 12/28/2022    Hypotension due to hypovolemia 12/28/2022    Lactic acidemia 12/28/2022    Elevated brain natriuretic peptide (BNP) level 12/28/2022    Hemorrhage of arteriovenous fistula, initial encounter ([facility] 10/12/2022    Dialysis AV fistula malfunction, initial encounter ([facility] 10/07/2022    Chronic respiratory failure with hypoxia and hypercapnia ([facility] 10/06/2022    Acute cerebrovascular accident (CVA) ([facility] 09/13/2022    Problem with dialysis access ([facility] 07/07/2022    History of venous thromboembolism 06/17/2022    Epistaxis 06/17/2022    Pancytopenia ([facility] 06/17/2022    AV fistula thrombosis, initial encounter ([facility] 06/15/2022    Hypokalemia 03/02/2022    V-tach 03/02/2022    Essential hypertension 03/02/2022    Hyperlipidemia 03/02/2022    Chronic combined systolic and diastolic CHF (congestive heart failure) ([facility] 03/02/2022    SOB (shortness of breath)     Chest pain 03/01/2022    Acute on chronic respiratory failure with hypoxia and hypercapnia ([facility] 04/06/2021    COPD (chronic obstructive pulmonary disease) ([facility] 09/10/2020    HCAP (healthcare-associated pneumonia) 03/31/2020    COPD exacerbation ([facility] 03/31/2020 ESRD on dialysis [facility]) 03/31/2020      Active Hospital Problems    Diagnosis Date Noted    Anticoagulated [Z79.01] 01/28/2023     Priority: Medium    Intramuscular hematoma [T14. 8XXA] 01/25/2023     Priority: Medium    Acute blood loss anemia (ABLA) [D62] 01/24/2023     Priority: Medium       Assessment and Plan:     Acute chest pain -noncardiac; resolved  ESRD on hemodialysis  Hypertension  Hyperlipidemia  Chronic systolic congestive heart failure, LV ejection fraction 40%     Recommendations:  Chest pain lasted for 20 minutes without significant changes in EKG; troponin remained flat in spite of having chest pain for 20 minutes  Secondary to recent acute blood loss anemia, due to traumatic left gluteal hematoma, not a candidate for anticoagulation  Telemetry showed normal sinus rhythm and few PVCs  Nuclear perfusion scan was within normal limits, approximately 9 months ago    Okay to discharge from cardiology standpoint, on his current medications. Thank you for allowing me to participate in the care of this patient. If you have any questions, please do not hesitate to contact me. Perla Joyner MD   Cardiac Electrophysiology  [address] [place] [address]  Office [phone]    For any EP related issues after 5 PM, contact [facility] on call cardiology through .

## 2023-01-29 NOTE — CARE COORDINATION
Case Management Assessment            Discharge Note                    Date / Time of Note: 1/29/2023 9:26 AM                  Discharge Note Completed by: Ana Maria Lyons RN    Patient Name: Adilson Miller   YOB: 1955  Diagnosis: Anticoagulated [Z79.01]  Chronic anemia [D64.9]  Intramuscular hematoma [T14. 8XXA]  Acute blood loss anemia (ABLA) [D62]   Date / Time: 1/23/2023 10:17 PM    Current PCP: Lucy Rosas MD  Clinic patient: No    Hospitalization in the last 30 days: Yes    Advance Directives:  Code Status: Full Code  [place] DNR form completed and on chart: No    Financial:  Payor: Lamont Randolph I-SNP / Plan: [place] I-SNP / Product Type: *No Product type* /      Pharmacy:    [address] [place] [address] [phone] - F [phone]  [address] [facility]  Phone: [phone] Fax: [phone]    [facility] [id number] [facility] [address] [facility] E [phone] - F [phone]  [facility] [address] [address]  Phone: [phone] Fax: [phone]    [place] - [address] [facility] [phone] - F [phone]  [address] [facility] [address]  Phone: [phone] Fax: [phone]      Assistance purchasing medications?: Potential Assistance Purchasing Medications: No  Assistance provided by Case Management: None at this time    Does patient want to participate in local refill/ meds to beds program?: No    Meds To Beds General Rules:  1. Can ONLY be done Monday- Friday between 8:30am-5pm  2. Prescription(s) must be in pharmacy by 3pm to be filled same day  3. Copy of patient's insurance/ prescription drug card and patient face sheet must be sent along with the prescription(s)  4. Cost of Rx cannot be added to hospital bill. If financial assistance is needed, please contact unit  or ;   or  CANNOT provide pharmacy voucher for patients co-pays  5. Patients can then  the prescription on their way out of the hospital at discharge, or pharmacy can deliver to the bedside if staff is available. (payment due at time of pick-up or delivery - cash, check, or card accepted)     Able to afford home medications/ co-pay costs: Yes    ADLS:  Current PT AM-PAC Score: 14 /24  Current OT AM-PAC Score: 18 /24      DISCHARGE Disposition: [facility] (LTC): [facility]   Phone: [phone]  Fax: [phone]    LOC at discharge: Thierno Mckenna Completed: Yes    Notification completed in HENS/PAS?:  Not Applicable    IMM Completed:   Yes, Case management has presented and reviewed IMM letter #2 to the patient and/or family/ POA. Patient and/or family/POA verbalized understanding of their medicare rights and appeal process if needed. Patient and/or family/POA has signed, initialed and placed today's date (01/29/23) and time (11:15am) on IMM letter #2 on the the appropriate lines. Patient and/or family/POA, copy of letter offered and they are aware that this original copy of IMM letter #2 is available prior to discharge from the paper chart on the unit. Electronic documentation has been entered into epic for IMM letter #2 and original paper copy has been added to the paper chart at the nurses station.      Transportation:  Transportation PLAN for discharge: EMS transportation   Mode of Transport: Ambulance stretcher - BLS  Reason for medical transport: Other: confusion   Name of [address]: [address] [facility] ambulance  Time of Transport: 15:30pm    Transport form completed: Yes  Dialysis:  Dialysis patient: Yes    Dialysis Center:  [place] [address] [address]  Location: Not Applicable  Agency: Not Applicable    Consents signed: Not Indicated    Referrals made at [facility] for outpatient continued care:  Not Applicable    Additional CM Notes: Pt to discharge back to [facility] at 3:30pm via [facility] ambulance, Nurse and facility notified. Message left for daughter regarding discharge time. The Plan for Transition of Care is related to the following treatment goals of Anticoagulated [Z79.01]  Chronic anemia [D64.9]  Intramuscular hematoma [T14. 8XXA]  Acute blood loss anemia (ABLA) [D62]    The Patient and/or patient representative Joe Martin and his family were provided with a choice of provider and agrees with the discharge plan Yes    Freedom of choice list was provided with basic dialogue that supports the patient's individualized plan of care/goals and shares the quality data associated with the providers.  Yes    Care Transitions patient: No    Estrella Carrion RN  [facility] [employer]  Case Management Department  Ph: [phone]  Fax: [phone]

## 2023-01-29 NOTE — DISCHARGE SUMMARY
Hospital Discharge Summary    Patient's PCP: Gui Luque MD  Admit Date: 1/23/2023   Discharge Date: 1/29/2023    Admitting Physician: Dr. Alem Clement DO  Discharge Physician: Dr. Angelo Parisi MD   Consults: GI and general surgery    Brief HPI:   74 y.o. male nursing home resident with significant past medical history of end-stage renal disease on hemodialysis on chronic anticoagulation for DVTs, combined systolic and diastolic heart failure, chronic hypoxic and hypercapnic respiratory failure secondary to COPD (2 L continuous) who presented to [facility] ED with falls. Dayna Colón He was recently admitted to [facility] in [place]. with symptomatic anemia, hemoglobin 6.2..  Was transfused 1 unit of packed red blood cells. Dayna Colón FOBT was positive but no overt bleeding, he underwent EGD and colonoscopy 1/17/23  At the time of discharge, his hemoglobin was 7.1  Patient reportedly had multiple falls-4-5 times over the last 2 days. Darren Saravia he fell on his left buttock and developed pain. .  Imaging in the ED revealed a left gluteal hematoma. .  H&H was close to his baseline    Brief hospital course:     - left thigh hematoma dt fall and anticoagulation--Eliquis dc'd on admission-general surgery consult appreciated. .  Monitored H&H and transfused as needed for hemoglobin less than 7-cleared to resume Eliquis at time of discharge per general surgery     -Frequent falls--maintain fall precautions, PT and OT to continue at LTC     -acute on Chronic anemia due to CKD, possible occult GI bleed, left thigh hematoma. .. Hemoglobin 6.2-6.7 on recent admission, baseline 8-9--- no overt bleeding but FOBT is +ve. .-   S/p 1 unit PRBC tranfusion 1/15 improved to 7.1 at dc--Eliquis was resumed 3 days post d/c per GI  Continue TEODORA,IV iron per nephrology  S/p EGD and colonoscopy 1/17/23  Small duodenal ulcers, erosive gastritis, hiatal hernia, 4 mm colonic polyp. .  No evidence of bleeding  Continue PPI    Hb down to 6.6-6.9 1/26--transfused 1 unit of PRBC at dialysis--improved to greater than 8    FOBT 1/25 was positive without gross bleeding---GI was consulted 1/26--no further intervention  H&H remained stable this admission following transfusion with no further episodes of bleeding--Eliquis resumed at discharge     -Chronic anemia and thrombocytopenia-bone marrow biopsy 11/15/22 was normal-leukopenia recovered-continue to monitor and transfuse for hemoglobin less than 7, platelets less than 50 given anticoagulation--reviewed prior oncology consults, no etiology was established as work-up was negative-continue to monitor     -ESRD on hemodialysis-4x/week-Mon/Tues/Thurs/Fri   follow-up with nephrology        -Chronic combined systolic/diastolic heart failure/NICM, EF 35 to 61%, grade 3 diastolic dysfunction-compensated continue Coreg-not on ACE/ARB given renal failure/hyperkalemia--follow-up with cardiology outpatient     -Hypothyroidism-recent TSH was suppressed below therapeutic level and decreased dose of Synthroid from 100-75mcg--repeat TSH in 6 weeks     -Hyperlipidemia-continue statin     -COPD with chronic respiratory failure with hypoxia-on 3 L nasal cannula-continue supplemental oxygen and bronchodilators and bronchodilators     -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 1/28. No further work-up from the cardiology    Invasive procedures:  See above    Discharge Diagnoses:   Principal Problem:    Acute blood loss anemia (ABLA)  Active Problems:    Intramuscular hematoma    Anticoagulated  Resolved Problems:    * No resolved hospital problems. *      Physical Exam: /79   Pulse 79   Temp 98 °F (36.7 °C) (Oral)   Resp 18   Ht 5' 8\" (1.727 m)   Wt 112 lb 10.5 oz (51.1 kg)   SpO2 100%   BMI 17.13 kg/m²   Gen/overall appearance: Not in acute distress. Alert.   Head: Normocephalic, atraumatic  Eyes: EOMI, good acuity  ENT:- Oral mucosa moist  Neck: No JVD, thyromegaly  CVS: Nml S1S2, no MRG, RRR  Pulm: Clear bilaterally. No crackles/wheezes  Gastrointestinal: Soft, NT/ND, +BS  Musculoskeletal: No edema. Warm  Neuro: No focal deficit. Moves extremity spontaneously. Psychiatry: Appropriate affect. Not agitated. Skin: Warm, dry with normal turgor. No rash        Significant Diagnostic Studies:    See above        Treatments: As above. Discharge Medications:     Medication List        CHANGE how you take these medications      Eliquis 2.5 MG Tabs tablet  Generic drug: apixaban  What changed: Another medication with the same name was removed. Continue taking this medication, and follow the directions you see here. CONTINUE taking these medications      albuterol sulfate  (90 Base) MCG/ACT inhaler  Commonly known as: PROVENTIL;VENTOLIN;PROAIR     allopurinol 100 MG tablet  Commonly known as: ZYLOPRIM     atorvastatin 80 MG tablet  Commonly known as: LIPITOR     calcitRIOL 0.5 MCG capsule  Commonly known as: ROCALTROL     calcium carb-cholecalciferol 600-200 MG-UNIT Tabs tablet     calcium carbonate 500 MG chewable tablet  Commonly known as: TUMS     carvedilol 3.125 MG tablet  Commonly known as: COREG  Take 1 tablet by mouth 2 times daily     ferrous sulfate 325 (65 Fe) MG tablet  Commonly known as: IRON 325     gabapentin 100 MG capsule  Commonly known as: NEURONTIN     levothyroxine 75 MCG tablet  Commonly known as: SYNTHROID  Take 1 tablet by mouth Daily     pantoprazole 40 MG tablet  Commonly known as: PROTONIX  Take 1 tablet by mouth every morning (before breakfast)     sevelamer 800 MG tablet  Commonly known as: RENVELA     sodium chloride 0.65 % nasal spray  Commonly known as: OCEAN  1-2 sprays both nostrils at least daily and as needed to prevent dry mucosa. Activity: activity as tolerated  Diet: ADULT DIET; Regular; 3 carb choices (45 gm/meal); Low Fat/Low Chol/High Fiber/2 gm Na;  Low Sodium (2 gm) Disposition: SNF  Discharged Condition: Stable  Follow Up:   Emily Campbell MD  [address] [facility] [place] [address]  [phone]    Go on 3/8/2023  Your hospital follow up is at 11:30am with Dr Kristi Hernández, cardiology, at the [facility]. (Scheduling options limited by HD 4x/week -- this was first available Wednesday). [facility] [address] [address]  [phone]            Code status:  Full Code         Total time spent on discharge, finalizing medications, referrals and arranging outpatient follow up was more than 45 minutes      Thank you Dr. Matthew Gay MD for the opportunity to be involved in this patients care.

## 2023-01-29 NOTE — PROGRESS NOTES
AVS reviewed with pt. IV and telemetry removed from pt. Pt transported back to [address] [place] [address] via [employer] Pt belongings at [facility].

## 2023-01-29 NOTE — PROGRESS NOTES
Nephrology Progress Note  [phone]   [facility] [employer] your[employer]        Reason for Consult:  ESKD    HISTORY OF PRESENT ILLNESS:                This is a patient with significant past medical history of ESKD on HD 4 x per week at [facility], HTN, h/o multiple falls, CVA, CHF, COPD, h/o DVT on [facility] [place], operated by [facility]   who presented ON multiple falls-4-5/day-fell on left buttock one day prior, CT showed left gluteal hematoma, seen by surgery. C/o abdominal pain, denies N/V/. Had HD yesterday. Lytes are stable. Adm hgb 8.2, dropped to 7.0    Subjective:  -pt seen and examined  -PMSHx and meds reviewed  -family at bedside    No acute events ON  -had HD today, tolerated poorly-had chest pain during HD - cardiology consulted  -tx terminated after 2 hours        ROS: feels weak/denies chest pain at this time    PHYSICAL EXAM:    Vitals:    /70   Pulse 74   Temp 98.1 °F (36.7 °C) (Oral)   Resp 18   Ht 5' 8\" (1.727 m)   Wt 112 lb 10.5 oz (51.1 kg)   SpO2 100%   BMI 17.13 kg/m²   I/O last 3 completed shifts: In: 480 [P.O.:480]  Out: 0   I/O this shift:  In: 150 [P.O.:150]  Out: -     Physical Exam:  Gen: Resting in bed, NAD. Ill appearing  HEENT: anicteric, NC/AT  CV: RRR, +S1/S2  Lungs: Good respiratory effort, clear air entry   Abd: soft, ND, NT  Ext: trace edema, no cyanosis  Skin: Warm. No rashes appreciated. Neuro:no focal weakness  Joints: No erythema noted over joints.   Access: left UA AVG + B    DATA:    CBC:   Lab Results   Component Value Date/Time    WBC 5.8 01/29/2023 04:02 AM    RBC 3.23 01/29/2023 04:02 AM    HGB 9.4 01/29/2023 04:02 AM    HCT 29.2 01/29/2023 04:02 AM    MCV 90.3 01/29/2023 04:02 AM    MCH 29.0 01/29/2023 04:02 AM    MCHC 32.1 01/29/2023 04:02 AM    RDW 17.2 01/29/2023 04:02 AM     01/29/2023 04:02 AM    MPV 7.5 01/29/2023 04:02 AM     BMP:    Lab Results   Component Value Date/Time     01/29/2023 04:02 AM    K 4.0 01/29/2023 04:02 AM    K 5.2 01/26/2023 04:52 AM    CL 95 01/29/2023 04:02 AM    CO2 28 01/29/2023 04:02 AM    BUN 18 01/29/2023 04:02 AM    LABALBU 3.1 01/28/2023 05:04 AM    CREATININE 6.9 01/29/2023 04:02 AM    CALCIUM 8.6 01/29/2023 04:02 AM    GFRAA 7 10/14/2022 05:45 AM    LABGLOM 8 01/29/2023 04:02 AM    GLUCOSE 106 01/29/2023 04:02 AM       IMPRESSION/RECOMMENDATIONS:      ESKD: followed by  Nephrology, HD 4 x per week, last HD yesterday  -had HD today- terminated after 2 hours due to chest pain  -post weight 54.1kg, TW TBD-UF 1.8L  -will need TW adjusted given weight loss  -next HD on Tuesday    Chest pain:  -while on HD  -plan per cardiology    Hyperkalemia:   -modulate with HD    Recent multiple falls: complicated by left gluteal hematoma  -Eliquis on hold-  -poor candidate for long term AC  -per primary    Anemia: acute on chronic due to bleeding-left gluteal hematoma, recent admission for GIB s/p EGD, colonoscopy  -seen by GI-continue PPI  -s/p PRBC  -hgb stable today  -continue epogen  -transfusion threshold per primary    H/o DVT: on AC-with recent multiple falls, ? 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 I will continue to follow along. Please call with questions.     Nadia Penny MD

## 2023-02-14 ENCOUNTER — HOSPITAL ENCOUNTER (EMERGENCY)
Age: 68
Discharge: HOME | End: 2023-02-15
Attending: EMERGENCY MEDICINE
Payer: MEDICARE

## 2023-02-14 DIAGNOSIS — T82.9XXA COMPLICATION OF ARTERIOVENOUS DIALYSIS FISTULA, INITIAL ENCOUNTER: Primary | ICD-10-CM

## 2023-02-14 LAB
ANION GAP SERPL CALCULATED.3IONS-SCNC: 10 MMOL/L (ref 3–16)
APTT: 40.2 SEC (ref 23–34.3)
BASOPHILS ABSOLUTE: 0.1 K/UL (ref 0–0.2)
BASOPHILS RELATIVE PERCENT: 2.6 %
BUN BLDV-MCNC: 33 MG/DL (ref 7–20)
CALCIUM SERPL-MCNC: 7.9 MG/DL (ref 8.3–10.6)
CHLORIDE BLD-SCNC: 95 MMOL/L (ref 99–110)
CO2: 27 MMOL/L (ref 21–32)
CREAT SERPL-MCNC: 9.7 MG/DL (ref 0.8–1.3)
EOSINOPHILS ABSOLUTE: 0.2 K/UL (ref 0–0.6)
EOSINOPHILS RELATIVE PERCENT: 7 %
GFR SERPL CREATININE-BSD FRML MDRD: 5 ML/MIN/{1.73_M2}
GLUCOSE BLD-MCNC: 89 MG/DL (ref 70–99)
HCT VFR BLD CALC: 28.2 % (ref 40.5–52.5)
HEMOGLOBIN: 8.8 G/DL (ref 13.5–17.5)
INR BLD: 1.37 (ref 0.87–1.14)
LYMPHOCYTES ABSOLUTE: 0.6 K/UL (ref 1–5.1)
LYMPHOCYTES RELATIVE PERCENT: 19.9 %
MCH RBC QN AUTO: 28.3 PG (ref 26–34)
MCHC RBC AUTO-ENTMCNC: 31.1 G/DL (ref 31–36)
MCV RBC AUTO: 90.9 FL (ref 80–100)
MONOCYTES ABSOLUTE: 0.3 K/UL (ref 0–1.3)
MONOCYTES RELATIVE PERCENT: 7.9 %
NEUTROPHILS ABSOLUTE: 2 K/UL (ref 1.7–7.7)
NEUTROPHILS RELATIVE PERCENT: 62.6 %
PDW BLD-RTO: 18 % (ref 12.4–15.4)
PLATELET # BLD: 122 K/UL (ref 135–450)
PMV BLD AUTO: 7 FL (ref 5–10.5)
POTASSIUM SERPL-SCNC: 3.5 MMOL/L (ref 3.5–5.1)
PROTHROMBIN TIME: 16.8 SEC (ref 11.7–14.5)
RBC # BLD: 3.11 M/UL (ref 4.2–5.9)
SODIUM BLD-SCNC: 132 MMOL/L (ref 136–145)
WBC # BLD: 3.3 K/UL (ref 4–11)

## 2023-02-14 PROCEDURE — 80048 BASIC METABOLIC PNL TOTAL CA: CPT

## 2023-02-14 PROCEDURE — 99284 EMERGENCY DEPT VISIT MOD MDM: CPT

## 2023-02-14 PROCEDURE — 85730 THROMBOPLASTIN TIME PARTIAL: CPT

## 2023-02-14 PROCEDURE — 86901 BLOOD TYPING SEROLOGIC RH(D): CPT

## 2023-02-14 PROCEDURE — 2580000003 HC RX 258: Performed by: EMERGENCY MEDICINE

## 2023-02-14 PROCEDURE — 85025 COMPLETE CBC W/AUTO DIFF WBC: CPT

## 2023-02-14 PROCEDURE — 85610 PROTHROMBIN TIME: CPT

## 2023-02-14 PROCEDURE — 86900 BLOOD TYPING SEROLOGIC ABO: CPT

## 2023-02-14 PROCEDURE — 86850 RBC ANTIBODY SCREEN: CPT

## 2023-02-14 RX ORDER — 0.9 % SODIUM CHLORIDE 0.9 %
250 INTRAVENOUS SOLUTION INTRAVENOUS ONCE
Status: COMPLETED | OUTPATIENT
Start: 2023-02-14 | End: 2023-02-15

## 2023-02-14 RX ADMIN — SODIUM CHLORIDE 250 ML: 900 INJECTION, SOLUTION INTRAVENOUS at 23:31

## 2023-02-14 ASSESSMENT — ENCOUNTER SYMPTOMS
EYES NEGATIVE: 1
GASTROINTESTINAL NEGATIVE: 1
RESPIRATORY NEGATIVE: 1

## 2023-02-15 VITALS
OXYGEN SATURATION: 99 % | RESPIRATION RATE: 18 BRPM | DIASTOLIC BLOOD PRESSURE: 82 MMHG | SYSTOLIC BLOOD PRESSURE: 129 MMHG | HEIGHT: 68 IN | TEMPERATURE: 97.5 F | BODY MASS INDEX: 20.22 KG/M2 | HEART RATE: 76 BPM | WEIGHT: 133.4 LBS

## 2023-02-15 LAB
ABO/RH: NORMAL
ANTIBODY SCREEN: NORMAL
BASOPHILS ABSOLUTE: 0.1 K/UL (ref 0–0.2)
BASOPHILS RELATIVE PERCENT: 1.5 %
EOSINOPHILS ABSOLUTE: 0.3 K/UL (ref 0–0.6)
EOSINOPHILS RELATIVE PERCENT: 8.1 %
HCT VFR BLD CALC: 26.5 % (ref 40.5–52.5)
HEMOGLOBIN: 8.2 G/DL (ref 13.5–17.5)
LYMPHOCYTES ABSOLUTE: 0.9 K/UL (ref 1–5.1)
LYMPHOCYTES RELATIVE PERCENT: 21.8 %
MCH RBC QN AUTO: 28.5 PG (ref 26–34)
MCHC RBC AUTO-ENTMCNC: 31 G/DL (ref 31–36)
MCV RBC AUTO: 91.8 FL (ref 80–100)
MONOCYTES ABSOLUTE: 0.3 K/UL (ref 0–1.3)
MONOCYTES RELATIVE PERCENT: 8.6 %
NEUTROPHILS ABSOLUTE: 2.4 K/UL (ref 1.7–7.7)
NEUTROPHILS RELATIVE PERCENT: 60 %
PDW BLD-RTO: 17.8 % (ref 12.4–15.4)
PLATELET # BLD: 114 K/UL (ref 135–450)
PMV BLD AUTO: 7.1 FL (ref 5–10.5)
RBC # BLD: 2.88 M/UL (ref 4.2–5.9)
WBC # BLD: 3.9 K/UL (ref 4–11)

## 2023-02-15 PROCEDURE — 36415 COLL VENOUS BLD VENIPUNCTURE: CPT

## 2023-02-15 PROCEDURE — 85025 COMPLETE CBC W/AUTO DIFF WBC: CPT

## 2023-02-15 NOTE — CONSULTS
Resident Consult Note  Vascular Surgery       Reason for Consult: Bleeding LUE brachiobasilic AV graft    History of Present Illness:   Corey Arroyo is a 74year old male with history of pulmonary embolism and pulmonary HTN on Eliquis, CVA, CHF, ESRD on HD via LUE AV graft, HLD, and HTN who presents to the ED for a bleeding from his AV graft site following HD session today. Patient reports that he last took his Eliquis this AM. He denies any lightheadedness, dizziness or palpitations. Of note, he was admitted in October for the same complaint, at which time a fistulagram revealed no significant stenosis of the venous outflow or central venous stenosis to account for the patient's prolonged bleeding post-dialysis. He subsequently underwent vascular duplex of the graft, again revealing no stenosis to account for his numerous presentations for prolonged bleeding. Past Medical History:        Diagnosis Date    Cerebral artery occlusion with cerebral infarction ([facility] [address]     Combined systolic and diastolic heart failure (HCC)     COPD (chronic obstructive pulmonary disease) (HCC)     Gout     Hemodialysis patient ([facility] [address]     Hx of blood clots     Hyperlipidemia     Hypertension      Past Surgical History:        Procedure Laterality Date    COLONOSCOPY      COLONOSCOPY N/A 1/17/2023    COLONOSCOPY DIAGNOSTIC performed by Charlette Gottron, MD at [address] [facility]  11/15/2022    CT BONE MARROW BIOPSY 11/15/2022 [facility] CT SCAN    DIALYSIS FISTULA CREATION Left     UPPER GASTROINTESTINAL ENDOSCOPY N/A 1/17/2023    EGD BIOPSY performed by Charlette Gottron, MD at [facility] ENDOSCOPY     Allergies:  Nitroglycerin and Oxymetazoline    Medications:   Home Meds  No current facility-administered medications on file prior to encounter.      Current Outpatient Medications on File Prior to Encounter   Medication Sig Dispense Refill    apixaban (ELIQUIS) 2.5 MG TABS tablet Take 1 tablet by mouth 2 times daily 180 tablet 1    gabapentin (NEURONTIN) 100 MG capsule Take 100 mg by mouth nightly. ferrous sulfate (IRON 325) 325 (65 Fe) MG tablet Take 325 mg by mouth every other day      pantoprazole (PROTONIX) 40 MG tablet Take 1 tablet by mouth every morning (before breakfast) 30 tablet 3    levothyroxine (SYNTHROID) 75 MCG tablet Take 1 tablet by mouth Daily 30 tablet 3    calcium carbonate (TUMS) 500 MG chewable tablet Take 1 tablet by mouth every 8 hours as needed for Heartburn      carvedilol (COREG) 3.125 MG tablet Take 1 tablet by mouth 2 times daily 60 tablet 3    sodium chloride (OCEAN) 0.65 % nasal spray 1-2 sprays both nostrils at least daily and as needed to prevent dry mucosa. 1 each 3    calcitRIOL (ROCALTROL) 0.5 MCG capsule Take 0.5 mcg by mouth three times a week On M/W/F      sevelamer (RENVELA) 800 MG tablet Take 1 tablet by mouth 3 times daily (with meals)      atorvastatin (LIPITOR) 80 MG tablet Take 80 mg by mouth nightly      calcium carb-cholecalciferol 600-200 MG-UNIT TABS tablet Take 1 tablet by mouth daily      albuterol sulfate HFA (PROVENTIL;VENTOLIN;PROAIR) 108 (90 Base) MCG/ACT inhaler Inhale 2 puffs into the lungs every 6 hours as needed for Wheezing 90mcg/actuation      allopurinol (ZYLOPRIM) 100 MG tablet Take 100 mg by mouth three times a week On M/W/F       Current Meds  0.9 % sodium chloride bolus, Once      Family History:   Family History   Problem Relation Age of Onset    Cancer Mother     Cancer Father     Other Sister      Social History:   TOBACCO:   reports that he has quit smoking. He has never used smokeless tobacco.  ETOH:   reports that he does not currently use alcohol. DRUGS:   reports no history of drug use. Review of Systems:   A 14 point review of systems was conducted, significant findings as noted in HPI. All other systems negative.     Physical Exam:    Vitals:    02/14/23 2231 02/14/23 2236 02/14/23 2334   BP:  (!) 78/50 (!) 151/81   Pulse: 81  76   Resp: 18  18 Temp: 97.5 °F (36.4 °C)     TempSrc: Oral     SpO2: 100%  100%   Weight: 133 lb 6.4 oz (60.5 kg)     Height: 5' 8\" (1.727 m)       General appearance: alert; no acute distress  HEENT: Normocephalic/atraumatic; PERRL; no scleral icterus; trachea midline; no JVD; no lymphadenopathy  Chest/Lungs: Normal effort; breathing room air, normal effort  Cardiovascular: Regular rate and rhythm  Abdomen: Non-distended; soft; non-tender; normoactive bowel sounds  Skin: warm and dry; no exanthems  Extremities: No edema, no cyanosis. LUE forearm with palpable thrill, pulsatile bleeding from puncture site without ulceration of skin. Distal pulse intact. Neuro: A&Ox3; no focal deficits; sensation intact    Laboratory Results:    CBC:   Recent Labs     02/14/23 2328   WBC 3.3*   HGB 8.8*   HCT 28.2*   MCV 90.9   *     BMP:   Recent Labs     02/14/23 2328   *   K 3.5   CL 95*   CO2 27   BUN 33*   CREATININE 9.7*     PT/INR:   Recent Labs     02/14/23 2328   PROTIME 16.8*   INR 1.37*     APTT:   Recent Labs     02/14/23 2328   APTT 40.2*     Liver Profile:  Lab Results   Component Value Date/Time    AST 10 01/26/2023 04:52 AM    ALT <5 01/26/2023 04:52 AM    BILIDIR <0.2 12/28/2022 12:17 AM    BILITOT 0.5 01/26/2023 04:52 AM    ALKPHOS 117 01/26/2023 04:52 AM     Lab Results   Component Value Date/Time    CHOL 77 09/14/2022 07:00 AM    HDL 38 09/14/2022 07:00 AM    TRIG 45 09/14/2022 07:00 AM     UA: No results found for: NITRITE, COLORU, PHUR, LABCAST, WBCUA, RBCUA, MUCUS, TRICHOMONAS, YEAST, BACTERIA, CLARITYU, SPECGRAV, LEUKOCYTESUR, UROBILINOGEN, BILIRUBINUR, BLOODU, GLUCOSEU, AMORPHOUS    Imaging:   No orders to display     Assessment/Plan:  Macrina Pierre is a 74year old male with history of pulmonary embolism and pulmonary HTN on Eliquis, CVA, CHF, ESRD on HD via LUE AV graft, HLD, and HTN who presents to the ED for a bleeding from his AV graft site following HD session today.  Patient reports that he last took his Eliquis this AM. He denies any lightheadedness, dizziness or palpitations. His graft was evaluated by graftogram as well as duplex approximately 4mo ago, both revealing no venous outflow or central stenosis to explain his numerous episodes of prolonged bleeding post-cannulation.     - Direct pressure and topical hemostatic agents attempted without success; accordingly, a figure-of-eight suture of 5-0 Prolene was placed with resolution of bleeding and no subsequent hematoma formation. Good thrill of graft as well as distal pulse remain following hemostasis.    - Patient may follow-up with his original surgeon at [facility] or may establish outpatient follow-up with Dr. Vicky Sherwood if he wishes.   - Dispo per ED.  - If patient is admitted, vascular surgery will follow in consultation   - Dr. Kathie Green was made aware of the consult and plan     Dian Sorto MD, MPH  PGY-4 General Surgery  02/15/23  12:26 AM

## 2023-02-15 NOTE — DISCHARGE INSTRUCTIONS
Continue with your routine dialysis. Follow-up with the vascular surgeon above or vascular surgeon of your choice for repeat evaluation and removal of the suture. Return to the emergency department for any worsening bleeding or any other concerns.

## 2023-02-15 NOTE — ED NOTES
Pt discharged from ED in stable condition. Discharge instruction explain, all question answered. Pt walked to [facility] independently.        Darren Lyle RN  02/15/23 2186

## 2023-02-15 NOTE — ED PROVIDER NOTES
[address] [facility]          ATTENDING PHYSICIAN NOTE       Date of evaluation: 2/14/2023    Chief Complaint     Vascular Access Problem (Bleeding from fistula site on L arm since 7 pm per ems.)      History of Present Illness     Ian Santiago is a 74 y.o. male who presents to the emergency department for bleeding from his vascular fistula. Patient is a history of end-stage renal disease on Gstaaa-Wlckvcg-Ufbrjtmq-Friday dialysis who does report having dialysis earlier today. Patient started having bleeding from his fistula approximately 3 hours prior to presentation. Per EMS, there was a large amount of blood present at the scene. They were unable to control bleeding bleeding with direct pressure so a tourniquet was placed and was up for approximately 10 minutes prior to the patient's arrival.  Patient has no other complaints at this time. Patient is on anticoagulation for prior history of thromboembolic disease. ASSESSMENT / PLAN  (MEDICAL DECISION MAKING)     INITIAL VITALS: BP: (!) 78/50, Temp: 97.5 °F (36.4 °C), Heart Rate: 81, Resp: 18, SpO2: 100 %      Ian Santiago is a 74 y.o. male with history of end-stage renal disease on hemodialysis presents from planing of bleeding from his fistula. Patient reportedly had bleeding for approximately 3 hours. On arrival, patient is a blood pressure of 78/50 but otherwise hemodynamically stable. He did have a tourniquet in place but continue to have some oozing from the site. We did place a pressure dressing over the area and were able to take the tourniquet down. Patient did have palpable radial pulse after this. Patient was seen by vascular surgery who was unable to control the bleeding with direct pressure so figure-of-eight suture was placed with good hemostatic control. Patient initial CBC had a hemoglobin 8.8 which is at the patient's baseline.   Patient was observed for 4 hours and a repeat CBC was obtained that shows hemoglobin of 8.2. I feel this is a acceptable level drop and I do not feel the patient's hemoglobin will drop significantly lower to the point where he would need transfusion. Patient be discharged back to his nursing facility for continued chronic care. He was started up to follow-up with vascular surgery for suture removal.    Medical Decision Making  Problems Addressed:  Complication of arteriovenous dialysis fistula, initial encounter: acute illness or injury    Amount and/or Complexity of Data Reviewed  Labs: ordered. Decision-making details documented in ED Course. Risk  Prescription drug management. Decision regarding hospitalization. Clinical Impression     1.  Complication of arteriovenous dialysis fistula, initial encounter        Disposition     PATIENT REFERRED TO:  Toya Bernal MD  [address]  [place] [address] [facility]  [phone]          DISCHARGE MEDICATIONS:  Current Discharge Medication List          DISPOSITION Decision To Discharge 02/15/2023 05:10:16 AM        Diagnostic Results and Other Data       RADIOLOGY:  No orders to display       LABS:   Results for orders placed or performed during the hospital encounter of 02/14/23   CBC with Auto Differential   Result Value Ref Range    WBC 3.3 (L) 4.0 - 11.0 K/uL    RBC 3.11 (L) 4.20 - 5.90 M/uL    Hemoglobin 8.8 (L) 13.5 - 17.5 g/dL    Hematocrit 28.2 (L) 40.5 - 52.5 %    MCV 90.9 80.0 - 100.0 fL    MCH 28.3 26.0 - 34.0 pg    MCHC 31.1 31.0 - 36.0 g/dL    RDW 18.0 (H) 12.4 - 15.4 %    Platelets 548 (L) 122 - 450 K/uL    MPV 7.0 5.0 - 10.5 fL    Neutrophils % 62.6 %    Lymphocytes % 19.9 %    Monocytes % 7.9 %    Eosinophils % 7.0 %    Basophils % 2.6 %    Neutrophils Absolute 2.0 1.7 - 7.7 K/uL    Lymphocytes Absolute 0.6 (L) 1.0 - 5.1 K/uL    Monocytes Absolute 0.3 0.0 - 1.3 K/uL    Eosinophils Absolute 0.2 0.0 - 0.6 K/uL    Basophils Absolute 0.1 0.0 - 0.2 K/uL   Basic Metabolic Panel   Result Value Ref Range Sodium 132 (L) 136 - 145 mmol/L    Potassium 3.5 3.5 - 5.1 mmol/L    Chloride 95 (L) 99 - 110 mmol/L    CO2 27 21 - 32 mmol/L    Anion Gap 10 3 - 16    Glucose 89 70 - 99 mg/dL    BUN 33 (H) 7 - 20 mg/dL    Creatinine 9.7 (HH) 0.8 - 1.3 mg/dL    Est, Glom Filt Rate 5 (A) >60    Calcium 7.9 (L) 8.3 - 10.6 mg/dL   Protime-INR   Result Value Ref Range    Protime 16.8 (H) 11.7 - 14.5 sec    INR 1.37 (H) 0.87 - 1.14   PTT   Result Value Ref Range    aPTT 40.2 (H) 23.0 - 34.3 sec   CBC with Auto Differential   Result Value Ref Range    WBC 3.9 (L) 4.0 - 11.0 K/uL    RBC 2.88 (L) 4.20 - 5.90 M/uL    Hemoglobin 8.2 (L) 13.5 - 17.5 g/dL    Hematocrit 26.5 (L) 40.5 - 52.5 %    MCV 91.8 80.0 - 100.0 fL    MCH 28.5 26.0 - 34.0 pg    MCHC 31.0 31.0 - 36.0 g/dL    RDW 17.8 (H) 12.4 - 15.4 %    Platelets 754 (L) 259 - 450 K/uL    MPV 7.1 5.0 - 10.5 fL    Neutrophils % 60.0 %    Lymphocytes % 21.8 %    Monocytes % 8.6 %    Eosinophils % 8.1 %    Basophils % 1.5 %    Neutrophils Absolute 2.4 1.7 - 7.7 K/uL    Lymphocytes Absolute 0.9 (L) 1.0 - 5.1 K/uL    Monocytes Absolute 0.3 0.0 - 1.3 K/uL    Eosinophils Absolute 0.3 0.0 - 0.6 K/uL    Basophils Absolute 0.1 0.0 - 0.2 K/uL   TYPE AND SCREEN   Result Value Ref Range    ABO/Rh B POS     Antibody Screen NEG      ED BEDSIDE ULTRASOUND:  No results found. MOST RECENT VITALS:  BP: 114/70,Temp: 97.5 °F (36.4 °C), Heart Rate: 76, Resp: 18, SpO2: 100 %     Procedures     N/A    ED Course     Nursing Notes, Past Medical Hx, Past Surgical Hx, Social Hx,Allergies, and Family Hx were reviewed. The patient was given the following medications:  Orders Placed This Encounter   Medications    0.9 % sodium chloride bolus       CONSULTS:  IP CONSULT TO GENERAL SURGERY    Review of Systems     Review of Systems   Constitutional: Negative. HENT: Negative. Eyes: Negative. Respiratory: Negative. Cardiovascular: Negative. Gastrointestinal: Negative. Genitourinary: Negative. Musculoskeletal: Negative. Hematological:         See HPI   All other systems reviewed and are negative. Past Medical, Surgical, Family, and Social History     He has a past medical history of Cerebral artery occlusion with cerebral infarction [facility]), Combined systolic and diastolic heart failure ([facility] [address] COPD (chronic obstructive pulmonary disease) ([facility] [address] Gout, Hemodialysis patient ([facility] [address] Hx of blood clots, Hyperlipidemia, and Hypertension. He has a past surgical history that includes Dialysis fistula creation (Left); Colonoscopy; CT BIOPSY BONE MARROW (11/15/2022); Upper gastrointestinal endoscopy (N/A, 1/17/2023); and Colonoscopy (N/A, 1/17/2023). His family history includes Cancer in his father and mother; Other in his sister. He reports that he has quit smoking. He has never used smokeless tobacco. He reports that he does not currently use alcohol. He reports that he does not use drugs. Medications     Current Discharge Medication List        CONTINUE these medications which have NOT CHANGED    Details   apixaban (ELIQUIS) 2.5 MG TABS tablet Take 1 tablet by mouth 2 times daily  Qty: 180 tablet, Refills: 1      gabapentin (NEURONTIN) 100 MG capsule Take 100 mg by mouth nightly. ferrous sulfate (IRON 325) 325 (65 Fe) MG tablet Take 325 mg by mouth every other day      pantoprazole (PROTONIX) 40 MG tablet Take 1 tablet by mouth every morning (before breakfast)  Qty: 30 tablet, Refills: 3      levothyroxine (SYNTHROID) 75 MCG tablet Take 1 tablet by mouth Daily  Qty: 30 tablet, Refills: 3      calcium carbonate (TUMS) 500 MG chewable tablet Take 1 tablet by mouth every 8 hours as needed for Heartburn      carvedilol (COREG) 3.125 MG tablet Take 1 tablet by mouth 2 times daily  Qty: 60 tablet, Refills: 3      sodium chloride (OCEAN) 0.65 % nasal spray 1-2 sprays both nostrils at least daily and as needed to prevent dry mucosa.   Qty: 1 each, Refills: 3      calcitRIOL (ROCALTROL) 0.5 MCG capsule Take 0.5 mcg by mouth three times a week On M/W/F      sevelamer (RENVELA) 800 MG tablet Take 1 tablet by mouth 3 times daily (with meals)      atorvastatin (LIPITOR) 80 MG tablet Take 80 mg by mouth nightly      calcium carb-cholecalciferol 600-200 MG-UNIT TABS tablet Take 1 tablet by mouth daily      albuterol sulfate HFA (PROVENTIL;VENTOLIN;PROAIR) 108 (90 Base) MCG/ACT inhaler Inhale 2 puffs into the lungs every 6 hours as needed for Wheezing 90mcg/actuation      allopurinol (ZYLOPRIM) 100 MG tablet Take 100 mg by mouth three times a week On M/W/F             Allergies     He is allergic to nitroglycerin and oxymetazoline. Physical Exam     INITIAL VITALS: BP: (!) 78/50, Temp: 97.5 °F (36.4 °C), Heart Rate: 81, Resp: 18, SpO2: 100 %   Physical Exam  Vitals and nursing note reviewed. Constitutional:       General: He is not in acute distress. Comments: Chronically ill-appearing. Musculoskeletal:      Comments: Patient is a fistula present to the left upper extremity. There is a tourniquet in place but patient continues to have small amount of bleeding through this. Neurological:      Mental Status: He is alert.                   Hallie Martinez MD  02/15/23 4910

## 2023-02-17 ENCOUNTER — APPOINTMENT (OUTPATIENT)
Dept: VASCULAR LAB | Age: 68
End: 2023-02-17
Payer: MEDICARE

## 2023-02-17 ENCOUNTER — HOSPITAL ENCOUNTER (EMERGENCY)
Age: 68
Discharge: NURSING, REHAB OR LONG-TERM CARE | End: 2023-02-17
Attending: EMERGENCY MEDICINE
Payer: MEDICARE

## 2023-02-17 VITALS
OXYGEN SATURATION: 100 % | HEIGHT: 68 IN | DIASTOLIC BLOOD PRESSURE: 87 MMHG | RESPIRATION RATE: 19 BRPM | WEIGHT: 124 LBS | HEART RATE: 86 BPM | SYSTOLIC BLOOD PRESSURE: 136 MMHG | TEMPERATURE: 97.7 F | BODY MASS INDEX: 18.79 KG/M2

## 2023-02-17 DIAGNOSIS — T82.838D HEMORRHAGE OF ARTERIOVENOUS FISTULA, SUBSEQUENT ENCOUNTER: Primary | ICD-10-CM

## 2023-02-17 LAB
ANION GAP SERPL CALCULATED.3IONS-SCNC: 12 MMOL/L (ref 3–16)
APTT: 39.2 SEC (ref 23–34.3)
BASOPHILS ABSOLUTE: 0 K/UL (ref 0–0.2)
BASOPHILS RELATIVE PERCENT: 0.7 %
BUN BLDV-MCNC: 27 MG/DL (ref 7–20)
CALCIUM SERPL-MCNC: 8.5 MG/DL (ref 8.3–10.6)
CHLORIDE BLD-SCNC: 94 MMOL/L (ref 99–110)
CO2: 29 MMOL/L (ref 21–32)
CREAT SERPL-MCNC: 9.3 MG/DL (ref 0.8–1.3)
EKG ATRIAL RATE: 96 BPM
EKG DIAGNOSIS: NORMAL
EKG P AXIS: 77 DEGREES
EKG P-R INTERVAL: 232 MS
EKG Q-T INTERVAL: 386 MS
EKG QRS DURATION: 98 MS
EKG QTC CALCULATION (BAZETT): 487 MS
EKG R AXIS: -60 DEGREES
EKG T AXIS: 78 DEGREES
EKG VENTRICULAR RATE: 96 BPM
EOSINOPHILS ABSOLUTE: 0.3 K/UL (ref 0–0.6)
EOSINOPHILS RELATIVE PERCENT: 7.7 %
GFR SERPL CREATININE-BSD FRML MDRD: 6 ML/MIN/{1.73_M2}
GLUCOSE BLD-MCNC: 95 MG/DL (ref 70–99)
HCT VFR BLD CALC: 28.7 % (ref 40.5–52.5)
HEMOGLOBIN: 9 G/DL (ref 13.5–17.5)
INR BLD: 1.19 (ref 0.87–1.14)
LYMPHOCYTES ABSOLUTE: 0.7 K/UL (ref 1–5.1)
LYMPHOCYTES RELATIVE PERCENT: 20.4 %
MCH RBC QN AUTO: 28.4 PG (ref 26–34)
MCHC RBC AUTO-ENTMCNC: 31.2 G/DL (ref 31–36)
MCV RBC AUTO: 91 FL (ref 80–100)
MONOCYTES ABSOLUTE: 0.3 K/UL (ref 0–1.3)
MONOCYTES RELATIVE PERCENT: 9.4 %
NEUTROPHILS ABSOLUTE: 2.2 K/UL (ref 1.7–7.7)
NEUTROPHILS RELATIVE PERCENT: 61.8 %
PDW BLD-RTO: 17.9 % (ref 12.4–15.4)
PLATELET # BLD: 127 K/UL (ref 135–450)
PMV BLD AUTO: 7.4 FL (ref 5–10.5)
POTASSIUM REFLEX MAGNESIUM: 3.9 MMOL/L (ref 3.5–5.1)
PROTHROMBIN TIME: 15 SEC (ref 11.7–14.5)
RBC # BLD: 3.16 M/UL (ref 4.2–5.9)
SODIUM BLD-SCNC: 135 MMOL/L (ref 136–145)
WBC # BLD: 3.6 K/UL (ref 4–11)

## 2023-02-17 PROCEDURE — 85025 COMPLETE CBC W/AUTO DIFF WBC: CPT

## 2023-02-17 PROCEDURE — 85610 PROTHROMBIN TIME: CPT

## 2023-02-17 PROCEDURE — 99284 EMERGENCY DEPT VISIT MOD MDM: CPT

## 2023-02-17 PROCEDURE — 80048 BASIC METABOLIC PNL TOTAL CA: CPT

## 2023-02-17 PROCEDURE — 93005 ELECTROCARDIOGRAM TRACING: CPT | Performed by: EMERGENCY MEDICINE

## 2023-02-17 PROCEDURE — 85730 THROMBOPLASTIN TIME PARTIAL: CPT

## 2023-02-17 PROCEDURE — 36415 COLL VENOUS BLD VENIPUNCTURE: CPT

## 2023-02-17 PROCEDURE — 2700000000 HC OXYGEN THERAPY PER DAY

## 2023-02-17 PROCEDURE — 94761 N-INVAS EAR/PLS OXIMETRY MLT: CPT

## 2023-02-17 PROCEDURE — 99221 1ST HOSP IP/OBS SF/LOW 40: CPT | Performed by: SURGERY

## 2023-02-17 PROCEDURE — 93990 DOPPLER FLOW TESTING: CPT

## 2023-02-17 ASSESSMENT — ENCOUNTER SYMPTOMS
ABDOMINAL PAIN: 0
COUGH: 0
NAUSEA: 0
DIARRHEA: 0
VOMITING: 0
SHORTNESS OF BREATH: 0
EYE PAIN: 0

## 2023-02-17 NOTE — CONSULTS
Vascular Surgery   Resident Consult Note    Reason for Consult: bleeding AVG    History of Present Illness:   Daysi Reddy is a 74 y.o. male with with history of pulmonary embolism and pulmonary HTN on Eliquis, CVA, CHF, ESRD on HD via LUE AV graft, HLD, and HTN who presents to the ED for a bleeding from his AV graft site following HD session  yesterday. He reports that he was washing up this am and it started to bleed. Patient reports that he last took his Eliquis this AM. HE called [facility] that brought him here. AV fistula created at  about 4-5 years ago but is unsure who his surgeon was. Of note, he was admitted in October for the same complaint, at which time a fistulagram revealed no significant stenosis of the venous outflow or central venous stenosis to account for the patient's prolonged bleeding post-dialysis. He subsequently underwent vascular duplex of the graft, again revealing no stenosis to account for his numerous presentations for prolonged bleeding. Past Medical History:        Diagnosis Date    Cerebral artery occlusion with cerebral infarction ([facility] [address]     Combined systolic and diastolic heart failure (HCC)     COPD (chronic obstructive pulmonary disease) (HCC)     Gout     Hemodialysis patient ([facility] [address]     Hx of blood clots     Hyperlipidemia     Hypertension        Past Surgical History:           Procedure Laterality Date    COLONOSCOPY      COLONOSCOPY N/A 1/17/2023    COLONOSCOPY DIAGNOSTIC performed by Carey Garcia MD at [address] [place] [address]  11/15/2022    CT BONE MARROW BIOPSY 11/15/2022 [facility] CT SCAN    DIALYSIS FISTULA CREATION Left     UPPER GASTROINTESTINAL ENDOSCOPY N/A 1/17/2023    EGD BIOPSY performed by Carey Garcia MD at [facility] ENDOSCOPY       Allergies:  Nitroglycerin and Oxymetazoline    Medications:   Home Meds  No current facility-administered medications on file prior to encounter.      Current Outpatient Medications on File Prior to Encounter   Medication Sig Dispense Refill    apixaban (ELIQUIS) 2.5 MG TABS tablet Take 1 tablet by mouth 2 times daily 180 tablet 1    gabapentin (NEURONTIN) 100 MG capsule Take 100 mg by mouth nightly. ferrous sulfate (IRON 325) 325 (65 Fe) MG tablet Take 325 mg by mouth every other day      pantoprazole (PROTONIX) 40 MG tablet Take 1 tablet by mouth every morning (before breakfast) 30 tablet 3    levothyroxine (SYNTHROID) 75 MCG tablet Take 1 tablet by mouth Daily 30 tablet 3    calcium carbonate (TUMS) 500 MG chewable tablet Take 1 tablet by mouth every 8 hours as needed for Heartburn      carvedilol (COREG) 3.125 MG tablet Take 1 tablet by mouth 2 times daily 60 tablet 3    sodium chloride (OCEAN) 0.65 % nasal spray 1-2 sprays both nostrils at least daily and as needed to prevent dry mucosa. 1 each 3    calcitRIOL (ROCALTROL) 0.5 MCG capsule Take 0.5 mcg by mouth three times a week On M/W/F      sevelamer (RENVELA) 800 MG tablet Take 1 tablet by mouth 3 times daily (with meals)      atorvastatin (LIPITOR) 80 MG tablet Take 80 mg by mouth nightly      calcium carb-cholecalciferol 600-200 MG-UNIT TABS tablet Take 1 tablet by mouth daily      albuterol sulfate HFA (PROVENTIL;VENTOLIN;PROAIR) 108 (90 Base) MCG/ACT inhaler Inhale 2 puffs into the lungs every 6 hours as needed for Wheezing 90mcg/actuation      allopurinol (ZYLOPRIM) 100 MG tablet Take 100 mg by mouth three times a week On M/W/F         Current Meds  No current facility-administered medications for this encounter. Family History:   Family History   Problem Relation Age of Onset    Cancer Mother     Cancer Father     Other Sister        Social History:   TOBACCO:   reports that he has quit smoking. He has never used smokeless tobacco.  ETOH:   reports that he does not currently use alcohol. DRUGS:   reports no history of drug use. ROS: A 14 point review of systems was conducted, significant findings as noted in HPI.  All other systems negative. Physical exam:    Vitals:    02/17/23 1028 02/17/23 1032   BP:  (!) 149/88   Pulse:  88   Resp:  18   Temp: 97.7 °F (36.5 °C)    TempSrc: Oral    SpO2:  100%   Weight: 124 lb (56.2 kg)    Height: 5' 8\" (1.727 m)        General appearance: alert, no acute distress, grooming appropriate  Eyes: no gross deficits  Neck: trachea midline  Chest/Lungs: normal effort, no accessory muscle use, on RA  Cardiovascular: RRR  Abdomen: soft, non-tender, non-distended, no guarding/rigidity  Skin: warm and dry, no rashes  LUE: AVG with small punctate bleeding from mid graft site. Suture from previous bleeding site superior to this and hemostatic, palpable radial artery, + Thrill in the graft throughout. Extremities: no edema, no cyanosis  Neuro: A&Ox3, no focal deficits, sensation intact    Labs:    CBC:   Recent Labs     02/14/23  2328 02/15/23  0445   WBC 3.3* 3.9*   HGB 8.8* 8.2*   HCT 28.2* 26.5*   MCV 90.9 91.8   * 114*     BMP:   Recent Labs     02/14/23 2328   *   K 3.5   CL 95*   CO2 27   BUN 33*   CREATININE 9.7*     PT/INR:   Recent Labs     02/14/23 2328   PROTIME 16.8*   INR 1.37*     APTT:   Recent Labs     02/14/23 2328   APTT 40.2*     Liver Profile:  Lab Results   Component Value Date/Time    AST 10 01/26/2023 04:52 AM    ALT <5 01/26/2023 04:52 AM    BILIDIR <0.2 12/28/2022 12:17 AM    BILITOT 0.5 01/26/2023 04:52 AM    ALKPHOS 117 01/26/2023 04:52 AM     Lab Results   Component Value Date/Time    CHOL 77 09/14/2022 07:00 AM    HDL 38 09/14/2022 07:00 AM    TRIG 45 09/14/2022 07:00 AM     UA: No results found for: NITRITE, COLORU, PHUR, LABCAST, WBCUA, RBCUA, MUCUS, TRICHOMONAS, YEAST, BACTERIA, CLARITYU, SPECGRAV, LEUKOCYTESUR, UROBILINOGEN, BILIRUBINUR, BLOODU, GLUCOSEU, AMORPHOUS    Imaging:   No orders to display          Assessment/Plan: This is a 74 y.o. male with bleeding AVG from HD yesterday. This is the second occurrence in the past week.  He is afebrile, hemodynamically stable. Labs pending. Vascular surgery consulted for evaluation.      - patient with patent AVG on elqiuis  - currently holding pressure, if unable to obtain hemostasis will place a small absorbable suture as previous  -will remove previous suture  - duplex of his graft without stenosis and widely patent  - ok to dc     Eusebio Whitfield MD  Surgical Resident PGY 5  02/17/23  11:35 AM  [phone]

## 2023-02-17 NOTE — ED PROVIDER NOTES
[address] [facility] RESIDENT NOTE     Date of evaluation: 2/17/2023    Chief Complaint     Vascular Access Problem (Bleeding fistula x 20 minutes )    of Present Illness     Sue Cota is a 74 y.o. male who presents for bleeding from his vascular fistula. Patient has a history of ESRD and is on a Monday, Tuesday, Thursday, Friday dialysis schedule. Does report attending dialysis yesterday. Patient started having bleeding about 30 minutes prior to ED presentation. Bleeding still ongoing despite tight wrapping with gauze by EMS. Patient is on Eliquis for prior history of thromboembolic disease. Currently reports some lightheadedness and some mild substernal chest pain that he notes started after bleeding started. Other than what is stated above, the patient reports no inciting or alleviating factors for his symptoms today. Review of Systems     Review of Systems   Constitutional:  Negative for chills and fever. HENT:  Negative for congestion. Eyes:  Negative for pain. Respiratory:  Negative for cough and shortness of breath. Cardiovascular:  Negative for chest pain and leg swelling. Gastrointestinal:  Negative for abdominal pain, diarrhea, nausea and vomiting. Genitourinary:  Negative for dysuria. Musculoskeletal:  Negative for arthralgias. Skin:  Negative for rash. Neurological:  Positive for light-headedness. Negative for weakness. All other systems reviewed and are negative. Past Medical, Surgical, Family, and Social History     He has a past medical history of Cerebral artery occlusion with cerebral infarction [facility]), Combined systolic and diastolic heart failure ([facility] [address] COPD (chronic obstructive pulmonary disease) ([facility] [address] Gout, Hemodialysis patient ([facility] [address] Hx of blood clots, Hyperlipidemia, and Hypertension. He has a past surgical history that includes Dialysis fistula creation (Left);  Colonoscopy; CT BIOPSY BONE MARROW (11/15/2022); Upper gastrointestinal endoscopy (N/A, 1/17/2023); and Colonoscopy (N/A, 1/17/2023). His family history includes Cancer in his father and mother; Other in his sister. He reports that he has quit smoking. He has never used smokeless tobacco. He reports that he does not currently use alcohol. He reports that he does not use drugs. Medications     Previous Medications    ALBUTEROL SULFATE HFA (PROVENTIL;VENTOLIN;PROAIR) 108 (90 BASE) MCG/ACT INHALER    Inhale 2 puffs into the lungs every 6 hours as needed for Wheezing 90mcg/actuation    ALLOPURINOL (ZYLOPRIM) 100 MG TABLET    Take 100 mg by mouth three times a week On M/W/F    APIXABAN (ELIQUIS) 2.5 MG TABS TABLET    Take 1 tablet by mouth 2 times daily    ATORVASTATIN (LIPITOR) 80 MG TABLET    Take 80 mg by mouth nightly    CALCITRIOL (ROCALTROL) 0.5 MCG CAPSULE    Take 0.5 mcg by mouth three times a week On M/W/F    CALCIUM CARB-CHOLECALCIFEROL 600-200 MG-UNIT TABS TABLET    Take 1 tablet by mouth daily    CALCIUM CARBONATE (TUMS) 500 MG CHEWABLE TABLET    Take 1 tablet by mouth every 8 hours as needed for Heartburn    CARVEDILOL (COREG) 3.125 MG TABLET    Take 1 tablet by mouth 2 times daily    FERROUS SULFATE (IRON 325) 325 (65 FE) MG TABLET    Take 325 mg by mouth every other day    GABAPENTIN (NEURONTIN) 100 MG CAPSULE    Take 100 mg by mouth nightly. LEVOTHYROXINE (SYNTHROID) 75 MCG TABLET    Take 1 tablet by mouth Daily    PANTOPRAZOLE (PROTONIX) 40 MG TABLET    Take 1 tablet by mouth every morning (before breakfast)    SEVELAMER (RENVELA) 800 MG TABLET    Take 1 tablet by mouth 3 times daily (with meals)    SODIUM CHLORIDE (OCEAN) 0.65 % NASAL SPRAY    1-2 sprays both nostrils at least daily and as needed to prevent dry mucosa. Allergies     He is allergic to nitroglycerin and oxymetazoline.     Physical Exam     INITIAL VITALS: BP: (!) 149/88, Temp: 97.7 °F (36.5 °C), Heart Rate: 88, Resp: 18, SpO2: 100 %   Physical Exam  Constitutional:       General: He is not in acute distress. Appearance: He is well-developed. HENT:      Head: Normocephalic and atraumatic. Eyes:      Pupils: Pupils are equal, round, and reactive to light. Neck:      Vascular: No JVD. Cardiovascular:      Rate and Rhythm: Normal rate and regular rhythm. Pulses: Normal pulses. Pulmonary:      Effort: Pulmonary effort is normal. No respiratory distress. Breath sounds: Normal breath sounds. No wheezing or rales. Abdominal:      General: There is no distension. Palpations: Abdomen is soft. Tenderness: There is no abdominal tenderness. Musculoskeletal:         General: Normal range of motion. Cervical back: Normal range of motion. Comments: Patient with AV fistula site with brisk bleeding present, seemingly well controlled with gauze wrapped around it, upper extremity with good distal pulses, warm and well-perfused   Skin:     Capillary Refill: Capillary refill takes less than 2 seconds. Findings: No rash. Neurological:      Mental Status: He is alert and oriented to person, place, and time.      DiagnosticResults     EKG   Interpreted in conjunction with emergencydepartment physician No att. providers found  Rhythm: normal sinus   Rate: normal  Axis: normal  Ectopy: none  Conduction: 1st degree AV block  ST Segments: no acute change  T Waves:no acute change  Q Waves: none  Clinical Impression: no acute changes  Comparison: EKG with no acute changes, prolonged IN interval which is consistent with prior EKG in chart    RADIOLOGY:  VL Hemodialysis Access           LABS:   Results for orders placed or performed during the hospital encounter of 02/17/23   BMP w/ Reflex to MG   Result Value Ref Range    Sodium 135 (L) 136 - 145 mmol/L    Potassium reflex Magnesium 3.9 3.5 - 5.1 mmol/L    Chloride 94 (L) 99 - 110 mmol/L    CO2 29 21 - 32 mmol/L    Anion Gap 12 3 - 16    Glucose 95 70 - 99 mg/dL    BUN 27 (H) 7 - 20 mg/dL    Creatinine 9.3 (HH) 0.8 - 1.3 mg/dL    Est, Glom Filt Rate 6 (A) >60    Calcium 8.5 8.3 - 10.6 mg/dL   CBC with Auto Differential   Result Value Ref Range    WBC 3.6 (L) 4.0 - 11.0 K/uL    RBC 3.16 (L) 4.20 - 5.90 M/uL    Hemoglobin 9.0 (L) 13.5 - 17.5 g/dL    Hematocrit 28.7 (L) 40.5 - 52.5 %    MCV 91.0 80.0 - 100.0 fL    MCH 28.4 26.0 - 34.0 pg    MCHC 31.2 31.0 - 36.0 g/dL    RDW 17.9 (H) 12.4 - 15.4 %    Platelets 292 (L) 762 - 450 K/uL    MPV 7.4 5.0 - 10.5 fL    Neutrophils % 61.8 %    Lymphocytes % 20.4 %    Monocytes % 9.4 %    Eosinophils % 7.7 %    Basophils % 0.7 %    Neutrophils Absolute 2.2 1.7 - 7.7 K/uL    Lymphocytes Absolute 0.7 (L) 1.0 - 5.1 K/uL    Monocytes Absolute 0.3 0.0 - 1.3 K/uL    Eosinophils Absolute 0.3 0.0 - 0.6 K/uL    Basophils Absolute 0.0 0.0 - 0.2 K/uL   Protime-INR   Result Value Ref Range    Protime 15.0 (H) 11.7 - 14.5 sec    INR 1.19 (H) 0.87 - 1.14   PTT   Result Value Ref Range    aPTT 39.2 (H) 23.0 - 34.3 sec   EKG 12 Lead   Result Value Ref Range    Ventricular Rate 96 BPM    Atrial Rate 96 BPM    P-R Interval 232 ms    QRS Duration 98 ms    Q-T Interval 386 ms    QTc Calculation (Bazett) 487 ms    P Axis 77 degrees    R Axis -60 degrees    T Axis 78 degrees    Diagnosis       EKG performed in ER and to be interpreted by ER physician. Confirmed by MD, ER ([id number]),  Eneida Cohen ([id number]) on 2/17/2023 11:02:28 AM       ED BEDSIDE ULTRASOUND:  No results found. RECENT VITALS:  BP: 119/72, Temp: 97.7 °F (36.5 °C), Heart Rate: 78,Resp: 18, SpO2: 100 %     Procedures     ED Course     Nursing Notes, Past Medical Hx, Past Surgical Hx, Social Hx, Allergies, and Family Hx were reviewed. The patient was given the followingmedications:  No orders of the defined types were placed in this encounter.       CONSULTS:  [address] / RADHA / Abimbola Dom is a 74 y.o. male with history of ESRD who presented for brisk bleeding from his left-sided upper extremity AV fistula. ED Course as of 02/17/23 1521   Fri Feb 17, 2023   1148 Hemoglobin Quant(!): 9.0 [SJ]   1205 Vascular surgery was able to control the patient's bleed with pressure held proximal to the patient's fistula. Given this patient's recurrent ED visits for same problem, hemodialysis access ultrasound ordered. Vascular surgery will offer final recommendations after the study and have patient follow-up with Dr. Dena Vasquez in clinic. [SJ]   8035 Hemodialysis graft with normal flow. No evidence of subclavian or brachial vein thrombus. Per vascular surgery recommendations, the patient does not need to follow-up in clinic at this time. They are planning to remove suture placed at last visit. [SJ]   1423 We decided against repeating a CBC on this patient given that he has had a very minimal bleeding since his last hemoglobin of 9.0. I have no reason to believe that he would have experienced a significant drop that would need to be treated at this time. [SJ]   1506 Reassessment, the patient is well-appearing and notes that he is ready to go home. Vascular surgery did come by and place additional suture for hemostasis. The site looks hemostatic. Additional small amount of Dermabond placed above the stitch. [SJ]      ED Course User Index  [SJ] Verona Lewis MD     Medical Decision Making  Amount and/or Complexity of Data Reviewed  Independent Historian: EMS  External Data Reviewed: labs, radiology, ECG and notes. Labs: ordered. Decision-making details documented in ED Course. Radiology: ordered. Decision-making details documented in ED Course. ECG/medicine tests: ordered. Decision-making details documented in ED Course. This patient was also evaluated by the attending physician. All care plans were discussed and agreed upon. Clinical Impression     1.  Hemorrhage of arteriovenous fistula, subsequent encounter      Disposition     PATIENT REFERRED TO:  Belgica Roberts MD  [address] [place] [address]  [phone]        DISCHARGE MEDICATIONS:  New Prescriptions    No medications on file       DISPOSITION Decision To Discharge 02/17/2023 03:16:21 PM     Charbel Montero MD  Resident  02/17/23 1553 Hiram Lyons MD  Resident  02/17/23 3202

## 2023-02-17 NOTE — DISCHARGE INSTRUCTIONS
You were seen in the emergency department today for bleeding from your AV fistula. We were able to get your bleeding under control. There is 1 stitch that was placed on top of the site of bleeding. You will not need to get this removed as it is absorbable. Please come back to the emergency department if you have any more brisk bleeding from the site. You may follow-up with your primary care physician regarding this ED visit. Please come back to the emergency department for any other concern that you feel needs emergent evaluation.

## 2023-02-17 NOTE — ED PROVIDER NOTES
ED Attending Attestation Note     Date of evaluation: 2/17/2023    This patient was seen by the resident. I have seen and examined the patient, agree with the workup, evaluation, management and diagnosis. The care plan has been discussed. My assessment reveals a 81-year-old male who presents with a chief complaint of vascular access problem. Patient with bleeding fistula, left arm. Had similar problem a few days ago. On my exam patient has a pressure bandage on his fistula, no active bleeding noted through bandage. Good distal pulses of his left arm. Patient overall chronically ill-appearing with chronic oxygen on but in no acute distress.      Jane Farrar MD  02/17/23 1132

## 2023-02-17 NOTE — ED NOTES
Attempted to call report to [facility], unable to reach staff at this time      Migdalia Jaime RN  02/17/23 8373

## 2023-02-22 ENCOUNTER — HOSPITAL ENCOUNTER (EMERGENCY)
Age: 68
Discharge: HOME | End: 2023-02-23
Attending: STUDENT IN AN ORGANIZED HEALTH CARE EDUCATION/TRAINING PROGRAM
Payer: MEDICARE

## 2023-02-22 DIAGNOSIS — T82.838A HEMORRHAGE OF ARTERIOVENOUS FISTULA, INITIAL ENCOUNTER (HCC): Primary | ICD-10-CM

## 2023-02-22 DIAGNOSIS — D62 ACUTE BLOOD LOSS ANEMIA: ICD-10-CM

## 2023-02-22 LAB
ABO/RH: NORMAL
ANION GAP SERPL CALCULATED.3IONS-SCNC: 9 MMOL/L (ref 3–16)
ANTIBODY SCREEN: NORMAL
BASOPHILS ABSOLUTE: 0 K/UL (ref 0–0.2)
BASOPHILS RELATIVE PERCENT: 0.7 %
BUN BLDV-MCNC: 42 MG/DL (ref 7–20)
CALCIUM SERPL-MCNC: 8.2 MG/DL (ref 8.3–10.6)
CHLORIDE BLD-SCNC: 93 MMOL/L (ref 99–110)
CO2: 29 MMOL/L (ref 21–32)
CREAT SERPL-MCNC: 10.3 MG/DL (ref 0.8–1.3)
EOSINOPHILS ABSOLUTE: 0.3 K/UL (ref 0–0.6)
EOSINOPHILS RELATIVE PERCENT: 8.2 %
GFR SERPL CREATININE-BSD FRML MDRD: 5 ML/MIN/{1.73_M2}
GLUCOSE BLD-MCNC: 102 MG/DL (ref 70–99)
HCT VFR BLD CALC: 20.1 % (ref 40.5–52.5)
HEMOGLOBIN: 6.3 G/DL (ref 13.5–17.5)
INR BLD: 1.29 (ref 0.87–1.14)
LYMPHOCYTES ABSOLUTE: 0.5 K/UL (ref 1–5.1)
LYMPHOCYTES RELATIVE PERCENT: 12.6 %
MCH RBC QN AUTO: 27.9 PG (ref 26–34)
MCHC RBC AUTO-ENTMCNC: 31.4 G/DL (ref 31–36)
MCV RBC AUTO: 88.7 FL (ref 80–100)
MONOCYTES ABSOLUTE: 0.4 K/UL (ref 0–1.3)
MONOCYTES RELATIVE PERCENT: 11.5 %
NEUTROPHILS ABSOLUTE: 2.6 K/UL (ref 1.7–7.7)
NEUTROPHILS RELATIVE PERCENT: 67 %
PDW BLD-RTO: 17.7 % (ref 12.4–15.4)
PLATELET # BLD: 120 K/UL (ref 135–450)
PMV BLD AUTO: 9 FL (ref 5–10.5)
POTASSIUM REFLEX MAGNESIUM: 4.3 MMOL/L (ref 3.5–5.1)
PROTHROMBIN TIME: 16 SEC (ref 11.7–14.5)
RBC # BLD: 2.26 M/UL (ref 4.2–5.9)
SODIUM BLD-SCNC: 131 MMOL/L (ref 136–145)
WBC # BLD: 3.9 K/UL (ref 4–11)

## 2023-02-22 PROCEDURE — 86901 BLOOD TYPING SEROLOGIC RH(D): CPT

## 2023-02-22 PROCEDURE — 80048 BASIC METABOLIC PNL TOTAL CA: CPT

## 2023-02-22 PROCEDURE — 85025 COMPLETE CBC W/AUTO DIFF WBC: CPT

## 2023-02-22 PROCEDURE — P9016 RBC LEUKOCYTES REDUCED: HCPCS

## 2023-02-22 PROCEDURE — 86850 RBC ANTIBODY SCREEN: CPT

## 2023-02-22 PROCEDURE — 85610 PROTHROMBIN TIME: CPT

## 2023-02-22 PROCEDURE — 86923 COMPATIBILITY TEST ELECTRIC: CPT

## 2023-02-22 PROCEDURE — 99285 EMERGENCY DEPT VISIT HI MDM: CPT

## 2023-02-22 PROCEDURE — 12001 RPR S/N/AX/GEN/TRNK 2.5CM/<: CPT

## 2023-02-22 PROCEDURE — 86900 BLOOD TYPING SEROLOGIC ABO: CPT

## 2023-02-22 RX ORDER — SODIUM CHLORIDE 9 MG/ML
INJECTION, SOLUTION INTRAVENOUS PRN
Status: DISCONTINUED | OUTPATIENT
Start: 2023-02-22 | End: 2023-02-23 | Stop reason: HOSPADM

## 2023-02-22 ASSESSMENT — LIFESTYLE VARIABLES
HOW MANY STANDARD DRINKS CONTAINING ALCOHOL DO YOU HAVE ON A TYPICAL DAY: PATIENT DOES NOT DRINK
HOW OFTEN DO YOU HAVE A DRINK CONTAINING ALCOHOL: PATIENT DECLINED

## 2023-02-22 ASSESSMENT — PAIN - FUNCTIONAL ASSESSMENT: PAIN_FUNCTIONAL_ASSESSMENT: NONE - DENIES PAIN

## 2023-02-23 VITALS
HEART RATE: 87 BPM | TEMPERATURE: 98.1 F | DIASTOLIC BLOOD PRESSURE: 91 MMHG | SYSTOLIC BLOOD PRESSURE: 138 MMHG | RESPIRATION RATE: 30 BRPM | BODY MASS INDEX: 20.07 KG/M2 | OXYGEN SATURATION: 100 % | WEIGHT: 132.4 LBS | HEIGHT: 68 IN

## 2023-02-23 LAB
BLOOD BANK DISPENSE STATUS: NORMAL
BLOOD BANK DISPENSE STATUS: NORMAL
BLOOD BANK PRODUCT CODE: NORMAL
BLOOD BANK PRODUCT CODE: NORMAL
BPU ID: NORMAL
BPU ID: NORMAL
DESCRIPTION BLOOD BANK: NORMAL
DESCRIPTION BLOOD BANK: NORMAL
HCT VFR BLD CALC: 27.8 % (ref 40.5–52.5)
HEMOGLOBIN: 9.2 G/DL (ref 13.5–17.5)

## 2023-02-23 PROCEDURE — 85018 HEMOGLOBIN: CPT

## 2023-02-23 PROCEDURE — 85014 HEMATOCRIT: CPT

## 2023-02-23 NOTE — ED NOTES
Bedside report received from [facility]. Patient pending blood transfusion at this time.       Yessica Hernandez RN  02/23/23 5265

## 2023-02-23 NOTE — ED NOTES
Blood transfusion not started at 600ml/hr. Transfusion started at 150ml/hr.  Double verified with Julian Almendarez, DENISHA  02/23/23 [address], RN  02/23/23 1573

## 2023-02-23 NOTE — DISCHARGE INSTRUCTIONS
Please continue your medications and dialysis. After your blood transfusion, your hemoglobin is back to its normal baseline. Please come back to the emergency department for new bleeding, chest pain, shortness of breath, fever, any other urgent concerns.

## 2023-02-23 NOTE — ED PROVIDER NOTES
[address]          ATTENDING PHYSICIAN NOTE       Date of evaluation: 2/22/2023    ADDENDUM:      Care of this patient was assumed from the previous providers. The patient was seen for Vascular Access Problem (Pt. Presents to ED via EMS from [facility] with c/o bleeding fistula on left arm. )  . The patient's initial evaluation and plan have been discussed with the prior provider who initially evaluated the patient. Nursing Notes, Past Medical Hx, Past Surgical Hx, Social Hx, Allergies, and Family Hx were all reviewed. At the time that I assumed care, he is receiving his blood transfusions. Pending actions include reassessment and repeat hemoglobin after transfusion has finished.     Diagnostic Results         LABS:   Results for orders placed or performed during the hospital encounter of 02/22/23   Protime-INR   Result Value Ref Range    Protime 16.0 (H) 11.7 - 14.5 sec    INR 1.29 (H) 0.87 - 1.14   CBC with Auto Differential   Result Value Ref Range    WBC 3.9 (L) 4.0 - 11.0 K/uL    RBC 2.26 (L) 4.20 - 5.90 M/uL    Hemoglobin 6.3 (LL) 13.5 - 17.5 g/dL    Hematocrit 20.1 (LL) 40.5 - 52.5 %    MCV 88.7 80.0 - 100.0 fL    MCH 27.9 26.0 - 34.0 pg    MCHC 31.4 31.0 - 36.0 g/dL    RDW 17.7 (H) 12.4 - 15.4 %    Platelets 803 (L) 394 - 450 K/uL    MPV 9.0 5.0 - 10.5 fL    Neutrophils % 67.0 %    Lymphocytes % 12.6 %    Monocytes % 11.5 %    Eosinophils % 8.2 %    Basophils % 0.7 %    Neutrophils Absolute 2.6 1.7 - 7.7 K/uL    Lymphocytes Absolute 0.5 (L) 1.0 - 5.1 K/uL    Monocytes Absolute 0.4 0.0 - 1.3 K/uL    Eosinophils Absolute 0.3 0.0 - 0.6 K/uL    Basophils Absolute 0.0 0.0 - 0.2 K/uL   BMP w/ Reflex to MG   Result Value Ref Range    Sodium 131 (L) 136 - 145 mmol/L    Potassium reflex Magnesium 4.3 3.5 - 5.1 mmol/L    Chloride 93 (L) 99 - 110 mmol/L    CO2 29 21 - 32 mmol/L    Anion Gap 9 3 - 16    Glucose 102 (H) 70 - 99 mg/dL    BUN 42 (H) 7 - 20 mg/dL Creatinine 10.3 (HH) 0.8 - 1.3 mg/dL    Est, Glom Filt Rate 5 (A) >60    Calcium 8.2 (L) 8.3 - 10.6 mg/dL   Hemoglobin and Hematocrit   Result Value Ref Range    Hemoglobin 9.2 (L) 13.5 - 17.5 g/dL    Hematocrit 27.8 (L) 40.5 - 52.5 %   TYPE AND SCREEN   Result Value Ref Range    ABO/Rh B POS     Antibody Screen NEG    PREPARE RBC (CROSSMATCH), 2 Units   Result Value Ref Range    Product Code Blood Bank [id number]     Description Blood Bank Red Blood Cells, Leuko-reduced     Unit Number [id number]     Dispense Status Blood Bank transfused     Product Code Blood Bank [id number]     Description Blood Bank Red Blood Cells, Leuko-reduced     Unit Number [id number]     Dispense Status Blood Bank transfused        RECENT VITALS:  BP: 128/76, Temp: 98.1 °F (36.7 °C), Heart Rate: 79, Resp: 18, SpO2: 100 %     ED Course     The patient was given the following medications:  Orders Placed This Encounter   Medications    lidocaine-EPINEPHrine 1 percent-1:[id number] injection 20 mL    0.9 % sodium chloride infusion     Please see my partner's notes for initial emergency department course. My evaluation, he is awake and alert and in no acute distress. He has had no further bleeding from his fistula on his left arm. He has received 2 units of blood without fever, chest pain, or shortness of breath. Hemoglobin has improved as expected. He has eaten a full meal while in the emergency department without difficulty    CONSULTS:  [address] [place] [address] / RADHA / Fili Jorge is a 74 y.o. male who presented yesterday with bleeding dialysis fistula, and previous provider was able to gain hemostasis. He was then found to be anemic, with blood transfusion ordered. He has received this blood transfusion with no evidence of transfusion reaction or fluid overload. No other signs or symptoms of bleeding from another source.   Feel he is stable for discharge back to his facility to continue his routine care. Medical Decision Making  Problems Addressed:  Acute blood loss anemia: acute illness or injury  Hemorrhage of arteriovenous fistula, initial encounter [facility]): acute illness or injury    Amount and/or Complexity of Data Reviewed  Labs: ordered. Decision-making details documented in ED Course. Risk  Prescription drug management. Minor surgery with identified risk factors. Risk Details: Sutures placed by previous provider, known risk of damage to underlying fistula          Clinical Impression     1. Hemorrhage of arteriovenous fistula, initial encounter ([facility] [address]    2.  Acute blood loss anemia        Disposition     PATIENT REFERRED TO:  [facility] Emergency Department  [address] [place] [address]  [address]  [phone]    As needed, If symptoms worsen    DISCHARGE MEDICATIONS:  New Prescriptions    No medications on file       DISPOSITION Decision To Discharge 02/23/2023 09:37:22 AM       Joaquin Myers MD  02/23/23 [id number]

## 2023-02-23 NOTE — ED PROVIDER NOTES
[address] [facility]          ATTENDING PHYSICIAN NOTE       Date of evaluation: 2/22/2023    Chief Complaint     Vascular Access Problem (Pt. Presents to ED via EMS from [place] with c/o bleeding fistula on left arm. )    History of Present Illness     Diana Vazquez is a 74 y.o. male with past medical history of ESRD on M [facility] hemodialysis who did receive a full run of dialysis today, chronic respiratory failure due to COPD with baseline oxygen requirement of 3 L, anticoagulation on Eliquis who presents to the emergency department today with bleeding from his fistula. Fistula was accessed earlier today for his hemodialysis run without any issues however, immediately prior to arrival he was trying to change his shirts when the overlying scab suddenly broke loose and he had profuse, pulsatile brisk bleeding. Patient has a history of multiple fistula bleeds similar to this including 1 most recently on 2/17/2023 where a surgery consult was required and he had 1 figure-of-eight stitch placed and Surgicel applied. He also has required transfusions previously. Bleeding did not stop with direct pressure and EMS placed a very large, bulky pressure dressing at this time. Patient denies any numbness or tingling of his arm. Does endorse fatigue as well as some lightheadedness. Denies any chest pain, worsening shortness of breath. ASSESSMENT / PLAN  (MEDICAL DECISION MAKING)     INITIAL VITALS: BP: 117/72, Temp: 97.9 °F (36.6 °C), Heart Rate: 80, Resp: 18, SpO2: 100 %      Diana Vazquez is a 74 y.o. male with a complex past medical history including ESRD on dialysis, therapeutic anticoagulation, recurrent AV fistula bleeds who presents with left upper extremity AV fistula bleed. On arrival, patient was awake, alert and although he appeared fatigued, was in no acute distress.   He had pulsatile flow from his fistula but otherwise had strong palpable thrill and good distal pulses in the left upper extremity. Direct pressure was held for greater than 5 minutes in addition to placing a peripheral venipuncture tourniquet with some swelling and bleeding but no control. A dry stat square was subsequently placed but did not achieve hemostasis. After this attempt, a second venipuncture tourniquet was placed and with difficulty, 2 figure-of-eight stitches were placed at the site of bleeding with subsequent hemostasis. Strong distal pulses remained palpable the entire time. Patient did have some downtrend in his blood pressures initially however never became hypotensive and never had any mental status changes. At the time of turnover, the patient has now had no active bleeding for 2 hours. Laboratory studies are notable for an acute drop of his hemoglobin from baseline of approximately 9.0 to 6.3, consistent with acute blood loss anemia. Patient will be transfused 2 units, slowly given his potential high risk for volume overload. I spoke with Dr. Jena Bravo of [facility]. nephrology regarding this patient; from her perspective, as long as patient tolerates his blood transfusions well and has no ongoing active bleeding, he is appropriate for discharge and would be also safe to receive dialysis during his next scheduled session on 2/24/2023    At this time I will be turning over care of this patient to my colleague, Dr. Myrtle Dang. They will follow-up on posttransfusion CBCs, continuing to monitor his respiratory status as needed for signs/symptoms of volume overload, evaluation and ongoing monitor for bleeding, reassessment and disposition    Medical Decision Making  Amount and/or Complexity of Data Reviewed  Labs: ordered. Decision-making details documented in ED Course. Risk  Prescription drug management. Decision regarding hospitalization.       Critical Care:  Due to the immediate potential for life-threatening deterioration due to massive AV fistula hemorrhage with arterial bleeding, I spent 70 minutes providing critical care. This time excludes time spent performing procedures but includes time spent on direct patient care, history retrieval, review of the chart, and discussions with patient, family, and consultant(s). Clinical Impression     1. Hemorrhage of arteriovenous fistula, initial encounter ([facility] [address]    2. Acute blood loss anemia        Disposition     PATIENT REFERRED TO:  No follow-up provider specified.     DISCHARGE MEDICATIONS:  New Prescriptions    No medications on file       DISPOSITION          Diagnostic Results and Other Data       RADIOLOGY:  No orders to display       LABS:   Results for orders placed or performed during the hospital encounter of 02/22/23   Protime-INR   Result Value Ref Range    Protime 16.0 (H) 11.7 - 14.5 sec    INR 1.29 (H) 0.87 - 1.14   CBC with Auto Differential   Result Value Ref Range    WBC 3.9 (L) 4.0 - 11.0 K/uL    RBC 2.26 (L) 4.20 - 5.90 M/uL    Hemoglobin 6.3 (LL) 13.5 - 17.5 g/dL    Hematocrit 20.1 (LL) 40.5 - 52.5 %    MCV 88.7 80.0 - 100.0 fL    MCH 27.9 26.0 - 34.0 pg    MCHC 31.4 31.0 - 36.0 g/dL    RDW 17.7 (H) 12.4 - 15.4 %    Platelets 722 (L) 139 - 450 K/uL    MPV 9.0 5.0 - 10.5 fL    Neutrophils % 67.0 %    Lymphocytes % 12.6 %    Monocytes % 11.5 %    Eosinophils % 8.2 %    Basophils % 0.7 %    Neutrophils Absolute 2.6 1.7 - 7.7 K/uL    Lymphocytes Absolute 0.5 (L) 1.0 - 5.1 K/uL    Monocytes Absolute 0.4 0.0 - 1.3 K/uL    Eosinophils Absolute 0.3 0.0 - 0.6 K/uL    Basophils Absolute 0.0 0.0 - 0.2 K/uL   BMP w/ Reflex to MG   Result Value Ref Range    Sodium 131 (L) 136 - 145 mmol/L    Potassium reflex Magnesium 4.3 3.5 - 5.1 mmol/L    Chloride 93 (L) 99 - 110 mmol/L    CO2 29 21 - 32 mmol/L    Anion Gap 9 3 - 16    Glucose 102 (H) 70 - 99 mg/dL    BUN 42 (H) 7 - 20 mg/dL    Creatinine 10.3 (HH) 0.8 - 1.3 mg/dL    Est, Glom Filt Rate 5 (A) >60    Calcium 8.2 (L) 8.3 - 10.6 mg/dL     ED BEDSIDE ULTRASOUND:  No results found.    MOST RECENT VITALS:  BP: 112/73,Temp: 97.9 °F (36.6 °C), Heart Rate: 76, Resp: 15, SpO2: 96 %     Procedures     Procedure was performed under emergent conditions secondary to life-threatening acute hemorrhage. Patient was able to verbally consent. No local anesthesia was able to be placed. Patient had 2 figure-of-eight's sutures placed over the site of active bleeding using 3-0 ethylene suture with subsequent hemostasis. Please see medical decision making for additional procedures performed to attempt hemostasis prior to this. ED Course     Nursing Notes, Past Medical Hx, Past Surgical Hx, Social Hx,Allergies, and Family Hx were reviewed. The patient was given the following medications:  Orders Placed This Encounter   Medications    lidocaine-EPINEPHrine 1 percent-1:577712 injection 20 mL    0.9 % sodium chloride infusion       CONSULTS:  IP CONSULT TO NEPHROLOGY    Review of Systems     14 Point ROS performed and is negative except as noted in HPI. Past Medical, Surgical, Family, and Social History     He has a past medical history of Cerebral artery occlusion with cerebral infarction [facility]), Combined systolic and diastolic heart failure ([facility] [address] COPD (chronic obstructive pulmonary disease) ([facility] [address] Gout, Hemodialysis patient ([facility] [address] Hx of blood clots, Hyperlipidemia, and Hypertension. He has a past surgical history that includes Dialysis fistula creation (Left); Colonoscopy; CT BIOPSY BONE MARROW (11/15/2022); Upper gastrointestinal endoscopy (N/A, 1/17/2023); and Colonoscopy (N/A, 1/17/2023). His family history includes Cancer in his father and mother; Other in his sister. He reports that he has quit smoking. He has never used smokeless tobacco. He reports that he does not currently use alcohol. He reports that he does not use drugs.     Medications     Previous Medications    ALBUTEROL SULFATE HFA (PROVENTIL;VENTOLIN;PROAIR) 108 (90 BASE) MCG/ACT INHALER    Inhale 2 puffs into the lungs every 6 hours as needed for Wheezing 90mcg/actuation    ALLOPURINOL (ZYLOPRIM) 100 MG TABLET    Take 100 mg by mouth three times a week On M/W/F    APIXABAN (ELIQUIS) 2.5 MG TABS TABLET    Take 1 tablet by mouth 2 times daily    ATORVASTATIN (LIPITOR) 80 MG TABLET    Take 80 mg by mouth nightly    CALCITRIOL (ROCALTROL) 0.5 MCG CAPSULE    Take 0.5 mcg by mouth three times a week On M/W/F    CALCIUM CARB-CHOLECALCIFEROL 600-200 MG-UNIT TABS TABLET    Take 1 tablet by mouth daily    CALCIUM CARBONATE (TUMS) 500 MG CHEWABLE TABLET    Take 1 tablet by mouth every 8 hours as needed for Heartburn    CARVEDILOL (COREG) 3.125 MG TABLET    Take 1 tablet by mouth 2 times daily    FERROUS SULFATE (IRON 325) 325 (65 FE) MG TABLET    Take 325 mg by mouth every other day    GABAPENTIN (NEURONTIN) 100 MG CAPSULE    Take 100 mg by mouth nightly.    LEVOTHYROXINE (SYNTHROID) 75 MCG TABLET    Take 1 tablet by mouth Daily    PANTOPRAZOLE (PROTONIX) 40 MG TABLET    Take 1 tablet by mouth every morning (before breakfast)    SEVELAMER (RENVELA) 800 MG TABLET    Take 1 tablet by mouth 3 times daily (with meals)    SODIUM CHLORIDE (OCEAN) 0.65 % NASAL SPRAY    1-2 sprays both nostrils at least daily and as needed to prevent dry mucosa.       Allergies     He is allergic to nitroglycerin and oxymetazoline.    Physical Exam     INITIAL VITALS: BP: 117/72, Temp: 97.9 °F (36.6 °C), Heart Rate: 80, Resp: 18, SpO2: 100 %     General:  WDWN male in NAD, nontoxic appearing   HEENT:  Normocephalic, atraumatic, tacky MM. No facial asymmetry.   Eyes: Anicteric, EOMI, PERRL   Neck:  Supple, full ROM, no bony TTP, no paraspinal TTP, no LAD   Pulmonary:   CTA bl, no wheezes, rales, rhonchi, no increased WOB or tachypnea, no chest wall TTP or visible deformities   Cardiovascular:  regular rate, regular rhythm, no m/r/g, no JVD, no peripheral edema.  Left upper extremity fistula is appreciated with strong thrill and symmetric, strongly palpable radial  pulses in the bilateral upper extremities. There is a punctate defect in the AV fistula with continuous, pulsatile, brisk, bright red bleeding appreciated. Abdomen:  Soft, nondistended, nontender to palpation in epigastrium, RUQ, LUQ, RLQ, LLQ, suprapubic region with no guarding or rebound. No CVA tenderness bilaterally  Extremities:  Warm, 2+ radial pulses b/l, 2+ DP pulses b/l.  No extremity deformity, edema or tenderness appreciated  Skin: No rashes or bruises, no appreciable pallor  Neuro:   Awake, alert, moving UE and LE spontaneously, CN 2-12 grossly intact  Psych:   Mood and affect appropriate for situation, denies SI/HI/AVH                Fabiola Bloom MD  02/22/23 0101

## 2023-02-23 NOTE — ED PROVIDER NOTES
[address] [facility] [address] ENCOUNTER          ATTENDING PHYSICIAN NOTE       Date of evaluation: 2/22/2023    ADDENDUM:      Care of this patient was assumed from Dr. Melia Trujillo. The patient was seen for Vascular Access Problem (Pt. Presents to ED via EMS from [facility] with c/o bleeding fistula on left arm. )  . The patient's initial evaluation and plan have been discussed with the prior provider who initially evaluated the patient. Nursing Notes, Past Medical Hx, Past Surgical Hx, Social Hx, Allergies, and Family Hx were all reviewed. Patient is a 31-year-old male with history of end-stage renal disease on Lqltfg-Bkprygqgx-Mbxnpy dialysis who presents for bleeding from dialysis fistula. Patient's had multiple ED visits for the same and has had to have figure-of-eight sutures placed several times in the past.  On arrival, patient was noted to have active bleeding from his fistula. This was controlled with direct pressure and figure-of-eight suture. Laboratory studies were significant for hemoglobin of 6.3 which is below the patient's baseline of 9. Nephrology was consulted and plan is to transfuse the patient 2 units of packed red blood cells. At time of turnover, repeat evaluation post transfusion was pending. ASSESSMENT / PLAN  (MEDICAL DECISION MAKING)     Elfego Adames is a 74 y.o. male with history of end-stage renal disease on Phhtkw-Vwcggxhnx-Ocvpms dialysis who did receive dialysis earlier presents for bleeding from his dialysis fistula. Patient's had multiple emergency departments for postprocedural bleeding. On arrival, patient was noted to have profuse bleeding coming from the dialysis fistula. This was able to be controlled with figure-of-eight sutures. Laboratory studies are significant for hemoglobin of 6.3 which is below the patient's baseline of 9. Laboratory studies are otherwise at the patient's baseline.   Patient was written for 2 units of packed red blood cells after discussion with nephrology, but there was a significant delay in initiation of this transfusion. Patient will be turned over to the oncoming provider to follow-up on repeat hemoglobin to assure appropriate response to transfusion as well as no evidence of fluid overload. Medical Decision Making  Problems Addressed:  Acute blood loss anemia: acute illness or injury  Hemorrhage of arteriovenous fistula, initial encounter [facility]): acute illness or injury    Amount and/or Complexity of Data Reviewed  Labs: ordered. Decision-making details documented in ED Course. Risk  Prescription drug management. Decision regarding hospitalization. Clinical Impression     1. Hemorrhage of arteriovenous fistula, initial encounter ([facility] [address]    2. Acute blood loss anemia        Disposition     PATIENT REFERRED TO:  No follow-up provider specified.     DISCHARGE MEDICATIONS:  New Prescriptions    No medications on file       DISPOSITION          Diagnostic Results and Other Data                                   RADIOLOGY:  No orders to display       LABS:   Results for orders placed or performed during the hospital encounter of 02/22/23   Protime-INR   Result Value Ref Range    Protime 16.0 (H) 11.7 - 14.5 sec    INR 1.29 (H) 0.87 - 1.14   CBC with Auto Differential   Result Value Ref Range    WBC 3.9 (L) 4.0 - 11.0 K/uL    RBC 2.26 (L) 4.20 - 5.90 M/uL    Hemoglobin 6.3 (LL) 13.5 - 17.5 g/dL    Hematocrit 20.1 (LL) 40.5 - 52.5 %    MCV 88.7 80.0 - 100.0 fL    MCH 27.9 26.0 - 34.0 pg    MCHC 31.4 31.0 - 36.0 g/dL    RDW 17.7 (H) 12.4 - 15.4 %    Platelets 899 (L) 116 - 450 K/uL    MPV 9.0 5.0 - 10.5 fL    Neutrophils % 67.0 %    Lymphocytes % 12.6 %    Monocytes % 11.5 %    Eosinophils % 8.2 %    Basophils % 0.7 %    Neutrophils Absolute 2.6 1.7 - 7.7 K/uL    Lymphocytes Absolute 0.5 (L) 1.0 - 5.1 K/uL    Monocytes Absolute 0.4 0.0 - 1.3 K/uL    Eosinophils Absolute 0.3 0.0 - 0.6 K/uL    Basophils Absolute 0.0 0.0 - 0.2 K/uL   BMP w/ Reflex to MG   Result Value Ref Range    Sodium 131 (L) 136 - 145 mmol/L    Potassium reflex Magnesium 4.3 3.5 - 5.1 mmol/L    Chloride 93 (L) 99 - 110 mmol/L    CO2 29 21 - 32 mmol/L    Anion Gap 9 3 - 16    Glucose 102 (H) 70 - 99 mg/dL    BUN 42 (H) 7 - 20 mg/dL    Creatinine 10.3 (HH) 0.8 - 1.3 mg/dL    Est, Glom Filt Rate 5 (A) >60    Calcium 8.2 (L) 8.3 - 10.6 mg/dL   TYPE AND SCREEN   Result Value Ref Range    ABO/Rh B POS     Antibody Screen NEG    PREPARE RBC (CROSSMATCH), 2 Units   Result Value Ref Range    Product Code Blood Bank [id number]     Description Blood Bank Red Blood Cells, Leuko-reduced     Unit Number [id number]     Dispense Status Blood Bank transfused     Product Code Blood Bank [id number]     Description Blood Bank Red Blood Cells, Leuko-reduced     Unit Number [id number]     Dispense Status Blood Bank transfused      MOST RECENT VITALS:  BP: 128/76, Temp: 97.7 °F (36.5 °C), Heart Rate: 79, Resp: 18, SpO2: 97 %     Procedures     N/A    ED Course          The patient was given the following medications:  Orders Placed This Encounter   Medications    lidocaine-EPINEPHrine 1 percent-1:085078 injection 20 mL    0.9 % sodium chloride infusion       CONSULTS:  Hernan Weiner MD  02/23/23 [id number]

## 2023-02-25 ENCOUNTER — APPOINTMENT (OUTPATIENT)
Dept: CT IMAGING | Age: 68
End: 2023-02-25
Payer: MEDICARE

## 2023-02-25 ENCOUNTER — APPOINTMENT (OUTPATIENT)
Dept: GENERAL RADIOLOGY | Age: 68
End: 2023-02-25
Payer: MEDICARE

## 2023-02-25 ENCOUNTER — HOSPITAL ENCOUNTER (OUTPATIENT)
Age: 68
Setting detail: OBSERVATION
Discharge: NURSING, REHAB OR LONG-TERM CARE | End: 2023-02-27
Attending: EMERGENCY MEDICINE | Admitting: INTERNAL MEDICINE
Payer: MEDICARE

## 2023-02-25 DIAGNOSIS — E87.1 HYPONATREMIA: Primary | ICD-10-CM

## 2023-02-25 DIAGNOSIS — R42 LIGHTHEADED: ICD-10-CM
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
## 2023-02-25 PROBLEM — R94.31 ACUTE ELECTROCARDIOGRAM CHANGES: Status: ACTIVE | Noted: 2023-02-25

## 2023-02-25 PROBLEM — R26.2 AMBULATORY DYSFUNCTION: Status: ACTIVE | Noted: 2023-02-25

## 2023-02-25 LAB
A/G RATIO: 1.2 (ref 1.1–2.2)
ALBUMIN SERPL-MCNC: 3.1 G/DL (ref 3.4–5)
ALP BLD-CCNC: 125 U/L (ref 40–129)
ALT SERPL-CCNC: 7 U/L (ref 10–40)
ANION GAP SERPL CALCULATED.3IONS-SCNC: 14 MMOL/L (ref 3–16)
AST SERPL-CCNC: 21 U/L (ref 15–37)
BASOPHILS ABSOLUTE: 0.1 K/UL (ref 0–0.2)
BASOPHILS RELATIVE PERCENT: 1.2 %
BILIRUB SERPL-MCNC: 0.4 MG/DL (ref 0–1)
BUN BLDV-MCNC: 68 MG/DL (ref 7–20)
CALCIUM SERPL-MCNC: 9.2 MG/DL (ref 8.3–10.6)
CHLORIDE BLD-SCNC: 90 MMOL/L (ref 99–110)
CO2: 25 MMOL/L (ref 21–32)
CREAT SERPL-MCNC: 13.4 MG/DL (ref 0.8–1.3)
EOSINOPHILS ABSOLUTE: 0.4 K/UL (ref 0–0.6)
EOSINOPHILS RELATIVE PERCENT: 7.4 %
GFR SERPL CREATININE-BSD FRML MDRD: 4 ML/MIN/{1.73_M2}
GLUCOSE BLD-MCNC: 100 MG/DL (ref 70–99)
HCT VFR BLD CALC: 29 % (ref 40.5–52.5)
HEMOGLOBIN: 9.4 G/DL (ref 13.5–17.5)
LYMPHOCYTES ABSOLUTE: 0.7 K/UL (ref 1–5.1)
LYMPHOCYTES RELATIVE PERCENT: 12.7 %
MCH RBC QN AUTO: 28.9 PG (ref 26–34)
MCHC RBC AUTO-ENTMCNC: 32.4 G/DL (ref 31–36)
MCV RBC AUTO: 89.3 FL (ref 80–100)
MONOCYTES ABSOLUTE: 0.5 K/UL (ref 0–1.3)
MONOCYTES RELATIVE PERCENT: 8.9 %
NEUTROPHILS ABSOLUTE: 4 K/UL (ref 1.7–7.7)
NEUTROPHILS RELATIVE PERCENT: 69.8 %
PDW BLD-RTO: 15.9 % (ref 12.4–15.4)
PLATELET # BLD: 133 K/UL (ref 135–450)
PMV BLD AUTO: 9 FL (ref 5–10.5)
POTASSIUM REFLEX MAGNESIUM: 4.3 MMOL/L (ref 3.5–5.1)
RBC # BLD: 3.25 M/UL (ref 4.2–5.9)
SODIUM BLD-SCNC: 129 MMOL/L (ref 136–145)
TOTAL PROTEIN: 5.6 G/DL (ref 6.4–8.2)
TROPONIN: 0.1 NG/ML
TROPONIN: 0.11 NG/ML
WBC # BLD: 5.7 K/UL (ref 4–11)

## 2023-02-25 PROCEDURE — 6370000000 HC RX 637 (ALT 250 FOR IP): Performed by: INTERNAL MEDICINE

## 2023-02-25 PROCEDURE — G0378 HOSPITAL OBSERVATION PER HR: HCPCS

## 2023-02-25 PROCEDURE — 73562 X-RAY EXAM OF KNEE 3: CPT

## 2023-02-25 PROCEDURE — 2500000003 HC RX 250 WO HCPCS: Performed by: INTERNAL MEDICINE

## 2023-02-25 PROCEDURE — 71045 X-RAY EXAM CHEST 1 VIEW: CPT

## 2023-02-25 PROCEDURE — 84484 ASSAY OF TROPONIN QUANT: CPT

## 2023-02-25 PROCEDURE — 93005 ELECTROCARDIOGRAM TRACING: CPT | Performed by: EMERGENCY MEDICINE

## 2023-02-25 PROCEDURE — 70450 CT HEAD/BRAIN W/O DYE: CPT

## 2023-02-25 PROCEDURE — 85025 COMPLETE CBC W/AUTO DIFF WBC: CPT

## 2023-02-25 PROCEDURE — 2580000003 HC RX 258: Performed by: INTERNAL MEDICINE

## 2023-02-25 PROCEDURE — 70486 CT MAXILLOFACIAL W/O DYE: CPT

## 2023-02-25 PROCEDURE — 80053 COMPREHEN METABOLIC PANEL: CPT

## 2023-02-25 PROCEDURE — 99285 EMERGENCY DEPT VISIT HI MDM: CPT

## 2023-02-25 RX ORDER — GABAPENTIN 100 MG/1
100 CAPSULE ORAL
Status: DISCONTINUED | OUTPATIENT
Start: 2023-02-25 | End: 2023-02-27 | Stop reason: HOSPADM

## 2023-02-25 RX ORDER — ONDANSETRON 2 MG/ML
4 INJECTION INTRAMUSCULAR; INTRAVENOUS EVERY 6 HOURS PRN
Status: DISCONTINUED | OUTPATIENT
Start: 2023-02-25 | End: 2023-02-27 | Stop reason: HOSPADM

## 2023-02-25 RX ORDER — LEVOTHYROXINE SODIUM 0.07 MG/1
75 TABLET ORAL DAILY
Status: DISCONTINUED | OUTPATIENT
Start: 2023-02-26 | End: 2023-02-27 | Stop reason: HOSPADM

## 2023-02-25 RX ORDER — POLYETHYLENE GLYCOL 3350 17 G/17G
17 POWDER, FOR SOLUTION ORAL DAILY PRN
Status: DISCONTINUED | OUTPATIENT
Start: 2023-02-25 | End: 2023-02-27 | Stop reason: HOSPADM

## 2023-02-25 RX ORDER — ALLOPURINOL 100 MG/1
100 TABLET ORAL
Status: DISCONTINUED | OUTPATIENT
Start: 2023-02-27 | End: 2023-02-27 | Stop reason: HOSPADM

## 2023-02-25 RX ORDER — SODIUM CHLORIDE 0.9 % (FLUSH) 0.9 %
5-40 SYRINGE (ML) INJECTION PRN
Status: DISCONTINUED | OUTPATIENT
Start: 2023-02-25 | End: 2023-02-27 | Stop reason: HOSPADM

## 2023-02-25 RX ORDER — CALCITRIOL 0.25 UG/1
0.5 CAPSULE, LIQUID FILLED ORAL
Status: DISCONTINUED | OUTPATIENT
Start: 2023-02-27 | End: 2023-02-27 | Stop reason: HOSPADM

## 2023-02-25 RX ORDER — PANTOPRAZOLE SODIUM 40 MG/1
40 TABLET, DELAYED RELEASE ORAL
Status: DISCONTINUED | OUTPATIENT
Start: 2023-02-26 | End: 2023-02-27 | Stop reason: HOSPADM

## 2023-02-25 RX ORDER — ATORVASTATIN CALCIUM 40 MG/1
40 TABLET, FILM COATED ORAL NIGHTLY
Status: DISCONTINUED | OUTPATIENT
Start: 2023-02-25 | End: 2023-02-25

## 2023-02-25 RX ORDER — CALCIUM ACETATE 667 MG/1
1334 CAPSULE ORAL 3 TIMES DAILY
Status: DISCONTINUED | OUTPATIENT
Start: 2023-02-25 | End: 2023-02-27 | Stop reason: HOSPADM

## 2023-02-25 RX ORDER — CALCIUM CARBONATE 200(500)MG
1 TABLET,CHEWABLE ORAL EVERY 8 HOURS PRN
Status: DISCONTINUED | OUTPATIENT
Start: 2023-02-25 | End: 2023-02-27 | Stop reason: HOSPADM

## 2023-02-25 RX ORDER — ACETAMINOPHEN 650 MG/1
650 SUPPOSITORY RECTAL EVERY 6 HOURS PRN
Status: DISCONTINUED | OUTPATIENT
Start: 2023-02-25 | End: 2023-02-27 | Stop reason: HOSPADM

## 2023-02-25 RX ORDER — ASPIRIN 81 MG
1 TABLET, DELAYED RELEASE (ENTERIC COATED) ORAL DAILY
Status: ON HOLD | COMMUNITY
End: 2023-02-27 | Stop reason: HOSPADM

## 2023-02-25 RX ORDER — SODIUM CHLORIDE 9 MG/ML
INJECTION, SOLUTION INTRAVENOUS PRN
Status: DISCONTINUED | OUTPATIENT
Start: 2023-02-25 | End: 2023-02-27 | Stop reason: HOSPADM

## 2023-02-25 RX ORDER — FERROUS SULFATE 325(65) MG
325 TABLET ORAL EVERY OTHER DAY
Status: DISCONTINUED | OUTPATIENT
Start: 2023-02-26 | End: 2023-02-27 | Stop reason: HOSPADM

## 2023-02-25 RX ORDER — SODIUM CHLORIDE 0.9 % (FLUSH) 0.9 %
5-40 SYRINGE (ML) INJECTION EVERY 12 HOURS SCHEDULED
Status: DISCONTINUED | OUTPATIENT
Start: 2023-02-25 | End: 2023-02-27 | Stop reason: HOSPADM

## 2023-02-25 RX ORDER — ALBUTEROL SULFATE 2.5 MG/3ML
2.5 SOLUTION RESPIRATORY (INHALATION) EVERY 4 HOURS PRN
Status: DISCONTINUED | OUTPATIENT
Start: 2023-02-25 | End: 2023-02-27 | Stop reason: HOSPADM

## 2023-02-25 RX ORDER — CALCIUM ACETATE 667 MG/1
2 TABLET ORAL 3 TIMES DAILY
COMMUNITY
Start: 2023-02-23

## 2023-02-25 RX ORDER — CARVEDILOL 6.25 MG/1
3.12 TABLET ORAL 2 TIMES DAILY
Status: DISCONTINUED | OUTPATIENT
Start: 2023-02-25 | End: 2023-02-27 | Stop reason: HOSPADM

## 2023-02-25 RX ORDER — ACETAMINOPHEN 325 MG/1
650 TABLET ORAL EVERY 6 HOURS PRN
Status: DISCONTINUED | OUTPATIENT
Start: 2023-02-25 | End: 2023-02-27 | Stop reason: HOSPADM

## 2023-02-25 RX ORDER — ASPIRIN 81 MG/1
81 TABLET, CHEWABLE ORAL DAILY
Status: DISCONTINUED | OUTPATIENT
Start: 2023-02-26 | End: 2023-02-27 | Stop reason: HOSPADM

## 2023-02-25 RX ORDER — SEVELAMER CARBONATE 800 MG/1
800 TABLET, FILM COATED ORAL
Status: DISCONTINUED | OUTPATIENT
Start: 2023-02-26 | End: 2023-02-27 | Stop reason: HOSPADM

## 2023-02-25 RX ORDER — ONDANSETRON 4 MG/1
4 TABLET, ORALLY DISINTEGRATING ORAL EVERY 8 HOURS PRN
Status: DISCONTINUED | OUTPATIENT
Start: 2023-02-25 | End: 2023-02-27 | Stop reason: HOSPADM

## 2023-02-25 RX ORDER — ATORVASTATIN CALCIUM 40 MG/1
80 TABLET, FILM COATED ORAL NIGHTLY
Status: DISCONTINUED | OUTPATIENT
Start: 2023-02-25 | End: 2023-02-27 | Stop reason: HOSPADM

## 2023-02-25 RX ADMIN — APIXABAN 2.5 MG: 2.5 TABLET, FILM COATED ORAL at 23:48

## 2023-02-25 RX ADMIN — SODIUM CHLORIDE, PRESERVATIVE FREE 10 ML: 5 INJECTION INTRAVENOUS at 23:54

## 2023-02-25 RX ADMIN — GABAPENTIN 100 MG: 100 CAPSULE ORAL at 23:47

## 2023-02-25 RX ADMIN — CALCIUM ACETATE 1334 MG: 667 CAPSULE ORAL at 23:47

## 2023-02-25 RX ADMIN — ATORVASTATIN CALCIUM 80 MG: 40 TABLET, FILM COATED ORAL at 23:47

## 2023-02-25 RX ADMIN — ACETAMINOPHEN 650 MG: 325 TABLET ORAL at 23:48

## 2023-02-25 RX ADMIN — CARVEDILOL 3.12 MG: 6.25 TABLET, FILM COATED ORAL at 23:46

## 2023-02-25 ASSESSMENT — PAIN DESCRIPTION - FREQUENCY: FREQUENCY: INTERMITTENT

## 2023-02-25 ASSESSMENT — PAIN SCALES - GENERAL
PAINLEVEL_OUTOF10: 7
PAINLEVEL_OUTOF10: 7

## 2023-02-25 ASSESSMENT — PAIN DESCRIPTION - ONSET: ONSET: ON-GOING

## 2023-02-25 ASSESSMENT — PAIN DESCRIPTION - LOCATION
LOCATION: KNEE
LOCATION: KNEE

## 2023-02-25 ASSESSMENT — PAIN - FUNCTIONAL ASSESSMENT: PAIN_FUNCTIONAL_ASSESSMENT: PREVENTS OR INTERFERES SOME ACTIVE ACTIVITIES AND ADLS

## 2023-02-25 ASSESSMENT — PAIN DESCRIPTION - ORIENTATION
ORIENTATION: RIGHT
ORIENTATION: RIGHT

## 2023-02-25 ASSESSMENT — PAIN DESCRIPTION - DESCRIPTORS
DESCRIPTORS: ACHING
DESCRIPTORS: ACHING

## 2023-02-25 ASSESSMENT — PAIN DESCRIPTION - PAIN TYPE: TYPE: ACUTE PAIN

## 2023-02-26 ENCOUNTER — APPOINTMENT (OUTPATIENT)
Dept: GENERAL RADIOLOGY | Age: 68
End: 2023-02-26
Payer: MEDICARE

## 2023-02-26 PROBLEM — Z86.711 HISTORY OF PULMONARY EMBOLISM: Status: ACTIVE | Noted: 2023-02-26

## 2023-02-26 PROBLEM — R42 LIGHTHEADED: Status: ACTIVE | Noted: 2023-02-26

## 2023-02-26 PROBLEM — I27.20 PULMONARY HYPERTENSION (HCC): Status: ACTIVE | Noted: 2023-02-26

## 2023-02-26 LAB
ANION GAP SERPL CALCULATED.3IONS-SCNC: 10 MMOL/L (ref 3–16)
BUN BLDV-MCNC: 69 MG/DL (ref 7–20)
CALCIUM SERPL-MCNC: 9.5 MG/DL (ref 8.3–10.6)
CHLORIDE BLD-SCNC: 94 MMOL/L (ref 99–110)
CO2: 29 MMOL/L (ref 21–32)
CREAT SERPL-MCNC: 14 MG/DL (ref 0.8–1.3)
EKG ATRIAL RATE: 79 BPM
EKG DIAGNOSIS: NORMAL
EKG P AXIS: 73 DEGREES
EKG P-R INTERVAL: 294 MS
EKG Q-T INTERVAL: 410 MS
EKG QRS DURATION: 98 MS
EKG QTC CALCULATION (BAZETT): 470 MS
EKG R AXIS: 121 DEGREES
EKG T AXIS: 94 DEGREES
EKG VENTRICULAR RATE: 79 BPM
GFR SERPL CREATININE-BSD FRML MDRD: 4 ML/MIN/{1.73_M2}
GLUCOSE BLD-MCNC: 107 MG/DL (ref 70–99)
HCT VFR BLD CALC: 31.5 % (ref 40.5–52.5)
HEMOGLOBIN: 10 G/DL (ref 13.5–17.5)
MCH RBC QN AUTO: 29 PG (ref 26–34)
MCHC RBC AUTO-ENTMCNC: 31.8 G/DL (ref 31–36)
MCV RBC AUTO: 91.3 FL (ref 80–100)
PDW BLD-RTO: 16.5 % (ref 12.4–15.4)
PLATELET # BLD: 112 K/UL (ref 135–450)
PMV BLD AUTO: 8 FL (ref 5–10.5)
POTASSIUM REFLEX MAGNESIUM: 4.3 MMOL/L (ref 3.5–5.1)
RBC # BLD: 3.45 M/UL (ref 4.2–5.9)
SODIUM BLD-SCNC: 133 MMOL/L (ref 136–145)
TROPONIN: 0.12 NG/ML
VITAMIN D 25-HYDROXY: 42.2 NG/ML
WBC # BLD: 5 K/UL (ref 4–11)

## 2023-02-26 PROCEDURE — 80048 BASIC METABOLIC PNL TOTAL CA: CPT

## 2023-02-26 PROCEDURE — 6370000000 HC RX 637 (ALT 250 FOR IP): Performed by: INTERNAL MEDICINE

## 2023-02-26 PROCEDURE — G0378 HOSPITAL OBSERVATION PER HR: HCPCS

## 2023-02-26 PROCEDURE — 2700000000 HC OXYGEN THERAPY PER DAY

## 2023-02-26 PROCEDURE — 36415 COLL VENOUS BLD VENIPUNCTURE: CPT

## 2023-02-26 PROCEDURE — 73630 X-RAY EXAM OF FOOT: CPT

## 2023-02-26 PROCEDURE — 84484 ASSAY OF TROPONIN QUANT: CPT

## 2023-02-26 PROCEDURE — 94761 N-INVAS EAR/PLS OXIMETRY MLT: CPT

## 2023-02-26 PROCEDURE — 2580000003 HC RX 258: Performed by: INTERNAL MEDICINE

## 2023-02-26 PROCEDURE — 82306 VITAMIN D 25 HYDROXY: CPT

## 2023-02-26 PROCEDURE — 2500000003 HC RX 250 WO HCPCS: Performed by: INTERNAL MEDICINE

## 2023-02-26 PROCEDURE — 93005 ELECTROCARDIOGRAM TRACING: CPT | Performed by: INTERNAL MEDICINE

## 2023-02-26 PROCEDURE — 85027 COMPLETE CBC AUTOMATED: CPT

## 2023-02-26 RX ADMIN — PANTOPRAZOLE SODIUM 40 MG: 40 TABLET, DELAYED RELEASE ORAL at 05:45

## 2023-02-26 RX ADMIN — APIXABAN 2.5 MG: 2.5 TABLET, FILM COATED ORAL at 21:00

## 2023-02-26 RX ADMIN — CARVEDILOL 3.12 MG: 6.25 TABLET, FILM COATED ORAL at 08:56

## 2023-02-26 RX ADMIN — ATORVASTATIN CALCIUM 80 MG: 40 TABLET, FILM COATED ORAL at 21:00

## 2023-02-26 RX ADMIN — LEVOTHYROXINE SODIUM 75 MCG: 75 TABLET ORAL at 05:44

## 2023-02-26 RX ADMIN — FERROUS SULFATE TAB 325 MG (65 MG ELEMENTAL FE) 325 MG: 325 (65 FE) TAB at 08:57

## 2023-02-26 RX ADMIN — GABAPENTIN 100 MG: 100 CAPSULE ORAL at 21:00

## 2023-02-26 RX ADMIN — ASPIRIN 81 MG 81 MG: 81 TABLET ORAL at 08:56

## 2023-02-26 RX ADMIN — SEVELAMER CARBONATE 800 MG: 800 TABLET, FILM COATED ORAL at 18:00

## 2023-02-26 RX ADMIN — SODIUM CHLORIDE, PRESERVATIVE FREE 10 ML: 5 INJECTION INTRAVENOUS at 21:02

## 2023-02-26 RX ADMIN — CALCIUM ACETATE 1334 MG: 667 CAPSULE ORAL at 14:49

## 2023-02-26 RX ADMIN — SEVELAMER CARBONATE 800 MG: 800 TABLET, FILM COATED ORAL at 08:56

## 2023-02-26 RX ADMIN — SODIUM CHLORIDE, PRESERVATIVE FREE 10 ML: 5 INJECTION INTRAVENOUS at 08:58

## 2023-02-26 RX ADMIN — CALCIUM ACETATE 1334 MG: 667 CAPSULE ORAL at 08:56

## 2023-02-26 RX ADMIN — CALCIUM ACETATE 1334 MG: 667 CAPSULE ORAL at 21:00

## 2023-02-26 RX ADMIN — APIXABAN 2.5 MG: 2.5 TABLET, FILM COATED ORAL at 08:56

## 2023-02-26 RX ADMIN — CALCIUM CARBONATE-VITAMIN D TAB 500 MG-200 UNIT 1 TABLET: 500-200 TAB at 08:56

## 2023-02-26 RX ADMIN — CARVEDILOL 3.12 MG: 6.25 TABLET, FILM COATED ORAL at 21:00

## 2023-02-26 NOTE — ED PROVIDER NOTES
[address] [facility]          ATTENDING PHYSICIAN NOTE       Date of evaluation: 2/25/2023    Chief Complaint     Fall (Pt arrives to the ER via EMS. EMS was called for frequent falls and low oxygen saturation. Per EMS pt was O2 sat was 95-98% on 3L via NC which is his baseline. Pt endorses falling 2x today, (+) LOC)      History of Present Illness     Charlie Ramos is a 74 y.o. male with a past medical history of ESRD on hemodialysis Monday/Wednesday/Friday, COPD on 3 L of oxygen, CVA, heart failure, hypertension, hyperlipidemia, blood clots on Eliquis who presents to the emergency department today for frequent falls. He states today that he fell twice. He states he got dizzy and lightheaded after standing. He fell forward onto his face. Per EMS, they were also called for low oxygen saturation, but he was satting normally when they arrived. He denies any respiratory distress currently. He is complained of discomfort in his right knee as well. Prior to this, he was eating and drinking normally. He is having normal bowel movements. He makes very little urine. No vision changes, chest pain, shortness of breath, abdominal pain, nausea, vomiting, diarrhea, constipation, fever, chills. Review of Systems     As documented in HPI, otherwise all other systems were reviewed and negative    Past Medical, Surgical, Family, and Social History     He has a past medical history of Cerebral artery occlusion with cerebral infarction ([address] Combined systolic and diastolic heart failure ([address] COPD (chronic obstructive pulmonary disease) ([address] Gout, Hemodialysis patient ([facility] Hx of blood clots, Hyperlipidemia, and Hypertension. He has a past surgical history that includes Dialysis fistula creation (Left); Colonoscopy; CT BIOPSY BONE MARROW (11/15/2022); Upper gastrointestinal endoscopy (N/A, 1/17/2023); and Colonoscopy (N/A, 1/17/2023).   His family history includes Cancer in his father and mother; Other in his sister. He reports that he has quit smoking. He has never used smokeless tobacco. He reports that he does not currently use alcohol. He reports that he does not use drugs. Medications     Previous Medications    ALBUTEROL SULFATE HFA (PROVENTIL;VENTOLIN;PROAIR) 108 (90 BASE) MCG/ACT INHALER    Inhale 2 puffs into the lungs every 6 hours as needed for Wheezing 90mcg/actuation    ALLOPURINOL (ZYLOPRIM) 100 MG TABLET    Take 100 mg by mouth three times a week On M/W/F    APIXABAN (ELIQUIS) 2.5 MG TABS TABLET    Take 1 tablet by mouth 2 times daily    ATORVASTATIN (LIPITOR) 80 MG TABLET    Take 80 mg by mouth nightly    CALCITRIOL (ROCALTROL) 0.5 MCG CAPSULE    Take 0.5 mcg by mouth three times a week On M/W/F    CALCIUM CARB-CHOLECALCIFEROL 600-200 MG-UNIT TABS TABLET    Take 1 tablet by mouth daily    CALCIUM CARBONATE (TUMS) 500 MG CHEWABLE TABLET    Take 1 tablet by mouth every 8 hours as needed for Heartburn    CARVEDILOL (COREG) 3.125 MG TABLET    Take 1 tablet by mouth 2 times daily    FERROUS SULFATE (IRON 325) 325 (65 FE) MG TABLET    Take 325 mg by mouth every other day    GABAPENTIN (NEURONTIN) 100 MG CAPSULE    Take 100 mg by mouth nightly. LEVOTHYROXINE (SYNTHROID) 75 MCG TABLET    Take 1 tablet by mouth Daily    PANTOPRAZOLE (PROTONIX) 40 MG TABLET    Take 1 tablet by mouth every morning (before breakfast)    SEVELAMER (RENVELA) 800 MG TABLET    Take 1 tablet by mouth 3 times daily (with meals)    SODIUM CHLORIDE (OCEAN) 0.65 % NASAL SPRAY    1-2 sprays both nostrils at least daily and as needed to prevent dry mucosa. Allergies     He is allergic to nitroglycerin and oxymetazoline. Physical Exam     INITIAL VITALS: BP: (!) 140/78, Temp: 98 °F (36.7 °C), Heart Rate: 84, Resp: 16, SpO2: 100 %   General: Well-appearing, no acute distress  HEENT: head is atraumatic, pupils equal round and reactive to light. No facial instability.   Discomfort to palpation to the nasal bones. Neck: Supple, no lymphadenopathy  Chest: Nonlabored respirations, equal chest rise bilaterally, no accessory muscle use  Cardiovascular: Normal rate, regular rhythm, 2+ radial pulses bilaterally  Abdominal: Soft, nontender, nondistended, no rebound or guarding  Back: No CVA tenderness bilaterally. No spine pain. Full range of motion of the cervical spine. Skin: Warm, dry, well-perfused, no rashes  Musculoskeletal: No obvious deformities. Full range of motion of the bilateral upper and the left lower extremity. Full range of motion of the right hip and ankle. Minimally decreased range of motion of the right knee due to discomfort. Neurologic: Alert and oriented x4, speech is clear and intact without dysarthria, moving all extremities normally  Psych: Appropriate mood and affect  Diagnostic Results     EKG   Twelve-lead EKG performed on 2/25/2023 at 23 9 hours. Sinus rhythm at first-degree heart block at a rate of 79. Normal axis. Poor R wave progression. No significant Q waves noted. No ST segment changes noted. T wave inversions in V5 and V6 which appear new. QTc prolonged at 470. QRS normal at 98. OK interval prolonged at 294. No STEMI. RADIOLOGY:  CT Head W/O Contrast   Final Result      Head:   1. Small focal hyperdensity in the right posterior temporal lobe is stable from January 26, 2023 and may represent parenchymal calcification. 2. No acute intracranial hemorrhage. 3. Mild chronic small vessel ischemic white matter disease with multiple small remote infarcts in the jenny, basal ganglia, and left thalamus. Face:   1. No acute fractures. 2. Severe maxillary sinus opacification. CT FACIAL BONES WO CONTRAST   Final Result      Head:   1. Small focal hyperdensity in the right posterior temporal lobe is stable from January 26, 2023 and may represent parenchymal calcification. 2. No acute intracranial hemorrhage.    3. Mild chronic small vessel ischemic white matter disease with multiple small remote infarcts in the jenny, basal ganglia, and left thalamus. Face:   1. No acute fractures. 2. Severe maxillary sinus opacification. XR KNEE RIGHT (3 VIEWS)   Final Result      Right knee: No acute osseous abnormality. CHEST: No acute cardiopulmonary disease. XR CHEST PORTABLE   Final Result      Right knee: No acute osseous abnormality. CHEST: No acute cardiopulmonary disease.           LABS:   Results for orders placed or performed during the hospital encounter of 02/25/23   CBC with Auto Differential   Result Value Ref Range    WBC 5.7 4.0 - 11.0 K/uL    RBC 3.25 (L) 4.20 - 5.90 M/uL    Hemoglobin 9.4 (L) 13.5 - 17.5 g/dL    Hematocrit 29.0 (L) 40.5 - 52.5 %    MCV 89.3 80.0 - 100.0 fL    MCH 28.9 26.0 - 34.0 pg    MCHC 32.4 31.0 - 36.0 g/dL    RDW 15.9 (H) 12.4 - 15.4 %    Platelets 281 (L) 833 - 450 K/uL    MPV 9.0 5.0 - 10.5 fL    Neutrophils % 69.8 %    Lymphocytes % 12.7 %    Monocytes % 8.9 %    Eosinophils % 7.4 %    Basophils % 1.2 %    Neutrophils Absolute 4.0 1.7 - 7.7 K/uL    Lymphocytes Absolute 0.7 (L) 1.0 - 5.1 K/uL    Monocytes Absolute 0.5 0.0 - 1.3 K/uL    Eosinophils Absolute 0.4 0.0 - 0.6 K/uL    Basophils Absolute 0.1 0.0 - 0.2 K/uL   CMP w/ Reflex to MG   Result Value Ref Range    Sodium 129 (L) 136 - 145 mmol/L    Potassium reflex Magnesium 4.3 3.5 - 5.1 mmol/L    Chloride 90 (L) 99 - 110 mmol/L    CO2 25 21 - 32 mmol/L    Anion Gap 14 3 - 16    Glucose 100 (H) 70 - 99 mg/dL    BUN 68 (H) 7 - 20 mg/dL    Creatinine 13.4 (HH) 0.8 - 1.3 mg/dL    Est, Glom Filt Rate 4 (A) >60    Calcium 9.2 8.3 - 10.6 mg/dL    Total Protein 5.6 (L) 6.4 - 8.2 g/dL    Albumin 3.1 (L) 3.4 - 5.0 g/dL    Albumin/Globulin Ratio 1.2 1.1 - 2.2    Total Bilirubin 0.4 0.0 - 1.0 mg/dL    Alkaline Phosphatase 125 40 - 129 U/L    ALT 7 (L) 10 - 40 U/L    AST 21 15 - 37 U/L   Troponin   Result Value Ref Range    Troponin 0.10 (H) <0.01 ng/mL       ED BEDSIDE ULTRASOUND:  No results found. RECENT VITALS:  BP: (!) 115/59,Temp: 98 °F (36.7 °C), Heart Rate: 82, Resp: 16, SpO2: 98 %     Procedures     Not applicable    ED Course     Nursing Notes, Past Medical Hx, Past Surgical Hx, Social Hx,Allergies, and Family Hx were reviewed. patient was given the following medications:  No orders of the defined types were placed in this encounter. CONSULTS:  None    MEDICAL DECISIONMAKING / ASSESSMENT / Jackie Velazquez  Evlyn Dubin is a 74 y.o. male with a past medical history of ESRD on hemodialysis Monday/Wednesday/Friday, COPD on 3 L of oxygen, CVA, heart failure, hypertension, hyperlipidemia, blood clots on Eliquis who presents to the emergency department today for frequent falls. Rise to the emergency department his ABCs intact. He is hypertensive, vitals are otherwise within normal limits. He has discomfort to palpation of his bilateral nasal bones, discomfort to palpation and range of motion of the right knee. He is also complained of feeling lightheaded. CT of the head/brain and CT of the face without any acute abnormalities. X-ray of the knee without any acute abnormalities. Chest x-ray without any acute abnormalities. CBC at his baseline. CMP shows a worsening hyponatremia than normal for him. His creatinine is at his baseline. Troponin is elevated but at his baseline. EKG negative for STEMI or arrhythmia but does show new T wave inversions in V5 and V6. Given his worsening hyponatremia, along with his continued symptoms of dizziness and T wave inversions, I think he should be admitted to the hospital.  Discussed his case with the hospitalist who accepted him for admission. He was admitted in stable condition. Amount and/or Complexity of Data Reviewed  Labs: ordered. Radiology: ordered. ECG/medicine tests: ordered. Risk  Decision regarding hospitalization. Clinical Impression     1. Hyponatremia    2.  Lightheaded 3. Fall, initial encounter        Disposition     PATIENT REFERRED TO:  No follow-up provider specified.     DISCHARGE MEDICATIONS:  New Prescriptions    No medications on file       DISPOSITION Decision To Admit 02/25/2023 08:56:53 PM         Albino Thomas MD  02/25/23 0143

## 2023-02-26 NOTE — PROGRESS NOTES
4 Eyes Admission Assessment     I agree as the admission nurse that 2 RN's have performed a thorough Head to Toe Skin Assessment on the patient. ALL assessment sites listed below have been assessed on admission. Areas assessed by both nurses:   [x]   Head, Face, and Ears   [x]   Shoulders, Back, and Chest  [x]   Arms, Elbows, and Hands   [x]   Coccyx, Sacrum, and Ischium  [x]   Legs, Feet, and Heels        Does the Patient have Skin Breakdown?   No         Mauricio Prevention initiated:  No   Wound Care Orders initiated:  NA      [facility] nurse consulted for Pressure Injury (Stage 3,4, Unstageable, DTI, NWPT, and Complex wounds) or Mauricio score 18 or lower:  No      Nurse 1 eSignature: Electronically signed by Sanna Sin RN on 2/26/23 at 3:23 AM EST    **SHARE this note so that the co-signing nurse is able to place an eSignature**    Nurse 2 eSignature: Electronically signed by Kalia Vela RN on 2/26/23 at 5:45 AM EST

## 2023-02-26 NOTE — PROGRESS NOTES
Hospitalist Progress Note      PCP: Gary Pimentel MD    Date of Admission: 2/25/2023    Chief Complaint: [facility] Course:   74 y.o. male smoker in remission with multiple medical problems including COPD on 3 L of oxygen, end-stage renal disease on hemodialysis who presented to [facility] ED with right knee pain after a fall. Patient said that he is right knee gave out from under him and he landed on it after which it started to hurt. He denies any lightheadedness, syncope, head strike, chest pain, increased shortness of breath, cough, nausea, vomiting, diarrhea. He is chronically fatigued. In emergency room his temperature was 98, blood pressure 140/78, pulse 84, respirations 16, sats high 90s on 3 L of oxygen. No acute process on the knee Xray and CT head and facial bone. Subjective: Tolerating diet well. Complains of right foot pain that he thinks related to the fall.     Medications:  Reviewed    Infusion Medications    sodium chloride       Scheduled Medications    sodium chloride flush  5-40 mL IntraVENous 2 times per day    aspirin  81 mg Oral Daily    [START ON 2/27/2023] allopurinol  100 mg Oral Once per day on Mon Wed Fri    apixaban  2.5 mg Oral BID    atorvastatin  80 mg Oral Nightly    [START ON 2/27/2023] calcitRIOL  0.5 mcg Oral Once per day on Mon Wed Fri    calcium acetate  1,334 mg Oral TID    [place] calcium w/D  1 tablet Oral Daily    carvedilol  3.125 mg Oral BID    ferrous sulfate  325 mg Oral Every Other Day    gabapentin  100 mg Oral QHS    levothyroxine  75 mcg Oral Daily    pantoprazole  40 mg Oral QAM AC    sevelamer  800 mg Oral TID      PRN Meds: sodium chloride flush, sodium chloride, ondansetron **OR** ondansetron, acetaminophen **OR** acetaminophen, polyethylene glycol, albuterol, calcium carbonate, sodium chloride      Intake/Output Summary (Last 24 hours) at 2/26/2023 1115  Last data filed at 2/26/2023 0418  Gross per 24 hour   Intake 360 ml Output --   Net 360 ml       Physical Exam Performed:    BP (!) 108/49   Pulse 78   Temp 97.7 °F (36.5 °C) (Oral)   Resp 18   Ht 5' 8\" (1.727 m)   Wt 126 lb 1.7 oz (57.2 kg)   SpO2 97%   BMI 19.17 kg/m²     General appearance: No apparent distress, appears stated age and cooperative. HEENT: Pupils equal, round, and reactive to light. Conjunctivae/corneas clear. Neck: Supple, with full range of motion. No jugular venous distention. Trachea midline. Respiratory:  Normal respiratory effort. Clear to auscultation, bilaterally without Rales/Wheezes/Rhonchi. Cardiovascular: Regular rate and rhythm with normal S1/S2 without murmurs, rubs or gallops. Abdomen: Soft, non-tender, non-distended with normal bowel sounds. Musculoskeletal: No clubbing, cyanosis or edema bilaterally. Full range of motion without deformity. Right foot tenderness especially on the toes. Skin: Skin color, texture, turgor normal.  No rashes or lesions. Neurologic:  Neurovascularly intact without any focal sensory/motor deficits. Cranial nerves: II-XII intact, grossly non-focal.  Psychiatric: Alert and oriented, thought content appropriate, normal insight        Labs:   Recent Labs     02/25/23 2003 02/26/23  0715   WBC 5.7 5.0   HGB 9.4* 10.0*   HCT 29.0* 31.5*   * 112*     Recent Labs     02/25/23 2003 02/26/23  0715   * 133*   K 4.3 4.3   CL 90* 94*   CO2 25 29   BUN 68* 69*   CREATININE 13.4* 14.0*   CALCIUM 9.2 9.5     Recent Labs     02/25/23 2003   AST 21   ALT 7*   BILITOT 0.4   ALKPHOS 125     No results for input(s): INR in the last 72 hours. Recent Labs     02/25/23 2003 02/25/23  2149 02/26/23  0154   TROPONINI 0.10* 0.11* 0.12*       Urinalysis:    No results found for: Laddie Corona, BACTERIA, RBCUA, BLOODU, SPECGRAV, GLUCOSEU    Radiology:  CT Head W/O Contrast   Final Result      Head:   1.  Small focal hyperdensity in the right posterior temporal lobe is stable from January 26, 2023 and may represent parenchymal calcification. 2. No acute intracranial hemorrhage. 3. Mild chronic small vessel ischemic white matter disease with multiple small remote infarcts in the jenny, basal ganglia, and left thalamus. Face:   1. No acute fractures. 2. Severe maxillary sinus opacification. CT FACIAL BONES WO CONTRAST   Final Result      Head:   1. Small focal hyperdensity in the right posterior temporal lobe is stable from January 26, 2023 and may represent parenchymal calcification. 2. No acute intracranial hemorrhage. 3. Mild chronic small vessel ischemic white matter disease with multiple small remote infarcts in the jenny, basal ganglia, and left thalamus. Face:   1. No acute fractures. 2. Severe maxillary sinus opacification. XR KNEE RIGHT (3 VIEWS)   Final Result      Right knee: No acute osseous abnormality. CHEST: No acute cardiopulmonary disease. XR CHEST PORTABLE   Final Result      Right knee: No acute osseous abnormality. CHEST: No acute cardiopulmonary disease. Assessment/Plan:    Active Hospital Problems    Diagnosis     Pulmonary hypertension ([facility] [I27.20]      Priority: Medium    History of pulmonary embolism [Z86.711]      Priority: Medium    Hyponatremia [E87.1]      Priority: Medium    Acute electrocardiogram changes [R94.31]      Priority: Medium    Fall [W19. XXXA]      Priority: Medium    Chronic respiratory failure with hypoxia and hypercapnia (HCC) [J96.11, J96.12]      Priority: Medium    History of venous thromboembolism [Z86.718]      Priority: Medium    Hyperlipidemia [E78.5]     Essential hypertension [I10]     Chronic combined systolic and diastolic CHF (congestive heart failure) (HCC) [I50.42]     COPD (chronic obstructive pulmonary disease) ([facility]) [J44.9]     ESRD on dialysis ([facility] [N18.6, Z99.2]      PLAN:  -Monitor on telemetry and trend cardiac enzymes  -Fluid restriction 1200 ml/day  -Nephrology consult for dialysis  -PT/OT eval for discharge planning  -Continue home regimen for chronic stable conditions  -We will order x-ray of the right foot    DVT Prophylaxis: On eliquis  Diet: ADULT DIET; Regular; Low Fat/Low Chol/High Fiber/2 gm Na;  Low Potassium (Less than 3000 mg/day); 1200 ml; No Caffeine  Code Status: Full Code    PT/OT Eval Status: Pending    Dispo - Pending    Ethan Roblero MD

## 2023-02-26 NOTE — ED NOTES
ED TO INPATIENT SBAR HANDOFF    Patient Name: Margot King   :  1955  74 y.o. MRN:  [id number]  Preferred Name  n/a  ED Room #:  B20/B20-20  Family/Caregiver Present no   Restraints no   Sitter no   Sepsis Risk Score Sepsis Risk Score: 1.87    Situation  Code Status: Full Code No additional code details. Allergies: Nitroglycerin and Oxymetazoline  Weight: No data found. Arrived from: nursing home  Chief Complaint:   Chief Complaint   Patient presents with    Fall     Pt arrives to the ER via EMS. EMS was called for frequent falls and low oxygen saturation. Per EMS pt was O2 sat was 95-98% on 3L via NC which is his baseline. Pt endorses falling 2x today, (+) LOC     Hospital Problem/Diagnosis:  Principal Problem:    Acute electrocardiogram changes  Active Problems:    Chronic respiratory failure with hypoxia and hypercapnia (HCC)    Hyponatremia    Fall    ESRD on dialysis [facility])    COPD (chronic obstructive pulmonary disease) ([facility])    Essential hypertension    Hyperlipidemia    Chronic combined systolic and diastolic CHF (congestive heart failure) ([facility]  Resolved Problems:    * No resolved hospital problems. *    Imaging:   CT Head W/O Contrast   Final Result      Head:   1. Small focal hyperdensity in the right posterior temporal lobe is stable from 2023 and may represent parenchymal calcification. 2. No acute intracranial hemorrhage. 3. Mild chronic small vessel ischemic white matter disease with multiple small remote infarcts in the jenny, basal ganglia, and left thalamus. Face:   1. No acute fractures. 2. Severe maxillary sinus opacification. CT FACIAL BONES WO CONTRAST   Final Result      Head:   1. Small focal hyperdensity in the right posterior temporal lobe is stable from 2023 and may represent parenchymal calcification. 2. No acute intracranial hemorrhage.    3. Mild chronic small vessel ischemic white matter disease with multiple small remote infarcts in the jenny, basal ganglia, and left thalamus. Face:   1. No acute fractures. 2. Severe maxillary sinus opacification. XR KNEE RIGHT (3 VIEWS)   Final Result      Right knee: No acute osseous abnormality. CHEST: No acute cardiopulmonary disease. XR CHEST PORTABLE   Final Result      Right knee: No acute osseous abnormality. CHEST: No acute cardiopulmonary disease.         Abnormal labs:   Abnormal Labs Reviewed   CBC WITH AUTO DIFFERENTIAL - Abnormal; Notable for the following components:       Result Value    RBC 3.25 (*)     Hemoglobin 9.4 (*)     Hematocrit 29.0 (*)     RDW 15.9 (*)     Platelets 908 (*)     Lymphocytes Absolute 0.7 (*)     All other components within normal limits   COMPREHENSIVE METABOLIC PANEL W/ REFLEX TO MG FOR LOW K - Abnormal; Notable for the following components:    Sodium 129 (*)     Chloride 90 (*)     Glucose 100 (*)     BUN 68 (*)     Creatinine 13.4 (*)     Est, Glom Filt Rate 4 (*)     Total Protein 5.6 (*)     Albumin 3.1 (*)     ALT 7 (*)     All other components within normal limits    Narrative:     CALL  [place]  [facility] tel. [phone],  Previous panic on this admission - call not needed per SOP, 02/25/2023 20:45,  by KATERYNA   TROPONIN - Abnormal; Notable for the following components:    Troponin 0.10 (*)     All other components within normal limits     Critical values: no     Abnormal Assessment Findings: no    Background  History:   Past Medical History:   Diagnosis Date    Cerebral artery occlusion with cerebral infarction ([facility]     Combined systolic and diastolic heart failure (HCC)     COPD (chronic obstructive pulmonary disease) ([facility]     Gout     Hemodialysis patient ([facility]     Hx of blood clots     Hyperlipidemia     Hypertension        Assessment    Vitals/MEWS:        Vitals:    02/25/23 2030 02/25/23 2100 02/25/23 2128 02/25/23 2130   BP: (!) 115/59 126/70  123/64   Pulse: 82 82  78   Resp: 16 18  18   Temp:       TempSrc: SpO2: 98% 100%  99%   Height:   5' 8\" (1.727 m)      FiO2 (%): nasal cannula for respiratory distress  O2 Flow Rate: O2 Device: Nasal cannula O2 Flow Rate (L/min): 3 L/min  Cardiac Rhythm:    Pain Assessment:  [x] Verbal [] Tanvi Mike Scale  Pain Scale:    Last documented pain score (0-10 scale)    Last documented pain medication administered: n/a  Mental Status: oriented  NIH Score:    C-SSRS: Risk of Suicide: No Risk  Bedside swallow:    Amy Coma Scale (GCS): Amy Coma Scale  Eye Opening: Spontaneous  Best Verbal Response: Oriented  Best Motor Response: Obeys commands  [place] Coma Scale Score: 15  Active LDA's:    PO Status: Regular  Pertinent or High Risk Medications/Drips: no   o If Yes, please provide details:n/a  Pending Blood Product Administration: no     You may also review the ED PT Care Timeline found under the Summary Nursing Index tab. Recommendation    Pending orders ed orders complete  Plan for Discharge (if known):    Additional Comments: n/a   If any further questions, please call Sending RN at [phone]    Electronically signed by: Electronically signed by Morris Hernandez RN on 2/25/2023 at 10:10 PM     Morris Hernandez RN  02/25/23 7418

## 2023-02-26 NOTE — PROGRESS NOTES
Patient admitted from ED, AOx4, made comfortable on bed, VSS with O2 via Nasal cannula in place. orientated to surrounding and call system. Educated on safety precautions and verbalized understanding. Patient rates pain @ 7/10 on R knee,pain med given, no other concerns at this time. Will continue to monitor.

## 2023-02-26 NOTE — H&P
Hospital Medicine History & Physical      PCP: Randa Millard MD    Date of Admission: 2/25/2023    Date of Service: Pt seen/examined on 2/25/2023 and placed in Observation. Chief Complaint: \"I fell\"      History Of Present Illness:    74 y.o. male smoker in remission with multiple medical problems including COPD on 3 L of oxygen, end-stage renal disease on hemodialysis who presented to [facility] ED with right knee pain after a fall. Patient said that he is right knee gave out from under him and he landed on it after which it started to hurt. He denies any lightheadedness, syncope, head strike, chest pain, increased shortness of breath, cough, nausea, vomiting, diarrhea. He is chronically fatigued. In emergency room his temperature was 98, blood pressure 140/78, pulse 84, respirations 16, sats high 90s on 3 L of oxygen. Past Medical History:          Diagnosis Date    Cerebral artery occlusion with cerebral infarction ([facility]     Combined systolic and diastolic heart failure (HCC)     COPD (chronic obstructive pulmonary disease) (HCC)     Gout     Hemodialysis patient ([facility]     Hx of blood clots     Hyperlipidemia     Hypertension        Past Surgical History:          Procedure Laterality Date    COLONOSCOPY      COLONOSCOPY N/A 1/17/2023    COLONOSCOPY DIAGNOSTIC performed by Bart Miller MD at [facility]  11/15/2022    CT BONE MARROW BIOPSY 11/15/2022 [facility] CT SCAN    DIALYSIS FISTULA CREATION Left     UPPER GASTROINTESTINAL ENDOSCOPY N/A 1/17/2023    EGD BIOPSY performed by Bart Miller MD at [facility] ENDOSCOPY       Medications Prior to Admission:      Prior to Admission medications    Medication Sig Start Date End Date Taking?  Authorizing Provider   calcium carb-cholecalciferol (CALCIUM + VITAMIN D3) 600-5 MG-MCG TABS tablet Take 1 tablet by mouth daily   Yes Historical Provider, MD   calcium acetate (PHOSLO) 667 MG TABS Take 2 tablets by mouth 3 times daily With meals 2/23/23   Historical Provider, MD   apixaban (ELIQUIS) 2.5 MG TABS tablet Take 1 tablet by mouth 2 times daily 1/29/23   Kg Mcdonough MD   gabapentin (NEURONTIN) 100 MG capsule Take 100 mg by mouth nightly. Historical Provider, MD   ferrous sulfate (IRON 325) 325 (65 Fe) MG tablet Take 325 mg by mouth every other day    Historical Provider, MD   pantoprazole (PROTONIX) 40 MG tablet Take 1 tablet by mouth every morning (before breakfast) 1/18/23   Kg Mcdonough MD   levothyroxine (SYNTHROID) 75 MCG tablet Take 1 tablet by mouth Daily 1/18/23   Kg Mcdonough MD   calcium carbonate (TUMS) 500 MG chewable tablet Take 1 tablet by mouth every 8 hours as needed for Heartburn    Historical Provider, MD   carvedilol (COREG) 3.125 MG tablet Take 1 tablet by mouth 2 times daily 12/30/22   Shannan Hensley MD   sodium chloride (OCEAN) 0.65 % nasal spray 1-2 sprays both nostrils at least daily and as needed to prevent dry mucosa. 10/15/22   Janice Hill DO   calcitRIOL (ROCALTROL) 0.5 MCG capsule Take 0.5 mcg by mouth three times a week On M/W/F    Historical Provider, MD   sevelamer (RENVELA) 800 MG tablet Take 1 tablet by mouth 3 times daily (with meals)  Patient not taking: Reported on 2/25/2023    Historical Provider, MD   atorvastatin (LIPITOR) 80 MG tablet Take 80 mg by mouth nightly    Historical Provider, MD   calcium carb-cholecalciferol 600-200 MG-UNIT TABS tablet Take 1 tablet by mouth daily  Patient not taking: Reported on 2/25/2023    Historical Provider, MD   albuterol sulfate HFA (PROVENTIL;VENTOLIN;PROAIR) 108 (90 Base) MCG/ACT inhaler Inhale 2 puffs into the lungs every 6 hours as needed for Wheezing 90mcg/actuation    Historical Provider, MD   allopurinol (ZYLOPRIM) 100 MG tablet Take 100 mg by mouth three times a week On M/W/F    Historical Provider, MD       Allergies:  Nitroglycerin and Oxymetazoline    Social History:      The patient currently lives at home.     TOBACCO: reports that he has quit smoking. He has never used smokeless tobacco.  ETOH:   reports that he does not currently use alcohol. E-cigarette/Vaping       Questions Responses    E-cigarette/Vaping Use Never User    Start Date     Passive Exposure     Quit Date     Counseling Given     Comments               Family History:       Reviewed and negative in regards to presenting illness/complaint. Problem Relation Age of Onset    Cancer Mother     Cancer Father     Other Sister        REVIEW OF SYSTEMS COMPLETED:   Pertinent positives as noted in the HPI. All other systems reviewed and negative. PHYSICAL EXAM PERFORMED:    /71   Pulse 77   Temp 98 °F (36.7 °C) (Oral)   Resp 18   Ht 5' 8\" (1.727 m)   SpO2 97%   BMI 20.13 kg/m²     General appearance: Frail, chronically ill in no apparent distress, appears stated age and cooperative. HEENT:  Normal cephalic, atraumatic without obvious deformity. Pupils equal, round, and reactive to light. Extra ocular muscles intact. Conjunctivae/corneas clear. Anicteric sclera. Dry oral mucosa. Neck: Supple, with full range of motion. No jugular venous distention. Trachea midline. Respiratory:  Normal respiratory effort. Bibasilar crackles,  no rhonchi or wheezes. Cardiovascular:  Regular rate and rhythm with normal S1/S2   Abdomen: Soft, non-tender, non-distended with normal bowel sounds. Musculoskeletal:  No clubbing, cyanosis or edema bilaterally. Right knee is tender but no warmth, erythema or fluid collection with decreased passive and active range of motion secondary to pain. Skin: Dry without rashes . patchy areas of discoloration over anterior shins and other hyperpigmented areas over the same area. Neurologic:  Neurovascularly intact without any focal sensory/motor deficits.  Cranial nerves: II-XII intact, grossly non-focal.  Psychiatric:  Alert and oriented, thought content appropriate, normal insight  Peripheral Pulses: +1 palpable, equal bilaterally       Labs:     Recent Labs     02/23/23  0834 02/25/23 2003   WBC  --  5.7   HGB 9.2* 9.4*   HCT 27.8* 29.0*   PLT  --  133*     Recent Labs     02/25/23 2003   *   K 4.3   CL 90*   CO2 25   BUN 68*   CREATININE 13.4*   CALCIUM 9.2     Recent Labs     02/25/23 2003   AST 21   ALT 7*   BILITOT 0.4   ALKPHOS 125     No results for input(s): INR in the last 72 hours. Recent Labs     02/25/23 2003 02/25/23  2149   TROPONINI 0.10* 0.11*       Urinalysis:    No results found for: Devi Harrier, BACTERIA, RBCUA, BLOODU, [address], [address]    Radiology:     CXR: I have reviewed the CXR with the following interpretation: No acute cardiopulmonary process. EKG:  I have reviewed the EKG with the following interpretation: Sinus rhythm, first-degree repeat block with poor R wave progression. There were new T wave inversion in V5 and V6 and QTc of 470. CT Head W/O Contrast   Final Result      Head:   1. Small focal hyperdensity in the right posterior temporal lobe is stable from January 26, 2023 and may represent parenchymal calcification. 2. No acute intracranial hemorrhage. 3. Mild chronic small vessel ischemic white matter disease with multiple small remote infarcts in the jenny, basal ganglia, and left thalamus. Face:   1. No acute fractures. 2. Severe maxillary sinus opacification. CT FACIAL BONES WO CONTRAST   Final Result      Head:   1. Small focal hyperdensity in the right posterior temporal lobe is stable from January 26, 2023 and may represent parenchymal calcification. 2. No acute intracranial hemorrhage. 3. Mild chronic small vessel ischemic white matter disease with multiple small remote infarcts in the jenny, basal ganglia, and left thalamus. Face:   1. No acute fractures. 2. Severe maxillary sinus opacification. XR KNEE RIGHT (3 VIEWS)   Final Result      Right knee: No acute osseous abnormality. CHEST: No acute cardiopulmonary disease.       XR CHEST PORTABLE   Final Result      Right knee: No acute osseous abnormality. CHEST: No acute cardiopulmonary disease. Consults:    IP CONSULT TO HOSPITALIST    ASSESSMENT:    Active Hospital Problems    Diagnosis Date Noted    Acute electrocardiogram changes [R94.31] 02/25/2023     Priority: High    Hyponatremia [E87.1] 02/25/2023     Priority: Medium    Fall [W19. XXXA] 02/25/2023     Priority: Medium    Chronic respiratory failure with hypoxia and hypercapnia (HCC) [J96.11, J96.12] 10/06/2022     Priority: Medium    Chronic combined systolic and diastolic CHF (congestive heart failure) ([facility] [address] [I50.42] 03/02/2022     Priority: Medium    [facility] [N18.6, Z99.2] 03/31/2020     Priority: Medium    Hyperlipidemia [E78.5] 03/02/2022     Priority: Low    Essential hypertension [I10] 03/02/2022     Priority: Low    COPD (chronic obstructive pulmonary disease) ([facility] [J44.9] 09/10/2020     Priority: Low         PLAN:  -Monitor on telemetry and trend cardiac enzymes  -Fluid restriction 1200 ml/day  -PT/OT eval for discharge planning  -Continue home regimen for chronic stable conditions    DVT Prophylaxis: Eliquis  Diet: ADULT DIET; Regular; Low Fat/Low Chol/High Fiber/2 gm Na; Low Potassium (Less than 3000 mg/day); 1200 ml; No Caffeine  Code Status: Full Code    PT/OT Eval Status: Pending    Dispo -likely home       Ruth Ann Kapadia MD    Thank you Chula Ramos MD for the opportunity to be involved in this patient's care. If you have any questions or concerns please feel free to contact me at [phone].

## 2023-02-26 NOTE — CONSULTS
Nephrology Consult Note                                                                                                                                                                                                                                                                                                                                                               Office : [phone]     Fax :[phone]              Patient's Name: Joanne Carmichael  5:05 PM  2/26/2023    Reason for Consult:  ESRD   Requesting Physician:  Aditya Wiggins MD      Chief Complaint:  fall      History of Present Ilness:    74 y.o. male smoker in remission with multiple medical problems including COPD on 3 L of oxygen, end-stage renal disease on hemodialysis who presented to [facility] ED with right knee pain after a fall. Patient said that he is right knee gave out from under him and he landed on it after which it started to hurt. He denies any lightheadedness, syncope, head strike, chest pain, increased shortness of breath, cough, nausea, vomiting, diarrhea.     On HD with  nephrology     Past Medical History:   Diagnosis Date    Cerebral artery occlusion with cerebral infarction ([facility] [address]     Combined systolic and diastolic heart failure (HCC)     COPD (chronic obstructive pulmonary disease) (HCC)     Gout     Hemodialysis patient ([facility] [address]     Hx of blood clots     Hyperlipidemia     Hypertension        Past Surgical History:   Procedure Laterality Date    COLONOSCOPY      COLONOSCOPY N/A 1/17/2023    COLONOSCOPY DIAGNOSTIC [facility] at [facility]  11/15/2022    CT BONE MARROW BIOPSY 11/15/2022 [facility] CT SCAN    DIALYSIS FISTULA CREATION Left     UPPER GASTROINTESTINAL ENDOSCOPY N/A 1/17/2023    EGD BIOPSY performed by Janice Cox MD at [facility] ENDOSCOPY       Family History   Problem Relation Age of Onset    Cancer Mother     Cancer Father     Other Sister reports that he has quit smoking. He has never used smokeless tobacco. He reports that he does not currently use alcohol. He reports that he does not use drugs.     Allergies:  Nitroglycerin and Oxymetazoline    Current Medications:    sodium chloride flush 0.9 % injection 5-40 mL, 2 times per day  sodium chloride flush 0.9 % injection 5-40 mL, PRN  0.9 % sodium chloride infusion, PRN  ondansetron (ZOFRAN-ODT) disintegrating tablet 4 mg, Q8H PRN   Or  ondansetron (ZOFRAN) injection 4 mg, Q6H PRN  acetaminophen (TYLENOL) tablet 650 mg, Q6H PRN   Or  acetaminophen (TYLENOL) suppository 650 mg, Q6H PRN  polyethylene glycol (GLYCOLAX) packet 17 g, Daily PRN  aspirin chewable tablet 81 mg, Daily  albuterol (PROVENTIL) nebulizer solution 2.5 mg, Q4H PRN  [START ON 2/27/2023] allopurinol (ZYLOPRIM) tablet 100 mg, Once per day on Mon Wed Fri  apixaban (ELIQUIS) tablet 2.5 mg, BID  atorvastatin (LIPITOR) tablet 80 mg, Nightly  [START ON 2/27/2023] calcitRIOL (ROCALTROL) capsule 0.5 mcg, Once per day on Mon Wed Fri  calcium acetate (PHOSLO) capsule 1,334 mg, TID  oyster shell calcium w/D 500-5 MG-MCG tablet 1 tablet, Daily  calcium carbonate (TUMS) chewable tablet 500 mg, Q8H PRN  carvedilol (COREG) tablet 3.125 mg, BID  ferrous sulfate (IRON 325) tablet 325 mg, Every Other Day  gabapentin (NEURONTIN) capsule 100 mg, QHS  levothyroxine (SYNTHROID) tablet 75 mcg, Daily  pantoprazole (PROTONIX) tablet 40 mg, QAM AC  sevelamer (RENVELA) tablet 800 mg, TID WC  sodium chloride (OCEAN, BABY AYR) 0.65 % nasal spray 2 spray, PRN        Review of Systems:   14 point ROS obtained but were negative except mentioned in HPI      Physical exam:     Vitals:  BP (!) 108/49   Pulse 78   Temp 97.7 °F (36.5 °C) (Oral)   Resp 18   Ht 5' 8\" (1.727 m)   Wt 126 lb 1.7 oz (57.2 kg)   SpO2 97%   BMI 19.17 kg/m²   Constitutional:  OAA X3 NAD  Skin: no rash, turgor wnl  Heent:  eomi, mmm  Neck: no bruits or jvd noted  Cardiovascular:  S1, S2 without m/r/g  Respiratory: CTA B without w/r/r  Abdomen:  +bs, soft, nt, nd  Ext: + lower extremity edema  Psychiatric: mood and affect appropriate  Musculoskeletal:  Rom, muscular strength intact    Data:   Labs:  CBC:   Recent Labs     02/25/23 2003 02/26/23  0715   WBC 5.7 5.0   HGB 9.4* 10.0*   * 112*     BMP:    Recent Labs     02/25/23 2003 02/26/23  0715   * 133*   K 4.3 4.3   CL 90* 94*   CO2 25 29   BUN 68* 69*   CREATININE 13.4* 14.0*   GLUCOSE 100* 107*     Ca/Mg/Phos:   Recent Labs     02/25/23 2003 02/26/23  0715   CALCIUM 9.2 9.5     Hepatic:   Recent Labs     02/25/23 2003   AST 21   ALT 7*   BILITOT 0.4   ALKPHOS 125     Troponin:   Recent Labs     02/25/23 2003 02/25/23  2149 02/26/23  0154   TROPONINI 0.10* 0.11* 0.12*     BNP: No results for input(s): BNP in the last 72 hours. Lipids: No results for input(s): CHOL, TRIG, HDL, LDLCALC, LABVLDL in the last 72 hours. ABGs: No results for input(s): PHART, PO2ART, ZYG8ALO in the last 72 hours. INR: No results for input(s): INR in the last 72 hours. UA:No results for input(s): Claryce Moulder, GLUCOSEU, BILIRUBINUR, Johnnie Cap, BLOODU, PHUR, PROTEINU, UROBILINOGEN, NITRU, LEUKOCYTESUR, LABMICR, URINETYPE in the last 72 hours. Urine Microscopic: No results for input(s): LABCAST, BACTERIA, COMU, HYALCAST, WBCUA, RBCUA, EPIU in the last 72 hours. Urine Culture: No results for input(s): LABURIN in the last 72 hours. Urine Chemistry: No results for input(s): Carmie Pitcher, PROTEINUR, NAUR in the last 72 hours. IMAGING:  CT Head W/O Contrast   Final Result      Head:   1. Small focal hyperdensity in the right posterior temporal lobe is stable from January 26, 2023 and may represent parenchymal calcification. 2. No acute intracranial hemorrhage. 3. Mild chronic small vessel ischemic white matter disease with multiple small remote infarcts in the jenny, basal ganglia, and left thalamus. Face:   1.  No acute fractures. 2. Severe maxillary sinus opacification. CT FACIAL BONES WO CONTRAST   Final Result      Head:   1. Small focal hyperdensity in the right posterior temporal lobe is stable from January 26, 2023 and may represent parenchymal calcification. 2. No acute intracranial hemorrhage. 3. Mild chronic small vessel ischemic white matter disease with multiple small remote infarcts in the jenny, basal ganglia, and left thalamus. Face:   1. No acute fractures. 2. Severe maxillary sinus opacification. XR KNEE RIGHT (3 VIEWS)   Final Result      Right knee: No acute osseous abnormality. CHEST: No acute cardiopulmonary disease. XR CHEST PORTABLE   Final Result      Right knee: No acute osseous abnormality. CHEST: No acute cardiopulmonary disease. XR FOOT RIGHT (MIN 3 VIEWS)    (Results Pending)       Assessment/Plan   1. ESRD     2. HTN    3. Anemia    4. Acid- base/ Electrolyte imbalance     5.  Fall     Plan   - HD MWF   - UF to dry wt   - renal diet   - PT eval   - Anemia management   - MBD management                 Thank you for allowing us to participate in care of Angela Flood MD  Feel free to contact me   [facility] [id number]  Office : [phone]  Fax :[phone]

## 2023-02-26 NOTE — PLAN OF CARE
Problem: Respiratory - Adult  Goal: Achieves optimal ventilation and oxygenation  Outcome: Progressing     Problem: Pain  Goal: Verbalizes/displays adequate comfort level or baseline comfort level  Outcome: Progressing     Problem: Metabolic/Fluid and Electrolytes - Adult  Goal: Hemodynamic stability and optimal renal function maintained  Outcome: Progressing     Problem: Skin/Tissue Integrity - Adult  Goal: Skin integrity remains intact  Outcome: Progressing  Flowsheets (Taken 2/26/2023 0015)  Skin Integrity Remains Intact: Monitor for areas of redness and/or skin breakdown

## 2023-02-26 NOTE — ED TRIAGE NOTES
PT to ed from [facility] for fall today. Pt states his leg gave out when getting up from a chair. Pt states he fell and hit his nose and head.

## 2023-02-27 VITALS
HEIGHT: 68 IN | SYSTOLIC BLOOD PRESSURE: 103 MMHG | WEIGHT: 123.46 LBS | DIASTOLIC BLOOD PRESSURE: 65 MMHG | OXYGEN SATURATION: 95 % | BODY MASS INDEX: 18.71 KG/M2 | HEART RATE: 76 BPM | RESPIRATION RATE: 18 BRPM | TEMPERATURE: 97.5 F

## 2023-02-27 LAB
ALBUMIN SERPL-MCNC: 3.4 G/DL (ref 3.4–5)
ANION GAP SERPL CALCULATED.3IONS-SCNC: 13 MMOL/L (ref 3–16)
ANISOCYTOSIS: ABNORMAL
BASOPHILS ABSOLUTE: 0 K/UL (ref 0–0.2)
BASOPHILS RELATIVE PERCENT: 0 %
BUN BLDV-MCNC: 79 MG/DL (ref 7–20)
CALCIUM SERPL-MCNC: 10.2 MG/DL (ref 8.3–10.6)
CHLORIDE BLD-SCNC: 90 MMOL/L (ref 99–110)
CO2: 27 MMOL/L (ref 21–32)
CREAT SERPL-MCNC: 15.9 MG/DL (ref 0.8–1.3)
EKG ATRIAL RATE: 69 BPM
EKG DIAGNOSIS: NORMAL
EKG P AXIS: 64 DEGREES
EKG P-R INTERVAL: 284 MS
EKG Q-T INTERVAL: 418 MS
EKG QRS DURATION: 102 MS
EKG QTC CALCULATION (BAZETT): 447 MS
EKG R AXIS: -36 DEGREES
EKG T AXIS: 85 DEGREES
EKG VENTRICULAR RATE: 69 BPM
EOSINOPHILS ABSOLUTE: 0.6 K/UL (ref 0–0.6)
EOSINOPHILS RELATIVE PERCENT: 10 %
GFR SERPL CREATININE-BSD FRML MDRD: 3 ML/MIN/{1.73_M2}
GLUCOSE BLD-MCNC: 116 MG/DL (ref 70–99)
HCT VFR BLD CALC: 28.9 % (ref 40.5–52.5)
HEMOGLOBIN: 9.2 G/DL (ref 13.5–17.5)
LYMPHOCYTES ABSOLUTE: 0.7 K/UL (ref 1–5.1)
LYMPHOCYTES RELATIVE PERCENT: 12 %
MACROCYTES: ABNORMAL
MCH RBC QN AUTO: 29 PG (ref 26–34)
MCHC RBC AUTO-ENTMCNC: 32 G/DL (ref 31–36)
MCV RBC AUTO: 90.8 FL (ref 80–100)
MICROCYTES: ABNORMAL
MONOCYTES ABSOLUTE: 0.5 K/UL (ref 0–1.3)
MONOCYTES RELATIVE PERCENT: 8 %
NEUTROPHILS ABSOLUTE: 4.1 K/UL (ref 1.7–7.7)
NEUTROPHILS RELATIVE PERCENT: 70 %
NUCLEATED RED BLOOD CELLS: 1 /100 WBC
PDW BLD-RTO: 16.1 % (ref 12.4–15.4)
PHOSPHORUS: 3.1 MG/DL (ref 2.5–4.9)
PLATELET # BLD: 127 K/UL (ref 135–450)
PMV BLD AUTO: 8.6 FL (ref 5–10.5)
POLYCHROMASIA: ABNORMAL
POTASSIUM SERPL-SCNC: 4.5 MMOL/L (ref 3.5–5.1)
RBC # BLD: 3.18 M/UL (ref 4.2–5.9)
SODIUM BLD-SCNC: 130 MMOL/L (ref 136–145)
SPHEROCYTES: ABNORMAL
TARGET CELLS: ABNORMAL
WBC # BLD: 5.8 K/UL (ref 4–11)

## 2023-02-27 PROCEDURE — 93010 ELECTROCARDIOGRAM REPORT: CPT | Performed by: INTERNAL MEDICINE

## 2023-02-27 PROCEDURE — 90935 HEMODIALYSIS ONE EVALUATION: CPT

## 2023-02-27 PROCEDURE — 93005 ELECTROCARDIOGRAM TRACING: CPT | Performed by: INTERNAL MEDICINE

## 2023-02-27 PROCEDURE — G0378 HOSPITAL OBSERVATION PER HR: HCPCS

## 2023-02-27 PROCEDURE — 85025 COMPLETE CBC W/AUTO DIFF WBC: CPT

## 2023-02-27 PROCEDURE — 80069 RENAL FUNCTION PANEL: CPT

## 2023-02-27 PROCEDURE — 36415 COLL VENOUS BLD VENIPUNCTURE: CPT

## 2023-02-27 PROCEDURE — 2500000003 HC RX 250 WO HCPCS: Performed by: INTERNAL MEDICINE

## 2023-02-27 PROCEDURE — 6370000000 HC RX 637 (ALT 250 FOR IP): Performed by: INTERNAL MEDICINE

## 2023-02-27 PROCEDURE — 2580000003 HC RX 258: Performed by: INTERNAL MEDICINE

## 2023-02-27 RX ADMIN — SODIUM CHLORIDE, PRESERVATIVE FREE 10 ML: 5 INJECTION INTRAVENOUS at 09:35

## 2023-02-27 RX ADMIN — SEVELAMER CARBONATE 800 MG: 800 TABLET, FILM COATED ORAL at 09:34

## 2023-02-27 RX ADMIN — CALCIUM ACETATE 1334 MG: 667 CAPSULE ORAL at 09:34

## 2023-02-27 RX ADMIN — CALCIUM CARBONATE-VITAMIN D TAB 500 MG-200 UNIT 1 TABLET: 500-200 TAB at 09:34

## 2023-02-27 RX ADMIN — LEVOTHYROXINE SODIUM 75 MCG: 75 TABLET ORAL at 05:08

## 2023-02-27 RX ADMIN — CARVEDILOL 3.12 MG: 6.25 TABLET, FILM COATED ORAL at 09:33

## 2023-02-27 RX ADMIN — SEVELAMER CARBONATE 800 MG: 800 TABLET, FILM COATED ORAL at 17:35

## 2023-02-27 RX ADMIN — ASPIRIN 81 MG 81 MG: 81 TABLET ORAL at 09:34

## 2023-02-27 RX ADMIN — PANTOPRAZOLE SODIUM 40 MG: 40 TABLET, DELAYED RELEASE ORAL at 05:08

## 2023-02-27 RX ADMIN — APIXABAN 2.5 MG: 2.5 TABLET, FILM COATED ORAL at 09:34

## 2023-02-27 NOTE — PROGRESS NOTES
Treatment time: 3.5 hours  Net UF: 2500 ml     Pre weight: 58.5 kg  Post weight:56.0 kg    Access used: luavf    Access function: well with  ml/min     Medications or blood products given: n/a     Regular outpatient schedule: mwf     Summary of response to treatment: Patient tolerated treatment well and without any complications.  Patient remained stable throughout entire treatment       02/27/23 1333 02/27/23 1708   Treatment   Time On 1333  --    Time Off  --  1708   Vital Signs   BP (!) 107/58 (!) 99/58   Temp 97.8 °F (36.6 °C) 98.2 °F (36.8 °C)   Heart Rate 73 72   Resp 16 16   Weight 128 lb 15.5 oz (58.5 kg) 123 lb 7.3 oz (56 kg)

## 2023-02-27 NOTE — DISCHARGE INSTRUCTIONS
Extra Heart Failure sites:   [email or web address].com.Xendo/   --- this is American Heart Association interactive Healthier Living with Heart Failure guidebook. Please copy and paste link into search bar. Use your mouse to scroll through the pages. Lots and lots of info / tips    HF Mary D lito  --- free smart phone lito available for [employer] and [employer]. Use your phone to track sodium / fluid intake,  symptoms, weight, etc.    Neighbor.ly - website-- [employer] is a dialysis company. All dialysis patients follow a renal diet which IS low sodium! This website offers free seasonal cookbooks.   Each quarter, they will release 25-30 new recipes with a breakdown of calories, sodium, glucose, etc    [email or web address] -- more free recipes

## 2023-02-27 NOTE — ADT AUTH CERT
H&P by Marcelo Walter MD at 2/25/2023  9:52 PM    Author: Marcelo Walter MD Specialty: Internal Medicine, Hospitalist Author Type: Physician   Filed: 2/25/2023 11:25 PM Date of Service: 2/25/2023  9:52 PM Status: Signed   : Marcelo Walter MD (Physician)   [facility] [address]       PCP: Elicia Clayton MD     Date of Admission: 2/25/2023     Date of Service: Pt seen/examined on 2/25/2023 and placed in Observation. Chief Complaint: \"I fell\"        History Of Present Illness:    74 y.o. male smoker in remission with multiple medical problems including COPD on 3 L of oxygen, end-stage renal disease on hemodialysis who presented to [facility] ED with right knee pain after a fall. Patient said that he is right knee gave out from under him and he landed on it after which it started to hurt. He denies any lightheadedness, syncope, head strike, chest pain, increased shortness of breath, cough, nausea, vomiting, diarrhea. He is chronically fatigued. In emergency room his temperature was 98, blood pressure 140/78, pulse 84, respirations 16, sats high 90s on 3 L of oxygen.      Past Medical History:       Past Medical History             Diagnosis Date    Cerebral artery occlusion with cerebral infarction ([facility]      Combined systolic and diastolic heart failure (HCC)      COPD (chronic obstructive pulmonary disease) (HCC)      Gout      Hemodialysis patient ([facility]      Hx of blood clots      Hyperlipidemia      Hypertension              Past Surgical History:       Past Surgical History             Procedure Laterality Date    COLONOSCOPY        COLONOSCOPY N/A 1/17/2023     COLONOSCOPY DIAGNOSTIC performed by Juventino Freedman MD at [address] [facility]   11/15/2022     CT BONE MARROW BIOPSY 11/15/2022 [facility] CT SCAN    DIALYSIS FISTULA CREATION Left      UPPER GASTROINTESTINAL ENDOSCOPY N/A 1/17/2023 EGD BIOPSY performed by Bishnu Pedro MD at [facility] ENDOSCOPY            Medications Prior to Admission:      Home Medications           Prior to Admission medications    Medication Sig Start Date End Date Taking? Authorizing Provider   calcium carb-cholecalciferol (CALCIUM + VITAMIN D3) 600-5 MG-MCG TABS tablet Take 1 tablet by mouth daily     Yes Historical Provider, MD   calcium acetate (PHOSLO) 667 MG TABS Take 2 tablets by mouth 3 times daily With meals 2/23/23     Historical Provider, MD   apixaban (ELIQUIS) 2.5 MG TABS tablet Take 1 tablet by mouth 2 times daily 1/29/23     Catalina Contreras MD   gabapentin (NEURONTIN) 100 MG capsule Take 100 mg by mouth nightly. Historical Provider, MD   ferrous sulfate (IRON 325) 325 (65 Fe) MG tablet Take 325 mg by mouth every other day       Historical Provider, MD   pantoprazole (PROTONIX) 40 MG tablet Take 1 tablet by mouth every morning (before breakfast) 1/18/23     Catalina Contreras MD   levothyroxine (SYNTHROID) 75 MCG tablet Take 1 tablet by mouth Daily 1/18/23     Catalina Contreras MD   calcium carbonate (TUMS) 500 MG chewable tablet Take 1 tablet by mouth every 8 hours as needed for Heartburn       Historical Provider, MD   carvedilol (COREG) 3.125 MG tablet Take 1 tablet by mouth 2 times daily 12/30/22     Andreia Proctor MD   sodium chloride (OCEAN) 0.65 % nasal spray 1-2 sprays both nostrils at least daily and as needed to prevent dry mucosa.  10/15/22     Janice Hill DO   calcitRIOL (ROCALTROL) 0.5 MCG capsule Take 0.5 mcg by mouth three times a week On M/W/F       Historical Provider, MD   sevelamer (RENVELA) 800 MG tablet Take 1 tablet by mouth 3 times daily (with meals)  Patient not taking: Reported on 2/25/2023       Historical Provider, MD   atorvastatin (LIPITOR) 80 MG tablet Take 80 mg by mouth nightly       Historical Provider, MD   calcium carb-cholecalciferol 600-200 MG-UNIT TABS tablet Take 1 tablet by mouth daily  Patient not taking: Reported on 2/25/2023       Historical Provider, MD   albuterol sulfate HFA (PROVENTIL;VENTOLIN;PROAIR) 108 (90 Base) MCG/ACT inhaler Inhale 2 puffs into the lungs every 6 hours as needed for Wheezing 90mcg/actuation       Historical Provider, MD   allopurinol (ZYLOPRIM) 100 MG tablet Take 100 mg by mouth three times a week On M/W/F       Historical Provider, MD            Allergies:  Nitroglycerin and Oxymetazoline     Social History:       The patient currently lives at home. TOBACCO:   reports that he has quit smoking. He has never used smokeless tobacco.  ETOH:   reports that he does not currently use alcohol. E-cigarette/Vaping         Questions Responses     E-cigarette/Vaping Use Never User     Start Date       Passive Exposure       Quit Date       Counseling Given       Comments                     Family History:        Reviewed and negative in regards to presenting illness/complaint. Family History             Problem Relation Age of Onset    Cancer Mother      Cancer Father      Other Sister              REVIEW OF SYSTEMS COMPLETED:   Pertinent positives as noted in the HPI. All other systems reviewed and negative. PHYSICAL EXAM PERFORMED:     /71   Pulse 77   Temp 98 °F (36.7 °C) (Oral)   Resp 18   Ht 5' 8\" (1.727 m)   SpO2 97%   BMI 20.13 kg/m²      General appearance: Frail, chronically ill in no apparent distress, appears stated age and cooperative. HEENT:  Normal cephalic, atraumatic without obvious deformity. Pupils equal, round, and reactive to light. Extra ocular muscles intact. Conjunctivae/corneas clear. Anicteric sclera. Dry oral mucosa. Neck: Supple, with full range of motion. No jugular venous distention. Trachea midline. Respiratory:  Normal respiratory effort. Bibasilar crackles,  no rhonchi or wheezes. Cardiovascular:  Regular rate and rhythm with normal S1/S2   Abdomen: Soft, non-tender, non-distended with normal bowel sounds.   Musculoskeletal:  No clubbing, cyanosis or edema bilaterally. Right knee is tender but no warmth, erythema or fluid collection with decreased passive and active range of motion secondary to pain. Skin: Dry without rashes . patchy areas of discoloration over anterior shins and other hyperpigmented areas over the same area. Neurologic:  Neurovascularly intact without any focal sensory/motor deficits. Cranial nerves: II-XII intact, grossly non-focal.  Psychiatric:  Alert and oriented, thought content appropriate, normal insight  Peripheral Pulses: +1 palpable, equal bilaterally         Labs:           Recent Labs     02/23/23  0834 02/25/23 2003   WBC  --  5.7   HGB 9.2* 9.4*   HCT 27.8* 29.0*   PLT  --  133*          Recent Labs     02/25/23 2003   *   K 4.3   CL 90*   CO2 25   BUN 68*   CREATININE 13.4*   CALCIUM 9.2          Recent Labs     02/25/23 2003   AST 21   ALT 7*   BILITOT 0.4   ALKPHOS 125      No results for input(s): INR in the last 72 hours. Recent Labs     02/25/23 2003 02/25/23  2149   TROPONINI 0.10* 0.11*         Urinalysis:    No results found for: Mari[place], BACTERIA, RBCUA, BLOODU, [address], [address]     Radiology:      CXR: I have reviewed the CXR with the following interpretation: No acute cardiopulmonary process. EKG:  I have reviewed the EKG with the following interpretation: Sinus rhythm, first-degree repeat block with poor R wave progression. There were new T wave inversion in V5 and V6 and QTc of 470. CT Head W/O Contrast   Final Result       Head:   1. Small focal hyperdensity in the right posterior temporal lobe is stable from January 26, 2023 and may represent parenchymal calcification. 2. No acute intracranial hemorrhage. 3. Mild chronic small vessel ischemic white matter disease with multiple small remote infarcts in the jenny, basal ganglia, and left thalamus. Face:   1. No acute fractures. 2. Severe maxillary sinus opacification.        CT FACIAL BONES WO CONTRAST Final Result       Head:   1. Small focal hyperdensity in the right posterior temporal lobe is stable from January 26, 2023 and may represent parenchymal calcification. 2. No acute intracranial hemorrhage. 3. Mild chronic small vessel ischemic white matter disease with multiple small remote infarcts in the jenny, basal ganglia, and left thalamus. Face:   1. No acute fractures. 2. Severe maxillary sinus opacification. XR KNEE RIGHT (3 VIEWS)   Final Result       Right knee: No acute osseous abnormality. CHEST: No acute cardiopulmonary disease. XR CHEST PORTABLE   Final Result       Right knee: No acute osseous abnormality. CHEST: No acute cardiopulmonary disease. Consults:     IP CONSULT TO HOSPITALIST     ASSESSMENT:           Active Hospital Problems     Diagnosis Date Noted    Acute electrocardiogram changes [R94.31] 02/25/2023       Priority: High    Hyponatremia [E87.1] 02/25/2023       Priority: Medium    Fall [W19. XXXA] 02/25/2023       Priority: Medium    Chronic respiratory failure with hypoxia and hypercapnia (HCC) [J96.11, J96.12] 10/06/2022       Priority: Medium    Chronic combined systolic and diastolic CHF (congestive heart failure) ([facility] [address] [I50.42] 03/02/2022       Priority: Medium    ESRD on dialysis ([facility] [address] [N18.6, Z99.2] 03/31/2020       Priority: Medium    Hyperlipidemia [E78.5] 03/02/2022       Priority: Low    Essential hypertension [I10] 03/02/2022       Priority: Low    COPD (chronic obstructive pulmonary disease) ([facility] [address] [J44.9] 09/10/2020       Priority: Low            PLAN:  -Monitor on telemetry and trend cardiac enzymes  -Fluid restriction 1200 ml/day  -PT/OT eval for discharge planning  -Continue home regimen for chronic stable conditions     DVT Prophylaxis: Eliquis  Diet: ADULT DIET; Regular; Low Fat/Low Chol/High Fiber/2 gm Na;  Low Potassium (Less than 3000 mg/day); 1200 ml; No Caffeine  Code Status: Full Code     PT/OT Eval Status: Pending Dispo -likely home         Dilma Cabezas MD     Thank you Randa Millard MD for the opportunity to be involved in this patient's care. If you have any questions or concerns please feel free to contact me at [phone]. Utilization Reviews       Systemic or Infectious Condition GRG - Care Day 2 (2/26/2023) by Brennon Molina RN       Review Entered Review Status   2/27/2023 0829 Completed      Criteria Review      Care Day: 2 Care Date: 2/26/2023 Level of Care: Telemetry    Guideline Day 2    Level Of Care    (X) Floor    2/27/2023 8:18 AM EST by Jacinta Monique      tele    Clinical Status    (X) * No ICU or intermediate care needs    2/27/2023 8:29 AM EST by Jacinta Monique      none noted    Interventions    (X) Inpatient interventions continue    2/27/2023 8:18 AM EST by Jacinta Monique      fluid restrictions, trend cardiac enzymes, Nephrology consult    * Milestone   Additional Notes   DATE: 2/26/23      VITALS:   97.5, 18, 76, 144/77, 97% 3L nc baseline use      ABNL/PERTINENT LABS/RADIOLOGY/DIAGNOSTIC STUDIES:   Sodium 133 mmol/L      Potassium reflex Magnesium 4.3 mmol/L      Chloride 94 mmol/L      Glucose 107 mg/dL      BUN 69 mg/dL      Creatinine 14.0 mg/dL      Est, Glom Filt Rate 4   Troponin 0.11 & 0.12, h/h 10.0/31.5,    Right foot xray: no acute osseous abnormality      PHYSICAL EXAM:   General appearance: No apparent distress, appears stated age and cooperative. HEENT: Pupils equal, round, and reactive to light. Conjunctivae/corneas clear. Neck: Supple, with full range of motion. No jugular venous distention. Trachea midline. Respiratory:  Normal respiratory effort. Clear to auscultation, bilaterally without Rales/Wheezes/Rhonchi. Cardiovascular: Regular rate and rhythm with normal S1/S2 without murmurs, rubs or gallops. Abdomen: Soft, non-tender, non-distended with normal bowel sounds. Musculoskeletal: No clubbing, cyanosis or edema bilaterally.   Full range of motion without deformity. Right foot tenderness especially on the toes. Skin: Skin color, texture, turgor normal.  No rashes or lesions. Neurologic:  Neurovascularly intact without any focal sensory/motor deficits. Cranial nerves: II-XII intact, grossly non-focal.   Psychiatric: Alert and oriented, thought content appropriate, normal insight          MD CONSULTS/ASSESSMENT AND PLAN:   Int Med note:    Assessment/Plan:    Active Hospital Problems     Diagnosis     · Pulmonary hypertension ([facility] [I27.20]         Priority: Medium   · History of pulmonary embolism [Z86.711]         Priority: Medium   · Hyponatremia [E87.1]         Priority: Medium   · Acute electrocardiogram changes [R94.31]         Priority: Medium   · Fall [W19. XXXA]         Priority: Medium   · Chronic respiratory failure with hypoxia and hypercapnia (HCC) [J96.11, J96.12]         Priority: Medium   · History of venous thromboembolism [Z86.718]         Priority: Medium   · Hyperlipidemia [E78.5]     · Essential hypertension [I10]     · Chronic combined systolic and diastolic CHF (congestive heart failure) ([facility]) [I50.42]     · COPD (chronic obstructive pulmonary disease) ([facility]) [J44.9]     · ESRD on dialysis ([facility] [N18.6, Z99.2]         PLAN:   -Monitor on telemetry and trend cardiac enzymes   -Fluid restriction 1200 ml/day   -Nephrology consult for dialysis   -PT/OT eval for discharge planning   -Continue home regimen for chronic stable conditions   -We will order x-ray of the right foot          Nephrology consult:    Assessment/Plan    1. ESRD        2. HTN       3. Anemia       4. Acid- base/ Electrolyte imbalance        5.  Fall        Plan    - HD MWF    - UF to dry wt    - renal diet    - PT eval    - Anemia management    - MBD management         MEDICATIONS:   Renvela 800mg po TID, Protonix 40mgpoQD, Iron 325mg poQOD, Eliquis 2.5mg po BID, asa 81mg poQD, lipitor 80mg poQD, Phoslo 1334mg poTID, Coreg 3.125mg poBID, Home meds      ORDERS: Daily weights I&O's, Adult diet low K no caffeine, 1200mL                  Systemic or Infectious Condition GRG - Care Day 1 (2/25/2023) by Elena Mendez RN       Review Entered Review Status   2/26/2023 0732 Completed      Criteria Review      Care Day: 1 Care Date: 2/25/2023 Level of Care: Telemetry    Guideline Day 1    Level Of Care    ( ) ICU or intermediate care    2/26/2023 7:32 AM EST by Joe Colorado      acute tele    Clinical Status    ( ) * Clinical Indications met    Interventions    (X) Inpatient interventions as needed    2/26/2023 7:32 AM EST by Joe Colorado      electrolyte replacement    * Milestone   Additional Notes   DATE: 2/25/23         CHIEF COMPLAINT/ED PRESENTATION:       Fall (Pt arrives to the ER via EMS. EMS was called for frequent falls and low oxygen saturation. Per EMS pt was O2 sat was 95-98% on 3L via NC which is his baseline. Pt endorses falling 2x today, (+) LOC)           History of Present Illness       Venus Chandra is a 74 y.o. male with a past medical history of ESRD on hemodialysis Monday/Wednesday/Friday, COPD on 3 L of oxygen, CVA, heart failure, hypertension, hyperlipidemia, blood clots on Eliquis who presents to the emergency department today for frequent falls. He states today that he fell twice. He states he got dizzy and lightheaded after standing. He fell forward onto his face. Per EMS, they were also called for low oxygen saturation, but he was satting normally when they arrived. He denies any respiratory distress currently. He is complained of discomfort in his right knee as well. Prior to this, he was eating and drinking normally. He is having normal bowel movements. He makes very little urine. No vision changes, chest pain, shortness of breath, abdominal pain, nausea, vomiting, diarrhea, constipation, fever, chills.          RELEVANT BASELINES: (lab values, vitals, o2 amount/delivery, etc.)   3L baseline use      VITALS:   98, 16, 82, 115/59, 140/78, 99% on 3L nc      ABNL/PERTINENT LABS/RADIOLOGY/DIAGNOSTIC STUDIES:   Sodium: 129 (L)   Potassium: 4.3   Chloride: 90 (L)   BUN,BUNPL: 68 (H)   Creatinine: 13.4 (HH)   Glucose: 100 (H)   Total Protein: 5.6 (L)   Troponin: 0.10 (H)   Albumin: 3.1 (L)   ALT: 7 (L)   Total Protein: 5.6 (L)   Hemoglobin Quant: 9.4 (L)   Hematocrit: 29.0 (L)   Platelet Count: 742 (L)   EKG: Twelve-lead EKG performed on 2/25/2023 at 23 9 hours. Sinus rhythm at first-degree heart block at a rate of 79. Normal axis. Poor R wave progression. No significant Q waves noted. No ST segment changes noted. T wave inversions in V5 and V6 which appear new. QTc prolonged at 470. QRS normal at 98. AK interval prolonged at 294. No STEMI. CT Head W/O Contrast    Small focal hyperdensity in the right posterior temporal lobe is stable from January 26, 2023 and may represent parenchymal calcification. 2. No acute intracranial hemorrhage. 3. Mild chronic small vessel ischemic white matter disease with multiple small remote infarcts in the jenny, basal ganglia, and left thalamus. Face:   1. No acute fractures. 2. Severe maxillary sinus opacification. Right knee: No acute osseous abnormality. CHEST: No acute cardiopulmonary disease. ED TREATMENT:   Labs and imaging      ADMISSION DIAGNOSIS/OP NOTE:   1. Hyponatremia    2. Lightheaded    3. Fall, initial encounter             PERTINENT MEDICAL HISTORY:    has a past medical history of Cerebral artery occlusion with cerebral infarction ([address] Combined systolic and diastolic heart failure ([address] COPD (chronic obstructive pulmonary disease) ([address] Gout, Hemodialysis patient ([address] Hx of blood clots, Hyperlipidemia, and Hypertension. PHYSICAL EXAM:   General: Well-appearing, no acute distress   HEENT: head is atraumatic, pupils equal round and reactive to light. No facial instability. Discomfort to palpation to the nasal bones.    Neck: Supple, no lymphadenopathy Chest: Nonlabored respirations, equal chest rise bilaterally, no accessory muscle use   Cardiovascular: Normal rate, regular rhythm, 2+ radial pulses bilaterally   Abdominal: Soft, nontender, nondistended, no rebound or guarding   Back: No CVA tenderness bilaterally. No spine pain. Full range of motion of the cervical spine. Skin: Warm, dry, well-perfused, no rashes   Musculoskeletal: No obvious deformities. Full range of motion of the bilateral upper and the left lower extremity. Full range of motion of the right hip and ankle. Minimally decreased range of motion of the right knee due to discomfort. Neurologic: Alert and oriented x4, speech is clear and intact without dysarthria, moving all extremities normally   Psych: Appropriate mood and affect      MD CONSULTS/ASSESSMENTS & PLANS:   Internal Med H&P note:       ASSESSMENT:       Active Hospital Problems     Diagnosis Date Noted   · Acute electrocardiogram changes [R94.31] 02/25/2023       Priority: High   · Hyponatremia [E87.1] 02/25/2023       Priority: Medium   · Fall [W19. XXXA] 02/25/2023       Priority: Medium   · Chronic respiratory failure with hypoxia and hypercapnia (HCC) [J96.11, J96.12] 10/06/2022       Priority: Medium   · Chronic combined systolic and diastolic CHF (congestive heart failure) ([facility] [I50.42] 03/02/2022       Priority: Medium   · [facility] [N18.6, Z99.2] 03/31/2020       Priority: Medium   · Hyperlipidemia [E78.5] 03/02/2022       Priority: Low   · Essential hypertension [I10] 03/02/2022       Priority: Low   · COPD (chronic obstructive pulmonary disease) ([facility] [J44.9] 09/10/2020       Priority: Low       PLAN:   -Monitor on telemetry and trend cardiac enzymes   -Fluid restriction 1200 ml/day   -PT/OT eval for discharge planning   -Continue home regimen for chronic stable conditions       DVT Prophylaxis: Eliquis   Diet: ADULT DIET; Regular; Low Fat/Low Chol/High Fiber/2 gm Na;  Low Potassium (Less than 3000 mg/day); 1200 ml; No Caffeine   Code Status: Full Code         MEDICATIONS:   Tyl 650mg [id number] prn, Coreg 3.125mg poBID, Phoslo 1334mgpoBID, Lipitor 80mg poQD, Eliquis 2.5mg poBID, Neurontin 100mgpoQD      ORDERS:   I&O's, tele, up with assist, BMP w reflex to MG in am, Adult diet 1200mL no caffeine              Systemic or Infectious Condition GRG - Clinical Indications for Admission to Inpatient Care by Poornima Barrera RN       Review Entered Review Status   2/26/2023 0731 Completed      Criteria Review      Clinical Indications for Admission to Inpatient Care    Most Recent : Poornima Barrera Most Recent Date: 2/26/2023 7:31 AM EST     (X) Other Indication: falls, hyponatremia      Entered 2/26/2023 7:31 AM EST by Poornima Barrera     Na 129, elevated troponin 0.10@2003, 0.11@2149

## 2023-02-27 NOTE — PLAN OF CARE
Problem: ABCDS Injury Assessment  Goal: Absence of physical injury  Outcome: Progressing     Problem: Respiratory - Adult  Goal: Achieves optimal ventilation and oxygenation  Outcome: Progressing     Problem: Skin/Tissue Integrity - Adult  Goal: Skin integrity remains intact  Outcome: Progressing     Problem: Metabolic/Fluid and Electrolytes - Adult  Goal: Electrolytes maintained within normal limits  Outcome: Progressing     Problem: Safety - Adult  Goal: Free from fall injury  Outcome: Progressing

## 2023-02-27 NOTE — DISCHARGE SUMMARY
History and Physical    Patient's PCP: Mariana Eisenmenger, MD    HISTORY OF PRESENT ILLNESS:    This is a very pleasant 74 y.o. male with a history of multiple medical problems presenting with ***    Past Medical / Surgical History:    Past Medical History:   Diagnosis Date    Cerebral artery occlusion with cerebral infarction ([facility]     Combined systolic and diastolic heart failure (HCC)     COPD (chronic obstructive pulmonary disease) ([facility]     Gout     Hemodialysis patient ([facility]     Hx of blood clots     Hyperlipidemia     Hypertension      Past Surgical History:   Procedure Laterality Date    COLONOSCOPY      COLONOSCOPY N/A 1/17/2023    COLONOSCOPY DIAGNOSTIC performed by Erica Ritter MD at [address] [facility] [place]  11/15/2022    CT BONE MARROW BIOPSY 11/15/2022 [facility] CT SCAN    DIALYSIS FISTULA CREATION Left     UPPER GASTROINTESTINAL ENDOSCOPY N/A 1/17/2023    EGD BIOPSY performed by Erica Ritter MD at [address] ENDOSCOPY       Medications Prior to Admission:    Prior to Admission medications    Medication Sig Start Date End Date Taking? Authorizing Provider   calcium acetate (PHOSLO) 667 MG TABS Take 2 tablets by mouth 3 times daily With meals 2/23/23   Historical Provider, MD   apixaban (ELIQUIS) 2.5 MG TABS tablet Take 1 tablet by mouth 2 times daily 1/29/23   Nedra Gayle MD   gabapentin (NEURONTIN) 100 MG capsule Take 100 mg by mouth nightly.     Historical Provider, MD   ferrous sulfate (IRON 325) 325 (65 Fe) MG tablet Take 325 mg by mouth every other day    Historical Provider, MD   pantoprazole (PROTONIX) 40 MG tablet Take 1 tablet by mouth every morning (before breakfast) 1/18/23   Nedra Gayle MD   levothyroxine (SYNTHROID) 75 MCG tablet Take 1 tablet by mouth Daily 1/18/23   Nedra Gayle MD   carvedilol (COREG) 3.125 MG tablet Take 1 tablet by mouth 2 times daily 12/30/22   Cordelia Marroquin MD   sodium chloride (OCEAN) 0.65 % nasal spray 1-2 sprays both nostrils at least daily and as needed to prevent dry mucosa. 10/15/22   Janice Hill,    calcitRIOL (ROCALTROL) 0.5 MCG capsule Take 0.5 mcg by mouth three times a week On M/W/F    Historical Provider, MD   sevelamer (RENVELA) 800 MG tablet Take 1 tablet by mouth 3 times daily (with meals)    Historical Provider, MD   atorvastatin (LIPITOR) 80 MG tablet Take 80 mg by mouth nightly    Historical Provider, MD   albuterol sulfate HFA (PROVENTIL;VENTOLIN;PROAIR) 108 (90 Base) MCG/ACT inhaler Inhale 2 puffs into the lungs every 6 hours as needed for Wheezing 90mcg/actuation    Historical Provider, MD   allopurinol (ZYLOPRIM) 100 MG tablet Take 100 mg by mouth three times a week On M/W/F    Historical Provider, MD       Allergies:  Nitroglycerin and Oxymetazoline    Social History:   TOBACCO:   reports that he has quit smoking. He has never used smokeless tobacco.     ETOH:   reports that he does not currently use alcohol. Patient currently lives {LIVING SITUATION:[id number]}    Family History:       Problem Relation Age of Onset    Cancer Mother     Cancer Father     Other Sister        ROS: Review of Systems - {ros master:238116}. All other systems reviewed and are negative. PHYSICAL EXAM:  /65   Pulse 76   Temp 97.5 °F (36.4 °C) (Oral)   Resp 18   Ht 5' 8\" (1.727 m)   Wt 126 lb 8.7 oz (57.4 kg)   SpO2 94%   BMI 19.24 kg/m²   No results for input(s): POCGLU in the last 72 hours.   {Exam; Complete Normal And System Select:06099}    LABS:  Recent Labs     02/25/23 2003 02/26/23  0715   WBC 5.7 5.0   HGB 9.4* 10.0*   HCT 29.0* 31.5*   * 112*                                                                  Recent Labs     02/25/23 2003 02/26/23  0715   * 133*   K 4.3 4.3   CL 90* 94*   CO2 25 29   BUN 68* 69*   CREATININE 13.4* 14.0*   GLUCOSE 100* 107*     Recent Labs     02/25/23 2003   AST 21   ALT 7*   BILITOT 0.4   ALKPHOS 125     Recent Labs     02/25/23 2003 02/25/23  2149 02/26/23  0154 TROPONINI 0.10* 0.11* 0.12*     No results for input(s): BNP in the last 72 hours. Lab Results   Component Value Date/Time    PHART 7.220 01/26/2023 12:58 AM    YIT7GJT 63.6 01/26/2023 12:58 AM    PO2ART 92.6 01/26/2023 12:58 AM     No results for input(s): INR in the last 72 hours. No results for input(s): NITRITE, COLORU, PHUR, LABCAST, WBCUA, RBCUA, MUCUS, TRICHOMONAS, YEAST, BACTERIA, CLARITYU, SPECGRAV, LEUKOCYTESUR, UROBILINOGEN, BILIRUBINUR, BLOODU, GLUCOSEU, AMORPHOUS in the last 72 hours. Invalid input(s): Jose Sensing   EKG Independently reviewed: {ekg findings:539314::\"normal EKG, normal sinus rhythm\",\"unchanged from previous tracings\"}.   PCXR Independently reviewed: ***    Assessment & Plan:    Patient Active Problem List:     HCAP (healthcare-associated pneumonia)     COPD exacerbation ([facility]     ESRD (end stage renal disease) ([facility]     COPD (chronic obstructive pulmonary disease) ([facility]     Acute on chronic respiratory failure with hypoxia and hypercapnia (HCC)     Chest pain     Hypokalemia     V-tach     Essential hypertension     Hyperlipidemia     Chronic combined systolic and diastolic CHF (congestive heart failure) (HCC)     SOB (shortness of breath)     AV fistula thrombosis, initial encounter ([facility]     History of venous thromboembolism     Epistaxis     Pancytopenia (HCC)     Problem with dialysis access [facility])     Acute cerebrovascular accident (CVA) ([facility]     Chronic respiratory failure with hypoxia and hypercapnia ([facility]     Dialysis AV fistula malfunction, initial encounter ([facility]     Hemorrhage of arteriovenous fistula, initial encounter ([facility]     Weakness     Pre-syncope     Hypotension due to hypovolemia     Lactic acidemia     Elevated brain natriuretic peptide (BNP) level     Hyperkalemia     Failure to thrive in adult     Acute blood loss anemia (ABLA)     Intramuscular hematoma     Anticoagulated     Hyponatremia     Acute electrocardiogram changes     Ambulatory dysfunction     Fall Pulmonary hypertension ([facility]     History of pulmonary embolism     Lightheaded        The patient and / or the family were informed of the results of any tests, a time was given to answer questions, a plan was proposed and they agreed with plan. Thank you Dr. Alma Delia Mejía MD for the opportunity to be involved in this patients care. If you have any questions or concerns please feel free to contact me at [phone].   Full Code

## 2023-02-27 NOTE — PROGRESS NOTES
Several attempts to call report. Left phone number with [address] [facility] [facility] [address]  for report, made aware pt will be d/c from here at [address].

## 2023-02-27 NOTE — CARE COORDINATION
Case Management Assessment            Discharge Note                    Date / Time of Note: 2/27/2023 1:36 PM                  Discharge Note Completed by: DINESH Gutiérrez, LSW    Patient Name: Viraj Harley   YOB: 1955  Diagnosis: Hyponatremia [E87.1]  Lightheaded [R42]  Fall, initial encounter [W19. XXXA]   Date / Time: 2/25/2023  7:23 PM    Current PCP: Belén Johnson MD  Clinic patient: No    Hospitalization in the last 30 days: Yes    Advance Directives:  Code Status: Full Code  [place] DNR form completed and on chart: Yes    Financial:  Payor: Alyson Santiagoa I-SNP / Plan: Le Deanna I-SNP / Product Type: *No Product type* /      Pharmacy:    [address] [place] [address], [address] [facility] [phone] - f [phone]  [address] [facility] [address]  Phone: [phone] Fax: [phone]    [facility] [id number] [facility] [address] [phone] - F [phone]  [facility] 67  [address] [address]  Phone: [phone] Fax: [phone]    [place], [place] - [address] [facility]. [place] Angel [phone] - F [phone]  [address] [facility] [address]  Phone: [phone] Fax: [phone]      Assistance purchasing medications?: Potential Assistance Purchasing Medications: No  Assistance provided by Case Management: None at this time    Does patient want to participate in local refill/ meds to beds program?: No    Meds To Beds General Rules:  1. Can ONLY be done Monday- Friday between 8:30am-5pm  2. Prescription(s) must be in pharmacy by 3pm to be filled same day  3. Copy of patient's insurance/ prescription drug card and patient face sheet must be sent along with the prescription(s)  4. Cost of Rx cannot be added to hospital bill. If financial assistance is needed, please contact unit  or ;   or  CANNOT provide pharmacy voucher for patients co-pays  5. Patients can then  the prescription on their way out of the hospital at discharge, or pharmacy can deliver to the bedside if staff is available. (payment due at time of pick-up or delivery - cash, check, or card accepted)     Able to afford home medications/ co-pay costs: Yes    ADLS:  Current PT AM-PAC Score:   /24  Current OT AM-PAC Score:   /24      DISCHARGE Disposition: [facility] (LTC): [facility]  Phone: [phone]  Fax: [phone]    LOC at discharge: Thierno Mckenna Completed: Yes    Notification completed in HENS/PAS?:  Not Applicable    IMM Completed:   Not Indicated    Transportation:  Transportation PLAN for discharge: EMS transportation   Mode of Transport: Ambulance stretcher - BLS  Reason for medical transport: Bed confined: Meets the following criteria 1) unable to get out of bed without assistance or ambulate, 2) unable to safely sit up in a wheelchair, 3) unable to maintain erect seating position in a chair for time needed for transport  Name of [address]: [employer]: [phone]  Time of Transport: 7:30pm    Transport form completed: Yes    Home Oxygen and Respiratory Equipment:  Oxygen needed at discharge?: Yes - 3L    Dialysis:  Dialysis patient: Yes - onsite [facility]    Referrals made at [facility] for outpatient continued care:  Not Applicable    Additional CM Notes:  Pt will DC to [facility] today. CM notified Bernabe Kidney in admissions and pt's RN of transport time. No other needs at this time. The Plan for Transition of Care is related to the following treatment goals of Hyponatremia [E87.1]  Lightheaded [R42]  Fall, initial encounter [W19. XXXA]    The Patient and/or patient representative Radha Nava and his family were provided with a choice of provider and agrees with the discharge plan Yes    Freedom of choice list was provided with basic dialogue that supports the patient's individualized plan of care/goals and shares the quality data associated with the providers.  Yes    Care Transitions patient: No    DINESH Wills, [facility]  Case Management Department  Ph: [phone]  Fax: [phone]

## 2023-02-27 NOTE — DISCHARGE INSTR - COC
Continuity of Care Form    Patient Name: Alex Poole   :  1955  MRN:  [id number]    Admit date:  2023  Discharge date:      Code Status Order: Full Code   Advance Directives:     Admitting Physician:  Favian Rose MD  PCP: Aida Robertson MD    Discharging Nurse: [facility] Unit/Room#: 2472/9698-16  Discharging Unit Phone Number: ***    Emergency Contact:   Extended Emergency Contact Information  Primary Emergency Contact: [address] Phone: [phone]  Relation: Child   needed?  No    Past Surgical History:  Past Surgical History:   Procedure Laterality Date    COLONOSCOPY      COLONOSCOPY N/A 2023    COLONOSCOPY DIAGNOSTIC performed by Neha Coelho MD at [address] [facility]  11/15/2022    CT BONE MARROW BIOPSY 11/15/2022 [address] SCAN    DIALYSIS FISTULA CREATION Left     UPPER GASTROINTESTINAL ENDOSCOPY N/A 2023    EGD BIOPSY performed by Neha Coelho MD at [address] ENDOSCOPY       Immunization History:   Immunization History   Administered Date(s) Administered    COVID-19, PFIZER Bivalent BOOSTER, DO NOT Dilute, (age 12y+), IM, 30 mcg/0.3 mL 2022    COVID-19, PFIZER GRAY top, DO NOT Dilute, (age 15 y+), IM, 30 mcg/0.3 mL 2022       Active Problems:  Patient Active Problem List   Diagnosis Code    HCAP (healthcare-associated pneumonia) J18.9    COPD exacerbation ([facility] J44.1    ESRD (end stage renal disease) ([facility] N18.6    COPD (chronic obstructive pulmonary disease) ([facility] J44.9    Acute on chronic respiratory failure with hypoxia and hypercapnia (HCC) J96.21, J96.22    Chest pain R07.9    Hypokalemia E87.6    V-tach I47.20    Essential hypertension I10    Hyperlipidemia E78.5    Chronic combined systolic and diastolic CHF (congestive heart failure) ([facility]) I50.42    SOB (shortness of breath) R06.02    AV fistula thrombosis, initial encounter ([facility] F08.405B    History of venous thromboembolism Z86.718    Epistaxis R04.0    Pancytopenia ([facility]) D61.818    Problem with dialysis access [facility]) T82.898A    Acute cerebrovascular accident (CVA) ([facility] I63.9    Chronic respiratory failure with hypoxia and hypercapnia ([facility]) J96.11, J96.12    Dialysis AV fistula malfunction, initial encounter [facility]) T82.590A    Hemorrhage of arteriovenous fistula, initial encounter ([facility]) T82.838A    Weakness R53.1    Pre-syncope R55    Hypotension due to hypovolemia I95.89, E86.1    Lactic acidemia E87.20    Elevated brain natriuretic peptide (BNP) level R79.89    Hyperkalemia E87.5    Failure to thrive in adult R62.7    Acute blood loss anemia (ABLA) D62    Intramuscular hematoma T14. 8XXA    Anticoagulated Z79.01    Hyponatremia E87.1    Acute electrocardiogram changes R94.31    Ambulatory dysfunction R26.2    Fall W19. XXXA    Pulmonary hypertension ([facility]) I27.20    History of pulmonary embolism Z86.711    Lightheaded R42       Isolation/Infection:   Isolation            No Isolation          Patient Infection Status       Infection Onset Added Last Indicated Last Indicated By Review Planned Expiration Resolved Resolved By    None active    Resolved    COVID-19 (Rule Out) 12/28/22 12/28/22 12/28/22 COVID-19, Rapid (Ordered)   12/28/22 Rule-Out Test Resulted    C-diff Rule Out 03/02/22 03/02/22 03/03/22 Clostridium Difficile Toxin/Antigen (Ordered)   03/03/22 Rule-Out Test Resulted    COVID-19 (Rule Out) 09/21/21 09/21/21 09/21/21 COVID-19, Rapid (Ordered)   09/21/21 Rule-Out Test Resulted    COVID-19 (Rule Out)  04/02/20 04/02/20 Steve Lopez RN   04/06/20 Steve Lopez RN    COVID-19 (Rule Out) 03/31/20 03/31/20 03/31/20 COVID-19 (Ordered)   03/31/20 Rule-Out Test Resulted            Nurse Assessment:  Last Vital Signs: /65   Pulse 76   Temp 97.5 °F (36.4 °C) (Oral)   Resp 18   Ht 5' 8\" (1.727 m)   Wt 126 lb 8.7 oz (57.4 kg)   SpO2 94%   BMI 19.24 kg/m²     Last documented pain score (0-10 scale): Pain Level: 7  Last Weight:   Wt Readings from Last 1 Encounters:   02/27/23 126 lb 8.7 oz (57.4 kg)     Mental Status:  disoriented and alert    IV Access:  - None    Nursing Mobility/ADLs:  Walking   Assisted  Transfer  Assisted  Bathing  Assisted  Dressing  Assisted  Toileting  Assisted  Feeding  Assisted  Med Admin  Independent  Med Delivery   whole    Wound Care Documentation and Therapy:        Elimination:  Continence: Bowel: Yes  Bladder: Yes  Urinary Catheter: None   Colostomy/Ileostomy/Ileal Conduit: No       Date of Last BM: 2/25      Intake/Output Summary (Last 24 hours) at 2/27/2023 1159  Last data filed at 2/27/2023 1043  Gross per 24 hour   Intake 480 ml   Output --   Net 480 ml     I/O last 3 completed shifts: In: 600 [P.O.:600]  Out: -     Safety Concerns: At Risk for Falls    Impairments/Disabilities:      None    Nutrition Therapy:  Current Nutrition Therapy:   - Oral Diet:  General    Routes of Feeding: Oral  Liquids: Thin Liquids  Daily Fluid Restriction: yes - amount 1200  Last Modified Barium Swallow with Video (Video Swallowing Test): not done    Treatments at the Time of Hospital Discharge:   Respiratory Treatments: ***  Oxygen Therapy:  is not on home oxygen therapy. Ventilator:    - No ventilator support    Heart Failure Instructions for Daily Management  Patient was treated for chronic systolic heart failure. he  will require the following:    Please weigh daily on the same scale and approximately the same time of day. Report weight gain of 3 pounds/day or 5 pounds/week to : facility MD, Valente Trujillo [phone], and [facility] / Raj Fritz [phone]. Please use hospital discharge weight as baseline reference.    Please monitor for signs and symptoms of and report to MD:  Worsening Heart Failure: sudden weight gain, shortness of breath, lower extremity or general edema/swelling, abdominal bloating/swelling, inability to lie flat, intolerance to usual activity, or cough (especially at night). Report these finding even if no increase in weight. Dehydration:  having difficulty or a decrease in urination, dizziness, worsening fatigue, or new onset/worsening of generalized weakness. Please continue a LOW SODIUM diet and LIMIT fluid intake to 48 - 64 ounces ( 1.5 - 2 liters) per day. Call Mimi Dove [phone], facility MD, and [facility] / Tarik Osuna [phone] with any questions or concerns. Please continue heart failure education to patient and family/support system. See After Visit Summary for hospital follow up appointment details. Consider spiritual care referral for support and/or completion of advance directives . Consider: having the [facility] MD complete required 7 day follow up, [place] [address] telehealth program if patient agreeable and able to participate, and palliative care consult for ongoing goals of care, end of life, and/or chronic disease management discussions. Patient's primary cardiologist is Dr Laura Mcnair.        Rehab Therapies: Physical Therapy and Occupational Therapy  Weight Bearing Status/Restrictions: No weight bearing restrictions  Other Medical Equipment (for information only, NOT a DME order):  walker  Other Treatments:       Patient's personal belongings (please select all that are sent with patient):  Pants, shoes, shirt    RN SIGNATURE:  Electronically signed by Michaela Omalley RN on 2/27/23 at 6:04 PM EST    CASE MANAGEMENT/SOCIAL WORK SECTION    Inpatient Status Date: 2/25/23    Readmission Risk Assessment Score:  Readmission Risk              Risk of Unplanned Readmission:  0           Discharging to Facility/ [address] ([facility]): [facility]    Phone: [phone]  Fax: [phone]    Dialysis Facility  Onsite at [facility]    / signature: Electronically signed by DINESH White, LSW on 2/27/23 at 1:49 PM EST    PHYSICIAN SECTION    Prognosis: Good    Condition at Discharge: Stable    Rehab Potential (if transferring to Rehab): Good    Recommended Labs or Other Treatments After Discharge: PT/OT    Physician Certification: I certify the above information and transfer of Joselo Fortune  is necessary for the continuing treatment of the diagnosis listed and that he requires Intermediate Nursing Care for greater 30 days.      Update Admission H&P: No change in H&P    PHYSICIAN SIGNATURE:  Electronically signed by Pierre Griffin MD on 2/27/23 at 11:59 AM EST

## 2023-02-27 NOTE — PROGRESS NOTES
Nephrology  Note                                                                                                                                                                                                                                                                                                                                                               Office : [phone]     Fax :[phone]              Patient's Name: Bandar Rivera  7:22 AM  2/27/2023    Reason for Consult:  ESRD   Requesting Physician:  Mariana Eisenmenger, MD      HD today   No N/V/D     Past Medical History:   Diagnosis Date    Cerebral artery occlusion with cerebral infarction ([facility] [address] [facility]     Combined systolic and diastolic heart failure (HCC)     COPD (chronic obstructive pulmonary disease) ([facility] [address] [facility]     Gout     Hemodialysis patient ([facility] [address]     Hx of blood clots     Hyperlipidemia     Hypertension        Past Surgical History:   Procedure Laterality Date    COLONOSCOPY      COLONOSCOPY N/A 1/17/2023    COLONOSCOPY DIAGNOSTIC performed by Erica Ritter MD at [facility]  11/15/2022    CT BONE MARROW BIOPSY 11/15/2022 [facility] CT SCAN    DIALYSIS FISTULA CREATION Left     UPPER GASTROINTESTINAL ENDOSCOPY N/A 1/17/2023    EGD BIOPSY performed by Erica Ritter MD at [facility] ENDOSCOPY       Family History   Problem Relation Age of Onset    Cancer Mother     Cancer Father     Other Sister         reports that he has quit smoking. He has never used smokeless tobacco. He reports that he does not currently use alcohol. He reports that he does not use drugs.     Allergies:  Nitroglycerin and Oxymetazoline    Current Medications:    sodium chloride flush 0.9 % injection 5-40 mL, 2 times per day  sodium chloride flush 0.9 % injection 5-40 mL, PRN  0.9 % sodium chloride infusion, PRN  ondansetron (ZOFRAN-ODT) disintegrating tablet 4 mg, Q8H PRN   Or  ondansetron (ZOFRAN) injection 4 mg, Q6H PRN  acetaminophen (TYLENOL) tablet 650 mg, Q6H PRN   Or  acetaminophen (TYLENOL) suppository 650 mg, Q6H PRN  polyethylene glycol (GLYCOLAX) packet 17 g, Daily PRN  aspirin chewable tablet 81 mg, Daily  albuterol (PROVENTIL) nebulizer solution 2.5 mg, Q4H PRN  allopurinol (ZYLOPRIM) tablet 100 mg, Once per day on Mon Wed Fri  apixaban (ELIQUIS) tablet 2.5 mg, BID  atorvastatin (LIPITOR) tablet 80 mg, Nightly  calcitRIOL (ROCALTROL) capsule 0.5 mcg, Once per day on Mon Wed Fri  calcium acetate (PHOSLO) capsule 1,334 mg, TID  oyster shell calcium w/D 500-5 MG-MCG tablet 1 tablet, Daily  calcium carbonate (TUMS) chewable tablet 500 mg, Q8H PRN  carvedilol (COREG) tablet 3.125 mg, BID  ferrous sulfate (IRON 325) tablet 325 mg, Every Other Day  gabapentin (NEURONTIN) capsule 100 mg, QHS  levothyroxine (SYNTHROID) tablet 75 mcg, Daily  pantoprazole (PROTONIX) tablet 40 mg, QAM AC  sevelamer (RENVELA) tablet 800 mg, TID WC  sodium chloride (OCEAN, BABY AYR) 0.65 % nasal spray 2 spray, PRN          Physical exam:     Vitals:  /75   Pulse 75   Temp 97 °F (36.1 °C) (Axillary)   Resp 16   Ht 5' 8\" (1.727 m)   Wt 126 lb 8.7 oz (57.4 kg)   SpO2 96%   BMI 19.24 kg/m²   Constitutional:  OAA X3 NAD  Skin: no rash, turgor wnl  Heent:  eomi, mmm  Neck: no bruits or jvd noted  Cardiovascular:  S1, S2 without m/r/g  Respiratory: CTA B without w/r/r  Abdomen:  +bs, soft, nt, nd  Ext: + lower extremity edema  Psychiatric: mood and affect appropriate  Musculoskeletal:  Rom, muscular strength intact    Data:   Labs:  CBC:   Recent Labs     02/25/23 2003 02/26/23  0715   WBC 5.7 5.0   HGB 9.4* 10.0*   * 112*       BMP:    Recent Labs     02/25/23 2003 02/26/23  0715   * 133*   K 4.3 4.3   CL 90* 94*   CO2 25 29   BUN 68* 69*   CREATININE 13.4* 14.0*   GLUCOSE 100* 107*       Ca/Mg/Phos:   Recent Labs     02/25/23 2003 02/26/23  0715   CALCIUM 9.2 9.5       Hepatic:   Recent Labs     02/25/23 2003   AST 21   ALT 7*   BILITOT 0.4   ALKPHOS 125       Troponin:   Recent Labs     02/25/23 2003 02/25/23  2149 02/26/23  0154   TROPONINI 0.10* 0.11* 0.12*       BNP: No results for input(s): BNP in the last 72 hours. Lipids: No results for input(s): CHOL, TRIG, HDL, LDLCALC, LABVLDL in the last 72 hours. ABGs: No results for input(s): PHART, PO2ART, GLC9ZYR in the last 72 hours. INR: No results for input(s): INR in the last 72 hours. UA:No results for input(s): Milo Face, GLUCOSEU, BILIRUBINUR, Miri Pedlar, BLOODU, PHUR, PROTEINU, UROBILINOGEN, NITRU, LEUKOCYTESUR, LABMICR, URINETYPE in the last 72 hours. Urine Microscopic: No results for input(s): LABCAST, BACTERIA, COMU, HYALCAST, WBCUA, RBCUA, EPIU in the last 72 hours. Urine Culture: No results for input(s): LABURIN in the last 72 hours. Urine Chemistry: No results for input(s): Holly Cirri, PROTEINUR, NAUR in the last 72 hours. IMAGING:  XR FOOT RIGHT (MIN 3 VIEWS)   Final Result   1. No acute osseous abnormality. CT Head W/O Contrast   Final Result      Head:   1. Small focal hyperdensity in the right posterior temporal lobe is stable from January 26, 2023 and may represent parenchymal calcification. 2. No acute intracranial hemorrhage. 3. Mild chronic small vessel ischemic white matter disease with multiple small remote infarcts in the jenny, basal ganglia, and left thalamus. Face:   1. No acute fractures. 2. Severe maxillary sinus opacification. CT FACIAL BONES WO CONTRAST   Final Result      Head:   1. Small focal hyperdensity in the right posterior temporal lobe is stable from January 26, 2023 and may represent parenchymal calcification. 2. No acute intracranial hemorrhage. 3. Mild chronic small vessel ischemic white matter disease with multiple small remote infarcts in the jenny, basal ganglia, and left thalamus. Face:   1. No acute fractures. 2. Severe maxillary sinus opacification.       XR KNEE RIGHT (3 VIEWS)   Final Result      Right knee: No acute osseous abnormality. CHEST: No acute cardiopulmonary disease. XR CHEST PORTABLE   Final Result      Right knee: No acute osseous abnormality. CHEST: No acute cardiopulmonary disease. Assessment/Plan   1. ESRD     2. HTN    3. Anemia    4. Acid- base/ Electrolyte imbalance     5.  Fall     Plan   - HD MWF   - UF to dry wt   - renal diet   - PT eval   - Anemia management   - MBD management                 Thank you for allowing us to participate in care of Alyson Enriquez MD  Feel free to contact me   [facility] [address]  Office : [phone]  Fax :[phone]

## 2023-02-27 NOTE — CARE COORDINATION
CM  following for  d/c planning:    Patient per  chart  from  [facility]  w/  on site  HD  :    Patent in Observ:   Patient has a noted  d/c order to return  ,  but  will get  HD today at  [phone]  per  RN Yehudak Rack. -  SW to arrange  transport  back to facility after  HD . Will confirm  return with family and  admission [facility]. )      Electronically signed by Everton Conner RN on 2/27/2023 at 12:35 PM       Everton Conner RN Case Manager  [facility]  [address] [place] [address] Janette Jensen.   [facility] [address]  [phone]  Fax [phone]

## 2023-02-28 ENCOUNTER — HOSPITAL ENCOUNTER (EMERGENCY)
Age: 68
Discharge: NURSING, REHAB OR LONG-TERM CARE | End: 2023-02-28
Attending: EMERGENCY MEDICINE
Payer: MEDICARE

## 2023-02-28 ENCOUNTER — APPOINTMENT (OUTPATIENT)
Dept: CT IMAGING | Age: 68
End: 2023-02-28
Payer: MEDICARE

## 2023-02-28 VITALS
HEART RATE: 72 BPM | RESPIRATION RATE: 16 BRPM | DIASTOLIC BLOOD PRESSURE: 65 MMHG | SYSTOLIC BLOOD PRESSURE: 110 MMHG | WEIGHT: 123.7 LBS | TEMPERATURE: 98.7 F | BODY MASS INDEX: 18.75 KG/M2 | HEIGHT: 68 IN | OXYGEN SATURATION: 95 %

## 2023-02-28 DIAGNOSIS — S00.83XA FACIAL CONTUSION, INITIAL ENCOUNTER: Primary | ICD-10-CM
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
## 2023-02-28 LAB
EKG ATRIAL RATE: 76 BPM
EKG DIAGNOSIS: NORMAL
EKG P AXIS: 70 DEGREES
EKG P-R INTERVAL: 248 MS
EKG Q-T INTERVAL: 434 MS
EKG QRS DURATION: 98 MS
EKG QTC CALCULATION (BAZETT): 488 MS
EKG R AXIS: 270 DEGREES
EKG T AXIS: 88 DEGREES
EKG VENTRICULAR RATE: 76 BPM

## 2023-02-28 PROCEDURE — 70486 CT MAXILLOFACIAL W/O DYE: CPT

## 2023-02-28 PROCEDURE — 70450 CT HEAD/BRAIN W/O DYE: CPT

## 2023-02-28 PROCEDURE — 93010 ELECTROCARDIOGRAM REPORT: CPT | Performed by: INTERNAL MEDICINE

## 2023-02-28 PROCEDURE — 96375 TX/PRO/DX INJ NEW DRUG ADDON: CPT

## 2023-02-28 PROCEDURE — 72125 CT NECK SPINE W/O DYE: CPT

## 2023-02-28 PROCEDURE — 99284 EMERGENCY DEPT VISIT MOD MDM: CPT

## 2023-02-28 PROCEDURE — 6360000002 HC RX W HCPCS: Performed by: EMERGENCY MEDICINE

## 2023-02-28 PROCEDURE — 96374 THER/PROPH/DIAG INJ IV PUSH: CPT

## 2023-02-28 PROCEDURE — 6360000002 HC RX W HCPCS: Performed by: PHYSICIAN ASSISTANT

## 2023-02-28 RX ORDER — ONDANSETRON 2 MG/ML
4 INJECTION INTRAMUSCULAR; INTRAVENOUS ONCE
Status: COMPLETED | OUTPATIENT
Start: 2023-02-28 | End: 2023-02-28

## 2023-02-28 RX ADMIN — ONDANSETRON 4 MG: 2 INJECTION INTRAMUSCULAR; INTRAVENOUS at 21:56

## 2023-02-28 RX ADMIN — HYDROMORPHONE HYDROCHLORIDE 0.5 MG: 1 INJECTION, SOLUTION INTRAMUSCULAR; INTRAVENOUS; SUBCUTANEOUS at 21:55

## 2023-02-28 ASSESSMENT — PAIN DESCRIPTION - ORIENTATION: ORIENTATION: LEFT

## 2023-02-28 ASSESSMENT — PAIN DESCRIPTION - DESCRIPTORS: DESCRIPTORS: POUNDING

## 2023-02-28 ASSESSMENT — LIFESTYLE VARIABLES: HOW OFTEN DO YOU HAVE A DRINK CONTAINING ALCOHOL: NEVER

## 2023-02-28 ASSESSMENT — PAIN SCALES - GENERAL
PAINLEVEL_OUTOF10: 8
PAINLEVEL_OUTOF10: 8

## 2023-02-28 ASSESSMENT — PAIN - FUNCTIONAL ASSESSMENT: PAIN_FUNCTIONAL_ASSESSMENT: 0-10

## 2023-02-28 ASSESSMENT — PAIN DESCRIPTION - LOCATION
LOCATION: BACK;HEAD
LOCATION: HEAD

## 2023-02-28 NOTE — PROGRESS NOTES
Transport arrives to take patient to [facility].  Report called to nurse all questions answered

## 2023-03-01 ASSESSMENT — ENCOUNTER SYMPTOMS
WHEEZING: 0
COUGH: 0
SHORTNESS OF BREATH: 0
VOMITING: 0
NAUSEA: 0
ABDOMINAL PAIN: 0
DIARRHEA: 0
SORE THROAT: 0

## 2023-03-01 NOTE — ED PROVIDER NOTES
[facility] ENCOUNTER          ADVANCED PRACTICE PROVIDER NOTE       Date of evaluation: 2/28/2023    Chief Complaint (from nursing documentation)     Fall (Pt present post fall. EMS states he did hit his head, no LOC, no syncope. States he was trying to put socks on while in bed and rolled too far and out of the bed.)      History of Present Illness     Joanne Carmichael is a 74 y.o. male on Apixaban for prior thromboembolic events who presents to the emergency department with a complaint of injuries from a fall. Patient is a resident of a skilled nursing facility. He reports that he was trying to put his socks on while he was in bed and rolled too far, subsequently falling out of the bed and landing on the left side of his face. The patient denies any loss of consciousness. The fall was not witnessed by skilled facility staff. EMS was activated and on their arrival the patient was found to be at baseline mental status with a large area of swelling on the left side of his face. The patient is complaining of headache and left-sided facial pain. ASSESSMENT / PLAN  ([facility])     Joanne Carmichael is admitted to the Emergency Department for evaluation of his chief complaint as described in the history of present illness. Complete history and physical was performed by me and my attending. Nursing notes, past medical history, surgical history, family history and social history were reviewed and addressed in the HPI. Amy Dsouza is a 74 y.o. male who presents to the emergency department with a complaint of injuries from a fall. The patient sustained a mechanical, unwitnessed fall when he rolled from his bed while attempting to put socks on. The patient landed directly on the left side of his face. No reported loss of consciousness. Patient complains of headache and pain to the left side of the face, but no other symptoms at this time.   The patient is on anticoagulant therapy for prior thromboembolic events. On arrival to the emergency department, the patient is hemodynamically stable and within normal limits. Physical examination reveals large contusion over the left zygoma and periorbital region with superior and inferior lid swelling causing the eye to remain shut despite patient's efforts to open it. Patient's eye demonstrates no injury subconjunctival hemorrhage or abnormal/painful EOMs. Pupils PERRLA bilaterally. Tenderness to palpation over the left zygoma and orbit region. Mild tenderness of the left jaw without malocclusion or loss of strength with opening closing or lateral extrusion of the jaw. Small, superficial laceration overlying the contusion which does not require repair. Given the patient's age and cognitive comorbidities, the patient cannot be ruled out using the [place] head CT and Nexus criteria, so a CT scan of the head, cervical spine and maxillofacial region were ordered in evaluation of the patient's injury. His CT scans demonstrate no evidence of acute fractures of the skull, spine or facial bones, intracranial hemorrhage or other acute abnormality. There is a small, hyperdense lesion of the right midbrain in the right temporal lobe which is stable from 9/13/2022 which likely represents a remote petechial hemorrhage and evidence of small remote infarcts without change. The patient was administered pain medication and antiemetics in the emergency department with improvement in his symptoms. I do not see anything that warrants admission or requires further evaluation from a trauma standpoint at this time. The patient will be discharged back to the skilled nursing facility.   Recommendations at the skilled nursing facility include elevation of the head of bed to 30 degrees to allow for a decrease in swelling of the face, consideration for a slight increase in his analgesic regimen and regular, 81-SPZWSC applications of ice, 5-6 times daily until swelling over the face decreases. Strict return precautions for the development of worsening symptoms, including those concerning for TBI. I discussed this plan at length the patient who verbalizes understanding and is in agreement. The patient is currently stable and will be discharged back to the skilled nursing facility for continued care. Please see patient's AVS for additional discharge instructions. The patient was seen and evaluated by myself and the attending physician, Abimbola Langford MD, who agrees with my assessment, treatment and plan. Medical Decision Making  Problems Addressed:  Facial contusion, initial encounter: acute illness or injury  Fall, initial encounter: acute illness or injury    Amount and/or Complexity of Data Reviewed  Independent Historian: caregiver and EMS     Details: Details from SNF and EMS staff  Radiology: ordered. Decision-making details documented in ED Course. Risk  Prescription drug management. Procedures     N/A    ED Course     Nursing Notes, Past Medical Hx,Past Surgical Hx, Social Hx, Allergies, and Family Hx were reviewed. The patient was given the following medications:  Orders Placed This Encounter   Medications    DISCONTD: HYDROmorphone (DILAUDID) injection 0.25 mg    ondansetron (ZOFRAN) injection 4 mg    HYDROmorphone (DILAUDID) injection 0.5 mg       CONSULTS:  None    Clinical Impression     1. Facial contusion, initial encounter    2.  Fall, initial encounter        Disposition     PATIENT REFERRED TO:  Guy Pandey MD  [address] [place] [address]  [phone]    Schedule an appointment as soon as possible for a visit   As needed    [facility] Emergency Department  [address] [facility]  Go to   If symptoms worsen      DISCHARGE MEDICATIONS:  Discharge Medication List as of 2/28/2023 11:28 PM          DISPOSITION Decision To Discharge 02/28/2023 11:44:57 PM      Diagnostic Results and Other Data     RADIOLOGY:  CT CERVICAL SPINE WO CONTRAST   Final Result   Impression:   No acute fracture. CT MAXILLOFACIAL WO CONTRAST   Final Result   Impression:   No acute fracture. CT Head W/O Contrast   Final Result   Impression:   1. No evidence of recent intracranial hemorrhage. 2. Parenchymal volume loss and chronic small vessel ischemic changes in the white matter. 3. Small hyperdense lesions of the right midbrain in the right temporal lobe, stable from 9/13/2022 and likely remote petechial hemorrhage. 4. Small remote infarcts. LABS:   No results found for this visit on 02/28/23. RECENT VITALS:  BP: 110/65, Temp: 98.7 °F (37.1 °C), Heart Rate: 72, Resp: 16, SpO2: 95 %     Physical Exam     INITIAL VITALS: BP: 134/69, Temp: 98.7 °F (37.1 °C), Heart Rate: 75, Resp: 16, SpO2: 96 %    Physical Exam  Vitals and nursing note reviewed. Constitutional:       General: He is awake. He is not in acute distress. Appearance: He is well-developed. He is not ill-appearing or diaphoretic. HENT:      Head: Normocephalic. Contusion and laceration present. No raccoon eyes or Douglas's sign. Jaw: Tenderness present. No trismus, swelling, pain on movement or malocclusion. Comments: Contusion over the left zygoma with swelling of the superior and inferior eyelids, inferior greater than superior causing the eye to remain shut. Tenderness to palpation of the periorbital region and left zygoma. Right Ear: Tympanic membrane and ear canal normal.      Left Ear: Tympanic membrane and ear canal normal.      Nose: No congestion. Right Nostril: No epistaxis or septal hematoma. Left Nostril: No epistaxis or septal hematoma. Mouth/Throat:      Mouth: No injury. Eyes:      General: No visual field deficit. Extraocular Movements: Extraocular movements intact. Right eye: Normal extraocular motion. Left eye: Normal extraocular motion. Conjunctiva/sclera:      Right eye: No hemorrhage. Left eye: No hemorrhage. Pupils: Pupils are equal, round, and reactive to light. Neck:      Vascular: No JVD. Cardiovascular:      Rate and Rhythm: Normal rate and regular rhythm. Pulmonary:      Effort: Pulmonary effort is normal.      Breath sounds: Normal breath sounds. No stridor or decreased air movement. No decreased breath sounds, wheezing, rhonchi or rales. Chest:      Chest wall: No tenderness. Abdominal:      General: There is no distension. Tenderness: There is no abdominal tenderness. There is no guarding or rebound. Musculoskeletal:         General: No tenderness. Right shoulder: Normal.      Left shoulder: Normal.      Right upper arm: Normal.      Left upper arm: Normal.      Right elbow: Normal.      Left elbow: Normal.      Right forearm: Normal.      Left forearm: Normal.      Right wrist: Normal.      Left wrist: Normal.      Right hand: Normal.      Left hand: Normal.      Cervical back: Full passive range of motion without pain, normal range of motion and neck supple. No edema, signs of trauma, tenderness, bony tenderness or crepitus. No pain with movement. Thoracic back: No edema, signs of trauma, tenderness or bony tenderness. Normal range of motion. Lumbar back: No edema, signs of trauma, tenderness or bony tenderness. Right hip: Normal.      Left hip: Normal.      Right upper leg: Normal.      Left upper leg: Normal.      Right knee: Normal.      Left knee: Normal.      Right lower leg: Normal.      Left lower leg: Normal.      Right ankle: Normal.      Right Achilles Tendon: Normal.      Left ankle: Normal.      Left Achilles Tendon: Normal.      Right foot: Normal.      Left foot: Normal.   Skin:     General: Skin is warm and dry. Coloration: Skin is not pale. Findings: No bruising, burn, signs of injury or laceration.    Neurological: General: No focal deficit present. Mental Status: He is alert and oriented to person, place, and time. GCS: GCS eye subscore is 4. GCS verbal subscore is 5. GCS motor subscore is 6. Cranial Nerves: No cranial nerve deficit, dysarthria or facial asymmetry. Sensory: No sensory deficit. Motor: No weakness, abnormal muscle tone or pronator drift. Deep Tendon Reflexes: Babinski sign absent on the right side. Babinski sign absent on the left side. Psychiatric:         Attention and Perception: Attention normal.         Mood and Affect: Mood and affect normal.         Speech: Speech normal.       Review of Systems     Review of Systems   Constitutional:  Negative for chills, diaphoresis and fever. HENT:  Negative for congestion and sore throat. Respiratory:  Negative for cough, shortness of breath and wheezing. Cardiovascular:  Negative for chest pain, palpitations and leg swelling. Gastrointestinal:  Negative for abdominal pain, diarrhea, nausea and vomiting. Genitourinary:  Negative for dysuria. Musculoskeletal:  Negative for myalgias. Skin:  Positive for wound. Negative for rash. Left-sided facial swelling of the cheek and surrounding the eye. Small laceration over the cheek, bleeding controlled. Neurological:  Positive for headaches. Negative for dizziness, seizures and weakness. Hematological:  Does not bruise/bleed easily. Psychiatric/Behavioral:  Negative for hallucinations and suicidal ideas. All other systems reviewed and are negative. Past Medical, Surgical, Family, and Social History     He has a past medical history of Cerebral artery occlusion with cerebral infarction [facility]), Combined systolic and diastolic heart failure ([facility] [address] COPD (chronic obstructive pulmonary disease) ([facility] [address] Gout, Hemodialysis patient ([facility] [address] Hx of blood clots, Hyperlipidemia, and Hypertension.   He has a past surgical history that includes Dialysis fistula creation (Left); Colonoscopy; CT BIOPSY BONE MARROW (11/15/2022); Upper gastrointestinal endoscopy (N/A, 1/17/2023); and Colonoscopy (N/A, 1/17/2023). His family history includes Cancer in his father and mother; Other in his sister. He reports that he has quit smoking. He has never used smokeless tobacco. He reports that he does not currently use alcohol. He reports that he does not use drugs. Medications     Discharge Medication List as of 2/28/2023 11:28 PM        CONTINUE these medications which have NOT CHANGED    Details   calcium acetate (PHOSLO) 667 MG TABS Take 2 tablets by mouth 3 times daily With mealsHistorical Med      apixaban (ELIQUIS) 2.5 MG TABS tablet Take 1 tablet by mouth 2 times daily, Disp-180 tablet, R-1Historical Med      gabapentin (NEURONTIN) 100 MG capsule Take 100 mg by mouth nightly. Historical Med      ferrous sulfate (IRON 325) 325 (65 Fe) MG tablet Take 325 mg by mouth every other dayHistorical Med      pantoprazole (PROTONIX) 40 MG tablet Take 1 tablet by mouth every morning (before breakfast), Disp-30 tablet, R-3DC to SNF      levothyroxine (SYNTHROID) 75 MCG tablet Take 1 tablet by mouth Daily, Disp-30 tablet, R-3DC to SNF      carvedilol (COREG) 3.125 MG tablet Take 1 tablet by mouth 2 times daily, Disp-60 tablet, R-3Normal      sodium chloride (OCEAN) 0.65 % nasal spray 1-2 sprays both nostrils at least daily and as needed to prevent dry mucosa., Disp-1 each, R-3Adjust Sig      calcitRIOL (ROCALTROL) 0.5 MCG capsule Take 0.5 mcg by mouth three times a week On M/W/FHistorical Med      sevelamer (RENVELA) 800 MG tablet Take 1 tablet by mouth 3 times daily (with meals)Historical Med      atorvastatin (LIPITOR) 80 MG tablet Take 80 mg by mouth nightlyHistorical Med      albuterol sulfate HFA (PROVENTIL;VENTOLIN;PROAIR) 108 (90 Base) MCG/ACT inhaler Inhale 2 puffs into the lungs every 6 hours as needed for Wheezing 90mcg/actuationHistorical Med      allopurinol (ZYLOPRIM) 100 MG tablet Take 100 mg by mouth three times a week On M/W/FHistorical Med             Allergies     He is allergic to nitroglycerin and oxymetazoline.         [address], [place]  03/01/23 0005

## 2023-03-01 NOTE — DISCHARGE INSTRUCTIONS
Thank you for choosing [facility] for your emergency care today. We take a lot of pride in the fact that you chose us for your care and we strived to do everything necessary to provide you with excellent medical care. We hope you agree that you received excellent care today. To continue that excellent medical care, the following information very important that you read and understand before you leave the emergency department today. It contains information about:  Your diagnosis  What was done for you in the emergency department  What you can expect from your illness or injury moving forward  The steps you will need to take after leaving the emergency department to help achieve the best possible outcome for your illness or injury. What we did in the Emergency Department Today:     You were seen in the Emergency Department today by David Jackson PA-C. You had the following lab abnormalities:  Labs Reviewed - No data to display    If no result appears for the test, then it was within normal limits. If the test is pending, the hospital will deborah you if the results are abnormal as soon as the test is completed. You had the following diagnostic imaging:  CT CERVICAL SPINE WO CONTRAST   Final Result   Impression:   No acute fracture. CT MAXILLOFACIAL WO CONTRAST   Final Result   Impression:   No acute fracture. CT Head W/O Contrast   Final Result   Impression:   1. No evidence of recent intracranial hemorrhage. 2. Parenchymal volume loss and chronic small vessel ischemic changes in the white matter. 3. Small hyperdense lesions of the right midbrain in the right temporal lobe, stable from 9/13/2022 and likely remote petechial hemorrhage. 4. Small remote infarcts. No evidence of fracture or intracranial hemorrhage.     You were given the following medications during your stay in the Emergency Department:  Orders Placed This Encounter   Medications DISCONTD: HYDROmorphone (DILAUDID) injection 0.25 mg    ondansetron (ZOFRAN) injection 4 mg    HYDROmorphone (DILAUDID) injection 0.5 mg       You were diagnosed with the followin. Facial contusion, initial encounter    2. Fall, initial encounter      IMPORTANT: If your condition worsens, or your development symptoms that you find concerning and want to have checked out immediately, do not hesitate to return to the Emergency Department. You can call  and have trained Emergency Medical Service providers bring you to the emergency department if you can't do so safely and quickly. They can begin the medical evaluation, on scene, wherever you are located and can begin critical medical care on the way to the emergency. Signs and symptoms that should always cause concern and be evaluated urgently or in the Emergency Department:  Going Unconscious  Dizziness, lightheadedness like you are going to faint, confusion, loss of balance or new clumsiness  New Seizures  Significant Head Injury  Eye Injuries or new vision changes  Severe Nausea and Vomiting that prevents you from eating, drinking and/or taking your medications  Significant or persistent fevers (Above 100.3 F in babies, over 80 F in adults, as an example)  Chest Pain  Shortness of Breath  Sudden, severe pain  Cuts that won't stop bleeding  Significant [place]am[place]  Bleeding during Pregnancy in women  Testicular Pain in men    Your Plan of Care after leaving our Emergency Department     You should read the following instructions before leaving the Emergency Department. If you have any questions about this Plan of Care, please don't hesitate to ask. It is important that you understand this Plan of Care so that you can achieve the best possible outcome from your illness or injury. IMPORTANT INSTRUCTIONS:    Keep head of bed at 30 degrees if possible to help with decrease in facial swelling.     Apply ice to facial contusion 4-5 times daily, 20 minute application     You should follow-up with:  Joslyn Rizo MD  [address] [facility]  [facility] [address] [phone]    Schedule an appointment as soon as possible for a visit   As needed    [facility] Emergency Department  [address] [id number] [phone]  Go to   If symptoms worsen      You should call the number of the providers listed above to schedule follow-up appointments in the timeline recommended or to speak to a healthcare provider. These providers also have on-call clinicians available after hours and on weekends for urgent questions and can be reached by calling the same number. If you feel your issue is an emergency, please don't hesitate to return to the emergency department for evaluation. If you can not safely and quickly get yourself to the emergency department, call 9-1-1 and have trained Emergency Medical Service providers bring you to the emergency department. They can begin the medical evaluation, on scene, wherever you are located and can begin critical medical care on the way to the emergency department while in the ambulance.    Even if not listed above, you should always call your Primary Care Provider after a visit to the Emergency Department. They will want to know what your emergency was so they can best figure out how to continue to coordinate your care moving forward. Your Primary Care Provider is responsible for coordinating and tracking your health and medical care. You should keep them informed regularly of any and all new medical conditions, changes to your medications or other information pertinent to your health.    DISCHARGE MEDICATIONS:  The following medications were prescribed for the you during this visit. Take them as directed below:     New Prescriptions    No medications on file       These home medications were modified or discontinued during this visit (be sure you discuss these modifications  with your primary care or prescribing physician as soon as possible): Modified Medications    No medications on file     Discontinued Medications    No medications on file        You should continue to take the following home medications as prescribed by your physician:   Previous Medications    ALBUTEROL SULFATE HFA (PROVENTIL;VENTOLIN;PROAIR) 108 (90 BASE) MCG/ACT INHALER    Inhale 2 puffs into the lungs every 6 hours as needed for Wheezing 90mcg/actuation    ALLOPURINOL (ZYLOPRIM) 100 MG TABLET    Take 100 mg by mouth three times a week On M/W/F    APIXABAN (ELIQUIS) 2.5 MG TABS TABLET    Take 1 tablet by mouth 2 times daily    ATORVASTATIN (LIPITOR) 80 MG TABLET    Take 80 mg by mouth nightly    CALCITRIOL (ROCALTROL) 0.5 MCG CAPSULE    Take 0.5 mcg by mouth three times a week On M/W/F    CALCIUM ACETATE (PHOSLO) 667 MG TABS    Take 2 tablets by mouth 3 times daily With meals    CARVEDILOL (COREG) 3.125 MG TABLET    Take 1 tablet by mouth 2 times daily    FERROUS SULFATE (IRON 325) 325 (65 FE) MG TABLET    Take 325 mg by mouth every other day    GABAPENTIN (NEURONTIN) 100 MG CAPSULE    Take 100 mg by mouth nightly. LEVOTHYROXINE (SYNTHROID) 75 MCG TABLET    Take 1 tablet by mouth Daily    PANTOPRAZOLE (PROTONIX) 40 MG TABLET    Take 1 tablet by mouth every morning (before breakfast)    SEVELAMER (RENVELA) 800 MG TABLET    Take 1 tablet by mouth 3 times daily (with meals)    SODIUM CHLORIDE (OCEAN) 0.65 % NASAL SPRAY    1-2 sprays both nostrils at least daily and as needed to prevent dry mucosa. Additional important information has been included within this packet, please make sure that you have read this information as it will better help you understand your illness/injury better, the danger signs to watch for and things you can do to improve your condition and health in the future.

## 2023-03-01 NOTE — ED PROVIDER NOTES
ED Attending Attestation Note     Date of evaluation: 2/23/2023    This patient was seen by the advance practice provider.  I have seen and examined the patient, agree with the workup, evaluation, management and diagnosis. The care plan has been discussed.      Patient History     Chief Complaint: Fall (Pt present post fall. EMS states he did hit his head, no LOC, no syncope. States he was trying to put socks on while in bed and rolled too far and out of the bed.)      HPI: Mike Rivera is a 67 y.o. to the Emergency Department with complaints of head and facial injury, after a fall at his long-term care facility.  Patient describes a very mechanical fall, in which he was on his bed trying to put his socks on, lost his balance, and fell off of the bed.  He struck primarily the left side of his face.  He denies loss of consciousness, but complains significantly of headache and pain in the left side of his face.  He is able to open the left eye, although there is significant swelling there, and denies changes in vision.  He complains of diffuse back pain, which she states is generally present, but worse since the fall.  He denies any other sources of pain related to this event.    He has a past medical history of Cerebral artery occlusion with cerebral infarction (HCC), Combined systolic and diastolic heart failure (HCC), COPD (chronic obstructive pulmonary disease) (HCC), Gout, Hemodialysis patient (HCC), Hx of blood clots, Hyperlipidemia, and Hypertension.    He has a past surgical history that includes Dialysis fistula creation (Left); Colonoscopy; CT BIOPSY BONE MARROW (11/15/2022); Upper gastrointestinal endoscopy (N/A, 1/17/2023); and Colonoscopy (N/A, 1/17/2023).    family history includes Cancer in his father and mother; Other in his sister.    He reports that he has quit smoking. He has never used smokeless tobacco. He reports that he does not currently use alcohol. He reports that he does not use  drugs. Pertinent Physical Exam Findings:   Slender, somewhat chronically ill-appearing gentleman, in no acute distress. There is significant edema and developing ecchymosis over primarily the left maxillary and periorbital region of the face. The left upper and lower eyelids are significantly edematous, although the patient is able to open his eye, and when he does so there are no abnormalities of examination of the eye itself. He has somewhat diffuse tenderness to palpation throughout the cervical region. He has diffuse, nonfocal tenderness to palpation throughout the entirety of the midline and bilateral paraspinous regions of the thoracic and lumbar back. No chest wall, abdominal, or extremity tenderness to palpation. Medical decision making:  Patient presents with significant evidence of facial injury after a mechanical fall. No indication for medical work-up. Head, face, and C-spine CT are performed, which do show the areas of ecchymosis and edema over the maxillary region, but thankfully no fractures and no evidence of intracranial or cervical injury. The patient does additionally complain of diffuse back pain, although he has no focal tenderness to palpation to suggest bony injury, and further spine imaging is not indicated.      Markell Gibbs MD  03/03/23 1033

## 2024-03-21 NOTE — ED PROVIDER NOTES
[id number]          ATTENDING PHYSICIAN NOTE       Date of evaluation: 10/12/2022    Chief Complaint     Other (S/p HD with bleeding from fistula that nursing facility could not control. Per pt no other complaints at this time.)      History of Present Illness     Maria Teresa Lawrence is a 61 y.o. male with a history of end-stage renal disease, blood clots on anticoagulant medication who presents to the hospital for evaluation of a bleeding fistula. The patient had dialysis today which went well without any issues but was noted to have bleeding from his fistula which is happened multiple times before. He denies any lightheadedness or syncope. He denies any direct trauma. He denies any chest pain or trouble breathing. He has not had any nausea or vomiting. He states that his been taking his medications as prescribed. He is currently on Eliquis for anticoagulation. Review of Systems     Review of Systems   HENT:  Negative for congestion and ear discharge. Eyes:  Negative for photophobia and visual disturbance. Respiratory:  Negative for apnea. Cardiovascular:  Negative for chest pain. Gastrointestinal:  Negative for abdominal pain, blood in stool, nausea, rectal pain and vomiting. Endocrine: Negative for polydipsia. Genitourinary:  Negative for difficulty urinating. Musculoskeletal:  Negative for back pain. Skin:  Negative for pallor. Neurological:  Negative for dizziness and syncope. Hematological:  Negative for adenopathy. Bruises/bleeds easily. All other systems reviewed and are negative.     Past Medical, Surgical, Family, and Social History     He has a past medical history of Acute HFrEF (heart failure with reduced ejection fraction) ([facility] [address] Cerebral artery occlusion with cerebral infarction ([facility] [address] Chronic kidney disease, COPD (chronic obstructive pulmonary disease) ([facility] [address] Dialysis patient ([facility] [address] Gout, Hemodialysis patient ([facility] [address] Hx of blood clots, Hyperlipidemia, Hypertension, and Renal failure. He has a past surgical history that includes Dialysis fistula creation (Left) and Colonoscopy. His family history includes Cancer in his father and mother; Other in his sister. He reports that he has quit smoking. He has never used smokeless tobacco. He reports current alcohol use. He reports that he does not use drugs. Medications     Previous Medications    ALBUTEROL SULFATE HFA (PROVENTIL;VENTOLIN;PROAIR) 108 (90 BASE) MCG/ACT INHALER    Inhale 2 puffs into the lungs every 6 hours as needed for Wheezing 90mcg/actuation    ALLOPURINOL (ZYLOPRIM) 100 MG TABLET    Take 100 mg by mouth Daily on MWF    APIXABAN (ELIQUIS) 2.5 MG TABS TABLET    Take 1 tablet by mouth 2 times daily    ATORVASTATIN (LIPITOR) 80 MG TABLET    Take 80 mg by mouth daily    CALCITRIOL (ROCALTROL) 0.25 MCG CAPSULE    Take 0.5 mcg by mouth every other day MW    CALCIUM CARB-CHOLECALCIFEROL 600-200 MG-UNIT TABS TABLET    Take 1 tablet by mouth daily    CARVEDILOL (COREG) 12.5 MG TABLET    Take 12.5 mg by mouth 2 times daily Unsure of dose     DICLOFENAC SODIUM (VOLTAREN) 1 % GEL    Apply 2 g topically 2 times daily    FERROUS SULFATE (FE TABS) 325 (65 FE) MG EC TABLET    Take 1 tablet by mouth every other day    FUROSEMIDE (LASIX) 20 MG TABLET    Take 20 mg by mouth daily    GABAPENTIN (NEURONTIN) 100 MG CAPSULE    Take 1 capsule by mouth at bedtime for 30 days.     LEVOTHYROXINE (SYNTHROID) 100 MCG TABLET    Take 100 mcg by mouth Daily    OMEPRAZOLE (PRILOSEC) 20 MG DELAYED RELEASE CAPSULE    Take 20 mg by mouth daily    PSEUDOEPHEDRINE (SUDAFED) 30 MG TABLET    Take 30 mg by mouth every 4 hours as needed for Congestion    SEVELAMER (RENVELA) 800 MG TABLET    Take 1 tablet by mouth 3 times daily (with meals)    SODIUM CHLORIDE (OCEAN) 0.65 % NASAL SPRAY    1 spray by Nasal route as needed for Congestion    UMECLIDINIUM BROMIDE (INCRUSE ELLIPTA) 62.5 MCG/INH AEPB    Inhale 1 puff into the lungs daily    VITAMIN D (ERGOCALCIFEROL) 1.25 MG (11136 UT) CAPS CAPSULE    Take 50,000 Units by mouth once a week Saturdays       Allergies     He is allergic to nitroglycerin and oxymetazoline. Physical Exam     INITIAL VITALS: BP: 134/74, Temp: 98.4 °F (36.9 °C), Heart Rate: 80, Resp: 16, SpO2: 96 %   Physical Exam  Vitals and nursing note reviewed. Constitutional:       General: He is not in acute distress. Appearance: Normal appearance. He is well-developed. He is not ill-appearing, toxic-appearing or diaphoretic. HENT:      Head: Normocephalic and atraumatic. Eyes:      Pupils: Pupils are equal, round, and reactive to light. Cardiovascular:      Rate and Rhythm: Normal rate and regular rhythm. Heart sounds: Normal heart sounds. Pulmonary:      Effort: Pulmonary effort is normal.      Breath sounds: Normal breath sounds. Abdominal:      General: Bowel sounds are normal. There is no distension. Palpations: Abdomen is soft. Tenderness: There is no abdominal tenderness. Musculoskeletal:         General: No swelling or tenderness. Normal range of motion. Cervical back: Neck supple. Comments: AV fistula wrapped with gauze but oozing noted. Thrill and bruit appreciated   Skin:     General: Skin is warm and dry. Neurological:      Mental Status: He is alert and oriented to person, place, and time. Diagnostic Results         RADIOLOGY:  No orders to display       LABS:   No results found for this visit on 10/12/22. ED BEDSIDE ULTRASOUND:  No results found.     RECENT VITALS:  BP: 134/74,Temp: 98.4 °F (36.9 °C), Heart Rate: 80, Resp: 16, SpO2: 96 %     Procedures     Lac Repair    Date/Time: 10/12/2022 4:36 PM  Performed by: Natalie Holder MD  Authorized by: Natalie Holder MD     Consent:     Consent obtained:  Verbal    Consent given by:  Patient    Risks, benefits, and alternatives were discussed: yes      Risks discussed:  Pain and infection  Universal protocol:     Patient identity confirmed:  Verbally with patient  Anesthesia:     Anesthesia method:  None  Laceration details:     Location:  Shoulder/arm    Shoulder/arm location:  L upper arm  Pre-procedure details:     Preparation:  Patient was prepped and draped in usual sterile fashion  Exploration:     Hemostasis achieved with:  Direct pressure  Treatment:     Area cleansed with:  Povidone-iodine  Skin repair:     Repair method:  Sutures    Suture size:  4-0    Suture material:  Prolene    Suture technique:  Simple interrupted    Number of sutures:  1  Approximation:     Approximation:  Close  Post-procedure details:     Dressing:  Bulky dressing    Procedure completion:  Tolerated well, no immediate complications  Comments:      Cleaned area with Betadine and attempted to place one 4-0 Prolene suture without any hemostasis at pressure at this time. Compressive dressing applied and findings discussed with vascular surgery     ED Course     Nursing Notes, Past Medical Hx, Past Surgical Hx, Social Hx,Allergies, and Family Hx were reviewed. ED Course as of 10/12/22 1635   Wed Oct 12, 2022   1615 Upon arrival the patient was noted to have pulsatile bleeding from his AV fistula. Bedside attempt was done with a figure-of-eight stitch without any significant improvement in hemostasis. At this time the patient's findings were discussed with the vascular surgery team who came down to bedside.  [EI]      ED Course User Index  [EI] Alice Maldonado MD       patient was given the following medications:  Orders Placed This Encounter   Medications    lidocaine-EPINEPHrine 1 percent-1:[id number] injection 20 mL    oxyCODONE (ROXICODONE) immediate release tablet 5 mg       CONSULTS:  IP CONSULT TO VASCULAR SURGERY  IP CONSULT TO HOSPITALIST    MEDICAL DECISIONMAKING / ASSESSMENT / Ankita Amarilis is a 61 y.o. male with a history of end-stage renal disease on dialysis with multiple episodes of bleeding fistula coming in with another episode today after finishing treatment of dialysis. Patient without any symptoms of lightheadedness, syncope, shortness of breath and bedside attempt at hemostasis failing. Findings discussed with vascular surgery who will be recommending admission to hospitalist and they will determine if they will repeat an fistulogram and definitive management between them and interventional radiology. They applied some hemostasis material and wrapped his fistula at this time which continues to have a good thrill and bruit. Given admission CBC and renal panel obtained at this time. Clinical Impression     1. Hemorrhage of arteriovenous fistula, initial encounter ([facility] [address]        Disposition     PATIENT REFERRED TO:  No follow-up provider specified.     DISCHARGE MEDICATIONS:  New Prescriptions    No medications on file       DISPOSITION Decision To Admit 10/12/2022 04:27:02 PM         Johnie Huizar MD  10/12/22 [id number] Male

## 2024-08-08 NOTE — PROGRESS NOTES
Laboratory studies did not show any signs of overt dehydration.  Renal function was normal.  The patient's electrolytes were normal.  Chloride was slightly low at 95.  The patient had a normal white blood cell count.  Hemoglobin and hematocrit are stable.  The patient did have elevated of monocytes and some low lymphocyte percentage but nonspecific findings.  Magnesium was normal. VSS. 3L NC baseline. A&Ox4. SBA. Tolerating fiet. Angiogram for fistula. No stenosis w/ graft. Patient has had 2 nose bleeds throughout shift. Hgb 7.8 after 1 unit. BM today. Call light within reach. All needs met at this time.  Will cont to monitor

## (undated) DEVICE — CANNULA SAMP CO2 AD GRN 7FT CO2 AND 7FT O2 TBNG UNIV CONN

## (undated) DEVICE — TRAP SPEC RETRV CLR PLAS POLYP IN LN SUCT QUIK CTCH

## (undated) DEVICE — SNARES COLD OVAL 10MM THIN

## (undated) DEVICE — FORCEPS BX L240CM JAW DIA2.4MM ORNG L CAP W/ NDL DISP RAD